# Patient Record
Sex: FEMALE | Race: WHITE | NOT HISPANIC OR LATINO | Employment: OTHER | URBAN - METROPOLITAN AREA
[De-identification: names, ages, dates, MRNs, and addresses within clinical notes are randomized per-mention and may not be internally consistent; named-entity substitution may affect disease eponyms.]

---

## 2017-01-16 ENCOUNTER — LAB REQUISITION (OUTPATIENT)
Dept: LAB | Facility: HOSPITAL | Age: 82
End: 2017-01-16
Payer: MEDICARE

## 2017-01-16 ENCOUNTER — ALLSCRIPTS OFFICE VISIT (OUTPATIENT)
Dept: OTHER | Facility: OTHER | Age: 82
End: 2017-01-16

## 2017-01-16 DIAGNOSIS — N39.0 URINARY TRACT INFECTION: ICD-10-CM

## 2017-01-16 LAB
BILIRUB UR QL STRIP: NEGATIVE
CLARITY UR: NORMAL
COLOR UR: YELLOW
GLUCOSE (HISTORICAL): NEGATIVE
HGB UR QL STRIP.AUTO: NEGATIVE
KETONES UR STRIP-MCNC: NEGATIVE MG/DL
LEUKOCYTE ESTERASE UR QL STRIP: NORMAL
NITRITE UR QL STRIP: NEGATIVE
PH UR STRIP.AUTO: 7 [PH]
PROT UR STRIP-MCNC: NEGATIVE MG/DL
SP GR UR STRIP.AUTO: 1.01
UROBILINOGEN UR QL STRIP.AUTO: 0.2

## 2017-01-16 PROCEDURE — 87086 URINE CULTURE/COLONY COUNT: CPT | Performed by: FAMILY MEDICINE

## 2017-01-18 LAB — BACTERIA UR CULT: NORMAL

## 2017-01-19 ENCOUNTER — GENERIC CONVERSION - ENCOUNTER (OUTPATIENT)
Dept: OTHER | Facility: OTHER | Age: 82
End: 2017-01-19

## 2017-01-30 ENCOUNTER — ALLSCRIPTS OFFICE VISIT (OUTPATIENT)
Dept: OTHER | Facility: OTHER | Age: 82
End: 2017-01-30

## 2017-01-30 ENCOUNTER — LAB REQUISITION (OUTPATIENT)
Dept: LAB | Facility: HOSPITAL | Age: 82
End: 2017-01-30
Payer: MEDICARE

## 2017-01-30 DIAGNOSIS — R31.9 HEMATURIA: ICD-10-CM

## 2017-01-30 LAB
BILIRUB UR QL STRIP: NORMAL
CLARITY UR: NORMAL
COLOR UR: YELLOW
GLUCOSE (HISTORICAL): NORMAL
HGB UR QL STRIP.AUTO: NORMAL
KETONES UR STRIP-MCNC: NORMAL MG/DL
LEUKOCYTE ESTERASE UR QL STRIP: NORMAL
NITRITE UR QL STRIP: NORMAL
PH UR STRIP.AUTO: 7 [PH]
PROT UR STRIP-MCNC: NORMAL MG/DL
SP GR UR STRIP.AUTO: 1.01
UROBILINOGEN UR QL STRIP.AUTO: 0.2

## 2017-01-30 PROCEDURE — 87086 URINE CULTURE/COLONY COUNT: CPT | Performed by: FAMILY MEDICINE

## 2017-02-01 LAB — BACTERIA UR CULT: NORMAL

## 2017-02-03 ENCOUNTER — GENERIC CONVERSION - ENCOUNTER (OUTPATIENT)
Dept: OTHER | Facility: OTHER | Age: 82
End: 2017-02-03

## 2017-02-16 DIAGNOSIS — R30.0 DYSURIA: ICD-10-CM

## 2017-02-16 DIAGNOSIS — N39.0 URINARY TRACT INFECTION: ICD-10-CM

## 2017-03-09 ENCOUNTER — ALLSCRIPTS OFFICE VISIT (OUTPATIENT)
Dept: OTHER | Facility: OTHER | Age: 82
End: 2017-03-09

## 2017-03-09 ENCOUNTER — GENERIC CONVERSION - ENCOUNTER (OUTPATIENT)
Dept: OTHER | Facility: OTHER | Age: 82
End: 2017-03-09

## 2017-03-09 ENCOUNTER — APPOINTMENT (OUTPATIENT)
Dept: LAB | Facility: HOSPITAL | Age: 82
End: 2017-03-09
Payer: MEDICARE

## 2017-03-09 LAB
BILIRUB UR QL STRIP: NORMAL
CLARITY UR: NORMAL
COLOR UR: YELLOW
GLUCOSE (HISTORICAL): NORMAL
HGB UR QL STRIP.AUTO: NORMAL
KETONES UR STRIP-MCNC: NORMAL MG/DL
LEUKOCYTE ESTERASE UR QL STRIP: NORMAL
NITRITE UR QL STRIP: NORMAL
PH UR STRIP.AUTO: 7 [PH]
PROT UR STRIP-MCNC: NORMAL MG/DL
SP GR UR STRIP.AUTO: 1.02
UROBILINOGEN UR QL STRIP.AUTO: 0.2

## 2017-03-09 PROCEDURE — 87077 CULTURE AEROBIC IDENTIFY: CPT

## 2017-03-10 ENCOUNTER — APPOINTMENT (OUTPATIENT)
Dept: LAB | Facility: HOSPITAL | Age: 82
End: 2017-03-10
Payer: MEDICARE

## 2017-03-10 DIAGNOSIS — R30.0 DYSURIA: ICD-10-CM

## 2017-03-10 PROCEDURE — 87086 URINE CULTURE/COLONY COUNT: CPT

## 2017-03-11 ENCOUNTER — ALLSCRIPTS OFFICE VISIT (OUTPATIENT)
Dept: OTHER | Facility: OTHER | Age: 82
End: 2017-03-11

## 2017-03-11 ENCOUNTER — LAB REQUISITION (OUTPATIENT)
Dept: LAB | Facility: HOSPITAL | Age: 82
End: 2017-03-11
Payer: MEDICARE

## 2017-03-11 DIAGNOSIS — N30.00 ACUTE CYSTITIS WITHOUT HEMATURIA: ICD-10-CM

## 2017-03-11 PROCEDURE — 87086 URINE CULTURE/COLONY COUNT: CPT | Performed by: FAMILY MEDICINE

## 2017-03-11 PROCEDURE — 87077 CULTURE AEROBIC IDENTIFY: CPT | Performed by: FAMILY MEDICINE

## 2017-03-17 LAB
BACTERIA UR CULT: NORMAL
BACTERIA UR CULT: NORMAL

## 2017-04-03 ENCOUNTER — ALLSCRIPTS OFFICE VISIT (OUTPATIENT)
Dept: OTHER | Facility: OTHER | Age: 82
End: 2017-04-03

## 2017-04-03 ENCOUNTER — LAB REQUISITION (OUTPATIENT)
Dept: LAB | Facility: HOSPITAL | Age: 82
End: 2017-04-03
Payer: MEDICARE

## 2017-04-03 ENCOUNTER — GENERIC CONVERSION - ENCOUNTER (OUTPATIENT)
Dept: OTHER | Facility: OTHER | Age: 82
End: 2017-04-03

## 2017-04-03 DIAGNOSIS — N39.0 URINARY TRACT INFECTION: ICD-10-CM

## 2017-04-03 DIAGNOSIS — R30.0 DYSURIA: ICD-10-CM

## 2017-04-03 LAB
BILIRUB UR QL STRIP: ABNORMAL
CLARITY UR: ABNORMAL
COLOR UR: YELLOW
GLUCOSE (HISTORICAL): ABNORMAL
HGB UR QL STRIP.AUTO: ABNORMAL
KETONES UR STRIP-MCNC: ABNORMAL MG/DL
LEUKOCYTE ESTERASE UR QL STRIP: ABNORMAL
NITRITE UR QL STRIP: ABNORMAL
PH UR STRIP.AUTO: 7.5 [PH]
PROT UR STRIP-MCNC: 30 MG/DL
SP GR UR STRIP.AUTO: 1.01
UROBILINOGEN UR QL STRIP.AUTO: 0.2

## 2017-04-03 PROCEDURE — 87086 URINE CULTURE/COLONY COUNT: CPT | Performed by: FAMILY MEDICINE

## 2017-04-05 LAB — BACTERIA UR CULT: NORMAL

## 2017-04-06 ENCOUNTER — GENERIC CONVERSION - ENCOUNTER (OUTPATIENT)
Dept: OTHER | Facility: OTHER | Age: 82
End: 2017-04-06

## 2017-04-20 ENCOUNTER — APPOINTMENT (OUTPATIENT)
Dept: LAB | Facility: CLINIC | Age: 82
End: 2017-04-20
Payer: MEDICARE

## 2017-04-20 ENCOUNTER — TRANSCRIBE ORDERS (OUTPATIENT)
Dept: LAB | Facility: CLINIC | Age: 82
End: 2017-04-20

## 2017-04-20 DIAGNOSIS — E87.6 HYPOKALEMIA: ICD-10-CM

## 2017-04-20 DIAGNOSIS — M19.90 OSTEOARTHRITIS: ICD-10-CM

## 2017-04-20 DIAGNOSIS — K21.9 GASTRO-ESOPHAGEAL REFLUX DISEASE WITHOUT ESOPHAGITIS: ICD-10-CM

## 2017-04-20 DIAGNOSIS — I10 ESSENTIAL (PRIMARY) HYPERTENSION: ICD-10-CM

## 2017-04-20 DIAGNOSIS — R74.8 ABNORMAL LEVELS OF OTHER SERUM ENZYMES: ICD-10-CM

## 2017-04-20 DIAGNOSIS — R68.89 OTHER GENERAL SYMPTOMS AND SIGNS: ICD-10-CM

## 2017-04-20 DIAGNOSIS — E55.9 VITAMIN D DEFICIENCY: ICD-10-CM

## 2017-04-20 DIAGNOSIS — R73.9 HYPERGLYCEMIA: ICD-10-CM

## 2017-04-20 DIAGNOSIS — E78.5 HYPERLIPIDEMIA: ICD-10-CM

## 2017-04-20 DIAGNOSIS — Z00.00 ENCOUNTER FOR GENERAL ADULT MEDICAL EXAMINATION WITHOUT ABNORMAL FINDINGS: ICD-10-CM

## 2017-04-20 DIAGNOSIS — F41.9 ANXIETY DISORDER: ICD-10-CM

## 2017-04-20 DIAGNOSIS — I89.0 LYMPHEDEMA, NOT ELSEWHERE CLASSIFIED: ICD-10-CM

## 2017-04-20 LAB
25(OH)D3 SERPL-MCNC: 58.3 NG/ML (ref 30–100)
ALBUMIN SERPL BCP-MCNC: 4.1 G/DL (ref 3.5–5)
ALP SERPL-CCNC: 96 U/L (ref 46–116)
ALT SERPL W P-5'-P-CCNC: 16 U/L (ref 12–78)
ANION GAP SERPL CALCULATED.3IONS-SCNC: 7 MMOL/L (ref 4–13)
AST SERPL W P-5'-P-CCNC: 14 U/L (ref 5–45)
BACTERIA UR QL AUTO: ABNORMAL /HPF
BILIRUB SERPL-MCNC: 0.47 MG/DL (ref 0.2–1)
BILIRUB UR QL STRIP: NEGATIVE
BUN SERPL-MCNC: 11 MG/DL (ref 5–25)
CALCIUM SERPL-MCNC: 9.3 MG/DL (ref 8.3–10.1)
CHLORIDE SERPL-SCNC: 99 MMOL/L (ref 100–108)
CHOLEST SERPL-MCNC: 204 MG/DL (ref 50–200)
CLARITY UR: ABNORMAL
CO2 SERPL-SCNC: 30 MMOL/L (ref 21–32)
COLOR UR: YELLOW
CREAT SERPL-MCNC: 0.56 MG/DL (ref 0.6–1.3)
ERYTHROCYTE [DISTWIDTH] IN BLOOD BY AUTOMATED COUNT: 14 % (ref 11.6–15.1)
EST. AVERAGE GLUCOSE BLD GHB EST-MCNC: 111 MG/DL
GFR SERPL CREATININE-BSD FRML MDRD: >60 ML/MIN/1.73SQ M
GLUCOSE P FAST SERPL-MCNC: 109 MG/DL (ref 65–99)
GLUCOSE UR STRIP-MCNC: NEGATIVE MG/DL
HBA1C MFR BLD: 5.5 % (ref 4.2–6.3)
HCT VFR BLD AUTO: 39.8 % (ref 34.8–46.1)
HDLC SERPL-MCNC: 60 MG/DL (ref 40–60)
HGB BLD-MCNC: 13.6 G/DL (ref 11.5–15.4)
HGB UR QL STRIP.AUTO: NEGATIVE
KETONES UR STRIP-MCNC: NEGATIVE MG/DL
LDLC SERPL CALC-MCNC: 100 MG/DL (ref 0–100)
LEUKOCYTE ESTERASE UR QL STRIP: ABNORMAL
MCH RBC QN AUTO: 31.8 PG (ref 26.8–34.3)
MCHC RBC AUTO-ENTMCNC: 34.2 G/DL (ref 31.4–37.4)
MCV RBC AUTO: 93 FL (ref 82–98)
NITRITE UR QL STRIP: NEGATIVE
NON-SQ EPI CELLS URNS QL MICRO: ABNORMAL /HPF
PH UR STRIP.AUTO: 6.5 [PH] (ref 4.5–8)
PLATELET # BLD AUTO: 254 THOUSANDS/UL (ref 149–390)
PMV BLD AUTO: 10.6 FL (ref 8.9–12.7)
POTASSIUM SERPL-SCNC: 3.7 MMOL/L (ref 3.5–5.3)
PROT SERPL-MCNC: 8 G/DL (ref 6.4–8.2)
PROT UR STRIP-MCNC: NEGATIVE MG/DL
RBC # BLD AUTO: 4.28 MILLION/UL (ref 3.81–5.12)
RBC #/AREA URNS AUTO: ABNORMAL /HPF
SODIUM SERPL-SCNC: 136 MMOL/L (ref 136–145)
SP GR UR STRIP.AUTO: 1.01 (ref 1–1.03)
TRIGL SERPL-MCNC: 222 MG/DL
TSH SERPL DL<=0.05 MIU/L-ACNC: 2.21 UIU/ML (ref 0.36–3.74)
UROBILINOGEN UR QL STRIP.AUTO: 0.2 E.U./DL
WBC # BLD AUTO: 4.53 THOUSAND/UL (ref 4.31–10.16)
WBC #/AREA URNS AUTO: ABNORMAL /HPF

## 2017-04-20 PROCEDURE — 36415 COLL VENOUS BLD VENIPUNCTURE: CPT

## 2017-04-20 PROCEDURE — 84075 ASSAY ALKALINE PHOSPHATASE: CPT

## 2017-04-20 PROCEDURE — 80053 COMPREHEN METABOLIC PANEL: CPT

## 2017-04-20 PROCEDURE — 81001 URINALYSIS AUTO W/SCOPE: CPT

## 2017-04-20 PROCEDURE — 80061 LIPID PANEL: CPT

## 2017-04-20 PROCEDURE — 84080 ASSAY ALKALINE PHOSPHATASES: CPT

## 2017-04-20 PROCEDURE — 83036 HEMOGLOBIN GLYCOSYLATED A1C: CPT

## 2017-04-20 PROCEDURE — 82306 VITAMIN D 25 HYDROXY: CPT

## 2017-04-20 PROCEDURE — 85027 COMPLETE CBC AUTOMATED: CPT

## 2017-04-20 PROCEDURE — 84443 ASSAY THYROID STIM HORMONE: CPT

## 2017-04-24 LAB
ALP BONE CFR SERPL: 31 % (ref 14–68)
ALP INTEST CFR SERPL: 7 % (ref 0–18)
ALP LIVER CFR SERPL: 62 % (ref 18–85)
ALP SERPL-CCNC: 93 IU/L (ref 39–117)

## 2017-04-28 ENCOUNTER — ALLSCRIPTS OFFICE VISIT (OUTPATIENT)
Dept: OTHER | Facility: OTHER | Age: 82
End: 2017-04-28

## 2017-04-28 ENCOUNTER — LAB REQUISITION (OUTPATIENT)
Dept: LAB | Facility: HOSPITAL | Age: 82
End: 2017-04-28
Payer: MEDICARE

## 2017-04-28 DIAGNOSIS — N39.0 URINARY TRACT INFECTION: ICD-10-CM

## 2017-04-28 LAB
BILIRUB UR QL STRIP: NEGATIVE
CLARITY UR: NORMAL
COLOR UR: CLEAR
GLUCOSE (HISTORICAL): NEGATIVE
HGB UR QL STRIP.AUTO: NEGATIVE
KETONES UR STRIP-MCNC: NEGATIVE MG/DL
LEUKOCYTE ESTERASE UR QL STRIP: NORMAL
NITRITE UR QL STRIP: NEGATIVE
PH UR STRIP.AUTO: 7 [PH]
PROT UR STRIP-MCNC: NEGATIVE MG/DL
SP GR UR STRIP.AUTO: 1.01
UROBILINOGEN UR QL STRIP.AUTO: 0.2

## 2017-04-28 PROCEDURE — 87086 URINE CULTURE/COLONY COUNT: CPT | Performed by: FAMILY MEDICINE

## 2017-05-01 LAB — BACTERIA UR CULT: NORMAL

## 2017-05-02 ENCOUNTER — GENERIC CONVERSION - ENCOUNTER (OUTPATIENT)
Dept: OTHER | Facility: OTHER | Age: 82
End: 2017-05-02

## 2017-06-06 ENCOUNTER — ALLSCRIPTS OFFICE VISIT (OUTPATIENT)
Dept: OTHER | Facility: OTHER | Age: 82
End: 2017-06-06

## 2017-10-19 ENCOUNTER — APPOINTMENT (OUTPATIENT)
Dept: LAB | Facility: HOSPITAL | Age: 82
End: 2017-10-19
Payer: MEDICARE

## 2017-10-19 ENCOUNTER — APPOINTMENT (EMERGENCY)
Dept: CT IMAGING | Facility: HOSPITAL | Age: 82
End: 2017-10-19
Payer: MEDICARE

## 2017-10-19 ENCOUNTER — HOSPITAL ENCOUNTER (EMERGENCY)
Facility: HOSPITAL | Age: 82
Discharge: HOME | End: 2017-10-19
Attending: EMERGENCY MEDICINE | Admitting: EMERGENCY MEDICINE
Payer: MEDICARE

## 2017-10-19 ENCOUNTER — GENERIC CONVERSION - ENCOUNTER (OUTPATIENT)
Dept: OTHER | Facility: OTHER | Age: 82
End: 2017-10-19

## 2017-10-19 ENCOUNTER — HOSPITAL ENCOUNTER (OUTPATIENT)
Dept: NON INVASIVE DIAGNOSTICS | Facility: HOSPITAL | Age: 82
Discharge: HOME | End: 2017-10-19
Payer: MEDICARE

## 2017-10-19 ENCOUNTER — ALLSCRIPTS OFFICE VISIT (OUTPATIENT)
Dept: OTHER | Facility: OTHER | Age: 82
End: 2017-10-19

## 2017-10-19 ENCOUNTER — HOSPITAL ENCOUNTER (OUTPATIENT)
Dept: RADIOLOGY | Facility: HOSPITAL | Age: 82
Discharge: HOME | End: 2017-10-19
Payer: MEDICARE

## 2017-10-19 VITALS
HEART RATE: 76 BPM | DIASTOLIC BLOOD PRESSURE: 66 MMHG | OXYGEN SATURATION: 98 % | RESPIRATION RATE: 21 BRPM | TEMPERATURE: 97.9 F | SYSTOLIC BLOOD PRESSURE: 144 MMHG

## 2017-10-19 DIAGNOSIS — R60.0 LOCALIZED EDEMA: ICD-10-CM

## 2017-10-19 DIAGNOSIS — R07.9 CHEST PAIN: ICD-10-CM

## 2017-10-19 DIAGNOSIS — I89.0 LYMPHEDEMA, NOT ELSEWHERE CLASSIFIED: ICD-10-CM

## 2017-10-19 DIAGNOSIS — I10 ESSENTIAL (PRIMARY) HYPERTENSION: ICD-10-CM

## 2017-10-19 DIAGNOSIS — R68.89 OTHER GENERAL SYMPTOMS AND SIGNS: ICD-10-CM

## 2017-10-19 DIAGNOSIS — N64.4 MASTODYNIA: ICD-10-CM

## 2017-10-19 DIAGNOSIS — M54.6 PAIN IN THORACIC SPINE: ICD-10-CM

## 2017-10-19 DIAGNOSIS — M19.90 OSTEOARTHRITIS: ICD-10-CM

## 2017-10-19 DIAGNOSIS — R07.9 CHEST PAIN: Primary | ICD-10-CM

## 2017-10-19 DIAGNOSIS — C50.919 MALIGNANT NEOPLASM OF FEMALE BREAST (HCC): ICD-10-CM

## 2017-10-19 LAB
ALBUMIN SERPL BCP-MCNC: 3.8 G/DL (ref 3.5–5)
ALP SERPL-CCNC: 101 U/L (ref 46–116)
ALT SERPL W P-5'-P-CCNC: 16 U/L (ref 12–78)
ANION GAP SERPL CALCULATED.3IONS-SCNC: 6 MMOL/L (ref 4–13)
AST SERPL W P-5'-P-CCNC: 20 U/L (ref 5–45)
ATRIAL RATE: 76 BPM
BILIRUB SERPL-MCNC: 0.47 MG/DL (ref 0.2–1)
BUN SERPL-MCNC: 14 MG/DL (ref 5–25)
CALCIUM SERPL-MCNC: 9.3 MG/DL (ref 8.3–10.1)
CHLORIDE SERPL-SCNC: 102 MMOL/L (ref 100–108)
CO2 SERPL-SCNC: 32 MMOL/L (ref 21–32)
CREAT SERPL-MCNC: 0.68 MG/DL (ref 0.6–1.3)
DEPRECATED D DIMER PPP: 616 NG/ML (FEU) (ref 0–424)
ERYTHROCYTE [DISTWIDTH] IN BLOOD BY AUTOMATED COUNT: 14.1 % (ref 11.6–15.1)
GFR SERPL CREATININE-BSD FRML MDRD: 77 ML/MIN/1.73SQ M
GLUCOSE SERPL-MCNC: 98 MG/DL (ref 65–140)
HCT VFR BLD AUTO: 36.9 % (ref 34.8–46.1)
HGB BLD-MCNC: 12.6 G/DL (ref 11.5–15.4)
MCH RBC QN AUTO: 31.6 PG (ref 26.8–34.3)
MCHC RBC AUTO-ENTMCNC: 34.1 G/DL (ref 31.4–37.4)
MCV RBC AUTO: 93 FL (ref 82–98)
P AXIS: 64 DEGREES
PLATELET # BLD AUTO: 243 THOUSANDS/UL (ref 149–390)
PMV BLD AUTO: 9.8 FL (ref 8.9–12.7)
POTASSIUM SERPL-SCNC: 3.6 MMOL/L (ref 3.5–5.3)
PR INTERVAL: 170 MS
PROT SERPL-MCNC: 7.9 G/DL (ref 6.4–8.2)
QRS AXIS: 68 DEGREES
QRSD INTERVAL: 82 MS
QT INTERVAL: 384 MS
QTC INTERVAL: 432 MS
RBC # BLD AUTO: 3.99 MILLION/UL (ref 3.81–5.12)
SODIUM SERPL-SCNC: 140 MMOL/L (ref 136–145)
SPECIMEN SOURCE: NORMAL
T WAVE AXIS: 59 DEGREES
TROPONIN I BLD-MCNC: 0.01 NG/ML (ref 0–0.08)
VENTRICULAR RATE: 76 BPM
WBC # BLD AUTO: 5.08 THOUSAND/UL (ref 4.31–10.16)

## 2017-10-19 PROCEDURE — 71275 CT ANGIOGRAPHY CHEST: CPT

## 2017-10-19 PROCEDURE — 85027 COMPLETE CBC AUTOMATED: CPT

## 2017-10-19 PROCEDURE — 36415 COLL VENOUS BLD VENIPUNCTURE: CPT

## 2017-10-19 PROCEDURE — 72072 X-RAY EXAM THORAC SPINE 3VWS: CPT

## 2017-10-19 PROCEDURE — 80053 COMPREHEN METABOLIC PANEL: CPT

## 2017-10-19 PROCEDURE — 84484 ASSAY OF TROPONIN QUANT: CPT

## 2017-10-19 PROCEDURE — 99284 EMERGENCY DEPT VISIT MOD MDM: CPT

## 2017-10-19 PROCEDURE — 85379 FIBRIN DEGRADATION QUANT: CPT

## 2017-10-19 PROCEDURE — 93970 EXTREMITY STUDY: CPT

## 2017-10-19 PROCEDURE — 71020 HB CHEST X-RAY 2VW FRONTAL&LATL: CPT

## 2017-10-19 PROCEDURE — 93005 ELECTROCARDIOGRAM TRACING: CPT | Performed by: EMERGENCY MEDICINE

## 2017-10-19 RX ORDER — CHLORDIAZEPOXIDE HYDROCHLORIDE 25 MG/1
25 CAPSULE, GELATIN COATED ORAL 2 TIMES DAILY
COMMUNITY
End: 2018-06-06 | Stop reason: SDUPTHER

## 2017-10-19 RX ORDER — POTASSIUM CHLORIDE 750 MG/1
20 TABLET, EXTENDED RELEASE ORAL DAILY
COMMUNITY
End: 2019-03-18

## 2017-10-19 RX ORDER — OMEPRAZOLE 20 MG/1
20 CAPSULE, DELAYED RELEASE ORAL DAILY
COMMUNITY
End: 2018-05-09 | Stop reason: SDUPTHER

## 2017-10-19 RX ORDER — FUROSEMIDE 40 MG/1
40 TABLET ORAL 2 TIMES DAILY
COMMUNITY
End: 2018-05-09 | Stop reason: SDUPTHER

## 2017-10-19 RX ORDER — TRAMADOL HYDROCHLORIDE 50 MG/1
100 TABLET ORAL EVERY 6 HOURS PRN
COMMUNITY
End: 2018-06-06 | Stop reason: SDUPTHER

## 2017-10-19 RX ORDER — PRAMIPEXOLE DIHYDROCHLORIDE 0.25 MG/1
0.25 TABLET ORAL DAILY
COMMUNITY
End: 2018-05-09 | Stop reason: SDUPTHER

## 2017-10-19 RX ORDER — NYSTATIN 100000 [USP'U]/G
1 POWDER TOPICAL AS NEEDED
COMMUNITY
End: 2018-02-17 | Stop reason: SDUPTHER

## 2017-10-19 RX ORDER — BRIMONIDINE TARTRATE, TIMOLOL MALEATE 2; 5 MG/ML; MG/ML
1 SOLUTION/ DROPS OPHTHALMIC 2 TIMES DAILY
COMMUNITY

## 2017-10-19 RX ORDER — LATANOPROST 50 UG/ML
1 SOLUTION/ DROPS OPHTHALMIC DAILY
COMMUNITY

## 2017-10-19 RX ORDER — ENALAPRIL MALEATE 10 MG/1
10 TABLET ORAL DAILY
COMMUNITY
End: 2018-05-09 | Stop reason: SDUPTHER

## 2017-10-19 RX ADMIN — IOHEXOL 85 ML: 350 INJECTION, SOLUTION INTRAVENOUS at 20:31

## 2017-10-19 NOTE — ED PROVIDER NOTES
History  Chief Complaint   Patient presents with    Evaluation of Abnormal Diagnostic Test     Pt presents to ED with daughter, reports having had  outpt tests done today and referred to ED for f/u due to potential blod clot in her lungs, pt denies complaints  This 70-year-old female with a history of breast cancer some 20 years ago presented to the emergency department after an abnormal outpatient blood test   The patient was apparently having some outpatient blood work done today and was called by her physician's office and told she needed to go to the emergency department  On review of the medical record, it appears the patient had an elevated D-dimer that was ordered as an outpatient  The patient denies any chest pain or shortness of breath at this time  She has had some intermittent right-sided breast pain and left-sided back pain that has been coming and going over the past few days  Patient notes that she has chronic back pain for some time  She already had bilateral lower extremity duplexes performed today which were normal  Her symptoms are described as mild and without exacerbating or remitting factors  She has had no fevers  He has had no cough  History provided by:  Patient   used: No    Evaluation of Abnormal Diagnostic Test   Time since result:  3 hours  Patient referred by:  PCP  Resulting agency:  Internal  Result type comment:  D-dimer      Prior to Admission Medications   Prescriptions Last Dose Informant Patient Reported? Taking?    Cholecalciferol (VITAMIN D PO)   Yes Yes   Sig: Take 2,000 Units by mouth daily   MECLIZINE HCL PO   Yes Yes   Sig: Take 25 mg by mouth 3 (three) times a day as needed   brimonidine-timolol (COMBIGAN) 0 2-0 5 %   Yes Yes   Sig: Administer 1 drop into the left eye 2 (two) times a day   chlordiazePOXIDE (LIBRIUM) 25 mg capsule   Yes Yes   Sig: Take 25 mg by mouth 2 (two) times a day   enalapril (VASOTEC) 10 mg tablet   Yes Yes Sig: Take 10 mg by mouth daily   furosemide (LASIX) 40 mg tablet   Yes Yes   Sig: Take 40 mg by mouth 2 (two) times a day   latanoprost (XALATAN) 0 005 % ophthalmic solution   Yes Yes   Sig: Administer 1 drop into the left eye daily   lidocaine (LIDODERM) 5 %   No Yes   Sig: Place 1 patch on the skin every 24 hours Remove & Discard patch within 12 hours or as directed by MD   nystatin (MYCOSTATIN) powder   Yes Yes   Sig: Apply 1 application topically as needed (2-3 times per day)   omeprazole (PriLOSEC) 20 mg delayed release capsule   Yes Yes   Sig: Take 20 mg by mouth daily   potassium chloride (K-DUR,KLOR-CON) 10 mEq tablet   Yes Yes   Sig: Take 20 mEq by mouth daily   pramipexole (MIRAPEX) 0 25 mg tablet   Yes Yes   Sig: Take 0 25 mg by mouth daily   traMADol (ULTRAM) 50 mg tablet   Yes Yes   Sig: Take 100 mg by mouth every 6 (six) hours as needed for moderate pain      Facility-Administered Medications: None       Past Medical History:   Diagnosis Date    Anxiety     Arthritis     Cancer ([facility] [address] )     Hypertension        Past Surgical History:   Procedure Laterality Date    BREAST SURGERY      CHOLECYSTECTOMY      HERNIA REPAIR      HYSTERECTOMY         History reviewed  No pertinent family history  I have reviewed and agree with the history as documented  Social History   Substance Use Topics    Smoking status: Never Smoker    Smokeless tobacco: Never Used    Alcohol use No        Review of Systems   Constitutional: Negative for activity change, appetite change, fatigue, fever and unexpected weight change  HENT: Negative for congestion, sore throat and voice change  Eyes: Negative for pain and visual disturbance  Respiratory: Negative for cough, chest tightness and shortness of breath  Cardiovascular: Negative for chest pain  Gastrointestinal: Negative for abdominal pain, diarrhea, nausea and vomiting  Endocrine: Negative for polyphagia and polyuria     Genitourinary: Negative for difficulty urinating, dysuria, flank pain, frequency and urgency  Musculoskeletal: Negative for back pain, gait problem, neck pain and neck stiffness  Skin: Negative for color change and rash  Allergic/Immunologic: Negative for immunocompromised state  Neurological: Negative for dizziness, syncope, speech difficulty, light-headedness, numbness and headaches  Hematological: Does not bruise/bleed easily  Psychiatric/Behavioral: Negative for confusion and decreased concentration  Physical Exam  ED Triage Vitals [10/19/17 1714]   Temperature Pulse Respirations Blood Pressure SpO2   97 9 °F (36 6 °C) 85 18 155/71 97 %      Temp Source Heart Rate Source Patient Position - Orthostatic VS BP Location FiO2 (%)   Oral Monitor Sitting Right arm --      Pain Score       No Pain           Physical Exam   Constitutional: She is oriented to person, place, and time  She appears well-developed and well-nourished  HENT:   Head: Normocephalic and atraumatic  Eyes: Conjunctivae and EOM are normal  Pupils are equal, round, and reactive to light  No scleral icterus  Neck: Normal range of motion  Neck supple  No tracheal deviation present  Cardiovascular: Normal rate and regular rhythm  Pulmonary/Chest: Effort normal and breath sounds normal  No respiratory distress  Abdominal: Soft  She exhibits no distension  There is no tenderness  There is no rebound and no guarding  Musculoskeletal: Normal range of motion  She exhibits no tenderness or deformity  Lymphadenopathy:     She has no cervical adenopathy  Neurological: She is alert and oriented to person, place, and time  No gross focal sensory or motor deficits   Skin: Skin is warm and dry  No rash noted  She is not diaphoretic  Psychiatric: She has a normal mood and affect  Vitals reviewed        ED Medications  Medications   iohexol (OMNIPAQUE) 350 MG/ML injection (MULTI-DOSE) 85 mL (85 mL Intravenous Given 10/19/17 2031)       Diagnostic Studies  Labs Reviewed   POCT TROPONIN - Normal       Result Value Ref Range Status    POC Troponin I 0 01  0 00 - 0 08 ng/ml Final    Specimen Type VENOUS   Final    Narrative:     [employer] i-Stat handheld analyzer 99% cutoff is > 0 08ng/mL in [facility] Emergency Departments    o cTnI 99% cutoff is useful only when applied to patients in the clinical setting of myocardial ischemia  o cTnI 99% cutoff should be interpreted in the context of clinical history, ECG findings and possibly cardiac imaging to establish correct diagnosis  o cTnI 99% cutoff may be suggestive but clearly not indicative of a coronary event without the clinical setting of myocardial ischemia  CTA ED chest PE study   Final Result      No evidence of pulmonary embolus  Large hiatal hernia  Workstation performed: [id number]             Procedures  Procedures      Phone Contacts  ED Phone Contact    ED Course  ED Course                                MDM  Number of Diagnoses or Management Options  Chest pain: new and requires workup  Diagnosis management comments: 70-year-old female presented after being told by her primary care physician's office that she had an abnormal blood test and that she might have a blood clot in her lungs  The patient did affirm some intermittent back pain over the past two days but denied any shortness of breath or dyspnea on exertion  A CTA of the chest was negative for pulmonary embolism  She will be discharged to follow up with her primary care physician         Amount and/or Complexity of Data Reviewed  Clinical lab tests: reviewed and ordered  Tests in the radiology section of CPT®: reviewed and ordered  Review and summarize past medical records: yes  Independent visualization of images, tracings, or specimens: yes      CritCare Time    Disposition  Final diagnoses:   Chest pain     ED Disposition     ED Disposition Condition Comment    Discharge  [address] Said discharge to home/self care     Condition at discharge: Good        Follow-up Information     Follow up With Specialties Details Why [address] [facility],  Family Medicine   [place] [address]  [phone]          Discharge Medication List as of 10/19/2017  8:52 PM      CONTINUE these medications which have NOT CHANGED    Details   brimonidine-timolol (COMBIGAN) 0 2-0 5 % Administer 1 drop into the left eye 2 (two) times a day, Historical Med      chlordiazePOXIDE (LIBRIUM) 25 mg capsule Take 25 mg by mouth 2 (two) times a day, Historical Med      Cholecalciferol (VITAMIN D PO) Take 2,000 Units by mouth daily, Historical Med      enalapril (VASOTEC) 10 mg tablet Take 10 mg by mouth daily, Historical Med      furosemide (LASIX) 40 mg tablet Take 40 mg by mouth 2 (two) times a day, Historical Med      latanoprost (XALATAN) 0 005 % ophthalmic solution Administer 1 drop into the left eye daily, Historical Med      lidocaine (LIDODERM) 5 % Place 1 patch on the skin every 24 hours Remove & Discard patch within 12 hours or as directed by MD, Starting 11/20/2016, Until Discontinued, Print      MECLIZINE HCL PO Take 25 mg by mouth 3 (three) times a day as needed, Historical Med      nystatin (MYCOSTATIN) powder Apply 1 application topically as needed (2-3 times per day), Historical Med      omeprazole (PriLOSEC) 20 mg delayed release capsule Take 20 mg by mouth daily, Historical Med      potassium chloride (K-DUR,KLOR-CON) 10 mEq tablet Take 20 mEq by mouth daily, Historical Med      pramipexole (MIRAPEX) 0 25 mg tablet Take 0 25 mg by mouth daily, Historical Med      traMADol (ULTRAM) 50 mg tablet Take 100 mg by mouth every 6 (six) hours as needed for moderate pain, Historical Med           No discharge procedures on file      ED Provider  Electronically Signed by       Shameka Coles MD  10/19/17 3562

## 2017-10-20 NOTE — DISCHARGE INSTRUCTIONS

## 2017-10-20 NOTE — PROGRESS NOTES
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 2)    Plan  Breast carcinoma, Chest pain, Mastalgia, Thoracic back pain    · * XR CHEST PA & LATERAL; Status:Active; Requested FTQ:[id number];    · * XR SPINE THORACIC 3 VIEW; Status:Active; Requested HJV:[id number];    · MAMMO DIAGNOSTIC RIGHT W CAD; Status:Hold For - Scheduling; Requested  for:19Oct2017;   Breast carcinoma, Mastalgia    · 1 - Jacob Pichardo MD, Mayur Abreu  (Surgical Oncology) Co-Management  *  Status: Active  Requested  for: 51TOE5556  Care Summary provided  : Yes  Chest pain, Decreased ambulation status, Hypertension, Leg edema, Lymphedema,  Mastalgia, Osteoarthritis, Thoracic back pain    · Continue with our present treatment plan ; Status:Complete;   Done: 30ACO3830 01:51PM   · Follow-up visit in 1 week Evaluation and Treatment  Follow-up  Status: Hold For -  Scheduling  Requested for: 72AXE0462   · (1) CBC/ PLT (NO DIFF); Status:Active; Requested BHN:[id number];    · (1) COMPREHENSIVE METABOLIC PANEL; Status:Active; Requested NPF:[id number];    · (1) D-DIMER; Status:Active; Requested DIM:[id number];   Encounter for special screening examination for genitourinary disorder    · *VB - Urinary Incontinence Screen (Dx Z13 89 Screen for UI);  Status:Complete -  Retrospective Authorization;   Done: 96FZI0522 01:41PM  Leg edema, Lymphedema    · VAS LOWER LIMB VENOUS DUPLEX STUDY, COMPLETE BILATERAL; Status:Hold For  - Scheduling; Requested VLM:[id number];   Need for prophylactic vaccination and inoculation against influenza    · Fluzone High-Dose 0 5 ML Intramuscular Suspension Prefilled Syringe  Neoplasm of skin of face    · 2 - Darrin Quinn  (Dermatology) Co-Management  *  Status: Hold For -  Scheduling  Requested for: 10HJF9201  Care Summary provided  : Yes    Discussion/Summary    1  Chest pain/thoracic pain, EKG showed no change checked out thoracic and chest x-ray I believe this is going to be muscle skeletal however will rule out cardiovascular and pulmonary with testing as performedRight mastalgia with history of left breast carcinoma mammography is ordered refer to breast specialist, Dr Adams  with increasing lymphedema left lower extremity will check venous duplex check blood work will refer for physical therapy/lymphedema evaluationDJD/decreased ambulation status patient ambulates with walkerHypertension, stable continue present therapyFacial neoplasm, nonhealing [facility] she has seen them in the remote pastWill recheck in 1 week if worsen report to [address] [address] Emergency Department  Chief Complaint  1) patient c/o left back pain x 3 days and right breast pain and burning intermittently x 2 months  2) Patient also c/o an ongoing sore on her right forehead  She has applied Eucerin and hydrocortisone cream with no relief  Now theres another spot on her back  Areas are itchy  3) Patient c/o bilateral lower leg swelling, intermittent burning  Now for 3 weeks she c/o thigh coldness, swelling  ak      History of Present Illness  HPI: Patient is having thoracic pain radiating anterior to anterior chest  Patient is having some right mastalgia  Patient is status post left mastectomy secondary to cancer approximately 15 years ago  Patient/mammography was 2013  Patient denies any nausea vomiting shortness of breath or palpitations  Patient is having increased edema left lower extremity with occasional pain and a cold feeling left lower extremity negative weakness   Patient has a nonhealing skin lesion right forehead      Review of Systems    Constitutional: No fever, no chills, feels well, no tiredness, no recent weight gain or loss  ENT: no ear ache, no loss of hearing, no nosebleeds or nasal discharge, no sore throat or hoarseness  Cardiovascular: as noted in HPI  Respiratory: no complaints of shortness of breath, no wheezing, no dyspnea on exertion, no orthopnea or PND  Breasts: as noted in HPI  Gastrointestinal: no complaints of abdominal pain, no constipation, no nausea or diarrhea, no vomiting, no bloody stools  Genitourinary: no complaints of dysuria, no incontinence, no pelvic pain, no dysmenorrhea, no vaginal discharge or abnormal vaginal bleeding  Musculoskeletal: as noted in HPI  Integumentary: as noted in HPI  Neurological: as noted in HPI  Preventive Quality 65 and Older: The patient is currently experiencing urinary symptoms  Urinary Incontinence Symptoms includes: urinary incontinence       Active Problems  1  Allergy to insect bites and stings (V15 06) (Z91 038)   2  Angiography Cerebral Aneurysm   3  Anxiety (300 00) (F41 9)   4  Back pain (724 5) (M54 9)   5  Breast carcinoma (174 9) (C50 919)   6  Chronic urinary tract infection (599 0) (N39 0)   7  Decreased ambulation status (780 99) (R68 89)   8  Dizziness (780 4) (R42)   9  History of Elevated serum alkaline phosphatase level (790 5) (R74 8)   10  Encounter for screening mammogram for malignant neoplasm of breast (V76 12)    (Z12 31)   11  GERD (gastroesophageal reflux disease) (530 81) (K21 9)   12  Glaucoma (365 9) (H40 9)   13  Hyperglycemia (790 29) (R73 9)   14  Hyperlipidemia (272 4) (E78 5)   15  Hypertension (401 9) (I10)   16  Hypokalemia (276 8) (E87 6)   17  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)   18  Lymphedema (457 1) (I89 0)   19  Macular degeneration (362 50) (H35 30)   20  Need for prophylactic vaccination and inoculation against influenza (V04 81) (Z23)   21  Osteoarthritis (715 90) (M19 90)   22   Osteoporosis (733 00) (M81 0)   23  Ovarian cancer (183 0) (C56 9)   24  Paresthesias (782 0) (R20 2)   25  Restless legs syndrome (333 94) (G25 81)   26  Visit for screening mammogram (V76 12) (Z12 31)   27  Vitamin D deficiency (268 9) (E55 9)    Past Medical History  1  History of Back Pain   2  History of Elevated serum alkaline phosphatase level (790 5) (R74 8)   3  History of Encounter for screening mammogram for malignant neoplasm of breast   (V76 12) (Z12 31)   4  History of Flank Pain Right   5  History of hematuria (V13 09) (Z87 448)   6  History of Joint Pain In The Left Hip   7  History of Joint Pain In The Right Knee   8  Pulmonary embolism (415 19) (I26 99)   9  History of Pulmonary Embolism (V12 51)   10  History of Screening mammogram for high-risk patient (V76 11) (Z12 31)    Family History  Mother    1  Family history of Hypertension (V17 49)   2  Family history of Legally Blind ([place] Definition) (V19 0)  Family History Reviewed: The family history was reviewed and updated today  Social History   · Lives with adult children   · Never a smoker   · Never Drank Alcohol   ·  (V61 07) (Z63 4)  The social history was reviewed and updated today  Surgical History  1  History of Breast Surgery Mastectomy   2  History of Cholecystectomy Laparoscopic   3  History of Oophorectomy - Bilat (Removal Of Both Ovaries) Laparoscopic   4  History of Small Bowel Resection  Surgical History Reviewed: The surgical history was reviewed and updated today  Current Meds   1  ChlordiazePOXIDE HCl - 25 MG Oral Capsule; TAKE 1 CAPSULE TWICE DAILY; Therapy: 25VNP0034 to (Last Rx:06Jun2017)  Requested for: 06Jun2017 Ordered   2  Combigan 0 2-0 5 % Ophthalmic Solution; Therapy: 77ZKK1927 to Recorded   3  Enalapril Maleate 10 MG Oral Tablet; Take 1 tablet by mouth  daily; Therapy: 15KRY6669 to (Evaluate:55Pav8978)  Requested for: 94JYJ6958; Last   Rx:05Crc4433 Ordered   4  Furosemide 40 MG Oral Tablet;  Take 1 tablet by mouth  twice a day; Therapy: 71AFM4658 to (Evaluate:09Aug2017)  Requested for: 10RPU1258; Last   Rx:11May2017 Ordered   5  Latanoprost 0 005 % Ophthalmic Solution; Therapy: 77LSE6949 to (Last Rx:06Jun2011)  Requested for: 21QMW0919 Ordered   6  Lidocaine 5 % External Patch; APPLY 1 PATCH TO THE AFFECTED AREA AND LEAVE   IN PLACE FOR 12 HOURS, THEN REMOVE AND LEAVE OFF FOR 12 HOURS; Therapy: 90LUM7988 to (Last Rx:16Jan2017)  Requested for: 20CRV9641 Ordered   7  Meclizine HCl - 25 MG Oral Tablet; Take one tablet by mouth  three times daily as   needed; Therapy: 97LPC2630 to (Evaluate:09Aug2017)  Requested for: 31FHJ1929; Last   Rx:11May2017 Ordered   8  Omeprazole 20 MG Oral Capsule Delayed Release; Take 1 capsule by mouth  daily; Therapy: 78FQM8821 to (Evaluate:09Aug2017)  Requested for: 27TTM0167; Last   Rx:11May2017 Ordered   9  Potassium Chloride Lacy ER 20 MEQ Oral Tablet Extended Release; Take 1 tablet by   mouth  daily; Therapy: 81MJP1806 to (Evaluate:17Mar2018)  Requested for: 32CGC6642; Last   Rx:22Mar2017 Ordered   10  Pramipexole Dihydrochloride 0 25 MG Oral Tablet; Take 1 tablet by mouth  daily; Therapy: 81Ojn6068 to (Evaluate:09Aug2017)  Requested for: 05VTC0827; Last    Rx:11May2017 Ordered   11  TraMADol HCl - 50 MG Oral Tablet; take 2 tablets every 6 hours as needed; Therapy: 71DWE3371 to (Chin Mendoza)  Requested for: 47QKI8617; Last    Rx:12May2017 Ordered   12  Tylenol 8 Hour Arthritis Pain 650 MG Oral Tablet Extended Release; TAKE 1 TABLET 3-4    TIMES DAILY; Therapy: 23ONY8409 to Recorded   13  Vitamin D 1000 UNIT CAPS; TAKE 2 CAPSULE Daily; Therapy: 07HCA2230 to (Last Rx:49Yom5866) Ordered   14  Xalatan 0 005 % Ophthalmic Solution; Therapy: 57POL0560 to (Last Rx:30Hwn8143)  Requested for: 97Yxi1718 Ordered    The medication list was reviewed and updated today  Allergies  1  Amoxicillin TABS   2  Cipro TABS   3  Macrobid CAPS   4  Bactrim TABS   5   CeleBREX CAPS   6  Codeine Derivatives   7  Keflex CAPS   8  Percocet TABS   9  Sulfa Drugs   10  Vioxx TABS    Vitals   Recorded: 19LCW5745 01:28PM   Heart Rate 72   Systolic 353   Diastolic 60   Weight 188 lb    BMI Calculated 38 34   BSA Calculated 1 66     Physical Exam    Constitutional   General appearance: No acute distress, well appearing and well nourished  Eyes   Conjunctiva and lids: No swelling, erythema or discharge  Ears, Nose, Mouth, and Throat Mucus membranes are moist    Pulmonary   Respiratory effort: No increased work of breathing or signs of respiratory distress  Auscultation of lungs: Clear to auscultation  Cardiovascular   Palpation of heart: Normal PMI, no thrills  Auscultation of heart: Normal rate and rhythm, normal S1 and S2, without murmurs  Examination of extremities for edema and/or varicosities: Abnormal  -- Positive pitting and nonpitting edema left more so than right lower extremities up to knee on left  Abdomen Soft nontender positive bowel sounds  Musculoskeletal   Inspection/palpation of joints, bones, and muscles: Abnormal  -- Mild thoracic kyphosis negative gross deformity negative point tenderness  Skin   Examination of the skin for lesions: Abnormal  -- Right forehead with approximately 1 cm x 1 cm erythematous excoriated area  Psychiatric   Mood and affect: Normal          Results/Data  *VB - Urinary Incontinence Screen (Dx Z13 89 Screen for UI) 45UJJ7105 01:41PM Cori Marino     Test Name Result Flag Reference   Urinary Incontinence Assessment 58SNS7821       A 12 lead ECG was performed and was normal -- No acute ischemia  Rhythm and rate: normal sinus rhythm  P-waves: the P wave is normal    Axis: the QRS axis is normal    QRS: the QRS is normal    ST segment: the ST segments are normal    T waves: normal    Comparison to prior ECGs: No change compared to EKG prior, 10/19/2012--   no interval change        Signatures   Electronically signed by : Jacklyn Diaz DO; Oct 19 2017  1:56PM EST                       (Author)

## 2017-10-22 ENCOUNTER — GENERIC CONVERSION - ENCOUNTER (OUTPATIENT)
Dept: OTHER | Facility: OTHER | Age: 82
End: 2017-10-22

## 2017-11-27 ENCOUNTER — GENERIC CONVERSION - ENCOUNTER (OUTPATIENT)
Dept: OTHER | Facility: OTHER | Age: 82
End: 2017-11-27

## 2017-11-27 ENCOUNTER — ALLSCRIPTS OFFICE VISIT (OUTPATIENT)
Dept: OTHER | Facility: OTHER | Age: 82
End: 2017-11-27

## 2017-11-27 LAB
BILIRUB UR QL STRIP: ABNORMAL
CLARITY UR: ABNORMAL
COLOR UR: YELLOW
GLUCOSE (HISTORICAL): ABNORMAL
HGB UR QL STRIP.AUTO: ABNORMAL
KETONES UR STRIP-MCNC: ABNORMAL MG/DL
LEUKOCYTE ESTERASE UR QL STRIP: ABNORMAL
NITRITE UR QL STRIP: POSITIVE
PH UR STRIP.AUTO: 7 [PH]
PROT UR STRIP-MCNC: ABNORMAL MG/DL
SP GR UR STRIP.AUTO: 1.01
UROBILINOGEN UR QL STRIP.AUTO: 0.2

## 2017-12-06 ENCOUNTER — GENERIC CONVERSION - ENCOUNTER (OUTPATIENT)
Dept: OTHER | Facility: OTHER | Age: 82
End: 2017-12-06

## 2017-12-06 DIAGNOSIS — G25.81 RESTLESS LEGS SYNDROME: ICD-10-CM

## 2017-12-06 DIAGNOSIS — I89.0 LYMPHEDEMA, NOT ELSEWHERE CLASSIFIED: ICD-10-CM

## 2017-12-06 DIAGNOSIS — I10 ESSENTIAL (PRIMARY) HYPERTENSION: ICD-10-CM

## 2017-12-06 DIAGNOSIS — R68.89 OTHER GENERAL SYMPTOMS AND SIGNS: ICD-10-CM

## 2017-12-06 DIAGNOSIS — R73.9 HYPERGLYCEMIA: ICD-10-CM

## 2017-12-06 DIAGNOSIS — M19.90 OSTEOARTHRITIS: ICD-10-CM

## 2017-12-06 DIAGNOSIS — E55.9 VITAMIN D DEFICIENCY: ICD-10-CM

## 2017-12-06 DIAGNOSIS — E78.5 HYPERLIPIDEMIA: ICD-10-CM

## 2017-12-06 DIAGNOSIS — F41.9 ANXIETY DISORDER: ICD-10-CM

## 2017-12-06 DIAGNOSIS — E87.6 HYPOKALEMIA: ICD-10-CM

## 2017-12-13 ENCOUNTER — GENERIC CONVERSION - ENCOUNTER (OUTPATIENT)
Dept: OTHER | Facility: OTHER | Age: 82
End: 2017-12-13

## 2017-12-13 ENCOUNTER — LAB REQUISITION (OUTPATIENT)
Dept: LAB | Facility: HOSPITAL | Age: 82
End: 2017-12-13
Payer: MEDICARE

## 2017-12-13 ENCOUNTER — ALLSCRIPTS OFFICE VISIT (OUTPATIENT)
Dept: OTHER | Facility: OTHER | Age: 82
End: 2017-12-13

## 2017-12-13 DIAGNOSIS — R30.0 DYSURIA: ICD-10-CM

## 2017-12-13 DIAGNOSIS — N39.0 URINARY TRACT INFECTION: ICD-10-CM

## 2017-12-13 PROCEDURE — 87077 CULTURE AEROBIC IDENTIFY: CPT | Performed by: FAMILY MEDICINE

## 2017-12-13 PROCEDURE — 87086 URINE CULTURE/COLONY COUNT: CPT | Performed by: FAMILY MEDICINE

## 2017-12-15 ENCOUNTER — GENERIC CONVERSION - ENCOUNTER (OUTPATIENT)
Dept: OTHER | Facility: OTHER | Age: 82
End: 2017-12-15

## 2017-12-15 LAB — BACTERIA UR CULT: ABNORMAL

## 2017-12-16 ENCOUNTER — GENERIC CONVERSION - ENCOUNTER (OUTPATIENT)
Dept: OTHER | Facility: OTHER | Age: 82
End: 2017-12-16

## 2018-01-09 NOTE — RESULT NOTES
Verified Results  * XR KNEE 4+ VIEW LEFT 00Xie2962 04:11PM Jim Derrick Order Number: [id number]     Test Name Result Flag Reference   XR KNEE 4+ VW LEFT (Report)     LEFT KNEE     INDICATION: Weakness left knee joint which gives out  COMPARISON: None     VIEWS: 4; 5 images     FINDINGS:     There is no acute fracture or dislocation  There is no joint effusion  Moderate tricompartmental osteoarthritis characterized by joint space narrowing and osteophyte formation  No lytic or blastic lesions are seen  Vascular calcifications  IMPRESSION:     Moderate tricompartmental osteoarthritis         Workstation performed: [id number]     Signed by:   Carlos Field DO   2/25/16

## 2018-01-10 NOTE — RESULT NOTES
Verified Results  (1) URINE CULTURE 28Apr2017 04:12PM Alessandro Tesfaye Order Number: [id number]     Test Name Result Flag Reference   CLINICAL REPORT (Report)     Test:        Urine culture  Specimen Source:  Urine, Unspecified Source  Specimen Type:   Urine  Specimen Date:   4/28/2017 4:12 PM  Result Date:    5/1/2017 6:42 PM  Result Status:   Final result  Resulting Lab:   [place] [address]            Tel: [phone]      CULTURE                                       ------------------                                   70,000-79,000 cfu/ml Aerococcus viridans

## 2018-01-10 NOTE — RESULT NOTES
Message  Pts daughter came to the office with a urine specimen stating her Mother has urinary frequency and burning x 2 days  UA run with positive results and Levaquin 500mg QD x 7 days was given by BT and CX sent to [facility]  kw      Plan  Dysuria    · Urine Dip Automated- POC; Status:Complete - Retrospective By Protocol Authorization;    Done: 74QUM1402 06:25PM  Dysuria, Urinary tract infection    · (1) URINE CULTURE; Source:Urine, Clean Catch; Status: In Progress - Specimen/Data  Collected,Retrospective By Protocol Authorization;   Done: 94ASQ7563  PMH: Urinary tract infection    · Levofloxacin 500 MG Oral Tablet (Levaquin);  Take 1 tablet daily    Signatures   Electronically signed by : Vipul Haas, ; Sep 19 2016  6:27PM EST                       (Author)

## 2018-01-10 NOTE — RESULT NOTES
Verified Results  (1) URINE CULTURE 30Jan2017 05:06PM Shashank Art Order Number: [id number]     Test Name Result Flag Reference   CLINICAL REPORT (Report)     Test:        Urine culture  Specimen Type:   Urine  Specimen Date:   1/30/2017 5:06 PM  Result Date:    2/1/2017 5:02 PM  Result Status:   Final result  Resulting Lab:   [place] [address]            Tel: [phone]      CULTURE                                       ------------------                                   >100,000 cfu/ml Mixed Contaminants X4

## 2018-01-10 NOTE — RESULT NOTES
Verified Results  Urine Dip Automated- POC [id number] 02:22PM Morro Marino     Test Name Result Flag Reference   Color Yellow     Clarity Cloudy     Leukocytes small     Nitrite neg     Blood neg     Bilirubin neg     Urobilinogen 0 2     Protein neg     Ph 7 0     Specific Gravity 1 020     Ketone neg     Glucose neg     Color Yellow     Clarity Cloudy     Leukocytes small     Nitrite neg     Blood neg     Bilirubin neg     Urobilinogen 0 2     Protein neg     Ph 7 0     Specific Gravity 1 020     Ketone neg     Glucose neg

## 2018-01-11 NOTE — RESULT NOTES
Verified Results  (1) URINE CULTURE 24Oct2016 03:59PM Noé Betancourt Order Number: [id number]     Test Name Result Flag Reference   CLINICAL REPORT (Report)     Test:        Urine culture  Specimen Type:   Urine  Specimen Date:   10/24/2016 3:59 PM  Result Date:    10/26/2016 8:49 AM  Result Status:   Final result  Resulting Lab:   [address] [address]            Tel: [phone]      CULTURE                                       ------------------                                   No Growth <1000 cfu/mL

## 2018-01-12 NOTE — RESULT NOTES
Verified Results  (1) CBC/ PLT (NO DIFF) [id number] 03:43PM Néstor Ignacio Order Number: [id number]     Test Name Result Flag Reference   HEMATOCRIT 36 9 %  34 8-46 1   HEMOGLOBIN 12 6 g/dL  11 5-15 4   MCHC 34 1 g/dL  31 4-37 4   MCH 31 6 pg  26 8-34 3   MCV 93 fL  82-98   PLATELET COUNT 736 Thousands/uL  149-390   RBC COUNT 3 99 Million/uL  3 81-5 12   RDW 14 1 %  11 6-15 1   WBC COUNT 5 08 Thousand/uL  4 31-10 16   MPV 9 8 fL  8 9-12 7     (1) COMPREHENSIVE METABOLIC PANEL 07QQW6539 36:21MR Xander Marino Order Number: [id number]     Test Name Result Flag Reference   GLUCOSE,RANDM 98 mg/dL     If the patient is fasting, the ADA then defines impaired fasting glucose as > 100 mg/dL and diabetes as > or equal to 123 mg/dL  Specimen collection should occur prior to Sulfasalazine administration due to the potential for falsely depressed results  Specimen collection should occur prior to Sulfapyridine administration due to the potential for falsely elevated results  SODIUM 140 mmol/L  136-145   POTASSIUM 3 6 mmol/L  3 5-5 3   CHLORIDE 102 mmol/L  100-108   CARBON DIOXIDE 32 mmol/L  21-32   ANION GAP (CALC) 6 mmol/L  4-13   BLOOD UREA NITROGEN 14 mg/dL  5-25   CREATININE 0 68 mg/dL  0 60-1 30   Standardized to IDMS reference method   CALCIUM 9 3 mg/dL  8 3-10 1   BILI, TOTAL 0 47 mg/dL  0 20-1 00   ALK PHOSPHATAS 101 U/L     ALT (SGPT) 16 U/L  12-78   Specimen collection should occur prior to Sulfasalazine administration due to the potential for falsely depressed results  AST(SGOT) 20 U/L  5-45   Specimen collection should occur prior to Sulfasalazine administration due to the potential for falsely depressed results     ALBUMIN 3 8 g/dL  3 5-5 0   TOTAL PROTEIN 7 9 g/dL  6 4-8 2   eGFR 77 ml/min/1 73sq m     National Kidney Disease Education Program recommendations are as follows:  GFR calculation is accurate only with a steady state creatinine  Chronic Kidney disease less than 60 ml/min/1 73 sq  meters  Kidney failure less than 15 ml/min/1 73 sq  meters  (1) D-DIMER [id number] 03:43PM Jose Rubi Order Number: [id number]     Test Name Result Flag Reference   D-DIMER 616 ng/ml (FEU) H 0-424   Reference and upper limits to exclude DVT and PE are the same  Do not use to exclude if clinical symptoms are present        Pregnant women:  1st trimester:  <220 - 1060 ng/ml (FEU)  2nd trimester:  <220 - 1880 ng/ml (FEU)  3rd trimester:   238 - 3280 ng/ml (FEU)

## 2018-01-12 NOTE — RESULT NOTES
Verified Results  (1) URINE CULTURE 03Apr2017 02:08PM Mazin Desai Order Number: [id number]     Test Name Result Flag Reference   CLINICAL REPORT (Report)     Test:        Urine culture  Specimen Type:   Urine  Specimen Date:   4/3/2017 2:08 PM  Result Date:    4/5/2017 9:41 PM  Result Status:   Final result  Resulting Lab:   [address] [address]            Tel: [phone]      CULTURE                                       ------------------                                   >100,000 cfu/ml Aerococcus viridans

## 2018-01-13 VITALS
SYSTOLIC BLOOD PRESSURE: 130 MMHG | HEART RATE: 78 BPM | WEIGHT: 164 LBS | HEIGHT: 56 IN | TEMPERATURE: 98.1 F | BODY MASS INDEX: 36.89 KG/M2 | DIASTOLIC BLOOD PRESSURE: 80 MMHG

## 2018-01-13 NOTE — MISCELLANEOUS
Message   Recorded as Task   Date: 09/22/2016 03:32 PM, Created By: Lawyer Dixon   Task Name: Medical Complaint Callback   Assigned To: Xiomara Wright   Regarding Patient: Azam Monteiro, Status: Complete   CommentCy Gtz - 22 Sep 2016 3:32 PM     TASK CREATED  Daughter [facility] called, patient is on Day 3 of Levaquin and c/o tingling in her toes and up into calf  Her UTI sx are better  Should she stop Levaquin? Call Cherry Montoya - 22 Sep 2016 3:37 PM     TASK REPLIED TO: Previously Assigned To Lexy Willams   No she should continue the Levaquin and until it's gone  Lawyer Dixon - 22 Sep 2016 3:55 PM     TASK EDITED  [facility] aware  Xiomara Wright - 22 Sep 2016 3:55 PM     TASK COMPLETED        Active Problems    1  Allergy to insect bites and stings (V15 06) (Z91 038)   2  Angiography Cerebral Aneurysm   3  Anxiety (300 00) (F41 9)   4  Backache (724 5) (M54 9)   5  Colonoscopy (Fiberoptic) Screening   6  Decreased ambulation status (780 99) (R68 89)   7  Dysuria (788 1) (R30 0)   8  Elevated serum alkaline phosphatase level (790 5) (R74 8)   9  Elevated serum protein level (790 99) (R77 9)   10  Encounter for screening mammogram for malignant neoplasm of breast (V76 12)    (Z12 31)   11  GERD (gastroesophageal reflux disease) (530 81) (K21 9)   12  Glaucoma (365 9) (H40 9)   13  Hyperglycemia (790 29) (R73 9)   14  Hyperlipidemia (272 4) (E78 5)   15  Hypertension (401 9) (I10)   16  Hypokalemia (276 8) (E87 6)   17  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)   18  Knee pain, left (719 46) (M25 562)   19  Leg pain (729 5) (M79 606)   20  Lymphedema (457 1) (I89 0)   21  Macular degeneration (362 50) (H35 30)   22  Malignant neoplasm of breast, unspecified laterality   23  Need for prophylactic vaccination and inoculation against influenza (V04 81) (Z23)   24  Osteoarthritis (715 90) (M19 90)   25   Osteoporosis (733 00) (M81 0) 26  Ovarian cancer (183 0) (C56 9)   27  Paresthesias (782 0) (R20 2)   28  Renal calculus, right (592 0) (N20 0)   29  Restless legs syndrome (333 94) (G25 81)   30  Urinary tract infection (599 0) (N39 0)   31  Visit for screening mammogram (V76 12) (Z12 31)   32  Vitamin D deficiency (268 9) (E55 9)    Current Meds   1  ChlordiazePOXIDE HCl - 25 MG Oral Capsule; TAKE 1 CAPSULE TWICE DAILY; Therapy: 56WXD8994 to (yL Barkley)  Requested for: 15Zkw0352; Last   Rx:08Sep2016 Ordered   2  Claritin 10 MG Oral Tablet; TAKE 1 TABLET DAILY AS NEEDED; Therapy: 53AEC2046 to (Last Rx:10Rvy5031) Ordered   3  Enalapril Maleate 10 MG Oral Tablet; Take 1 tablet by mouth  daily; Therapy: 30ESZ1765 to (Evaluate:13Mar2017)  Requested for: 20Mar2016; Last   Rx:18Mar2016 Ordered   4  Furosemide 40 MG Oral Tablet; Take 1 tablet by mouth  twice a day; Therapy: 79NMU4560 to (Evaluate:13Mar2017)  Requested for: 20Mar2016; Last   Rx:18Mar2016 Ordered   5  Klor-Con M20 20 MEQ Oral Tablet Extended Release; TAKE 1 TABLET DAILY; Therapy: 68BBH4755 to (Evaluate:13Mar2017)  Requested for: 20Mar2016; Last   Rx:18Mar2016 Ordered   6  Latanoprost 0 005 % Ophthalmic Solution; Therapy: 96EEU3502 to (Last Rx:06Jun2011)  Requested for: 95HSY1886 Ordered   7  Levofloxacin 500 MG Oral Tablet (Levaquin); Take 1 tablet daily; Therapy: 33VRV4602 to (Last Rx:69Vjw4304)  Requested for: 06Irg4265 Ordered   8  Meclizine HCl - 25 MG Oral Tablet; one tablet three times daily as needed; Therapy: 37IYE4958 to (Evaluate:13Mar2017)  Requested for: 20Mar2016; Last   Rx:18Mar2016 Ordered   9  Nystatin 634592 UNIT/GM External Powder; APPLY 2-3 TIMES DAILY TO AFFECTED   AREA(S); Therapy: 45XRV2260 to (Last Rx:14Cyj2229)  Requested for: 19Aug2015 Ordered   10  Omeprazole 20 MG Oral Capsule Delayed Release; take 1 capsule daily; Therapy: 06EMH6875 to (Evaluate:13Mar2017)  Requested for: 20Mar2016; Last    Rx:18Mar2016 Ordered   11   Phazyme 180 MG Oral Capsule; TAKE AS DIRECTED ON PACKAGE; Therapy: 88SUR1730 to (Last Rx:02Cmu1757) Ordered   12  Pramipexole Dihydrochloride 0 25 MG Oral Tablet; Take 1 tablet by mouth  daily; Therapy: 49Tpv2849 to (Evaluate:13Mar2017)  Requested for: 20Mar2016; Last    Rx:18Mar2016 Ordered   13  TraMADol HCl - 50 MG Oral Tablet; take 2 tablets every 6 hours as needed; Therapy: 47IMS0462 to (Evaluate:62Flv2846)  Requested for: 68WOI6657; Last    Rx:15Eya2260 Ordered   14  Vitamin D 1000 UNIT CAPS; TAKE 2 CAPSULE Daily; Therapy: 73GYQ6305 to (Last Rx:43Cps4024) Ordered   15  Xalatan 0 005 % Ophthalmic Solution (Latanoprost); Therapy: 66EAB3464 to (Last Rx:69Lji1628)  Requested for: 30Vsh5959 Ordered    Allergies    1  Amoxicillin TABS   2  Cipro TABS   3  Macrobid CAPS   4  Bactrim TABS   5  CeleBREX CAPS   6  Codeine Derivatives   7  Keflex CAPS   8  Percocet TABS   9  Sulfa Drugs   10   Vioxx TABS    Signatures   Electronically signed by : Milind Scott, ; Sep 22 2016  3:55PM EST                       (Author)

## 2018-01-13 NOTE — RESULT NOTES
Verified Results  (1) URINE CULTURE 03CES9194 01:26PM Miguelito Sanabria Order Number: [id number]     Test Name Result Flag Reference   CLINICAL REPORT (Report)     Test:        Urine culture  Specimen Type:   Urine  Specimen Date:   1/16/2017 1:26 PM  Result Date:    1/18/2017 10:31 PM  Result Status:   Final result  Resulting Lab:   [place] [address]            Tel: [phone]      CULTURE                                       ------------------                                   >100,000 cfu/ml Aerococcus viridans

## 2018-01-13 NOTE — RESULT NOTES
Message  Patient's daughter dropped off her urine  Positive for leukocytes with this patient usually means UTI will set up prescription in for Levaquin      Plan  Chronic urinary tract infection    · Urine Dip Automated- POC; Status:Complete;   Done: 19NEB9701 12:18PM  Urinary tract infection    · LevoFLOXacin 250 MG Oral Tablet (Levaquin); Take 1 tablet daily    Signatures   Electronically signed by : Belinda Hearn DO;  Apr 28 2017 12:26PM EST                       (Author)

## 2018-01-13 NOTE — PROGRESS NOTES
Chief Complaint  pt;'s daughter called stating pt is once again having urinary frequency / urgency  urine specimen was dropped off  U / A show small amount of leukocytes  she uses [facility] and states Levaquin is the only antibiotic she can take  Active Problems    1  Allergy to insect bites and stings (V15 06) (Z91 038)   2  Angiography Cerebral Aneurysm   3  Anxiety (300 00) (F41 9)   4  Back pain (724 5) (M54 9)   5  Chronic urinary tract infection (599 0) (N39 0)   6  Colonoscopy (Fiberoptic) Screening   7  Decreased ambulation status (780 99) (R68 89)   8  Dizziness (780 4) (R42)   9  Dysuria (788 1) (R30 0)   10  History of Elevated serum alkaline phosphatase level (790 5) (R74 8)   11  Encounter for screening mammogram for malignant neoplasm of breast (V76 12)    (Z12 31)   12  GERD (gastroesophageal reflux disease) (530 81) (K21 9)   13  Glaucoma (365 9) (H40 9)   14  Hematuria (599 70) (R31 9)   15  Hyperglycemia (790 29) (R73 9)   16  Hyperlipidemia (272 4) (E78 5)   17  Hypertension (401 9) (I10)   18  Hypokalemia (276 8) (E87 6)   19  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)   20  Knee pain, left (719 46) (M25 562)   21  Lymphedema (457 1) (I89 0)   22  Macular degeneration (362 50) (H35 30)   23  Malignant neoplasm of breast, unspecified laterality   24  Need for prophylactic vaccination and inoculation against influenza (V04 81) (Z23)   25  Osteoarthritis (715 90) (M19 90)   26  Osteoporosis (733 00) (M81 0)   27  Ovarian cancer (183 0) (C56 9)   28  Paresthesias (782 0) (R20 2)   29  Renal calculus, right (592 0) (N20 0)   30  Restless legs syndrome (333 94) (G25 81)   31  Urinary tract infection (599 0) (N39 0)   32  Visit for screening mammogram (V76 12) (Z12 31)   33  Vitamin D deficiency (268 9) (E55 9)    Current Meds   1  ChlordiazePOXIDE HCl - 25 MG Oral Capsule; TAKE 1 CAPSULE TWICE DAILY;    Therapy: 45UCZ3101 to (Evaluate:19Xvm1298)  Requested for: 58CIX7372; Last Rx:99Ikj6474 Ordered   2  Cyclobenzaprine HCl - 5 MG Oral Tablet; Take 1 tablet 3 times daily as needed; Therapy: 57ZHK5114 to (Last Rx:93Pho8216)  Requested for: 78EKY8388 Ordered   3  Enalapril Maleate 10 MG Oral Tablet; Take 1 tablet by mouth  daily; Therapy: 94WIK7574 to (Yurie Crimes)  Requested for: 71VDQ8664; Last   Rx:30Jan2017 Ordered   4  Furosemide 40 MG Oral Tablet; Take 1 tablet by mouth  twice a day; Therapy: 12GFF8763 to (Evaluate:84Kco4802)  Requested for: 91CED0965; Last   Rx:30Jan2017 Ordered   5  Klor-Con M20 20 MEQ Oral Tablet Extended Release; TAKE 1 TABLET DAILY; Therapy: 87LVQ8735 to (Evaluate:13Mar2017)  Requested for: 20Mar2016; Last   Rx:18Mar2016 Ordered   6  Latanoprost 0 005 % Ophthalmic Solution; Therapy: 24LBG1784 to (Last Rx:06Jun2011)  Requested for: 52JRC9655 Ordered   7  LevoFLOXacin 250 MG Oral Tablet; TAKE 1 TABLET DAILY; Therapy: 51LNQ3908 to (Evaluate:08Apr2017)  Requested for: 03Apr2017; Last   Rx:03Apr2017 Ordered   8  Lidocaine 5 % External Patch; APPLY 1 PATCH TO THE AFFECTED AREA AND LEAVE   IN PLACE FOR 12 HOURS, THEN REMOVE AND LEAVE OFF FOR 12 HOURS; Therapy: 58MLK8395 to (Last Rx:16Jan2017)  Requested for: 29CWF8901 Ordered   9  Meclizine HCl - 25 MG Oral Tablet; Take one tablet by mouth  three times daily as   needed; Therapy: 84ZSO0158 to (Skye Veronica)  Requested for: 66YHE4675; Last   Rx:31Ykj5269 Ordered   10  Nystatin 716624 UNIT/GM External Powder; APPLY 2-3 TIMES DAILY TO AFFECTED    AREA(S); Therapy: 66GQE1595 to (Last Rx:10Nov2016)  Requested for: 68JFS5638 Ordered   11  Omeprazole 20 MG Oral Capsule Delayed Release; Take 1 capsule by mouth  daily; Therapy: 52SZU7323 to (Evaluate:64Wsm0329)  Requested for: 88MXD5968; Last    Rx:30Jan2017 Ordered   12  Potassium Chloride Lacy ER 20 MEQ Oral Tablet Extended Release; Take 1 tablet by    mouth  daily;     Therapy: 29QAL8857 to (Evaluate:17Mar2018)  Requested for: 13SRS9538; Last Rx:22Mar2017 Ordered   13  Pramipexole Dihydrochloride 0 25 MG Oral Tablet; Take 1 tablet by mouth  daily; Therapy: 51Rlv7613 to (Evaluate:95Hlm8987)  Requested for: 62QKV1224; Last    Rx:30Jan2017 Ordered   14  TraMADol HCl - 50 MG Oral Tablet; take 2 tablets every 6 hours as needed; Therapy: 24RZZ6734 to (Evaluate:66Tgb9640)  Requested for: 61VOF6821; Last    Rx:56Rbf3653 Ordered   15  Vitamin D 1000 UNIT CAPS; TAKE 2 CAPSULE Daily; Therapy: 57NFF4008 to (Last Rx:69Pyx6486) Ordered   16  Xalatan 0 005 % Ophthalmic Solution; Therapy: 12RYM6109 to (Last Rx:24Arx1001)  Requested for: 87Hls6282 Ordered    Allergies    1  Amoxicillin TABS   2  Cipro TABS   3  Macrobid CAPS   4  Bactrim TABS   5  CeleBREX CAPS   6  Codeine Derivatives   7  Keflex CAPS   8  Percocet TABS   9  Sulfa Drugs   10  Vioxx TABS    Results/Data  Urine Dip Automated- POC 28Apr2017 12:18PM Maurice Marino     Test Name Result Flag Reference   Color Clear     Clarity Transparent     Leukocytes small     Nitrite negative     Blood negative     Bilirubin negative     Urobilinogen 0 2     Protein negative     Ph 7 0     Specific Gravity 1 015     Ketone negative     Glucose negative         Plan  Chronic urinary tract infection    · Urine Dip Automated- POC; Status:Complete;   Done: 53ISR0242 12:18PM    Future Appointments    Date/Time Provider Specialty Site   05/18/2017 03:00 PM Kev Marino DO [facility]     Signatures   Electronically signed by : Cristiano Jauregui, ; Apr 28 2017 12:17PM EST                       (Author)    Electronically signed by : Tammy Meng DO;  Apr 28 2017 12:24PM EST                       (Author)

## 2018-01-14 VITALS
BODY MASS INDEX: 38.34 KG/M2 | HEART RATE: 72 BPM | WEIGHT: 171 LBS | SYSTOLIC BLOOD PRESSURE: 122 MMHG | DIASTOLIC BLOOD PRESSURE: 60 MMHG

## 2018-01-14 VITALS
HEIGHT: 56 IN | SYSTOLIC BLOOD PRESSURE: 140 MMHG | HEART RATE: 76 BPM | BODY MASS INDEX: 38.02 KG/M2 | WEIGHT: 169 LBS | DIASTOLIC BLOOD PRESSURE: 80 MMHG

## 2018-01-14 NOTE — RESULT NOTES
Verified Results  Urine Dip Automated- POC 46PUU0800 01:51PM Jamila Morro     Test Name Result Flag Reference   Color Yellow     Clarity Cloudy A    Leukocytes small A    Nitrite neg     Blood neg     Bilirubin neg     Urobilinogen 0 2     Protein 30 A    Ph 7 5     Specific Gravity 1 015     Ketone neg     Glucose neg     Color Yellow     Clarity Cloudy A    Leukocytes small A    Nitrite neg     Blood neg     Bilirubin neg     Urobilinogen 0 2     Protein 30 A    Ph 7 5     Specific Gravity 1 015     Ketone neg     Glucose neg

## 2018-01-15 NOTE — RESULT NOTES
Verified Results  * XR CHEST PA & LATERAL 35IQC5052 03:05PM Nuvia Brewer Order Number: [id number]     Test Name Result Flag Reference   XR CHEST PA & LATERAL (Report)     CHEST 2 View     INDICATION: Breast cancer  Mid back pain  COMPARISON: 12/27/2012     VIEWS: PA and lateral projections; 2 images     FINDINGS:        Cardiomediastinal silhouette  Stable large hiatal hernia and normal heart size  The lungs are clear  No pneumothorax or pleural effusion  Visualized osseous structures demonstrate chronic compression deformities in the lower thoracic/upper lumbar spine  IMPRESSION:     No active pulmonary disease  Large hiatal hernia  Workstation performed: [id number]     Signed by: Alexandra Aguirre MD   10/20/17     * XR SPINE THORACIC 3 VIEW 19Oct2017 03:05PM Nuvia Brewer Order Number: [id number]     Test Name Result Flag Reference   XR SPINE THORACIC 3 VW (Report)     THORACIC SPINE     INDICATION: Back pain  COMPARISON: Chest x-ray 12/27/2012     VIEWS: AP, lateral and coned-down projections     IMAGES: 4     FINDINGS:     Chronic compression deformities in the lower thoracic/upper lumbar spine  No acute abnormality  Partially visualized scoliosis of the thoracolumbar spine     There is no acute fracture or pathologic bone lesion  There is no displacement of the paraspinal line  The pedicles are intact  IMPRESSION:     Chronic compression deformities in the lower thoracic/upper lumbar spine  No acute abnormality  Workstation performed: [id number]     Signed by: Alexandra Aguirre MD   10/20/17     VAS LOWER LIMB VENOUS DUPLEX STUDY, COMPLETE BILATERAL 33AQD6569 03:03PM Nuvia Brewer Order Number: [id number]    - Patient Instructions: To schedule this appointment, please contact Central Scheduling at [phone]       Test Name Result Flag Reference   VAS LOWER LIMB VENOUS DUPLEX STUDY, COMPLETE BILATERAL (Report)     [facility] REPORT   CLINICAL:   Indications: Bilateral Swelling of Limb [R22 4]  Patient has a history of bilateral leg lymphedema  FINDINGS:      Segment Right      Left          Impression    Impression       CFV   Normal (Patent) Normal (Patent)             CONCLUSION:      Impression:   RIGHT LOWER LIMB:   No definite evidence of acute or chronic deep vein thrombosis in the common   femoral vein, femoral vein, popliteal vein and visualized posterior tibial   veins  The peroneal veins could not be identified  No evidence of superficial thrombophlebitis noted  Doppler evaluation shows a normal response to augmentation maneuvers  Popliteal, posterior tibial arterial Doppler waveforms are biphasic  LEFT LOWER LIMB:   No definite evidence of acute or chronic deep vein thrombosis in the common   femoral vein, femoral vein, popliteal vein and visualized posterior tibial   veins  The peroneal veins could not be identified  No evidence of superficial thrombophlebitis noted  Doppler evaluation shows a normal response to augmentation maneuvers  Popliteal, posterior tibial arterial Doppler waveforms are   triphasic/monophasic  Tech Note: This study was technically limited/difficult due to leg edema   (lymphedema)  Preliminary report called to Monica at physician office   KT/4pm       SIGNATURE:   Electronically Signed by: Barbara Pack on 2017-10-20 12:45:35 PM

## 2018-01-16 NOTE — RESULT NOTES
Verified Results  (1) URINE CULTURE 51Lra3059 06:27PM Kina De Santiago Order Number: [id number]     Test Name Result Flag Reference   CLINICAL REPORT (Report)     Test:        Urine culture  Specimen Type:   Urine  Specimen Date:   9/19/2016 6:27 PM  Result Date:    9/21/2016 12:14 PM  Result Status:   Final result  Resulting Lab:   [place] [address]            Tel: [phone]      CULTURE                                       ------------------                                   >100,000 cfu/ml Klebsiella oxytoca      SUSCEPTIBILITY                                   ------------------                                                       Klebsiella oxytoca  METHOD                 JEROME  -------------------------------------  --------------------------  AMPICILLIN ($$)             >16 00 ug/ml Resistant  AMPICILLIN + SULBACTAM ($)       <=8/4 ug/ml  Susceptible  AZTREONAM ($$$)             <=8 ug/ml   Susceptible  CEFAZOLIN ($)              16 00 ug/ml  Intermediate  CEFUROXIME ($$)             <=4 ug/ml   Susceptible  CIPROFLOXACIN ($)            <=1 00 ug/ml Susceptible  GENTAMICIN ($$)             <=4 ug/ml   Susceptible  LEVOFLOXACIN ($)            <=2 00 ug/ml Susceptible  NITROFURANTOIN             <=32 ug/ml  Susceptible  PIPERACILLIN + TAZOBACTAM ($$$)     <=16 ug/ml  Susceptible  TETRACYCLINE              <=4 ug/ml   Susceptible  TOBRAMYCIN ($)             <=4 ug/ml   Susceptible  TRIMETHOPRIM + SULFAMETHOXAZOLE ($$$)  <=2/38 ug/ml Susceptible

## 2018-01-17 NOTE — RESULT NOTES
Verified Results  Urine Dip Automated- POC 61RBG8860 03:42PM Bryce Lopez     Test Name Result Flag Reference   Color Yellow     Clarity Transparent     Leukocytes trace A    Nitrite neg     Blood neg     Bilirubin neg     Urobilinogen 0 2     Protein neg     Ph 5 5     Specific Gravity 1 010     Ketone neg     Glucose neg     Color Yellow     Clarity Transparent     Leukocytes trace A    Nitrite neg     Blood neg     Bilirubin neg     Urobilinogen 0 2     Protein neg     Ph 5 5     Specific Gravity 1 010     Ketone neg     Glucose neg

## 2018-01-23 NOTE — RESULT NOTES
Verified Results  (1) URINE CULTURE 41Kzp6266 10:39AM Perla Cihn    Order Number: [id number]     Test Name Result Flag Reference   CLINICAL REPORT (Report) A    Test:        Urine culture  Specimen Type:   Urine  Specimen Date:   12/13/2017 10:39 AM  Result Date:    12/15/2017 2:21 PM  Result Status:   Final result  Abnormal:      Yes  Resulting Lab:   [facility] [address] [place] [address]            Tel: [phone]      CULTURE                                       ------------------                                   >100,000 cfu/ml Aerococcus viridans (Abnormal)

## 2018-01-23 NOTE — MISCELLANEOUS
Message   Recorded as Task   Date: 12/15/2017 03:50 PM, Created By: Swati Christopher   Task Name: Call Patient with results   Assigned To: Sophia Gutierrez   Regarding Patient: Newt Phoenix, Status: Active   Comment: Dwain Husain - 15 Dec 2017 3:50 PM     Patient Phone: [phone]      Patient does have a positive urinalysis  Unfortunately, the culture results do not show sensitivity yet  Would recommend waiting for sensitivities to start antibiotics  If they are not back, or are not going to be done, I would consider treating this with Cipro 500 b i d   This would be for 7 days  Yennifer Back - 16 Dec 2017 10:46 AM     TASK EDITED  Joellen Smith BACK AT   [facility]  THANK YOU  Sophia Gutierrez - 16 Dec 2017 12:16 PM     TASK EDITED  Pt's daughter adament that pt can only take levaquin  Aretha Garcia gave long profanity laced tirade recounting all the UTIs her mother has had and Levaquin is the only antibiotic that works for her  She invited us to her mother's  if we do not call in this antibiotic  Leighton sent in [address] [place] [address] Rd  Daughter notified  She will bring specimen to office for testing after completing antibiotic  Active Problems    1  Allergic rhinitis (477 9) (J30 9)   2  Allergy to insect bites and stings (V15 06) (Z91 038)   3  Angiography Cerebral Aneurysm   4  Anxiety (300 00) (F41 9)   5  Back pain (724 5) (M54 9)   6  Breast carcinoma (174 9) (C50 919)   7  Chronic urinary tract infection (599 0) (N39 0)   8  Decreased ambulation status (780 99) (R68 89)   9  Dizziness (780 4) (R42)   10  Dysuria (788 1) (R30 0)   11  History of Elevated serum alkaline phosphatase level (790 5) (R74 8)   12  Encounter for screening mammogram for malignant neoplasm of breast (V76 12)    (Z12 31)   13  Encounter for special screening examination for genitourinary disorder (V81 6) (Z13 89)   14  GERD (gastroesophageal reflux disease) (530 81) (K21 9)   15   Glaucoma (365 9) (H40 9) 16  Hyperglycemia (790 29) (R73 9)   17  Hyperlipidemia (272 4) (E78 5)   18  Hypertension (401 9) (I10)   19  Hypokalemia (276 8) (E87 6)   20  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)   21  Leg edema (782 3) (R60 0)   22  Lymphedema (457 1) (I89 0)   23  Macular degeneration (362 50) (H35 30)   24  Mastalgia (611 71) (N64 4)   25  Need for prophylactic vaccination and inoculation against influenza (V04 81) (Z23)   26  Neoplasm of skin of face (239 2) (D49 2)   27  Osteoarthritis (715 90) (M19 90)   28  Osteoporosis (733 00) (M81 0)   29  Ovarian cancer (183 0) (C56 9)   30  Paresthesias (782 0) (R20 2)   31  Restless legs syndrome (333 94) (G25 81)   32  Serous otitis media (381 4) (H65 90)   33  Thoracic back pain (724 1) (M54 6)   34  Visit for screening mammogram (V76 12) (Z12 31)   35  Vitamin D deficiency (268 9) (E55 9)    Current Meds   1  Azelastine HCl - 0 1 % Nasal Solution; use 2 sprays each nostril twice a day; Therapy: 11GNR4292 to (Last Rx:55Vjf4075)  Requested for: 67Sec6808 Ordered   2  ChlordiazePOXIDE HCl - 25 MG Oral Capsule; TAKE 1 CAPSULE TWICE DAILY; Therapy: 66CSD7829 to (Last Rx:01Txe4071)  Requested for: 18Zja3334 Ordered   3  Combigan 0 2-0 5 % Ophthalmic Solution; Therapy: 91BLG7040 to Recorded   4  Enalapril Maleate 10 MG Oral Tablet; Take 1 tablet by mouth  daily; Therapy: 59PPB3886 to (Nani Look)  Requested for: 70ZAA1281; Last   Rx:07Nov2017 Ordered   5  Furosemide 40 MG Oral Tablet; Take 1 tablet by mouth  twice a day; Therapy: 49QED8206 to (Nani Look)  Requested for: 16CUI3192; Last   Rx:07Nov2017 Ordered   6  Latanoprost 0 005 % Ophthalmic Solution; Therapy: 93ZGE8605 to (Last Rx:06Jun2011)  Requested for: 92IAO8373 Ordered   7  LevoFLOXacin 250 MG Oral Tablet (Levaquin); TAKE 1 TABLET DAILY AS DIRECTED; Therapy: 31YTO8688 to (Evaluate:30Xta6794)  Requested for: 46ALR1921; Last   Rx:30Oty4735 Ordered   8   Lidocaine 5 % External Patch; APPLY 1 PATCH TO THE AFFECTED AREA AND LEAVE   IN PLACE FOR 12 HOURS, THEN REMOVE AND LEAVE OFF FOR 12 HOURS; Therapy: 57LTB3526 to (Last Rx:16Jan2017)  Requested for: 40QTW8265 Ordered   9  Meclizine HCl - 25 MG Oral Tablet; Take one tablet by mouth  three times daily as   needed; Therapy: 59GIX0251 to (0487 72 23 66)  Requested for: 09ZKY6788; Last   Rx:07Nov2017 Ordered   10  Omeprazole 20 MG Oral Capsule Delayed Release; Take 1 capsule by mouth  daily; Therapy: 09IAE4228 to (0487 72 23 66)  Requested for: 05POA8676; Last    Rx:07Nov2017 Ordered   11  Potassium Chloride Lacy ER 20 MEQ Oral Tablet Extended Release; Take 1 tablet by    mouth  daily; Therapy: 72XKD2297 to (Evaluate:17Mar2018)  Requested for: 22Mar2017; Last    Rx:22Mar2017 Ordered   12  Pramipexole Dihydrochloride 0 25 MG Oral Tablet; Take 1 tablet by mouth  daily; Therapy: 96Mao2655 to (0487 72 23 66)  Requested for: 16PDA8665; Last    Rx:07Nov2017 Ordered   13  TraMADol HCl - 50 MG Oral Tablet; take 2 tablets every 6 hours as needed; Therapy: 27FTU8674 to (Evaluate:04Jun2018)  Requested for: 41XIX4358; Last    Rx:73Kds4771 Ordered   14  Tylenol 8 Hour Arthritis Pain 650 MG Oral Tablet Extended Release; TAKE 1 TABLET 3-4    TIMES DAILY; Therapy: 27QMK1144 to Recorded   15  Vitamin D 1000 UNIT CAPS; TAKE 2 CAPSULE Daily; Therapy: 34THT3037 to (Last Rx:31Bbe2154) Ordered   16  Xalatan 0 005 % Ophthalmic Solution (Latanoprost); Therapy: 83ITM7450 to (Last Rx:35Hbi5848)  Requested for: 39Cod9601 Ordered    Allergies    1  Amoxicillin TABS   2  Cipro TABS   3  Macrobid CAPS   4  Bactrim TABS   5  CeleBREX CAPS   6  Codeine Derivatives   7  Keflex CAPS   8  Percocet TABS   9  Sulfa Drugs   10   Vioxx TABS    Signatures   Electronically signed by : Analilia Kwok, ; Dec 16 2017 12:16PM EST                       (Author)

## 2018-01-24 VITALS
HEART RATE: 72 BPM | SYSTOLIC BLOOD PRESSURE: 116 MMHG | WEIGHT: 166 LBS | BODY MASS INDEX: 37.22 KG/M2 | DIASTOLIC BLOOD PRESSURE: 78 MMHG

## 2018-02-13 NOTE — RESULT NOTES
Verified Results  Urine Dip Automated- POC 05GMM7406 11:42AM Morro Marino     Test Name Result Flag Reference   Color Yellow     Clarity Cloudy     Leukocytes moderate A    Nitrite positive A    Blood neg     Bilirubin neg     Urobilinogen 0 2     Protein trace A    Ph 7 0     Specific Gravity 1 015     Ketone trace A    Glucose neg     Color Yellow     Clarity Cloudy     Leukocytes moderate A    Nitrite positive A    Blood neg     Bilirubin neg     Urobilinogen 0 2     Protein trace A    Ph 7 0     Specific Gravity 1 015     Ketone trace A    Glucose neg

## 2018-02-17 DIAGNOSIS — B37.2 CANDIDAL INTERTRIGO: Primary | ICD-10-CM

## 2018-02-19 PROBLEM — N39.0 CHRONIC URINARY TRACT INFECTION: Status: ACTIVE | Noted: 2017-04-03

## 2018-02-19 PROBLEM — M54.6 THORACIC BACK PAIN: Status: ACTIVE | Noted: 2017-10-19

## 2018-02-19 PROBLEM — R42 DIZZINESS: Status: ACTIVE | Noted: 2017-02-01

## 2018-02-19 PROBLEM — J30.9 ALLERGIC RHINITIS: Status: ACTIVE | Noted: 2017-12-06

## 2018-02-19 PROBLEM — R60.0 LEG EDEMA: Status: ACTIVE | Noted: 2017-10-19

## 2018-02-19 PROBLEM — B37.2 CANDIDAL INTERTRIGO: Status: ACTIVE | Noted: 2018-02-19

## 2018-02-19 PROBLEM — D49.2 NEOPLASM OF SKIN OF FACE: Status: ACTIVE | Noted: 2017-10-19

## 2018-02-19 RX ORDER — NYSTATIN 100000 [USP'U]/G
POWDER TOPICAL
Qty: 180 G | Refills: 3 | Status: SHIPPED | OUTPATIENT
Start: 2018-02-19 | End: 2019-05-23 | Stop reason: SDUPTHER

## 2018-02-20 ENCOUNTER — TELEPHONE (OUTPATIENT)
Dept: FAMILY MEDICINE CLINIC | Facility: CLINIC | Age: 83
End: 2018-02-20

## 2018-02-20 DIAGNOSIS — R35.0 URINARY FREQUENCY: Primary | ICD-10-CM

## 2018-02-20 PROCEDURE — 87086 URINE CULTURE/COLONY COUNT: CPT | Performed by: FAMILY MEDICINE

## 2018-02-20 NOTE — TELEPHONE ENCOUNTER
We rec'd a Urine specimen from pt  Her daughter dropped it off with the front office stating her mother is having symptoms  Unfortunately she did not speak to anyone about this and I spoke to Dr Nita White he advised we send the Urine culture only

## 2018-02-21 ENCOUNTER — TELEPHONE (OUTPATIENT)
Dept: FAMILY MEDICINE CLINIC | Facility: CLINIC | Age: 83
End: 2018-02-21

## 2018-02-21 NOTE — TELEPHONE ENCOUNTER
Patient's daughter called in about her urine culture  Told her it takes a few days to come back  They took it upon themselves to just drop off urine and they hadn't spoken to anyone  Ortega Reinoso would like to speak to someone re: her symptoms

## 2018-02-22 LAB — BACTERIA UR CULT: NORMAL

## 2018-02-22 NOTE — TELEPHONE ENCOUNTER
lmom notifying daughter that urine cx results are neg  And if she has further questions to call back  --bb

## 2018-04-06 DIAGNOSIS — J30.9 ALLERGIC RHINITIS, UNSPECIFIED CHRONICITY, UNSPECIFIED SEASONALITY, UNSPECIFIED TRIGGER: Primary | ICD-10-CM

## 2018-04-06 RX ORDER — AZELASTINE 1 MG/ML
SPRAY, METERED NASAL
Qty: 30 ML | Refills: 5 | Status: SHIPPED | OUTPATIENT
Start: 2018-04-06 | End: 2018-06-05

## 2018-05-09 DIAGNOSIS — I10 ESSENTIAL HYPERTENSION: ICD-10-CM

## 2018-05-09 DIAGNOSIS — R42 DIZZINESS: ICD-10-CM

## 2018-05-09 DIAGNOSIS — K21.9 GASTROESOPHAGEAL REFLUX DISEASE, ESOPHAGITIS PRESENCE NOT SPECIFIED: ICD-10-CM

## 2018-05-09 DIAGNOSIS — E87.6 HYPOKALEMIA: Primary | ICD-10-CM

## 2018-05-09 DIAGNOSIS — E78.5 HYPERLIPIDEMIA, UNSPECIFIED HYPERLIPIDEMIA TYPE: ICD-10-CM

## 2018-05-09 DIAGNOSIS — E55.9 VITAMIN D DEFICIENCY: ICD-10-CM

## 2018-05-09 DIAGNOSIS — R73.9 HYPERGLYCEMIA: ICD-10-CM

## 2018-05-09 DIAGNOSIS — G25.81 RESTLESS LEGS SYNDROME: ICD-10-CM

## 2018-05-09 DIAGNOSIS — R60.0 LEG EDEMA: ICD-10-CM

## 2018-05-09 RX ORDER — ENALAPRIL MALEATE 10 MG/1
TABLET ORAL
Qty: 90 TABLET | Refills: 3 | Status: SHIPPED | OUTPATIENT
Start: 2018-05-09 | End: 2019-03-26 | Stop reason: SDUPTHER

## 2018-05-09 RX ORDER — OMEPRAZOLE 20 MG/1
CAPSULE, DELAYED RELEASE ORAL
Qty: 90 CAPSULE | Refills: 3 | Status: SHIPPED | OUTPATIENT
Start: 2018-05-09 | End: 2019-03-26 | Stop reason: SDUPTHER

## 2018-05-09 RX ORDER — MECLIZINE HYDROCHLORIDE 25 MG/1
TABLET ORAL
Qty: 270 TABLET | Refills: 3 | Status: SHIPPED | OUTPATIENT
Start: 2018-05-09 | End: 2019-05-23 | Stop reason: SDUPTHER

## 2018-05-09 RX ORDER — FUROSEMIDE 40 MG/1
TABLET ORAL
Qty: 180 TABLET | Refills: 3 | Status: SHIPPED | OUTPATIENT
Start: 2018-05-09 | End: 2019-03-26 | Stop reason: SDUPTHER

## 2018-05-09 RX ORDER — POTASSIUM CHLORIDE 20 MEQ/1
TABLET, EXTENDED RELEASE ORAL
Qty: 90 TABLET | Refills: 3 | Status: SHIPPED | OUTPATIENT
Start: 2018-05-09 | End: 2019-03-18

## 2018-05-09 RX ORDER — PRAMIPEXOLE DIHYDROCHLORIDE 0.25 MG/1
TABLET ORAL
Qty: 90 TABLET | Refills: 3 | Status: SHIPPED | OUTPATIENT
Start: 2018-05-09 | End: 2019-03-26 | Stop reason: SDUPTHER

## 2018-06-05 RX ORDER — AZELASTINE 1 MG/ML
2 SPRAY, METERED NASAL 2 TIMES DAILY
COMMUNITY
Start: 2017-12-06 | End: 2020-03-01

## 2018-06-06 ENCOUNTER — OFFICE VISIT (OUTPATIENT)
Dept: FAMILY MEDICINE CLINIC | Facility: CLINIC | Age: 83
End: 2018-06-06
Payer: MEDICARE

## 2018-06-06 ENCOUNTER — TRANSCRIBE ORDERS (OUTPATIENT)
Dept: LAB | Facility: CLINIC | Age: 83
End: 2018-06-06

## 2018-06-06 ENCOUNTER — APPOINTMENT (OUTPATIENT)
Dept: LAB | Facility: CLINIC | Age: 83
End: 2018-06-06
Payer: MEDICARE

## 2018-06-06 VITALS
RESPIRATION RATE: 16 BRPM | HEART RATE: 68 BPM | DIASTOLIC BLOOD PRESSURE: 76 MMHG | HEIGHT: 60 IN | WEIGHT: 164.5 LBS | SYSTOLIC BLOOD PRESSURE: 118 MMHG | BODY MASS INDEX: 32.3 KG/M2

## 2018-06-06 DIAGNOSIS — Z74.09 DECREASED AMBULATION STATUS: ICD-10-CM

## 2018-06-06 DIAGNOSIS — E87.6 HYPOKALEMIA: ICD-10-CM

## 2018-06-06 DIAGNOSIS — G25.81 RESTLESS LEGS SYNDROME: ICD-10-CM

## 2018-06-06 DIAGNOSIS — K21.9 GASTROESOPHAGEAL REFLUX DISEASE, ESOPHAGITIS PRESENCE NOT SPECIFIED: ICD-10-CM

## 2018-06-06 DIAGNOSIS — E55.9 VITAMIN D DEFICIENCY: ICD-10-CM

## 2018-06-06 DIAGNOSIS — I10 ESSENTIAL HYPERTENSION: Primary | ICD-10-CM

## 2018-06-06 DIAGNOSIS — F41.9 ANXIETY: ICD-10-CM

## 2018-06-06 DIAGNOSIS — R60.0 LEG EDEMA: ICD-10-CM

## 2018-06-06 DIAGNOSIS — C50.919 CARCINOMA OF FEMALE BREAST, UNSPECIFIED ESTROGEN RECEPTOR STATUS, UNSPECIFIED LATERALITY, UNSPECIFIED SITE OF BREAST (HCC): ICD-10-CM

## 2018-06-06 DIAGNOSIS — R73.9 HYPERGLYCEMIA: ICD-10-CM

## 2018-06-06 DIAGNOSIS — I89.0 LYMPHEDEMA: ICD-10-CM

## 2018-06-06 DIAGNOSIS — D49.2 NEOPLASM OF SKIN OF FACE: ICD-10-CM

## 2018-06-06 DIAGNOSIS — R42 DIZZINESS: ICD-10-CM

## 2018-06-06 DIAGNOSIS — M54.50 CHRONIC BILATERAL LOW BACK PAIN WITHOUT SCIATICA: ICD-10-CM

## 2018-06-06 DIAGNOSIS — E78.5 HYPERLIPIDEMIA, UNSPECIFIED HYPERLIPIDEMIA TYPE: ICD-10-CM

## 2018-06-06 DIAGNOSIS — G89.29 CHRONIC BILATERAL LOW BACK PAIN WITHOUT SCIATICA: ICD-10-CM

## 2018-06-06 DIAGNOSIS — I10 ESSENTIAL HYPERTENSION: ICD-10-CM

## 2018-06-06 DIAGNOSIS — N39.0 CHRONIC URINARY TRACT INFECTION: Primary | ICD-10-CM

## 2018-06-06 DIAGNOSIS — M15.9 OSTEOARTHRITIS OF MULTIPLE JOINTS, UNSPECIFIED OSTEOARTHRITIS TYPE: ICD-10-CM

## 2018-06-06 PROBLEM — E88.09 HYPERPROTEINEMIA: Status: ACTIVE | Noted: 2018-06-06

## 2018-06-06 LAB
25(OH)D3 SERPL-MCNC: 85.4 NG/ML (ref 30–100)
ALBUMIN SERPL BCP-MCNC: 4.3 G/DL (ref 3.5–5)
ALP SERPL-CCNC: 103 U/L (ref 46–116)
ALT SERPL W P-5'-P-CCNC: 16 U/L (ref 12–78)
ANION GAP SERPL CALCULATED.3IONS-SCNC: 7 MMOL/L (ref 4–13)
AST SERPL W P-5'-P-CCNC: 18 U/L (ref 5–45)
BACTERIA UR QL AUTO: ABNORMAL /HPF
BILIRUB SERPL-MCNC: 0.81 MG/DL (ref 0.2–1)
BILIRUB UR QL STRIP: NEGATIVE
BUN SERPL-MCNC: 13 MG/DL (ref 5–25)
CALCIUM SERPL-MCNC: 9.6 MG/DL (ref 8.3–10.1)
CAOX CRY URNS QL MICRO: ABNORMAL /HPF
CHLORIDE SERPL-SCNC: 97 MMOL/L (ref 100–108)
CHOLEST SERPL-MCNC: 199 MG/DL (ref 50–200)
CLARITY UR: ABNORMAL
CO2 SERPL-SCNC: 32 MMOL/L (ref 21–32)
COLOR UR: YELLOW
CREAT SERPL-MCNC: 0.68 MG/DL (ref 0.6–1.3)
ERYTHROCYTE [DISTWIDTH] IN BLOOD BY AUTOMATED COUNT: 13.9 % (ref 11.6–15.1)
EST. AVERAGE GLUCOSE BLD GHB EST-MCNC: 108 MG/DL
GFR SERPL CREATININE-BSD FRML MDRD: 77 ML/MIN/1.73SQ M
GLUCOSE P FAST SERPL-MCNC: 89 MG/DL (ref 65–99)
GLUCOSE UR STRIP-MCNC: NEGATIVE MG/DL
HBA1C MFR BLD: 5.4 % (ref 4.2–6.3)
HCT VFR BLD AUTO: 40 % (ref 34.8–46.1)
HDLC SERPL-MCNC: 64 MG/DL (ref 40–60)
HGB BLD-MCNC: 13.1 G/DL (ref 11.5–15.4)
HGB UR QL STRIP.AUTO: NEGATIVE
KETONES UR STRIP-MCNC: NEGATIVE MG/DL
LDLC SERPL CALC-MCNC: 113 MG/DL (ref 0–100)
LEUKOCYTE ESTERASE UR QL STRIP: ABNORMAL
MCH RBC QN AUTO: 31.2 PG (ref 26.8–34.3)
MCHC RBC AUTO-ENTMCNC: 32.8 G/DL (ref 31.4–37.4)
MCV RBC AUTO: 95 FL (ref 82–98)
NITRITE UR QL STRIP: NEGATIVE
NON-SQ EPI CELLS URNS QL MICRO: ABNORMAL /HPF
PH UR STRIP.AUTO: 6.5 [PH] (ref 4.5–8)
PLATELET # BLD AUTO: 250 THOUSANDS/UL (ref 149–390)
PMV BLD AUTO: 10.4 FL (ref 8.9–12.7)
POTASSIUM SERPL-SCNC: 4 MMOL/L (ref 3.5–5.3)
PROT SERPL-MCNC: 8.4 G/DL (ref 6.4–8.2)
PROT UR STRIP-MCNC: NEGATIVE MG/DL
RBC # BLD AUTO: 4.2 MILLION/UL (ref 3.81–5.12)
RBC #/AREA URNS AUTO: ABNORMAL /HPF
SODIUM SERPL-SCNC: 136 MMOL/L (ref 136–145)
SP GR UR STRIP.AUTO: 1.01 (ref 1–1.03)
TRIGL SERPL-MCNC: 110 MG/DL
TSH SERPL DL<=0.05 MIU/L-ACNC: 2.14 UIU/ML (ref 0.36–3.74)
UROBILINOGEN UR QL STRIP.AUTO: 0.2 E.U./DL
WBC # BLD AUTO: 5.52 THOUSAND/UL (ref 4.31–10.16)
WBC #/AREA URNS AUTO: ABNORMAL /HPF

## 2018-06-06 PROCEDURE — 85027 COMPLETE CBC AUTOMATED: CPT

## 2018-06-06 PROCEDURE — 84443 ASSAY THYROID STIM HORMONE: CPT

## 2018-06-06 PROCEDURE — 99214 OFFICE O/P EST MOD 30 MIN: CPT | Performed by: FAMILY MEDICINE

## 2018-06-06 PROCEDURE — 36415 COLL VENOUS BLD VENIPUNCTURE: CPT

## 2018-06-06 PROCEDURE — 80053 COMPREHEN METABOLIC PANEL: CPT

## 2018-06-06 PROCEDURE — 81001 URINALYSIS AUTO W/SCOPE: CPT

## 2018-06-06 PROCEDURE — 82306 VITAMIN D 25 HYDROXY: CPT

## 2018-06-06 PROCEDURE — 80061 LIPID PANEL: CPT

## 2018-06-06 PROCEDURE — 83036 HEMOGLOBIN GLYCOSYLATED A1C: CPT

## 2018-06-06 RX ORDER — CHLORDIAZEPOXIDE HYDROCHLORIDE 25 MG/1
25 CAPSULE, GELATIN COATED ORAL 2 TIMES DAILY
Qty: 60 CAPSULE | Refills: 5 | Status: SHIPPED | OUTPATIENT
Start: 2018-06-06 | End: 2018-12-11 | Stop reason: SDUPTHER

## 2018-06-06 RX ORDER — TRAMADOL HYDROCHLORIDE 50 MG/1
50 TABLET ORAL EVERY 6 HOURS PRN
Qty: 30 TABLET | Refills: 1 | Status: SHIPPED | OUTPATIENT
Start: 2018-06-06 | End: 2018-10-05 | Stop reason: SDUPTHER

## 2018-06-06 NOTE — ASSESSMENT & PLAN NOTE
Librium was reordered, I discussed decreasing dose or changing medication    Patient tried this in the past it did not go well in light of patient's advanced age medical status I think the safest thing to do is just have patient keep taking this medication without change

## 2018-06-06 NOTE — PATIENT INSTRUCTIONS
Follow-up with dermatologist for facial skin lesion, return in 6 months for office visit blood work sooner if need

## 2018-06-06 NOTE — PROGRESS NOTES
Assessment/Plan:  1  Hypertension, stable continue present therapy  2  DJD/ambulation dysfunction/back pain, patient is off Podiatry early today tramadol was reordered  3  Ambulation dysfunction patient ambulates with walker  4  Hypokalemia/hyperglycemia/hyperlipidemia/vitamin-D deficiency, blood work is completed this morning results are pending  5  Breast carcinoma, stable blood work pending  6  Lymphedema, chronic and stable  7  Anxiety, refill Librium, discussed decreasing dose or changing medication  We tried this a few years ago it did not go well" per patient and daughter  In light of patient's advanced age and other medical conditions and safest thing to do is discontinue this at the same dose  Patient and daughter are aware of the risks some benefit especially risk of increased drowsiness respiratory just brash in an dependency  Patient and the daughter want to continue the same dose without change  8  Facial neoplasm, right forehead, 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  9  Patient to return in 6 months for opposite blood work will see sooner if needed  GERD (gastroesophageal reflux disease)  Stable continue present therapy    Hypertension  Stable continue present therapy    Osteoarthritis  Tramadol was reordered    Neoplasm of skin of face  Refer to Dermatology    Vitamin D deficiency  Blood work pending    Restless legs syndrome  Stable continue present therapy    Lymphedema  Chronic, stable    Hypokalemia  Blood work pending    Hyperlipidemia  Blood work pending    Hyperglycemia  Blood work pending    Decreased ambulation status  Ambulates with walker    Breast carcinoma ([facility] [address] )  Stable, blood work pending    Back pain  Stable, tramadol reordered    Anxiety  Librium was reordered, I discussed decreasing dose or changing medication    Patient tried this in the past it did not go well in light of patient's advanced age medical status I think the safest thing to do is just have patient keep taking this medication without change       Diagnoses and all orders for this visit:    Essential hypertension  -     CBC; Future  -     Comprehensive metabolic panel; Future  -     HEMOGLOBIN A1C W/ EAG ESTIMATION; Future  -     Lipid Panel with Direct LDL reflex; Future  -     TSH, 3rd generation with T4 reflex; Future  -     Urinalysis with microscopic; Future  -     Vitamin D 25 hydroxy; Future    Vitamin D deficiency  -     CBC; Future  -     Comprehensive metabolic panel; Future  -     HEMOGLOBIN A1C W/ EAG ESTIMATION; Future  -     Lipid Panel with Direct LDL reflex; Future  -     TSH, 3rd generation with T4 reflex; Future  -     Urinalysis with microscopic; Future  -     Vitamin D 25 hydroxy; Future    Restless legs syndrome  -     CBC; Future  -     Comprehensive metabolic panel; Future  -     HEMOGLOBIN A1C W/ EAG ESTIMATION; Future  -     Lipid Panel with Direct LDL reflex; Future  -     TSH, 3rd generation with T4 reflex; Future  -     Urinalysis with microscopic; Future  -     Vitamin D 25 hydroxy; Future    Hypokalemia  -     CBC; Future  -     Comprehensive metabolic panel; Future  -     HEMOGLOBIN A1C W/ EAG ESTIMATION; Future  -     Lipid Panel with Direct LDL reflex; Future  -     TSH, 3rd generation with T4 reflex; Future  -     Urinalysis with microscopic; Future  -     Vitamin D 25 hydroxy; Future    Hyperglycemia  -     CBC; Future  -     Comprehensive metabolic panel; Future  -     HEMOGLOBIN A1C W/ EAG ESTIMATION; Future  -     Lipid Panel with Direct LDL reflex; Future  -     TSH, 3rd generation with T4 reflex; Future  -     Urinalysis with microscopic; Future  -     Vitamin D 25 hydroxy; Future    Carcinoma of female breast, unspecified estrogen receptor status, unspecified laterality, [facility]  -     CBC; Future  -     Comprehensive metabolic panel; Future  -     HEMOGLOBIN A1C W/ EAG ESTIMATION; Future  -     Lipid Panel with Direct LDL reflex;  Future  -     TSH, 3rd generation with T4 reflex; Future  -     Urinalysis with microscopic; Future  -     Vitamin D 25 hydroxy; Future    Decreased ambulation status  -     CBC; Future  -     Comprehensive metabolic panel; Future  -     HEMOGLOBIN A1C W/ EAG ESTIMATION; Future  -     Lipid Panel with Direct LDL reflex; Future  -     TSH, 3rd generation with T4 reflex; Future  -     Urinalysis with microscopic; Future  -     Vitamin D 25 hydroxy; Future    Anxiety  -     chlordiazePOXIDE (LIBRIUM) 25 mg capsule; Take 1 capsule (25 mg total) by mouth 2 (two) times a day  -     CBC; Future  -     Comprehensive metabolic panel; Future  -     HEMOGLOBIN A1C W/ EAG ESTIMATION; Future  -     Lipid Panel with Direct LDL reflex; Future  -     TSH, 3rd generation with T4 reflex; Future  -     Urinalysis with microscopic; Future  -     Vitamin D 25 hydroxy; Future    Chronic bilateral low back pain without sciatica  -     traMADol (ULTRAM) 50 mg tablet; Take 1 tablet (50 mg total) by mouth every 6 (six) hours as needed for moderate pain Take 1  -     CBC; Future  -     Comprehensive metabolic panel; Future  -     HEMOGLOBIN A1C W/ EAG ESTIMATION; Future  -     Lipid Panel with Direct LDL reflex; Future  -     TSH, 3rd generation with T4 reflex; Future  -     Urinalysis with microscopic; Future  -     Vitamin D 25 hydroxy; Future    Osteoarthritis of multiple joints, unspecified osteoarthritis type  -     traMADol (ULTRAM) 50 mg tablet; Take 1 tablet (50 mg total) by mouth every 6 (six) hours as needed for moderate pain Take 1  -     CBC; Future  -     Comprehensive metabolic panel; Future  -     HEMOGLOBIN A1C W/ EAG ESTIMATION; Future  -     Lipid Panel with Direct LDL reflex; Future  -     TSH, 3rd generation with T4 reflex; Future  -     Urinalysis with microscopic; Future  -     Vitamin D 25 hydroxy; Future    Gastroesophageal reflux disease, esophagitis presence not specified  -     CBC; Future  -     Comprehensive metabolic panel;  Future  - HEMOGLOBIN A1C W/ EAG ESTIMATION; Future  -     Lipid Panel with Direct LDL reflex; Future  -     TSH, 3rd generation with T4 reflex; Future  -     Urinalysis with microscopic; Future  -     Vitamin D 25 hydroxy; Future    Lymphedema  -     CBC; Future  -     Comprehensive metabolic panel; Future  -     HEMOGLOBIN A1C W/ EAG ESTIMATION; Future  -     Lipid Panel with Direct LDL reflex; Future  -     TSH, 3rd generation with T4 reflex; Future  -     Urinalysis with microscopic; Future  -     Vitamin D 25 hydroxy; Future    Neoplasm of skin of face  -     Ambulatory referral to Dermatology; Future          Subjective: pt here with concerns of angel on forehead x years and rx refills  GUILLERMO Casanova     Patient ID: Sachin Quarles is a 80 y o  female  Patient stable at the present time  She did see Podiatry for her foot pain this morning  Patient's right forehead lung a skin lesion is enlarging  I recommend dermatologist they had seen Dr priscilla veliz in the past   Wishes to go back to his group  Blood work completed this morning  I will call patient with blood work results        The following portions of the patient's history were reviewed and updated as appropriate: allergies, current medications, past family history, past medical history, past social history, past surgical history and problem list     Review of Systems   Constitutional: Negative  HENT: Negative  Eyes: Negative  Respiratory: Negative  Cardiovascular: Negative  Gastrointestinal: Negative  Endocrine: Negative  Genitourinary: Negative  Musculoskeletal:        Back pain leg pain is stable patient's [facility] for foot pain earlier today   Skin:        HPI   Allergic/Immunologic: Negative  Hematological: Negative  Psychiatric/Behavioral: Negative            Objective:      Vitals:    06/06/18 1430   BP: 118/76   BP Location: Left arm   Patient Position: Sitting   Cuff Size: Large   Pulse: 68   Resp: 16   Weight: 74 6 kg (164 lb 8 oz)   Height: 5' (1 524 m)        Physical Exam   Constitutional: She is oriented to person, place, and time  She appears well-developed and well-nourished  HENT:   Head: Normocephalic and atraumatic  Mouth/Throat: Oropharynx is clear and moist    Eyes: Conjunctivae are normal  Pupils are equal, round, and reactive to light  No scleral icterus  Neck: Neck supple  Cardiovascular: Normal rate, regular rhythm and normal heart sounds  Pulmonary/Chest: Effort normal and breath sounds normal    Abdominal: Bowel sounds are normal  There is no tenderness  Musculoskeletal: She exhibits edema  Ambulating with walker, chronic nonpitting edema lower extremities   Lymphadenopathy:     She has no cervical adenopathy  Neurological: She is alert and oriented to person, place, and time  No cranial nerve deficit  Skin: Skin is warm and dry  Right forehead with flat erythematous irregular skin lesion 1 x 1 cm   Psychiatric: She has a normal mood and affect

## 2018-06-12 ENCOUNTER — APPOINTMENT (OUTPATIENT)
Dept: LAB | Facility: CLINIC | Age: 83
End: 2018-06-12
Payer: MEDICARE

## 2018-06-12 DIAGNOSIS — N39.0 CHRONIC URINARY TRACT INFECTION: ICD-10-CM

## 2018-06-12 PROCEDURE — 84166 PROTEIN E-PHORESIS/URINE/CSF: CPT

## 2018-06-12 PROCEDURE — 84166 PROTEIN E-PHORESIS/URINE/CSF: CPT | Performed by: PATHOLOGY

## 2018-06-12 PROCEDURE — 36415 COLL VENOUS BLD VENIPUNCTURE: CPT

## 2018-06-12 PROCEDURE — 84165 PROTEIN E-PHORESIS SERUM: CPT | Performed by: PATHOLOGY

## 2018-06-12 PROCEDURE — 84165 PROTEIN E-PHORESIS SERUM: CPT

## 2018-06-15 ENCOUNTER — TELEPHONE (OUTPATIENT)
Dept: FAMILY MEDICINE CLINIC | Facility: CLINIC | Age: 83
End: 2018-06-15

## 2018-06-15 LAB
ALBUMIN SERPL ELPH-MCNC: 4.28 G/DL (ref 3.5–5)
ALBUMIN SERPL ELPH-MCNC: 57.1 % (ref 52–65)
ALBUMIN UR ELPH-MCNC: 100 %
ALPHA1 GLOB MFR UR ELPH: 0 %
ALPHA1 GLOB SERPL ELPH-MCNC: 0.25 G/DL (ref 0.1–0.4)
ALPHA1 GLOB SERPL ELPH-MCNC: 3.3 % (ref 2.5–5)
ALPHA2 GLOB MFR UR ELPH: 0 %
ALPHA2 GLOB SERPL ELPH-MCNC: 0.88 G/DL (ref 0.4–1.2)
ALPHA2 GLOB SERPL ELPH-MCNC: 11.7 % (ref 7–13)
B-GLOBULIN MFR UR ELPH: 0 %
BETA GLOB ABNORMAL SERPL ELPH-MCNC: 0.5 G/DL (ref 0.4–0.8)
BETA1 GLOB SERPL ELPH-MCNC: 6.6 % (ref 5–13)
BETA2 GLOB SERPL ELPH-MCNC: 7 % (ref 2–8)
BETA2+GAMMA GLOB SERPL ELPH-MCNC: 0.53 G/DL (ref 0.2–0.5)
GAMMA GLOB ABNORMAL SERPL ELPH-MCNC: 1.07 G/DL (ref 0.5–1.6)
GAMMA GLOB MFR UR ELPH: 0 %
GAMMA GLOB SERPL ELPH-MCNC: 14.3 % (ref 12–22)
IGG/ALB SER: 1.33 {RATIO} (ref 1.1–1.8)
PROT PATTERN SERPL ELPH-IMP: ABNORMAL
PROT PATTERN UR ELPH-IMP: NORMAL
PROT SERPL-MCNC: 7.5 G/DL (ref 6.4–8.2)
PROT UR-MCNC: 8 MG/DL

## 2018-06-16 DIAGNOSIS — G89.4 CHRONIC PAIN SYNDROME: Primary | ICD-10-CM

## 2018-06-18 RX ORDER — LIDOCAINE 50 MG/G
PATCH TOPICAL
Qty: 30 PATCH | Refills: 0 | Status: SHIPPED | OUTPATIENT
Start: 2018-06-18 | End: 2018-08-01 | Stop reason: SDUPTHER

## 2018-08-01 ENCOUNTER — APPOINTMENT (OUTPATIENT)
Dept: LAB | Facility: CLINIC | Age: 83
End: 2018-08-01
Payer: MEDICARE

## 2018-08-01 DIAGNOSIS — N39.0 CHRONIC URINARY TRACT INFECTION: Primary | ICD-10-CM

## 2018-08-01 DIAGNOSIS — G89.4 CHRONIC PAIN SYNDROME: ICD-10-CM

## 2018-08-01 LAB
AMORPH PHOS CRY URNS QL MICRO: ABNORMAL /HPF
BACTERIA UR QL AUTO: ABNORMAL /HPF
BILIRUB UR QL STRIP: NEGATIVE
CLARITY UR: ABNORMAL
COLOR UR: YELLOW
GLUCOSE UR STRIP-MCNC: NEGATIVE MG/DL
HGB UR QL STRIP.AUTO: NEGATIVE
KETONES UR STRIP-MCNC: NEGATIVE MG/DL
LEUKOCYTE ESTERASE UR QL STRIP: ABNORMAL
NITRITE UR QL STRIP: NEGATIVE
NON-SQ EPI CELLS URNS QL MICRO: ABNORMAL /HPF
PH UR STRIP.AUTO: 8 [PH] (ref 4.5–8)
PROT UR STRIP-MCNC: ABNORMAL MG/DL
RBC #/AREA URNS AUTO: ABNORMAL /HPF
SP GR UR STRIP.AUTO: 1.02 (ref 1–1.03)
TRI-PHOS CRY URNS QL MICRO: ABNORMAL /HPF
UROBILINOGEN UR QL STRIP.AUTO: 0.2 E.U./DL
WBC #/AREA URNS AUTO: ABNORMAL /HPF

## 2018-08-01 PROCEDURE — 81001 URINALYSIS AUTO W/SCOPE: CPT | Performed by: FAMILY MEDICINE

## 2018-08-01 RX ORDER — DOXYCYCLINE HYCLATE 100 MG/1
100 CAPSULE ORAL EVERY 12 HOURS SCHEDULED
Qty: 14 CAPSULE | Refills: 0 | Status: SHIPPED | OUTPATIENT
Start: 2018-08-01 | End: 2018-08-08

## 2018-08-01 RX ORDER — LIDOCAINE 50 MG/G
1 PATCH TOPICAL DAILY
Qty: 30 PATCH | Refills: 0 | Status: SHIPPED | OUTPATIENT
Start: 2018-08-01 | End: 2019-01-30 | Stop reason: SDUPTHER

## 2018-08-06 ENCOUNTER — TELEPHONE (OUTPATIENT)
Dept: FAMILY MEDICINE CLINIC | Facility: CLINIC | Age: 83
End: 2018-08-06

## 2018-08-06 DIAGNOSIS — R30.0 DYSURIA: Primary | ICD-10-CM

## 2018-08-06 PROCEDURE — 87086 URINE CULTURE/COLONY COUNT: CPT | Performed by: FAMILY MEDICINE

## 2018-08-06 NOTE — TELEPHONE ENCOUNTER
What is the status of the urine culture? At this point, based on the urinalysis alone, I would not feel comfortable changing to anything else

## 2018-08-06 NOTE — TELEPHONE ENCOUNTER
Spoke with daughter and told her pt needs to have another cx done prior to changing ABT  She will try to get specimen here today for send out

## 2018-08-06 NOTE — TELEPHONE ENCOUNTER
Pt daughter called, Angeles Negrete has a UTI and normally gets Levaquin per Mack Coleman, this time Doxycycline was called in and patient is sick off of it with diarrhea, she has 2 days left of the Doxy and is not sure if she should finish it or can something else be sent in for her UTI, Mack Coleman would like a call back today please  Thank you!

## 2018-08-06 NOTE — TELEPHONE ENCOUNTER
I spoke with the laba and the culture was not done  (Mistake on their part )  Does this change anything?

## 2018-08-07 RX ORDER — LEVOFLOXACIN 250 MG/1
TABLET ORAL
Qty: 3 TABLET | Refills: 0 | OUTPATIENT
Start: 2018-08-07

## 2018-08-07 NOTE — TELEPHONE ENCOUNTER
Patient just had a urine culture obtained after the most recent culture was not able to be done by the lab  Urinalysis at that point did look abnormal, and she was on doxycycline  I would recommend that we wait until after the culture is back, and then start antibiotics that would be appropriate for the bacteria that she may have

## 2018-08-08 LAB — BACTERIA UR CULT: NORMAL

## 2018-08-23 ENCOUNTER — APPOINTMENT (OUTPATIENT)
Dept: LAB | Facility: CLINIC | Age: 83
End: 2018-08-23
Payer: MEDICARE

## 2018-08-23 ENCOUNTER — TELEPHONE (OUTPATIENT)
Dept: FAMILY MEDICINE CLINIC | Facility: CLINIC | Age: 83
End: 2018-08-23

## 2018-08-23 DIAGNOSIS — N39.0 CHRONIC URINARY TRACT INFECTION: Primary | ICD-10-CM

## 2018-08-23 PROCEDURE — 87077 CULTURE AEROBIC IDENTIFY: CPT | Performed by: PHYSICIAN ASSISTANT

## 2018-08-23 PROCEDURE — 87186 SC STD MICRODIL/AGAR DIL: CPT | Performed by: PHYSICIAN ASSISTANT

## 2018-08-23 PROCEDURE — 87086 URINE CULTURE/COLONY COUNT: CPT | Performed by: PHYSICIAN ASSISTANT

## 2018-08-23 RX ORDER — LEVOFLOXACIN 250 MG/1
250 TABLET ORAL DAILY
Qty: 3 TABLET | Refills: 0 | Status: SHIPPED | OUTPATIENT
Start: 2018-08-23 | End: 2018-08-26

## 2018-08-23 NOTE — TELEPHONE ENCOUNTER
Per call to pt's daughter Nikia Richey,  pt is experiencing urinary urgency, very little urine output, "squeezing sensation" while urinating  Symptoms started yesterday  Pt's daughter reluctant to wait starting antibiotic as last time " it took 5 days" and she doesn't want to wait until Monday to hear results  She will drop off specimen today

## 2018-08-23 NOTE — TELEPHONE ENCOUNTER
Upon review of patient's chart she just had a urine culture at the beginning of this month which was negative  I guess what we could do since this is not my patient is to give patient a prescription for Levaquin 250 mg to take 1 daily for 3 days as long as the patient's daughter drops off a urine for culture before patient starts antibiotic  We must document patient's symptoms as well

## 2018-08-23 NOTE — TELEPHONE ENCOUNTER
Pt's dghtr called and Pt has UTI again  Would like to know if Levaquin can be called in  She has no issue dropping off a urine sample      Pharmacy-[facility]

## 2018-08-25 LAB — BACTERIA UR CULT: ABNORMAL

## 2018-08-31 ENCOUNTER — TELEPHONE (OUTPATIENT)
Dept: FAMILY MEDICINE CLINIC | Facility: CLINIC | Age: 83
End: 2018-08-31

## 2018-08-31 NOTE — TELEPHONE ENCOUNTER
Maria Luz Adams was in for a doctor appointment today and wanted to let us know that her mom took the 3 day rx of Levaquin before she was told on the 27th that her UTI was positive and she should take it then  Toyin Martinez spoke to the daughter with me and Iyvtracie Palmira was telling us that she still has some kidney pain and the problem is that her mom wears maxi pads on top of each other and doesn't change them like she should so she is always sitting in wet pads  Ivytracie Chavis said she has tried to explain to her and bought her depends and other supplies to make it easier for her mom to change but her mom refuses to do so  She thinks this is what is constantly giving her UTIs and doesn't know what to do  This is an FYI

## 2018-10-05 DIAGNOSIS — M54.50 CHRONIC BILATERAL LOW BACK PAIN WITHOUT SCIATICA: ICD-10-CM

## 2018-10-05 DIAGNOSIS — M15.9 OSTEOARTHRITIS OF MULTIPLE JOINTS, UNSPECIFIED OSTEOARTHRITIS TYPE: ICD-10-CM

## 2018-10-05 DIAGNOSIS — G89.29 CHRONIC BILATERAL LOW BACK PAIN WITHOUT SCIATICA: ICD-10-CM

## 2018-10-06 RX ORDER — TRAMADOL HYDROCHLORIDE 50 MG/1
TABLET ORAL
Qty: 30 TABLET | Refills: 1 | Status: SHIPPED | OUTPATIENT
Start: 2018-10-06 | End: 2018-12-11 | Stop reason: SDUPTHER

## 2018-12-11 DIAGNOSIS — M54.50 CHRONIC BILATERAL LOW BACK PAIN WITHOUT SCIATICA: ICD-10-CM

## 2018-12-11 DIAGNOSIS — M15.9 OSTEOARTHRITIS OF MULTIPLE JOINTS, UNSPECIFIED OSTEOARTHRITIS TYPE: ICD-10-CM

## 2018-12-11 DIAGNOSIS — G89.29 CHRONIC BILATERAL LOW BACK PAIN WITHOUT SCIATICA: ICD-10-CM

## 2018-12-11 DIAGNOSIS — F41.9 ANXIETY: ICD-10-CM

## 2018-12-11 RX ORDER — TRAMADOL HYDROCHLORIDE 50 MG/1
50 TABLET ORAL EVERY 6 HOURS PRN
Qty: 30 TABLET | Refills: 0 | Status: SHIPPED | OUTPATIENT
Start: 2018-12-11 | End: 2019-01-30 | Stop reason: SDUPTHER

## 2018-12-11 RX ORDER — CHLORDIAZEPOXIDE HYDROCHLORIDE 25 MG/1
25 CAPSULE, GELATIN COATED ORAL 2 TIMES DAILY
Qty: 60 CAPSULE | Refills: 5 | Status: SHIPPED | OUTPATIENT
Start: 2018-12-11 | End: 2019-03-26 | Stop reason: SDUPTHER

## 2019-01-30 DIAGNOSIS — M15.9 OSTEOARTHRITIS OF MULTIPLE JOINTS, UNSPECIFIED OSTEOARTHRITIS TYPE: ICD-10-CM

## 2019-01-30 DIAGNOSIS — G89.29 CHRONIC BILATERAL LOW BACK PAIN WITHOUT SCIATICA: ICD-10-CM

## 2019-01-30 DIAGNOSIS — M54.50 CHRONIC BILATERAL LOW BACK PAIN WITHOUT SCIATICA: ICD-10-CM

## 2019-01-30 DIAGNOSIS — G89.4 CHRONIC PAIN SYNDROME: ICD-10-CM

## 2019-01-31 ENCOUNTER — PATIENT OUTREACH (OUTPATIENT)
Dept: FAMILY MEDICINE CLINIC | Facility: CLINIC | Age: 84
End: 2019-01-31

## 2019-01-31 RX ORDER — LIDOCAINE 50 MG/G
1 PATCH TOPICAL DAILY
Qty: 30 PATCH | Refills: 0 | Status: SHIPPED | OUTPATIENT
Start: 2019-01-31 | End: 2019-10-17 | Stop reason: SDUPTHER

## 2019-01-31 RX ORDER — TRAMADOL HYDROCHLORIDE 50 MG/1
50 TABLET ORAL EVERY 6 HOURS PRN
Qty: 30 TABLET | Refills: 0 | Status: SHIPPED | OUTPATIENT
Start: 2019-01-31 | End: 2019-03-26 | Stop reason: SDUPTHER

## 2019-01-31 NOTE — PROGRESS NOTES
I spoke with Maurice Rodriguez, patient's daughter & caregiver  The following is a summary of our conversation:    Safety: She told me that her mom is unable to see well, uses a walker and has problems with balance  She said that she has fallen about 3x over the past month  She sometimes forgets or chooses not to use the walker  She explained that it is very difficult to get her out of the house for any activities  We talked about home PT  She is going to ask her mom if she would be agreeable to have someone come to her home  Valeria  has a life alert pendant, but does not always wear it  ADL's/IADL's: Maurice Rodriguez told me that the patient is able to eat & get dressed, but needs help with bathing and help to get up when she falls  Maurice Rodriguez does all of the household tasks & errands  PCP Follow Up: The patient canceled her appointment in December & prior appointment was in June  I encouraged her to make an appointment for February & offered to coordinate for her  She told me that she would prefer to call the office herself  Meds: Maurice Rodriguez fills her pill cases for her & supervises, but says that sometimes the patient does not take the meds as she should  Caregiver Stress: Maurice Rodriguez describes feeling stressed over the household tasks and the care giving for her mom  She's looking for resources for the two of them  Planning--We discussed various options as follows:  -caregiver support and stress management  -home health aids (not covered by insurance)  -possible home PT if patient is interested (covered by Medicare)   -[address]  Maurice Rodriguez has already been thinking about looking at [employer]: She is already aware of the services (free of charge) that [facility] offers and she will call & speak with [facility]  I mailed Maurice Rodriguez information on [facility] Caregiver Support Group, which meets monthly  Update to Dr Tahmina Knowles

## 2019-01-31 NOTE — TELEPHONE ENCOUNTER
Because tramadol is a narcotic  I will renew it over need to see her at least every 6 months she was last seen in June    She needs to make appoint within the next month for me to keep prescribing this

## 2019-03-13 ENCOUNTER — PATIENT OUTREACH (OUTPATIENT)
Dept: FAMILY MEDICINE CLINIC | Facility: CLINIC | Age: 84
End: 2019-03-13

## 2019-03-13 NOTE — PROGRESS NOTES
This CM spoke very briefly to pt's daughter Angel Jules as she explained she was at work and couldn't talk long but was still seeking help getting her mother into an assisted living facility  CM inquired if she'd contacted [facility] and Angel Jules said she had and she knows Altaf Sanchez very well  Angel Jules stated she would give this CM a call tomorrow and confirmed she had the contact number

## 2019-03-16 DIAGNOSIS — E87.6 HYPOKALEMIA: Primary | ICD-10-CM

## 2019-03-18 RX ORDER — POTASSIUM CHLORIDE 20 MEQ/1
TABLET, EXTENDED RELEASE ORAL
Qty: 90 TABLET | Refills: 3 | Status: SHIPPED | OUTPATIENT
Start: 2019-03-18 | End: 2020-01-17

## 2019-03-22 ENCOUNTER — TELEPHONE (OUTPATIENT)
Dept: FAMILY MEDICINE CLINIC | Facility: CLINIC | Age: 84
End: 2019-03-22

## 2019-03-23 ENCOUNTER — TELEPHONE (OUTPATIENT)
Dept: FAMILY MEDICINE CLINIC | Facility: CLINIC | Age: 84
End: 2019-03-23

## 2019-03-23 DIAGNOSIS — M54.50 CHRONIC BILATERAL LOW BACK PAIN WITHOUT SCIATICA: ICD-10-CM

## 2019-03-23 DIAGNOSIS — M15.9 OSTEOARTHRITIS OF MULTIPLE JOINTS, UNSPECIFIED OSTEOARTHRITIS TYPE: ICD-10-CM

## 2019-03-23 DIAGNOSIS — G89.29 CHRONIC BILATERAL LOW BACK PAIN WITHOUT SCIATICA: ICD-10-CM

## 2019-03-23 NOTE — TELEPHONE ENCOUNTER
Patient's daughter called in yesterday for a refill of her tramadol  She is currently out and has been fairly painful  There is a task to TS for refills as well as homecare  Patient's daughter is having issues trying to get her out of the home  Would you be willing to call in a refill of tramadol for her?

## 2019-03-23 NOTE — TELEPHONE ENCOUNTER
Problem List Items Addressed This Visit     Back pain    Osteoarthritis     Please see 31 January 2019 telephone note from Dr Colette Mooney  per that note, he did not wish to renew the tramadol  Further, I did review the case management notes, and in the case management notes there was a concern that the patient had fallen multiple times  Based on these to fax, I would feel uncomfortable renewing tramadol as it has not been renewed since January, it was only 30 pills at that point, and patient is quite unsteady  She does need to have further evaluation for this, so she does need to make an appointment

## 2019-03-23 NOTE — TELEPHONE ENCOUNTER
Patient's daughter called in again yelling that we are not doing our jobs  I explained that we can not approve a medication for her mother, that would go to the doctor  TH was in the office this weekend and he will not approve pain medications for her mother as she has not followed up as she was supposed to  Joseph Mccormack continued to yell saying the same things that I had already discussed with her  I tried explaining that we needed to wait for TS to come in on Monday to speak to him She continued yelling and speaking over me  I asked her to please stop and to let me speak  She continued to yell over me, not letting me get a word out  I told her that I felt she was being rude and I was going to end the call if she would not stop yelling at me and not allowing me to speak  She then yelled joselyn alves rude over and over  I told her the manager would be calling her and to have a nice day  She continued to chanreji alves and I hung up the phone

## 2019-03-23 NOTE — TELEPHONE ENCOUNTER
Called pt's daughter with information that pt needs to up appt to be seen  Pt hasnt been seen since June 2018  And as of right now she can not have a refill of tramadol until she comes in per the Dr on call  Aretha Garcia very upset starts to yell and scream and states that she wants to speaks to Dr Marium Raphael  Explaining to gavi that Ts is not here she goes on stating for our office to page him now!   Again she is yelling I tell madonna to please stop yelling and that i'm not going to put up with yelling at me and continues to scream  So I tell her ok sorry have to go and I hang up   R Edilberto

## 2019-03-23 NOTE — ASSESSMENT & PLAN NOTE
Please see 31 January 2019 telephone note from Dr Juliana Arroyo  per that note, he did not wish to renew the tramadol  Further, I did review the case management notes, and in the case management notes there was a concern that the patient had fallen multiple times  Based on these to fax, I would feel uncomfortable renewing tramadol as it has not been renewed since January, it was only 30 pills at that point, and patient is quite unsteady  She does need to have further evaluation for this, so she does need to make an appointment

## 2019-03-26 ENCOUNTER — OFFICE VISIT (OUTPATIENT)
Dept: FAMILY MEDICINE CLINIC | Facility: CLINIC | Age: 84
End: 2019-03-26
Payer: MEDICARE

## 2019-03-26 VITALS
WEIGHT: 153.6 LBS | HEART RATE: 68 BPM | SYSTOLIC BLOOD PRESSURE: 118 MMHG | DIASTOLIC BLOOD PRESSURE: 72 MMHG | BODY MASS INDEX: 30 KG/M2

## 2019-03-26 DIAGNOSIS — K21.9 GASTROESOPHAGEAL REFLUX DISEASE, ESOPHAGITIS PRESENCE NOT SPECIFIED: ICD-10-CM

## 2019-03-26 DIAGNOSIS — G25.81 RESTLESS LEGS SYNDROME: ICD-10-CM

## 2019-03-26 DIAGNOSIS — R60.0 LEG EDEMA: ICD-10-CM

## 2019-03-26 DIAGNOSIS — C50.919 CARCINOMA OF FEMALE BREAST, UNSPECIFIED ESTROGEN RECEPTOR STATUS, UNSPECIFIED LATERALITY, UNSPECIFIED SITE OF BREAST (HCC): ICD-10-CM

## 2019-03-26 DIAGNOSIS — I10 ESSENTIAL HYPERTENSION: ICD-10-CM

## 2019-03-26 DIAGNOSIS — G89.29 CHRONIC BILATERAL LOW BACK PAIN WITHOUT SCIATICA: ICD-10-CM

## 2019-03-26 DIAGNOSIS — F41.9 ANXIETY: ICD-10-CM

## 2019-03-26 DIAGNOSIS — M15.9 OSTEOARTHRITIS OF MULTIPLE JOINTS, UNSPECIFIED OSTEOARTHRITIS TYPE: ICD-10-CM

## 2019-03-26 DIAGNOSIS — M54.50 CHRONIC BILATERAL LOW BACK PAIN WITHOUT SCIATICA: ICD-10-CM

## 2019-03-26 DIAGNOSIS — Z23 ENCOUNTER FOR IMMUNIZATION: Primary | ICD-10-CM

## 2019-03-26 PROCEDURE — 99214 OFFICE O/P EST MOD 30 MIN: CPT | Performed by: PHYSICIAN ASSISTANT

## 2019-03-26 RX ORDER — TRAMADOL HYDROCHLORIDE 50 MG/1
50 TABLET ORAL EVERY 8 HOURS PRN
Qty: 90 TABLET | Refills: 1 | Status: SHIPPED | OUTPATIENT
Start: 2019-03-26 | End: 2019-07-11 | Stop reason: SDUPTHER

## 2019-03-26 RX ORDER — OMEPRAZOLE 20 MG/1
20 CAPSULE, DELAYED RELEASE ORAL DAILY
Qty: 90 CAPSULE | Refills: 3 | Status: SHIPPED | OUTPATIENT
Start: 2019-03-26 | End: 2020-05-20 | Stop reason: SDUPTHER

## 2019-03-26 RX ORDER — FUROSEMIDE 40 MG/1
40 TABLET ORAL 2 TIMES DAILY
Qty: 180 TABLET | Refills: 3 | Status: SHIPPED | OUTPATIENT
Start: 2019-03-26 | End: 2020-03-09 | Stop reason: HOSPADM

## 2019-03-26 RX ORDER — CHLORDIAZEPOXIDE HYDROCHLORIDE 25 MG/1
25 CAPSULE, GELATIN COATED ORAL 2 TIMES DAILY
Qty: 60 CAPSULE | Refills: 5 | Status: SHIPPED | OUTPATIENT
Start: 2019-03-26 | End: 2019-07-11 | Stop reason: SDUPTHER

## 2019-03-26 RX ORDER — ENALAPRIL MALEATE 10 MG/1
10 TABLET ORAL DAILY
Qty: 90 TABLET | Refills: 3 | Status: SHIPPED | OUTPATIENT
Start: 2019-03-26 | End: 2020-03-09 | Stop reason: HOSPADM

## 2019-03-26 RX ORDER — PRAMIPEXOLE DIHYDROCHLORIDE 0.25 MG/1
0.25 TABLET ORAL DAILY
Qty: 90 TABLET | Refills: 3 | Status: SHIPPED | OUTPATIENT
Start: 2019-03-26 | End: 2020-05-20 | Stop reason: SDUPTHER

## 2019-03-26 NOTE — PATIENT INSTRUCTIONS
1   Osteoarthritis of the knee-discussed treatment options  She is continuing Tylenol and tramadol as necessary  Medication renewals given  Encouraged follow-up with orthopedic specialist to consider injection therapies  Patient and daughter seem interested and will [facility]  2  Hypertension-presently stable, no medication changes  3  Breast cancer-history mastectomy, 2004  Patient declined further mammography surveillance    4   Osteoporosis-patient continues calcium and vitamin-D

## 2019-04-01 ENCOUNTER — PATIENT OUTREACH (OUTPATIENT)
Dept: FAMILY MEDICINE CLINIC | Facility: CLINIC | Age: 84
End: 2019-04-01

## 2019-04-17 ENCOUNTER — PATIENT OUTREACH (OUTPATIENT)
Dept: FAMILY MEDICINE CLINIC | Facility: CLINIC | Age: 84
End: 2019-04-17

## 2019-04-25 ENCOUNTER — PATIENT OUTREACH (OUTPATIENT)
Dept: FAMILY MEDICINE CLINIC | Facility: CLINIC | Age: 84
End: 2019-04-25

## 2019-05-23 DIAGNOSIS — B37.2 CANDIDAL INTERTRIGO: ICD-10-CM

## 2019-05-23 DIAGNOSIS — R42 DIZZINESS: ICD-10-CM

## 2019-05-23 RX ORDER — MECLIZINE HYDROCHLORIDE 25 MG/1
TABLET ORAL
Qty: 270 TABLET | Refills: 3 | Status: SHIPPED | OUTPATIENT
Start: 2019-05-23 | End: 2020-03-24

## 2019-05-23 RX ORDER — NYSTATIN 100000 [USP'U]/G
POWDER TOPICAL
Qty: 180 G | Refills: 3 | Status: SHIPPED | OUTPATIENT
Start: 2019-05-23 | End: 2020-03-24

## 2019-05-24 ENCOUNTER — PATIENT OUTREACH (OUTPATIENT)
Dept: FAMILY MEDICINE CLINIC | Facility: CLINIC | Age: 84
End: 2019-05-24

## 2019-07-11 ENCOUNTER — APPOINTMENT (OUTPATIENT)
Dept: LAB | Facility: CLINIC | Age: 84
End: 2019-07-11
Payer: MEDICARE

## 2019-07-11 ENCOUNTER — OFFICE VISIT (OUTPATIENT)
Dept: FAMILY MEDICINE CLINIC | Facility: CLINIC | Age: 84
End: 2019-07-11
Payer: MEDICARE

## 2019-07-11 VITALS
DIASTOLIC BLOOD PRESSURE: 64 MMHG | BODY MASS INDEX: 35.7 KG/M2 | HEART RATE: 60 BPM | SYSTOLIC BLOOD PRESSURE: 120 MMHG | HEIGHT: 55 IN

## 2019-07-11 DIAGNOSIS — G89.29 CHRONIC PAIN OF LEFT KNEE: ICD-10-CM

## 2019-07-11 DIAGNOSIS — G89.29 CHRONIC BILATERAL LOW BACK PAIN WITHOUT SCIATICA: ICD-10-CM

## 2019-07-11 DIAGNOSIS — E88.09 HYPERPROTEINEMIA: ICD-10-CM

## 2019-07-11 DIAGNOSIS — J30.9 ALLERGIC RHINITIS, UNSPECIFIED SEASONALITY, UNSPECIFIED TRIGGER: Primary | ICD-10-CM

## 2019-07-11 DIAGNOSIS — E87.6 HYPOKALEMIA: ICD-10-CM

## 2019-07-11 DIAGNOSIS — M25.562 CHRONIC PAIN OF LEFT KNEE: ICD-10-CM

## 2019-07-11 DIAGNOSIS — M25.362 INSTABILITY OF LEFT KNEE JOINT: ICD-10-CM

## 2019-07-11 DIAGNOSIS — E55.9 VITAMIN D DEFICIENCY: ICD-10-CM

## 2019-07-11 DIAGNOSIS — I89.0 LYMPHEDEMA: ICD-10-CM

## 2019-07-11 DIAGNOSIS — R73.9 HYPERGLYCEMIA: ICD-10-CM

## 2019-07-11 DIAGNOSIS — M15.9 OSTEOARTHRITIS OF MULTIPLE JOINTS, UNSPECIFIED OSTEOARTHRITIS TYPE: ICD-10-CM

## 2019-07-11 DIAGNOSIS — E78.5 HYPERLIPIDEMIA, UNSPECIFIED HYPERLIPIDEMIA TYPE: ICD-10-CM

## 2019-07-11 DIAGNOSIS — J30.9 ALLERGIC RHINITIS, UNSPECIFIED SEASONALITY, UNSPECIFIED TRIGGER: ICD-10-CM

## 2019-07-11 DIAGNOSIS — I10 ESSENTIAL HYPERTENSION: ICD-10-CM

## 2019-07-11 DIAGNOSIS — M25.462 KNEE EFFUSION, LEFT: ICD-10-CM

## 2019-07-11 DIAGNOSIS — M54.50 CHRONIC BILATERAL LOW BACK PAIN WITHOUT SCIATICA: ICD-10-CM

## 2019-07-11 DIAGNOSIS — F41.9 ANXIETY: ICD-10-CM

## 2019-07-11 DIAGNOSIS — Z74.09 DECREASED AMBULATION STATUS: ICD-10-CM

## 2019-07-11 DIAGNOSIS — Z00.00 HEALTH CARE MAINTENANCE: ICD-10-CM

## 2019-07-11 LAB
25(OH)D3 SERPL-MCNC: 132.2 NG/ML (ref 30–100)
ALBUMIN SERPL BCP-MCNC: 4.2 G/DL (ref 3.5–5)
ALP SERPL-CCNC: 91 U/L (ref 46–116)
ALT SERPL W P-5'-P-CCNC: 18 U/L (ref 12–78)
ANION GAP SERPL CALCULATED.3IONS-SCNC: 6 MMOL/L (ref 4–13)
AST SERPL W P-5'-P-CCNC: 20 U/L (ref 5–45)
BACTERIA UR QL AUTO: ABNORMAL /HPF
BILIRUB SERPL-MCNC: 0.61 MG/DL (ref 0.2–1)
BILIRUB UR QL STRIP: NEGATIVE
BUN SERPL-MCNC: 19 MG/DL (ref 5–25)
CALCIUM SERPL-MCNC: 9.4 MG/DL (ref 8.3–10.1)
CHLORIDE SERPL-SCNC: 106 MMOL/L (ref 100–108)
CHOLEST SERPL-MCNC: 193 MG/DL (ref 50–200)
CK SERPL-CCNC: 97 U/L (ref 26–192)
CLARITY UR: ABNORMAL
CO2 SERPL-SCNC: 30 MMOL/L (ref 21–32)
COLOR UR: YELLOW
CREAT SERPL-MCNC: 0.75 MG/DL (ref 0.6–1.3)
CRP SERPL QL: <3 MG/L
ERYTHROCYTE [DISTWIDTH] IN BLOOD BY AUTOMATED COUNT: 13.9 % (ref 11.6–15.1)
GFR SERPL CREATININE-BSD FRML MDRD: 69 ML/MIN/1.73SQ M
GLUCOSE SERPL-MCNC: 86 MG/DL (ref 65–140)
GLUCOSE UR STRIP-MCNC: NEGATIVE MG/DL
HCT VFR BLD AUTO: 38.9 % (ref 34.8–46.1)
HDLC SERPL-MCNC: 74 MG/DL (ref 40–60)
HGB BLD-MCNC: 12.5 G/DL (ref 11.5–15.4)
HGB UR QL STRIP.AUTO: NEGATIVE
HYALINE CASTS #/AREA URNS LPF: ABNORMAL /LPF
KETONES UR STRIP-MCNC: NEGATIVE MG/DL
LDLC SERPL CALC-MCNC: 99 MG/DL (ref 0–100)
LEUKOCYTE ESTERASE UR QL STRIP: ABNORMAL
MCH RBC QN AUTO: 30.8 PG (ref 26.8–34.3)
MCHC RBC AUTO-ENTMCNC: 32.1 G/DL (ref 31.4–37.4)
MCV RBC AUTO: 96 FL (ref 82–98)
NITRITE UR QL STRIP: POSITIVE
NON-SQ EPI CELLS URNS QL MICRO: ABNORMAL /HPF
PH UR STRIP.AUTO: 7 [PH]
PLATELET # BLD AUTO: 239 THOUSANDS/UL (ref 149–390)
PMV BLD AUTO: 10.7 FL (ref 8.9–12.7)
POTASSIUM SERPL-SCNC: 3.6 MMOL/L (ref 3.5–5.3)
PROT SERPL-MCNC: 8 G/DL (ref 6.4–8.2)
PROT UR STRIP-MCNC: NEGATIVE MG/DL
RBC # BLD AUTO: 4.06 MILLION/UL (ref 3.81–5.12)
RBC #/AREA URNS AUTO: ABNORMAL /HPF
SODIUM SERPL-SCNC: 142 MMOL/L (ref 136–145)
SP GR UR STRIP.AUTO: 1.01 (ref 1–1.03)
TRIGL SERPL-MCNC: 99 MG/DL
TSH SERPL DL<=0.05 MIU/L-ACNC: 0.83 UIU/ML (ref 0.36–3.74)
URATE SERPL-MCNC: 5.3 MG/DL (ref 2–6.8)
UROBILINOGEN UR QL STRIP.AUTO: 0.2 E.U./DL
WBC # BLD AUTO: 5.91 THOUSAND/UL (ref 4.31–10.16)
WBC #/AREA URNS AUTO: ABNORMAL /HPF

## 2019-07-11 PROCEDURE — 86038 ANTINUCLEAR ANTIBODIES: CPT

## 2019-07-11 PROCEDURE — 84166 PROTEIN E-PHORESIS/URINE/CSF: CPT

## 2019-07-11 PROCEDURE — 80053 COMPREHEN METABOLIC PANEL: CPT

## 2019-07-11 PROCEDURE — 84165 PROTEIN E-PHORESIS SERUM: CPT

## 2019-07-11 PROCEDURE — G0439 PPPS, SUBSEQ VISIT: HCPCS | Performed by: FAMILY MEDICINE

## 2019-07-11 PROCEDURE — 84165 PROTEIN E-PHORESIS SERUM: CPT | Performed by: PATHOLOGY

## 2019-07-11 PROCEDURE — 84443 ASSAY THYROID STIM HORMONE: CPT

## 2019-07-11 PROCEDURE — 80061 LIPID PANEL: CPT

## 2019-07-11 PROCEDURE — 36415 COLL VENOUS BLD VENIPUNCTURE: CPT

## 2019-07-11 PROCEDURE — 99214 OFFICE O/P EST MOD 30 MIN: CPT | Performed by: FAMILY MEDICINE

## 2019-07-11 PROCEDURE — 84550 ASSAY OF BLOOD/URIC ACID: CPT

## 2019-07-11 PROCEDURE — 82306 VITAMIN D 25 HYDROXY: CPT

## 2019-07-11 PROCEDURE — 81001 URINALYSIS AUTO W/SCOPE: CPT

## 2019-07-11 PROCEDURE — 84166 PROTEIN E-PHORESIS/URINE/CSF: CPT | Performed by: PATHOLOGY

## 2019-07-11 PROCEDURE — 86140 C-REACTIVE PROTEIN: CPT

## 2019-07-11 PROCEDURE — 86430 RHEUMATOID FACTOR TEST QUAL: CPT

## 2019-07-11 PROCEDURE — 85027 COMPLETE CBC AUTOMATED: CPT

## 2019-07-11 PROCEDURE — 82550 ASSAY OF CK (CPK): CPT

## 2019-07-11 RX ORDER — CHLORDIAZEPOXIDE HYDROCHLORIDE 25 MG/1
25 CAPSULE, GELATIN COATED ORAL 2 TIMES DAILY
Qty: 60 CAPSULE | Refills: 5 | Status: CANCELLED | OUTPATIENT
Start: 2019-07-11

## 2019-07-11 RX ORDER — TRAMADOL HYDROCHLORIDE 50 MG/1
50 TABLET ORAL EVERY 8 HOURS PRN
Qty: 90 TABLET | Refills: 1 | Status: SHIPPED | OUTPATIENT
Start: 2019-07-11 | End: 2020-01-30 | Stop reason: SDUPTHER

## 2019-07-11 RX ORDER — CHLORDIAZEPOXIDE HYDROCHLORIDE 25 MG/1
25 CAPSULE, GELATIN COATED ORAL 2 TIMES DAILY
Qty: 60 CAPSULE | Refills: 5 | Status: SHIPPED | OUTPATIENT
Start: 2019-07-11 | End: 2020-01-30 | Stop reason: SDUPTHER

## 2019-07-11 NOTE — PROGRESS NOTES
Assessment and Plan:     Problem List Items Addressed This Visit        Musculoskeletal and Integument    Osteoarthritis       Other    Anxiety    Back pain         History of Present Illness:     Patient presents for Medicare Annual Wellness visit    Patient Care Team:  Jeff Lamas DO as PCP - General  Cherylle Fall, PA-C Madalyn Babinski, PA-C Kenney Harden, PA-C     Problem List:     Patient Active Problem List   Diagnosis    Allergic rhinitis    Cerebral aneurysm    Anxiety    Back pain    Breast carcinoma ([facility] [address] [facility]    Chronic urinary tract infection    Decreased ambulation status    Dizziness    GERD (gastroesophageal reflux disease)    Glaucoma    Hyperglycemia    Hyperlipidemia    Hypertension    Hypokalemia    Instability of left knee joint    Leg edema    Lymphedema    Macular degeneration    Neoplasm of skin of face    Osteoarthritis    Osteoporosis    Ovarian cancer ([facility] [address] [facility]    Restless legs syndrome    Thoracic back pain    Vitamin D deficiency    Candidal intertrigo    Hyperproteinemia    Dysuria      Past Medical and Surgical History:     Past Medical History:   Diagnosis Date    Anxiety     Arthritis     Cancer ([facility] [address] [facility]     Elevated serum alkaline phosphatase level     mild, isoenzymes are normal, resolved 4/24/17 labs , last assessed 11/10/16, resolved 4/24/17    Hematuria     last assessed 9/18/12    Hyperlipidemia 8/13/2012    Hypertension     Pulmonary embolism ([facility] [address] ) 01/2011    last assessed 9/18/12     Past Surgical History:   Procedure Laterality Date    BILATERAL OOPHORECTOMY      Laparoscopic    BREAST SURGERY Left     Mastectomy     CHOLECYSTECTOMY      Laparoscopic    HERNIA REPAIR      HYSTERECTOMY      SMALL INTESTINE SURGERY        Family History:     Family History   Problem Relation Age of Onset    Hypertension Mother     Blindness Mother       Social History:     Social History     Tobacco Use   Smoking Status Never Smoker   Smokeless Tobacco Never Used     Social History     Substance and Sexual Activity   Alcohol Use No     Social History     Substance and Sexual Activity   Drug Use No      Medications and Allergies:     Current Outpatient Medications   Medication Sig Dispense Refill    azelastine (ASTELIN) 0 1 % nasal spray 2 sprays into each nostril 2 (two) times a day      brimonidine-timolol (COMBIGAN) 0 2-0 5 % Administer 1 drop into the left eye 2 (two) times a day      chlordiazePOXIDE (LIBRIUM) 25 mg capsule Take 1 capsule (25 mg total) by mouth 2 (two) times a day 60 capsule 5    Cholecalciferol (VITAMIN D PO) Take 2,000 Units by mouth daily      enalapril (VASOTEC) 10 mg tablet Take 1 tablet (10 mg total) by mouth daily 90 tablet 3    furosemide (LASIX) 40 mg tablet Take 1 tablet (40 mg total) by mouth 2 (two) times a day 180 tablet 3    latanoprost (XALATAN) 0 005 % ophthalmic solution Administer 1 drop into the left eye daily      lidocaine (LIDODERM) 5 % Apply 1 patch topically daily Remove & Discard patch within 12 hours or as directed by MD 30 patch 0    meclizine (ANTIVERT) 25 mg tablet TAKE 1 TABLET BY MOUTH 3  TIMES A DAY AS NEEDED 270 tablet 3    NYSTATIN powder APPLY TO AFFECTED AREA(S)  2-3 TIMES A  g 3    omeprazole (PriLOSEC) 20 mg delayed release capsule Take 1 capsule (20 mg total) by mouth daily 90 capsule 3    potassium chloride (K-DUR,KLOR-CON) 20 mEq tablet TAKE 1 TABLET BY MOUTH  DAILY 90 tablet 3    pramipexole (MIRAPEX) 0 25 mg tablet Take 1 tablet (0 25 mg total) by mouth daily 90 tablet 3    traMADol (ULTRAM) 50 mg tablet Take 1 tablet (50 mg total) by mouth every 8 (eight) hours as needed for moderate pain 90 tablet 1     No current facility-administered medications for this visit  Allergies   Allergen Reactions    Amoxicillin Hives    Celecoxib     Cephalexin     Ciprofloxacin      Other reaction(s): diarrhea   Tolerates Levaquin    Codeine Other (See Comments)     Other reaction(s): Other (See Comments)  takes vicodin @ home  takes vicodin @ home    Epinephrine Other (See Comments)     Other reaction(s): Unknown Reaction    Oxycodone-Acetaminophen Other (See Comments)    Oxycodone-Acetaminophen      Other reaction(s): Unknown Reaction    Propoxyphene Other (See Comments)    Rofecoxib     Sulfa Antibiotics Other (See Comments)     takes at home  takes at home  Other reaction(s): Other (See Comments)  takes at home    Sulfamethoxazole-Trimethoprim     Nitrofurantoin Rash      Immunizations:     Immunization History   Administered Date(s) Administered    Influenza Quadrivalent Preservative Free 3 years and older IM 10/09/2014, 12/17/2015    Influenza Split High Dose Preservative Free IM 11/10/2016, 10/19/2017    Influenza TIV (IM) 10/05/2009, 10/27/2010, 10/03/2013    Pneumococcal Conjugate 13-Valent 06/06/2017    Pneumococcal Polysaccharide PPV23 10/01/2010      Medicare Screening Tests and Risk Assessments:     Travis Gardner is here for her Subsequent Wellness visit  Health Risk Assessment:  Patient rates overall health as poor  Patient feels that their physical health rating is Slightly worse  Eyesight was rated as Much worse  Hearing was rated as Same  Patient feels that their emotional and mental health rating is Slightly worse  Pain experienced by patient in the last 7 days has been A lot  Patient's pain rating has been 10/10  Patient states that she has experienced no weight loss or gain in last 6 months  Emotional/Mental Health:  Patient has been feeling nervous/anxious  PHQ-9 Depression Screening:    Frequency of the following problems over the past two weeks:      1  Little interest or pleasure in doing things: 0 - not at all      2  Feeling down, depressed, or hopeless: 0 - not at all  PHQ-2 Score: 0          Broken Bones/Falls:     Fall Risk Assessment:    In the past year, patient has experienced: No history of falling in past year          Bladder/Bowel:  Patient has leaked urine accidently in the last six months  Patient reports no loss of bowel control  Immunizations:  Patient has not had a flu vaccination within the last year  Patient has not received a pneumonia shot  Patient has not received a shingles shot  Home Safety:  Patient has trouble with stairs inside or outside of their home  Patient currently reports that there are safety hazards present in home , working smoke alarms, working carbon monoxide detectors  Preventative Screenings:   Breast cancer screening performed, no colon cancer screen completed, cholesterol screen completed, glaucoma eye exam completed,     Nutrition:  Current diet: Regular with servings of the following:    Medications:  Patient is currently taking over-the-counter supplements  Patient is not able to manage medications  Lifestyle Choices:  Patient reports no tobacco use  Patient has not smoked or used tobacco in the past   Patient reports no alcohol use  Patient wears seat belt  Activities of Daily Living:  Can get out of bed by his or her self, able to dress self, unable to make own meals, unable to do own shopping, able to bathe self, unable to do laundry/housekeeping, unable to manage own money and other related tasks    Previous Hospitalizations:  Hospitalization or ED visit in past 12 months  Number of hospitalizations within the last year: 1-2        Advanced Directives:  Patient has decided on a power of   Patient has spoken to designated power of   Patient has completed advanced directive          Preventative Screening/Counseling:      Cardiovascular:      General: Screening Current          Diabetes:      General: Screening Current          Colorectal Cancer:      General: Screening Not Indicated          Breast Cancer:      General: Screening Not Indicated          Cervical Cancer:      General: Screening Not Indicated          Osteoporosis: General: Screening Not Indicated          AAA:      General: Screening Not Indicated          Glaucoma:      General: Screening Current          HIV:      General: Screening Not Indicated          Hepatitis C:      General: Screening Not Indicated        Advanced Directives:   Patient has living will for healthcare, has durable POA for healthcare, patient has an advanced directive  Information on ACP and/or AD provided  No 5 wishes given  End of life assessment reviewed with patient  Provider agrees with end of life decisions

## 2019-07-11 NOTE — PATIENT INSTRUCTIONS
Complete x-ray of knee, complete blood work today, follow[facility] with [facility]    Recheck 2 months sooner if needed

## 2019-07-11 NOTE — PROGRESS NOTES
Assessment and Plan:  1  Allergic rhinitis, patient to start Astelin nasal spray she has his at home  2  Left knee pain /left knee instability / DJD / decreased ambulation / knee effusion  Continue present therapy tramadol was reordered Voltaren gel was ordered  X-ray of knee was ordered  Physical therapy and orthopedic consultation ordered  3  Lymphedema, lymphedema therapy is ordered  4  Hypertension, stable continue present therapy  5  Vitamin-D deficiency, blood work ordered  6  Hyperlipidemia blood work ordered  7  Hyperproteinemia serum and urine electrophoresis ordered  8  Hyperglycemia blood work is ordered  9  Anxiety, chlordiazepoxide is ordered  Discussed at length the risk in Geriatrics with this medication with patient and daughter however, at patient's advanced age is been on this for years, I believe it would be more hazardous to her health to discontinue this than just continue this  Patient and daughter agree with verbalized understanding of the risks specially dizziness and excessive drowsiness and respiratory depression    10  Recheck patient in 2 months, sooner if needed    Problem List Items Addressed This Visit        Respiratory    Allergic rhinitis - Primary    Relevant Orders    CBC    Comprehensive metabolic panel    Lipid Panel with Direct LDL reflex    TSH, 3rd generation with Free T4 reflex    Urinalysis with reflex to microscopic    Vitamin D 25 hydroxy    Protein electrophoresis, serum    Protein electrophoresis, urine    RF Screen w/ Reflex to Titer    Uric acid    CRISTAL Screen w/ Reflex to Titer/Pattern    C-reactive protein    CK       Cardiovascular and Mediastinum    Hypertension    Relevant Orders    CBC    Comprehensive metabolic panel    Lipid Panel with Direct LDL reflex    TSH, 3rd generation with Free T4 reflex    Urinalysis with reflex to microscopic    Vitamin D 25 hydroxy    Protein electrophoresis, serum    Protein electrophoresis, urine    RF Screen w/ Reflex to Titer Uric acid    CRISTAL Screen w/ Reflex to Titer/Pattern    C-reactive protein    CK       Musculoskeletal and Integument    Osteoarthritis    Relevant Medications    traMADol (ULTRAM) 50 mg tablet    diclofenac sodium (VOLTAREN) 1 %    Other Relevant Orders    Ambulatory referral to Orthopedic Surgery    Ambulatory referral to Physical Therapy    CBC    Comprehensive metabolic panel    Lipid Panel with Direct LDL reflex    TSH, 3rd generation with Free T4 reflex    Urinalysis with reflex to microscopic    Vitamin D 25 hydroxy    Protein electrophoresis, serum    Protein electrophoresis, urine    RF Screen w/ Reflex to Titer    Uric acid    CRISTAL Screen w/ Reflex to Titer/Pattern    C-reactive protein    CK       Other    Anxiety    Relevant Medications    chlordiazePOXIDE (LIBRIUM) 25 mg capsule    Other Relevant Orders    CBC    Comprehensive metabolic panel    Lipid Panel with Direct LDL reflex    TSH, 3rd generation with Free T4 reflex    Urinalysis with reflex to microscopic    Vitamin D 25 hydroxy    Protein electrophoresis, serum    Protein electrophoresis, urine    RF Screen w/ Reflex to Titer    Uric acid    CRISTAL Screen w/ Reflex to Titer/Pattern    C-reactive protein    CK    Back pain    Relevant Medications    traMADol (ULTRAM) 50 mg tablet    Other Relevant Orders    CBC    Comprehensive metabolic panel    Lipid Panel with Direct LDL reflex    TSH, 3rd generation with Free T4 reflex    Urinalysis with reflex to microscopic    Vitamin D 25 hydroxy    Protein electrophoresis, serum    Protein electrophoresis, urine    RF Screen w/ Reflex to Titer    Uric acid    CRISTAL Screen w/ Reflex to Titer/Pattern    C-reactive protein    CK    Decreased ambulation status    Relevant Orders    Ambulatory referral to Physical Therapy    CBC    Comprehensive metabolic panel    Lipid Panel with Direct LDL reflex    TSH, 3rd generation with Free T4 reflex    Urinalysis with reflex to microscopic    Vitamin D 25 hydroxy    Protein electrophoresis, serum    Protein electrophoresis, urine    RF Screen w/ Reflex to Titer    Uric acid    CRISTAL Screen w/ Reflex to Titer/Pattern    C-reactive protein    CK    Hyperglycemia    Relevant Orders    CBC    Comprehensive metabolic panel    Lipid Panel with Direct LDL reflex    TSH, 3rd generation with Free T4 reflex    Urinalysis with reflex to microscopic    Vitamin D 25 hydroxy    Protein electrophoresis, serum    Protein electrophoresis, urine    RF Screen w/ Reflex to Titer    Uric acid    CRISTAL Screen w/ Reflex to Titer/Pattern    C-reactive protein    CK    Hyperlipidemia    Relevant Orders    CBC    Comprehensive metabolic panel    Lipid Panel with Direct LDL reflex    TSH, 3rd generation with Free T4 reflex    Urinalysis with reflex to microscopic    Vitamin D 25 hydroxy    Protein electrophoresis, serum    Protein electrophoresis, urine    RF Screen w/ Reflex to Titer    Uric acid    CRISTAL Screen w/ Reflex to Titer/Pattern    C-reactive protein    CK    Hypokalemia    Relevant Orders    CBC    Comprehensive metabolic panel    Lipid Panel with Direct LDL reflex    TSH, 3rd generation with Free T4 reflex    Urinalysis with reflex to microscopic    Vitamin D 25 hydroxy    Protein electrophoresis, serum    Protein electrophoresis, urine    RF Screen w/ Reflex to Titer    Uric acid    CRISTAL Screen w/ Reflex to Titer/Pattern    C-reactive protein    CK    Instability of left knee joint    Relevant Orders    Ambulatory referral to Orthopedic Surgery    Ambulatory referral to Physical Therapy    CBC    Comprehensive metabolic panel    Lipid Panel with Direct LDL reflex    TSH, 3rd generation with Free T4 reflex    Urinalysis with reflex to microscopic    Vitamin D 25 hydroxy    Protein electrophoresis, serum    Protein electrophoresis, urine    RF Screen w/ Reflex to Titer    Uric acid    CRISTAL Screen w/ Reflex to Titer/Pattern    C-reactive protein    CK    XR knee 3 vw left non injury    Lymphedema    Relevant Orders    Ambulatory referral to Physical Therapy    CBC    Comprehensive metabolic panel    Lipid Panel with Direct LDL reflex    TSH, 3rd generation with Free T4 reflex    Urinalysis with reflex to microscopic    Vitamin D 25 hydroxy    Protein electrophoresis, serum    Protein electrophoresis, urine    RF Screen w/ Reflex to Titer    Uric acid    CRISTAL Screen w/ Reflex to Titer/Pattern    C-reactive protein    CK    Vitamin D deficiency    Relevant Orders    CBC    Comprehensive metabolic panel    Lipid Panel with Direct LDL reflex    TSH, 3rd generation with Free T4 reflex    Urinalysis with reflex to microscopic    Vitamin D 25 hydroxy    Protein electrophoresis, serum    Protein electrophoresis, urine    RF Screen w/ Reflex to Titer    Uric acid    CRISTAL Screen w/ Reflex to Titer/Pattern    C-reactive protein    CK    Hyperproteinemia    Relevant Orders    CBC    Comprehensive metabolic panel    Lipid Panel with Direct LDL reflex    TSH, 3rd generation with Free T4 reflex    Urinalysis with reflex to microscopic    Vitamin D 25 hydroxy    Protein electrophoresis, serum    Protein electrophoresis, urine    RF Screen w/ Reflex to Titer    Uric acid    CRISTAL Screen w/ Reflex to Titer/Pattern    C-reactive protein    CK    Chronic pain of left knee      X-ray ordered orthopedic consultation ordered         Relevant Medications    diclofenac sodium (VOLTAREN) 1 %    Other Relevant Orders    Ambulatory referral to Orthopedic Surgery    Ambulatory referral to Physical Therapy    CBC    Comprehensive metabolic panel    Lipid Panel with Direct LDL reflex    TSH, 3rd generation with Free T4 reflex    Urinalysis with reflex to microscopic    Vitamin D 25 hydroxy    Protein electrophoresis, serum    Protein electrophoresis, urine    RF Screen w/ Reflex to Titer    Uric acid    CRISTAL Screen w/ Reflex to Titer/Pattern    C-reactive protein    CK    Health care maintenance      Medicare wellness questionnaire was completed Other Visit Diagnoses     Knee effusion, left        Relevant Orders    Ambulatory referral to Orthopedic Surgery    Ambulatory referral to Physical Therapy    CBC    Comprehensive metabolic panel    Lipid Panel with Direct LDL reflex    TSH, 3rd generation with Free T4 reflex    Urinalysis with reflex to microscopic    Vitamin D 25 hydroxy    Protein electrophoresis, serum    Protein electrophoresis, urine    RF Screen w/ Reflex to Titer    Uric acid    CRISTAL Screen w/ Reflex to Titer/Pattern    C-reactive protein    CK    XR knee 3 vw left non injury                 Diagnoses and all orders for this visit:    Allergic rhinitis, unspecified seasonality, unspecified trigger  -     CBC; Future  -     Comprehensive metabolic panel; Future  -     Lipid Panel with Direct LDL reflex; Future  -     TSH, 3rd generation with Free T4 reflex; Future  -     Urinalysis with reflex to microscopic; Future  -     Vitamin D 25 hydroxy; Future  -     Protein electrophoresis, serum; Future  -     Protein electrophoresis, urine; Future  -     RF Screen w/ Reflex to Titer; Future  -     Uric acid; Future  -     CRISTAL Screen w/ Reflex to Titer/Pattern; Future  -     C-reactive protein; Future  -     CK; Future    Anxiety  -     chlordiazePOXIDE (LIBRIUM) 25 mg capsule; Take 1 capsule (25 mg total) by mouth 2 (two) times a day  -     CBC; Future  -     Comprehensive metabolic panel; Future  -     Lipid Panel with Direct LDL reflex; Future  -     TSH, 3rd generation with Free T4 reflex; Future  -     Urinalysis with reflex to microscopic; Future  -     Vitamin D 25 hydroxy; Future  -     Protein electrophoresis, serum; Future  -     Protein electrophoresis, urine; Future  -     RF Screen w/ Reflex to Titer; Future  -     Uric acid; Future  -     CRISTAL Screen w/ Reflex to Titer/Pattern; Future  -     C-reactive protein; Future  -     CK; Future    Chronic bilateral low back pain without sciatica  -     traMADol (ULTRAM) 50 mg tablet;  Take 1 tablet (50 mg total) by mouth every 8 (eight) hours as needed for moderate pain  -     CBC; Future  -     Comprehensive metabolic panel; Future  -     Lipid Panel with Direct LDL reflex; Future  -     TSH, 3rd generation with Free T4 reflex; Future  -     Urinalysis with reflex to microscopic; Future  -     Vitamin D 25 hydroxy; Future  -     Protein electrophoresis, serum; Future  -     Protein electrophoresis, urine; Future  -     RF Screen w/ Reflex to Titer; Future  -     Uric acid; Future  -     CRISTAL Screen w/ Reflex to Titer/Pattern; Future  -     C-reactive protein; Future  -     CK; Future    Osteoarthritis of multiple joints, unspecified osteoarthritis type  -     traMADol (ULTRAM) 50 mg tablet; Take 1 tablet (50 mg total) by mouth every 8 (eight) hours as needed for moderate pain  -     Ambulatory referral to Orthopedic Surgery; Future  -     diclofenac sodium (VOLTAREN) 1 %; Apply 4 g topically 4 (four) times a day  -     Ambulatory referral to Physical Therapy; Future  -     CBC; Future  -     Comprehensive metabolic panel; Future  -     Lipid Panel with Direct LDL reflex; Future  -     TSH, 3rd generation with Free T4 reflex; Future  -     Urinalysis with reflex to microscopic; Future  -     Vitamin D 25 hydroxy; Future  -     Protein electrophoresis, serum; Future  -     Protein electrophoresis, urine; Future  -     RF Screen w/ Reflex to Titer; Future  -     Uric acid; Future  -     CRISTAL Screen w/ Reflex to Titer/Pattern; Future  -     C-reactive protein; Future  -     CK; Future    Essential hypertension  -     CBC; Future  -     Comprehensive metabolic panel; Future  -     Lipid Panel with Direct LDL reflex; Future  -     TSH, 3rd generation with Free T4 reflex; Future  -     Urinalysis with reflex to microscopic; Future  -     Vitamin D 25 hydroxy; Future  -     Protein electrophoresis, serum; Future  -     Protein electrophoresis, urine; Future  -     RF Screen w/ Reflex to Titer;  Future  -     Uric acid; Future  -     CRISTAL Screen w/ Reflex to Titer/Pattern; Future  -     C-reactive protein; Future  -     CK; Future    Vitamin D deficiency  -     CBC; Future  -     Comprehensive metabolic panel; Future  -     Lipid Panel with Direct LDL reflex; Future  -     TSH, 3rd generation with Free T4 reflex; Future  -     Urinalysis with reflex to microscopic; Future  -     Vitamin D 25 hydroxy; Future  -     Protein electrophoresis, serum; Future  -     Protein electrophoresis, urine; Future  -     RF Screen w/ Reflex to Titer; Future  -     Uric acid; Future  -     CRISTAL Screen w/ Reflex to Titer/Pattern; Future  -     C-reactive protein; Future  -     CK; Future    Lymphedema  -     Ambulatory referral to Physical Therapy; Future  -     CBC; Future  -     Comprehensive metabolic panel; Future  -     Lipid Panel with Direct LDL reflex; Future  -     TSH, 3rd generation with Free T4 reflex; Future  -     Urinalysis with reflex to microscopic; Future  -     Vitamin D 25 hydroxy; Future  -     Protein electrophoresis, serum; Future  -     Protein electrophoresis, urine; Future  -     RF Screen w/ Reflex to Titer; Future  -     Uric acid; Future  -     CRISTAL Screen w/ Reflex to Titer/Pattern; Future  -     C-reactive protein; Future  -     CK; Future    Hypokalemia  -     CBC; Future  -     Comprehensive metabolic panel; Future  -     Lipid Panel with Direct LDL reflex; Future  -     TSH, 3rd generation with Free T4 reflex; Future  -     Urinalysis with reflex to microscopic; Future  -     Vitamin D 25 hydroxy; Future  -     Protein electrophoresis, serum; Future  -     Protein electrophoresis, urine; Future  -     RF Screen w/ Reflex to Titer; Future  -     Uric acid; Future  -     CRISTAL Screen w/ Reflex to Titer/Pattern; Future  -     C-reactive protein; Future  -     CK; Future    Hyperproteinemia  -     CBC; Future  -     Comprehensive metabolic panel; Future  -     Lipid Panel with Direct LDL reflex;  Future  -     TSH, 3rd generation with Free T4 reflex; Future  -     Urinalysis with reflex to microscopic; Future  -     Vitamin D 25 hydroxy; Future  -     Protein electrophoresis, serum; Future  -     Protein electrophoresis, urine; Future  -     RF Screen w/ Reflex to Titer; Future  -     Uric acid; Future  -     CRISTAL Screen w/ Reflex to Titer/Pattern; Future  -     C-reactive protein; Future  -     CK; Future    Hyperlipidemia, unspecified hyperlipidemia type  -     CBC; Future  -     Comprehensive metabolic panel; Future  -     Lipid Panel with Direct LDL reflex; Future  -     TSH, 3rd generation with Free T4 reflex; Future  -     Urinalysis with reflex to microscopic; Future  -     Vitamin D 25 hydroxy; Future  -     Protein electrophoresis, serum; Future  -     Protein electrophoresis, urine; Future  -     RF Screen w/ Reflex to Titer; Future  -     Uric acid; Future  -     CRISTAL Screen w/ Reflex to Titer/Pattern; Future  -     C-reactive protein; Future  -     CK; Future    Hyperglycemia  -     CBC; Future  -     Comprehensive metabolic panel; Future  -     Lipid Panel with Direct LDL reflex; Future  -     TSH, 3rd generation with Free T4 reflex; Future  -     Urinalysis with reflex to microscopic; Future  -     Vitamin D 25 hydroxy; Future  -     Protein electrophoresis, serum; Future  -     Protein electrophoresis, urine; Future  -     RF Screen w/ Reflex to Titer; Future  -     Uric acid; Future  -     CRISTAL Screen w/ Reflex to Titer/Pattern; Future  -     C-reactive protein; Future  -     CK; Future    Decreased ambulation status  -     Ambulatory referral to Physical Therapy; Future  -     CBC; Future  -     Comprehensive metabolic panel; Future  -     Lipid Panel with Direct LDL reflex; Future  -     TSH, 3rd generation with Free T4 reflex; Future  -     Urinalysis with reflex to microscopic; Future  -     Vitamin D 25 hydroxy; Future  -     Protein electrophoresis, serum; Future  -     Protein electrophoresis, urine;  Future  - RF Screen w/ Reflex to Titer; Future  -     Uric acid; Future  -     CRISTAL Screen w/ Reflex to Titer/Pattern; Future  -     C-reactive protein; Future  -     CK; Future    Chronic pain of left knee  -     Ambulatory referral to Orthopedic Surgery; Future  -     diclofenac sodium (VOLTAREN) 1 %; Apply 4 g topically 4 (four) times a day  -     Ambulatory referral to Physical Therapy; Future  -     CBC; Future  -     Comprehensive metabolic panel; Future  -     Lipid Panel with Direct LDL reflex; Future  -     TSH, 3rd generation with Free T4 reflex; Future  -     Urinalysis with reflex to microscopic; Future  -     Vitamin D 25 hydroxy; Future  -     Protein electrophoresis, serum; Future  -     Protein electrophoresis, urine; Future  -     RF Screen w/ Reflex to Titer; Future  -     Uric acid; Future  -     CRISTAL Screen w/ Reflex to Titer/Pattern; Future  -     C-reactive protein; Future  -     CK; Future    Knee effusion, left  -     Ambulatory referral to Orthopedic Surgery; Future  -     Ambulatory referral to Physical Therapy; Future  -     CBC; Future  -     Comprehensive metabolic panel; Future  -     Lipid Panel with Direct LDL reflex; Future  -     TSH, 3rd generation with Free T4 reflex; Future  -     Urinalysis with reflex to microscopic; Future  -     Vitamin D 25 hydroxy; Future  -     Protein electrophoresis, serum; Future  -     Protein electrophoresis, urine; Future  -     RF Screen w/ Reflex to Titer; Future  -     Uric acid; Future  -     CRISTAL Screen w/ Reflex to Titer/Pattern; Future  -     C-reactive protein; Future  -     CK; Future  -     XR knee 3 vw left non injury; Future    Health care maintenance    Instability of left knee joint  -     Ambulatory referral to Orthopedic Surgery; Future  -     Ambulatory referral to Physical Therapy; Future  -     CBC; Future  -     Comprehensive metabolic panel; Future  -     Lipid Panel with Direct LDL reflex;  Future  -     TSH, 3rd generation with Free T4 reflex; Future  -     Urinalysis with reflex to microscopic; Future  -     Vitamin D 25 hydroxy; Future  -     Protein electrophoresis, serum; Future  -     Protein electrophoresis, urine; Future  -     RF Screen w/ Reflex to Titer; Future  -     Uric acid; Future  -     CRISTAL Screen w/ Reflex to Titer/Pattern; Future  -     C-reactive protein; Future  -     CK; Future  -     XR knee 3 vw left non injury; Future    Other orders  -     Cancel: chlordiazePOXIDE (LIBRIUM) 25 mg capsule; Take 1 capsule (25 mg total) by mouth 2 (two) times a day  -     Acetaminophen (TYLENOL ARTHRITIS PAIN PO); Take 1 tablet by mouth 3 (three) times a day              Subjective:      Patient ID: Warren Mckenzie is a 80 y o  female  CC:    Chief Complaint   Patient presents with    Knee Pain     patient c/o increased left knee pain radiating both up to the hip and down to the toes  Patient takes Tramadol with some relief and Arthritis Tylenol  ak       HPI:     Patient is having increasing left knee pain using over-the-counter Tylenol as well as over-the-counter  arthritis cream as well as tramadol still with pain  Patient is having trouble getting up and ambulating with his pain  In addition patient is having mild head congestion no fever chills chest pain shortness of breath  The following portions of the patient's history were reviewed and updated as appropriate: allergies, current medications, past family history, past medical history, past social history, past surgical history and problem list       Review of Systems   Constitutional:          HPI   HENT:         HPI   Eyes: Negative  Respiratory: Negative  Cardiovascular: Negative  Gastrointestinal: Negative  Endocrine: Negative  Genitourinary: Negative  Musculoskeletal:         HPI   Skin: Negative  Allergic/Immunologic: Negative  Neurological: Negative  Hematological: Negative  Psychiatric/Behavioral: Negative            Data to review: Objective:    Vitals:    07/11/19 1249   BP: 120/64   Pulse: 60   Height: 4' 7" (1 397 m)        Physical Exam   Constitutional: She is oriented to person, place, and time  She appears well-developed and well-nourished  HENT:   Head: Normocephalic and atraumatic  Right Ear: External ear normal    Left Ear: External ear normal    Mouth/Throat: Oropharynx is clear and moist  No oropharyngeal exudate  Positive allergic turbinates and clear postnasal drip   Eyes: Pupils are equal, round, and reactive to light  Conjunctivae and EOM are normal  No scleral icterus  Neck: Neck supple  Cardiovascular: Normal rate, regular rhythm and normal heart sounds  Pulmonary/Chest: Effort normal and breath sounds normal    Abdominal: Soft  Bowel sounds are normal  There is no tenderness  Musculoskeletal: She exhibits edema  She exhibits no tenderness or deformity  Left knee with anterior effusion negative warmth or induration  Negative point tenderness  Positive chronic lymphedema  Positive valgus deformity   Lymphadenopathy:     She has no cervical adenopathy  Neurological: She is alert and oriented to person, place, and time  No cranial nerve deficit  Skin: Skin is warm and dry  Psychiatric: She has a normal mood and affect

## 2019-07-12 LAB
RHEUMATOID FACT SER QL LA: NEGATIVE
RYE IGE QN: NEGATIVE

## 2019-07-13 LAB
ALBUMIN SERPL ELPH-MCNC: 4.59 G/DL (ref 3.5–5)
ALBUMIN SERPL ELPH-MCNC: 58.8 % (ref 52–65)
ALBUMIN UR ELPH-MCNC: 100 %
ALPHA1 GLOB MFR UR ELPH: 0 %
ALPHA1 GLOB SERPL ELPH-MCNC: 0.24 G/DL (ref 0.1–0.4)
ALPHA1 GLOB SERPL ELPH-MCNC: 3.1 % (ref 2.5–5)
ALPHA2 GLOB MFR UR ELPH: 0 %
ALPHA2 GLOB SERPL ELPH-MCNC: 0.83 G/DL (ref 0.4–1.2)
ALPHA2 GLOB SERPL ELPH-MCNC: 10.6 % (ref 7–13)
B-GLOBULIN MFR UR ELPH: 0 %
BETA GLOB ABNORMAL SERPL ELPH-MCNC: 0.48 G/DL (ref 0.4–0.8)
BETA1 GLOB SERPL ELPH-MCNC: 6.2 % (ref 5–13)
BETA2 GLOB SERPL ELPH-MCNC: 6.6 % (ref 2–8)
BETA2+GAMMA GLOB SERPL ELPH-MCNC: 0.51 G/DL (ref 0.2–0.5)
GAMMA GLOB ABNORMAL SERPL ELPH-MCNC: 1.15 G/DL (ref 0.5–1.6)
GAMMA GLOB MFR UR ELPH: 0 %
GAMMA GLOB SERPL ELPH-MCNC: 14.7 % (ref 12–22)
IGG/ALB SER: 1.43 {RATIO} (ref 1.1–1.8)
PROT PATTERN SERPL ELPH-IMP: ABNORMAL
PROT PATTERN UR ELPH-IMP: NORMAL
PROT SERPL-MCNC: 7.8 G/DL (ref 6.4–8.2)
PROT UR-MCNC: 6 MG/DL

## 2019-07-15 ENCOUNTER — APPOINTMENT (OUTPATIENT)
Dept: RADIOLOGY | Facility: MEDICAL CENTER | Age: 84
End: 2019-07-15
Payer: MEDICARE

## 2019-07-15 DIAGNOSIS — M25.362 INSTABILITY OF LEFT KNEE JOINT: ICD-10-CM

## 2019-07-15 DIAGNOSIS — M25.462 KNEE EFFUSION, LEFT: ICD-10-CM

## 2019-07-15 PROBLEM — E88.09 HYPERPROTEINEMIA: Status: RESOLVED | Noted: 2018-06-06 | Resolved: 2019-07-15

## 2019-07-15 PROCEDURE — 73562 X-RAY EXAM OF KNEE 3: CPT

## 2019-07-18 NOTE — PROGRESS NOTES
Assessment and Plan:  Patient Instructions   1  Osteoarthritis of the knee-discussed treatment options  She is continuing Tylenol and tramadol as necessary  Medication renewals given  Encouraged follow-up with orthopedic specialist to consider injection therapies  Patient and daughter seem interested and will set up appointment with [facility]  2  Hypertension-presently stable, no medication changes  3  Breast cancer-history mastectomy, 2004  Patient declined further mammography surveillance  4   Osteoporosis-patient continues calcium and vitamin-D  Diagnoses and all orders for this visit:    Encounter for immunization  -     PREFERRED: influenza vaccine, [id number], high-dose, PF 0 5 mL, for patients 65 yr+ (FLUZONE HIGH-DOSE)  -     Tdap vaccine greater than or equal to 6yo IM    Essential hypertension  -     enalapril (VASOTEC) 10 mg tablet; Take 1 tablet (10 mg total) by mouth daily  -     CBC and differential; Future  -     Comprehensive metabolic panel; Future  -     Lipid Panel with Direct LDL reflex; Future  -     TSH, 3rd generation; Future  -     UA w Reflex to Microscopic w Reflex to Culture -Lab Collect; Future    Osteoarthritis of multiple joints, unspecified osteoarthritis type  -     traMADol (ULTRAM) 50 mg tablet; Take 1 tablet (50 mg total) by mouth every 8 (eight) hours as needed for moderate pain  -     Ambulatory referral to Orthopedic Surgery; Future    Carcinoma of female breast, unspecified estrogen receptor status, unspecified laterality, unspecified site of breast ([facility] [address] )  -     CBC and differential; Future  -     Comprehensive metabolic panel; Future  -     Lipid Panel with Direct LDL reflex; Future  -     TSH, 3rd generation; Future  -     UA w Reflex to Microscopic w Reflex to Culture -Lab Collect; Future    Chronic bilateral low back pain without sciatica  -     traMADol (ULTRAM) 50 mg tablet;  Take 1 tablet (50 mg total) by mouth every 8 (eight) hours as needed for moderate pain  -     CBC and differential; Future  -     Comprehensive metabolic panel; Future  -     Lipid Panel with Direct LDL reflex; Future  -     TSH, 3rd generation; Future  -     UA w Reflex to Microscopic w Reflex to Culture -Lab Collect; Future    Anxiety  -     chlordiazePOXIDE (LIBRIUM) 25 mg capsule; Take 1 capsule (25 mg total) by mouth 2 (two) times a day  -     CBC and differential; Future  -     Comprehensive metabolic panel; Future  -     Lipid Panel with Direct LDL reflex; Future  -     TSH, 3rd generation; Future  -     UA w Reflex to Microscopic w Reflex to Culture -Lab Collect; Future    Restless legs syndrome  -     pramipexole (MIRAPEX) 0 25 mg tablet; Take 1 tablet (0 25 mg total) by mouth daily    Leg edema  -     furosemide (LASIX) 40 mg tablet; Take 1 tablet (40 mg total) by mouth 2 (two) times a day    Gastroesophageal reflux disease, esophagitis presence not specified  -     omeprazole (PriLOSEC) 20 mg delayed release capsule; Take 1 capsule (20 mg total) by mouth daily  -     CBC and differential; Future  -     Comprehensive metabolic panel; Future  -     Lipid Panel with Direct LDL reflex; Future  -     TSH, 3rd generation; Future  -     UA w Reflex to Microscopic w Reflex to Culture -Lab Collect; Future              Subjective:      Patient ID: Cosmo Graham is a 80 y o  female  CC:    Chief Complaint   Patient presents with    Follow-up     HTN, Ambulatory dysfunction, hypokalemia, hyperglycemia, Vit D deficiency, hyperlipidemia, Anxiety, Lymphedema    Medication Refill       HPI:    HPI:  This is a 60-year-old female who presents to the office accompanied by her daughter  She is here for follow-up of chronic health conditions and complains of ongoing pain in her left knee from arthritis  She has had a history of trauma to her left ankle after a tendon was inadvertently cut during surgery  She has had difficulty walking and pain in the knee since    Pain is at the joint space  There is occasionally swelling  She has been using tramadol when necessary but typically takes Tylenol, 2 tablets every 8 hours  Patient does also have a history of esophageal reflux disease which has been stable on omeprazole  She continues to take Librium for anxiety  She has been stable on this for several years  She has tried other therapies in the past but has not had success  Her blood pressure has been controlled with enalapril  She does have a history of breast cancer and had mastectomy in 2004  The following portions of the patient's history were reviewed and updated as appropriate: allergies, current medications, past family history, past medical history, past social history, past surgical history and problem list       Review of Systems   Constitutional: Negative for chills, fatigue and fever  HENT: Negative for congestion, ear pain and sinus pressure  Eyes: Negative for visual disturbance  Respiratory: Negative for cough, chest tightness and shortness of breath  Cardiovascular: Negative for chest pain and palpitations  Gastrointestinal: Negative for diarrhea, nausea and vomiting  Endocrine: Negative for polyuria  Genitourinary: Negative for dysuria and frequency  Musculoskeletal: Positive for arthralgias and myalgias  Skin: Negative for pallor and rash  Neurological: Negative for dizziness, weakness, light-headedness, numbness and headaches  Psychiatric/Behavioral: Negative for agitation, behavioral problems and sleep disturbance  All other systems reviewed and are negative  Data to review:       Objective:    Vitals:    03/26/19 1003   BP: 118/72   BP Location: Right arm   Patient Position: Sitting   Cuff Size: Standard   Pulse: 68   Weight: 69 7 kg (153 lb 9 6 oz)        Physical Exam   Constitutional: She is oriented to person, place, and time  She appears well-developed and well-nourished  No distress  Ambulates with a walker  Significant kyphosis  HENT:   Head: Normocephalic and atraumatic  Eyes: Pupils are equal, round, and reactive to light  Conjunctivae are normal    Cardiovascular: Normal rate, normal heart sounds and intact distal pulses  No murmur heard  Pulmonary/Chest: Effort normal  No respiratory distress  She has no wheezes  Musculoskeletal:   Crepitus with range of motion of the left knee  There is tenderness at the joint space bilaterally  Neurological: She is alert and oriented to person, place, and time  No

## 2019-07-24 ENCOUNTER — TELEPHONE (OUTPATIENT)
Dept: OBGYN CLINIC | Facility: MEDICAL CENTER | Age: 84
End: 2019-07-24

## 2019-07-24 NOTE — TELEPHONE ENCOUNTER
Called and spoke to patient's adult daughter, Sarita Burgess  She confirms that her mother has sever arthritis in her knee and that no surgery was done to her knee previously       # [phone]

## 2019-07-29 ENCOUNTER — CONSULT (OUTPATIENT)
Dept: OBGYN CLINIC | Facility: MEDICAL CENTER | Age: 84
End: 2019-07-29
Payer: MEDICARE

## 2019-07-29 VITALS
HEIGHT: 56 IN | HEART RATE: 59 BPM | DIASTOLIC BLOOD PRESSURE: 71 MMHG | BODY MASS INDEX: 35.99 KG/M2 | WEIGHT: 160 LBS | SYSTOLIC BLOOD PRESSURE: 116 MMHG

## 2019-07-29 DIAGNOSIS — M17.12 PRIMARY OSTEOARTHRITIS OF LEFT KNEE: Primary | ICD-10-CM

## 2019-07-29 DIAGNOSIS — M25.462 KNEE EFFUSION, LEFT: ICD-10-CM

## 2019-07-29 DIAGNOSIS — M25.562 CHRONIC PAIN OF LEFT KNEE: ICD-10-CM

## 2019-07-29 DIAGNOSIS — G89.29 CHRONIC PAIN OF LEFT KNEE: ICD-10-CM

## 2019-07-29 DIAGNOSIS — M25.362 INSTABILITY OF LEFT KNEE JOINT: ICD-10-CM

## 2019-07-29 DIAGNOSIS — M15.9 OSTEOARTHRITIS OF MULTIPLE JOINTS, UNSPECIFIED OSTEOARTHRITIS TYPE: ICD-10-CM

## 2019-07-29 PROCEDURE — 20610 DRAIN/INJ JOINT/BURSA W/O US: CPT | Performed by: ORTHOPAEDIC SURGERY

## 2019-07-29 PROCEDURE — 99203 OFFICE O/P NEW LOW 30 MIN: CPT | Performed by: ORTHOPAEDIC SURGERY

## 2019-07-29 RX ORDER — LIDOCAINE HYDROCHLORIDE 10 MG/ML
3 INJECTION, SOLUTION INFILTRATION; PERINEURAL
Status: COMPLETED | OUTPATIENT
Start: 2019-07-29 | End: 2019-07-29

## 2019-07-29 RX ORDER — METHYLPREDNISOLONE ACETATE 40 MG/ML
2 INJECTION, SUSPENSION INTRA-ARTICULAR; INTRALESIONAL; INTRAMUSCULAR; SOFT TISSUE
Status: COMPLETED | OUTPATIENT
Start: 2019-07-29 | End: 2019-07-29

## 2019-07-29 RX ADMIN — METHYLPREDNISOLONE ACETATE 2 ML: 40 INJECTION, SUSPENSION INTRA-ARTICULAR; INTRALESIONAL; INTRAMUSCULAR; SOFT TISSUE at 11:47

## 2019-07-29 RX ADMIN — LIDOCAINE HYDROCHLORIDE 3 ML: 10 INJECTION, SOLUTION INFILTRATION; PERINEURAL at 11:47

## 2019-07-29 NOTE — PROGRESS NOTES
Assessment/Plan     1  Primary osteoarthritis of left knee    2  Osteoarthritis of multiple joints, unspecified osteoarthritis type    3  Chronic pain of left knee    4  Knee effusion, left    5  Instability of left knee joint      Orders Placed This Encounter   Procedures    Large joint arthrocentesis: L knee     Plan:  Treatment options were discussed in detail with patient for severe osteoarthritis of the left knee  A CSI of the left knee was performed today in the office  Rash avoided at injection site  She is to continue activity to tolerance after post CSI protocol  She can continue with Lidoderm patches which helps to provide relief once rash clears up  Monitor rash- if persists call PCP  Continue with home physical therapy for the left knee  A short leg hinged knee brace was advised, patient elected not wear the brace  She was advised on rest, ice and elevation  If symptoms persist or no relief from CSI will order Visco lubricant injections  Return in about 3 months (around 10/29/2019) for Recheck left knee osteoarthritis       I answered all of the patient's questions during the visit and provided education of the patient's condition during the visit  The patient verbalized understanding of the information given and agrees with the plan  This note was dictated using [employer] software  It may contain errors including improperly dictated words  Please contact physician directly for any questions  History of Present Illness   Chief complaint:   Chief Complaint   Patient presents with    Left Knee - Pain       HPI: Devan Vivas is a 80 y o  female that c/o left knee pain  She states knee pain started x 1 month ago  Pain level at worse is 8/10 and at best 2/10  She describes the pain as ache and sharp  Pain increases with getting up from a sitting positions and is relieved with rest   Pain is located anterior and lateral knee    She has taken tramadol for pain control, Lidoderm patches and Voltaren gel for pain relief  ROS:    See HPI for musculoskeletal review     All other systems reviewed are negative     Historical Information   Past Medical History:   Diagnosis Date    Anxiety     Arthritis     Cancer ([facility] [address] )     Elevated serum alkaline phosphatase level     mild, isoenzymes are normal, resolved 4/24/17 labs , last assessed 11/10/16, resolved 4/24/17    Hematuria     last assessed 9/18/12    Hyperlipidemia 8/13/2012    Hypertension     Pulmonary embolism ([facility] [address] ) 01/2011    last assessed 9/18/12     Past Surgical History:   Procedure Laterality Date    BILATERAL OOPHORECTOMY      Laparoscopic    BREAST SURGERY Left     Mastectomy     CHOLECYSTECTOMY      Laparoscopic    HERNIA REPAIR      HYSTERECTOMY      SMALL INTESTINE SURGERY       Social History   Social History     Substance and Sexual Activity   Alcohol Use No     Social History     Substance and Sexual Activity   Drug Use No     Social History     Tobacco Use   Smoking Status Never Smoker   Smokeless Tobacco Never Used     Family History:   Family History   Problem Relation Age of Onset    Hypertension Mother     Blindness Mother        Current Outpatient Medications on File Prior to Visit   Medication Sig Dispense Refill    Acetaminophen (TYLENOL ARTHRITIS PAIN PO) Take 1 tablet by mouth 3 (three) times a day      azelastine (ASTELIN) 0 1 % nasal spray 2 sprays into each nostril 2 (two) times a day      brimonidine-timolol (COMBIGAN) 0 2-0 5 % Administer 1 drop into the left eye 2 (two) times a day      chlordiazePOXIDE (LIBRIUM) 25 mg capsule Take 1 capsule (25 mg total) by mouth 2 (two) times a day 60 capsule 5    diclofenac sodium (VOLTAREN) 1 % Apply 4 g topically 4 (four) times a day 1 Tube 5    enalapril (VASOTEC) 10 mg tablet Take 1 tablet (10 mg total) by mouth daily 90 tablet 3    furosemide (LASIX) 40 mg tablet Take 1 tablet (40 mg total) by mouth 2 (two) times a day 180 tablet 3    latanoprost (XALATAN) 0 005 % ophthalmic solution Administer 1 drop into the left eye daily      lidocaine (LIDODERM) 5 % Apply 1 patch topically daily Remove & Discard patch within 12 hours or as directed by MD 30 patch 0    meclizine (ANTIVERT) 25 mg tablet TAKE 1 TABLET BY MOUTH 3  TIMES A DAY AS NEEDED 270 tablet 3    NYSTATIN powder APPLY TO AFFECTED AREA(S)  2-3 TIMES A  g 3    omeprazole (PriLOSEC) 20 mg delayed release capsule Take 1 capsule (20 mg total) by mouth daily 90 capsule 3    potassium chloride (K-DUR,KLOR-CON) 20 mEq tablet TAKE 1 TABLET BY MOUTH  DAILY 90 tablet 3    pramipexole (MIRAPEX) 0 25 mg tablet Take 1 tablet (0 25 mg total) by mouth daily 90 tablet 3    traMADol (ULTRAM) 50 mg tablet Take 1 tablet (50 mg total) by mouth every 8 (eight) hours as needed for moderate pain 90 tablet 1     No current facility-administered medications on file prior to visit  Allergies   Allergen Reactions    Amoxicillin Hives    Celecoxib     Cephalexin     Ciprofloxacin      Other reaction(s): diarrhea  Tolerates Levaquin    Codeine Other (See Comments)     Other reaction(s): Other (See Comments)  takes vicodin @ home  takes vicodin @ home    Epinephrine Other (See Comments)     Other reaction(s): Unknown Reaction    Oxycodone-Acetaminophen Other (See Comments)    Oxycodone-Acetaminophen      Other reaction(s): Unknown Reaction    Propoxyphene Other (See Comments)    Rofecoxib     Sulfa Antibiotics Other (See Comments)     takes at home  takes at home  Other reaction(s):  Other (See Comments)  takes at home    Sulfamethoxazole-Trimethoprim     Nitrofurantoin Rash       Current Outpatient Medications on File Prior to Visit   Medication Sig Dispense Refill    Acetaminophen (TYLENOL ARTHRITIS PAIN PO) Take 1 tablet by mouth 3 (three) times a day      azelastine (ASTELIN) 0 1 % nasal spray 2 sprays into each nostril 2 (two) times a day      brimonidine-timolol (COMBIGAN) 0 2-0 5 % Administer 1 drop into the left eye 2 (two) times a day      chlordiazePOXIDE (LIBRIUM) 25 mg capsule Take 1 capsule (25 mg total) by mouth 2 (two) times a day 60 capsule 5    diclofenac sodium (VOLTAREN) 1 % Apply 4 g topically 4 (four) times a day 1 Tube 5    enalapril (VASOTEC) 10 mg tablet Take 1 tablet (10 mg total) by mouth daily 90 tablet 3    furosemide (LASIX) 40 mg tablet Take 1 tablet (40 mg total) by mouth 2 (two) times a day 180 tablet 3    latanoprost (XALATAN) 0 005 % ophthalmic solution Administer 1 drop into the left eye daily      lidocaine (LIDODERM) 5 % Apply 1 patch topically daily Remove & Discard patch within 12 hours or as directed by MD 30 patch 0    meclizine (ANTIVERT) 25 mg tablet TAKE 1 TABLET BY MOUTH 3  TIMES A DAY AS NEEDED 270 tablet 3    NYSTATIN powder APPLY TO AFFECTED AREA(S)  2-3 TIMES A  g 3    omeprazole (PriLOSEC) 20 mg delayed release capsule Take 1 capsule (20 mg total) by mouth daily 90 capsule 3    potassium chloride (K-DUR,KLOR-CON) 20 mEq tablet TAKE 1 TABLET BY MOUTH  DAILY 90 tablet 3    pramipexole (MIRAPEX) 0 25 mg tablet Take 1 tablet (0 25 mg total) by mouth daily 90 tablet 3    traMADol (ULTRAM) 50 mg tablet Take 1 tablet (50 mg total) by mouth every 8 (eight) hours as needed for moderate pain 90 tablet 1     No current facility-administered medications on file prior to visit  Objective   Vitals: Blood pressure 116/71, pulse 59, height 4' 7 5" (1 41 m), weight 72 6 kg (160 lb)  ,Body mass index is 36 52 kg/m²      PE:  AAOx 3  WDWN  Hearing intact, no drainage from eyes  Regular rate  no audible wheezing  no abdominal distension  LE compartments soft, skin intact    leftknee:    Appearance:  Mild generalized swelling   No ecchymosis  no obvious joint deformity   No effusion  Rash present over anterior/anteromedial knee, no signs of infection  Palpation/Tenderness:  + TTP over medial joint line  +  TTP over lateral joint line   + TTP over patella  + TTP over patellar tendon  No TTP over pes anserine bursa  Active Range of Motion:  AROM: 10-90  Passive:  5-95  Special Tests:  Valgus Stress Test:  negative  Varus Stress Test:  negative     No ipsilateral hip pain with ROM    leftLE:    Sensation grossly intact L4, S1  Brisk capillary refill  AT/GS intact    Imaging Studies: I have personally reviewed pertinent films in PACS  leftknee: Severe tricompartmental osteoarthritis       Large joint arthrocentesis: L knee  Date/Time: 7/29/2019 11:47 AM  Consent given by: patient  Site marked: site marked  Supporting Documentation  Indications: pain and diagnostic evaluation   Procedure Details  Location: knee - L knee  Preparation: Patient was prepped and draped in the usual sterile fashion  Needle size: 22 G  Ultrasound guidance: no  Approach: anterolateral  Medications administered: 3 mL lidocaine 1 %; 2 mL methylPREDNISolone acetate 40 mg/mL    Patient tolerance: patient tolerated the procedure well with no immediate complications  Dressing:  Sterile dressing applied        Scribe Attestation    I,:   Torrey Bustillos am acting as a scribe while in the presence of the attending physician :        I,:   Keven Cook DO personally performed the services described in this documentation    as scribed in my presence :

## 2019-07-29 NOTE — LETTER
July 29, 2019     Najma Swartz, [address] [address] [address] [facility]    Patient: Beatriz Correa   YOB: 1926   Date of Visit: 7/29/2019       Dear Dr Wong: Thank you for referring Carley Peralta to me for evaluation  Below are my notes for this consultation  If you have questions, please do not hesitate to call me  I look forward to following your patient along with you  Sincerely,        Jenny Baird DO        CC: No Recipients  Jenny Baird DO  7/29/2019 12:19 PM  Incomplete   Assessment/Plan     1  Primary osteoarthritis of left knee    2  Osteoarthritis of multiple joints, unspecified osteoarthritis type    3  Chronic pain of left knee    4  Knee effusion, left    5  Instability of left knee joint      Orders Placed This Encounter   Procedures    Large joint arthrocentesis: L knee     Plan:  Treatment options were discussed in detail with patient for severe osteoarthritis of the left knee  A CSI of the left knee was performed today in the office  Rash avoided at injection site  She is to continue activity to tolerance after post CSI protocol  She can continue with Lidoderm patches which helps to provide relief once rash clears up  Monitor rash- if persists call PCP  Continue with home physical therapy for the left knee  A short leg hinged knee brace was advised, patient elected not wear the brace  She was advised on rest, ice and elevation  If symptoms persist or no relief from CSI will order Visco lubricant injections  Return in about 3 months (around 10/29/2019) for Recheck left knee osteoarthritis       I answered all of the patient's questions during the visit and provided education of the patient's condition during the visit  The patient verbalized understanding of the information given and agrees with the plan  This note was dictated using [employer] software  It may contain errors including improperly dictated words    Please contact physician directly for any questions  History of Present Illness   Chief complaint:   Chief Complaint   Patient presents with    Left Knee - Pain       HPI: Refugio Hilliard is a 80 y o  female that c/o left knee pain  She states knee pain started x 1 month ago  Pain level at worse is 8/10 and at best 2/10  She describes the pain as ache and sharp  Pain increases with getting up from a sitting positions and is relieved with rest   Pain is located anterior and lateral knee  She has taken tramadol for pain control, Lidoderm patches and Voltaren gel for pain relief  ROS:    See HPI for musculoskeletal review     All other systems reviewed are negative     Historical Information   Past Medical History:   Diagnosis Date    Anxiety     Arthritis     [facility] ([facility] [address] [facility]     Elevated serum alkaline phosphatase level     mild, isoenzymes are normal, resolved 4/24/17 labs , last assessed 11/10/16, resolved 4/24/17    Hematuria     last assessed 9/18/12    Hyperlipidemia 8/13/2012    Hypertension     Pulmonary embolism ([facility] [address] ) 01/2011    last assessed 9/18/12     Past Surgical History:   Procedure Laterality Date    BILATERAL OOPHORECTOMY      Laparoscopic    BREAST SURGERY Left     Mastectomy     CHOLECYSTECTOMY      Laparoscopic    HERNIA REPAIR      HYSTERECTOMY      SMALL INTESTINE SURGERY       Social History   Social History     Substance and Sexual Activity   Alcohol Use No     Social History     Substance and Sexual Activity   Drug Use No     Social History     Tobacco Use   Smoking Status Never Smoker   Smokeless Tobacco Never Used     Family History:   Family History   Problem Relation Age of Onset    Hypertension Mother     Blindness Mother        Current Outpatient Medications on File Prior to Visit   Medication Sig Dispense Refill    Acetaminophen (TYLENOL ARTHRITIS PAIN PO) Take 1 tablet by mouth 3 (three) times a day      azelastine (ASTELIN) 0 1 % nasal spray 2 sprays into each nostril 2 (two) times a day      brimonidine-timolol (COMBIGAN) 0 2-0 5 % Administer 1 drop into the left eye 2 (two) times a day      chlordiazePOXIDE (LIBRIUM) 25 mg capsule Take 1 capsule (25 mg total) by mouth 2 (two) times a day 60 capsule 5    diclofenac sodium (VOLTAREN) 1 % Apply 4 g topically 4 (four) times a day 1 Tube 5    enalapril (VASOTEC) 10 mg tablet Take 1 tablet (10 mg total) by mouth daily 90 tablet 3    furosemide (LASIX) 40 mg tablet Take 1 tablet (40 mg total) by mouth 2 (two) times a day 180 tablet 3    latanoprost (XALATAN) 0 005 % ophthalmic solution Administer 1 drop into the left eye daily      lidocaine (LIDODERM) 5 % Apply 1 patch topically daily Remove & Discard patch within 12 hours or as directed by MD 30 patch 0    meclizine (ANTIVERT) 25 mg tablet TAKE 1 TABLET BY MOUTH 3  TIMES A DAY AS NEEDED 270 tablet 3    NYSTATIN powder APPLY TO AFFECTED AREA(S)  2-3 TIMES A  g 3    omeprazole (PriLOSEC) 20 mg delayed release capsule Take 1 capsule (20 mg total) by mouth daily 90 capsule 3    potassium chloride (K-DUR,KLOR-CON) 20 mEq tablet TAKE 1 TABLET BY MOUTH  DAILY 90 tablet 3    pramipexole (MIRAPEX) 0 25 mg tablet Take 1 tablet (0 25 mg total) by mouth daily 90 tablet 3    traMADol (ULTRAM) 50 mg tablet Take 1 tablet (50 mg total) by mouth every 8 (eight) hours as needed for moderate pain 90 tablet 1     No current facility-administered medications on file prior to visit  Allergies   Allergen Reactions    Amoxicillin Hives    Celecoxib     Cephalexin     Ciprofloxacin      Other reaction(s): diarrhea  Tolerates Levaquin    Codeine Other (See Comments)     Other reaction(s):  Other (See Comments)  takes vicodin @ home  takes vicodin @ home    Epinephrine Other (See Comments)     Other reaction(s): Unknown Reaction    Oxycodone-Acetaminophen Other (See Comments)    Oxycodone-Acetaminophen      Other reaction(s): Unknown Reaction    Propoxyphene Other (See Comments)  Rofecoxib     Sulfa Antibiotics Other (See Comments)     takes at home  takes at home  Other reaction(s): Other (See Comments)  takes at home    Sulfamethoxazole-Trimethoprim     Nitrofurantoin Rash       Current Outpatient Medications on File Prior to Visit   Medication Sig Dispense Refill    Acetaminophen (TYLENOL ARTHRITIS PAIN PO) Take 1 tablet by mouth 3 (three) times a day      azelastine (ASTELIN) 0 1 % nasal spray 2 sprays into each nostril 2 (two) times a day      brimonidine-timolol (COMBIGAN) 0 2-0 5 % Administer 1 drop into the left eye 2 (two) times a day      chlordiazePOXIDE (LIBRIUM) 25 mg capsule Take 1 capsule (25 mg total) by mouth 2 (two) times a day 60 capsule 5    diclofenac sodium (VOLTAREN) 1 % Apply 4 g topically 4 (four) times a day 1 Tube 5    enalapril (VASOTEC) 10 mg tablet Take 1 tablet (10 mg total) by mouth daily 90 tablet 3    furosemide (LASIX) 40 mg tablet Take 1 tablet (40 mg total) by mouth 2 (two) times a day 180 tablet 3    latanoprost (XALATAN) 0 005 % ophthalmic solution Administer 1 drop into the left eye daily      lidocaine (LIDODERM) 5 % Apply 1 patch topically daily Remove & Discard patch within 12 hours or as directed by MD 30 patch 0    meclizine (ANTIVERT) 25 mg tablet TAKE 1 TABLET BY MOUTH 3  TIMES A DAY AS NEEDED 270 tablet 3    NYSTATIN powder APPLY TO AFFECTED AREA(S)  2-3 TIMES A  g 3    omeprazole (PriLOSEC) 20 mg delayed release capsule Take 1 capsule (20 mg total) by mouth daily 90 capsule 3    potassium chloride (K-DUR,KLOR-CON) 20 mEq tablet TAKE 1 TABLET BY MOUTH  DAILY 90 tablet 3    pramipexole (MIRAPEX) 0 25 mg tablet Take 1 tablet (0 25 mg total) by mouth daily 90 tablet 3    traMADol (ULTRAM) 50 mg tablet Take 1 tablet (50 mg total) by mouth every 8 (eight) hours as needed for moderate pain 90 tablet 1     No current facility-administered medications on file prior to visit          Objective   Vitals: Blood pressure 116/71, pulse 59, height 4' 7 5" (1 41 m), weight 72 6 kg (160 lb)  ,Body mass index is 36 52 kg/m²  PE:  AAOx 3  WDWN  Hearing intact, no drainage from eyes  Regular rate  no audible wheezing  no abdominal distension  LE compartments soft, skin intact    leftknee:    Appearance:  Mild generalized swelling   No ecchymosis  no obvious joint deformity   No effusion  Rash present over anterior/anteromedial knee, no signs of infection  Palpation/Tenderness:  + TTP over medial joint line  +  TTP over lateral joint line   + TTP over patella  + TTP over patellar tendon  No TTP over pes anserine bursa  Active Range of Motion:  AROM: 10-90  Passive:  5-95  Special Tests:  Valgus Stress Test:  negative  Varus Stress Test:  negative     No ipsilateral hip pain with ROM    leftLE:    Sensation grossly intact L4, S1  Brisk capillary refill  AT/GS intact    Imaging Studies: I have personally reviewed pertinent films in PACS  leftknee: Severe tricompartmental osteoarthritis       Large joint arthrocentesis: L knee  Date/Time: 7/29/2019 11:47 AM  Consent given by: patient  Site marked: site marked  Supporting Documentation  Indications: pain and diagnostic evaluation   Procedure Details  Location: knee - L knee  Preparation: Patient was prepped and draped in the usual sterile fashion  Needle size: 22 G  Ultrasound guidance: no  Approach: anterolateral  Medications administered: 3 mL lidocaine 1 %; 2 mL methylPREDNISolone acetate 40 mg/mL    Patient tolerance: patient tolerated the procedure well with no immediate complications  Dressing:  Sterile dressing applied        Scribe Attestation    I,:   Ana Garner am acting as a scribe while in the presence of the attending physician :        I,:   Sheridan Payton DO personally performed the services described in this documentation    as scribed in my presence :

## 2019-08-07 ENCOUNTER — HOSPITAL ENCOUNTER (EMERGENCY)
Facility: HOSPITAL | Age: 84
Discharge: HOME | End: 2019-08-07
Attending: EMERGENCY MEDICINE | Admitting: EMERGENCY MEDICINE
Payer: MEDICARE

## 2019-08-07 VITALS
HEART RATE: 86 BPM | RESPIRATION RATE: 20 BRPM | DIASTOLIC BLOOD PRESSURE: 68 MMHG | TEMPERATURE: 98.4 F | SYSTOLIC BLOOD PRESSURE: 132 MMHG | OXYGEN SATURATION: 97 %

## 2019-08-07 DIAGNOSIS — N10 PYELONEPHRITIS, ACUTE: Primary | ICD-10-CM

## 2019-08-07 LAB
ALBUMIN SERPL BCP-MCNC: 4.1 G/DL (ref 3.5–5)
ALP SERPL-CCNC: 90 U/L (ref 46–116)
ALT SERPL W P-5'-P-CCNC: 18 U/L (ref 12–78)
ANION GAP SERPL CALCULATED.3IONS-SCNC: 11 MMOL/L (ref 4–13)
AST SERPL W P-5'-P-CCNC: 37 U/L (ref 5–45)
BACTERIA UR QL AUTO: ABNORMAL /HPF
BASOPHILS # BLD AUTO: 0.04 THOUSANDS/ΜL (ref 0–0.1)
BASOPHILS NFR BLD AUTO: 1 % (ref 0–1)
BILIRUB SERPL-MCNC: 0.89 MG/DL (ref 0.2–1)
BILIRUB UR QL STRIP: NEGATIVE
BUN SERPL-MCNC: 16 MG/DL (ref 5–25)
CALCIUM SERPL-MCNC: 9.5 MG/DL (ref 8.3–10.1)
CHLORIDE SERPL-SCNC: 100 MMOL/L (ref 100–108)
CLARITY UR: CLEAR
CO2 SERPL-SCNC: 27 MMOL/L (ref 21–32)
COLOR UR: YELLOW
CREAT SERPL-MCNC: 0.77 MG/DL (ref 0.6–1.3)
EOSINOPHIL # BLD AUTO: 0.08 THOUSAND/ΜL (ref 0–0.61)
EOSINOPHIL NFR BLD AUTO: 1 % (ref 0–6)
ERYTHROCYTE [DISTWIDTH] IN BLOOD BY AUTOMATED COUNT: 13.2 % (ref 11.6–15.1)
GFR SERPL CREATININE-BSD FRML MDRD: 67 ML/MIN/1.73SQ M
GLUCOSE SERPL-MCNC: 94 MG/DL (ref 65–140)
GLUCOSE UR STRIP-MCNC: NEGATIVE MG/DL
HCT VFR BLD AUTO: 38.6 % (ref 34.8–46.1)
HGB BLD-MCNC: 12.8 G/DL (ref 11.5–15.4)
HGB UR QL STRIP.AUTO: NEGATIVE
IMM GRANULOCYTES # BLD AUTO: 0.02 THOUSAND/UL (ref 0–0.2)
IMM GRANULOCYTES NFR BLD AUTO: 0 % (ref 0–2)
KETONES UR STRIP-MCNC: ABNORMAL MG/DL
LEUKOCYTE ESTERASE UR QL STRIP: ABNORMAL
LIPASE SERPL-CCNC: 122 U/L (ref 73–393)
LYMPHOCYTES # BLD AUTO: 1.67 THOUSANDS/ΜL (ref 0.6–4.47)
LYMPHOCYTES NFR BLD AUTO: 28 % (ref 14–44)
MCH RBC QN AUTO: 31.2 PG (ref 26.8–34.3)
MCHC RBC AUTO-ENTMCNC: 33.2 G/DL (ref 31.4–37.4)
MCV RBC AUTO: 94 FL (ref 82–98)
MONOCYTES # BLD AUTO: 0.68 THOUSAND/ΜL (ref 0.17–1.22)
MONOCYTES NFR BLD AUTO: 12 % (ref 4–12)
NEUTROPHILS # BLD AUTO: 3.44 THOUSANDS/ΜL (ref 1.85–7.62)
NEUTS SEG NFR BLD AUTO: 58 % (ref 43–75)
NITRITE UR QL STRIP: POSITIVE
NON-SQ EPI CELLS URNS QL MICRO: ABNORMAL /HPF
NRBC BLD AUTO-RTO: 0 /100 WBCS
PH UR STRIP.AUTO: 6 [PH] (ref 4.5–8)
PLATELET # BLD AUTO: 231 THOUSANDS/UL (ref 149–390)
PMV BLD AUTO: 9.8 FL (ref 8.9–12.7)
POTASSIUM SERPL-SCNC: 3.8 MMOL/L (ref 3.5–5.3)
PROT SERPL-MCNC: 8 G/DL (ref 6.4–8.2)
PROT UR STRIP-MCNC: NEGATIVE MG/DL
RBC # BLD AUTO: 4.1 MILLION/UL (ref 3.81–5.12)
RBC #/AREA URNS AUTO: ABNORMAL /HPF
SODIUM SERPL-SCNC: 138 MMOL/L (ref 136–145)
SP GR UR STRIP.AUTO: 1.02 (ref 1–1.03)
UROBILINOGEN UR QL STRIP.AUTO: 0.2 E.U./DL
WBC # BLD AUTO: 5.93 THOUSAND/UL (ref 4.31–10.16)
WBC #/AREA URNS AUTO: ABNORMAL /HPF

## 2019-08-07 PROCEDURE — 99284 EMERGENCY DEPT VISIT MOD MDM: CPT

## 2019-08-07 PROCEDURE — 83690 ASSAY OF LIPASE: CPT | Performed by: EMERGENCY MEDICINE

## 2019-08-07 PROCEDURE — 36415 COLL VENOUS BLD VENIPUNCTURE: CPT | Performed by: EMERGENCY MEDICINE

## 2019-08-07 PROCEDURE — 85025 COMPLETE CBC W/AUTO DIFF WBC: CPT | Performed by: EMERGENCY MEDICINE

## 2019-08-07 PROCEDURE — 99284 EMERGENCY DEPT VISIT MOD MDM: CPT | Performed by: EMERGENCY MEDICINE

## 2019-08-07 PROCEDURE — 80053 COMPREHEN METABOLIC PANEL: CPT | Performed by: EMERGENCY MEDICINE

## 2019-08-07 PROCEDURE — 81001 URINALYSIS AUTO W/SCOPE: CPT

## 2019-08-07 PROCEDURE — 96360 HYDRATION IV INFUSION INIT: CPT

## 2019-08-07 RX ORDER — LEVOFLOXACIN 750 MG/1
750 TABLET ORAL EVERY 24 HOURS
Qty: 5 TABLET | Refills: 0 | Status: SHIPPED | OUTPATIENT
Start: 2019-08-07 | End: 2019-08-12

## 2019-08-07 RX ADMIN — SODIUM CHLORIDE 500 ML: 0.9 INJECTION, SOLUTION INTRAVENOUS at 16:11

## 2019-08-07 RX ADMIN — LEVOFLOXACIN 750 MG: 500 TABLET, FILM COATED ORAL at 16:17

## 2019-08-07 NOTE — DISCHARGE INSTRUCTIONS
Take antibiotics as prescribed  Call , Vniny Lazo tomorrow between 8a-5p, her number is [phone]    Take Tylenol and/or Ibuprofen as needed for discomfort  Follow up with family doctor    Return to ED if any new/worsening symptoms including but not limited to fevers, severe pain, intractable vomiting, etc

## 2019-08-08 NOTE — SOCIAL WORK
B[facility] was contacted by pt's daughter Lex Mariee [phone]  She asked about how to get help for caring for her mom  Discussed Home health aids vs [facility]  She has already been working with [facility] and will contact them to discuss respite care at [facility]  [facility] offered to help with [facility] as needed

## 2019-08-14 DIAGNOSIS — N39.0 CHRONIC URINARY TRACT INFECTION: Primary | ICD-10-CM

## 2019-08-14 LAB
SL AMB  POCT GLUCOSE, UA: NEGATIVE
SL AMB LEUKOCYTE ESTERASE,UA: NORMAL
SL AMB POCT BILIRUBIN,UA: NEGATIVE
SL AMB POCT BLOOD,UA: NEGATIVE
SL AMB POCT CLARITY,UA: CLEAR
SL AMB POCT COLOR,UA: YELLOW
SL AMB POCT KETONES,UA: NEGATIVE
SL AMB POCT NITRITE,UA: NEGATIVE
SL AMB POCT PH,UA: 5.5
SL AMB POCT SPECIFIC GRAVITY,UA: 1.01
SL AMB POCT URINE PROTEIN: NEGATIVE
SL AMB POCT UROBILINOGEN: 0.2

## 2019-09-16 ENCOUNTER — CLINICAL SUPPORT (OUTPATIENT)
Dept: FAMILY MEDICINE CLINIC | Facility: CLINIC | Age: 84
End: 2019-09-16
Payer: MEDICARE

## 2019-09-16 DIAGNOSIS — N39.0 CHRONIC URINARY TRACT INFECTION: Primary | ICD-10-CM

## 2019-09-16 DIAGNOSIS — R39.9 UTI SYMPTOMS: ICD-10-CM

## 2019-09-16 LAB
SL AMB  POCT GLUCOSE, UA: NEGATIVE
SL AMB LEUKOCYTE ESTERASE,UA: NORMAL
SL AMB POCT BILIRUBIN,UA: NEGATIVE
SL AMB POCT BLOOD,UA: NEGATIVE
SL AMB POCT CLARITY,UA: NORMAL
SL AMB POCT COLOR,UA: YELLOW
SL AMB POCT KETONES,UA: NEGATIVE
SL AMB POCT NITRITE,UA: NEGATIVE
SL AMB POCT PH,UA: 7.5
SL AMB POCT SPECIFIC GRAVITY,UA: 1.01
SL AMB POCT URINE PROTEIN: NEGATIVE
SL AMB POCT UROBILINOGEN: 0.2

## 2019-09-16 PROCEDURE — 87086 URINE CULTURE/COLONY COUNT: CPT | Performed by: FAMILY MEDICINE

## 2019-09-16 PROCEDURE — 81002 URINALYSIS NONAUTO W/O SCOPE: CPT | Performed by: FAMILY MEDICINE

## 2019-09-17 LAB — BACTERIA UR CULT: NORMAL

## 2019-09-18 DIAGNOSIS — N39.0 CHRONIC URINARY TRACT INFECTION: Primary | ICD-10-CM

## 2019-09-18 RX ORDER — LEVOFLOXACIN 250 MG/1
250 TABLET ORAL DAILY
Qty: 3 TABLET | Refills: 0 | Status: SHIPPED | OUTPATIENT
Start: 2019-09-18 | End: 2019-09-19 | Stop reason: SDUPTHER

## 2019-09-19 ENCOUNTER — TELEPHONE (OUTPATIENT)
Dept: FAMILY MEDICINE CLINIC | Facility: CLINIC | Age: 84
End: 2019-09-19

## 2019-09-19 DIAGNOSIS — N39.0 CHRONIC URINARY TRACT INFECTION: ICD-10-CM

## 2019-09-19 RX ORDER — LEVOFLOXACIN 250 MG/1
250 TABLET ORAL DAILY
Qty: 2 TABLET | Refills: 0 | Status: SHIPPED | OUTPATIENT
Start: 2019-09-19 | End: 2019-09-23

## 2019-09-19 NOTE — TELEPHONE ENCOUNTER
TS CALLED IN ONLY 3 DAYS OF LEVAQUIN AND DAUGHTER STATES THAT IN THE HOSPITAL HER MOTHER HAD 5 DAYS WORTH  PT IS ACTING "LOOPY" AND DAUGHTER IS CONCERNED AND ALSO IS GOING AWAY SO WOULD PREFER HER MOTHER HAVE THE FULL 5 DAYS WORTH PLEASE  PLEASE LET DAUGHTER KNOW  THANK YOU

## 2019-09-26 DIAGNOSIS — R41.0 CONFUSION: Primary | ICD-10-CM

## 2019-09-26 NOTE — TELEPHONE ENCOUNTER
Hep drop off another urine  Typically UTIs or treated with 3 days she has had more than this    If she still symptomatic I would like her to drop off urine and a culture to be sent

## 2019-09-26 NOTE — TELEPHONE ENCOUNTER
lmom for daughter asking her to bring a urine to any [facility] lab   Order is in computer~cd

## 2019-09-26 NOTE — TELEPHONE ENCOUNTER
Pt's daughter called stating pt is still having frequent urination and is acting alittle loopy   Please advise

## 2019-09-27 ENCOUNTER — TRANSCRIBE ORDERS (OUTPATIENT)
Dept: LAB | Facility: CLINIC | Age: 84
End: 2019-09-27

## 2019-09-27 ENCOUNTER — APPOINTMENT (OUTPATIENT)
Dept: LAB | Facility: CLINIC | Age: 84
End: 2019-09-27
Payer: MEDICARE

## 2019-09-27 DIAGNOSIS — R41.0 CONFUSION: ICD-10-CM

## 2019-09-27 PROCEDURE — 87086 URINE CULTURE/COLONY COUNT: CPT

## 2019-09-29 LAB — BACTERIA UR CULT: ABNORMAL

## 2019-09-30 DIAGNOSIS — N39.0 CHRONIC URINARY TRACT INFECTION: Primary | ICD-10-CM

## 2019-09-30 RX ORDER — LEVOFLOXACIN 250 MG/1
250 TABLET ORAL DAILY
Qty: 7 TABLET | Refills: 0 | Status: SHIPPED | OUTPATIENT
Start: 2019-09-30 | End: 2019-11-23 | Stop reason: SDUPTHER

## 2019-10-17 DIAGNOSIS — G89.4 CHRONIC PAIN SYNDROME: ICD-10-CM

## 2019-10-17 RX ORDER — LIDOCAINE 50 MG/G
1 PATCH TOPICAL DAILY
Qty: 30 PATCH | Refills: 0 | Status: ON HOLD | OUTPATIENT
Start: 2019-10-17 | End: 2020-02-28 | Stop reason: SDUPTHER

## 2019-10-29 ENCOUNTER — OFFICE VISIT (OUTPATIENT)
Dept: OBGYN CLINIC | Facility: MEDICAL CENTER | Age: 84
End: 2019-10-29
Payer: MEDICARE

## 2019-10-29 ENCOUNTER — TELEPHONE (OUTPATIENT)
Dept: FAMILY MEDICINE CLINIC | Facility: CLINIC | Age: 84
End: 2019-10-29

## 2019-10-29 VITALS
HEART RATE: 76 BPM | BODY MASS INDEX: 35.99 KG/M2 | DIASTOLIC BLOOD PRESSURE: 72 MMHG | SYSTOLIC BLOOD PRESSURE: 118 MMHG | WEIGHT: 160 LBS | HEIGHT: 56 IN

## 2019-10-29 DIAGNOSIS — M17.12 PRIMARY OSTEOARTHRITIS OF LEFT KNEE: Primary | ICD-10-CM

## 2019-10-29 PROCEDURE — 99213 OFFICE O/P EST LOW 20 MIN: CPT | Performed by: PHYSICIAN ASSISTANT

## 2019-10-29 PROCEDURE — 20610 DRAIN/INJ JOINT/BURSA W/O US: CPT | Performed by: PHYSICIAN ASSISTANT

## 2019-10-29 RX ORDER — LIDOCAINE HYDROCHLORIDE 10 MG/ML
3 INJECTION, SOLUTION INFILTRATION; PERINEURAL
Status: COMPLETED | OUTPATIENT
Start: 2019-10-29 | End: 2019-10-29

## 2019-10-29 RX ORDER — METHYLPREDNISOLONE ACETATE 40 MG/ML
2 INJECTION, SUSPENSION INTRA-ARTICULAR; INTRALESIONAL; INTRAMUSCULAR; SOFT TISSUE
Status: COMPLETED | OUTPATIENT
Start: 2019-10-29 | End: 2019-10-29

## 2019-10-29 RX ADMIN — LIDOCAINE HYDROCHLORIDE 3 ML: 10 INJECTION, SOLUTION INFILTRATION; PERINEURAL at 16:28

## 2019-10-29 RX ADMIN — METHYLPREDNISOLONE ACETATE 2 ML: 40 INJECTION, SUSPENSION INTRA-ARTICULAR; INTRALESIONAL; INTRAMUSCULAR; SOFT TISSUE at 16:28

## 2019-10-29 NOTE — TELEPHONE ENCOUNTER
Louann Tony (Daughter) called in stating patient's left leg was very swollen and she was not able to get her up from the chair  Patient had an appointment to get her Cortisone shot but she is going to miss it  I advice her to call 911 to request an ambulance and have her evaluated by the ER

## 2019-10-29 NOTE — PROGRESS NOTES
Assessment/Plan:  1  Primary osteoarthritis of left knee      Orders Placed This Encounter   Procedures    Large joint arthrocentesis     · Left knee CSI performed today without difficulty  Patient may rest, ice, and avoid strenuous activity for the next 1-2 days if needed  · Continue home PT   · Continue Tylenol and tramadol as prescribed by PCP for pain control prn  · Discussed the possibility of visco with the patient and her family as a possbile option if CSI loose effectiveness  Return in about 3 months (around 1/29/2020)  Subjective   Chief Complaint:   Chief Complaint   Patient presents with    Left Knee - Follow-up       Beryle Gutter is a 80 y o  female who presents for follow up for left knee pain  Patient is s/p L CSI performed on 7/29/19  Patient and her family report approximately 2 months of relief  She has been working with home PT on range of motion and tranferring  She uses tylenol and tramadol as prescribed by her PCP for pain control  She reports pain both medial and lateral  She states her pain has been so severe the last several days, she can hardly tolerate bearing weight  She also reports her knee pain prevents her from ranging her knees  She denies any new falls or trauma  Review of Systems  ROS:    See HPI for musculoskeletal review     All other systems reviewed are negative     History:  Past Medical History:   Diagnosis Date    Anxiety     Arthritis     Cancer ([facility] [address] )     Elevated serum alkaline phosphatase level     mild, isoenzymes are normal, resolved 4/24/17 labs , last assessed 11/10/16, resolved 4/24/17    Hematuria     last assessed 9/18/12    Hyperlipidemia 8/13/2012    Hypertension     Pulmonary embolism ([address] ) 01/2011    last assessed 9/18/12     Past Surgical History:   Procedure Laterality Date    BILATERAL OOPHORECTOMY      Laparoscopic    BREAST SURGERY Left     Mastectomy     CHOLECYSTECTOMY      Laparoscopic    HERNIA REPAIR      HYSTERECTOMY  SMALL INTESTINE SURGERY       Social History   Social History     Substance and Sexual Activity   Alcohol Use No     Social History     Substance and Sexual Activity   Drug Use No     Social History     Tobacco Use   Smoking Status Never Smoker   Smokeless Tobacco Never Used     Family History:   Family History   Problem Relation Age of Onset    Hypertension Mother     Blindness Mother        Meds/Allergies     (Not in a hospital admission)  Allergies   Allergen Reactions    Amoxicillin Hives    Celecoxib     Cephalexin     Ciprofloxacin      Other reaction(s): diarrhea  Tolerates Levaquin    Codeine Other (See Comments)     Other reaction(s): Other (See Comments)  takes vicodin @ home  takes vicodin @ home    Epinephrine Other (See Comments)     Other reaction(s): Unknown Reaction    Oxycodone-Acetaminophen Other (See Comments)    Oxycodone-Acetaminophen      Other reaction(s): Unknown Reaction    Propoxyphene Other (See Comments)    Rofecoxib     Sulfa Antibiotics Other (See Comments)     takes at home  takes at home  Other reaction(s): Other (See Comments)  takes at home    Sulfamethoxazole-Trimethoprim     Nitrofurantoin Rash          Objective     /72   Pulse 76   Ht 4' 7 5" (1 41 m)   Wt 72 6 kg (160 lb)   BMI 36 52 kg/m²      PE:  AAOx 3  WDWN  Hearing intact, no drainage from eyes  no audible wheezing  no abdominal distension  LE compartments soft, skin intact    Ortho Exam:  left Knee:   No erythema  Mild generalized swelling  no effusion  no warmth  Patient reports she is unable to actively range knee secondary to pain     PROM: 10-90  +TTP medial and lateral joint lines  +TTP patella   Stable to varus/valgus stress    Large joint arthrocentesis: L knee  Date/Time: 10/29/2019 4:28 PM  Consent given by: patient  Site marked: site marked  Timeout: Immediately prior to procedure a time out was called to verify the correct patient, procedure, equipment, support staff and site/side marked as required   Supporting Documentation  Indications: pain   Procedure Details  Location: knee - L knee  Preparation: Patient was prepped and draped in the usual sterile fashion  Needle size: 22 G  Ultrasound guidance: no  Approach: anterolateral  Medications administered: 3 mL lidocaine 1 %; 2 mL methylPREDNISolone acetate 40 mg/mL    Patient tolerance: patient tolerated the procedure well with no immediate complications  Dressing:  Sterile dressing applied

## 2019-10-30 ENCOUNTER — TELEPHONE (OUTPATIENT)
Dept: FAMILY MEDICINE CLINIC | Facility: CLINIC | Age: 84
End: 2019-10-30

## 2019-10-30 DIAGNOSIS — Z78.9 IMPAIRED MOBILITY AND ADLS: Primary | ICD-10-CM

## 2019-10-30 DIAGNOSIS — Z74.09 IMPAIRED MOBILITY AND ADLS: Primary | ICD-10-CM

## 2019-10-30 NOTE — TELEPHONE ENCOUNTER
Patient's daughter was in today  Patient start her is having difficult time caring for the patient she is incontinent have his complete difficulty ADLs  Patient's daughter states that she had the aging office in and patient would meet requirements for nursing home care    I will refer to our outpatient care manager and have them call the daughter, Miroslava Rocha,  To assist them

## 2019-11-06 ENCOUNTER — PATIENT OUTREACH (OUTPATIENT)
Dept: FAMILY MEDICINE CLINIC | Facility: CLINIC | Age: 84
End: 2019-11-06

## 2019-11-06 DIAGNOSIS — Z78.9 ENROLLED IN CHRONIC CARE MANAGEMENT: Primary | ICD-10-CM

## 2019-11-06 NOTE — PROGRESS NOTES
This CM spoke to [place] and recommended she call Kori Silva at   [employer] for information about possibly getting help for her mother  She agreed to do so

## 2019-11-06 NOTE — PROGRESS NOTES
As per PCP request, this CM contacted patient's daughter Shane Resendiz in effort to assist her with finding home health care for patient  Shane Resendiz admitted being very frustrated and overwhelmed with the daily care giving requirements her mother needs  She appears to have hit many roadblocks and doesn't know where else to turn  This CM agreed to provide resources as able and have our  [place] assist as well

## 2019-11-06 NOTE — PROGRESS NOTES
This CM spoke to Shad Wade at   [employer] who stated patient might be eligible for program that could provide in home assistance via her Medicare  Shad Wade will check to see if patient ever applied for their service in the past and contact this CM with her findings

## 2019-11-06 NOTE — PROGRESS NOTES
Alyssa at   [employer] contacted this CM and reported she did not find any records that patient had ever applied to their service  CM will contact patient's daughter Vickie Carolina with Alyssa's contact number

## 2019-11-16 NOTE — RESULT NOTES
Verified Results  (1) CBC/ PLT (NO DIFF) [id number] 03:43PM Alessandro Breed Order Number: [id number]     Test Name Result Flag Reference   HEMATOCRIT 36 9 %  34 8-46 1   HEMOGLOBIN 12 6 g/dL  11 5-15 4   MCHC 34 1 g/dL  31 4-37 4   MCH 31 6 pg  26 8-34 3   MCV 93 fL  82-98   PLATELET COUNT 213 Thousands/uL  149-390   RBC COUNT 3 99 Million/uL  3 81-5 12   RDW 14 1 %  11 6-15 1   WBC COUNT 5 08 Thousand/uL  4 31-10 16   MPV 9 8 fL  8 9-12 7 No

## 2019-11-23 DIAGNOSIS — N39.0 CHRONIC URINARY TRACT INFECTION: ICD-10-CM

## 2019-11-23 RX ORDER — LEVOFLOXACIN 250 MG/1
250 TABLET ORAL DAILY
Qty: 7 TABLET | Refills: 0 | Status: SHIPPED | OUTPATIENT
Start: 2019-11-23 | End: 2019-11-30

## 2019-11-23 NOTE — PROGRESS NOTES
77-year-old white female who lives with her daughter  Daughter called this morning stating that mother is acting somewhat unusually, which is indicative of when she gets a UTI  Will send in Levaquin 250 b i d  For 7 days    Daughter knows to notify us if symptoms worsen or persist

## 2019-12-10 ENCOUNTER — PATIENT OUTREACH (OUTPATIENT)
Dept: FAMILY MEDICINE CLINIC | Facility: CLINIC | Age: 84
End: 2019-12-10

## 2019-12-10 NOTE — PROGRESS NOTES
This CM left message for patient's daughter Lissett Crane her that the medical information form for [facility] had been completed and faxed to [facility] Shepherd by PCP office on 11/25/19

## 2019-12-10 NOTE — PROGRESS NOTES
This CM spoke to patient's daughter Roula Armando who continues to face multiple obstacles getting caregiver assistance for her mother  Roula Armando stated it's either the income is over the eligibility level or some other problem  Roula Armando reported trust issues as she's had problems with people in her home in the past  This CM suggested she put cameras in her house  She said she has cameras installed but an agency she was interested in using wanted to know the location of the cameras  Roula Armando stated she is waiting to receive call back from PCP office when they complete the health care section on an application for [facility] she had given  She accepted this CM offer to follow up on the status of the application  Roula Armando also accepted contact number of Nico Berumen, an independent senior , another resource she may find helpful

## 2019-12-10 NOTE — PROGRESS NOTES
This CM left message with Admissions Dept at [facility] with request for return call  CM inquired if Alexander Cornejo had received the medical information form (scanned into patient chart) faxed to them on 11/2519 by PCP office

## 2019-12-12 ENCOUNTER — PATIENT OUTREACH (OUTPATIENT)
Dept: CASE MANAGEMENT | Facility: OTHER | Age: 84
End: 2019-12-12

## 2019-12-12 NOTE — PROGRESS NOTES
OP[facility] SW intern reached out to patient daughter to discuss resources for her mother  Enma Davis stated that she is about to go to a meeting and can not tlk currenlty but they have tried every resource and there is nothing anyone can do  Xaviersarah Davis stated patient makes to much money for waiver assistance and the only help she would be able to get she would have to pay out of pocket, which she can not afford  Process has been started to have patient transition to [place]  Isaelcarlos Susan stated that she is having difficulty managing being a caregiver for her mother and being able to support the two of them  All other resources have been exhausted at this time

## 2020-01-15 ENCOUNTER — PATIENT OUTREACH (OUTPATIENT)
Dept: FAMILY MEDICINE CLINIC | Facility: CLINIC | Age: 85
End: 2020-01-15

## 2020-01-16 DIAGNOSIS — E87.6 HYPOKALEMIA: ICD-10-CM

## 2020-01-17 RX ORDER — POTASSIUM CHLORIDE 20 MEQ/1
TABLET, EXTENDED RELEASE ORAL
Qty: 90 TABLET | Refills: 0 | Status: SHIPPED | OUTPATIENT
Start: 2020-01-17 | End: 2020-03-09 | Stop reason: HOSPADM

## 2020-01-21 ENCOUNTER — PATIENT OUTREACH (OUTPATIENT)
Dept: FAMILY MEDICINE CLINIC | Facility: CLINIC | Age: 85
End: 2020-01-21

## 2020-01-21 NOTE — PROGRESS NOTES
This CM spoke very briefly to patient's daughter Saira Barreto who stated she was in a hurry to get to a 9:30AM appt  Saira Barreto reported that it was "just me and mom" at the house  Saira Barreto said she never heard back from Son  She also said patient is no longer getting OT because the therapist said patient wasn't retaining what was taught  Jadajose luis Barreto agreed to give this CM a call when she has time as CM would like to inquire if she contacted Mark Grijalva, a  who without cost, helps people navigate nursing home and assisted living facilities

## 2020-01-28 DIAGNOSIS — G89.29 CHRONIC BILATERAL LOW BACK PAIN WITHOUT SCIATICA: ICD-10-CM

## 2020-01-28 DIAGNOSIS — G89.4 CHRONIC PAIN SYNDROME: Primary | ICD-10-CM

## 2020-01-28 DIAGNOSIS — M54.50 CHRONIC BILATERAL LOW BACK PAIN WITHOUT SCIATICA: ICD-10-CM

## 2020-01-28 DIAGNOSIS — M15.9 OSTEOARTHRITIS OF MULTIPLE JOINTS, UNSPECIFIED OSTEOARTHRITIS TYPE: ICD-10-CM

## 2020-01-28 DIAGNOSIS — F41.9 ANXIETY: ICD-10-CM

## 2020-01-28 RX ORDER — TRAMADOL HYDROCHLORIDE 50 MG/1
TABLET ORAL
Qty: 90 TABLET | Refills: 0 | OUTPATIENT
Start: 2020-01-28

## 2020-01-28 RX ORDER — CHLORDIAZEPOXIDE HYDROCHLORIDE 25 MG/1
CAPSULE, GELATIN COATED ORAL
Qty: 60 CAPSULE | Refills: 0 | OUTPATIENT
Start: 2020-01-28

## 2020-01-28 NOTE — TELEPHONE ENCOUNTER
DENNISE, PT'S DAUGHTER CALLED STATING PT IS UNABLE TO BRING PT IN FOR URINE SAMPLE  SHE WAS VERY FRUSTRATED AND WOULD LIKE TO KNOW IF BRINGING IN A URINE SAMPLE WOULD BE AN OPTION TO SEND OUT TO THE LAB AND IF SHE IS ABLE TO SIGN THE CONTRACT FOR HER MOTHER AS SHE IS HER POA  DAUGHTER WAS REQUESTING FOR DR CONNER TO CALL HER BACK TO DISCUSS SOME OPTIONS [phone]

## 2020-01-28 NOTE — TELEPHONE ENCOUNTER
Patient needs office visit    Per state law she will need a urine drug screen and sign a contract and I needed discussed risk and benefit of medications

## 2020-01-28 NOTE — TELEPHONE ENCOUNTER
I put an order in for urine drug screen  Patient may drop urine off at our lab  If she does not use are lab she may stop it will print this out for her    In addition patient's daughter may sign a contract for chronic narcotic therapy if she has power-of-

## 2020-01-29 NOTE — TELEPHONE ENCOUNTER
Pt;s daughter Theoplis [place] notified of TS response  She will drop off urine specimen at [facility] lab  She will stop by office to sign narcotics contract

## 2020-01-30 ENCOUNTER — APPOINTMENT (OUTPATIENT)
Dept: LAB | Facility: CLINIC | Age: 85
End: 2020-01-30
Payer: MEDICARE

## 2020-01-30 ENCOUNTER — TELEPHONE (OUTPATIENT)
Dept: FAMILY MEDICINE CLINIC | Facility: CLINIC | Age: 85
End: 2020-01-30

## 2020-01-30 DIAGNOSIS — N39.0 CHRONIC URINARY TRACT INFECTION: Primary | ICD-10-CM

## 2020-01-30 DIAGNOSIS — M54.50 CHRONIC BILATERAL LOW BACK PAIN WITHOUT SCIATICA: ICD-10-CM

## 2020-01-30 DIAGNOSIS — F41.9 ANXIETY: ICD-10-CM

## 2020-01-30 DIAGNOSIS — G89.4 CHRONIC PAIN SYNDROME: ICD-10-CM

## 2020-01-30 DIAGNOSIS — N39.0 CHRONIC URINARY TRACT INFECTION: ICD-10-CM

## 2020-01-30 DIAGNOSIS — G89.29 CHRONIC BILATERAL LOW BACK PAIN WITHOUT SCIATICA: ICD-10-CM

## 2020-01-30 DIAGNOSIS — M15.9 OSTEOARTHRITIS OF MULTIPLE JOINTS, UNSPECIFIED OSTEOARTHRITIS TYPE: ICD-10-CM

## 2020-01-30 DIAGNOSIS — Z79.891 OPIOID USE AGREEMENT EXISTS: Primary | ICD-10-CM

## 2020-01-30 PROCEDURE — 80307 DRUG TEST PRSMV CHEM ANLYZR: CPT | Performed by: FAMILY MEDICINE

## 2020-01-30 PROCEDURE — 87086 URINE CULTURE/COLONY COUNT: CPT

## 2020-01-30 PROCEDURE — 87077 CULTURE AEROBIC IDENTIFY: CPT

## 2020-01-30 PROCEDURE — 87186 SC STD MICRODIL/AGAR DIL: CPT

## 2020-01-30 RX ORDER — CHLORDIAZEPOXIDE HYDROCHLORIDE 25 MG/1
CAPSULE, GELATIN COATED ORAL
Qty: 60 CAPSULE | Refills: 5 | Status: SHIPPED | OUTPATIENT
Start: 2020-01-30 | End: 2020-03-24

## 2020-01-30 RX ORDER — TRAMADOL HYDROCHLORIDE 50 MG/1
50 TABLET ORAL EVERY 8 HOURS PRN
Qty: 90 TABLET | Refills: 1 | Status: ON HOLD | OUTPATIENT
Start: 2020-01-30 | End: 2020-02-28 | Stop reason: SDUPTHER

## 2020-01-30 NOTE — TELEPHONE ENCOUNTER
Spoke with pts daughter Tamika Singh and she stated "if she is alive next month I will be glad to bring her in"  She expressed that mother is house bound and she is unable to bring her in and would like to know if there is any way to get a home visit  I expressed to her that Dr Daphne Knight was only asking her to come in due to being on a controled substance and that he would like her to start to decrease chlordiazepoxide and she started " I already have her taking one a day does he not know who I am  I was in the office for my own appointment and discussed this with him already"  I told her I would relay message

## 2020-01-30 NOTE — TELEPHONE ENCOUNTER
Please call the daughter  If patient is doing that poorly    I would happily place hospice order that hospice nursing can come into the home and take care of her if she feels that is necessary would be happy to do with this

## 2020-01-30 NOTE — TELEPHONE ENCOUNTER
Yes, this is why I decreased did not remember speaking with her  However because these are controlled substances I must see patient every 6 months  She can call either local ambulance core or [facility] they do of transportation services to get her mother into the office  These are state law rules and [facility] because of controlled substances    As of the 1st of the year there are very significant restrictions and rules about controlled substances and stated [place]

## 2020-01-30 NOTE — TELEPHONE ENCOUNTER
Patient was last seen 07/11/2019  I did refill her tramadol and her chlordiazepoxide the patient will need appointment next month as I need to see her least every 6 months secondary to controlled substances     In addition I have decreased her chlordiazepoxide 25 mg daily  At patient's age, chlordiazepoxide can build up in the system  At patient's next office visit will consider changing chlordiazepoxide to something shorter acting  However patient's minutes for years and do not want to change abruptly    PDMP was checked

## 2020-01-31 DIAGNOSIS — N39.0 CHRONIC URINARY TRACT INFECTION: Primary | ICD-10-CM

## 2020-01-31 LAB
LABORATORY COMMENT REPORT: ABNORMAL
TRAMADOL UR QL SCN: POSITIVE NG/ML

## 2020-01-31 RX ORDER — DOXYCYCLINE HYCLATE 100 MG/1
100 CAPSULE ORAL EVERY 12 HOURS SCHEDULED
Qty: 20 CAPSULE | Refills: 0 | Status: SHIPPED | OUTPATIENT
Start: 2020-01-31 | End: 2020-02-10

## 2020-02-01 LAB — BACTERIA UR CULT: ABNORMAL

## 2020-02-04 LAB
AMPHETAMINES UR QL SCN: NEGATIVE NG/ML
BARBITURATES UR QL SCN: NEGATIVE NG/ML
BENZODIAZ UR QL: POSITIVE
BZE UR QL: NEGATIVE NG/ML
CANNABINOIDS UR QL SCN: NEGATIVE NG/ML
METHADONE UR QL SCN: NEGATIVE NG/ML
OPIATES UR QL: NEGATIVE NG/ML
PCP UR QL: NEGATIVE NG/ML
PROPOXYPH UR QL SCN: NEGATIVE NG/ML

## 2020-02-05 ENCOUNTER — TELEPHONE (OUTPATIENT)
Dept: FAMILY MEDICINE CLINIC | Facility: CLINIC | Age: 85
End: 2020-02-05

## 2020-02-05 DIAGNOSIS — Z74.09 DECREASED AMBULATION STATUS: Primary | ICD-10-CM

## 2020-02-05 DIAGNOSIS — I67.1 CEREBRAL ANEURYSM: ICD-10-CM

## 2020-02-05 DIAGNOSIS — Z74.09 IMPAIRED MOBILITY AND ADLS: ICD-10-CM

## 2020-02-05 DIAGNOSIS — G89.4 CHRONIC PAIN SYNDROME: ICD-10-CM

## 2020-02-05 DIAGNOSIS — I10 ESSENTIAL HYPERTENSION: ICD-10-CM

## 2020-02-05 DIAGNOSIS — Z78.9 IMPAIRED MOBILITY AND ADLS: ICD-10-CM

## 2020-02-05 DIAGNOSIS — Z79.891 OPIOID USE AGREEMENT EXISTS: ICD-10-CM

## 2020-02-05 NOTE — TELEPHONE ENCOUNTER
Palliative care should be calling the patient    She will have to be seen once in the office and then they can set up home visits thereafter

## 2020-02-05 NOTE — TELEPHONE ENCOUNTER
Please call daughter  I just was at a lecture from palliative care specialist   They will do home visits I believe that this may be a benefit for Latanya    If patient family wish I will put an order for palliative care so they can evaluate patient and possibly set up home visits for her

## 2020-02-21 ENCOUNTER — HOSPITAL ENCOUNTER (INPATIENT)
Facility: HOSPITAL | Age: 85
LOS: 7 days | Discharge: NURSING, REHAB OR LONG-TERM CARE | DRG: 697 | End: 2020-02-28
Attending: EMERGENCY MEDICINE | Admitting: HOSPITALIST
Payer: MEDICARE

## 2020-02-21 ENCOUNTER — APPOINTMENT (EMERGENCY)
Dept: CT IMAGING | Facility: HOSPITAL | Age: 85
DRG: 697 | End: 2020-02-21
Payer: MEDICARE

## 2020-02-21 DIAGNOSIS — M15.9 OSTEOARTHRITIS OF MULTIPLE JOINTS, UNSPECIFIED OSTEOARTHRITIS TYPE: ICD-10-CM

## 2020-02-21 DIAGNOSIS — N39.0 RECURRENT UTI: ICD-10-CM

## 2020-02-21 DIAGNOSIS — R33.9 URINARY RETENTION: Primary | ICD-10-CM

## 2020-02-21 DIAGNOSIS — Q52.5 LABIAL FUSION: ICD-10-CM

## 2020-02-21 DIAGNOSIS — M54.50 CHRONIC BILATERAL LOW BACK PAIN WITHOUT SCIATICA: ICD-10-CM

## 2020-02-21 DIAGNOSIS — G89.4 CHRONIC PAIN SYNDROME: ICD-10-CM

## 2020-02-21 DIAGNOSIS — G89.29 CHRONIC BILATERAL LOW BACK PAIN WITHOUT SCIATICA: ICD-10-CM

## 2020-02-21 DIAGNOSIS — N35.919 URETHRAL STRICTURE: ICD-10-CM

## 2020-02-21 LAB
ANION GAP SERPL CALCULATED.3IONS-SCNC: 8 MMOL/L (ref 4–13)
BASOPHILS # BLD AUTO: 0.03 THOUSANDS/ΜL (ref 0–0.1)
BASOPHILS NFR BLD AUTO: 1 % (ref 0–1)
BILIRUB UR QL STRIP: NEGATIVE
BUN SERPL-MCNC: 13 MG/DL (ref 5–25)
CALCIUM SERPL-MCNC: 8.6 MG/DL (ref 8.3–10.1)
CHLORIDE SERPL-SCNC: 97 MMOL/L (ref 100–108)
CLARITY UR: CLEAR
CO2 SERPL-SCNC: 30 MMOL/L (ref 21–32)
COLOR UR: YELLOW
CREAT SERPL-MCNC: 0.7 MG/DL (ref 0.6–1.3)
EOSINOPHIL # BLD AUTO: 0.01 THOUSAND/ΜL (ref 0–0.61)
EOSINOPHIL NFR BLD AUTO: 0 % (ref 0–6)
ERYTHROCYTE [DISTWIDTH] IN BLOOD BY AUTOMATED COUNT: 14.2 % (ref 11.6–15.1)
GFR SERPL CREATININE-BSD FRML MDRD: 75 ML/MIN/1.73SQ M
GLUCOSE SERPL-MCNC: 81 MG/DL (ref 65–140)
GLUCOSE UR STRIP-MCNC: NEGATIVE MG/DL
HCT VFR BLD AUTO: 33.2 % (ref 34.8–46.1)
HGB BLD-MCNC: 10.9 G/DL (ref 11.5–15.4)
HGB UR QL STRIP.AUTO: NEGATIVE
IMM GRANULOCYTES # BLD AUTO: 0.01 THOUSAND/UL (ref 0–0.2)
IMM GRANULOCYTES NFR BLD AUTO: 0 % (ref 0–2)
KETONES UR STRIP-MCNC: ABNORMAL MG/DL
LEUKOCYTE ESTERASE UR QL STRIP: NEGATIVE
LYMPHOCYTES # BLD AUTO: 1.89 THOUSANDS/ΜL (ref 0.6–4.47)
LYMPHOCYTES NFR BLD AUTO: 36 % (ref 14–44)
MCH RBC QN AUTO: 31.5 PG (ref 26.8–34.3)
MCHC RBC AUTO-ENTMCNC: 32.8 G/DL (ref 31.4–37.4)
MCV RBC AUTO: 96 FL (ref 82–98)
MONOCYTES # BLD AUTO: 0.73 THOUSAND/ΜL (ref 0.17–1.22)
MONOCYTES NFR BLD AUTO: 14 % (ref 4–12)
NEUTROPHILS # BLD AUTO: 2.53 THOUSANDS/ΜL (ref 1.85–7.62)
NEUTS SEG NFR BLD AUTO: 49 % (ref 43–75)
NITRITE UR QL STRIP: NEGATIVE
NRBC BLD AUTO-RTO: 0 /100 WBCS
PH UR STRIP.AUTO: 6 [PH]
PLATELET # BLD AUTO: 185 THOUSANDS/UL (ref 149–390)
PMV BLD AUTO: 9.8 FL (ref 8.9–12.7)
POTASSIUM SERPL-SCNC: 3.5 MMOL/L (ref 3.5–5.3)
PROT UR STRIP-MCNC: NEGATIVE MG/DL
RBC # BLD AUTO: 3.46 MILLION/UL (ref 3.81–5.12)
SODIUM SERPL-SCNC: 135 MMOL/L (ref 136–145)
SP GR UR STRIP.AUTO: 1.01 (ref 1–1.03)
UROBILINOGEN UR QL STRIP.AUTO: 0.2 E.U./DL
WBC # BLD AUTO: 5.2 THOUSAND/UL (ref 4.31–10.16)

## 2020-02-21 PROCEDURE — 99285 EMERGENCY DEPT VISIT HI MDM: CPT

## 2020-02-21 PROCEDURE — 70450 CT HEAD/BRAIN W/O DYE: CPT

## 2020-02-21 PROCEDURE — 80048 BASIC METABOLIC PNL TOTAL CA: CPT | Performed by: NURSE PRACTITIONER

## 2020-02-21 PROCEDURE — 85025 COMPLETE CBC W/AUTO DIFF WBC: CPT | Performed by: NURSE PRACTITIONER

## 2020-02-21 PROCEDURE — 81003 URINALYSIS AUTO W/O SCOPE: CPT | Performed by: NURSE PRACTITIONER

## 2020-02-21 PROCEDURE — 74177 CT ABD & PELVIS W/CONTRAST: CPT

## 2020-02-21 PROCEDURE — 36415 COLL VENOUS BLD VENIPUNCTURE: CPT | Performed by: NURSE PRACTITIONER

## 2020-02-21 PROCEDURE — 99285 EMERGENCY DEPT VISIT HI MDM: CPT | Performed by: EMERGENCY MEDICINE

## 2020-02-21 PROCEDURE — 96360 HYDRATION IV INFUSION INIT: CPT

## 2020-02-21 PROCEDURE — 99223 1ST HOSP IP/OBS HIGH 75: CPT | Performed by: HOSPITALIST

## 2020-02-21 PROCEDURE — 99222 1ST HOSP IP/OBS MODERATE 55: CPT | Performed by: PHYSICIAN ASSISTANT

## 2020-02-21 PROCEDURE — 96361 HYDRATE IV INFUSION ADD-ON: CPT

## 2020-02-21 RX ORDER — MELATONIN
4000 DAILY
COMMUNITY

## 2020-02-21 RX ORDER — GLIMEPIRIDE 2 MG/1
1 TABLET ORAL DAILY
Status: DISCONTINUED | OUTPATIENT
Start: 2020-02-22 | End: 2020-02-28 | Stop reason: HOSPADM

## 2020-02-21 RX ORDER — CHLORDIAZEPOXIDE HYDROCHLORIDE 25 MG/1
25 CAPSULE, GELATIN COATED ORAL DAILY
Status: DISCONTINUED | OUTPATIENT
Start: 2020-02-22 | End: 2020-02-22

## 2020-02-21 RX ORDER — POTASSIUM CHLORIDE 20 MEQ/1
20 TABLET, EXTENDED RELEASE ORAL DAILY
Status: DISCONTINUED | OUTPATIENT
Start: 2020-02-22 | End: 2020-02-22

## 2020-02-21 RX ORDER — SODIUM CHLORIDE 9 MG/ML
100 INJECTION, SOLUTION INTRAVENOUS CONTINUOUS
Status: DISCONTINUED | OUTPATIENT
Start: 2020-02-21 | End: 2020-02-21

## 2020-02-21 RX ORDER — TRAMADOL HYDROCHLORIDE 50 MG/1
50 TABLET ORAL EVERY 8 HOURS PRN
Status: DISCONTINUED | OUTPATIENT
Start: 2020-02-21 | End: 2020-02-28 | Stop reason: HOSPADM

## 2020-02-21 RX ORDER — ACETAMINOPHEN 325 MG/1
650 TABLET ORAL EVERY 6 HOURS PRN
Status: DISCONTINUED | OUTPATIENT
Start: 2020-02-21 | End: 2020-02-28 | Stop reason: HOSPADM

## 2020-02-21 RX ORDER — ONDANSETRON 2 MG/ML
4 INJECTION INTRAMUSCULAR; INTRAVENOUS EVERY 6 HOURS PRN
Status: DISCONTINUED | OUTPATIENT
Start: 2020-02-21 | End: 2020-02-28 | Stop reason: HOSPADM

## 2020-02-21 RX ORDER — MECLIZINE HYDROCHLORIDE 25 MG/1
25 TABLET ORAL 3 TIMES DAILY PRN
Status: DISCONTINUED | OUTPATIENT
Start: 2020-02-21 | End: 2020-02-22

## 2020-02-21 RX ORDER — PANTOPRAZOLE SODIUM 40 MG/1
40 TABLET, DELAYED RELEASE ORAL
Status: DISCONTINUED | OUTPATIENT
Start: 2020-02-22 | End: 2020-02-22

## 2020-02-21 RX ORDER — PRAMIPEXOLE DIHYDROCHLORIDE 0.25 MG/1
0.25 TABLET ORAL DAILY
Status: DISCONTINUED | OUTPATIENT
Start: 2020-02-22 | End: 2020-02-28 | Stop reason: HOSPADM

## 2020-02-21 RX ORDER — SODIUM CHLORIDE 9 MG/ML
50 INJECTION, SOLUTION INTRAVENOUS CONTINUOUS
Status: DISCONTINUED | OUTPATIENT
Start: 2020-02-21 | End: 2020-02-23

## 2020-02-21 RX ORDER — LEVOFLOXACIN 5 MG/ML
500 INJECTION, SOLUTION INTRAVENOUS ONCE
Status: COMPLETED | OUTPATIENT
Start: 2020-02-21 | End: 2020-02-21

## 2020-02-21 RX ORDER — LATANOPROST 50 UG/ML
1 SOLUTION/ DROPS OPHTHALMIC DAILY
Status: DISCONTINUED | OUTPATIENT
Start: 2020-02-22 | End: 2020-02-28 | Stop reason: HOSPADM

## 2020-02-21 RX ORDER — LISINOPRIL 10 MG/1
10 TABLET ORAL DAILY
Status: DISCONTINUED | OUTPATIENT
Start: 2020-02-22 | End: 2020-02-28 | Stop reason: HOSPADM

## 2020-02-21 RX ORDER — MAGNESIUM 30 MG
400 TABLET ORAL DAILY
COMMUNITY
End: 2021-12-19 | Stop reason: SINTOL

## 2020-02-21 RX ORDER — LANOLIN ALCOHOL/MO/W.PET/CERES
2500 CREAM (GRAM) TOPICAL DAILY
COMMUNITY

## 2020-02-21 RX ADMIN — SODIUM CHLORIDE 500 ML: 0.9 INJECTION, SOLUTION INTRAVENOUS at 13:10

## 2020-02-21 RX ADMIN — IOHEXOL 85 ML: 350 INJECTION, SOLUTION INTRAVENOUS at 15:04

## 2020-02-21 RX ADMIN — ACETAMINOPHEN 650 MG: 325 TABLET ORAL at 22:21

## 2020-02-21 RX ADMIN — SODIUM CHLORIDE 75 ML/HR: 0.9 INJECTION, SOLUTION INTRAVENOUS at 22:58

## 2020-02-21 RX ADMIN — TRAMADOL HYDROCHLORIDE 50 MG: 50 TABLET, FILM COATED ORAL at 19:45

## 2020-02-21 RX ADMIN — LEVOFLOXACIN 500 MG: 5 INJECTION, SOLUTION INTRAVENOUS at 22:58

## 2020-02-21 NOTE — ED NOTES
Per Radha Fitzgerald NP continuous cardiac monitoring and pulse oximetry not needed at this time       Bren Coles RN  02/21/20 [id number]

## 2020-02-21 NOTE — H&P
H&P- Lonza Landau 9/20/1926, 80 y o  female MRN: [id number]    Unit/Bed#: ED 23 Encounter: [id number]    Primary Care Provider: Sheldon Sandhu DO   Date and time admitted to hospital: 2/21/2020 11:00 AM      * Urinary retention  Assessment & Plan  Likely causing chronic UTIs  Multifactorial secondary to advanced age, poor mobility, and stenotic urethra  ER had difficulty placing a Ward catheter and therefore urology placed it  Ward catheter inserted 2/21/20  Urology recommends flushing Ward until clear and maintaining to gravity  Will need outpatient follow-up with urology the office    Chronic urinary tract infection  Assessment & Plan  With history of multiple UTIs  Just finished a course of doxycycline 100 mg b i d  x 10 days  Likely multifactorial secondary to above  Hold off on current antibiotics as urinalysis with no leukocytes or nitrates  Patient afebrile, nontoxic without leukocytosis on admission    Draining clear yellow urine on admission    Impaired mobility and ADLs  Assessment & Plan  Secondary to osteoarthritis  With deconditioning secondary to above  Will have PT/OT evaluate    Restless legs syndrome  Assessment & Plan  Continue Mirapex    Osteoarthritis  Assessment & Plan  With chronic deformities/severe kyphosis and ambulatory dysfunction  Uses walker to ambulate at baseline    Glaucoma  Assessment & Plan  Continue home eye drops    GERD (gastroesophageal reflux disease)  Assessment & Plan  Continue omeprazole    Lower extremity edema  Assessment & Plan  On lasix 40 mg BID  Hold here while on IV fluids    VTE Prophylaxis: Enoxaparin (Lovenox)  / sequential compression device   Code Status: full code  Anticipated Length of Stay:  Initially admitted under observation however will require a greater than 2 midnight stay given urinary retention with need for ward placement, generalized weakness/deconditioning with need for further evaluation by PT/OT    Chief Complaint: Multiple vague complaints - decreased appetite / decreased energy / frequent urination    History of Present Illness:    Makeda Lui is a 80 y o  female with a PMH of hypertension, frequent UTIs, dementia, glaucoma, osteoarthritis, kyphosis, chronic deformities of bilateral feet, ambulatory dysfunction, history of breast cancer status post left mastectomy, chronic pain and follows with pain management  She presents from home after being brought to the hospital by her daughter  Majority of history was obtained from daughter at bedside  She reports patient has been with intermittent confusion/altered mental status  She was concerned given these are her usual presenting symptoms of a urinary tract infection  She does have a history of frequent UTIs and most recently was treated for 1 with a 10 day course of doxycycline 100 mg b i d  She finished the course around February 17th  Other general complaints consist of decreased appetite and decreased energy  A Orantes catheter was placed in the ED on 2/21/20  Urinalysis at that time negative for leukocytes and nitrates  Patient nontoxic appearing afebrile, no white count  Lives at home with daughter  For ambulatory status at baseline but does ambulate with a walker  Review of Systems:    General:   No Fever or chills; + decreased appetite   EENT:   No ear pain, facial swelling;    Skin:   No rashes, color changes  Respiratory:     No shortness of breath, cough,   Cardiovascular:     No chest pain, palpitations  Gastrointestinal:    No nausea, vomiting, diarrhea; No abdominal pain  Musculoskeletal:     No arthralgias, myalgias, swelling  Neurologic:   No dizziness, numbness, weakness  No speech difficulties     Psych:   No agitation,    Otherwise, All other twelve-point review of systems normal      Past Medical and Surgical History:     Past Medical History:   Diagnosis Date    Anxiety     Arthritis     [facility] ([facility] [address] )     Elevated serum alkaline phosphatase level     mild, isoenzymes are normal, resolved 4/24/17 labs , last assessed 11/10/16, resolved 4/24/17    Hematuria     last assessed 9/18/12    Hyperlipidemia 8/13/2012    Hypertension     Pulmonary embolism ([facility] [address] ) 01/2011    last assessed 9/18/12       Past Surgical History:   Procedure Laterality Date    BILATERAL OOPHORECTOMY      Laparoscopic    BREAST SURGERY Left     Mastectomy     CHOLECYSTECTOMY      Laparoscopic    HERNIA REPAIR      HYSTERECTOMY      SMALL INTESTINE SURGERY         Meds/Allergies:    Current Facility-Administered Medications   Medication Dose Route Frequency Provider Last Rate Last Dose    sodium chloride 0 9 % infusion  100 mL/hr Intravenous Continuous Radha Krause MD         Current Outpatient Medications   Medication Sig Dispense Refill    brimonidine-timolol (COMBIGAN) 0 2-0 5 % Administer 1 drop into the left eye 2 (two) times a day      Calcium Carbonate-Vit D-Min (CALCIUM 1200 PO) Take 750 mg by mouth 2 (two) times a day      chlordiazePOXIDE (LIBRIUM) 25 mg capsule Take 1 capsule daily as needed for anxiety 60 capsule 5    cholecalciferol (VITAMIN D3) 1,000 units tablet Take 4,000 Units by mouth daily      Cranberry 250 MG TABS Take by mouth daily      enalapril (VASOTEC) 10 mg tablet Take 1 tablet (10 mg total) by mouth daily 90 tablet 3    furosemide (LASIX) 40 mg tablet Take 1 tablet (40 mg total) by mouth 2 (two) times a day 180 tablet 3    latanoprost (XALATAN) 0 005 % ophthalmic solution Administer 1 drop into the left eye daily      magnesium 30 MG tablet Take 400 mg by mouth daily      meclizine (ANTIVERT) 25 mg tablet TAKE 1 TABLET BY MOUTH 3  TIMES A DAY AS NEEDED 270 tablet 3    NYSTATIN powder APPLY TO AFFECTED AREA(S)  2-3 TIMES A  g 3    omeprazole (PriLOSEC) 20 mg delayed release capsule Take 1 capsule (20 mg total) by mouth daily 90 capsule 3    potassium chloride (K-DUR,KLOR-CON) 20 mEq tablet TAKE 1 TABLET BY MOUTH  DAILY 90 tablet 0    pramipexole (MIRAPEX) 0 25 mg tablet Take 1 tablet (0 25 mg total) by mouth daily 90 tablet 3    vitamin B-12 (VITAMIN B-12) 1,000 mcg tablet Take 2,500 mcg by mouth daily      Acetaminophen (TYLENOL ARTHRITIS PAIN PO) Take 1 tablet by mouth 3 (three) times a day      azelastine (ASTELIN) 0 1 % nasal spray 2 sprays into each nostril 2 (two) times a day      diclofenac sodium (VOLTAREN) 1 % Apply 4 g topically 4 (four) times a day 1 Tube 5    lidocaine (LIDODERM) 5 % Apply 1 patch topically daily Remove & Discard patch within 12 hours or as directed by MD 30 patch 0    traMADol (ULTRAM) 50 mg tablet Take 1 tablet (50 mg total) by mouth every 8 (eight) hours as needed for moderate pain 90 tablet 1       Allergies   Allergen Reactions    Amoxicillin Hives    Celecoxib     Cephalexin     Ciprofloxacin      Other reaction(s): diarrhea  Tolerates Levaquin    Codeine Other (See Comments)     Other reaction(s): Other (See Comments)  takes vicodin @ home  takes vicodin @ home    Epinephrine Other (See Comments)     Other reaction(s): Unknown Reaction    Oxycodone-Acetaminophen Other (See Comments)    Oxycodone-Acetaminophen      Other reaction(s): Unknown Reaction    Propoxyphene Other (See Comments)    Rofecoxib     Sulfa Antibiotics Other (See Comments)     takes at home  takes at home  Other reaction(s): Other (See Comments)  takes at home    Sulfamethoxazole-Trimethoprim     Nitrofurantoin Rash       Allergies: Allergies   Allergen Reactions    Amoxicillin Hives    Celecoxib     Cephalexin     Ciprofloxacin      Other reaction(s): diarrhea  Tolerates Levaquin    Codeine Other (See Comments)     Other reaction(s):  Other (See Comments)  takes vicodin @ home  takes vicodin @ home    Epinephrine Other (See Comments)     Other reaction(s): Unknown Reaction    Oxycodone-Acetaminophen Other (See Comments)    Oxycodone-Acetaminophen      Other reaction(s): Unknown Reaction    Propoxyphene Other (See Comments)    Rofecoxib     Sulfa Antibiotics Other (See Comments)     takes at home  takes at home  Other reaction(s): Other (See Comments)  takes at home    Sulfamethoxazole-Trimethoprim     Nitrofurantoin Rash       Social History:     Marital Status:      Substance Use History:   Social History     Substance and Sexual Activity   Alcohol Use No     Social History     Tobacco Use   Smoking Status Never Smoker   Smokeless Tobacco Never Used     Social History     Substance and Sexual Activity   Drug Use No       Family History:    non-contributory    Physical Exam:     Vitals:   Blood Pressure: 134/70 (02/21/20 1815)  Pulse: 89 (02/21/20 1815)  Temperature: 99 7 °F (37 6 °C) (02/21/20 1314)  Temp Source: Rectal (02/21/20 1314)  Respirations: 16 (02/21/20 1815)  Weight - Scale: 61 1 kg (134 lb 11 2 oz) (02/21/20 1111)  SpO2: 94 % (02/21/20 1815)    Physical Exam   Constitutional: She is oriented to person, place, and time  No distress  Frail and cachectic-appearing  Kyphosis   HENT:   Head: Normocephalic and atraumatic  Cardiovascular: Normal rate, regular rhythm and normal heart sounds  Pulmonary/Chest: Effort normal and breath sounds normal  No stridor  No respiratory distress  She has no wheezes  She has no rales  She exhibits no tenderness  Abdominal: Soft  Bowel sounds are normal  She exhibits no distension and no mass  There is no tenderness  There is no rebound and no guarding  No hernia  Genitourinary:   Genitourinary Comments: Orantes catheter in place   Neurological: She is alert and oriented to person, place, and time  Skin: Skin is warm and dry  She is not diaphoretic  Psychiatric: She has a normal mood and affect  Her behavior is normal    Nursing note and vitals reviewed  Additional Data:     Lab Results: I have personally reviewed pertinent reports        Results from last 7 days   Lab Units 02/21/20  1309   WBC Thousand/uL 5 20 HEMOGLOBIN g/dL 10 9*   HEMATOCRIT % 33 2*   PLATELETS Thousands/uL 185   NEUTROS PCT % 49   LYMPHS PCT % 36   MONOS PCT % 14*   EOS PCT % 0     Results from last 7 days   Lab Units 02/21/20  1309   POTASSIUM mmol/L 3 5   CHLORIDE mmol/L 97*   CO2 mmol/L 30   BUN mg/dL 13   CREATININE mg/dL 0 70   CALCIUM mg/dL 8 6           Imaging: I have personally reviewed pertinent reports  Ct Head Without Contrast    Result Date: 2/21/2020  Narrative: CT BRAIN - WITHOUT CONTRAST INDICATION:   fall  COMPARISON:  None  TECHNIQUE:  CT examination of the brain was performed  In addition to axial images, coronal 2D reformatted images were created and submitted for interpretation  Radiation dose length product (DLP) for this visit:  23-14-20-09 mGy-cm   This examination, like all CT scans performed in the [facility], was performed utilizing techniques to minimize radiation dose exposure, including the use of iterative reconstruction and automated exposure control  IMAGE QUALITY:  Diagnostic  FINDINGS: PARENCHYMA: Decreased attenuation is noted in periventricular and subcortical white matter demonstrating an appearance that is statistically most likely to represent moderate microangiopathic change  No CT signs of acute infarction  No intracranial mass, mass effect or midline shift  No acute parenchymal hemorrhage  VENTRICLES AND EXTRA-AXIAL SPACES:  Normal for the patient's age  VISUALIZED ORBITS AND PARANASAL SINUSES:  Unremarkable  CALVARIUM AND EXTRACRANIAL SOFT TISSUES:  Normal      Impression: No acute intracranial abnormality  Workstation performed: [id number]     Ct Recon Only Lumbar Spine (no Charge)    Result Date: 2/21/2020  Narrative: CT ABDOMEN AND PELVIS WITH IV CONTRAST, CT RECONSTRUCTION LUMBAR SPINE (NO CHARGE) INDICATION:   Concerned for hydronephritis r/t UTI with right CVA tenderness  COMPARISON:  1/17/2014 TECHNIQUE:  CT examination of the abdomen and pelvis was performed   Dedicated reconstructions the lumbar spine were created and utilized in interpretation  Axial, sagittal, and coronal 2D reformatted images were created from the source data and submitted for interpretation  Radiation dose length product (DLP) for this visit:  298 mGy-cm  (accession [id number]), 0 mGy-cm  (accession [id number])  This examination, like all CT scans performed in the [facility], was performed utilizing techniques to minimize radiation dose exposure, including the use of iterative reconstruction and automated exposure control  IV Contrast:  85 mL of iohexol (OMNIPAQUE) Enteric Contrast:  Enteric contrast was not administered  FINDINGS: ABDOMEN LOWER CHEST:  Partially imaged large hiatal hernia containing stomach and small bowel  LIVER/BILIARY TREE:  Unremarkable  GALLBLADDER:  Surgically absent  SPLEEN:  Unremarkable  PANCREAS:  Unremarkable  ADRENAL GLANDS:  Unremarkable  KIDNEYS/URETERS:  Large left parapelvic cyst again noted  No hydronephrosis  STOMACH AND BOWEL:  Unremarkable  APPENDIX:  No findings to suggest appendicitis  ABDOMINOPELVIC CAVITY:  No ascites or free intraperitoneal air  No lymphadenopathy  VESSELS:  Unremarkable for patient's age  PELVIS REPRODUCTIVE ORGANS:  Status post hysterectomy  URINARY BLADDER:  Distended despite the presence of a Orantes catheter  Locules of gas are noted  ABDOMINAL WALL/INGUINAL REGIONS:  Unremarkable  OSSEOUS STRUCTURES:  No acute fracture or destructive osseous lesion  LUMBAR SPINE RECONSTRUCTIONS: No acute osseous abnormality  Moderate multilevel degenerative disc disease and osteoarthritis of the lower lumbar facet joints with degenerative dextroscoliosis noted  No significant spondylolisthesis  No destructive osseous lesion  Impression: 1  Urinary bladder is distended despite the presence of a Orantes catheter  Correlate for proper function    Locules of gas in the urinary bladder possibly related to infection versus recent catheter manipulation  2   No CT evidence of pyelonephritis, as clinically questioned  3   No acute osseous abnormality in the lumbar spine  Workstation performed: [id number]     Ct Abdomen Pelvis With Contrast    Result Date: 2/21/2020  Narrative: CT ABDOMEN AND PELVIS WITH IV CONTRAST, CT RECONSTRUCTION LUMBAR SPINE (NO CHARGE) INDICATION:   Concerned for hydronephritis r/t UTI with right CVA tenderness  COMPARISON:  1/17/2014 TECHNIQUE:  CT examination of the abdomen and pelvis was performed  Dedicated reconstructions the lumbar spine were created and utilized in interpretation  Axial, sagittal, and coronal 2D reformatted images were created from the source data and submitted for interpretation  Radiation dose length product (DLP) for this visit:  298 mGy-cm  (accession [id number]), 0 mGy-cm  (accession [id number])  This examination, like all CT scans performed in the [facility], was performed utilizing techniques to minimize radiation dose exposure, including the use of iterative reconstruction and automated exposure control  IV Contrast:  85 mL of iohexol (OMNIPAQUE) Enteric Contrast:  Enteric contrast was not administered  FINDINGS: ABDOMEN LOWER CHEST:  Partially imaged large hiatal hernia containing stomach and small bowel  LIVER/BILIARY TREE:  Unremarkable  GALLBLADDER:  Surgically absent  SPLEEN:  Unremarkable  PANCREAS:  Unremarkable  ADRENAL GLANDS:  Unremarkable  KIDNEYS/URETERS:  Large left parapelvic cyst again noted  No hydronephrosis  STOMACH AND BOWEL:  Unremarkable  APPENDIX:  No findings to suggest appendicitis  ABDOMINOPELVIC CAVITY:  No ascites or free intraperitoneal air  No lymphadenopathy  VESSELS:  Unremarkable for patient's age  PELVIS REPRODUCTIVE ORGANS:  Status post hysterectomy  URINARY BLADDER:  Distended despite the presence of a Orantes catheter  Locules of gas are noted  ABDOMINAL WALL/INGUINAL REGIONS:  Unremarkable   OSSEOUS STRUCTURES:  No acute fracture or destructive osseous lesion  LUMBAR SPINE RECONSTRUCTIONS: No acute osseous abnormality  Moderate multilevel degenerative disc disease and osteoarthritis of the lower lumbar facet joints with degenerative dextroscoliosis noted  No significant spondylolisthesis  No destructive osseous lesion  Impression: 1  Urinary bladder is distended despite the presence of a Orantes catheter  Correlate for proper function  Locules of gas in the urinary bladder possibly related to infection versus recent catheter manipulation  2   No CT evidence of pyelonephritis, as clinically questioned  3   No acute osseous abnormality in the lumbar spine  Workstation performed: [id number]       EKG, Pathology, and Other Studies Reviewed on Admission:   · EKG: none    Allscripts Records Reviewed: Yes     Total Time for Visit, including Counseling / Coordination of Care: 45 minutes  Greater than 50% of this total time spent on direct patient counseling and coordination of care  ** Please Note: This note has been constructed using a voice recognition system   **

## 2020-02-21 NOTE — CONSULTS
Consult - Urology   Divya Arm 9/20/1926, 80 y o  female MRN: [id number]    Unit/Bed#: ED 23 Encounter: [id number]    Chronic urinary tract infection  Assessment & Plan  Likely multifactorial due to age, dementia, decreased mobility, stenotic urethra  Status post Orantes catheter placement with a 14 [place] catheter with 10 cc in the balloon  Patient tolerated this well  Clear yellow urine draining  Maintain Orantes catheter upon discharge from the hospital   Outpatient follow-up with Urology  Bedside rounds performed with RN present at the bedside  Discussed with Dr Anusha Walker in the ED  Subjective/Objective     Subjective:   CC: History from daughter, present at the bedside  HPI:  Her daughter reports a history of recurrent UTIs  She is here with a myriad of complaints in the ER  One of them is concerned BI  The emergency department has requested urologic consultation secondary to inability of nurses to perform straight catheterization or placement of a Orantes catheter  On ER doctor exam, he believes there is a stenotic or fused urethra  Bladder scan the ER revealed a bladder volume of about 500  She is normally incontinent at home  She lives at home with her daughter, who cares for her  Her daughter believes she may no longer be able to care for her  She reports a history of uterine prolapse with repair and subsequent delivery and hysterectomy in the past     She has a history of dementia  She is also blind  She denies any abdominal pain at this time  She cannot remember if she has had burning with urination  No hematuria noted by the daughter  No fevers at home  ROS:  Review of Systems   Constitutional: Negative for activity change and appetite change  HENT: Negative for congestion and ear pain  Eyes: Negative for pain  Respiratory: Negative for cough and shortness of breath  Cardiovascular: Negative for chest pain and palpitations     Gastrointestinal: Negative for abdominal distention, abdominal pain, blood in stool, constipation, diarrhea and nausea  Genitourinary: Negative for difficulty urinating and dysuria  Musculoskeletal: Negative for arthralgias and myalgias  Skin: Negative for rash  Allergic/Immunologic: Negative for immunocompromised state  Neurological: Negative for dizziness and headaches  Hematological: Negative for adenopathy  Does not bruise/bleed easily  Psychiatric/Behavioral: Negative for agitation  The patient is not nervous/anxious  Objective:  Vitals: Blood pressure 158/76, pulse 82, temperature 99 7 °F (37 6 °C), temperature source Rectal, resp  rate 18, weight 61 1 kg (134 lb 11 2 oz), SpO2 96 %  ,Body mass index is 30 75 kg/m²  Intake/Output Summary (Last 24 hours) at 2/21/2020 1556  Last data filed at 2/21/2020 1441  Gross per 24 hour   Intake --   Output 40 ml   Net -40 ml       Invasive Devices     Peripheral Intravenous Line            Peripheral IV 02/21/20 Left Forearm less than 1 day          Drain            Urethral Catheter 14 Fr  less than 1 day                Physical Exam   Constitutional: She is oriented to person, place, and time  She appears well-developed and well-nourished  She is cooperative  She does not appear ill  No distress  HENT:   Head: Normocephalic and atraumatic  Moist mucous membranes  Eyes: Conjunctivae and EOM are normal    Neck: Normal range of motion  Neck supple  No tracheal deviation present  Cardiovascular: Normal rate, regular rhythm and normal heart sounds  No murmur heard  Pulmonary/Chest: Effort normal and breath sounds normal  No respiratory distress  She has no wheezes  Good airflow bilaterally on deep inspiration  Abdominal: Soft  Bowel sounds are normal  She exhibits no distension and no mass  There is no tenderness  Abdomen soft and benign without significant tenderness  Genitourinary:   Genitourinary Comments: Tiny stenotic urethra    Twelve in that subsequently a 15 [place] catheter was placed was return of clear yellow urine  This was sent for sample  10 cc replaced in the balloon  Patient tolerated this moderately well  Musculoskeletal: She exhibits deformity (Scoliosis, ulcer on her back  )  Deformity of her feet  Neurological: She is alert and oriented to person, place, and time  Skin: Skin is warm and dry  No rash noted  She is not diaphoretic  No erythema  No pallor  Psychiatric: She has a normal mood and affect  Her behavior is normal  Judgment and thought content normal    Nursing note and vitals reviewed  History:    Past Medical History:   Diagnosis Date    Anxiety     Arthritis     [facility]     Elevated serum alkaline phosphatase level     mild, isoenzymes are normal, resolved 4/24/17 labs , last assessed 11/10/16, resolved 4/24/17    Hematuria     last assessed 9/18/12    Hyperlipidemia 8/13/2012    Hypertension     Pulmonary embolism ([facility] ) 01/2011    last assessed 9/18/12     Past Surgical History:   Procedure Laterality Date    BILATERAL OOPHORECTOMY      Laparoscopic    BREAST SURGERY Left     Mastectomy     CHOLECYSTECTOMY      Laparoscopic    HERNIA REPAIR      HYSTERECTOMY      SMALL INTESTINE SURGERY       Family History   Problem Relation Age of Onset    Hypertension Mother     Blindness Mother      Social History     Socioeconomic History    Marital status:       Spouse name: None    Number of children: None    Years of education: None    Highest education level: None   Occupational History    None   Social Needs    Financial resource strain: None    Food insecurity:     Worry: None     Inability: None    Transportation needs:     Medical: None     Non-medical: None   Tobacco Use    Smoking status: Never Smoker    Smokeless tobacco: Never Used   Substance and Sexual Activity    Alcohol use: No    Drug use: No    Sexual activity: None   Lifestyle    Physical activity:     Days per week: None Minutes per session: None    Stress: None   Relationships    Social connections:     Talks on phone: None     Gets together: None     Attends [employer] service: None     Active member of club or organization: None     Attends meetings of clubs or organizations: None     Relationship status: None    Intimate partner violence:     Fear of current or ex partner: None     Emotionally abused: None     Physically abused: None     Forced sexual activity: None   Other Topics Concern    None   Social History Narrative    Lives with adult children        Imaging:  CT pending  Imaging reviewed - both report and images personally reviewed  Lab Results:  I have personally reviewed pertinent labs    Results from last 7 days   Lab Units 02/21/20  1309   WBC Thousand/uL 5 20   HEMOGLOBIN g/dL 10 9*   PLATELETS Thousands/uL 185     Results from last 7 days   Lab Units 02/21/20  1309   SODIUM mmol/L 135*   POTASSIUM mmol/L 3 5   CHLORIDE mmol/L 97*   CO2 mmol/L 30   BUN mg/dL 13   CREATININE mg/dL 0 70   EGFR ml/min/1 73sq m 75   CALCIUM mg/dL 8 6               Steven Tee PA-C  Date: 2/21/2020 Time: 3:56 PM

## 2020-02-21 NOTE — ASSESSMENT & PLAN NOTE
With history of multiple UTIs  Just finished a course of doxycycline 100 mg b i d  x 10 days  Likely multifactorial secondary to above  Hold off on current antibiotics as urinalysis with no leukocytes or nitrates  Patient afebrile, nontoxic without leukocytosis on admission    Draining clear yellow urine on admission

## 2020-02-21 NOTE — ED NOTES
Unable to successfully straight cath  Dr Nicola Mccoy made aware       Feli Mahmood, NATHANIEL  02/21/20 4849

## 2020-02-21 NOTE — ASSESSMENT & PLAN NOTE
Likely multifactorial due to age, dementia, decreased mobility, stenotic urethra  Status post Orantes catheter placement with a 14 [place] catheter with 10 cc in the balloon  Patient tolerated this well  Clear yellow urine draining  Maintain Orantes catheter upon discharge from the hospital   Outpatient follow-up with Urology

## 2020-02-21 NOTE — ASSESSMENT & PLAN NOTE
With chronic deformities/severe kyphosis and ambulatory dysfunction  Uses walker to ambulate at baseline

## 2020-02-21 NOTE — ASSESSMENT & PLAN NOTE
Likely causing chronic UTIs  Multifactorial secondary to advanced age, poor mobility, and stenotic urethra  ER had difficulty placing a Orantes catheter and therefore urology placed it     Orantes catheter inserted 2/21/20  Urology recommends flushing Orantes until clear and maintaining to gravity  Will need outpatient follow-up with urology the office

## 2020-02-21 NOTE — ED ATTENDING ATTESTATION
2/21/2020  I, Noelle Velasquez MD, saw and evaluated the patient  I have discussed the patient with the resident/non-physician practitioner and agree with the resident's/non-physician practitioner's findings, Plan of Care, and MDM as documented in the resident's/non-physician practitioner's note, except where noted  All available labs and Radiology studies were reviewed  I was present for key portions of any procedure(s) performed by the resident/non-physician practitioner and I was immediately available to provide assistance  At this point I agree with the current assessment done in the Emergency Department  I have conducted an independent evaluation of this patient a history and physical is as follows:  History is provided by the daughter who is her primary caregiver  Apparently she has had a little bit of altered mental status she gets recurrent UTIs just finished a course of doxycycline for UTI  It was pansensitive E coli  She has had no fevers  No nausea vomiting  She is getting difficult to care for at home and in her current state she is unable to care for her  She is concerned of complaints of back pain and that she possibly could have another urinary tract infection  There has been no nausea vomiting  She has had decreased p o  Intake  No known trauma although she may have fall in recently  She does have what appears to be pressure sores on her back on the posterior ribcage  She has quite kyphotic  Her abdominal exam appears rather benign presently  She has severe significant labial fusion with a pinhole for urethra  Bladder scan was performed and she had almost 500 cc of urine in her bladder and I suspect that this may be the reason why she could be getting recurrent UTIs  Urology was consulted and they were able to place a 14 [place] Orantes and they recommend leaving that in until her possible urinary tract infection is cleared    ED Course         Critical Care Time  Procedures

## 2020-02-21 NOTE — ED NOTES
Meal tray ordered for patient  Okay per AVIVA Fritz for patient to eat tray       Deborah Winters RN  02/21/20 8073

## 2020-02-21 NOTE — ED PROVIDER NOTES
History  Chief Complaint   Patient presents with    Back Pain     Pt reports right lower back radiating into tailbone pain  Pt recently diagnosed with uti-daughter sent in from home because she is not sure if antibiotic treated UTI  Patient unaware what antibiotic she is on or when she started  Denies burning with urination, abdominal pain or fevers  Patient confused at baseline  This is a 44-year-old female with past medical history of chronic back pain, osteoarthritis, osteoporosis, breast cancer, and UTI that was treated with doxycycline for 14 days; she comes in today with her daughter (primary home caregiver) complaining confusion and back pain  Symptoms started 4 days ago, around the time antibiotics were completed, per daughter  Daughter states that she is generally oriented and relatively active around the house for 80year-old, at baseline  Recently, she has been less active and asking unusual questions including "who are you?" and "did you see the flying saucers?"  While she normally has back pain that she treats with tramadol, she has been stating that is worse particularly in her lower spine  Daughter states that she fell on her right side 2 weeks ago that she has an ecchymotic area around her right posterior iliac crest  She has also had associated generalized weakness and subjective fever  Patient is typically continent but wears depends for occasional incontinence  Patient is unable to recall any urinary symptoms  Prior to Admission Medications   Prescriptions Last Dose Informant Patient Reported? Taking?    Acetaminophen (TYLENOL ARTHRITIS PAIN PO) Unknown at Unknown time  Yes No   Sig: Take 1 tablet by mouth 3 (three) times a day   Calcium Carbonate-Vit D-Min (CALCIUM 1200 PO)   Yes Yes   Sig: Take 750 mg by mouth 2 (two) times a day   Cranberry 250 MG TABS   Yes Yes   Sig: Take by mouth daily   NYSTATIN powder   No Yes   Sig: APPLY TO AFFECTED AREA(S)  2-3 TIMES A DAY azelastine (ASTELIN) 0 1 % nasal spray Unknown at Unknown time  Yes No   Si sprays into each nostril 2 (two) times a day   brimonidine-timolol (COMBIGAN) 0 2-0 5 %   Yes Yes   Sig: Administer 1 drop into the left eye 2 (two) times a day   chlordiazePOXIDE (LIBRIUM) 25 mg capsule   No Yes   Sig: Take 1 capsule daily as needed for anxiety   cholecalciferol (VITAMIN D3) 1,000 units tablet   Yes Yes   Sig: Take 4,000 Units by mouth daily   diclofenac sodium (VOLTAREN) 1 %   No No   Sig: Apply 4 g topically 4 (four) times a day   enalapril (VASOTEC) 10 mg tablet   No Yes   Sig: Take 1 tablet (10 mg total) by mouth daily   furosemide (LASIX) 40 mg tablet   No Yes   Sig: Take 1 tablet (40 mg total) by mouth 2 (two) times a day   latanoprost (XALATAN) 0 005 % ophthalmic solution   Yes Yes   Sig: Administer 1 drop into the left eye daily   lidocaine (LIDODERM) 5 % Unknown at Unknown time  No No   Sig: Apply 1 patch topically daily Remove & Discard patch within 12 hours or as directed by MD   magnesium 30 MG tablet   Yes Yes   Sig: Take 400 mg by mouth daily   meclizine (ANTIVERT) 25 mg tablet   No Yes   Sig: TAKE 1 TABLET BY MOUTH 3  TIMES A DAY AS NEEDED   omeprazole (PriLOSEC) 20 mg delayed release capsule   No Yes   Sig: Take 1 capsule (20 mg total) by mouth daily   potassium chloride (K-DUR,KLOR-CON) 20 mEq tablet   No Yes   Sig: TAKE 1 TABLET BY MOUTH  DAILY   pramipexole (MIRAPEX) 0 25 mg tablet   No Yes   Sig: Take 1 tablet (0 25 mg total) by mouth daily   traMADol (ULTRAM) 50 mg tablet Unknown at Unknown time  No No   Sig: Take 1 tablet (50 mg total) by mouth every 8 (eight) hours as needed for moderate pain   vitamin B-12 (VITAMIN B-12) 1,000 mcg tablet   Yes Yes   Sig: Take 2,500 mcg by mouth daily      Facility-Administered Medications: None       Past Medical History:   Diagnosis Date    Anxiety     Arthritis     Cancer (HCC)     Elevated serum alkaline phosphatase level     mild, isoenzymes are normal, resolved 4/24/17 labs , last assessed 11/10/16, resolved 4/24/17    Hematuria     last assessed 9/18/12    Hyperlipidemia 8/13/2012    Hypertension     Pulmonary embolism ([facility] [address] ) 01/2011    last assessed 9/18/12       Past Surgical History:   Procedure Laterality Date    BILATERAL OOPHORECTOMY      Laparoscopic    BREAST SURGERY Left     Mastectomy     CHOLECYSTECTOMY      Laparoscopic    HERNIA REPAIR      HYSTERECTOMY      SMALL INTESTINE SURGERY         Family History   Problem Relation Age of Onset    Hypertension Mother     Blindness Mother      I have reviewed and agree with the history as documented  Social History     Tobacco Use    Smoking status: Never Smoker    Smokeless tobacco: Never Used   Substance Use Topics    Alcohol use: No    Drug use: No       Review of Systems   Constitutional: Positive for activity change, appetite change, fatigue and fever  Negative for chills and diaphoresis  Respiratory: Negative for cough, chest tightness, shortness of breath and wheezing  Cardiovascular: Positive for leg swelling  Negative for chest pain and palpitations  Daughter states that she commonly has minor leg swelling   Gastrointestinal: Negative for abdominal distention, abdominal pain, constipation, diarrhea, nausea and vomiting  Genitourinary: Negative for difficulty urinating, dysuria, frequency, hematuria and urgency  Musculoskeletal: Positive for back pain  Negative for neck pain and neck stiffness  Daughter states she has been having difficulty walking  related to weakness  Neurological: Positive for dizziness and weakness  Negative for numbness and headaches  Patient has vertigo and does get dizziness occasionally  Psychiatric/Behavioral: Positive for confusion and decreased concentration  Physical Exam  Physical Exam   Constitutional: She is oriented to person, place, and time  She appears well-developed and well-nourished  She appears distressed  HENT:   Head: Normocephalic and atraumatic  Mouth/Throat: Uvula is midline, oropharynx is clear and moist and mucous membranes are normal  No oropharyngeal exudate, posterior oropharyngeal edema or posterior oropharyngeal erythema  Cardiovascular: Normal rate, normal heart sounds and normal pulses  Pulmonary/Chest: Effort normal and breath sounds normal  She has no decreased breath sounds  She has no wheezes  She has no rhonchi  She has no rales  Abdominal: Soft  Normal appearance and bowel sounds are normal  She exhibits no distension and no mass  There is no hepatosplenomegaly  There is no tenderness  There is CVA tenderness  There is no rigidity, no guarding, no tenderness at McBurney's point and negative Bryant's sign  Right-sided CVA tenderness   Genitourinary:         Musculoskeletal:        Thoracic back: She exhibits no tenderness and no bony tenderness  Lumbar back: She exhibits tenderness  She exhibits no bony tenderness, no edema and no laceration  Back:         Right lower leg: She exhibits swelling  Left lower leg: She exhibits swelling  Fading ecchymotic area left iliac crest as indicated on diagram   Tender musculature  No bony tenderness   Neurological: She is alert and oriented to person, place, and time  GCS eye subscore is 4  GCS verbal subscore is 5  GCS motor subscore is 6  Patient is oriented, overall  She was able to tell me her name, year, and place  She was not able to tell me the day or date, which daughter states is typical    Skin: Skin is warm, dry and intact  Capillary refill takes less than 2 seconds  She is not diaphoretic  Lower leg skin is thickened  Psychiatric: She has a normal mood and affect  Her behavior is normal  Thought content normal  Her speech is not slurred         Vital Signs  ED Triage Vitals   Temperature Pulse Respirations Blood Pressure SpO2   02/21/20 1111 02/21/20 1111 02/21/20 1111 02/21/20 1111 02/21/20 1111   98 7 °F (37 1 °C) 86 18 160/68 96 %      Temp Source Heart Rate Source Patient Position - Orthostatic VS BP Location FiO2 (%)   02/21/20 1111 02/21/20 1111 02/21/20 1111 02/21/20 1111 --   Oral Monitor Lying Right arm       Pain Score       02/21/20 1441       No Pain           Vitals:    02/21/20 1111 02/21/20 1310 02/21/20 1441   BP: 160/68 145/67 158/76   Pulse: 86 79 82   Patient Position - Orthostatic VS: Lying Lying Lying         Visual Acuity      ED Medications  Medications   sodium chloride 0 9 % infusion (has no administration in time range)   sodium chloride 0 9 % bolus 500 mL (500 mL Intravenous New Bag 2/21/20 1310)   iohexol (OMNIPAQUE) 350 MG/ML injection (MULTI-DOSE) 85 mL (85 mL Intravenous Given 2/21/20 1504)       Diagnostic Studies  Results Reviewed     Procedure Component Value Units Date/Time    UA w Reflex to Microscopic w Reflex to Culture [[id number]]  (Abnormal) Collected:  02/21/20 1437    Lab Status:  Final result Specimen:  Urine, Indwelling Orantes Catheter Updated:  02/21/20 1527     Color, UA Yellow     Clarity, UA Clear     Specific Gravity, UA 1 015     pH, UA 6 0     Leukocytes, UA Negative     Nitrite, UA Negative     Protein, UA Negative mg/dl      Glucose, UA Negative mg/dl      Ketones, UA Trace mg/dl      Urobilinogen, UA 0 2 E U /dl      Bilirubin, UA Negative     Blood, UA Negative    Basic metabolic panel [[id number]]  (Abnormal) Collected:  02/21/20 1309    Lab Status:  Final result Specimen:  Blood from Arm, Left Updated:  02/21/20 1333     Sodium 135 mmol/L      Potassium 3 5 mmol/L      Chloride 97 mmol/L      CO2 30 mmol/L      ANION GAP 8 mmol/L      BUN 13 mg/dL      Creatinine 0 70 mg/dL      Glucose 81 mg/dL      Calcium 8 6 mg/dL      eGFR 75 ml/min/1 73sq m     Narrative:       [place] guidelines for Chronic Kidney Disease (CKD):     Stage 1 with normal or high GFR (GFR > 90 mL/min/1 73 square meters)    Stage 2 Mild CKD (GFR = 60-89 mL/min/1 73 square meters)    Stage 3A Moderate CKD (GFR = 45-59 mL/min/1 73 square meters)    Stage 3B Moderate CKD (GFR = 30-44 mL/min/1 73 square meters)    Stage 4 Severe CKD (GFR = 15-29 mL/min/1 73 square meters)    Stage 5 End Stage CKD (GFR <15 mL/min/1 73 square meters)  Note: GFR calculation is accurate only with a steady state creatinine    CBC and differential [[id number]]  (Abnormal) Collected:  02/21/20 1309    Lab Status:  Final result Specimen:  Blood from Arm, Left Updated:  02/21/20 1317     WBC 5 20 Thousand/uL      RBC 3 46 Million/uL      Hemoglobin 10 9 g/dL      Hematocrit 33 2 %      MCV 96 fL      MCH 31 5 pg      MCHC 32 8 g/dL      RDW 14 2 %      MPV 9 8 fL      Platelets 082 Thousands/uL      nRBC 0 /100 WBCs      Neutrophils Relative 49 %      Immat GRANS % 0 %      Lymphocytes Relative 36 %      Monocytes Relative 14 %      Eosinophils Relative 0 %      Basophils Relative 1 %      Neutrophils Absolute 2 53 Thousands/µL      Immature Grans Absolute 0 01 Thousand/uL      Lymphocytes Absolute 1 89 Thousands/µL      Monocytes Absolute 0 73 Thousand/µL      Eosinophils Absolute 0 01 Thousand/µL      Basophils Absolute 0 03 Thousands/µL                  CT head without contrast   Final Result by Magnus Hooper MD (02/21 8860)      No acute intracranial abnormality  Workstation performed: [id number]         CT abdomen pelvis with contrast   Final Result by Melly Armando MD (02/21 9371)      1  Urinary bladder is distended despite the presence of a Orantes catheter  Correlate for proper function  Locules of gas in the urinary bladder possibly related to infection versus recent catheter manipulation  2   No CT evidence of pyelonephritis, as clinically questioned  3   No acute osseous abnormality in the lumbar spine  Workstation performed: [id number]         CT recon only lumbar spine (No Charge)   Final Result by Melly Armando MD (02/21 7040)      1    Urinary bladder is distended despite the presence of a Orantes catheter  Correlate for proper function  Locules of gas in the urinary bladder possibly related to infection versus recent catheter manipulation  2   No CT evidence of pyelonephritis, as clinically questioned  3   No acute osseous abnormality in the lumbar spine  Workstation performed: [id number]                    Procedures  Procedures         ED Course  ED Course as of Feb 21 1642   Fri Feb 21, 2020   1413 Lymphocytes Relative: 39                               MDM  Number of Diagnoses or Management Options  Labial fusion: new and requires workup  Urethral stricture: new and requires workup  Urinary retention: new and requires workup  Diagnosis management comments: This is a 80year-old with chronic back pain and recent UTI has been treated, coming in today complaining worsening back pain and confusion  On exam patient displayed right-sided CVA tenderness and has old ecchymotic area on right iliac crest   She is unable to report any UTI symptoms, because she does not remember  I am concerned for UTI, ureter obstruction, hydronephrosis, malignancy, spinal trauma, unknown fall with spinal/head injury  CT of lumbar spine will be performed to assess for malignancy and trauma  CT abdomen will be performed for assessment kidneys or ureter obstruction  CT head will be performed because daughter was unable to recall specific details of event leading to right hip ecchymosis  Due to subjective fevers and patient's current confusion straight catheterization will be performed for UA  As well as a CBC with BMP to assess white count, kidney functioning, with basic electrolytes  There was difficulty obtaining a straight cath  On examination patient has labial fusion and urethral stricture; which is causing urinary retention  Is a possibility for cysto colonic fistula  Neurology was consulted came to bedside and placed Orantes catheter    CT scans where negative  UA negative  Blood work unremarkable  Orantes catheter has not been draining appropriately  Urology was paged come back to bedside  Discussed patient with IM provider regarding inpatient admission for monitoring of urinary retention related to urethral stricture  Also, daughter is unsure if she is able to continue to care for her mother at home; is interested in placement  IM agreed to accept the patient on an observation basis  Urology consult  Amount and/or Complexity of Data Reviewed  Clinical lab tests: ordered and reviewed  Tests in the radiology section of CPT®: ordered and reviewed          Disposition  Final diagnoses:   Urinary retention   Labial fusion   Urethral stricture     Time reflects when diagnosis was documented in both MDM as applicable and the Disposition within this note     Time User Action Codes Description Comment    2/21/2020  2:10 PM Hedwig Kirks [R33 9] Urinary retention     2/21/2020  2:10 PM [place] Orn Add [Q52 5] Labial fusion     2/21/2020  3:04 PM Damian Orn Add [W30 755] Urethral stricture       ED Disposition     ED Disposition Condition Date/Time Comment    Admit Stable Fri Feb 21, 2020  4:30 PM Case was discussed with Dr Ajith Floyd and the patient's admission status was agreed to be Admission Status: observation status to the service of Dr Ajith Floyd   Follow-up Information     Follow up With Specialties Details Why Contact Info Additional [address] [facility] Follow up  [facility] [place] [address] [place] [address] [id number]  704  [facility], [address] [place] [place] [address], [place], [place], [id number] [phone]          Patient's Medications   Discharge Prescriptions    No medications on file     No discharge procedures on file      PDMP Review       Value Time User    PDMP Reviewed  Yes 1/30/2020  2:15 PM Mick Khan DO Jamila          ED Provider  Electronically Signed by           Denise Cortez  02/21/20 9862

## 2020-02-22 LAB
ANION GAP SERPL CALCULATED.3IONS-SCNC: 7 MMOL/L (ref 4–13)
BUN SERPL-MCNC: 9 MG/DL (ref 5–25)
CALCIUM SERPL-MCNC: 7.9 MG/DL (ref 8.3–10.1)
CHLORIDE SERPL-SCNC: 99 MMOL/L (ref 100–108)
CO2 SERPL-SCNC: 28 MMOL/L (ref 21–32)
CREAT SERPL-MCNC: 0.67 MG/DL (ref 0.6–1.3)
ERYTHROCYTE [DISTWIDTH] IN BLOOD BY AUTOMATED COUNT: 14.1 % (ref 11.6–15.1)
GFR SERPL CREATININE-BSD FRML MDRD: 76 ML/MIN/1.73SQ M
GLUCOSE SERPL-MCNC: 82 MG/DL (ref 65–140)
HCT VFR BLD AUTO: 28.9 % (ref 34.8–46.1)
HGB BLD-MCNC: 9.4 G/DL (ref 11.5–15.4)
MCH RBC QN AUTO: 31.5 PG (ref 26.8–34.3)
MCHC RBC AUTO-ENTMCNC: 32.5 G/DL (ref 31.4–37.4)
MCV RBC AUTO: 97 FL (ref 82–98)
PLATELET # BLD AUTO: 156 THOUSANDS/UL (ref 149–390)
PMV BLD AUTO: 10 FL (ref 8.9–12.7)
POTASSIUM SERPL-SCNC: 3.3 MMOL/L (ref 3.5–5.3)
RBC # BLD AUTO: 2.98 MILLION/UL (ref 3.81–5.12)
SODIUM SERPL-SCNC: 134 MMOL/L (ref 136–145)
WBC # BLD AUTO: 4.73 THOUSAND/UL (ref 4.31–10.16)

## 2020-02-22 PROCEDURE — NC001 PR NO CHARGE: Performed by: INTERNAL MEDICINE

## 2020-02-22 PROCEDURE — 99232 SBSQ HOSP IP/OBS MODERATE 35: CPT | Performed by: PHYSICIAN ASSISTANT

## 2020-02-22 PROCEDURE — 99223 1ST HOSP IP/OBS HIGH 75: CPT | Performed by: INTERNAL MEDICINE

## 2020-02-22 PROCEDURE — 85027 COMPLETE CBC AUTOMATED: CPT | Performed by: PHYSICIAN ASSISTANT

## 2020-02-22 PROCEDURE — 80048 BASIC METABOLIC PNL TOTAL CA: CPT | Performed by: PHYSICIAN ASSISTANT

## 2020-02-22 RX ORDER — MECLIZINE HYDROCHLORIDE 25 MG/1
25 TABLET ORAL 3 TIMES DAILY
Status: DISCONTINUED | OUTPATIENT
Start: 2020-02-22 | End: 2020-02-28 | Stop reason: HOSPADM

## 2020-02-22 RX ORDER — CHLORDIAZEPOXIDE HYDROCHLORIDE 10 MG/1
10 CAPSULE, GELATIN COATED ORAL ONCE
Status: COMPLETED | OUTPATIENT
Start: 2020-02-22 | End: 2020-02-22

## 2020-02-22 RX ORDER — PANTOPRAZOLE SODIUM 20 MG/1
20 TABLET, DELAYED RELEASE ORAL
Status: DISCONTINUED | OUTPATIENT
Start: 2020-02-22 | End: 2020-02-28 | Stop reason: HOSPADM

## 2020-02-22 RX ORDER — CHLORDIAZEPOXIDE HYDROCHLORIDE 25 MG/1
25 CAPSULE, GELATIN COATED ORAL
Status: DISCONTINUED | OUTPATIENT
Start: 2020-02-23 | End: 2020-02-28 | Stop reason: HOSPADM

## 2020-02-22 RX ADMIN — PRAMIPEXOLE DIHYDROCHLORIDE 0.25 MG: 0.25 TABLET ORAL at 21:58

## 2020-02-22 RX ADMIN — POTASSIUM CHLORIDE 20 MEQ: 1500 TABLET, EXTENDED RELEASE ORAL at 09:44

## 2020-02-22 RX ADMIN — ENOXAPARIN SODIUM 40 MG: 40 INJECTION SUBCUTANEOUS at 09:44

## 2020-02-22 RX ADMIN — CHLORDIAZEPOXIDE HYDROCHLORIDE 10 MG: 10 CAPSULE ORAL at 16:28

## 2020-02-22 RX ADMIN — MECLIZINE HYDROCHLORIDE 25 MG: 25 TABLET ORAL at 16:28

## 2020-02-22 RX ADMIN — PANTOPRAZOLE SODIUM 40 MG: 40 TABLET, DELAYED RELEASE ORAL at 06:12

## 2020-02-22 RX ADMIN — LISINOPRIL 10 MG: 10 TABLET ORAL at 09:44

## 2020-02-22 RX ADMIN — MECLIZINE HYDROCHLORIDE 25 MG: 25 TABLET ORAL at 21:58

## 2020-02-22 RX ADMIN — LATANOPROST 1 DROP: 50 SOLUTION OPHTHALMIC at 09:44

## 2020-02-22 RX ADMIN — TIMOLOL MALEATE 1 DROP: 2.5 SOLUTION/ DROPS OPHTHALMIC at 09:44

## 2020-02-22 RX ADMIN — PANTOPRAZOLE SODIUM 20 MG: 20 TABLET, DELAYED RELEASE ORAL at 21:58

## 2020-02-22 RX ADMIN — SODIUM CHLORIDE 75 ML/HR: 0.9 INJECTION, SOLUTION INTRAVENOUS at 12:52

## 2020-02-22 RX ADMIN — CHLORDIAZEPOXIDE HYDROCHLORIDE 25 MG: 25 CAPSULE ORAL at 09:44

## 2020-02-22 NOTE — ED NOTES
Pt c/o bone pain and would like her tramadol at this time     Kadie Guerrero, NATHANIEL  02/21/20 1950

## 2020-02-22 NOTE — PLAN OF CARE
Problem: Potential for Falls  Goal: Patient will remain free of falls  Description  INTERVENTIONS:  - Assess patient frequently for physical needs  -  Identify cognitive and physical deficits and behaviors that affect risk of falls    -  [place] fall precautions as indicated by assessment   - Educate patient/family on patient safety including physical limitations  - Instruct patient to call for assistance with activity based on assessment  - Modify environment to reduce risk of injury  - Consider OT/PT consult to assist with strengthening/mobility  Outcome: Progressing     Problem: Prexisting or High Potential for Compromised Skin Integrity  Goal: Skin integrity is maintained or improved  Description  INTERVENTIONS:  - Identify patients at risk for skin breakdown  - Assess and monitor skin integrity  - Assess and monitor nutrition and hydration status  - Monitor labs   - Assess for incontinence   - Turn and reposition patient  - Assist with mobility/ambulation  - Relieve pressure over bony prominences  - Avoid friction and shearing  - Provide appropriate hygiene as needed including keeping skin clean and dry  - Evaluate need for skin moisturizer/barrier cream  - Collaborate with interdisciplinary team   - Patient/family teaching  - Consider wound care consult   Outcome: Progressing     Problem: PAIN - ADULT  Goal: Verbalizes/displays adequate comfort level or baseline comfort level  Description  Interventions:  - Encourage patient to monitor pain and request assistance  - Assess pain using appropriate pain scale  - Administer analgesics based on type and severity of pain and evaluate response  - Implement non-pharmacological measures as appropriate and evaluate response  - Consider cultural and social influences on pain and pain management  - Notify physician/advanced practitioner if interventions unsuccessful or patient reports new pain  Outcome: Progressing     Problem: INFECTION - ADULT  Goal: Absence or prevention of progression during hospitalization  Description  INTERVENTIONS:  - Assess and monitor for signs and symptoms of infection  - Monitor lab/diagnostic results  - Monitor all insertion sites, i e  indwelling lines, tubes, and drains  - Monitor endotracheal if appropriate and nasal secretions for changes in amount and color  - [place] appropriate cooling/warming therapies per order  - Administer medications as ordered  - Instruct and encourage patient and family to use good hand hygiene technique  - Identify and instruct in appropriate isolation precautions for identified infection/condition  Outcome: Progressing     Problem: SAFETY ADULT  Goal: Maintain or return to baseline ADL function  Description  INTERVENTIONS:  -  Assess patient's ability to carry out ADLs; assess patient's baseline for ADL function and identify physical deficits which impact ability to perform ADLs (bathing, care of mouth/teeth, toileting, grooming, dressing, etc )  - Assess/evaluate cause of self-care deficits   - Assess range of motion  - Assess patient's mobility; develop plan if impaired  - Assess patient's need for assistive devices and provide as appropriate  - Encourage maximum independence but intervene and supervise when necessary  - Involve family in performance of ADLs  - Assess for home care needs following discharge   - Consider OT consult to assist with ADL evaluation and planning for discharge  - Provide patient education as appropriate  Outcome: Progressing  Goal: Maintain or return mobility status to optimal level  Description  INTERVENTIONS:  - Assess patient's baseline mobility status (ambulation, transfers, stairs, etc )    - Identify cognitive and physical deficits and behaviors that affect mobility  - Identify mobility aids required to assist with transfers and/or ambulation (gait belt, sit-to-stand, lift, walker, cane, etc )  - [place] fall precautions as indicated by assessment  - Record patient progress and toleration of activity level on Mobility SBAR; progress patient to next Phase/Stage  - Instruct patient to call for assistance with activity based on assessment  - Consider rehabilitation consult to assist with strengthening/weightbearing, etc   Outcome: Progressing     Problem: DISCHARGE PLANNING  Goal: Discharge to home or other facility with appropriate resources  Description  INTERVENTIONS:  - Identify barriers to discharge w/patient and caregiver  - Arrange for needed discharge resources and transportation as appropriate  - Identify discharge learning needs (meds, wound care, etc )  - Arrange for interpretive services to assist at discharge as needed  - Refer to Case Management Department for coordinating discharge planning if the patient needs post-hospital services based on physician/advanced practitioner order or complex needs related to functional status, cognitive ability, or social support system  Outcome: Progressing     Problem: Knowledge Deficit  Goal: Patient/family/caregiver demonstrates understanding of disease process, treatment plan, medications, and discharge instructions  Description  Complete learning assessment and assess knowledge base  Interventions:  - Provide teaching at level of understanding  - Provide teaching via preferred learning methods  Outcome: Progressing     Problem: Nutrition/Hydration-ADULT  Goal: Nutrient/Hydration intake appropriate for improving, restoring or maintaining nutritional needs  Description  Monitor and assess patient's nutrition/hydration status for malnutrition  Collaborate with interdisciplinary team and initiate plan and interventions as ordered  Monitor patient's weight and dietary intake as ordered or per policy  Utilize nutrition screening tool and intervene as necessary  Determine patient's food preferences and provide high-protein, high-caloric foods as appropriate       INTERVENTIONS:  - Monitor oral intake, urinary output, labs, and treatment plans  - Assess nutrition and hydration status and recommend course of action  - Evaluate amount of meals eaten  - Assist patient with eating if necessary   - Allow adequate time for meals  - Recommend/ encourage appropriate diets, oral nutritional supplements, and vitamin/mineral supplements  - Order, calculate, and assess calorie counts as needed  - Recommend, monitor, and adjust tube feedings and TPN/PPN based on assessed needs  - Assess need for intravenous fluids  - Provide specific nutrition/hydration education as appropriate  - Include patient/family/caregiver in decisions related to nutrition  Outcome: Progressing     Problem: GENITOURINARY - ADULT  Goal: Maintains or returns to baseline urinary function  Description  INTERVENTIONS:  - Assess urinary function  - Encourage oral fluids to ensure adequate hydration if ordered  - Administer IV fluids as ordered to ensure adequate hydration  - Administer ordered medications as needed  - Offer frequent toileting  - Follow urinary retention protocol if ordered  Outcome: Progressing  Goal: Absence of urinary retention  Description  INTERVENTIONS:  - Assess patients ability to void and empty bladder  - Monitor I/O  - Bladder scan as needed  - Discuss with physician/AP medications to alleviate retention as needed  - Discuss catheterization for long term situations as appropriate  Outcome: Progressing  Goal: Urinary catheter remains patent  Description  INTERVENTIONS:  - Assess patency of urinary catheter  - If patient has a chronic ward, consider changing catheter if non-functioning  - Follow guidelines for intermittent irrigation of non-functioning urinary catheter  Outcome: Progressing     Problem: SKIN/TISSUE INTEGRITY - ADULT  Goal: Skin integrity remains intact  Description  INTERVENTIONS  - Identify patients at risk for skin breakdown  - Assess and monitor skin integrity  - Assess and monitor nutrition and hydration status  - Monitor labs (i e  albumin)  - Assess for incontinence   - Turn and reposition patient  - Assist with mobility/ambulation  - Relieve pressure over bony prominences  - Avoid friction and shearing  - Provide appropriate hygiene as needed including keeping skin clean and dry  - Evaluate need for skin moisturizer/barrier cream  - Collaborate with interdisciplinary team (i e  Nutrition, Rehabilitation, etc )   - Patient/family teaching  Outcome: Progressing  Goal: Oral mucous membranes remain intact  Description  INTERVENTIONS  - Assess oral mucosa and hygiene practices  - Implement preventative oral hygiene regimen  - Implement oral medicated treatments as ordered  - Initiate Nutrition services referral as needed  Outcome: Progressing     Problem: MUSCULOSKELETAL - ADULT  Goal: Maintain or return mobility to safest level of function  Description  INTERVENTIONS:  - Assess patient's ability to carry out ADLs; assess patient's baseline for ADL function and identify physical deficits which impact ability to perform ADLs (bathing, care of mouth/teeth, toileting, grooming, dressing, etc )  - Assess/evaluate cause of self-care deficits   - Assess range of motion  - Assess patient's mobility  - Assess patient's need for assistive devices and provide as appropriate  - Encourage maximum independence but intervene and supervise when necessary  - Involve family in performance of ADLs  - Assess for home care needs following discharge   - Consider OT consult to assist with ADL evaluation and planning for discharge  - Provide patient education as appropriate  Outcome: Progressing

## 2020-02-22 NOTE — PROGRESS NOTES
Progress Note - Oc Stagers 9/20/1926, 80 y o  female MRN: [id number]    Unit/Bed#: [address] [id number] Encounter: [id number]    Primary Care Provider: Amauri Soto DO   Date and time admitted to hospital: 2/21/2020 11:00 AM      * Urinary retention  Assessment & Plan  Likely causing chronic UTIs  Multifactorial secondary to advanced age, poor mobility, and stenotic urethra  ER had difficulty placing a Orantes catheter and therefore urology placed it  Orantes catheter inserted 2/21/20  Urology recommends flushing Orantes until clear and maintaining to gravity  Will need outpatient follow-up with urology the office    Chronic urinary tract infection  Assessment & Plan  With history of multiple UTIs  Likely multifactorial secondary to above  Just finished a course of doxycycline 100 mg b i d  x 10 days 2/17/20    On admission patient was afebrile, nontoxic, no leukocytosis and therefore antibiotics were not warranted at that time  Patient was later seen by admitting physician and spiked a 102 fever  At that time she was given a dose of IV Levaquin 500 mg x 1  Personally spoke with Dr Kori Peterson of Infectious Disease and will hold off on further antibiotics as fever may have been caused from multiple attempts at Orantes insertion with traumatic urethritis  If fever reoccurs consider checking urine cultures and will further confer with ID at that time      Impaired mobility and ADLs  Assessment & Plan  Secondary to osteoarthritis  With deconditioning secondary to above  Awaiting PT/OT evaluation    Restless legs syndrome  Assessment & Plan  Continue Mirapex    Osteoarthritis  Assessment & Plan  With chronic deformities/severe kyphosis and ambulatory dysfunction  Uses walker to ambulate at baseline    Glaucoma  Assessment & Plan  Continue home eye drops    GERD (gastroesophageal reflux disease)  Assessment & Plan  Continue omeprazole        VTE Pharmacologic Prophylaxis:   Pharmacologic: Enoxaparin (Lovenox)  Mechanical VTE Prophylaxis in Place: Yes    Discharge Plan: With need for continued monitoring for fever and PT/OT evaluation  Discussions with Specialists or Other Care Team Provider: Dr Solis Mccollum, Dr Ren Humphrey, nursing    Education and Discussions with Family / Patient: patient and daughter at bedside    Time Spent for Care: 20 minutes  More than 50% of total time spent on counseling and coordination of care as described above  Current Length of Stay: 1 day(s)  Current Patient Status: Inpatient   Code Status: Level 3 - DNAR and DNI    Subjective:   Pt resting in bed  Daughter at bedside  Discussed plan of care and need to hold off on antibiotics at this point  Pt has been tired and very sleeping often  Complains of being cold  Daughter was concerned about times her meds were being administered  Personally went through and readjusted times  Denies any pain at present  Waiting to work with PT/OT    Objective:     Vitals:   Temp (24hrs), Av 7 °F (37 6 °C), Min:98 2 °F (36 8 °C), Max:102 1 °F (38 9 °C)    Temp:  [98 2 °F (36 8 °C)-102 1 °F (38 9 °C)] 98 8 °F (37 1 °C)  HR:  [73-96] 73  Resp:  [16-18] 18  BP: (105-158)/(55-76) 105/55  SpO2:  [94 %-97 %] 97 %  Body mass index is 30 75 kg/m²  Input and Output Summary (last 24 hours): Intake/Output Summary (Last 24 hours) at 2020 1424  Last data filed at 2020 1044  Gross per 24 hour   Intake 500 ml   Output 1190 ml   Net -690 ml       Physical Exam:     Physical Exam   Constitutional: She is oriented to person, place, and time  She appears well-developed and well-nourished  No distress  Kyphosis   HENT:   Head: Normocephalic and atraumatic  Cardiovascular: Normal rate, regular rhythm and normal heart sounds  Pulmonary/Chest: Effort normal and breath sounds normal  No stridor  No respiratory distress  She has no wheezes  She has no rales  She exhibits no tenderness  Abdominal: Soft   Bowel sounds are normal  She exhibits no distension and no mass  There is no tenderness  There is no rebound and no guarding  No hernia  Genitourinary:   Genitourinary Comments: Urinary catheter in place draining yellow urine   Neurological: She is alert and oriented to person, place, and time  Skin: Skin is warm and dry  She is not diaphoretic  Psychiatric: She has a normal mood and affect  Her behavior is normal    Nursing note and vitals reviewed  Additional Data:     Labs:    Results from last 7 days   Lab Units 02/22/20  0509 02/21/20  1309   WBC Thousand/uL 4 73 5 20   HEMOGLOBIN g/dL 9 4* 10 9*   HEMATOCRIT % 28 9* 33 2*   PLATELETS Thousands/uL 156 185   NEUTROS PCT %  --  49   LYMPHS PCT %  --  36   MONOS PCT %  --  14*   EOS PCT %  --  0     Results from last 7 days   Lab Units 02/22/20  0509   POTASSIUM mmol/L 3 3*   CHLORIDE mmol/L 99*   CO2 mmol/L 28   BUN mg/dL 9   CREATININE mg/dL 0 67   CALCIUM mg/dL 7 9*           * I Have Reviewed All Lab Data Listed Above  * Additional Pertinent Lab Tests Reviewed:  Yelena Parker Admission Reviewed    Imaging:    Imaging Reports Reviewed Today Include:   Imaging Personally Reviewed by Myself Includes:      Recent Cultures (last 7 days):           Last 24 Hours Medication List:     Current Facility-Administered Medications:  acetaminophen 650 mg Oral Q6H PRN Pradip Marie PA-C    chlordiazePOXIDE 25 mg Oral Daily Aracelis Camila, PA-BENITA    enoxaparin 40 mg Subcutaneous Daily Aracelis Jensen, NITZA    latanoprost 1 drop Left Eye Daily Aracelis Jensen PA-C    lisinopril 10 mg Oral Daily Aracelis Camila, PA-BENITA    meclizine 25 mg Oral TID PRN Pradip Marie PA-C    ondansetron 4 mg Intravenous Q6H PRN Aracelis Jensen, PA-BENITA    pantoprazole 40 mg Oral Early Morning Aracelis Camila, PA-C    potassium chloride 20 mEq Oral Daily Aracelis Piger, PA-BENITA    pramipexole 0 25 mg Oral Daily Aracelis Piger, PA-C    sodium chloride 75 mL/hr Intravenous Continuous Aracelis Camila PA-C Last Rate: 75 mL/hr (02/22/20 1252)   timolol 1 drop Left Eye Daily Aracelis Jensen PA-C    traMADol 50 mg Oral Q8H PRN Raphael Reyes PA-C         Today, Patient Was Seen By: Raphael Reyes PA-C    ** Please Note: This note has been constructed using a voice recognition system   **

## 2020-02-22 NOTE — PROGRESS NOTES
Consultation - Infectious Disease   Onofre Mendoza 80 y o  female MRN: [id number]  Unit/Bed#: [place] [id number] [id number] Encounter: [id number]      IMPRESSION & RECOMMENDATIONS:   Impression/Recommendations: This is a 80 y o  female, with dementia and urine retention with chronic Orantes catheterization, was brought to the ER yesterday due to encephalopathy  Patient was found to have blocked Orantes catheter  Orantes was exchanged after multiple attempts  Patient had fever afterwards, which was nonsustained  1  Fever, which was not present on admission but developed after multiple attempts at Orantes catheter insertion  Suspect that her fever was most likely secondary to traumatic urethritis  Fever was nonsustained  UA was benign  Patient is comfortable without suprapubic tenderness  At this point, I would observe off antibiotic  I would not check urine culture/fever recurs, since it will most likely reflect bladder colonization  Observe off further antibiotic  Monitor temperature  Re-evaluate with cultures only if fever recurs  2  Encephalopathy, on background of advanced dementia  I suspect it is secondary to urinary retention  Patient is with advanced dementia usually has encephalopathy with any significant change in baseline status, infectious and noninfectious  Urinary retention can certainly result in encephalopathy in this patient  Monitor mental status  3  Urinary retention, with chronic Orantes catheterization  If patient does indeed have recurrent UTI, although I am not sure she truly had UTIs (see below), she would be a candidate for suprapubic catheterization  4  Recurrent UTIs by report  As typical with patient who has chronic Orantes catheterization, many antibiotic courses are actually given for bladder colonization rather than true infection    In this patient with advanced dementia, I would refrain from treating patient for UTI unless she has objective signs of UTI, not just positive urine culture and encephalopathy  5  Advanced dementia  Patient is unable to voice symptoms reliably  Discussed with patient  Discussed with slim service  Thank you for this consultation  We will follow along with you  HISTORY OF PRESENT ILLNESS:  Reason for Consult:  Fever  HPI: Suraj Steven is a 80 y o  female, with underlying dementia, was brought to the ER yesterday by her daughter due to increasing confusion  On presentation, patient did not have fever or leukocytosis  Although patient has Orantes catheter in place, abdomen/pelvis CT showed urinary bladder distention  Orantes catheter was removed and exchanged  Orantes insertion was a difficult process  Orantes catheter could not be inserted in the ER despite multiple tries  Subsequently, urology placed  Afterwards, patient had fever  One dose IV Levaquin was given  Patient was admitted  We are asked to evaluate the patient  Unfortunately, due to patient dementia, history was unobtainable  At present, patient has Orantes catheter in place and draining well  She has no abdominal pain or discomfort  She has no chills  Patient does have history of supposed UTI, currently on doxycycline for bacteriuria  REVIEW OF SYSTEMS:  A complete system-based review was attempted but unreliable, due to patient's advanced dementia      PAST MEDICAL HISTORY:  Past Medical History:   Diagnosis Date    Anxiety     Arthritis     [facility] ([facility] [address]     Elevated serum alkaline phosphatase level     mild, isoenzymes are normal, resolved 4/24/17 labs , last assessed 11/10/16, resolved 4/24/17    Hematuria     last assessed 9/18/12    Hyperlipidemia 8/13/2012    Hypertension     Pulmonary embolism ([facility] [address] [facility] 01/2011    last assessed 9/18/12     Past Surgical History:   Procedure Laterality Date    BILATERAL OOPHORECTOMY      Laparoscopic    BREAST SURGERY Left     Mastectomy     CHOLECYSTECTOMY      Laparoscopic    HERNIA REPAIR      HYSTERECTOMY      SMALL INTESTINE SURGERY       Problem list reviewed  FAMILY HISTORY:  Non-contributory    SOCIAL HISTORY:  Social History     Substance and Sexual Activity   Alcohol Use No     Social History     Substance and Sexual Activity   Drug Use No     Social History     Tobacco Use   Smoking Status Never Smoker   Smokeless Tobacco Never Used       ALLERGIES:  Allergies   Allergen Reactions    Amoxicillin Hives    Celecoxib     Cephalexin     Ciprofloxacin      Other reaction(s): diarrhea  Tolerates Levaquin    Codeine Other (See Comments)     Other reaction(s): Other (See Comments)  takes vicodin @ home  takes vicodin @ home    Epinephrine Other (See Comments)     Other reaction(s): Unknown Reaction    Oxycodone-Acetaminophen Other (See Comments)    Oxycodone-Acetaminophen      Other reaction(s): Unknown Reaction    Propoxyphene Other (See Comments)    Rofecoxib     Sulfa Antibiotics Other (See Comments)     takes at home  takes at home  Other reaction(s): Other (See Comments)  takes at home    Sulfamethoxazole-Trimethoprim     Nitrofurantoin Rash       MEDICATIONS:  All current active medications have been reviewed  One dose Levaquin noted  PHYSICAL EXAM:  Vitals:  Temp:  [98 2 °F (36 8 °C)-102 1 °F (38 9 °C)] 98 8 °F (37 1 °C)  HR:  [73-96] 73  Resp:  [16-18] 18  BP: (105-158)/(55-76) 105/55  SpO2:  [94 %-97 %] 97 %  Temp (24hrs), Av 7 °F (37 6 °C), Min:98 2 °F (36 8 °C), Max:102 1 °F (38 9 °C)  Current: Temperature: 98 8 °F (37 1 °C)     Physical Exam:  General:  Pain, near cachectic, chronically ill appearing, not acutely ill, comfortable, in no acute distress  Awake, alert but disoriented    Eyes:  Conjunctive clear with no hemorrhages or effusions  Oropharynx:  No ulcers, no lesions, pharynx benign, no tonsillitis  Neck:  Supple, no lymphadenopathy, no mass, nontender  Lungs:  Expansion symmetric, no rales, no wheezing, no accessory muscle use  Cardiac:  Regular rate and rhythm, normal S1, normal S2, no murmurs  Abdomen:  Soft, nondistended, non-tender, no HSM  Extremities:  Trace edema  Chronic left plantar foot ulcer  No drainage  No erythema/warmth  No tenderness  Skin:  No rashes, no ulcers  Neurological:  Moves all four extremities spontaneously, sensation grossly intact    LABS, IMAGING, & OTHER STUDIES:  Lab Results:  I have personally reviewed pertinent labs  Results from last 7 days   Lab Units 02/22/20  0509 02/21/20  1309   POTASSIUM mmol/L 3 3* 3 5   CHLORIDE mmol/L 99* 97*   CO2 mmol/L 28 30   BUN mg/dL 9 13   CREATININE mg/dL 0 67 0 70   EGFR ml/min/1 73sq m 76 75   CALCIUM mg/dL 7 9* 8 6     Results from last 7 days   Lab Units 02/22/20  0509 02/21/20  1309   WBC Thousand/uL 4 73 5 20   HEMOGLOBIN g/dL 9 4* 10 9*   PLATELETS Thousands/uL 156 185           Imaging Studies:   I have personally reviewed pertinent imaging study reports and images in PACS  Abdomen/pelvis CT reviewed personally  Distended urinary bladder despite presence of Orantes catheter  Kidneys are normal   Lumbar spine CT reviewed personally  No diskitis/osteomyelitis/abscess  Head CT reviewed personally  No acute changes  EKG, Pathology, and Other Studies:   I have personally reviewed pertinent reports

## 2020-02-22 NOTE — PLAN OF CARE
Problem: Potential for Falls  Goal: Patient will remain free of falls  Description  INTERVENTIONS:  - Assess patient frequently for physical needs  -  Identify cognitive and physical deficits and behaviors that affect risk of falls    -  [place] fall precautions as indicated by assessment   - Educate patient/family on patient safety including physical limitations  - Instruct patient to call for assistance with activity based on assessment  - Modify environment to reduce risk of injury  - Consider OT/PT consult to assist with strengthening/mobility  Outcome: Progressing     Problem: Prexisting or High Potential for Compromised Skin Integrity  Goal: Skin integrity is maintained or improved  Description  INTERVENTIONS:  - Identify patients at risk for skin breakdown  - Assess and monitor skin integrity  - Assess and monitor nutrition and hydration status  - Monitor labs   - Assess for incontinence   - Turn and reposition patient  - Assist with mobility/ambulation  - Relieve pressure over bony prominences  - Avoid friction and shearing  - Provide appropriate hygiene as needed including keeping skin clean and dry  - Evaluate need for skin moisturizer/barrier cream  - Collaborate with interdisciplinary team   - Patient/family teaching  - Consider wound care consult   Outcome: Progressing     Problem: PAIN - ADULT  Goal: Verbalizes/displays adequate comfort level or baseline comfort level  Description  Interventions:  - Encourage patient to monitor pain and request assistance  - Assess pain using appropriate pain scale  - Administer analgesics based on type and severity of pain and evaluate response  - Implement non-pharmacological measures as appropriate and evaluate response  - Consider cultural and social influences on pain and pain management  - Notify physician/advanced practitioner if interventions unsuccessful or patient reports new pain  Outcome: Progressing     Problem: INFECTION - ADULT  Goal: Absence or prevention of progression during hospitalization  Description  INTERVENTIONS:  - Assess and monitor for signs and symptoms of infection  - Monitor lab/diagnostic results  - Monitor all insertion sites, i e  indwelling lines, tubes, and drains  - Monitor endotracheal if appropriate and nasal secretions for changes in amount and color  - [place] appropriate cooling/warming therapies per order  - Administer medications as ordered  - Instruct and encourage patient and family to use good hand hygiene technique  - Identify and instruct in appropriate isolation precautions for identified infection/condition  Outcome: Progressing     Problem: SAFETY ADULT  Goal: Maintain or return to baseline ADL function  Description  INTERVENTIONS:  -  Assess patient's ability to carry out ADLs; assess patient's baseline for ADL function and identify physical deficits which impact ability to perform ADLs (bathing, care of mouth/teeth, toileting, grooming, dressing, etc )  - Assess/evaluate cause of self-care deficits   - Assess range of motion  - Assess patient's mobility; develop plan if impaired  - Assess patient's need for assistive devices and provide as appropriate  - Encourage maximum independence but intervene and supervise when necessary  - Involve family in performance of ADLs  - Assess for home care needs following discharge   - Consider OT consult to assist with ADL evaluation and planning for discharge  - Provide patient education as appropriate  Outcome: Progressing  Goal: Maintain or return mobility status to optimal level  Description  INTERVENTIONS:  - Assess patient's baseline mobility status (ambulation, transfers, stairs, etc )    - Identify cognitive and physical deficits and behaviors that affect mobility  - Identify mobility aids required to assist with transfers and/or ambulation (gait belt, sit-to-stand, lift, walker, cane, etc )  - [place] fall precautions as indicated by assessment  - Record patient progress and toleration of activity level on Mobility SBAR; progress patient to next Phase/Stage  - Instruct patient to call for assistance with activity based on assessment  - Consider rehabilitation consult to assist with strengthening/weightbearing, etc   Outcome: Progressing     Problem: DISCHARGE PLANNING  Goal: Discharge to home or other facility with appropriate resources  Description  INTERVENTIONS:  - Identify barriers to discharge w/patient and caregiver  - Arrange for needed discharge resources and transportation as appropriate  - Identify discharge learning needs (meds, wound care, etc )  - Arrange for interpretive services to assist at discharge as needed  - Refer to Case Management Department for coordinating discharge planning if the patient needs post-hospital services based on physician/advanced practitioner order or complex needs related to functional status, cognitive ability, or social support system  Outcome: Progressing     Problem: Knowledge Deficit  Goal: Patient/family/caregiver demonstrates understanding of disease process, treatment plan, medications, and discharge instructions  Description  Complete learning assessment and assess knowledge base  Interventions:  - Provide teaching at level of understanding  - Provide teaching via preferred learning methods  Outcome: Progressing     Problem: Nutrition/Hydration-ADULT  Goal: Nutrient/Hydration intake appropriate for improving, restoring or maintaining nutritional needs  Description  Monitor and assess patient's nutrition/hydration status for malnutrition  Collaborate with interdisciplinary team and initiate plan and interventions as ordered  Monitor patient's weight and dietary intake as ordered or per policy  Utilize nutrition screening tool and intervene as necessary  Determine patient's food preferences and provide high-protein, high-caloric foods as appropriate       INTERVENTIONS:  - Monitor oral intake, urinary output, labs, and treatment plans  - Assess nutrition and hydration status and recommend course of action  - Evaluate amount of meals eaten  - Assist patient with eating if necessary   - Allow adequate time for meals  - Recommend/ encourage appropriate diets, oral nutritional supplements, and vitamin/mineral supplements  - Order, calculate, and assess calorie counts as needed  - Recommend, monitor, and adjust tube feedings and TPN/PPN based on assessed needs  - Assess need for intravenous fluids  - Provide specific nutrition/hydration education as appropriate  - Include patient/family/caregiver in decisions related to nutrition  Outcome: Progressing     Problem: GENITOURINARY - ADULT  Goal: Maintains or returns to baseline urinary function  Description  INTERVENTIONS:  - Assess urinary function  - Encourage oral fluids to ensure adequate hydration if ordered  - Administer IV fluids as ordered to ensure adequate hydration  - Administer ordered medications as needed  - Offer frequent toileting  - Follow urinary retention protocol if ordered  Outcome: Progressing  Goal: Absence of urinary retention  Description  INTERVENTIONS:  - Assess patients ability to void and empty bladder  - Monitor I/O  - Bladder scan as needed  - Discuss with physician/AP medications to alleviate retention as needed  - Discuss catheterization for long term situations as appropriate  Outcome: Progressing  Goal: Urinary catheter remains patent  Description  INTERVENTIONS:  - Assess patency of urinary catheter  - If patient has a chronic ward, consider changing catheter if non-functioning  - Follow guidelines for intermittent irrigation of non-functioning urinary catheter  Outcome: Progressing     Problem: SKIN/TISSUE INTEGRITY - ADULT  Goal: Skin integrity remains intact  Description  INTERVENTIONS  - Identify patients at risk for skin breakdown  - Assess and monitor skin integrity  - Assess and monitor nutrition and hydration status  - Monitor labs (i e  albumin)  - Assess for incontinence   - Turn and reposition patient  - Assist with mobility/ambulation  - Relieve pressure over bony prominences  - Avoid friction and shearing  - Provide appropriate hygiene as needed including keeping skin clean and dry  - Evaluate need for skin moisturizer/barrier cream  - Collaborate with interdisciplinary team (i e  Nutrition, Rehabilitation, etc )   - Patient/family teaching  Outcome: Progressing  Goal: Oral mucous membranes remain intact  Description  INTERVENTIONS  - Assess oral mucosa and hygiene practices  - Implement preventative oral hygiene regimen  - Implement oral medicated treatments as ordered  - Initiate Nutrition services referral as needed  Outcome: Progressing     Problem: MUSCULOSKELETAL - ADULT  Goal: Maintain or return mobility to safest level of function  Description  INTERVENTIONS:  - Assess patient's ability to carry out ADLs; assess patient's baseline for ADL function and identify physical deficits which impact ability to perform ADLs (bathing, care of mouth/teeth, toileting, grooming, dressing, etc )  - Assess/evaluate cause of self-care deficits   - Assess range of motion  - Assess patient's mobility  - Assess patient's need for assistive devices and provide as appropriate  - Encourage maximum independence but intervene and supervise when necessary  - Involve family in performance of ADLs  - Assess for home care needs following discharge   - Consider OT consult to assist with ADL evaluation and planning for discharge  - Provide patient education as appropriate  Outcome: Progressing

## 2020-02-22 NOTE — ASSESSMENT & PLAN NOTE
With history of multiple UTIs  Likely multifactorial secondary to above  Just finished a course of doxycycline 100 mg b i d  x 10 days 2/17/20    On admission patient was afebrile, nontoxic, no leukocytosis and therefore antibiotics were not warranted at that time  Patient was later seen by admitting physician and spiked a 102 fever  At that time she was given a dose of IV Levaquin 500 mg x 1  Personally spoke with Dr Casi Nash of Infectious Disease and will hold off on further antibiotics as fever may have been caused from multiple attempts at Orantes insertion with traumatic urethritis  If fever reoccurs consider checking urine cultures and will further confer with ID at that time

## 2020-02-23 LAB
ANION GAP SERPL CALCULATED.3IONS-SCNC: 6 MMOL/L (ref 4–13)
BUN SERPL-MCNC: 12 MG/DL (ref 5–25)
CALCIUM SERPL-MCNC: 8.1 MG/DL (ref 8.3–10.1)
CHLORIDE SERPL-SCNC: 103 MMOL/L (ref 100–108)
CO2 SERPL-SCNC: 26 MMOL/L (ref 21–32)
CREAT SERPL-MCNC: 0.6 MG/DL (ref 0.6–1.3)
ERYTHROCYTE [DISTWIDTH] IN BLOOD BY AUTOMATED COUNT: 14.3 % (ref 11.6–15.1)
GFR SERPL CREATININE-BSD FRML MDRD: 79 ML/MIN/1.73SQ M
GLUCOSE SERPL-MCNC: 96 MG/DL (ref 65–140)
HCT VFR BLD AUTO: 29.8 % (ref 34.8–46.1)
HGB BLD-MCNC: 9.5 G/DL (ref 11.5–15.4)
MCH RBC QN AUTO: 31.6 PG (ref 26.8–34.3)
MCHC RBC AUTO-ENTMCNC: 31.9 G/DL (ref 31.4–37.4)
MCV RBC AUTO: 99 FL (ref 82–98)
PLATELET # BLD AUTO: 138 THOUSANDS/UL (ref 149–390)
PMV BLD AUTO: 10.9 FL (ref 8.9–12.7)
POTASSIUM SERPL-SCNC: 3.7 MMOL/L (ref 3.5–5.3)
RBC # BLD AUTO: 3.01 MILLION/UL (ref 3.81–5.12)
SODIUM SERPL-SCNC: 135 MMOL/L (ref 136–145)
WBC # BLD AUTO: 5.42 THOUSAND/UL (ref 4.31–10.16)

## 2020-02-23 PROCEDURE — 85027 COMPLETE CBC AUTOMATED: CPT | Performed by: PHYSICIAN ASSISTANT

## 2020-02-23 PROCEDURE — 80048 BASIC METABOLIC PNL TOTAL CA: CPT | Performed by: PHYSICIAN ASSISTANT

## 2020-02-23 PROCEDURE — 99232 SBSQ HOSP IP/OBS MODERATE 35: CPT | Performed by: INTERNAL MEDICINE

## 2020-02-23 PROCEDURE — 97163 PT EVAL HIGH COMPLEX 45 MIN: CPT | Performed by: PHYSICAL THERAPIST

## 2020-02-23 PROCEDURE — 99232 SBSQ HOSP IP/OBS MODERATE 35: CPT | Performed by: PHYSICIAN ASSISTANT

## 2020-02-23 RX ORDER — KETOROLAC TROMETHAMINE 30 MG/ML
30 INJECTION, SOLUTION INTRAMUSCULAR; INTRAVENOUS ONCE
Status: COMPLETED | OUTPATIENT
Start: 2020-02-23 | End: 2020-02-23

## 2020-02-23 RX ORDER — LIDOCAINE 50 MG/G
1 PATCH TOPICAL DAILY
Status: DISCONTINUED | OUTPATIENT
Start: 2020-02-23 | End: 2020-02-28 | Stop reason: HOSPADM

## 2020-02-23 RX ORDER — FUROSEMIDE 40 MG/1
40 TABLET ORAL
Status: DISCONTINUED | OUTPATIENT
Start: 2020-02-23 | End: 2020-02-28 | Stop reason: HOSPADM

## 2020-02-23 RX ADMIN — SODIUM CHLORIDE 50 ML/HR: 0.9 INJECTION, SOLUTION INTRAVENOUS at 07:36

## 2020-02-23 RX ADMIN — KETOROLAC TROMETHAMINE 30 MG: 30 INJECTION, SOLUTION INTRAMUSCULAR at 13:42

## 2020-02-23 RX ADMIN — TIMOLOL MALEATE 1 DROP: 2.5 SOLUTION/ DROPS OPHTHALMIC at 09:17

## 2020-02-23 RX ADMIN — LIDOCAINE 1 PATCH: 50 PATCH TOPICAL at 13:42

## 2020-02-23 RX ADMIN — CHLORDIAZEPOXIDE HYDROCHLORIDE 25 MG: 25 CAPSULE ORAL at 21:53

## 2020-02-23 RX ADMIN — MECLIZINE HYDROCHLORIDE 25 MG: 25 TABLET ORAL at 21:53

## 2020-02-23 RX ADMIN — PANTOPRAZOLE SODIUM 20 MG: 20 TABLET, DELAYED RELEASE ORAL at 21:53

## 2020-02-23 RX ADMIN — LATANOPROST 1 DROP: 50 SOLUTION OPHTHALMIC at 09:17

## 2020-02-23 RX ADMIN — PRAMIPEXOLE DIHYDROCHLORIDE 0.25 MG: 0.25 TABLET ORAL at 21:53

## 2020-02-23 RX ADMIN — MECLIZINE HYDROCHLORIDE 25 MG: 25 TABLET ORAL at 09:17

## 2020-02-23 RX ADMIN — LISINOPRIL 10 MG: 10 TABLET ORAL at 09:17

## 2020-02-23 RX ADMIN — MECLIZINE HYDROCHLORIDE 25 MG: 25 TABLET ORAL at 16:33

## 2020-02-23 RX ADMIN — ENOXAPARIN SODIUM 40 MG: 40 INJECTION SUBCUTANEOUS at 09:17

## 2020-02-23 NOTE — PROGRESS NOTES
Progress Note - Twila Doshi 9/20/1926, 80 y o  female MRN: [id number]    Unit/Bed#: [facility] [address] [id number] Encounter: [id number]    Primary Care Provider: Nuno Vigil DO   Date and time admitted to hospital: 2/21/2020 11:00 AM        * Urinary retention  Assessment & Plan  Likely causing chronic UTIs  Multifactorial secondary to advanced age, poor mobility, and stenotic urethra  ER had difficulty placing a Orantes catheter and therefore urology placed it  Orantes catheter inserted 2/21/20 - please note catheter is NOT chronic   Urology recommends flushing Orantes until clear and maintaining to gravity  Will need outpatient follow-up with urology the office    Chronic urinary tract infection  Assessment & Plan  With history of multiple UTIs  Likely multifactorial secondary to above  Just finished a course of doxycycline 100 mg b i d  x 10 days 2/17/20    On admission patient was afebrile, nontoxic, no leukocytosis and therefore antibiotics were not warranted at that time  Patient was later seen by admitting physician and spiked a 102 fever  At that time she was given a dose of IV Levaquin 500 mg x 1  Personally spoke with Dr Lynnette Sam of Infectious Disease and will hold off on further antibiotics as fever may have been caused from multiple attempts at Orantes insertion with traumatic urethritis  If fever reoccurs consider checking urine cultures and will further confer with ID at that time    -fortunately has remained without additional fever spike      Impaired mobility and ADLs  Assessment & Plan  Secondary to osteoarthritis  With deconditioning secondary to above  PT/OT recommending short-term rehab  Spoke with daughter at bedside who is agreeable    Restless legs syndrome  Assessment & Plan  Continue Mirapex    Osteoarthritis  Assessment & Plan  With chronic deformities/severe kyphosis and ambulatory dysfunction  Uses walker to ambulate at baseline   -complains of joint pain and back pain   -worsened after working with physical therapy   -provide IV Toradol 30 mg x 1 in Lidoderm patch    Leg edema  Assessment & Plan  Pre-hospital chronically maintained on Lasix 40 mg daily  This was initially held as patient was thought to be dehydrated  She received IV fluids  Will stop IV fluids at this point and resume Lasix therapy  Glaucoma  Assessment & Plan  Continue home eye drops    GERD (gastroesophageal reflux disease)  Assessment & Plan  Continue omeprazole      VTE Pharmacologic Prophylaxis:   Pharmacologic: Enoxaparin (Lovenox)  Mechanical VTE Prophylaxis in Place: Yes    Discharge Plan: With need for continued inpatient stay for short-term placement    Discussions with Specialists or Other Care Team Provider:  Dr Bonita Tan    Education and Discussions with Family / Patient:  Patient and daughter at bedside    Time Spent for Care: 15 minutes  More than 50% of total time spent on counseling and coordination of care as described above  Current Length of Stay: 2 day(s)  Current Patient Status: Inpatient   Code Status: Level 3 - DNAR and DNI    Subjective:   Patient resting in chair at bedside about the lunch  Complains of joint pain especially after working with PT  Also complains of left-sided mid back pain that is tender to palpation  She does have chronic deformities and severe kyphosis/scoliosis  At home she normally takes Tylenol on a more regular basis  She remains without fever spike overnight  She denies any other shortness of breath, chest pain, chest pressure  PT/OT recommending rehab-daughter agreeable  Await placement and case management consult    Objective:     Vitals:   Temp (24hrs), Av 6 °F (37 6 °C), Min:99 5 °F (37 5 °C), Max:99 8 °F (37 7 °C)    Temp:  [99 5 °F (37 5 °C)-99 8 °F (37 7 °C)] 99 5 °F (37 5 °C)  HR:  [73-77] 73  Resp:  [18] 18  BP: (116-131)/(66-71) 131/71  SpO2:  [96 %-99 %] 96 %  Body mass index is 30 75 kg/m²       Input and Output Summary (last 24 hours): Intake/Output Summary (Last 24 hours) at 2/23/2020 1253  Last data filed at 2/23/2020 0701  Gross per 24 hour   Intake --   Output 600 ml   Net -600 ml       Physical Exam:     Physical Exam   Constitutional: No distress  Severe kyphosis/scoliosis   Cardiovascular: Normal rate, regular rhythm and normal heart sounds  Pulmonary/Chest:   Decreased breath sounds bilaterally   Abdominal: Soft  Bowel sounds are normal  She exhibits no distension and no mass  There is no tenderness  There is no rebound and no guarding  No hernia  Musculoskeletal: She exhibits edema  Neurological: She is alert  Skin: Skin is warm and dry  She is not diaphoretic  Nursing note and vitals reviewed  Additional Data:     Labs:    Results from last 7 days   Lab Units 02/23/20  0458  02/21/20  1309   WBC Thousand/uL 5 42   < > 5 20   HEMOGLOBIN g/dL 9 5*   < > 10 9*   HEMATOCRIT % 29 8*   < > 33 2*   PLATELETS Thousands/uL 138*   < > 185   NEUTROS PCT %  --   --  49   LYMPHS PCT %  --   --  36   MONOS PCT %  --   --  14*   EOS PCT %  --   --  0    < > = values in this interval not displayed  Results from last 7 days   Lab Units 02/23/20  0458   POTASSIUM mmol/L 3 7   CHLORIDE mmol/L 103   CO2 mmol/L 26   BUN mg/dL 12   CREATININE mg/dL 0 60   CALCIUM mg/dL 8 1*           * I Have Reviewed All Lab Data Listed Above  * Additional Pertinent Lab Tests Reviewed:  All Labs Within Last 24 Hours Reviewed    Imaging:    Imaging Reports Reviewed Today Include:   Imaging Personally Reviewed by Myself Includes:      Recent Cultures (last 7 days):           Last 24 Hours Medication List:     Current Facility-Administered Medications:  acetaminophen 650 mg Oral Q6H PRN Serena Marrero PA-C    chlordiazePOXIDE 25 mg Oral HS Aracelis Jensen PA-C    enoxaparin 40 mg Subcutaneous Daily Aracelis Jensen PA-C    ketorolac 30 mg Intravenous Once Serena Marrero PA-C    latanoprost 1 drop Left Eye Daily Aracelis Jensen PA-C    lidocaine 1 patch Topical Daily Aracelis Jensen PA-C    lisinopril 10 mg Oral Daily Aracelis Jensen PA-C    meclizine 25 mg Oral TID Carla Shoulder NITZA Jensen    ondansetron 4 mg Intravenous Q6H PRN Aracelis Jensen PA-C    pantoprazole 20 mg Oral HS Aracelis Jensen PA-C    pramipexole 0 25 mg Oral Daily Aracelis Jensen PA-C    sodium chloride 50 mL/hr Intravenous Continuous Aracelis Jensen PA-C Last Rate: 50 mL/hr (02/23/20 0736)   timolol 1 drop Left Eye Daily Aracelis Jensen PA-C    traMADol 50 mg Oral Q8H PRN Sylvester David PA-C         Today, Patient Was Seen By: Sylvester David PA-C    ** Please Note: This note has been constructed using a voice recognition system   **

## 2020-02-23 NOTE — PLAN OF CARE
Problem: Potential for Falls  Goal: Patient will remain free of falls  Description  INTERVENTIONS:  - Assess patient frequently for physical needs  -  Identify cognitive and physical deficits and behaviors that affect risk of falls    -  [place] fall precautions as indicated by assessment   - Educate patient/family on patient safety including physical limitations  - Instruct patient to call for assistance with activity based on assessment  - Modify environment to reduce risk of injury  - Consider OT/PT consult to assist with strengthening/mobility  Outcome: Progressing     Problem: Prexisting or High Potential for Compromised Skin Integrity  Goal: Skin integrity is maintained or improved  Description  INTERVENTIONS:  - Identify patients at risk for skin breakdown  - Assess and monitor skin integrity  - Assess and monitor nutrition and hydration status  - Monitor labs   - Assess for incontinence   - Turn and reposition patient  - Assist with mobility/ambulation  - Relieve pressure over bony prominences  - Avoid friction and shearing  - Provide appropriate hygiene as needed including keeping skin clean and dry  - Evaluate need for skin moisturizer/barrier cream  - Collaborate with interdisciplinary team   - Patient/family teaching  - Consider wound care consult   Outcome: Progressing     Problem: PAIN - ADULT  Goal: Verbalizes/displays adequate comfort level or baseline comfort level  Description  Interventions:  - Encourage patient to monitor pain and request assistance  - Assess pain using appropriate pain scale  - Administer analgesics based on type and severity of pain and evaluate response  - Implement non-pharmacological measures as appropriate and evaluate response  - Consider cultural and social influences on pain and pain management  - Notify physician/advanced practitioner if interventions unsuccessful or patient reports new pain  Outcome: Progressing     Problem: INFECTION - ADULT  Goal: Absence or prevention of progression during hospitalization  Description  INTERVENTIONS:  - Assess and monitor for signs and symptoms of infection  - Monitor lab/diagnostic results  - Monitor all insertion sites, i e  indwelling lines, tubes, and drains  - Monitor endotracheal if appropriate and nasal secretions for changes in amount and color  - [place] appropriate cooling/warming therapies per order  - Administer medications as ordered  - Instruct and encourage patient and family to use good hand hygiene technique  - Identify and instruct in appropriate isolation precautions for identified infection/condition  Outcome: Progressing     Problem: SAFETY ADULT  Goal: Maintain or return to baseline ADL function  Description  INTERVENTIONS:  -  Assess patient's ability to carry out ADLs; assess patient's baseline for ADL function and identify physical deficits which impact ability to perform ADLs (bathing, care of mouth/teeth, toileting, grooming, dressing, etc )  - Assess/evaluate cause of self-care deficits   - Assess range of motion  - Assess patient's mobility; develop plan if impaired  - Assess patient's need for assistive devices and provide as appropriate  - Encourage maximum independence but intervene and supervise when necessary  - Involve family in performance of ADLs  - Assess for home care needs following discharge   - Consider OT consult to assist with ADL evaluation and planning for discharge  - Provide patient education as appropriate  Outcome: Progressing  Goal: Maintain or return mobility status to optimal level  Description  INTERVENTIONS:  - Assess patient's baseline mobility status (ambulation, transfers, stairs, etc )    - Identify cognitive and physical deficits and behaviors that affect mobility  - Identify mobility aids required to assist with transfers and/or ambulation (gait belt, sit-to-stand, lift, walker, cane, etc )  - [place] fall precautions as indicated by assessment  - Record patient progress and toleration of activity level on Mobility SBAR; progress patient to next Phase/Stage  - Instruct patient to call for assistance with activity based on assessment  - Consider rehabilitation consult to assist with strengthening/weightbearing, etc   Outcome: Progressing     Problem: DISCHARGE PLANNING  Goal: Discharge to home or other facility with appropriate resources  Description  INTERVENTIONS:  - Identify barriers to discharge w/patient and caregiver  - Arrange for needed discharge resources and transportation as appropriate  - Identify discharge learning needs (meds, wound care, etc )  - Arrange for interpretive services to assist at discharge as needed  - Refer to Case Management Department for coordinating discharge planning if the patient needs post-hospital services based on physician/advanced practitioner order or complex needs related to functional status, cognitive ability, or social support system  Outcome: Progressing     Problem: Knowledge Deficit  Goal: Patient/family/caregiver demonstrates understanding of disease process, treatment plan, medications, and discharge instructions  Description  Complete learning assessment and assess knowledge base  Interventions:  - Provide teaching at level of understanding  - Provide teaching via preferred learning methods  Outcome: Progressing     Problem: Nutrition/Hydration-ADULT  Goal: Nutrient/Hydration intake appropriate for improving, restoring or maintaining nutritional needs  Description  Monitor and assess patient's nutrition/hydration status for malnutrition  Collaborate with interdisciplinary team and initiate plan and interventions as ordered  Monitor patient's weight and dietary intake as ordered or per policy  Utilize nutrition screening tool and intervene as necessary  Determine patient's food preferences and provide high-protein, high-caloric foods as appropriate       INTERVENTIONS:  - Monitor oral intake, urinary output, labs, and treatment plans  - Assess nutrition and hydration status and recommend course of action  - Evaluate amount of meals eaten  - Assist patient with eating if necessary   - Allow adequate time for meals  - Recommend/ encourage appropriate diets, oral nutritional supplements, and vitamin/mineral supplements  - Order, calculate, and assess calorie counts as needed  - Recommend, monitor, and adjust tube feedings and TPN/PPN based on assessed needs  - Assess need for intravenous fluids  - Provide specific nutrition/hydration education as appropriate  - Include patient/family/caregiver in decisions related to nutrition  Outcome: Progressing     Problem: GENITOURINARY - ADULT  Goal: Maintains or returns to baseline urinary function  Description  INTERVENTIONS:  - Assess urinary function  - Encourage oral fluids to ensure adequate hydration if ordered  - Administer IV fluids as ordered to ensure adequate hydration  - Administer ordered medications as needed  - Offer frequent toileting  - Follow urinary retention protocol if ordered  Outcome: Progressing  Goal: Absence of urinary retention  Description  INTERVENTIONS:  - Assess patients ability to void and empty bladder  - Monitor I/O  - Bladder scan as needed  - Discuss with physician/AP medications to alleviate retention as needed  - Discuss catheterization for long term situations as appropriate  Outcome: Progressing  Goal: Urinary catheter remains patent  Description  INTERVENTIONS:  - Assess patency of urinary catheter  - If patient has a chronic ward, consider changing catheter if non-functioning  - Follow guidelines for intermittent irrigation of non-functioning urinary catheter  Outcome: Progressing     Problem: SKIN/TISSUE INTEGRITY - ADULT  Goal: Skin integrity remains intact  Description  INTERVENTIONS  - Identify patients at risk for skin breakdown  - Assess and monitor skin integrity  - Assess and monitor nutrition and hydration status  - Monitor labs (i e  albumin)  - Assess for incontinence   - Turn and reposition patient  - Assist with mobility/ambulation  - Relieve pressure over bony prominences  - Avoid friction and shearing  - Provide appropriate hygiene as needed including keeping skin clean and dry  - Evaluate need for skin moisturizer/barrier cream  - Collaborate with interdisciplinary team (i e  Nutrition, Rehabilitation, etc )   - Patient/family teaching  Outcome: Progressing  Goal: Oral mucous membranes remain intact  Description  INTERVENTIONS  - Assess oral mucosa and hygiene practices  - Implement preventative oral hygiene regimen  - Implement oral medicated treatments as ordered  - Initiate Nutrition services referral as needed  Outcome: Progressing     Problem: MUSCULOSKELETAL - ADULT  Goal: Maintain or return mobility to safest level of function  Description  INTERVENTIONS:  - Assess patient's ability to carry out ADLs; assess patient's baseline for ADL function and identify physical deficits which impact ability to perform ADLs (bathing, care of mouth/teeth, toileting, grooming, dressing, etc )  - Assess/evaluate cause of self-care deficits   - Assess range of motion  - Assess patient's mobility  - Assess patient's need for assistive devices and provide as appropriate  - Encourage maximum independence but intervene and supervise when necessary  - Involve family in performance of ADLs  - Assess for home care needs following discharge   - Consider OT consult to assist with ADL evaluation and planning for discharge  - Provide patient education as appropriate  Outcome: Progressing

## 2020-02-23 NOTE — ASSESSMENT & PLAN NOTE
Likely causing chronic UTIs  Multifactorial secondary to advanced age, poor mobility, and stenotic urethra  ER had difficulty placing a Orantes catheter and therefore urology placed it     Orantes catheter inserted 2/21/20 - please note catheter is NOT chronic   Urology recommends flushing Orantes until clear and maintaining to gravity  Will need outpatient follow-up with urology the office

## 2020-02-23 NOTE — ASSESSMENT & PLAN NOTE
With history of multiple UTIs  Likely multifactorial secondary to above  Just finished a course of doxycycline 100 mg b i d  x 10 days 2/17/20    On admission patient was afebrile, nontoxic, no leukocytosis and therefore antibiotics were not warranted at that time  Patient was later seen by admitting physician and spiked a 102 fever  At that time she was given a dose of IV Levaquin 500 mg x 1  Personally spoke with Dr Miri Adair of Infectious Disease and will hold off on further antibiotics as fever may have been caused from multiple attempts at Orantes insertion with traumatic urethritis  If fever reoccurs consider checking urine cultures and will further confer with ID at that time    -fortunately has remained without additional fever spike

## 2020-02-23 NOTE — PHYSICAL THERAPY NOTE
Physical Therapy Evaluation      Patient Active Problem List   Diagnosis    Allergic rhinitis    Cerebral aneurysm    Anxiety    Back pain    Breast carcinoma (HCC)    Chronic urinary tract infection    Decreased ambulation status    Dizziness    GERD (gastroesophageal reflux disease)    Glaucoma    Hyperglycemia    Hyperlipidemia    Hypertension    Hypokalemia    Instability of left knee joint    Leg edema    Lymphedema    Macular degeneration    Neoplasm of skin of face    Osteoarthritis    Osteoporosis    Ovarian cancer (HCC)    Restless legs syndrome    Thoracic back pain    Vitamin D deficiency    Candidal intertrigo    Dysuria    Chronic pain of left knee    Health care maintenance    Impaired mobility and ADLs    Chronic pain syndrome    Opioid use agreement exists    Urinary retention       Past Medical History:   Diagnosis Date    Anxiety     Arthritis     Cancer ([facility] [address] )     Elevated serum alkaline phosphatase level     mild, isoenzymes are normal, resolved 4/24/17 labs , last assessed 11/10/16, resolved 4/24/17    Hematuria     last assessed 9/18/12    Hyperlipidemia 8/13/2012    Hypertension     Pulmonary embolism ([facility] [address] ) 01/2011    last assessed 9/18/12       Past Surgical History:   Procedure Laterality Date    BILATERAL OOPHORECTOMY      Laparoscopic    BREAST SURGERY Left     Mastectomy     CHOLECYSTECTOMY      Laparoscopic    HERNIA REPAIR      HYSTERECTOMY      SMALL INTESTINE SURGERY          02/23/20 0912   Note Type   Note type Eval only   Pain Assessment   Pain Assessment No/denies pain   Home Living   Type of [address] Two level;Stairs to enter with rails  (4 stairs to enter)   Home Equipment Walker;Cane;Wheelchair-manual   Prior Function   Level of [place] Needs assistance with IADLs; Needs assistance with ADLs and functional mobility   Lives With Family   Receives Help From Family   ADL Assistance Needs assistance   IADLs Needs assistance   Falls in the last 6 months 0  (pt denies)   Comments pt is disoriented, thinks she has been here for 2 weeks  pt reports she lives with family, has assist for bathing and bed mobility, pt reports using rw indep to amb  pt stays first floor  Restrictions/Precautions   Other Precautions Cognitive; Chair Alarm;Multiple lines; Fall Risk;Pain   General   Family/Caregiver Present No   Cognition   Overall Cognitive Status Impaired   Orientation Level Oriented to person;Oriented to place   RUE Assessment   RUE Assessment X  (severe limit elevation, str 3+/5)   LUE Assessment   LUE Assessment X  (severe limit elevation, str 3+/5)   RLE Assessment   RLE Assessment X  (pes planus, calf tender, veins firm, str 3+/5)   LLE Assessment   LLE Assessment X  (unstable knee, foot deformed, str 3-/5)   Coordination   Movements are Fluid and Coordinated 1   Sensation X  (hypersensitive ble)   Bed Mobility   Rolling R 2  Maximal assistance   Additional items Assist x 1; Increased time required;Verbal cues;LE management   Rolling L 2  Maximal assistance   Additional items Assist x 1; Increased time required;Verbal cues;LE management   Supine to Sit 2  Maximal assistance   Additional items Assist x 1; Increased time required;Verbal cues;LE management   Transfers   Sit to Stand 3  Moderate assistance   Additional items Assist x 2; Increased time required;Verbal cues  (pulls on rw)   Stand to Sit 2  Maximal assistance   Additional items Assist x 2; Increased time required;Verbal cues  (to control descent)   Stand pivot 2  Maximal assistance   Additional items Assist x 2; Increased time required;Verbal cues  (assist weight shift, steer rw, advance lle)   Ambulation/Elevation   Gait pattern Poor UE support; Improper Weight shift; Forward Flexion;Decreased foot clearance;Narrow ZOEY; Decreased L stance;Shuffling; Inconsistent juan; Short stride; Step to;Excessively slow   Gait Assistance 2  Maximal assist   Additional items Assist x 2 Assistive Device Rolling walker   Distance 2'   Balance   Static Sitting Fair -   Dynamic Sitting Poor +   Static Standing Poor   Dynamic Standing Poor   Ambulatory Poor   Endurance Deficit   Endurance Deficit Yes   Endurance Deficit Description weakness, fatigue   Activity Tolerance   Activity Tolerance Treatment limited secondary to medical complications (Comment)   Nurse Made Aware yes   Assessment   Prognosis Fair   Problem List Decreased strength;Decreased range of motion;Decreased endurance; Impaired balance;Decreased mobility; Decreased coordination;Decreased cognition; Impaired judgement;Decreased safety awareness;Decreased skin integrity;Pain   Assessment pt adimtted with complaint of back pain, in thoracic region per pt  pt dx with uti, urinary retention  pt referred to PT  pt was disoriented and history may be somewhat unreliable  reports she is indep with amb using rw in home distances  pt also reports needing assist for bathing (sponge bath), dressing and bed mobility  pt reports she is indep toileting  pt stays on single floor and lives with family  pt denies prior falls  pt demonstrated severe functional limitatoins due to chronic debility, recent infection and cognitive impairment due to advanced age  pt required max assist of 1 for supine to sit  max assist of 2 for transfer and attempted amb using rw  pt has severe kyhposcoliosis, foot deformitied wtih callus and wounds, l knee instability, weakness , gait instability and compromised self care  pt will need skilled PT due to high fall risk and function below baseline  pt will need rehab  Barriers to Discharge Inaccessible home environment   Goals   Patient Goals go home   STG Expiration Date 03/04/20   Short Term Goal #1 bed moiblity wtih mod assist of 1  transfers with max assist of 1  amb using rw for 25' with mod assist of 1  improve strength and balance by 1/2 to 1 grade  improve safety awareness and practices  improve standing posture  Plan   Treatment/Interventions Functional transfer training;LE strengthening/ROM; Therapeutic exercise; Endurance training;Cognitive reorientation;Patient/family training;Equipment eval/education; Bed mobility;Gait training;Spoke to nursing   PT Frequency   (3-5x/week)   Recommendation   Recommendation Post acute IP rehab   PT - OK to Discharge Yes  (rehab)   Modified [place] Scale   Modified Riaz Scale 4   Barthel Index   Feeding 0   Bathing 0   Grooming Score 0   Dressing Score 0   Bladder Score 0   Bowels Score 5   Toilet Use Score 5   Transfers (Bed/Chair) Score 5   Mobility (Level Surface) Score 0   Stairs Score 0   Barthel Index Score 15   History: co - morbidities, fall risk, use of assistive device, assist for adl's, cognition, multiple lines  Exam: impairments in locomotion, musculoskeletal, balance,posture, joint integrity, skin integrity,  cognition   Clinical: unstable/unpredictable  Complexity:high      Cheryl Rocha, PT

## 2020-02-23 NOTE — ASSESSMENT & PLAN NOTE
Secondary to osteoarthritis  With deconditioning secondary to above  PT/OT recommending short-term rehab  Spoke with daughter at bedside who is agreeable

## 2020-02-23 NOTE — PROGRESS NOTES
Progress Note - Infectious Disease   Divya Arm 80 y o  female MRN: [id number]  Unit/Bed#: [place] [address] -01 Encounter: [id number]      Impression/Recommendations:  1  Fever, which was not present on admission but developed after multiple attempts at Orantes catheter insertion  Suspect that her fever was most likely secondary to traumatic urethritis  Fever was nonsustained  UA was benign  Patient remains comfortable without suprapubic tenderness  Patient remains without further temperature spike  I would not check urine culture unless fever recurs, since it will most likely reflect bladder colonization  Observe off further antibiotic  Monitor temperature  Re-evaluate with cultures only if fever recurs      2  Encephalopathy, on background of advanced dementia  I suspect it is secondary to urinary retention  Patient is with advanced dementia usually has encephalopathy with any significant change in baseline status, infectious and noninfectious  Urinary retention can certainly result in encephalopathy in this patient  Mental status stable  Monitor mental status      3  Urinary retention, with chronic Orantes catheterization  If patient does indeed have recurrent UTI, although I am not sure she truly had UTIs (see below), she would be a candidate for suprapubic catheterization      4  Recurrent UTIs by report  As typical with patient who has chronic Orantes catheterization, many antibiotic courses are actually given for bladder colonization rather than true infection  In this patient with advanced dementia, I would refrain from treating patient for UTI unless she has objective signs of UTI, not just positive urine culture and encephalopathy      5  Advanced dementia  Patient is unable to voice symptoms reliably      Antibiotics:  None    Subjective:  Patient is stable  Mental status about the same  Temperature low-grade overnight  No diarrhea      Objective:  Vitals:  Temp:  [99 5 °F (37 5 °C)-99 8 °F (37 7 °C)] 99 5 °F (37 5 °C)  HR:  [73-77] 73  Resp:  [18] 18  BP: (116-131)/(66-71) 131/71  SpO2:  [96 %-99 %] 96 %  Temp (24hrs), Av 6 °F (37 6 °C), Min:99 5 °F (37 5 °C), Max:99 8 °F (37 7 °C)  Current: Temperature: 99 5 °F (37 5 °C)    Physical Exam:     General: Awake, alert, cooperative, no distress  Stable confusion   Neck:  Supple  No mass  No lymphadenopathy  Lungs: Decreased breath sounds, no rales, no wheezing, respirations unlabored  Heart:  Regular rate and rhythm, S1 and S2 normal, no murmur  Abdomen: Soft, nondistended, non-tender, bowel sounds active all four quadrants, no masses, no organomegaly  Extremities: Trace edema  No erythema/warmth  No ulcer  Nontender to palpation  Skin:  No rash  Neuro: Moves all extremities  Invasive Devices     Peripheral Intravenous Line            Peripheral IV 20 Left Forearm 1 day          Drain            Urethral Catheter 14 Fr  1 day                Labs studies:   I have personally reviewed pertinent labs  Results from last 7 days   Lab Units 20  0458 20  0509 20  1309   POTASSIUM mmol/L 3 7 3 3* 3 5   CHLORIDE mmol/L 103 99* 97*   CO2 mmol/L 26 28 30   BUN mg/dL 12 9 13   CREATININE mg/dL 0 60 0 67 0 70   EGFR ml/min/1 73sq m 79 76 75   CALCIUM mg/dL 8 1* 7 9* 8 6     Results from last 7 days   Lab Units 20  0458 20  0509 20  1309   WBC Thousand/uL 5 42 4 73 5 20   HEMOGLOBIN g/dL 9 5* 9 4* 10 9*   PLATELETS Thousands/uL 138* 156 185           Imaging Studies:   I have personally reviewed pertinent imaging study reports and images in PACS  EKG, Pathology, and Other Studies:   I have personally reviewed pertinent reports

## 2020-02-23 NOTE — ASSESSMENT & PLAN NOTE
With chronic deformities/severe kyphosis and ambulatory dysfunction  Uses walker to ambulate at baseline   -complains of joint pain and back pain   -worsened after working with physical therapy   -provide IV Toradol 30 mg x 1 in Lidoderm patch

## 2020-02-23 NOTE — PLAN OF CARE
Problem: PHYSICAL THERAPY ADULT  Goal: Performs mobility at highest level of function for planned discharge setting  See evaluation for individualized goals  Description  Treatment/Interventions: Functional transfer training, LE strengthening/ROM, Therapeutic exercise, Endurance training, Cognitive reorientation, Patient/family training, Equipment eval/education, Bed mobility, Gait training, Spoke to nursing          See flowsheet documentation for full assessment, interventions and recommendations  Note:   Prognosis: Fair  Problem List: Decreased strength, Decreased range of motion, Decreased endurance, Impaired balance, Decreased mobility, Decreased coordination, Decreased cognition, Impaired judgement, Decreased safety awareness, Decreased skin integrity, Pain  Assessment: pt adimtted with complaint of back pain, in thoracic region per pt  pt dx with uti, urinary retention  pt referred to PT  pt was disoriented and history may be somewhat unreliable  reports she is indep with amb using rw in home distances  pt also reports needing assist for bathing (sponge bath), dressing and bed mobility  pt reports she is indep toileting  pt stays on single floor and lives with family  pt denies prior falls  pt demonstrated severe functional limitatoins due to chronic debility, recent infection and cognitive impairment due to advanced age  pt required max assist of 1 for supine to sit  max assist of 2 for transfer and attempted amb using rw  pt has severe kyhposcoliosis, foot deformitied wtih callus and wounds, l knee instability, weakness , gait instability and compromised self care  pt will need skilled PT due to high fall risk and function below baseline  pt will need rehab  Barriers to Discharge: Inaccessible home environment     Recommendation: Post acute IP rehab     PT - OK to Discharge: Yes(rehab)    See flowsheet documentation for full assessment

## 2020-02-23 NOTE — PLAN OF CARE
Problem: Potential for Falls  Goal: Patient will remain free of falls  Description  INTERVENTIONS:  - Assess patient frequently for physical needs  -  Identify cognitive and physical deficits and behaviors that affect risk of falls    -  [place] fall precautions as indicated by assessment   - Educate patient/family on patient safety including physical limitations  - Instruct patient to call for assistance with activity based on assessment  - Modify environment to reduce risk of injury  - Consider OT/PT consult to assist with strengthening/mobility  Outcome: Progressing     Problem: Prexisting or High Potential for Compromised Skin Integrity  Goal: Skin integrity is maintained or improved  Description  INTERVENTIONS:  - Identify patients at risk for skin breakdown  - Assess and monitor skin integrity  - Assess and monitor nutrition and hydration status  - Monitor labs   - Assess for incontinence   - Turn and reposition patient  - Assist with mobility/ambulation  - Relieve pressure over bony prominences  - Avoid friction and shearing  - Provide appropriate hygiene as needed including keeping skin clean and dry  - Evaluate need for skin moisturizer/barrier cream  - Collaborate with interdisciplinary team   - Patient/family teaching  - Consider wound care consult   Outcome: Progressing     Problem: PAIN - ADULT  Goal: Verbalizes/displays adequate comfort level or baseline comfort level  Description  Interventions:  - Encourage patient to monitor pain and request assistance  - Assess pain using appropriate pain scale  - Administer analgesics based on type and severity of pain and evaluate response  - Implement non-pharmacological measures as appropriate and evaluate response  - Consider cultural and social influences on pain and pain management  - Notify physician/advanced practitioner if interventions unsuccessful or patient reports new pain  Outcome: Progressing     Problem: INFECTION - ADULT  Goal: Absence or prevention of progression during hospitalization  Description  INTERVENTIONS:  - Assess and monitor for signs and symptoms of infection  - Monitor lab/diagnostic results  - Monitor all insertion sites, i e  indwelling lines, tubes, and drains  - Monitor endotracheal if appropriate and nasal secretions for changes in amount and color  - [place] appropriate cooling/warming therapies per order  - Administer medications as ordered  - Instruct and encourage patient and family to use good hand hygiene technique  - Identify and instruct in appropriate isolation precautions for identified infection/condition  Outcome: Progressing     Problem: SAFETY ADULT  Goal: Maintain or return to baseline ADL function  Description  INTERVENTIONS:  -  Assess patient's ability to carry out ADLs; assess patient's baseline for ADL function and identify physical deficits which impact ability to perform ADLs (bathing, care of mouth/teeth, toileting, grooming, dressing, etc )  - Assess/evaluate cause of self-care deficits   - Assess range of motion  - Assess patient's mobility; develop plan if impaired  - Assess patient's need for assistive devices and provide as appropriate  - Encourage maximum independence but intervene and supervise when necessary  - Involve family in performance of ADLs  - Assess for home care needs following discharge   - Consider OT consult to assist with ADL evaluation and planning for discharge  - Provide patient education as appropriate  Outcome: Progressing  Goal: Maintain or return mobility status to optimal level  Description  INTERVENTIONS:  - Assess patient's baseline mobility status (ambulation, transfers, stairs, etc )    - Identify cognitive and physical deficits and behaviors that affect mobility  - Identify mobility aids required to assist with transfers and/or ambulation (gait belt, sit-to-stand, lift, walker, cane, etc )  - [place] fall precautions as indicated by assessment  - Record patient progress and toleration of activity level on Mobility SBAR; progress patient to next Phase/Stage  - Instruct patient to call for assistance with activity based on assessment  - Consider rehabilitation consult to assist with strengthening/weightbearing, etc   Outcome: Progressing     Problem: DISCHARGE PLANNING  Goal: Discharge to home or other facility with appropriate resources  Description  INTERVENTIONS:  - Identify barriers to discharge w/patient and caregiver  - Arrange for needed discharge resources and transportation as appropriate  - Identify discharge learning needs (meds, wound care, etc )  - Arrange for interpretive services to assist at discharge as needed  - Refer to Case Management Department for coordinating discharge planning if the patient needs post-hospital services based on physician/advanced practitioner order or complex needs related to functional status, cognitive ability, or social support system  Outcome: Progressing     Problem: Knowledge Deficit  Goal: Patient/family/caregiver demonstrates understanding of disease process, treatment plan, medications, and discharge instructions  Description  Complete learning assessment and assess knowledge base  Interventions:  - Provide teaching at level of understanding  - Provide teaching via preferred learning methods  Outcome: Progressing     Problem: Nutrition/Hydration-ADULT  Goal: Nutrient/Hydration intake appropriate for improving, restoring or maintaining nutritional needs  Description  Monitor and assess patient's nutrition/hydration status for malnutrition  Collaborate with interdisciplinary team and initiate plan and interventions as ordered  Monitor patient's weight and dietary intake as ordered or per policy  Utilize nutrition screening tool and intervene as necessary  Determine patient's food preferences and provide high-protein, high-caloric foods as appropriate       INTERVENTIONS:  - Monitor oral intake, urinary output, labs, and treatment plans  - Assess nutrition and hydration status and recommend course of action  - Evaluate amount of meals eaten  - Assist patient with eating if necessary   - Allow adequate time for meals  - Recommend/ encourage appropriate diets, oral nutritional supplements, and vitamin/mineral supplements  - Order, calculate, and assess calorie counts as needed  - Recommend, monitor, and adjust tube feedings and TPN/PPN based on assessed needs  - Assess need for intravenous fluids  - Provide specific nutrition/hydration education as appropriate  - Include patient/family/caregiver in decisions related to nutrition  Outcome: Progressing     Problem: GENITOURINARY - ADULT  Goal: Maintains or returns to baseline urinary function  Description  INTERVENTIONS:  - Assess urinary function  - Encourage oral fluids to ensure adequate hydration if ordered  - Administer IV fluids as ordered to ensure adequate hydration  - Administer ordered medications as needed  - Offer frequent toileting  - Follow urinary retention protocol if ordered  Outcome: Progressing  Goal: Absence of urinary retention  Description  INTERVENTIONS:  - Assess patients ability to void and empty bladder  - Monitor I/O  - Bladder scan as needed  - Discuss with physician/AP medications to alleviate retention as needed  - Discuss catheterization for long term situations as appropriate  Outcome: Progressing  Goal: Urinary catheter remains patent  Description  INTERVENTIONS:  - Assess patency of urinary catheter  - If patient has a chronic ward, consider changing catheter if non-functioning  - Follow guidelines for intermittent irrigation of non-functioning urinary catheter  Outcome: Progressing     Problem: SKIN/TISSUE INTEGRITY - ADULT  Goal: Skin integrity remains intact  Description  INTERVENTIONS  - Identify patients at risk for skin breakdown  - Assess and monitor skin integrity  - Assess and monitor nutrition and hydration status  - Monitor labs (i e  albumin)  - Assess for incontinence   - Turn and reposition patient  - Assist with mobility/ambulation  - Relieve pressure over bony prominences  - Avoid friction and shearing  - Provide appropriate hygiene as needed including keeping skin clean and dry  - Evaluate need for skin moisturizer/barrier cream  - Collaborate with interdisciplinary team (i e  Nutrition, Rehabilitation, etc )   - Patient/family teaching  Outcome: Progressing  Goal: Oral mucous membranes remain intact  Description  INTERVENTIONS  - Assess oral mucosa and hygiene practices  - Implement preventative oral hygiene regimen  - Implement oral medicated treatments as ordered  - Initiate Nutrition services referral as needed  Outcome: Progressing     Problem: MUSCULOSKELETAL - ADULT  Goal: Maintain or return mobility to safest level of function  Description  INTERVENTIONS:  - Assess patient's ability to carry out ADLs; assess patient's baseline for ADL function and identify physical deficits which impact ability to perform ADLs (bathing, care of mouth/teeth, toileting, grooming, dressing, etc )  - Assess/evaluate cause of self-care deficits   - Assess range of motion  - Assess patient's mobility  - Assess patient's need for assistive devices and provide as appropriate  - Encourage maximum independence but intervene and supervise when necessary  - Involve family in performance of ADLs  - Assess for home care needs following discharge   - Consider OT consult to assist with ADL evaluation and planning for discharge  - Provide patient education as appropriate  Outcome: Progressing

## 2020-02-23 NOTE — ASSESSMENT & PLAN NOTE
Pre-hospital chronically maintained on Lasix 40 mg daily  This was initially held as patient was thought to be dehydrated  She received IV fluids  Will stop IV fluids at this point and resume Lasix therapy

## 2020-02-24 PROCEDURE — 97530 THERAPEUTIC ACTIVITIES: CPT

## 2020-02-24 PROCEDURE — 97110 THERAPEUTIC EXERCISES: CPT

## 2020-02-24 PROCEDURE — 97167 OT EVAL HIGH COMPLEX 60 MIN: CPT

## 2020-02-24 PROCEDURE — 99232 SBSQ HOSP IP/OBS MODERATE 35: CPT | Performed by: PHYSICIAN ASSISTANT

## 2020-02-24 PROCEDURE — 97116 GAIT TRAINING THERAPY: CPT

## 2020-02-24 PROCEDURE — 99232 SBSQ HOSP IP/OBS MODERATE 35: CPT | Performed by: INTERNAL MEDICINE

## 2020-02-24 RX ORDER — VALACYCLOVIR HYDROCHLORIDE 500 MG/1
1000 TABLET, FILM COATED ORAL EVERY 12 HOURS
Status: DISCONTINUED | OUTPATIENT
Start: 2020-02-24 | End: 2020-02-28 | Stop reason: HOSPADM

## 2020-02-24 RX ORDER — DIPHENHYDRAMINE HYDROCHLORIDE 50 MG/ML
12.5 INJECTION INTRAMUSCULAR; INTRAVENOUS ONCE
Status: DISCONTINUED | OUTPATIENT
Start: 2020-02-24 | End: 2020-02-28 | Stop reason: HOSPADM

## 2020-02-24 RX ORDER — ACETAMINOPHEN 325 MG/1
650 TABLET ORAL EVERY MORNING
Status: DISCONTINUED | OUTPATIENT
Start: 2020-02-25 | End: 2020-02-28 | Stop reason: HOSPADM

## 2020-02-24 RX ORDER — LIDOCAINE 50 MG/G
1 PATCH TOPICAL DAILY
Status: DISCONTINUED | OUTPATIENT
Start: 2020-02-24 | End: 2020-02-28 | Stop reason: HOSPADM

## 2020-02-24 RX ADMIN — LATANOPROST 1 DROP: 50 SOLUTION OPHTHALMIC at 08:24

## 2020-02-24 RX ADMIN — VALACYCLOVIR HYDROCHLORIDE 1000 MG: 500 TABLET, FILM COATED ORAL at 21:30

## 2020-02-24 RX ADMIN — MECLIZINE HYDROCHLORIDE 25 MG: 25 TABLET ORAL at 08:23

## 2020-02-24 RX ADMIN — PANTOPRAZOLE SODIUM 20 MG: 20 TABLET, DELAYED RELEASE ORAL at 21:30

## 2020-02-24 RX ADMIN — FUROSEMIDE 40 MG: 40 TABLET ORAL at 17:00

## 2020-02-24 RX ADMIN — ENOXAPARIN SODIUM 40 MG: 40 INJECTION SUBCUTANEOUS at 08:23

## 2020-02-24 RX ADMIN — CHLORDIAZEPOXIDE HYDROCHLORIDE 25 MG: 25 CAPSULE ORAL at 21:30

## 2020-02-24 RX ADMIN — MECLIZINE HYDROCHLORIDE 25 MG: 25 TABLET ORAL at 21:30

## 2020-02-24 RX ADMIN — LIDOCAINE 1 PATCH: 50 PATCH TOPICAL at 14:12

## 2020-02-24 RX ADMIN — LIDOCAINE 1 PATCH: 50 PATCH TOPICAL at 08:23

## 2020-02-24 RX ADMIN — TIMOLOL MALEATE 1 DROP: 2.5 SOLUTION/ DROPS OPHTHALMIC at 08:24

## 2020-02-24 RX ADMIN — MECLIZINE HYDROCHLORIDE 25 MG: 25 TABLET ORAL at 17:00

## 2020-02-24 RX ADMIN — FUROSEMIDE 40 MG: 40 TABLET ORAL at 08:24

## 2020-02-24 RX ADMIN — PRAMIPEXOLE DIHYDROCHLORIDE 0.25 MG: 0.25 TABLET ORAL at 21:30

## 2020-02-24 NOTE — PROGRESS NOTES
Progress Note - Infectious Disease   Nir Walker 80 y o  female MRN: [id number]  Unit/Bed#: [facility] 2 -01 Encounter: [id number]      Impression/Recommendations:  1  Fever, which was not present on admission but developed after multiple attempts at Orantes catheter insertion   Suspect that her fever was most likely secondary to traumatic urethritis versus urinary retention   Fever was nonsustained   UA was benign   Patient remains comfortable without suprapubic tenderness  Patient remains without further temperature spike   I would not check urine culture unless fever recurs, since it will most likely reflect bladder colonization  Observe off further antibiotic  Monitor temperature  Re-evaluate with cultures only if fever recurs      2  Encephalopathy, on background of advanced dementia   I suspect it is secondary to urinary retention  Alexia Coventry is with advanced dementia usually has encephalopathy with any significant change in baseline status, infectious and noninfectious  Tigist Ricardo retention can certainly result in encephalopathy in this patient  Mental status stable, now at baseline  Monitor mental status      3  Urinary retention, with chronic Orantes catheterization   If patient does indeed have recurrent UTI, although I am not sure she truly had UTIs (see below), she would be a candidate for suprapubic catheterization      4  Recurrent UTIs by report   As typical with patient who has chronic Orantes catheterization, many antibiotic courses are actually given for bladder colonization rather than true infection   In this patient with advanced dementia, I would refrain from treating patient for UTI unless she has objective signs of UTI, not just positive urine culture and encephalopathy      5  Advanced dementia   Patient is unable to voice symptoms reliably  Discussed with patient and her family in detail regarding the above plan      Antibiotics:  None     Subjective:  Patient is comfortable    Mental status is baseline  Temperature stays down  No diarrhea  Objective:  Vitals:  Temp:  [98 1 °F (36 7 °C)-99 7 °F (37 6 °C)] 98 1 °F (36 7 °C)  HR:  [68-89] 89  Resp:  [16-18] 16  BP: ()/(47-68) 107/60  SpO2:  [94 %-98 %] 95 %  Temp (24hrs), Av 2 °F (37 3 °C), Min:98 1 °F (36 7 °C), Max:99 7 °F (37 6 °C)  Current: Temperature: 98 1 °F (36 7 °C)    Physical Exam:     General: Awake, alert, cooperative, no distress  Stable confusion  Neck:  Supple  No mass  No lymphadenopathy  Lungs: Expansion symmetric, no rales, no wheezing, respirations unlabored  Heart:  Regular rate and rhythm, S1 and S2 normal, no murmur  Abdomen: Soft, nondistended, non-tender, bowel sounds active all four quadrants, no masses, no organomegaly  Extremities: No edema  No erythema/warmth  No ulcer  Nontender to palpation  Skin:  No rash  Neuro: Moves all extremities  Invasive Devices     Peripheral Intravenous Line            Peripheral IV 20 Left Antecubital less than 1 day          Drain            Urethral Catheter 14 Fr  2 days                Labs studies:   I have personally reviewed pertinent labs  Results from last 7 days   Lab Units 208 20  0509 20  1309   POTASSIUM mmol/L 3 7 3 3* 3 5   CHLORIDE mmol/L 103 99* 97*   CO2 mmol/L 26 28 30   BUN mg/dL 12 9 13   CREATININE mg/dL 0 60 0 67 0 70   EGFR ml/min/1 73sq m 79 76 75   CALCIUM mg/dL 8 1* 7 9* 8 6     Results from last 7 days   Lab Units 20  0458 20  0509 20  1309   WBC Thousand/uL 5 42 4 73 5 20   HEMOGLOBIN g/dL 9 5* 9 4* 10 9*   PLATELETS Thousands/uL 138* 156 185           Imaging Studies:   I have personally reviewed pertinent imaging study reports and images in PACS  EKG, Pathology, and Other Studies:   I have personally reviewed pertinent reports

## 2020-02-24 NOTE — PHYSICAL THERAPY NOTE
Physical Therapy Progress Note     02/24/20 1228   Pain Assessment   Pain Assessment FLACC   Pain Location Knee   Pain Orientation Left   Pain Rating: FLACC (Rest) - Face 0   Pain Rating: FLACC (Rest) - Legs 0   Pain Rating: FLACC (Rest) - Activity 0   Pain Rating: FLACC (Rest) - Cry 0   Pain Rating: FLACC (Rest) - Consolability 0   Score: FLACC (Rest) 0   Pain Rating: FLACC (Activity) - Face 1   Pain Rating: FLACC (Activity) - Legs 0   Pain Rating: FLACC (Activity) - Activity 1   Pain Rating: FLACC (Activity) - Cry 1   Pain Rating: FLACC (Activity) - Consolability 1   Score: FLACC (Activity) 4   Restrictions/Precautions   Weight Bearing Precautions Per Order No   Other Precautions Cognitive; Chair Alarm; Bed Alarm; Fall Risk;Pain   General   Chart Reviewed Yes   Response to Previous Treatment Patient reporting fatigue but able to participate  Family/Caregiver Present No   Subjective   Subjective Willing to participate in therapy this PM    Transfers   Sit to Stand 4  Minimal assistance   Additional items Assist x 2;Armrests; Increased time required;Verbal cues   Stand to Sit 4  Minimal assistance   Additional items Assist x 2;Armrests; Increased time required;Verbal cues   Ambulation/Elevation   Gait pattern Decreased foot clearance; Forward Flexion; Short stride; Step to;Excessively slow; Inconsistent juan; Shuffling   Gait Assistance 4  Minimal assist   Additional items Assist x 2;Verbal cues; Tactile cues   Assistive Device Rolling walker   Distance 10'   Balance   Static Sitting Fair   Dynamic Sitting Fair -   Static Standing Poor +   Dynamic Standing Poor   Ambulatory Poor   Endurance Deficit   Endurance Deficit Yes   Endurance Deficit Description fatigue/weakness/pain   Activity Tolerance   Activity Tolerance Patient limited by fatigue;Patient limited by pain   Medical Staff Made Aware [facility] [place]   Nurse Made Aware Yes   Exercises   TKR Sitting;10 reps;AAROM; Bilateral   Assessment   Prognosis Fair   Problem List Decreased strength;Decreased range of motion;Decreased endurance; Impaired balance;Decreased mobility; Decreased cognition; Impaired judgement;Decreased safety awareness;Decreased skin integrity;Pain; Impaired vision   Assessment Pt  seated in bedside chair upon my arrival  Pt  reporting fatigue, however agreeable to therapeutic intervention  Performance of HEP seated in bedside chair with cues provided for proper completion  Progressed with transfers requiring A of 2 with cues for hand placement/technique  Progressed with a limtied amb  trial with use of RW and A of 2 with cues provided for LE sequencing  Pt  limited by fatigue requiring quick return to bedside chair  Pt  remained seated in bedside chair with alarm active at end of treatment session  PT will continue to recommend d/c to rehab when medically stable for continued improvement of noted impairments above  Barriers to Discharge Inaccessible home environment   Goals   Patient Goals To get better  STG Expiration Date 03/04/20   PT Treatment Day 1   Plan   Treatment/Interventions LE strengthening/ROM; Functional transfer training; Therapeutic exercise; Endurance training;Gait training;Spoke to nursing;Spoke to case management;OT;Family   Progress Slow progress, decreased activity tolerance   PT Frequency Other (Comment)  (3-5x/wk)   Recommendation   Recommendation Short-term skilled PT   Equipment Recommended Walker  (RW)   PT - OK to Discharge Yes  (if d/c to rehab when medically stable )     Everett Mckeon, PTA

## 2020-02-24 NOTE — ASSESSMENT & PLAN NOTE
Likely causing chronic UTIs  Multifactorial secondary to advanced age, poor mobility, and stenotic urethra  ER had difficulty placing a Orantes catheter and therefore urology placed it     Orantes catheter inserted 2/21/20 - please note catheter is NOT chronic   Urology recommends flushing Orantes until clear and maintaining to gravity  Will need outpatient follow-up with urology the office to determine further management

## 2020-02-24 NOTE — PLAN OF CARE
Problem: OCCUPATIONAL THERAPY ADULT  Goal: Performs self-care activities at highest level of function for planned discharge setting  See evaluation for individualized goals  Description  Treatment Interventions: ADL retraining, Functional transfer training, UE strengthening/ROM, Endurance training, Cognitive reorientation, Patient/family training, Equipment evaluation/education, Compensatory technique education, Activityengagement, Energy conservation          See flowsheet documentation for full assessment, interventions and recommendations  Note:   Limitation: Decreased ADL status, Decreased UE strength, Decreased Safe judgement during ADL, Decreased cognition, Decreased endurance, Decreased self-care trans, Decreased high-level ADLs  Prognosis: Good  Assessment: Patient is a 80 y o  female admitted to [facility] on 2/21/2020 due to Urinary retention  Comorbidities affecting pt's physical performance at time of assessment include chronic UTI, GERD, Glaucoma, OA in L knee, back pain, macular degeneration  Patient has active OT orders and activity orders for Up and OOB as tolerated   PTA pt living in a ranch home with 2+2 SUSI, pt lives with daughter who A with ADLs and IADL tasks, pt able to complete UB dressing and A with washing/drying dishes, uses RW for functional mobility, (-)drives, (-)falls  Personal factors affecting pt at time of IE include:steps to enter environment, difficulty performing ADLS and difficulty performing IADLS  At the time of evaluation patient currently requires min-max A for overall ADLs, and min Ax2 for functional mobility  The following deficits affected patient's occupational performance weakness, decreased functional strength, decreased functional balance, decreased activity tolerance, decreased safety awareness, impaired initiation, impaired memory, impaired problem solving and increased pain   Patient would benefit from skilled OT services while in the hospital to address above deficits  Occupational performance areas to be addressed include ADL retraining, functional transfer training, endurance training, cognitive reorientation, patient/family training, equipment evaluation/education, compensatory technique education, activity engagement and activity tolerance in order to maximize patient's level of function  From OT standpoint recommend Short term rehab upon D/C  OT continue to follow pt 3-5x/week to address the following goals        OT Discharge Recommendation: Short Term Rehab  OT - OK to Discharge: (to rehab when medically cleared)

## 2020-02-24 NOTE — PROGRESS NOTES
Progress Note - Mack Armando 9/20/1926, 80 y o  female MRN: [id number]    Unit/Bed#: [facility] [address] 204-01 Encounter: [id number]    Primary Care Provider: Linda Arevalo DO   Date and time admitted to hospital: 2/21/2020 11:00 AM        * Urinary retention  Assessment & Plan  Likely causing chronic UTIs  Multifactorial secondary to advanced age, poor mobility, and stenotic urethra  ER had difficulty placing a Orantes catheter and therefore urology placed it  Orantes catheter inserted 2/21/20 - please note catheter is NOT chronic   Urology recommends flushing Orantes until clear and maintaining to gravity  Will need outpatient follow-up with urology the office to determine further management    Chronic urinary tract infection  Assessment & Plan  With history of multiple UTIs  Likely multifactorial secondary to above  Just finished a course of doxycycline 100 mg b i d  x 10 days 2/17/20    On admission patient was afebrile, nontoxic, no leukocytosis and therefore antibiotics were not warranted at that time  Patient was later seen by admitting physician and spiked a 102 fever  At that time she was given a dose of IV Levaquin 500 mg x 1  Personally spoke with Dr Ramon Clarke of Infectious Disease and will hold off on further antibiotics as fever may have been caused from multiple attempts at Orantes insertion with traumatic urethritis  If fever reoccurs consider checking urine cultures and will further confer with ID at that time    -fortunately has remained without additional fever spike      Impaired mobility and ADLs  Assessment & Plan  Secondary to osteoarthritis  With deconditioning secondary to above  PT/OT recommending short-term rehab  Spoke with daughter at bedside who is agreeable  Awaiting placement    Restless legs syndrome  Assessment & Plan  Continue Mirapex    Osteoarthritis  Assessment & Plan  With chronic deformities/severe kyphosis and ambulatory dysfunction  Uses walker to ambulate at baseline   -complains of joint pain and back pain          -worsened after working with physical therapy          -provide IV Toradol 30 mg x 1 in Lidoderm patch   -with improvement in pain           -add lidoderm patch for left knee    Leg edema  Assessment & Plan  Pre-hospital chronically maintained on Lasix 40 mg daily  This was initially held as patient was thought to be dehydrated  She received IV fluids  Now IV fluids stopped and pt back on Lasix therapy  Glaucoma  Assessment & Plan  Continue home eye drops    GERD (gastroesophageal reflux disease)  Assessment & Plan  Continue omeprazole        VTE Pharmacologic Prophylaxis:   Pharmacologic: Enoxaparin (Lovenox)  Mechanical VTE Prophylaxis in Place: Yes    Discharge Plan:  Awaiting placement    Discussions with Specialists or Other Care Team Provider: Dr Kari Moody    Education and Discussions with Family / Patient: patient, daughter and son at bedside    Time Spent for Care: 20 minutes  More than 50% of total time spent on counseling and coordination of care as described above  Current Length of Stay: 3 day(s)  Current Patient Status: Inpatient   Code Status: Level 3 - DNAR and DNI    Subjective:   Patient sitting in chair at bedside  Just finished working with physical therapy  She will be a candidate for rehab  Requesting have Tylenol administered in the morning scheduled to help with her joint pain  Objective:     Vitals:   Temp (24hrs), Av 2 °F (37 3 °C), Min:98 1 °F (36 7 °C), Max:99 7 °F (37 6 °C)    Temp:  [98 1 °F (36 7 °C)-99 7 °F (37 6 °C)] 98 1 °F (36 7 °C)  HR:  [68-89] 89  Resp:  [16-18] 16  BP: ()/(47-68) 107/60  SpO2:  [94 %-98 %] 95 %  Body mass index is 30 75 kg/m²  Input and Output Summary (last 24 hours):        Intake/Output Summary (Last 24 hours) at 2020 1341  Last data filed at 2020 0601  Gross per 24 hour   Intake --   Output 2300 ml   Net -2300 ml       Physical Exam:     Physical Exam Constitutional: She is oriented to person, place, and time  She appears well-developed and well-nourished  No distress  Severe kyphosis/scoliosis   HENT:   Head: Normocephalic and atraumatic  Cardiovascular: Normal rate, regular rhythm and normal heart sounds  Pulmonary/Chest: Effort normal and breath sounds normal  No stridor  No respiratory distress  She has no wheezes  She has no rales  She exhibits no tenderness  Abdominal: Soft  Bowel sounds are normal  She exhibits no distension and no mass  There is no tenderness  There is no rebound and no guarding  No hernia  Musculoskeletal:   Chronic deformities to feet   Neurological: She is alert and oriented to person, place, and time  Skin: She is not diaphoretic  Psychiatric: She has a normal mood and affect  Her behavior is normal    Nursing note and vitals reviewed  Additional Data:     Labs:    Results from last 7 days   Lab Units 02/23/20 0458  02/21/20  1309   WBC Thousand/uL 5 42   < > 5 20   HEMOGLOBIN g/dL 9 5*   < > 10 9*   HEMATOCRIT % 29 8*   < > 33 2*   PLATELETS Thousands/uL 138*   < > 185   NEUTROS PCT %  --   --  49   LYMPHS PCT %  --   --  36   MONOS PCT %  --   --  14*   EOS PCT %  --   --  0    < > = values in this interval not displayed  Results from last 7 days   Lab Units 02/23/20  0458   POTASSIUM mmol/L 3 7   CHLORIDE mmol/L 103   CO2 mmol/L 26   BUN mg/dL 12   CREATININE mg/dL 0 60   CALCIUM mg/dL 8 1*           * I Have Reviewed All Lab Data Listed Above  * Additional Pertinent Lab Tests Reviewed:  All Labs Within Last 24 Hours Reviewed    Imaging:    Imaging Reports Reviewed Today Include:   Imaging Personally Reviewed by Myself Includes:      Recent Cultures (last 7 days):           Last 24 Hours Medication List:     Current Facility-Administered Medications:  acetaminophen 650 mg Oral Q6H PRN Radha Derek, PA-C   chlordiazePOXIDE 25 mg Oral HS Aracelis Jensen PA-C   enoxaparin 40 mg Subcutaneous Daily Tory Bio NITZA Jensen   furosemide 40 mg Oral BID (diuretic) Aracelis Jensen PA-C   latanoprost 1 drop Left Eye Daily Aracelis Jensen PA-C   lidocaine 1 patch Topical Daily Aracelis Jensen PA-C   lisinopril 10 mg Oral Daily Aracelis Jensen PA-C   meclizine 25 mg Oral TID Cassidy Sniff NITZA Jensen   ondansetron 4 mg Intravenous Q6H PRN Aracelis Jensen PA-C   pantoprazole 20 mg Oral HS Aracelis Jensen PA-C   pramipexole 0 25 mg Oral Daily Aracelis Jensen PA-C   timolol 1 drop Left Eye Daily Aracelis Jensen PA-C   traMADol 50 mg Oral Q8H PRN Orlando Lynn PA-C        Today, Patient Was Seen By: Orlando Lynn PA-C    ** Please Note: This note has been constructed using a voice recognition system   **

## 2020-02-24 NOTE — SOCIAL WORK
CM met with pt, pt's daughter and pt's son at bedside  Pt is from home  She lives in a two story home but stays on the first floor  Pt uses RW/cane/manual WC and receives assistance from her family  Therapy recommends STR and family was agreeable  Pt has dementia, so family was assisting with choices  Choices were, in order:  [facility] - [facility], Félix Sena, [facility], Son  CM sent referrals and will follow up for remainder of dcp

## 2020-02-24 NOTE — PLAN OF CARE
Problem: Potential for Falls  Goal: Patient will remain free of falls  Description  INTERVENTIONS:  - Assess patient frequently for physical needs  -  Identify cognitive and physical deficits and behaviors that affect risk of falls    -  [place] fall precautions as indicated by assessment   - Educate patient/family on patient safety including physical limitations  - Instruct patient to call for assistance with activity based on assessment  - Modify environment to reduce risk of injury  - Consider OT/PT consult to assist with strengthening/mobility  Outcome: Progressing     Problem: Prexisting or High Potential for Compromised Skin Integrity  Goal: Skin integrity is maintained or improved  Description  INTERVENTIONS:  - Identify patients at risk for skin breakdown  - Assess and monitor skin integrity  - Assess and monitor nutrition and hydration status  - Monitor labs   - Assess for incontinence   - Turn and reposition patient  - Assist with mobility/ambulation  - Relieve pressure over bony prominences  - Avoid friction and shearing  - Provide appropriate hygiene as needed including keeping skin clean and dry  - Evaluate need for skin moisturizer/barrier cream  - Collaborate with interdisciplinary team   - Patient/family teaching  - Consider wound care consult   Outcome: Progressing     Problem: PAIN - ADULT  Goal: Verbalizes/displays adequate comfort level or baseline comfort level  Description  Interventions:  - Encourage patient to monitor pain and request assistance  - Assess pain using appropriate pain scale  - Administer analgesics based on type and severity of pain and evaluate response  - Implement non-pharmacological measures as appropriate and evaluate response  - Consider cultural and social influences on pain and pain management  - Notify physician/advanced practitioner if interventions unsuccessful or patient reports new pain  Outcome: Progressing     Problem: INFECTION - ADULT  Goal: Absence or prevention of progression during hospitalization  Description  INTERVENTIONS:  - Assess and monitor for signs and symptoms of infection  - Monitor lab/diagnostic results  - Monitor all insertion sites, i e  indwelling lines, tubes, and drains  - Monitor endotracheal if appropriate and nasal secretions for changes in amount and color  - [place] appropriate cooling/warming therapies per order  - Administer medications as ordered  - Instruct and encourage patient and family to use good hand hygiene technique  - Identify and instruct in appropriate isolation precautions for identified infection/condition  Outcome: Progressing     Problem: SAFETY ADULT  Goal: Maintain or return to baseline ADL function  Description  INTERVENTIONS:  -  Assess patient's ability to carry out ADLs; assess patient's baseline for ADL function and identify physical deficits which impact ability to perform ADLs (bathing, care of mouth/teeth, toileting, grooming, dressing, etc )  - Assess/evaluate cause of self-care deficits   - Assess range of motion  - Assess patient's mobility; develop plan if impaired  - Assess patient's need for assistive devices and provide as appropriate  - Encourage maximum independence but intervene and supervise when necessary  - Involve family in performance of ADLs  - Assess for home care needs following discharge   - Consider OT consult to assist with ADL evaluation and planning for discharge  - Provide patient education as appropriate  Outcome: Progressing  Goal: Maintain or return mobility status to optimal level  Description  INTERVENTIONS:  - Assess patient's baseline mobility status (ambulation, transfers, stairs, etc )    - Identify cognitive and physical deficits and behaviors that affect mobility  - Identify mobility aids required to assist with transfers and/or ambulation (gait belt, sit-to-stand, lift, walker, cane, etc )  - [place] fall precautions as indicated by assessment  - Record patient progress and toleration of activity level on Mobility SBAR; progress patient to next Phase/Stage  - Instruct patient to call for assistance with activity based on assessment  - Consider rehabilitation consult to assist with strengthening/weightbearing, etc   Outcome: Progressing     Problem: DISCHARGE PLANNING  Goal: Discharge to home or other facility with appropriate resources  Description  INTERVENTIONS:  - Identify barriers to discharge w/patient and caregiver  - Arrange for needed discharge resources and transportation as appropriate  - Identify discharge learning needs (meds, wound care, etc )  - Arrange for interpretive services to assist at discharge as needed  - Refer to Case Management Department for coordinating discharge planning if the patient needs post-hospital services based on physician/advanced practitioner order or complex needs related to functional status, cognitive ability, or social support system  Outcome: Progressing     Problem: Knowledge Deficit  Goal: Patient/family/caregiver demonstrates understanding of disease process, treatment plan, medications, and discharge instructions  Description  Complete learning assessment and assess knowledge base  Interventions:  - Provide teaching at level of understanding  - Provide teaching via preferred learning methods  Outcome: Progressing     Problem: Nutrition/Hydration-ADULT  Goal: Nutrient/Hydration intake appropriate for improving, restoring or maintaining nutritional needs  Description  Monitor and assess patient's nutrition/hydration status for malnutrition  Collaborate with interdisciplinary team and initiate plan and interventions as ordered  Monitor patient's weight and dietary intake as ordered or per policy  Utilize nutrition screening tool and intervene as necessary  Determine patient's food preferences and provide high-protein, high-caloric foods as appropriate       INTERVENTIONS:  - Monitor oral intake, urinary output, labs, and treatment plans  - Assess nutrition and hydration status and recommend course of action  - Evaluate amount of meals eaten  - Assist patient with eating if necessary   - Allow adequate time for meals  - Recommend/ encourage appropriate diets, oral nutritional supplements, and vitamin/mineral supplements  - Order, calculate, and assess calorie counts as needed  - Recommend, monitor, and adjust tube feedings and TPN/PPN based on assessed needs  - Assess need for intravenous fluids  - Provide specific nutrition/hydration education as appropriate  - Include patient/family/caregiver in decisions related to nutrition  Outcome: Progressing     Problem: GENITOURINARY - ADULT  Goal: Maintains or returns to baseline urinary function  Description  INTERVENTIONS:  - Assess urinary function  - Encourage oral fluids to ensure adequate hydration if ordered  - Administer IV fluids as ordered to ensure adequate hydration  - Administer ordered medications as needed  - Offer frequent toileting  - Follow urinary retention protocol if ordered  Outcome: Progressing  Goal: Absence of urinary retention  Description  INTERVENTIONS:  - Assess patients ability to void and empty bladder  - Monitor I/O  - Bladder scan as needed  - Discuss with physician/AP medications to alleviate retention as needed  - Discuss catheterization for long term situations as appropriate  Outcome: Progressing  Goal: Urinary catheter remains patent  Description  INTERVENTIONS:  - Assess patency of urinary catheter  - If patient has a chronic ward, consider changing catheter if non-functioning  - Follow guidelines for intermittent irrigation of non-functioning urinary catheter  Outcome: Progressing     Problem: SKIN/TISSUE INTEGRITY - ADULT  Goal: Skin integrity remains intact  Description  INTERVENTIONS  - Identify patients at risk for skin breakdown  - Assess and monitor skin integrity  - Assess and monitor nutrition and hydration status  - Monitor labs (i e  albumin)  - Assess for incontinence   - Turn and reposition patient  - Assist with mobility/ambulation  - Relieve pressure over bony prominences  - Avoid friction and shearing  - Provide appropriate hygiene as needed including keeping skin clean and dry  - Evaluate need for skin moisturizer/barrier cream  - Collaborate with interdisciplinary team (i e  Nutrition, Rehabilitation, etc )   - Patient/family teaching  Outcome: Progressing  Goal: Oral mucous membranes remain intact  Description  INTERVENTIONS  - Assess oral mucosa and hygiene practices  - Implement preventative oral hygiene regimen  - Implement oral medicated treatments as ordered  - Initiate Nutrition services referral as needed  Outcome: Progressing     Problem: MUSCULOSKELETAL - ADULT  Goal: Maintain or return mobility to safest level of function  Description  INTERVENTIONS:  - Assess patient's ability to carry out ADLs; assess patient's baseline for ADL function and identify physical deficits which impact ability to perform ADLs (bathing, care of mouth/teeth, toileting, grooming, dressing, etc )  - Assess/evaluate cause of self-care deficits   - Assess range of motion  - Assess patient's mobility  - Assess patient's need for assistive devices and provide as appropriate  - Encourage maximum independence but intervene and supervise when necessary  - Involve family in performance of ADLs  - Assess for home care needs following discharge   - Consider OT consult to assist with ADL evaluation and planning for discharge  - Provide patient education as appropriate  Outcome: Progressing

## 2020-02-24 NOTE — PLAN OF CARE
Problem: Potential for Falls  Goal: Patient will remain free of falls  Description  INTERVENTIONS:  - Assess patient frequently for physical needs  -  Identify cognitive and physical deficits and behaviors that affect risk of falls    -  [place] fall precautions as indicated by assessment   - Educate patient/family on patient safety including physical limitations  - Instruct patient to call for assistance with activity based on assessment  - Modify environment to reduce risk of injury  - Consider OT/PT consult to assist with strengthening/mobility  Outcome: Progressing     Problem: Prexisting or High Potential for Compromised Skin Integrity  Goal: Skin integrity is maintained or improved  Description  INTERVENTIONS:  - Identify patients at risk for skin breakdown  - Assess and monitor skin integrity  - Assess and monitor nutrition and hydration status  - Monitor labs   - Assess for incontinence   - Turn and reposition patient  - Assist with mobility/ambulation  - Relieve pressure over bony prominences  - Avoid friction and shearing  - Provide appropriate hygiene as needed including keeping skin clean and dry  - Evaluate need for skin moisturizer/barrier cream  - Collaborate with interdisciplinary team   - Patient/family teaching  - Consider wound care consult   Outcome: Progressing     Problem: PAIN - ADULT  Goal: Verbalizes/displays adequate comfort level or baseline comfort level  Description  Interventions:  - Encourage patient to monitor pain and request assistance  - Assess pain using appropriate pain scale  - Administer analgesics based on type and severity of pain and evaluate response  - Implement non-pharmacological measures as appropriate and evaluate response  - Consider cultural and social influences on pain and pain management  - Notify physician/advanced practitioner if interventions unsuccessful or patient reports new pain  Outcome: Progressing     Problem: INFECTION - ADULT  Goal: Absence or prevention of progression during hospitalization  Description  INTERVENTIONS:  - Assess and monitor for signs and symptoms of infection  - Monitor lab/diagnostic results  - Monitor all insertion sites, i e  indwelling lines, tubes, and drains  - Monitor endotracheal if appropriate and nasal secretions for changes in amount and color  - [place] appropriate cooling/warming therapies per order  - Administer medications as ordered  - Instruct and encourage patient and family to use good hand hygiene technique  - Identify and instruct in appropriate isolation precautions for identified infection/condition  Outcome: Progressing     Problem: SAFETY ADULT  Goal: Maintain or return to baseline ADL function  Description  INTERVENTIONS:  -  Assess patient's ability to carry out ADLs; assess patient's baseline for ADL function and identify physical deficits which impact ability to perform ADLs (bathing, care of mouth/teeth, toileting, grooming, dressing, etc )  - Assess/evaluate cause of self-care deficits   - Assess range of motion  - Assess patient's mobility; develop plan if impaired  - Assess patient's need for assistive devices and provide as appropriate  - Encourage maximum independence but intervene and supervise when necessary  - Involve family in performance of ADLs  - Assess for home care needs following discharge   - Consider OT consult to assist with ADL evaluation and planning for discharge  - Provide patient education as appropriate  Outcome: Progressing  Goal: Maintain or return mobility status to optimal level  Description  INTERVENTIONS:  - Assess patient's baseline mobility status (ambulation, transfers, stairs, etc )    - Identify cognitive and physical deficits and behaviors that affect mobility  - Identify mobility aids required to assist with transfers and/or ambulation (gait belt, sit-to-stand, lift, walker, cane, etc )  - [place] fall precautions as indicated by assessment  - Record patient progress and toleration of activity level on Mobility SBAR; progress patient to next Phase/Stage  - Instruct patient to call for assistance with activity based on assessment  - Consider rehabilitation consult to assist with strengthening/weightbearing, etc   Outcome: Progressing     Problem: DISCHARGE PLANNING  Goal: Discharge to home or other facility with appropriate resources  Description  INTERVENTIONS:  - Identify barriers to discharge w/patient and caregiver  - Arrange for needed discharge resources and transportation as appropriate  - Identify discharge learning needs (meds, wound care, etc )  - Arrange for interpretive services to assist at discharge as needed  - Refer to Case Management Department for coordinating discharge planning if the patient needs post-hospital services based on physician/advanced practitioner order or complex needs related to functional status, cognitive ability, or social support system  Outcome: Progressing     Problem: Knowledge Deficit  Goal: Patient/family/caregiver demonstrates understanding of disease process, treatment plan, medications, and discharge instructions  Description  Complete learning assessment and assess knowledge base  Interventions:  - Provide teaching at level of understanding  - Provide teaching via preferred learning methods  Outcome: Progressing     Problem: Nutrition/Hydration-ADULT  Goal: Nutrient/Hydration intake appropriate for improving, restoring or maintaining nutritional needs  Description  Monitor and assess patient's nutrition/hydration status for malnutrition  Collaborate with interdisciplinary team and initiate plan and interventions as ordered  Monitor patient's weight and dietary intake as ordered or per policy  Utilize nutrition screening tool and intervene as necessary  Determine patient's food preferences and provide high-protein, high-caloric foods as appropriate       INTERVENTIONS:  - Monitor oral intake, urinary output, labs, and treatment plans  - Assess nutrition and hydration status and recommend course of action  - Evaluate amount of meals eaten  - Assist patient with eating if necessary   - Allow adequate time for meals  - Recommend/ encourage appropriate diets, oral nutritional supplements, and vitamin/mineral supplements  - Order, calculate, and assess calorie counts as needed  - Recommend, monitor, and adjust tube feedings and TPN/PPN based on assessed needs  - Assess need for intravenous fluids  - Provide specific nutrition/hydration education as appropriate  - Include patient/family/caregiver in decisions related to nutrition  Outcome: Progressing     Problem: GENITOURINARY - ADULT  Goal: Maintains or returns to baseline urinary function  Description  INTERVENTIONS:  - Assess urinary function  - Encourage oral fluids to ensure adequate hydration if ordered  - Administer IV fluids as ordered to ensure adequate hydration  - Administer ordered medications as needed  - Offer frequent toileting  - Follow urinary retention protocol if ordered  Outcome: Progressing  Goal: Absence of urinary retention  Description  INTERVENTIONS:  - Assess patients ability to void and empty bladder  - Monitor I/O  - Bladder scan as needed  - Discuss with physician/AP medications to alleviate retention as needed  - Discuss catheterization for long term situations as appropriate  Outcome: Progressing  Goal: Urinary catheter remains patent  Description  INTERVENTIONS:  - Assess patency of urinary catheter  - If patient has a chronic ward, consider changing catheter if non-functioning  - Follow guidelines for intermittent irrigation of non-functioning urinary catheter  Outcome: Progressing     Problem: SKIN/TISSUE INTEGRITY - ADULT  Goal: Skin integrity remains intact  Description  INTERVENTIONS  - Identify patients at risk for skin breakdown  - Assess and monitor skin integrity  - Assess and monitor nutrition and hydration status  - Monitor labs (i e  albumin)  - Assess for incontinence   - Turn and reposition patient  - Assist with mobility/ambulation  - Relieve pressure over bony prominences  - Avoid friction and shearing  - Provide appropriate hygiene as needed including keeping skin clean and dry  - Evaluate need for skin moisturizer/barrier cream  - Collaborate with interdisciplinary team (i e  Nutrition, Rehabilitation, etc )   - Patient/family teaching  Outcome: Progressing  Goal: Oral mucous membranes remain intact  Description  INTERVENTIONS  - Assess oral mucosa and hygiene practices  - Implement preventative oral hygiene regimen  - Implement oral medicated treatments as ordered  - Initiate Nutrition services referral as needed  Outcome: Progressing     Problem: MUSCULOSKELETAL - ADULT  Goal: Maintain or return mobility to safest level of function  Description  INTERVENTIONS:  - Assess patient's ability to carry out ADLs; assess patient's baseline for ADL function and identify physical deficits which impact ability to perform ADLs (bathing, care of mouth/teeth, toileting, grooming, dressing, etc )  - Assess/evaluate cause of self-care deficits   - Assess range of motion  - Assess patient's mobility  - Assess patient's need for assistive devices and provide as appropriate  - Encourage maximum independence but intervene and supervise when necessary  - Involve family in performance of ADLs  - Assess for home care needs following discharge   - Consider OT consult to assist with ADL evaluation and planning for discharge  - Provide patient education as appropriate  Outcome: Progressing

## 2020-02-24 NOTE — ASSESSMENT & PLAN NOTE
With history of multiple UTIs  Likely multifactorial secondary to above  Just finished a course of doxycycline 100 mg b i d  x 10 days 2/17/20    On admission patient was afebrile, nontoxic, no leukocytosis and therefore antibiotics were not warranted at that time  Patient was later seen by admitting physician and spiked a 102 fever  At that time she was given a dose of IV Levaquin 500 mg x 1  Personally spoke with Dr Adia Shane of Infectious Disease and will hold off on further antibiotics as fever may have been caused from multiple attempts at [place] insertion with traumatic urethritis  If fever reoccurs consider checking urine cultures and will further confer with ID at that time    -fortunately has remained without additional fever spike

## 2020-02-24 NOTE — ASSESSMENT & PLAN NOTE
Pre-hospital chronically maintained on Lasix 40 mg daily  This was initially held as patient was thought to be dehydrated  She received IV fluids  Now IV fluids stopped and pt back on Lasix therapy

## 2020-02-24 NOTE — OCCUPATIONAL THERAPY NOTE
Occupational Therapy Evaluation     Patient Name: Sophie Merlos  TYHUB'X Date: 2/24/2020  Problem List  Principal Problem:    Urinary retention  Active Problems:    Chronic urinary tract infection    GERD (gastroesophageal reflux disease)    Glaucoma    Leg edema    Osteoarthritis    Restless legs syndrome    Impaired mobility and ADLs    Past Medical History  Past Medical History:   Diagnosis Date    Anxiety     Arthritis     Cancer ([facility] )     Elevated serum alkaline phosphatase level     mild, isoenzymes are normal, resolved 4/24/17 labs , last assessed 11/10/16, resolved 4/24/17    Hematuria     last assessed 9/18/12    Hyperlipidemia 8/13/2012    Hypertension     Pulmonary embolism ([facility] ) 01/2011    last assessed 9/18/12     Past Surgical History  Past Surgical History:   Procedure Laterality Date    BILATERAL OOPHORECTOMY      Laparoscopic    BREAST SURGERY Left     Mastectomy     CHOLECYSTECTOMY      Laparoscopic    HERNIA REPAIR      HYSTERECTOMY      SMALL INTESTINE SURGERY             02/24/20 1232   Note Type   Note type Eval only   Restrictions/Precautions   Weight Bearing Precautions Per Order No   Other Precautions Cognitive; Chair Alarm; Bed Alarm; Fall Risk;Pain   Pain Assessment   Pain Assessment FLACC   Pain Location Knee   Pain Orientation Left   Pain Rating: FLACC (Rest) - Face 0   Pain Rating: FLACC (Rest) - Legs 0   Pain Rating: FLACC (Rest) - Activity 0   Pain Rating: FLACC (Rest) - Cry 0   Pain Rating: FLACC (Rest) - Consolability 0   Score: FLACC (Rest) 0   Pain Rating: FLACC (Activity) - Face 1   Pain Rating: FLACC (Activity) - Legs 0   Pain Rating: FLACC (Activity) - Activity 1   Pain Rating: FLACC (Activity) - Cry 1   Pain Rating: FLACC (Activity) - Consolability 1   Score: FLACC (Activity) 4   Home Living   Type of Home House   Home Layout One level; Laundry in basement;Performs ADLs on one level; Able to live on main level with bedroom/bathroom;Stairs to enter with rails  (2+2 SUSI)   Bathroom Shower/Tub Tub/shower unit  (pt reports sponge bathing at sink)   Bathroom Toilet Standard   Bathroom Equipment Grab bars in shower; Shower chair;Commode   Bathroom Accessibility Accessible via walker   [address]; Wheelchair-manual;Cane   Additional Comments Pt reports using RW at baseline, states having 4 RW at home and 3 [facility]   Prior Function   Level of [place] Needs assistance with IADLs; Needs assistance with ADLs and functional mobility   Lives With Family   Receives Help From Family   ADL Assistance Needs assistance   IADLs Needs assistance   Falls in the last 6 months 0   Vocational Retired   Comments PTA pt living in a ranch home with 2+2 SUSI, pt lives with daughter who A with ADLs and IADL tasks, pt able to complete UB dressing and A with washing/drying dishes, uses RW for functional mobility, (-)drives, (-)falls  Lifestyle   Autonomy PTA pt living in a ranch home with 2+2 SUSI, pt lives with daughter who A with ADLs and IADL tasks, pt able to complete UB dressing and A with washing/drying dishes, uses RW for functional mobility, (-)drives, (-)falls  Reciprocal Relationships daughter, son   Service to Others retired   Intrinsic Gratification watching tv, doing dishes   Subjective   Subjective "I don't go outside, because I have no shoes I like"   ADL   Where Assessed Chair   Eating Assistance 5  Supervision/Setup   Eating Deficit Setup; Increased time to complete   Grooming Assistance 5  Supervision/Setup   Grooming Deficit Setup;Supervision/safety; Increased time to complete   UB Bathing Assistance 4  Minimal Assistance   LB Bathing Assistance 2  Maximal Assistance   [address] 2  Maximal Assistance   LB Dressing Deficit Don/doff R sock; Don/doff L sock; Increased time to complete;Supervision/safety;Steadying;Verbal cueing   Toileting Assistance  2  Maximal Assistance   Bed Mobility   Additional Comments OOB and in chair upon arrival, pt noted with L lateral lean upon arrival   Transfers   Sit to Stand 4  Minimal assistance   Additional items Assist x 2; Increased time required;Verbal cues   Stand to Sit 4  Minimal assistance   Additional items Assist x 2; Increased time required;Verbal cues   Functional Mobility   Functional Mobility 4  Minimal assistance   Additional Comments Ax2, taking 8-10 steps forward, with chair follow   Additional items Rolling walker   Balance   Static Sitting Fair   Dynamic Sitting Fair -   Static Standing Poor +   Dynamic Standing Poor +   Ambulatory Poor +   Activity Tolerance   Activity Tolerance Patient limited by fatigue   Medical Staff Made Aware FÉLIX Ornelas   RUEDMUNDO Assessment   RUE Assessment   (3+/5 strength throughout)   LUE Assessment   LUE Assessment   (3+/5 strength throughout)   Hand Function   Gross Motor Coordination Functional   Fine Motor Coordination Functional   Sensation   Light Touch No apparent deficits   Sharp/Dull No apparent deficits   Vision-Basic Assessment   Current Vision Does not wear glasses   Visual History Macular degeneration;Glaucoma  (Daughter states "She is blind")   Vision - Complex Assessment   Acuity   (Able to make out therapist's features when 2ft away )   Cognition   Overall Cognitive Status Impaired   Arousal/Participation Alert; Cooperative   Attention Attends with cues to redirect   Orientation Level Oriented to person;Oriented to place;Oriented to time   Memory Decreased short term memory;Decreased recall of recent events;Decreased recall of precautions   Following Commands Follows one step commands with increased time or repetition   Comments decreased safety awareness   Assessment   Limitation Decreased ADL status; Decreased UE strength;Decreased Safe judgement during ADL;Decreased cognition;Decreased endurance;Decreased self-care trans;Decreased high-level ADLs   Prognosis Good   Assessment Patient is a 80 y o  female admitted to [facility] on 2/21/2020 due to Urinary retention  Comorbidities affecting pt's physical performance at time of assessment include chronic UTI, GERD, Glaucoma, OA in L knee, back pain, macular degeneration  Patient has active OT orders and activity orders for Up and OOB as tolerated   PTA pt living in a ranch home with 2+2 SUSI, pt lives with daughter who A with ADLs and IADL tasks, pt able to complete UB dressing and A with washing/drying dishes, uses RW for functional mobility, (-)drives, (-)falls  Personal factors affecting pt at time of IE include:steps to enter environment, difficulty performing ADLS and difficulty performing IADLS  At the time of evaluation patient currently requires min-max A for overall ADLs, and min Ax2 for functional mobility  The following deficits affected patient's occupational performance weakness, decreased functional strength, decreased functional balance, decreased activity tolerance, decreased safety awareness, impaired initiation, impaired memory, impaired problem solving and increased pain  Patient would benefit from skilled OT services while in the hospital to address above deficits  Occupational performance areas to be addressed include ADL retraining, functional transfer training, endurance training, cognitive reorientation, patient/family training, equipment evaluation/education, compensatory technique education, activity engagement and activity tolerance in order to maximize patient's level of function  From OT standpoint recommend Short term rehab upon D/C  OT continue to follow pt 3-5x/week to address the following goals  Goals   Long Term Goal please see goals written below   Plan   Treatment Interventions ADL retraining;Functional transfer training;UE strengthening/ROM; Endurance training;Cognitive reorientation;Patient/family training;Equipment evaluation/education; Compensatory technique education; Activityengagement; Energy conservation   Goal Expiration Date 03/09/20   OT Treatment Day 0   OT Frequency 3-5x/wk   Recommendation   OT Discharge Recommendation Short Term Rehab   OT - OK to Discharge   (to rehab when medically cleared)   Barthel Index   Feeding 5   Bathing 0   Grooming Score 5   Dressing Score 5   Bladder Score 0   Bowels Score 5   Toilet Use Score 5   Transfers (Bed/Chair) Score 5   Mobility (Level Surface) Score 0   Stairs Score 0   Barthel Index Score 30        Goals    1) Patient will complete UB ADLs w/ (S) using AE and AD as needed    2) Patient will complete LB ADLs w/ (S) using AE and AD as needed    3) Patient will complete toileting w/ (S) w/ G hygiene/thoroughness    4) Patient will complete bed mobility with (S) without use of bed rails    5) Patient will tolerate therapeutic activities for greater than 15 min, in order to increase tolerance for functional activities  6) Patient will perform functional transfers with (S) to/from all surfaces using DME as needed    7) Patient will complete functional mobility during ADL/IADL/leisure tasks with (S)     8) Patient will follow multistep instructions with no cuing to increase cognitive function and independence with tasks     9) Patient will increase BUE strength by 1MM grade via AROM/AAROM/PROM exercises to increase independence in ADLs and transfers    10)Patient will verbalize 3 potential fall hazards and identify appropriate compensatory techniques to decrease fall risk in home environment         Pt will benefit from continued OT services in order to maximize independence with ADL performance, functional mobility with RW, and improve overall endurance/strength required to complete functional tasks in preparation for discharge  At the end of the session, all needs met and pt seated in bedside chair, LEs elevated , chair alarm activated, SCDs on BLE and turned on and Call bell within reach       Zita Anderson, OTR/L
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
## 2020-02-24 NOTE — ASSESSMENT & PLAN NOTE
With chronic deformities/severe kyphosis and ambulatory dysfunction  Uses walker to ambulate at baseline   2/24-complains of joint pain and back pain          -worsened after working with physical therapy          -provide IV Toradol 30 mg x 1 in Lidoderm patch   2/25-with improvement in pain           -add lidoderm patch for left knee

## 2020-02-24 NOTE — ASSESSMENT & PLAN NOTE
Secondary to osteoarthritis  With deconditioning secondary to above  PT/OT recommending short-term rehab  Spoke with daughter at bedside who is agreeable  Awaiting placement

## 2020-02-24 NOTE — PLAN OF CARE
Problem: PHYSICAL THERAPY ADULT  Goal: Performs mobility at highest level of function for planned discharge setting  See evaluation for individualized goals  Description  Treatment/Interventions: Functional transfer training, LE strengthening/ROM, Therapeutic exercise, Endurance training, Cognitive reorientation, Patient/family training, Equipment eval/education, Bed mobility, Gait training, Spoke to nursing          See flowsheet documentation for full assessment, interventions and recommendations  Outcome: Progressing  Note:   Prognosis: Fair  Problem List: Decreased strength, Decreased range of motion, Decreased endurance, Impaired balance, Decreased mobility, Decreased cognition, Impaired judgement, Decreased safety awareness, Decreased skin integrity, Pain, Impaired vision  Assessment: Pt  seated in bedside chair upon my arrival  Pt  reporting fatigue, however agreeable to therapeutic intervention  Performance of HEP seated in bedside chair with cues provided for proper completion  Progressed with transfers requiring A of 2 with cues for hand placement/technique  Progressed with a limtied amb  trial with use of RW and A of 2 with cues provided for LE sequencing  Pt  limited by fatigue requiring quick return to bedside chair  Pt  remained seated in bedside chair with alarm active at end of treatment session  PT will continue to recommend d/c to rehab when medically stable for continued improvement of noted impairments above  Barriers to Discharge: Inaccessible home environment     Recommendation: Short-term skilled PT     PT - OK to Discharge: Yes(if d/c to rehab when medically stable )    See flowsheet documentation for full assessment

## 2020-02-25 PROBLEM — B02.9 SHINGLES: Status: ACTIVE | Noted: 2020-02-25

## 2020-02-25 LAB
ANION GAP SERPL CALCULATED.3IONS-SCNC: 9 MMOL/L (ref 4–13)
BUN SERPL-MCNC: 14 MG/DL (ref 5–25)
CALCIUM SERPL-MCNC: 8.7 MG/DL (ref 8.3–10.1)
CHLORIDE SERPL-SCNC: 101 MMOL/L (ref 100–108)
CO2 SERPL-SCNC: 26 MMOL/L (ref 21–32)
CREAT SERPL-MCNC: 0.62 MG/DL (ref 0.6–1.3)
ERYTHROCYTE [DISTWIDTH] IN BLOOD BY AUTOMATED COUNT: 13.8 % (ref 11.6–15.1)
GFR SERPL CREATININE-BSD FRML MDRD: 78 ML/MIN/1.73SQ M
GLUCOSE SERPL-MCNC: 97 MG/DL (ref 65–140)
HCT VFR BLD AUTO: 32.8 % (ref 34.8–46.1)
HGB BLD-MCNC: 10.6 G/DL (ref 11.5–15.4)
MCH RBC QN AUTO: 31.1 PG (ref 26.8–34.3)
MCHC RBC AUTO-ENTMCNC: 32.3 G/DL (ref 31.4–37.4)
MCV RBC AUTO: 96 FL (ref 82–98)
PLATELET # BLD AUTO: 199 THOUSANDS/UL (ref 149–390)
PMV BLD AUTO: 10 FL (ref 8.9–12.7)
POTASSIUM SERPL-SCNC: 4.1 MMOL/L (ref 3.5–5.3)
RBC # BLD AUTO: 3.41 MILLION/UL (ref 3.81–5.12)
SODIUM SERPL-SCNC: 136 MMOL/L (ref 136–145)
WBC # BLD AUTO: 5.24 THOUSAND/UL (ref 4.31–10.16)

## 2020-02-25 PROCEDURE — 80048 BASIC METABOLIC PNL TOTAL CA: CPT | Performed by: PHYSICIAN ASSISTANT

## 2020-02-25 PROCEDURE — 99232 SBSQ HOSP IP/OBS MODERATE 35: CPT | Performed by: INTERNAL MEDICINE

## 2020-02-25 PROCEDURE — 99232 SBSQ HOSP IP/OBS MODERATE 35: CPT | Performed by: PHYSICIAN ASSISTANT

## 2020-02-25 PROCEDURE — 85027 COMPLETE CBC AUTOMATED: CPT | Performed by: PHYSICIAN ASSISTANT

## 2020-02-25 RX ADMIN — TIMOLOL MALEATE 1 DROP: 2.5 SOLUTION/ DROPS OPHTHALMIC at 09:54

## 2020-02-25 RX ADMIN — FUROSEMIDE 40 MG: 40 TABLET ORAL at 17:30

## 2020-02-25 RX ADMIN — MECLIZINE HYDROCHLORIDE 25 MG: 25 TABLET ORAL at 22:00

## 2020-02-25 RX ADMIN — ACETAMINOPHEN 650 MG: 325 TABLET ORAL at 08:55

## 2020-02-25 RX ADMIN — ENOXAPARIN SODIUM 40 MG: 40 INJECTION SUBCUTANEOUS at 08:56

## 2020-02-25 RX ADMIN — PANTOPRAZOLE SODIUM 20 MG: 20 TABLET, DELAYED RELEASE ORAL at 22:00

## 2020-02-25 RX ADMIN — MECLIZINE HYDROCHLORIDE 25 MG: 25 TABLET ORAL at 17:30

## 2020-02-25 RX ADMIN — LISINOPRIL 10 MG: 10 TABLET ORAL at 08:56

## 2020-02-25 RX ADMIN — MECLIZINE HYDROCHLORIDE 25 MG: 25 TABLET ORAL at 08:55

## 2020-02-25 RX ADMIN — VALACYCLOVIR HYDROCHLORIDE 1000 MG: 500 TABLET, FILM COATED ORAL at 22:04

## 2020-02-25 RX ADMIN — CHLORDIAZEPOXIDE HYDROCHLORIDE 25 MG: 25 CAPSULE ORAL at 22:00

## 2020-02-25 RX ADMIN — LIDOCAINE 1 PATCH: 50 PATCH TOPICAL at 09:55

## 2020-02-25 RX ADMIN — VALACYCLOVIR HYDROCHLORIDE 1000 MG: 500 TABLET, FILM COATED ORAL at 09:54

## 2020-02-25 RX ADMIN — LATANOPROST 1 DROP: 50 SOLUTION OPHTHALMIC at 09:54

## 2020-02-25 RX ADMIN — FUROSEMIDE 40 MG: 40 TABLET ORAL at 08:56

## 2020-02-25 RX ADMIN — LIDOCAINE 1 PATCH: 50 PATCH TOPICAL at 09:54

## 2020-02-25 RX ADMIN — PRAMIPEXOLE DIHYDROCHLORIDE 0.25 MG: 0.25 TABLET ORAL at 22:00

## 2020-02-25 NOTE — PROGRESS NOTES
Progress Note - Infectious Disease   Libby Taveras 80 y o  female MRN: [id number]  Unit/Bed#: [place] [address] -[id number] Encounter: [id number]      Impression/Recommendations:  1  Dermatomal zoster, on left flank  No clinical signs of bacterial superinfection  Start Valtrex  Serial exams  Treat x7 days total     2  Fever, which was not present on admission but developed after multiple attempts at Orantes catheter insertion   Suspect that her fever was most likely secondary to traumatic urethritis versus urinary retention   Fever was nonsustained   UA was benign     Observe off further antibiotic  Monitor temperature  Re-evaluate with cultures only if fever recurs      3  Encephalopathy, on background of advanced dementia   I suspect it is secondary to urinary retention  Cornelia Mc is with advanced dementia usually has encephalopathy with any significant change in baseline status, infectious and noninfectious  [place] retention can certainly result in encephalopathy in this patient   Mental status stable, now at baseline  Monitor mental status      4  Urinary retention, with chronic Orantes catheterization   If patient does indeed have recurrent UTI, although I am not sure she truly had UTIs (see below), she would be a candidate for suprapubic catheterization      5  Recurrent UTIs by report   As typical with patient who has chronic Orantes catheterization, many antibiotic courses are actually given for bladder colonization rather than true infection   In this patient with advanced dementia, I would refrain from treating patient for UTI unless she has objective signs of UTI, not just positive urine culture and encephalopathy      6  Advanced dementia   Patient is unable to voice symptoms reliably      Discussed with patient  Discussed with slim service      Antibiotics:  None     Subjective:  Patient with painful left flank rash  She is otherwise comfortable  Mental status is baseline  Temperature stays down    No diarrhea  Objective:  Vitals:  Temp:  [98 3 °F (36 8 °C)-98 9 °F (37 2 °C)] 98 9 °F (37 2 °C)  HR:  [81-86] 81  Resp:  [16-18] 18  BP: (108-124)/(61-70) 119/66  SpO2:  [94 %-99 %] 97 %  Temp (24hrs), Av 8 °F (37 1 °C), Min:98 3 °F (36 8 °C), Max:98 9 °F (37 2 °C)  Current: Temperature: 98 9 °F (37 2 °C)    Physical Exam:     General: Awake, alert, cooperative, no distress  Neck:  Supple  No mass  No lymphadenopathy  Lungs: Expansion symmetric, no rales, no wheezing, respirations unlabored  Heart:  Regular rate and rhythm, S1 and S2 normal, no murmur  Abdomen: Soft, nondistended, non-tender, bowel sounds active all four quadrants, no masses, no organomegaly  Extremities: No edema  No erythema/warmth  No ulcer  Nontender to palpation  Skin:  Vesicular rash in left back/flank/abdomen, in dermatomal distribution  No purulence  Mild erythema  Mild to moderate tenderness  Neuro: Moves all extremities  Invasive Devices     Peripheral Intravenous Line            Peripheral IV 20 Left Antecubital 1 day          Drain            Urethral Catheter 14 Fr  4 days                Labs studies:   I have personally reviewed pertinent labs  Results from last 7 days   Lab Units 20  0505 20  0458 20  0509   POTASSIUM mmol/L 4 1 3 7 3 3*   CHLORIDE mmol/L 101 103 99*   CO2 mmol/L 26 26 28   BUN mg/dL 14 12 9   CREATININE mg/dL 0 62 0 60 0 67   EGFR ml/min/1 73sq m 78 79 76   CALCIUM mg/dL 8 7 8 1* 7 9*     Results from last 7 days   Lab Units 20  0505 20  0458 20  0509   WBC Thousand/uL 5 24 5 42 4 73   HEMOGLOBIN g/dL 10 6* 9 5* 9 4*   PLATELETS Thousands/uL 199 138* 156           Imaging Studies:   I have personally reviewed pertinent imaging study reports and images in PACS  EKG, Pathology, and Other Studies:   I have personally reviewed pertinent reports

## 2020-02-25 NOTE — ASSESSMENT & PLAN NOTE
With history of multiple UTIs  Likely multifactorial secondary to above  Just finished a course of doxycycline 100 mg b i d  x 10 days 2/17/20    On admission patient was afebrile, nontoxic, no leukocytosis and therefore antibiotics were not warranted at that time  Patient was later seen by admitting physician and spiked a 102 fever  At that time she was given a dose of IV Levaquin 500 mg x 1  Personally spoke with Dr Claudio Gregory of Infectious Disease and will hold off on further antibiotics as fever may have been caused from multiple attempts at Orantes insertion with traumatic urethritis  If fever reoccurs consider checking urine cultures and will further confer with ID at that time    -fortunately has remained without additional fever spike

## 2020-02-25 NOTE — PROGRESS NOTES
Paged by nurse - concern for a new rash on back and abdomen initially thought to be from soap used during bathing  Given 12 5 mg IV benedryl stat  Seen and evaluated patient  Rash actually follows a dermatomal pattern and is blistering  Will treat for shingles  Will renally dose valtrex 1g BID x 7 days  ID already following  Pt placed on contact and airborne precautions per protocol

## 2020-02-25 NOTE — ASSESSMENT & PLAN NOTE
Paged by nurse in the evening of 2/24 - concern for a new rash on back and abdomen initially thought to be from soap used during bathing  Given 12 5 mg IV benedryl stat  Seen and evaluated patient  Rash actually follows a dermatomal pattern and is blistering  Will treat for shingles  Will renally dose valtrex 1g BID x 7 days  ID already following  Initially placed on contact and airborne precautions per protocol  Personally spoke with Dr Viji Clancy feels airborne precautions can be discontinued at this time

## 2020-02-25 NOTE — PROGRESS NOTES
Progress Note - Sophie Merlos 9/20/1926, 80 y o  female MRN: [id number]    Unit/Bed#: [place] [address]-01 Encounter: [id number]    Primary Care Provider: Moira Estrada DO   Date and time admitted to hospital: 2/21/2020 11:00 AM        * Urinary retention  Assessment & Plan  Likely causing chronic UTIs  Multifactorial secondary to advanced age, poor mobility, and stenotic urethra  ER had difficulty placing a Orantes catheter and therefore urology placed it  Orantes catheter inserted 2/21/20 - please note catheter is NOT chronic   Urology recommends flushing Orantes until clear and maintaining to gravity  Will need outpatient follow-up with urology the office to determine further management    Chronic urinary tract infection  Assessment & Plan  With history of multiple UTIs  Likely multifactorial secondary to above  Just finished a course of doxycycline 100 mg b i d  x 10 days 2/17/20    On admission patient was afebrile, nontoxic, no leukocytosis and therefore antibiotics were not warranted at that time  Patient was later seen by admitting physician and spiked a 102 fever  At that time she was given a dose of IV Levaquin 500 mg x 1  Personally spoke with Dr Elton Hernandez of Infectious Disease and will hold off on further antibiotics as fever may have been caused from multiple attempts at Orantes insertion with traumatic urethritis  If fever reoccurs consider checking urine cultures and will further confer with ID at that time    -fortunately has remained without additional fever spike  Shingles  Assessment & Plan  Paged by nurse in the evening of 2/24 - concern for a new rash on back and abdomen initially thought to be from soap used during bathing  Given 12 5 mg IV benedryl stat  Seen and evaluated patient  Rash actually follows a dermatomal pattern and is blistering  Will treat for shingles  Will renally dose valtrex 1g BID x 7 days  ID already following    Initially placed on contact and airborne precautions per protocol  Personally spoke with Dr Oumar Luz feels airborne precautions can be discontinued at this time  Impaired mobility and ADLs  Assessment & Plan  Secondary to osteoarthritis  With deconditioning secondary to above  PT/OT recommending short-term rehab  Spoke with daughter at bedside who is agreeable  Awaiting placement    Restless legs syndrome  Assessment & Plan  Continue Mirapex    Osteoarthritis  Assessment & Plan  With chronic deformities/severe kyphosis and ambulatory dysfunction  Uses walker to ambulate at baseline   2/24-complains of joint pain and back pain          -worsened after working with physical therapy          -provide IV Toradol 30 mg x 1 in Lidoderm patch   2/25-with improvement in pain           -add lidoderm patch for left knee    Leg edema  Assessment & Plan  Pre-hospital chronically maintained on Lasix 40 mg daily  This was initially held as patient was thought to be dehydrated  She received IV fluids  Now IV fluids stopped and pt back on Lasix therapy  Glaucoma  Assessment & Plan  Continue home eye drops    GERD (gastroesophageal reflux disease)  Assessment & Plan  Continue omeprazole          VTE Pharmacologic Prophylaxis:   Pharmacologic: Enoxaparin (Lovenox)  Mechanical VTE Prophylaxis in Place: Yes    Discharge Plan:  Start discharge planning-awaiting placement  Will need urology follow-up as she will be discharged with a Orantes catheter in place which is new for her  Discussions with Specialists or Other Care Team Provider:  Dr Oumar Luz    Education and Discussions with Family / Patient:  Patient and daughter via telephone    Time Spent for Care: 20 minutes  More than 50% of total time spent on counseling and coordination of care as described above  Current Length of Stay: 4 day(s)  Current Patient Status: Inpatient   Code Status: Level 3 - DNAR and DNI    Subjective:   Patient resting in bed  She complains of burning in her back and side where shingles are present  Requesting for daughter to be called  No other complaints  Objective:     Vitals:   Temp (24hrs), Av 8 °F (37 1 °C), Min:98 3 °F (36 8 °C), Max:98 9 °F (37 2 °C)    Temp:  [98 3 °F (36 8 °C)-98 9 °F (37 2 °C)] 98 9 °F (37 2 °C)  HR:  [81-86] 81  Resp:  [16-18] 18  BP: (108-124)/(61-70) 119/66  SpO2:  [94 %-99 %] 97 %  Body mass index is 30 75 kg/m²  Input and Output Summary (last 24 hours): Intake/Output Summary (Last 24 hours) at 2020 1448  Last data filed at 2020 0500  Gross per 24 hour   Intake --   Output 1650 ml   Net -1650 ml       Physical Exam:     Physical Exam   Constitutional: She is oriented to person, place, and time  No distress  Severely kyphotic and scoliosis present   HENT:   Head: Normocephalic and atraumatic  Cardiovascular: Normal rate, regular rhythm and normal heart sounds  Pulmonary/Chest: Effort normal and breath sounds normal  No stridor  No respiratory distress  She has no wheezes  She has no rales  She exhibits no tenderness  Abdominal: Soft  Bowel sounds are normal  She exhibits no distension and no mass  There is no tenderness  There is no rebound and no guarding  Crusted lesions that do not cross midline of back, extending in a dermatomal pattern to her abdomen   Neurological: She is alert and oriented to person, place, and time  Skin: She is not diaphoretic  Psychiatric: She has a normal mood and affect  Her behavior is normal    Nursing note and vitals reviewed  Additional Data:     Labs:    Results from last 7 days   Lab Units 20  0505  20  1309   WBC Thousand/uL 5 24   < > 5 20   HEMOGLOBIN g/dL 10 6*   < > 10 9*   HEMATOCRIT % 32 8*   < > 33 2*   PLATELETS Thousands/uL 199   < > 185   NEUTROS PCT %  --   --  49   LYMPHS PCT %  --   --  36   MONOS PCT %  --   --  14*   EOS PCT %  --   --  0    < > = values in this interval not displayed       Results from last 7 days   Lab Units 20  0505   POTASSIUM mmol/L 4 1 CHLORIDE mmol/L 101   CO2 mmol/L 26   BUN mg/dL 14   CREATININE mg/dL 0 62   CALCIUM mg/dL 8 7           * I Have Reviewed All Lab Data Listed Above  * Additional Pertinent Lab Tests Reviewed: All Labs Within Last 24 Hours Reviewed    Imaging:    Imaging Reports Reviewed Today Include:   Imaging Personally Reviewed by Myself Includes:      Recent Cultures (last 7 days):           Last 24 Hours Medication List:     Current Facility-Administered Medications:  acetaminophen 650 mg Oral Q6H PRN Regina Rodriguez PA-C   acetaminophen 650 mg Oral QAM Aracelis Piger, NITZA   chlordiazePOXIDE 25 mg Oral HS Aracelis Piger, NITZA   diphenhydrAMINE 12 5 mg Intravenous Once Regina Rodriguez PA-C   enoxaparin 40 mg Subcutaneous Daily Aracelis Piger, NITZA   furosemide 40 mg Oral BID (diuretic) Aracelis Piger, NITZA   latanoprost 1 drop Left Eye Daily Aracelis Piger, NITZA   lidocaine 1 patch Topical Daily Aracelis Piger, NITZA   lidocaine 1 patch Topical Daily Aracelis Piger, NITZA   lisinopril 10 mg Oral Daily Aracelis Piger, NITZA   meclizine 25 mg Oral TID Aracelis Piger, NITZA   ondansetron 4 mg Intravenous Q6H PRN Aracelis Piger, NITZA   pantoprazole 20 mg Oral HS Aracelis Piger, NITZA   pramipexole 0 25 mg Oral Daily Aracelis Piger, NITZA   timolol 1 drop Left Eye Daily Aracelis Piger, NITZA   traMADol 50 mg Oral Q8H PRN Aracelis Piger, NITZA   valACYclovir 1,000 mg Oral Q12H Regina Rodriguez PA-C        Today, Patient Was Seen By: Regina Rodriguez PA-C    ** Please Note: This note has been constructed using a voice recognition system   **

## 2020-02-25 NOTE — PLAN OF CARE
Problem: Potential for Falls  Goal: Patient will remain free of falls  Description  INTERVENTIONS:  - Assess patient frequently for physical needs  -  Identify cognitive and physical deficits and behaviors that affect risk of falls    -  [place] fall precautions as indicated by assessment   - Educate patient/family on patient safety including physical limitations  - Instruct patient to call for assistance with activity based on assessment  - Modify environment to reduce risk of injury  - Consider OT/PT consult to assist with strengthening/mobility  Outcome: Progressing     Problem: Prexisting or High Potential for Compromised Skin Integrity  Goal: Skin integrity is maintained or improved  Description  INTERVENTIONS:  - Identify patients at risk for skin breakdown  - Assess and monitor skin integrity  - Assess and monitor nutrition and hydration status  - Monitor labs   - Assess for incontinence   - Turn and reposition patient  - Assist with mobility/ambulation  - Relieve pressure over bony prominences  - Avoid friction and shearing  - Provide appropriate hygiene as needed including keeping skin clean and dry  - Evaluate need for skin moisturizer/barrier cream  - Collaborate with interdisciplinary team   - Patient/family teaching  - Consider wound care consult   Outcome: Progressing     Problem: PAIN - ADULT  Goal: Verbalizes/displays adequate comfort level or baseline comfort level  Description  Interventions:  - Encourage patient to monitor pain and request assistance  - Assess pain using appropriate pain scale  - Administer analgesics based on type and severity of pain and evaluate response  - Implement non-pharmacological measures as appropriate and evaluate response  - Consider cultural and social influences on pain and pain management  - Notify physician/advanced practitioner if interventions unsuccessful or patient reports new pain  Outcome: Progressing     Problem: INFECTION - ADULT  Goal: Absence or prevention of progression during hospitalization  Description  INTERVENTIONS:  - Assess and monitor for signs and symptoms of infection  - Monitor lab/diagnostic results  - Monitor all insertion sites, i e  indwelling lines, tubes, and drains  - Monitor endotracheal if appropriate and nasal secretions for changes in amount and color  - [place] appropriate cooling/warming therapies per order  - Administer medications as ordered  - Instruct and encourage patient and family to use good hand hygiene technique  - Identify and instruct in appropriate isolation precautions for identified infection/condition  Outcome: Progressing     Problem: SAFETY ADULT  Goal: Maintain or return to baseline ADL function  Description  INTERVENTIONS:  -  Assess patient's ability to carry out ADLs; assess patient's baseline for ADL function and identify physical deficits which impact ability to perform ADLs (bathing, care of mouth/teeth, toileting, grooming, dressing, etc )  - Assess/evaluate cause of self-care deficits   - Assess range of motion  - Assess patient's mobility; develop plan if impaired  - Assess patient's need for assistive devices and provide as appropriate  - Encourage maximum independence but intervene and supervise when necessary  - Involve family in performance of ADLs  - Assess for home care needs following discharge   - Consider OT consult to assist with ADL evaluation and planning for discharge  - Provide patient education as appropriate  Outcome: Progressing  Goal: Maintain or return mobility status to optimal level  Description  INTERVENTIONS:  - Assess patient's baseline mobility status (ambulation, transfers, stairs, etc )    - Identify cognitive and physical deficits and behaviors that affect mobility  - Identify mobility aids required to assist with transfers and/or ambulation (gait belt, sit-to-stand, lift, walker, cane, etc )  - [place] fall precautions as indicated by assessment  - Record patient progress and toleration of activity level on Mobility SBAR; progress patient to next Phase/Stage  - Instruct patient to call for assistance with activity based on assessment  - Consider rehabilitation consult to assist with strengthening/weightbearing, etc   Outcome: Progressing     Problem: DISCHARGE PLANNING  Goal: Discharge to home or other facility with appropriate resources  Description  INTERVENTIONS:  - Identify barriers to discharge w/patient and caregiver  - Arrange for needed discharge resources and transportation as appropriate  - Identify discharge learning needs (meds, wound care, etc )  - Arrange for interpretive services to assist at discharge as needed  - Refer to Case Management Department for coordinating discharge planning if the patient needs post-hospital services based on physician/advanced practitioner order or complex needs related to functional status, cognitive ability, or social support system  Outcome: Progressing     Problem: Knowledge Deficit  Goal: Patient/family/caregiver demonstrates understanding of disease process, treatment plan, medications, and discharge instructions  Description  Complete learning assessment and assess knowledge base  Interventions:  - Provide teaching at level of understanding  - Provide teaching via preferred learning methods  Outcome: Progressing     Problem: Nutrition/Hydration-ADULT  Goal: Nutrient/Hydration intake appropriate for improving, restoring or maintaining nutritional needs  Description  Monitor and assess patient's nutrition/hydration status for malnutrition  Collaborate with interdisciplinary team and initiate plan and interventions as ordered  Monitor patient's weight and dietary intake as ordered or per policy  Utilize nutrition screening tool and intervene as necessary  Determine patient's food preferences and provide high-protein, high-caloric foods as appropriate       INTERVENTIONS:  - Monitor oral intake, urinary output, labs, and treatment plans  - Assess nutrition and hydration status and recommend course of action  - Evaluate amount of meals eaten  - Assist patient with eating if necessary   - Allow adequate time for meals  - Recommend/ encourage appropriate diets, oral nutritional supplements, and vitamin/mineral supplements  - Order, calculate, and assess calorie counts as needed  - Recommend, monitor, and adjust tube feedings and TPN/PPN based on assessed needs  - Assess need for intravenous fluids  - Provide specific nutrition/hydration education as appropriate  - Include patient/family/caregiver in decisions related to nutrition  Outcome: Progressing     Problem: GENITOURINARY - ADULT  Goal: Maintains or returns to baseline urinary function  Description  INTERVENTIONS:  - Assess urinary function  - Encourage oral fluids to ensure adequate hydration if ordered  - Administer IV fluids as ordered to ensure adequate hydration  - Administer ordered medications as needed  - Offer frequent toileting  - Follow urinary retention protocol if ordered  Outcome: Progressing  Goal: Absence of urinary retention  Description  INTERVENTIONS:  - Assess patients ability to void and empty bladder  - Monitor I/O  - Bladder scan as needed  - Discuss with physician/AP medications to alleviate retention as needed  - Discuss catheterization for long term situations as appropriate  Outcome: Progressing  Goal: Urinary catheter remains patent  Description  INTERVENTIONS:  - Assess patency of urinary catheter  - If patient has a chronic ward, consider changing catheter if non-functioning  - Follow guidelines for intermittent irrigation of non-functioning urinary catheter  Outcome: Progressing     Problem: SKIN/TISSUE INTEGRITY - ADULT  Goal: Skin integrity remains intact  Description  INTERVENTIONS  - Identify patients at risk for skin breakdown  - Assess and monitor skin integrity  - Assess and monitor nutrition and hydration status  - Monitor labs (i e  albumin)  - Assess for incontinence   - Turn and reposition patient  - Assist with mobility/ambulation  - Relieve pressure over bony prominences  - Avoid friction and shearing  - Provide appropriate hygiene as needed including keeping skin clean and dry  - Evaluate need for skin moisturizer/barrier cream  - Collaborate with interdisciplinary team (i e  Nutrition, Rehabilitation, etc )   - Patient/family teaching  Outcome: Progressing  Goal: Oral mucous membranes remain intact  Description  INTERVENTIONS  - Assess oral mucosa and hygiene practices  - Implement preventative oral hygiene regimen  - Implement oral medicated treatments as ordered  - Initiate Nutrition services referral as needed  Outcome: Progressing     Problem: MUSCULOSKELETAL - ADULT  Goal: Maintain or return mobility to safest level of function  Description  INTERVENTIONS:  - Assess patient's ability to carry out ADLs; assess patient's baseline for ADL function and identify physical deficits which impact ability to perform ADLs (bathing, care of mouth/teeth, toileting, grooming, dressing, etc )  - Assess/evaluate cause of self-care deficits   - Assess range of motion  - Assess patient's mobility  - Assess patient's need for assistive devices and provide as appropriate  - Encourage maximum independence but intervene and supervise when necessary  - Involve family in performance of ADLs  - Assess for home care needs following discharge   - Consider OT consult to assist with ADL evaluation and planning for discharge  - Provide patient education as appropriate  Outcome: Progressing

## 2020-02-26 PROCEDURE — 97110 THERAPEUTIC EXERCISES: CPT

## 2020-02-26 PROCEDURE — 99232 SBSQ HOSP IP/OBS MODERATE 35: CPT | Performed by: INTERNAL MEDICINE

## 2020-02-26 PROCEDURE — 97530 THERAPEUTIC ACTIVITIES: CPT

## 2020-02-26 PROCEDURE — 97535 SELF CARE MNGMENT TRAINING: CPT

## 2020-02-26 PROCEDURE — 97116 GAIT TRAINING THERAPY: CPT

## 2020-02-26 RX ORDER — KETOROLAC TROMETHAMINE 30 MG/ML
15 INJECTION, SOLUTION INTRAMUSCULAR; INTRAVENOUS ONCE
Status: COMPLETED | OUTPATIENT
Start: 2020-02-26 | End: 2020-02-26

## 2020-02-26 RX ADMIN — ENOXAPARIN SODIUM 40 MG: 40 INJECTION SUBCUTANEOUS at 08:50

## 2020-02-26 RX ADMIN — LIDOCAINE 1 PATCH: 50 PATCH TOPICAL at 08:49

## 2020-02-26 RX ADMIN — VALACYCLOVIR HYDROCHLORIDE 1000 MG: 500 TABLET, FILM COATED ORAL at 21:50

## 2020-02-26 RX ADMIN — VALACYCLOVIR HYDROCHLORIDE 1000 MG: 500 TABLET, FILM COATED ORAL at 08:51

## 2020-02-26 RX ADMIN — MECLIZINE HYDROCHLORIDE 25 MG: 25 TABLET ORAL at 21:50

## 2020-02-26 RX ADMIN — CHLORDIAZEPOXIDE HYDROCHLORIDE 25 MG: 25 CAPSULE ORAL at 21:50

## 2020-02-26 RX ADMIN — ACETAMINOPHEN 650 MG: 325 TABLET ORAL at 16:44

## 2020-02-26 RX ADMIN — PANTOPRAZOLE SODIUM 20 MG: 20 TABLET, DELAYED RELEASE ORAL at 21:50

## 2020-02-26 RX ADMIN — TIMOLOL MALEATE 1 DROP: 2.5 SOLUTION/ DROPS OPHTHALMIC at 08:49

## 2020-02-26 RX ADMIN — ACETAMINOPHEN 650 MG: 325 TABLET ORAL at 08:50

## 2020-02-26 RX ADMIN — MECLIZINE HYDROCHLORIDE 25 MG: 25 TABLET ORAL at 08:50

## 2020-02-26 RX ADMIN — FUROSEMIDE 40 MG: 40 TABLET ORAL at 17:22

## 2020-02-26 RX ADMIN — PRAMIPEXOLE DIHYDROCHLORIDE 0.25 MG: 0.25 TABLET ORAL at 21:50

## 2020-02-26 RX ADMIN — KETOROLAC TROMETHAMINE 15 MG: 30 INJECTION, SOLUTION INTRAMUSCULAR at 20:38

## 2020-02-26 RX ADMIN — MECLIZINE HYDROCHLORIDE 25 MG: 25 TABLET ORAL at 17:22

## 2020-02-26 RX ADMIN — LATANOPROST 1 DROP: 50 SOLUTION OPHTHALMIC at 08:50

## 2020-02-26 NOTE — ASSESSMENT & PLAN NOTE
With history of multiple UTIs  Likely multifactorial secondary to above  Just finished a course of doxycycline 100 mg b i d  x 10 days 2/17/20    On admission patient was afebrile, nontoxic, no leukocytosis and therefore antibiotics were not warranted at that time  Patient was later seen by admitting physician and spiked a 102 fever  At that time she was given a dose of IV Levaquin 500 mg x 1  Personally spoke with Dr Del Duarte of Infectious Disease and will hold off on further antibiotics as fever may have been caused from multiple attempts at Orantes insertion with traumatic urethritis  If fever reoccurs consider checking urine cultures and will further confer with ID at that time    -fortunately has remained without additional fever spike

## 2020-02-26 NOTE — OCCUPATIONAL THERAPY NOTE
Occupational Therapy Treatment Note:         02/26/20 1509   Restrictions/Precautions   Weight Bearing Precautions Per Order No   Other Precautions Fall Risk;Cognitive; Chair Alarm; Bed Alarm   Pain Assessment   Pain Assessment FLACC   Pain Location Arm;Knee; Shoulder   Pain Orientation Left   Pain Rating: FLACC (Rest) - Face 0   Pain Rating: FLACC (Rest) - Legs 0   Pain Rating: FLACC (Rest) - Activity 0   Pain Rating: FLACC (Rest) - Cry 0   Pain Rating: FLACC (Rest) - Consolability 0   Score: FLACC (Rest) 0   Pain Rating: FLACC (Activity) - Face 1   Pain Rating: FLACC (Activity) - Legs 0   Pain Rating: FLACC (Activity) - Activity 0   Pain Rating: FLACC (Activity) - Cry 1   Pain Rating: FLACC (Activity) - Consolability 1   Score: FLACC (Activity) 3   ADL   Where Assessed Supine, bed  (bed positioned as chair  )   Grooming Assistance 4  Minimal Assistance   Grooming Deficit Setup;Verbal cueing;Supervision/safety; Increased time to complete;Wash/dry hands; Wash/dry face;Brushing hair   Grooming Comments increased A to encourage good quality  UB Bathing Assistance Unable to assess   UB Bathing Comments UE covered  Nursing staff report monitoring skin integrity  LB Bathing Assistance 2  Maximal Assistance   LB Bathing Deficit Setup;Verbal cueing   UB Dressing Assistance Unable to assess   LB Dressing Assistance 2  Maximal Assistance   LB Dressing Deficit Setup; Increased time to complete; Don/doff R sock; Don/doff L sock   Functional Standing Tolerance   Comments Pt prefers bed positioned as chair this tx session  Bed Mobility   Supine to Sit 3  Moderate assistance   Additional items Assist x 2;HOB elevated; Bedrails; Increased time required;Verbal cues;LE management   Sit to Supine 2  Maximal assistance   Additional items Assist x 2;Bedrails; Increased time required;Verbal cues;LE management   Additional Comments Pt able to tolerate bed positioned as chair with focus on flexion/extension of trunk with functional reach  Cognition   Overall Cognitive Status Impaired   Arousal/Participation Cooperative   Attention Attends with cues to redirect   Orientation Level Oriented X4   Memory Decreased short term memory   Following Commands Follows one step commands without difficulty   Comments Pt able to make her needs known  Additional Activities   Additional Activities Other (Comment)  (reviewed current plan of care with Pt and daughter  )   Additional Activities Comments Pt's daughter with many negative comments regarding her mother's capabilities and current therapies  Forwarded information to [employer]  Activity Tolerance   Activity Tolerance Patient tolerated treatment well   Medical Staff Made Aware reported all findings to nursing staff  Assessment   Assessment Pt was seen for skilled OT with focus on completion of self care tasks, bed mobility, review of BUE ROM exercises tolerated, light grooming activity and review of current plan of care  Pt agreeable to chair being positioned as chair to complete trunk flexion/extension, BUE ROM exercises  Pt noted slight discomfort with ROM of L shoulder due to skin integrity issues found by nursing staff  Pt able to complete ROM with full range without further increase in pain noted  Pt's daughter entered the room with many questions regarding Pt's participation levels stating, "I've been with her for 15 years of therapy, therapy doesn't do anything"  Attempted to review Pt's current plan of care and as to what goals the Pt and family currently have  Pt reports hoping to get around better"  Pt's daughter stated, "Well that's what you people are recommending"  Again questioned Pt and daughter what their wishes were  Pt's daughter stated, "We want therapy but not at where my dad was because they killed him"  Extended time provided for Pt's daughter to self express current concerns   Pt's daughter again reported displeasure with attempts to complete both PT/OT with Pt stating, "they barely did anything with her"  Reviewed Pt's current plan of care to include visuospatial/perceptual activities to promoted functional tasks as able with adaptive techs  Pt's daughter stated, "She's blind you know"  Educated Pt's daughter on Pt's ability to read the wipe of board in room, though it was blurry as well as identify grooming items that were currently being worked with for OT tx session  Pt was able to complete use of shampoo cap, towel to towel dry hair and comb hair with stained usage of BUE  Pt's daughter stated, "now she look like a drowned rat"  This MORRISON suggested that maybe the Pt's daughter could better assist with grooming activity due to being the one that she lives with  Again reviewed current plan of care to include her mother's wishes as well as the families to promote her mothers independence and quality of life  Attempted to empathize with Pt regarding having an elderly family member that is sick with reassurance that this therapist would continue to promote the Pt's wishes as indicated to the best of my ability  Pt did displayed engagement in tx session with full usage of BUE with bed positioned as chair for more than 25 mins without further c/o of fatigue or pain  Pt reported just getting back to bed and that she was comfortable to be in supine positioning at this time  Plan   Treatment Interventions ADL retraining;Functional transfer training; Endurance training;Cognitive reorientation;Patient/family training   Goal Expiration Date 03/09/20   OT Treatment Day 1   OT Frequency 3-5x/wk   Recommendation   OT Discharge Recommendation Short Term Rehab   OT - OK to Discharge   (when medically cleared  )   Barthel Index   Feeding 5   Bathing 0   Grooming Score 5   Dressing Score 5   Bladder Score 0   Bowels Score 5   Toilet Use Score 5   Transfers (Bed/Chair) Score 5   Mobility (Level Surface) Score 0   Stairs Score 0   Barthel Index Score 30   Modified [place] Scale   Modified [place] Scale [address], [place]

## 2020-02-26 NOTE — PROGRESS NOTES
Progress Note - Libby Going 9/20/1926, 80 y o  female MRN: [id number]    Unit/Bed#: [facility] [address] [id number] Encounter: [id number]    Primary Care Provider: Marie Riojas DO   Date and time admitted to hospital: 2/21/2020 11:00 AM        * Urinary retention  Assessment & Plan  Likely causing chronic UTIs  Multifactorial secondary to advanced age, poor mobility, and stenotic urethra  ER had difficulty placing a Orantes catheter and therefore urology placed it  Orantes catheter inserted 2/21/20 - please note catheter is NOT chronic   Urology recommends flushing Orantes until clear and maintaining to gravity  Will need outpatient follow-up with urology the office to determine further management    Chronic urinary tract infection  Assessment & Plan  With history of multiple UTIs  Likely multifactorial secondary to above  Just finished a course of doxycycline 100 mg b i d  x 10 days 2/17/20    On admission patient was afebrile, nontoxic, no leukocytosis and therefore antibiotics were not warranted at that time  Patient was later seen by admitting physician and spiked a 102 fever  At that time she was given a dose of IV Levaquin 500 mg x 1  Personally spoke with Dr Mirna Tineo of Infectious Disease and will hold off on further antibiotics as fever may have been caused from multiple attempts at Orantes insertion with traumatic urethritis  If fever reoccurs consider checking urine cultures and will further confer with ID at that time    -fortunately has remained without additional fever spike      Shingles  Assessment & Plan  Infectious follow-up appreciated, continue Valtrex to treat for 7 days total  No need for further isolation    Impaired mobility and ADLs  Assessment & Plan  Secondary to osteoarthritis  With deconditioning secondary to above  PT/OT recommending short-term rehab  Spoke with daughter at bedside who is agreeable  Awaiting placement    Restless legs syndrome  Assessment & Plan  Continue Mirapex    Osteoarthritis  Assessment & Plan  With chronic deformities/severe kyphosis and ambulatory dysfunction  Uses walker to ambulate at baseline  Continue Lidoderm    Leg edema  Assessment & Plan  Pre-hospital chronically maintained on Lasix 40 mg daily  This was initially held as patient was thought to be dehydrated  She received IV fluids  Now IV fluids stopped and pt back on Lasix therapy  Glaucoma  Assessment & Plan  Continue home eye drops    GERD (gastroesophageal reflux disease)  Assessment & Plan  Continue omeprazole          VTE Pharmacologic Prophylaxis:   Pharmacologic:  Lovenox    Patient Centered Rounds: I have performed bedside rounds with nursing staff today  Time Spent for Care: 20 minutes  More than 50% of total time spent on counseling and coordination of care as described above  Current Length of Stay: 5 day(s)    Current Patient Status: Inpatient   Certification Statement: The patient will continue to require additional inpatient hospital stay due to Awaiting placement    Discharge Plan / Estimated Discharge Date: TBD    Code Status: Level 3 - DNAR and DNI      Subjective:   Patient seen and examined at bedside, currently denies any complaints    Objective:     Vitals:   Temp (24hrs), Av 7 °F (37 1 °C), Min:97 8 °F (36 6 °C), Max:99 2 °F (37 3 °C)    Temp:  [97 8 °F (36 6 °C)-99 2 °F (37 3 °C)] 99 °F (37 2 °C)  HR:  [73-76] 76  Resp:  [18-19] 18  BP: ()/(47-63) 112/61  SpO2:  [93 %-96 %] 95 %  Body mass index is 30 75 kg/m²  Input and Output Summary (last 24 hours): Intake/Output Summary (Last 24 hours) at 2020 1802  Last data filed at 2020 1501  Gross per 24 hour   Intake --   Output 1600 ml   Net -1600 ml       Physical Exam:    Constitutional: Patient is oriented to person, place and time, no acute distress  HEENT:  Normocephalic, atraumatic  Cardiovascular: Normal S1S2, RRR, No murmurs/rubs/gallops appreciated    Pulmonary:  Bilateral air entry, No rhonchi/rales/wheezing appreciated  Abdominal: Soft, Bowel sounds present, Non-tender, Non-distended  Extremities:  No cyanosis, clubbing or edema  Neurological: Cranial nerves II-XII grossly intact, sensation intact, otherwise no focal neurological symptoms  Skin:  Crusted lesions from left midline back extending into left upper abdomen chest region, no new vesicles, no purulence    Additional Data:     Labs:    Results from last 7 days   Lab Units 02/25/20  0505  02/21/20  1309   WBC Thousand/uL 5 24   < > 5 20   HEMOGLOBIN g/dL 10 6*   < > 10 9*   HEMATOCRIT % 32 8*   < > 33 2*   PLATELETS Thousands/uL 199   < > 185   NEUTROS PCT %  --   --  49   LYMPHS PCT %  --   --  36   MONOS PCT %  --   --  14*   EOS PCT %  --   --  0    < > = values in this interval not displayed  Results from last 7 days   Lab Units 02/25/20  0505   POTASSIUM mmol/L 4 1   CHLORIDE mmol/L 101   CO2 mmol/L 26   BUN mg/dL 14   CREATININE mg/dL 0 62   CALCIUM mg/dL 8 7            I Have Reviewed All Lab Data Listed Above          Recent Cultures (last 7 days):           Last 24 Hours Medication List:     Current Facility-Administered Medications:  acetaminophen 650 mg Oral Q6H PRN Alvy Mask, PA-C   acetaminophen 650 mg Oral QAM Aracelis Piger, PA-C   chlordiazePOXIDE 25 mg Oral HS Aracelis Piger, PA-C   diphenhydrAMINE 12 5 mg Intravenous Once Alvy Mask, PA-C   enoxaparin 40 mg Subcutaneous Daily Aracelis Piger, PA-C   furosemide 40 mg Oral BID (diuretic) Aracelis Piger, PA-C   latanoprost 1 drop Left Eye Daily Aracelis Piger, PA-C   lidocaine 1 patch Topical Daily Aracelis Piger, PA-C   lidocaine 1 patch Topical Daily Aracelis Piger, PA-C   lisinopril 10 mg Oral Daily Aracelis Piger, PA-C   meclizine 25 mg Oral TID Aracelis Piger, PA-C   ondansetron 4 mg Intravenous Q6H PRN Aracelis Piger, PA-C   pantoprazole 20 mg Oral HS Aracelis Piger, PA-C   pramipexole 0 25 mg Oral Daily Aracelis Piger, PA-C   timolol 1 drop Left Eye Daily Lady Larios NITZA Jensen   traMADol 50 mg Oral Q8H PRN Marta Anderson PA-C   valACYclovir 1,000 mg Oral Q12H Marta Anderson PA-C        Today, Patient Was Seen By: Rosio Doyle MD

## 2020-02-26 NOTE — ASSESSMENT & PLAN NOTE
Infectious follow-up appreciated, continue Valtrex to treat for 7 days total  No need for further isolation

## 2020-02-26 NOTE — PROGRESS NOTES
Progress Note - Infectious Disease   Willard Orf 80 y o  female MRN: [id number]  Unit/Bed#: [place] [address] -01 Encounter: [id number]      Impression/Recommendations:  1  Dermatomal zoster, on left flank  No clinical signs of bacterial superinfection  Rash crusting  Continue Valtrex  Serial exams  Treat x7 days total      2  Fever, which was not present on admission but developed after multiple attempts at Orantes catheter insertion   Suspect that her fever was most likely secondary to traumatic urethritis versus urinary retention   Fever was nonsustained  [place] was benign     Observe off further antibiotic  Monitor temperature  Re-evaluate with cultures only if fever recurs      3  Encephalopathy, on background of advanced dementia   I suspect it is secondary to urinary retention  Lukas Jani is with advanced dementia usually has encephalopathy with any significant change in baseline status, infectious and noninfectious  Conrad Herkimer retention can certainly result in encephalopathy in this patient   Mental status stable, now at baseline  Monitor mental status      4  Urinary retention, with chronic Orantes catheterization   If patient does indeed have recurrent UTI, although I am not sure she truly had UTIs (see below), she would be a candidate for suprapubic catheterization      5  Recurrent UTIs by report   As typical with patient who has chronic Orantes catheterization, many antibiotic courses are actually given for bladder colonization rather than true infection   In this patient with advanced dementia, I would refrain from treating patient for UTI unless she has objective signs of UTI, not just positive urine culture and encephalopathy      6  Advanced dementia   Patient is unable to voice symptoms reliably      Discussed with patient      Antibiotics:  Valtrex # 2      Subjective:  Patient  is comfortable  Mild pain in left flank  Mental status is baseline  Temperature stays down    No diarrhea  Objective:  Vitals:  Temp:  [97 8 °F (36 6 °C)-99 2 °F (37 3 °C)] 97 8 °F (36 6 °C)  HR:  [73] 73  Resp:  [19] 19  BP: ()/(47-63) 98/47  SpO2:  [93 %-96 %] 96 %  Temp (24hrs), Av 5 °F (36 9 °C), Min:97 8 °F (36 6 °C), Max:99 2 °F (37 3 °C)  Current: Temperature: 97 8 °F (36 6 °C)    Physical Exam:     General: Awake, alert, cooperative, no distress  Neck:  Supple  No mass  No lymphadenopathy  Lungs: Expansion symmetric, no rales, no wheezing, respirations unlabored  Heart:  Regular rate and rhythm, S1 and S2 normal, no murmur  Abdomen: Soft, nondistended, non-tender, bowel sounds active all four quadrants, no masses, no organomegaly  Extremities: No edema  No erythema/warmth  No ulcer  Nontender to palpation  Skin:  Left flank rash crusting  No new vesicle  No purulence  Mildly tender  Neuro: Moves all extremities  Invasive Devices     Peripheral Intravenous Line            Peripheral IV 20 Left Antecubital 2 days          Drain            Urethral Catheter 14 Fr  4 days                Labs studies:   I have personally reviewed pertinent labs  Results from last 7 days   Lab Units 20  0505 20  0458 20  0509   POTASSIUM mmol/L 4 1 3 7 3 3*   CHLORIDE mmol/L 101 103 99*   CO2 mmol/L 26 26 28   BUN mg/dL 14 12 9   CREATININE mg/dL 0 62 0 60 0 67   EGFR ml/min/1 73sq m 78 79 76   CALCIUM mg/dL 8 7 8 1* 7 9*     Results from last 7 days   Lab Units 20  0505 20  0458 20  0509   WBC Thousand/uL 5 24 5 42 4 73   HEMOGLOBIN g/dL 10 6* 9 5* 9 4*   PLATELETS Thousands/uL 199 138* 156           Imaging Studies:   I have personally reviewed pertinent imaging study reports and images in PACS  EKG, Pathology, and Other Studies:   I have personally reviewed pertinent reports

## 2020-02-26 NOTE — PHYSICAL THERAPY NOTE
PHYSICAL THERAPY NOTE          Patient Name: Twila Doshi  ZVHAH'I Date: 2/26/2020   Time In: 1340 Time Out: 1430       02/26/20 1430   Pain Assessment   Pain Assessment FLACC   Pain Score No Pain   Pain Location Arm;Knee   Pain Orientation Left   Pain Rating: FLACC (Rest) - Face 0   Pain Rating: FLACC (Rest) - Legs 0   Pain Rating: FLACC (Rest) - Activity 0   Pain Rating: FLACC (Rest) - Cry 0   Pain Rating: FLACC (Rest) - Consolability 0   Score: FLACC (Rest) 0   Pain Rating: FLACC (Activity) - Face 1   Pain Rating: FLACC (Activity) - Legs 0   Pain Rating: FLACC (Activity) - Activity 0   Pain Rating: FLACC (Activity) - Cry 1   Pain Rating: FLACC (Activity) - Consolability 1   Score: FLACC (Activity) 3   Restrictions/Precautions   Other Precautions Cognitive; Chair Alarm; Bed Alarm;Multiple lines;Pain; Fall Risk;Limb alert;Visual impairment   General   Chart Reviewed Yes   Additional Pertinent History pt w +shingles but per infectious disease, pt does not need contact or airborne precautions  appropriate to be seen with gown covering backside rash   Response to Previous Treatment Patient reporting fatigue but able to participate  Family/Caregiver Present No  (dtr at end of session)   Cognition   Overall Cognitive Status Impaired   Arousal/Participation Cooperative   Attention Attends with cues to redirect   Orientation Level Oriented to person;Oriented to place;Oriented to time  (general to time (mo, year, Yovanny Wednesday))   Memory Decreased recall of precautions;Decreased recall of recent events;Decreased short term memory   Following Commands Follows one step commands with increased time or repetition   Subjective   Subjective pt agreeable to PT at this time   Bed Mobility   Supine to Sit 3  Moderate assistance  (pt able to initiate, increased A needed toward end)   Additional items Assist x 2;HOB elevated; Bedrails; Increased time required;Verbal cues;LE management   Sit to Supine 2  Maximal assistance   Additional items Assist x 1; Increased time required;Verbal cues;LE management; Other  (trunk support)   Additional Comments pt require mod-max A to reposition at EOB   Transfers   Sit to Stand 3  Moderate assistance   Additional items Assist x 1; Increased time required;Verbal cues; Other  (RW)   Stand to Sit 4  Minimal assistance   Additional items Assist x 1; Increased time required;Verbal cues; Other  (RW)   Additional Comments vc for safety and hand placement, increased reliance on RW for transf   Ambulation/Elevation   Gait pattern Wide ZOEY; Forward Flexion;Decreased foot clearance; Short stride; Excessively slow; Improper Weight shift   Gait Assistance 4  Minimal assist   Additional items Assist x 1;Verbal cues   Assistive Device Rolling walker   Distance 2' FW, 2' BW   Balance   Static Sitting Fair   Dynamic Sitting Fair -   Static Standing Poor +  (RW)   Dynamic Standing Poor +  (RW)   Ambulatory Poor  (RW)   Endurance Deficit   Endurance Deficit Yes   Endurance Deficit Description fatigue, weakness   Activity Tolerance   Activity Tolerance Patient limited by fatigue;Patient limited by pain   Medical Staff [address]; spoke w BETITO Porter to let her know dtr is asking to speak w Baldomero RN   Exercises   Knee AROM Long Arc Quad Sitting;10 reps;AROM; Bilateral  (2x10)   Marching Sitting;10 reps;AROM; Bilateral  (2x10)   Balance training  pt tolerate sitting EOB >15' to perform LE therex and balance training  1  Reaching out of ZOEY w one UE at a time 2x5, require other UE to maintain upright position, w cues for safety and to correct posture (noted L/BW lean)  2  Pt tolerate sitting w/o BUE support 1', supervision for safety   Assessment   Prognosis Fair   Problem List Decreased strength;Decreased range of motion;Decreased endurance; Impaired balance;Decreased mobility; Decreased cognition; Impaired judgement;Decreased safety awareness;Decreased skin integrity;Pain; Impaired vision   Assessment Ceil was seen for a follow up session today  Pt reporting fatigue but agreeable to PT at this time  Pt continues to require min-max physical A to perform all mobility tasks, w frequent cuing for technique, safety  Pt activity tolerance limited by L sided arm and knee pain, fatigue, weakness  However pt demonstrates mild improvements in balance EOB to tolerate LE therex and balance activities  End of session pt repositioned in bed for comfort and care needs, call bell in reach  MORRISON and pt dtr present  Barriers to Discharge Inaccessible home environment   Goals   Patient Goals to decrease L side pain   STG Expiration Date 03/04/20   Short Term Goal #1 bed moiblity wtih mod assist of 1  transfers with max assist of 1  amb using rw for 25' with mod assist of 1  improve strength and balance by 1/2 to 1 grade  improve safety awareness and practices  improve standing posture  PT Treatment Day 2   Plan   Treatment/Interventions Functional transfer training;LE strengthening/ROM; Therapeutic exercise; Endurance training;Cognitive reorientation;Patient/family training;Equipment eval/education; Bed mobility;Gait training;Spoke to nursing;Spoke to case management;OT;Compensatory technique education   Progress Slow progress, decreased activity tolerance   PT Frequency Other (Comment)  (3-5/wk)   Recommendation   Recommendation Short-term skilled PT   Equipment Recommended Walker   PT - OK to Discharge Yes   Additional Comments to STR when medically stable   Ana Randall PT
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
## 2020-02-26 NOTE — PLAN OF CARE
Problem: Potential for Falls  Goal: Patient will remain free of falls  Description  INTERVENTIONS:  - Assess patient frequently for physical needs  -  Identify cognitive and physical deficits and behaviors that affect risk of falls    -  [place] fall precautions as indicated by assessment   - Educate patient/family on patient safety including physical limitations  - Instruct patient to call for assistance with activity based on assessment  - Modify environment to reduce risk of injury  - Consider OT/PT consult to assist with strengthening/mobility  Outcome: Progressing     Problem: Prexisting or High Potential for Compromised Skin Integrity  Goal: Skin integrity is maintained or improved  Description  INTERVENTIONS:  - Identify patients at risk for skin breakdown  - Assess and monitor skin integrity  - Assess and monitor nutrition and hydration status  - Monitor labs   - Assess for incontinence   - Turn and reposition patient  - Assist with mobility/ambulation  - Relieve pressure over bony prominences  - Avoid friction and shearing  - Provide appropriate hygiene as needed including keeping skin clean and dry  - Evaluate need for skin moisturizer/barrier cream  - Collaborate with interdisciplinary team   - Patient/family teaching  - Consider wound care consult   Outcome: Progressing     Problem: PAIN - ADULT  Goal: Verbalizes/displays adequate comfort level or baseline comfort level  Description  Interventions:  - Encourage patient to monitor pain and request assistance  - Assess pain using appropriate pain scale  - Administer analgesics based on type and severity of pain and evaluate response  - Implement non-pharmacological measures as appropriate and evaluate response  - Consider cultural and social influences on pain and pain management  - Notify physician/advanced practitioner if interventions unsuccessful or patient reports new pain  Outcome: Progressing     Problem: INFECTION - ADULT  Goal: Absence or prevention of progression during hospitalization  Description  INTERVENTIONS:  - Assess and monitor for signs and symptoms of infection  - Monitor lab/diagnostic results  - Monitor all insertion sites, i e  indwelling lines, tubes, and drains  - Monitor endotracheal if appropriate and nasal secretions for changes in amount and color  - [place] appropriate cooling/warming therapies per order  - Administer medications as ordered  - Instruct and encourage patient and family to use good hand hygiene technique  - Identify and instruct in appropriate isolation precautions for identified infection/condition  Outcome: Progressing     Problem: SAFETY ADULT  Goal: Maintain or return to baseline ADL function  Description  INTERVENTIONS:  -  Assess patient's ability to carry out ADLs; assess patient's baseline for ADL function and identify physical deficits which impact ability to perform ADLs (bathing, care of mouth/teeth, toileting, grooming, dressing, etc )  - Assess/evaluate cause of self-care deficits   - Assess range of motion  - Assess patient's mobility; develop plan if impaired  - Assess patient's need for assistive devices and provide as appropriate  - Encourage maximum independence but intervene and supervise when necessary  - Involve family in performance of ADLs  - Assess for home care needs following discharge   - Consider OT consult to assist with ADL evaluation and planning for discharge  - Provide patient education as appropriate  Outcome: Progressing  Goal: Maintain or return mobility status to optimal level  Description  INTERVENTIONS:  - Assess patient's baseline mobility status (ambulation, transfers, stairs, etc )    - Identify cognitive and physical deficits and behaviors that affect mobility  - Identify mobility aids required to assist with transfers and/or ambulation (gait belt, sit-to-stand, lift, walker, cane, etc )  - [place] fall precautions as indicated by assessment  - Record patient progress and toleration of activity level on Mobility SBAR; progress patient to next Phase/Stage  - Instruct patient to call for assistance with activity based on assessment  - Consider rehabilitation consult to assist with strengthening/weightbearing, etc   Outcome: Progressing     Problem: DISCHARGE PLANNING  Goal: Discharge to home or other facility with appropriate resources  Description  INTERVENTIONS:  - Identify barriers to discharge w/patient and caregiver  - Arrange for needed discharge resources and transportation as appropriate  - Identify discharge learning needs (meds, wound care, etc )  - Arrange for interpretive services to assist at discharge as needed  - Refer to Case Management Department for coordinating discharge planning if the patient needs post-hospital services based on physician/advanced practitioner order or complex needs related to functional status, cognitive ability, or social support system  Outcome: Progressing     Problem: Knowledge Deficit  Goal: Patient/family/caregiver demonstrates understanding of disease process, treatment plan, medications, and discharge instructions  Description  Complete learning assessment and assess knowledge base  Interventions:  - Provide teaching at level of understanding  - Provide teaching via preferred learning methods  Outcome: Progressing     Problem: Nutrition/Hydration-ADULT  Goal: Nutrient/Hydration intake appropriate for improving, restoring or maintaining nutritional needs  Description  Monitor and assess patient's nutrition/hydration status for malnutrition  Collaborate with interdisciplinary team and initiate plan and interventions as ordered  Monitor patient's weight and dietary intake as ordered or per policy  Utilize nutrition screening tool and intervene as necessary  Determine patient's food preferences and provide high-protein, high-caloric foods as appropriate       INTERVENTIONS:  - Monitor oral intake, urinary output, labs, and treatment plans  - Assess nutrition and hydration status and recommend course of action  - Evaluate amount of meals eaten  - Assist patient with eating if necessary   - Allow adequate time for meals  - Recommend/ encourage appropriate diets, oral nutritional supplements, and vitamin/mineral supplements  - Order, calculate, and assess calorie counts as needed  - Recommend, monitor, and adjust tube feedings and TPN/PPN based on assessed needs  - Assess need for intravenous fluids  - Provide specific nutrition/hydration education as appropriate  - Include patient/family/caregiver in decisions related to nutrition  Outcome: Progressing     Problem: GENITOURINARY - ADULT  Goal: Maintains or returns to baseline urinary function  Description  INTERVENTIONS:  - Assess urinary function  - Encourage oral fluids to ensure adequate hydration if ordered  - Administer IV fluids as ordered to ensure adequate hydration  - Administer ordered medications as needed  - Offer frequent toileting  - Follow urinary retention protocol if ordered  Outcome: Progressing  Goal: Absence of urinary retention  Description  INTERVENTIONS:  - Assess patients ability to void and empty bladder  - Monitor I/O  - Bladder scan as needed  - Discuss with physician/AP medications to alleviate retention as needed  - Discuss catheterization for long term situations as appropriate  Outcome: Progressing  Goal: Urinary catheter remains patent  Description  INTERVENTIONS:  - Assess patency of urinary catheter  - If patient has a chronic ward, consider changing catheter if non-functioning  - Follow guidelines for intermittent irrigation of non-functioning urinary catheter  Outcome: Progressing     Problem: SKIN/TISSUE INTEGRITY - ADULT  Goal: Skin integrity remains intact  Description  INTERVENTIONS  - Identify patients at risk for skin breakdown  - Assess and monitor skin integrity  - Assess and monitor nutrition and hydration status  - Monitor labs (i e  albumin)  - Assess for incontinence   - Turn and reposition patient  - Assist with mobility/ambulation  - Relieve pressure over bony prominences  - Avoid friction and shearing  - Provide appropriate hygiene as needed including keeping skin clean and dry  - Evaluate need for skin moisturizer/barrier cream  - Collaborate with interdisciplinary team (i e  Nutrition, Rehabilitation, etc )   - Patient/family teaching  Outcome: Progressing  Goal: Oral mucous membranes remain intact  Description  INTERVENTIONS  - Assess oral mucosa and hygiene practices  - Implement preventative oral hygiene regimen  - Implement oral medicated treatments as ordered  - Initiate Nutrition services referral as needed  Outcome: Progressing     Problem: MUSCULOSKELETAL - ADULT  Goal: Maintain or return mobility to safest level of function  Description  INTERVENTIONS:  - Assess patient's ability to carry out ADLs; assess patient's baseline for ADL function and identify physical deficits which impact ability to perform ADLs (bathing, care of mouth/teeth, toileting, grooming, dressing, etc )  - Assess/evaluate cause of self-care deficits   - Assess range of motion  - Assess patient's mobility  - Assess patient's need for assistive devices and provide as appropriate  - Encourage maximum independence but intervene and supervise when necessary  - Involve family in performance of ADLs  - Assess for home care needs following discharge   - Consider OT consult to assist with ADL evaluation and planning for discharge  - Provide patient education as appropriate  Outcome: Progressing

## 2020-02-26 NOTE — PLAN OF CARE
Problem: PHYSICAL THERAPY ADULT  Goal: Performs mobility at highest level of function for planned discharge setting  See evaluation for individualized goals  Description  Treatment/Interventions: Functional transfer training, LE strengthening/ROM, Therapeutic exercise, Endurance training, Cognitive reorientation, Patient/family training, Equipment eval/education, Bed mobility, Gait training, Spoke to nursing          See flowsheet documentation for full assessment, interventions and recommendations  Note:   Prognosis: Fair  Problem List: Decreased strength, Decreased range of motion, Decreased endurance, Impaired balance, Decreased mobility, Decreased cognition, Impaired judgement, Decreased safety awareness, Decreased skin integrity, Pain, Impaired vision  Assessment: Cuca was seen for a follow up session today  Pt reporting fatigue but agreeable to PT at this time  Pt continues to require min-max physical A to perform all mobility tasks, w frequent cuing for technique, safety  Pt activity tolerance limited by L sided arm and knee pain, fatigue, weakness  However pt demonstrates mild improvements in balance EOB to tolerate LE therex and balance activities  End of session pt repositioned in bed for comfort and care needs, call bell in reach  MORRISON and pt dtr present  Barriers to Discharge: Inaccessible home environment     Recommendation: Short-term skilled PT     PT - OK to Discharge: Yes    See flowsheet documentation for full assessment

## 2020-02-26 NOTE — PLAN OF CARE
Problem: OCCUPATIONAL THERAPY ADULT  Goal: Performs self-care activities at highest level of function for planned discharge setting  See evaluation for individualized goals  Description  Treatment Interventions: ADL retraining, Functional transfer training, UE strengthening/ROM, Endurance training, Cognitive reorientation, Patient/family training, Equipment evaluation/education, Compensatory technique education, Activityengagement, Energy conservation          See flowsheet documentation for full assessment, interventions and recommendations  Outcome: Progressing  Note:   Limitation: Decreased ADL status, Decreased UE strength, Decreased Safe judgement during ADL, Decreased cognition, Decreased endurance, Decreased self-care trans, Decreased high-level ADLs  Prognosis: Good  Assessment: Pt was seen for skilled OT with focus on completion of self care tasks, bed mobility, review of BUE ROM exercises tolerated, light grooming activity and review of current plan of care  Pt agreeable to chair being positioned as chair to complete trunk flexion/extension, BUE ROM exercises  Pt noted slight discomfort with ROM of L shoulder due to skin integrity issues found by nursing staff  Pt able to complete ROM with full range without further increase in pain noted  Pt's daughter entered the room with many questions regarding Pt's participation levels stating, "I've been with her for 15 years of therapy, therapy doesn't do anything"  Attempted to review Pt's current plan of care and as to what goals the Pt and family currently have  Pt reports hoping to get around better"  Pt's daughter stated, "Well that's what you people are recommending"  Again questioned Pt and daughter what their wishes were  Pt's daughter stated, "We want therapy but not at where my dad was because they killed him"  Extended time provided for Pt's daughter to self express current concerns   Pt's daughter again reported displeasure with attempts to complete both PT/OT with Pt stating, "they barely did anything with her"  Reviewed Pt's current plan of care to include visuospatial/perceptual activities to promoted functional tasks as able with adaptive techs  Pt's daughter stated, "She's blind you know"  Educated Pt's daughter on Pt's ability to read the wipe of board in room, though it was blurry as well as identify grooming items that were currently being worked with for OT tx session  Pt was able to complete use of shampoo cap, towel to towel dry hair and comb hair with stained usage of BUE  Pt's daughter stated, "now she look like a drowned rat"  This MORRISON suggested that maybe the Pt's daughter could better assist with grooming activity due to being the one that she lives with  Again reviewed current plan of care to include her mother's wishes as well as the families to promote her mothers independence and quality of life  Attempted to empathize with Pt regarding having an elderly family member that is sick with reassurance that this therapist would continue to promote the Pt's wishes as indicated to the best of my ability  Pt did displayed engagement in tx session with full usage of BUE with bed positioned as chair for more than 25 mins without further c/o of fatigue or pain  Pt reported just getting back to bed and that she was comfortable to be in supine positioning at this time       OT Discharge Recommendation: Short Term Rehab  OT - OK to Discharge: (when medically cleared  )

## 2020-02-26 NOTE — ASSESSMENT & PLAN NOTE
With chronic deformities/severe kyphosis and ambulatory dysfunction  Uses walker to ambulate at baseline  Continue Lidoderm

## 2020-02-27 LAB
ANION GAP SERPL CALCULATED.3IONS-SCNC: 8 MMOL/L (ref 4–13)
BASOPHILS # BLD AUTO: 0.03 THOUSANDS/ΜL (ref 0–0.1)
BASOPHILS NFR BLD AUTO: 1 % (ref 0–1)
BUN SERPL-MCNC: 16 MG/DL (ref 5–25)
CALCIUM SERPL-MCNC: 8.4 MG/DL (ref 8.3–10.1)
CHLORIDE SERPL-SCNC: 101 MMOL/L (ref 100–108)
CO2 SERPL-SCNC: 28 MMOL/L (ref 21–32)
CREAT SERPL-MCNC: 0.7 MG/DL (ref 0.6–1.3)
EOSINOPHIL # BLD AUTO: 0.2 THOUSAND/ΜL (ref 0–0.61)
EOSINOPHIL NFR BLD AUTO: 3 % (ref 0–6)
ERYTHROCYTE [DISTWIDTH] IN BLOOD BY AUTOMATED COUNT: 14.1 % (ref 11.6–15.1)
GFR SERPL CREATININE-BSD FRML MDRD: 75 ML/MIN/1.73SQ M
GLUCOSE SERPL-MCNC: 97 MG/DL (ref 65–140)
HCT VFR BLD AUTO: 32 % (ref 34.8–46.1)
HGB BLD-MCNC: 10.5 G/DL (ref 11.5–15.4)
IMM GRANULOCYTES # BLD AUTO: 0.01 THOUSAND/UL (ref 0–0.2)
IMM GRANULOCYTES NFR BLD AUTO: 0 % (ref 0–2)
LYMPHOCYTES # BLD AUTO: 2.19 THOUSANDS/ΜL (ref 0.6–4.47)
LYMPHOCYTES NFR BLD AUTO: 35 % (ref 14–44)
MCH RBC QN AUTO: 31.8 PG (ref 26.8–34.3)
MCHC RBC AUTO-ENTMCNC: 32.8 G/DL (ref 31.4–37.4)
MCV RBC AUTO: 97 FL (ref 82–98)
MONOCYTES # BLD AUTO: 0.68 THOUSAND/ΜL (ref 0.17–1.22)
MONOCYTES NFR BLD AUTO: 11 % (ref 4–12)
NEUTROPHILS # BLD AUTO: 3.24 THOUSANDS/ΜL (ref 1.85–7.62)
NEUTS SEG NFR BLD AUTO: 50 % (ref 43–75)
NRBC BLD AUTO-RTO: 0 /100 WBCS
PLATELET # BLD AUTO: 231 THOUSANDS/UL (ref 149–390)
PMV BLD AUTO: 9.9 FL (ref 8.9–12.7)
POTASSIUM SERPL-SCNC: 4.2 MMOL/L (ref 3.5–5.3)
RBC # BLD AUTO: 3.3 MILLION/UL (ref 3.81–5.12)
SODIUM SERPL-SCNC: 137 MMOL/L (ref 136–145)
WBC # BLD AUTO: 6.35 THOUSAND/UL (ref 4.31–10.16)

## 2020-02-27 PROCEDURE — 99232 SBSQ HOSP IP/OBS MODERATE 35: CPT | Performed by: INTERNAL MEDICINE

## 2020-02-27 PROCEDURE — 80048 BASIC METABOLIC PNL TOTAL CA: CPT | Performed by: INTERNAL MEDICINE

## 2020-02-27 PROCEDURE — 85025 COMPLETE CBC W/AUTO DIFF WBC: CPT | Performed by: INTERNAL MEDICINE

## 2020-02-27 RX ORDER — DOCUSATE SODIUM 100 MG/1
100 CAPSULE, LIQUID FILLED ORAL 2 TIMES DAILY
Status: DISCONTINUED | OUTPATIENT
Start: 2020-02-27 | End: 2020-02-28 | Stop reason: HOSPADM

## 2020-02-27 RX ORDER — GABAPENTIN 100 MG/1
100 CAPSULE ORAL DAILY
Status: DISCONTINUED | OUTPATIENT
Start: 2020-02-27 | End: 2020-02-28 | Stop reason: HOSPADM

## 2020-02-27 RX ADMIN — TIMOLOL MALEATE 1 DROP: 2.5 SOLUTION/ DROPS OPHTHALMIC at 09:10

## 2020-02-27 RX ADMIN — LIDOCAINE 1 PATCH: 50 PATCH TOPICAL at 09:09

## 2020-02-27 RX ADMIN — ACETAMINOPHEN 650 MG: 325 TABLET ORAL at 09:10

## 2020-02-27 RX ADMIN — VALACYCLOVIR HYDROCHLORIDE 1000 MG: 500 TABLET, FILM COATED ORAL at 22:00

## 2020-02-27 RX ADMIN — ENOXAPARIN SODIUM 40 MG: 40 INJECTION SUBCUTANEOUS at 09:10

## 2020-02-27 RX ADMIN — DOCUSATE SODIUM 100 MG: 100 CAPSULE, LIQUID FILLED ORAL at 12:32

## 2020-02-27 RX ADMIN — VALACYCLOVIR HYDROCHLORIDE 1000 MG: 500 TABLET, FILM COATED ORAL at 09:21

## 2020-02-27 RX ADMIN — CHLORDIAZEPOXIDE HYDROCHLORIDE 25 MG: 25 CAPSULE ORAL at 22:03

## 2020-02-27 RX ADMIN — LATANOPROST 1 DROP: 50 SOLUTION OPHTHALMIC at 09:09

## 2020-02-27 RX ADMIN — LISINOPRIL 10 MG: 10 TABLET ORAL at 09:10

## 2020-02-27 RX ADMIN — DOCUSATE SODIUM 100 MG: 100 CAPSULE, LIQUID FILLED ORAL at 17:40

## 2020-02-27 RX ADMIN — FUROSEMIDE 40 MG: 40 TABLET ORAL at 09:10

## 2020-02-27 RX ADMIN — GABAPENTIN 100 MG: 100 CAPSULE ORAL at 12:31

## 2020-02-27 RX ADMIN — MECLIZINE HYDROCHLORIDE 25 MG: 25 TABLET ORAL at 17:40

## 2020-02-27 RX ADMIN — MECLIZINE HYDROCHLORIDE 25 MG: 25 TABLET ORAL at 22:00

## 2020-02-27 RX ADMIN — TRAMADOL HYDROCHLORIDE 50 MG: 50 TABLET, FILM COATED ORAL at 06:29

## 2020-02-27 RX ADMIN — PANTOPRAZOLE SODIUM 20 MG: 20 TABLET, DELAYED RELEASE ORAL at 22:03

## 2020-02-27 RX ADMIN — MECLIZINE HYDROCHLORIDE 25 MG: 25 TABLET ORAL at 09:10

## 2020-02-27 RX ADMIN — PRAMIPEXOLE DIHYDROCHLORIDE 0.25 MG: 0.25 TABLET ORAL at 22:03

## 2020-02-27 NOTE — PROGRESS NOTES
Progress Note - Infectious Disease   Sachin Rogers 80 y o  female MRN: [id number]  Unit/Bed#: [place] 2 -01 Encounter: [id number]      Impression/Recommendations:  1  Dermatomal zoster, on left flank   No clinical signs of bacterial superinfection  Rash crusting/resolving  Continue Valtrex  Serial exams  Treat x7 days total      2  Fever, most likely secondary to urinary retention   Fever was nonsustained   UA was benign     Observe off further antibiotic  Monitor temperature  Re-evaluate with cultures only if fever recurs      3  Encephalopathy, on background of advanced dementia   I suspect it is secondary to urinary retention  Cristo Ferrer is with advanced dementia usually has encephalopathy with any significant change in baseline status, infectious and noninfectious  Sheria Bers retention can certainly result in encephalopathy in this patient   Mental status stable, now at baseline  Monitor mental status      4  Urinary retention, with chronic Orantes catheterization   If patient does indeed have recurrent UTI, although I am not sure she truly had UTIs (see below), she would be a candidate for suprapubic catheterization      5  Recurrent UTIs by report   As typical with patient who has chronic Orantes catheterization, many antibiotic courses are actually given for bladder colonization rather than true infection   In this patient with advanced dementia, I would refrain from treating patient for UTI unless she has objective signs of UTI, not just positive urine culture and encephalopathy      6  Advanced dementia   Patient is unable to voice symptoms reliably      Discussed with patient      Antibiotics:  Valtrex # 3      Subjective:  Patient  is comfortable  Left flank pain mild and improving  Mental status is baseline  Temperature stays down  No diarrhea      Objective:  Vitals:  Temp:  [99 °F (37 2 °C)-100 4 °F (38 °C)] 99 2 °F (37 3 °C)  HR:  [75-80] 75  Resp:  [17-20] 17  BP: (112-126)/(53-61) 126/60  SpO2:  [95 %-96 %] 95 %  Temp (24hrs), Av 5 °F (37 5 °C), Min:99 °F (37 2 °C), Max:100 4 °F (38 °C)  Current: Temperature: 99 2 °F (37 3 °C)    Physical Exam:     General: Awake, alert, cooperative, no distress  Neck:  Supple  No mass  No lymphadenopathy  Lungs: Expansion symmetric, no rales, no wheezing, respirations unlabored  Heart:  Regular rate and rhythm, S1 and S2 normal, no murmur  Abdomen: Soft, nondistended, non-tender, bowel sounds active all four quadrants, no masses, no organomegaly  Extremities: Trace leg edema  No erythema/warmth  No ulcer  Nontender to palpation  Skin:  Left flank rash crusting  No new vesicle  No purulence  Mild tenderness  Neuro: Moves all extremities  Invasive Devices     Peripheral Intravenous Line            Peripheral IV 20 Left Antecubital 3 days          Drain            Urethral Catheter 14 Fr  5 days                Labs studies:   I have personally reviewed pertinent labs  Results from last 7 days   Lab Units 20  0629 20  0505 20  0458   POTASSIUM mmol/L 4 2 4 1 3 7   CHLORIDE mmol/L 101 101 103   CO2 mmol/L 28 26 26   BUN mg/dL 16 14 12   CREATININE mg/dL 0 70 0 62 0 60   EGFR ml/min/1 73sq m 75 78 79   CALCIUM mg/dL 8 4 8 7 8 1*     Results from last 7 days   Lab Units 20  0614 20  0505 20  0458   WBC Thousand/uL 6 35 5 24 5 42   HEMOGLOBIN g/dL 10 5* 10 6* 9 5*   PLATELETS Thousands/uL 231 199 138*           Imaging Studies:   I have personally reviewed pertinent imaging study reports and images in PACS  EKG, Pathology, and Other Studies:   I have personally reviewed pertinent reports

## 2020-02-27 NOTE — PROGRESS NOTES
Progress Note - Oc Stagers 9/20/1926, 80 y o  female MRN: [id number]    Unit/Bed#: [place] [address] 2 [place] [address] 208-01 Encounter: [id number]    Primary Care Provider: Amauri Soto DO   Date and time admitted to hospital: 2/21/2020 11:00 AM        * Urinary retention  Assessment & Plan  Likely causing chronic UTIs  Multifactorial secondary to advanced age, poor mobility, and stenotic urethra  ER had difficulty placing a Orantes catheter and therefore urology placed it  Orantes catheter inserted 2/21/20 - please note catheter is NOT chronic   Urology recommends flushing Orantes until clear and maintaining to gravity  Will need outpatient follow-up with urology the office to determine further management    Chronic urinary tract infection  Assessment & Plan  With history of multiple UTIs  Likely multifactorial secondary to above  Just finished a course of doxycycline 100 mg b i d  x 10 days 2/17/20    On admission patient was afebrile, nontoxic, no leukocytosis and therefore antibiotics were not warranted at that time  Patient was later seen by admitting physician and spiked a 102 fever  At that time she was given a dose of IV Levaquin 500 mg x 1    -As per ID will hold off on further antibiotics as fever may have been caused from multiple attempts at Orantes insertion with traumatic urethritis       Shingles  Assessment & Plan  Infectious follow-up appreciated, continue Valtrex to treat for 7 days total  No need for further isolation    Impaired mobility and ADLs  Assessment & Plan  Secondary to osteoarthritis  With deconditioning secondary to above  PT/OT recommending short-term rehab  Spoke with daughter at bedside who is agreeable  Awaiting placement    Restless legs syndrome  Assessment & Plan  Continue Mirapex    Osteoarthritis  Assessment & Plan  With chronic deformities/severe kyphosis and ambulatory dysfunction  Uses walker to ambulate at baseline  Continue Lidoderm    Leg edema  Assessment & Plan  Pre-hospital chronically maintained on Lasix 40 mg daily  Glaucoma  Assessment & Plan  Continue home eye drops    GERD (gastroesophageal reflux disease)  Assessment & Plan  Continue omeprazole        VTE Pharmacologic Prophylaxis:   Pharmacologic: lovenox    Patient Centered Rounds: I have performed bedside rounds with nursing staff today  Education and Discussions with Family / Patient: daughter[place]    Time Spent for Care: 36 minutes  More than 50% of total time spent on counseling and coordination of care as described above  Current Length of Stay: 6 day(s)    Current Patient Status: Inpatient   Certification Statement: The patient will continue to require additional inpatient hospital stay due to fever, pain    Discharge Plan / Estimated Discharge Date: 24-48 H    Code Status: Level 3 - DNAR and DNI      Subjective:   Patient seen and examined at bedside, currently denies any complaints  Objective:     Vitals:   Temp (24hrs), Av 6 °F (37 6 °C), Min:99 2 °F (37 3 °C), Max:100 4 °F (38 °C)    Temp:  [99 2 °F (37 3 °C)-100 4 °F (38 °C)] 99 2 °F (37 3 °C)  HR:  [73-80] 73  Resp:  [17-20] 17  BP: ()/(50-60) 90/50  SpO2:  [94 %-96 %] 94 %  Body mass index is 30 75 kg/m²  Input and Output Summary (last 24 hours): Intake/Output Summary (Last 24 hours) at 2020 1526  Last data filed at 2020 9551  Gross per 24 hour   Intake --   Output 1400 ml   Net -1400 ml       Physical Exam:    Constitutional: Patient is oriented to person, place and time, no acute distress  HEENT:  Normocephalic, atraumatic  Cardiovascular: Normal S1S2, RRR, No murmurs/rubs/gallops appreciated  Pulmonary:  Bilateral air entry, No rhonchi/rales/wheezing appreciated  Abdominal: Soft, Bowel sounds present, Non-tender, Non-distended  Extremities:  No cyanosis, clubbing or edema  Neurological: Cranial nerves II-XII grossly intact, sensation intact, otherwise no focal neurological symptoms    Skin: left flank rash crusting, no new lesions     Additional Data:     Labs:    Results from last 7 days   Lab Units 02/27/20  0614   WBC Thousand/uL 6 35   HEMOGLOBIN g/dL 10 5*   HEMATOCRIT % 32 0*   PLATELETS Thousands/uL 231   NEUTROS PCT % 50   LYMPHS PCT % 35   MONOS PCT % 11   EOS PCT % 3     Results from last 7 days   Lab Units 02/27/20  0629   POTASSIUM mmol/L 4 2   CHLORIDE mmol/L 101   CO2 mmol/L 28   BUN mg/dL 16   CREATININE mg/dL 0 70   CALCIUM mg/dL 8 4            I Have Reviewed All Lab Data Listed Above          Recent Cultures (last 7 days):           Last 24 Hours Medication List:     Current Facility-Administered Medications:  acetaminophen 650 mg Oral Q6H PRN Aftab Ramon PA-C   acetaminophen 650 mg Oral QAM Aracelis PigerNITZA   chlordiazePOXIDE 25 mg Oral HS Aracelis Piger, NITZA   diphenhydrAMINE 12 5 mg Intravenous Once Aftab Ramon PA-C   docusate sodium 100 mg Oral BID Monique Muñoz MD   enoxaparin 40 mg Subcutaneous Daily Aracelis Piger, NITZA   furosemide 40 mg Oral BID (diuretic) Aftab Ramon PA-C   gabapentin 100 mg Oral Daily Monique Muñoz MD   latanoprost 1 drop Left Eye Daily Aracelis Piger, NITZA   lidocaine 1 patch Topical Daily Aracelis Piger, PA-BENITA   lidocaine 1 patch Topical Daily Aracelis Piger, PA-BENITA   lisinopril 10 mg Oral Daily Aracelis Piger, PA-BENITA   meclizine 25 mg Oral TID Aracelis Piger, PA-C   ondansetron 4 mg Intravenous Q6H PRN Aracelis Piger, PA-C   pantoprazole 20 mg Oral HS Aracelis Piger, PA-BENITA   pramipexole 0 25 mg Oral Daily Aracelis Piger, NITZA   timolol 1 drop Left Eye Daily Aracelis Piger, PA-BENITA   traMADol 50 mg Oral Q8H PRN Aracelis PigeNITZA hopper   valACYclovir 1,000 mg Oral Q12H Aftab Ramon PA-C        Today, Patient Was Seen By: Monique Muñoz MD

## 2020-02-27 NOTE — ASSESSMENT & PLAN NOTE
With history of multiple UTIs  Likely multifactorial secondary to above  Just finished a course of doxycycline 100 mg b i d  x 10 days 2/17/20    On admission patient was afebrile, nontoxic, no leukocytosis and therefore antibiotics were not warranted at that time  Patient was later seen by admitting physician and spiked a 102 fever  At that time she was given a dose of IV Levaquin 500 mg x 1    -As per ID will hold off on further antibiotics as fever may have been caused from multiple attempts at [place] insertion with traumatic urethritis

## 2020-02-27 NOTE — PLAN OF CARE
Problem: Potential for Falls  Goal: Patient will remain free of falls  Description  INTERVENTIONS:  - Assess patient frequently for physical needs  -  Identify cognitive and physical deficits and behaviors that affect risk of falls    -  [place] fall precautions as indicated by assessment   - Educate patient/family on patient safety including physical limitations  - Instruct patient to call for assistance with activity based on assessment  - Modify environment to reduce risk of injury  - Consider OT/PT consult to assist with strengthening/mobility  Outcome: Progressing     Problem: Prexisting or High Potential for Compromised Skin Integrity  Goal: Skin integrity is maintained or improved  Description  INTERVENTIONS:  - Identify patients at risk for skin breakdown  - Assess and monitor skin integrity  - Assess and monitor nutrition and hydration status  - Monitor labs   - Assess for incontinence   - Turn and reposition patient  - Assist with mobility/ambulation  - Relieve pressure over bony prominences  - Avoid friction and shearing  - Provide appropriate hygiene as needed including keeping skin clean and dry  - Evaluate need for skin moisturizer/barrier cream  - Collaborate with interdisciplinary team   - Patient/family teaching  - Consider wound care consult   Outcome: Progressing     Problem: PAIN - ADULT  Goal: Verbalizes/displays adequate comfort level or baseline comfort level  Description  Interventions:  - Encourage patient to monitor pain and request assistance  - Assess pain using appropriate pain scale  - Administer analgesics based on type and severity of pain and evaluate response  - Implement non-pharmacological measures as appropriate and evaluate response  - Consider cultural and social influences on pain and pain management  - Notify physician/advanced practitioner if interventions unsuccessful or patient reports new pain  Outcome: Progressing     Problem: INFECTION - ADULT  Goal: Absence or prevention of progression during hospitalization  Description  INTERVENTIONS:  - Assess and monitor for signs and symptoms of infection  - Monitor lab/diagnostic results  - Monitor all insertion sites, i e  indwelling lines, tubes, and drains  - Monitor endotracheal if appropriate and nasal secretions for changes in amount and color  - [place] appropriate cooling/warming therapies per order  - Administer medications as ordered  - Instruct and encourage patient and family to use good hand hygiene technique  - Identify and instruct in appropriate isolation precautions for identified infection/condition  Outcome: Progressing     Problem: SAFETY ADULT  Goal: Maintain or return to baseline ADL function  Description  INTERVENTIONS:  -  Assess patient's ability to carry out ADLs; assess patient's baseline for ADL function and identify physical deficits which impact ability to perform ADLs (bathing, care of mouth/teeth, toileting, grooming, dressing, etc )  - Assess/evaluate cause of self-care deficits   - Assess range of motion  - Assess patient's mobility; develop plan if impaired  - Assess patient's need for assistive devices and provide as appropriate  - Encourage maximum independence but intervene and supervise when necessary  - Involve family in performance of ADLs  - Assess for home care needs following discharge   - Consider OT consult to assist with ADL evaluation and planning for discharge  - Provide patient education as appropriate  Outcome: Progressing  Goal: Maintain or return mobility status to optimal level  Description  INTERVENTIONS:  - Assess patient's baseline mobility status (ambulation, transfers, stairs, etc )    - Identify cognitive and physical deficits and behaviors that affect mobility  - Identify mobility aids required to assist with transfers and/or ambulation (gait belt, sit-to-stand, lift, walker, cane, etc )  - [place] fall precautions as indicated by assessment  - Record patient progress and toleration of activity level on Mobility SBAR; progress patient to next Phase/Stage  - Instruct patient to call for assistance with activity based on assessment  - Consider rehabilitation consult to assist with strengthening/weightbearing, etc   Outcome: Progressing     Problem: DISCHARGE PLANNING  Goal: Discharge to home or other facility with appropriate resources  Description  INTERVENTIONS:  - Identify barriers to discharge w/patient and caregiver  - Arrange for needed discharge resources and transportation as appropriate  - Identify discharge learning needs (meds, wound care, etc )  - Arrange for interpretive services to assist at discharge as needed  - Refer to Case Management Department for coordinating discharge planning if the patient needs post-hospital services based on physician/advanced practitioner order or complex needs related to functional status, cognitive ability, or social support system  Outcome: Progressing     Problem: Knowledge Deficit  Goal: Patient/family/caregiver demonstrates understanding of disease process, treatment plan, medications, and discharge instructions  Description  Complete learning assessment and assess knowledge base  Interventions:  - Provide teaching at level of understanding  - Provide teaching via preferred learning methods  Outcome: Progressing     Problem: Nutrition/Hydration-ADULT  Goal: Nutrient/Hydration intake appropriate for improving, restoring or maintaining nutritional needs  Description  Monitor and assess patient's nutrition/hydration status for malnutrition  Collaborate with interdisciplinary team and initiate plan and interventions as ordered  Monitor patient's weight and dietary intake as ordered or per policy  Utilize nutrition screening tool and intervene as necessary  Determine patient's food preferences and provide high-protein, high-caloric foods as appropriate       INTERVENTIONS:  - Monitor oral intake, urinary output, labs, and treatment plans  - Assess nutrition and hydration status and recommend course of action  - Evaluate amount of meals eaten  - Assist patient with eating if necessary   - Allow adequate time for meals  - Recommend/ encourage appropriate diets, oral nutritional supplements, and vitamin/mineral supplements  - Order, calculate, and assess calorie counts as needed  - Recommend, monitor, and adjust tube feedings and TPN/PPN based on assessed needs  - Assess need for intravenous fluids  - Provide specific nutrition/hydration education as appropriate  - Include patient/family/caregiver in decisions related to nutrition  Outcome: Progressing     Problem: GENITOURINARY - ADULT  Goal: Maintains or returns to baseline urinary function  Description  INTERVENTIONS:  - Assess urinary function  - Encourage oral fluids to ensure adequate hydration if ordered  - Administer IV fluids as ordered to ensure adequate hydration  - Administer ordered medications as needed  - Offer frequent toileting  - Follow urinary retention protocol if ordered  Outcome: Progressing  Goal: Absence of urinary retention  Description  INTERVENTIONS:  - Assess patients ability to void and empty bladder  - Monitor I/O  - Bladder scan as needed  - Discuss with physician/AP medications to alleviate retention as needed  - Discuss catheterization for long term situations as appropriate  Outcome: Progressing  Goal: Urinary catheter remains patent  Description  INTERVENTIONS:  - Assess patency of urinary catheter  - If patient has a chronic ward, consider changing catheter if non-functioning  - Follow guidelines for intermittent irrigation of non-functioning urinary catheter  Outcome: Progressing     Problem: SKIN/TISSUE INTEGRITY - ADULT  Goal: Skin integrity remains intact  Description  INTERVENTIONS  - Identify patients at risk for skin breakdown  - Assess and monitor skin integrity  - Assess and monitor nutrition and hydration status  - Monitor labs (i e  albumin)  - Assess for incontinence   - Turn and reposition patient  - Assist with mobility/ambulation  - Relieve pressure over bony prominences  - Avoid friction and shearing  - Provide appropriate hygiene as needed including keeping skin clean and dry  - Evaluate need for skin moisturizer/barrier cream  - Collaborate with interdisciplinary team (i e  Nutrition, Rehabilitation, etc )   - Patient/family teaching  Outcome: Progressing  Goal: Oral mucous membranes remain intact  Description  INTERVENTIONS  - Assess oral mucosa and hygiene practices  - Implement preventative oral hygiene regimen  - Implement oral medicated treatments as ordered  - Initiate Nutrition services referral as needed  Outcome: Progressing     Problem: MUSCULOSKELETAL - ADULT  Goal: Maintain or return mobility to safest level of function  Description  INTERVENTIONS:  - Assess patient's ability to carry out ADLs; assess patient's baseline for ADL function and identify physical deficits which impact ability to perform ADLs (bathing, care of mouth/teeth, toileting, grooming, dressing, etc )  - Assess/evaluate cause of self-care deficits   - Assess range of motion  - Assess patient's mobility  - Assess patient's need for assistive devices and provide as appropriate  - Encourage maximum independence but intervene and supervise when necessary  - Involve family in performance of ADLs  - Assess for home care needs following discharge   - Consider OT consult to assist with ADL evaluation and planning for discharge  - Provide patient education as appropriate  Outcome: Progressing

## 2020-02-28 VITALS
DIASTOLIC BLOOD PRESSURE: 58 MMHG | BODY MASS INDEX: 30.75 KG/M2 | SYSTOLIC BLOOD PRESSURE: 118 MMHG | TEMPERATURE: 98.6 F | HEART RATE: 82 BPM | WEIGHT: 134.7 LBS | OXYGEN SATURATION: 94 % | RESPIRATION RATE: 17 BRPM

## 2020-02-28 LAB
ANION GAP SERPL CALCULATED.3IONS-SCNC: 8 MMOL/L (ref 4–13)
BASOPHILS # BLD AUTO: 0.04 THOUSANDS/ΜL (ref 0–0.1)
BASOPHILS NFR BLD AUTO: 1 % (ref 0–1)
BUN SERPL-MCNC: 14 MG/DL (ref 5–25)
CALCIUM SERPL-MCNC: 8.5 MG/DL (ref 8.3–10.1)
CHLORIDE SERPL-SCNC: 101 MMOL/L (ref 100–108)
CO2 SERPL-SCNC: 27 MMOL/L (ref 21–32)
CREAT SERPL-MCNC: 0.68 MG/DL (ref 0.6–1.3)
EOSINOPHIL # BLD AUTO: 0.28 THOUSAND/ΜL (ref 0–0.61)
EOSINOPHIL NFR BLD AUTO: 5 % (ref 0–6)
ERYTHROCYTE [DISTWIDTH] IN BLOOD BY AUTOMATED COUNT: 14.5 % (ref 11.6–15.1)
GFR SERPL CREATININE-BSD FRML MDRD: 76 ML/MIN/1.73SQ M
GLUCOSE SERPL-MCNC: 93 MG/DL (ref 65–140)
HCT VFR BLD AUTO: 31.1 % (ref 34.8–46.1)
HGB BLD-MCNC: 10.2 G/DL (ref 11.5–15.4)
IMM GRANULOCYTES # BLD AUTO: 0.02 THOUSAND/UL (ref 0–0.2)
IMM GRANULOCYTES NFR BLD AUTO: 0 % (ref 0–2)
LYMPHOCYTES # BLD AUTO: 2.29 THOUSANDS/ΜL (ref 0.6–4.47)
LYMPHOCYTES NFR BLD AUTO: 41 % (ref 14–44)
MCH RBC QN AUTO: 31.9 PG (ref 26.8–34.3)
MCHC RBC AUTO-ENTMCNC: 32.8 G/DL (ref 31.4–37.4)
MCV RBC AUTO: 97 FL (ref 82–98)
MONOCYTES # BLD AUTO: 0.6 THOUSAND/ΜL (ref 0.17–1.22)
MONOCYTES NFR BLD AUTO: 11 % (ref 4–12)
NEUTROPHILS # BLD AUTO: 2.34 THOUSANDS/ΜL (ref 1.85–7.62)
NEUTS SEG NFR BLD AUTO: 42 % (ref 43–75)
NRBC BLD AUTO-RTO: 0 /100 WBCS
PLATELET # BLD AUTO: 232 THOUSANDS/UL (ref 149–390)
PMV BLD AUTO: 9.9 FL (ref 8.9–12.7)
POTASSIUM SERPL-SCNC: 3.7 MMOL/L (ref 3.5–5.3)
RBC # BLD AUTO: 3.2 MILLION/UL (ref 3.81–5.12)
SODIUM SERPL-SCNC: 136 MMOL/L (ref 136–145)
WBC # BLD AUTO: 5.57 THOUSAND/UL (ref 4.31–10.16)

## 2020-02-28 PROCEDURE — 85025 COMPLETE CBC W/AUTO DIFF WBC: CPT | Performed by: INTERNAL MEDICINE

## 2020-02-28 PROCEDURE — 80048 BASIC METABOLIC PNL TOTAL CA: CPT | Performed by: INTERNAL MEDICINE

## 2020-02-28 PROCEDURE — 99239 HOSP IP/OBS DSCHRG MGMT >30: CPT | Performed by: INTERNAL MEDICINE

## 2020-02-28 PROCEDURE — 99232 SBSQ HOSP IP/OBS MODERATE 35: CPT | Performed by: INTERNAL MEDICINE

## 2020-02-28 RX ORDER — GABAPENTIN 100 MG/1
100 CAPSULE ORAL DAILY
Qty: 10 CAPSULE | Refills: 0 | Status: SHIPPED | OUTPATIENT
Start: 2020-02-29 | End: 2020-03-19

## 2020-02-28 RX ORDER — LIDOCAINE 50 MG/G
1 PATCH TOPICAL DAILY
Qty: 30 PATCH | Refills: 0 | Status: SHIPPED | OUTPATIENT
Start: 2020-02-28 | End: 2020-03-24

## 2020-02-28 RX ORDER — TRAMADOL HYDROCHLORIDE 50 MG/1
50 TABLET ORAL EVERY 8 HOURS PRN
Qty: 4 TABLET | Refills: 0 | Status: SHIPPED | OUTPATIENT
Start: 2020-02-28 | End: 2020-03-24

## 2020-02-28 RX ORDER — VALACYCLOVIR HYDROCHLORIDE 1 G/1
1000 TABLET, FILM COATED ORAL EVERY 12 HOURS
Qty: 7 TABLET | Refills: 0 | Status: SHIPPED | OUTPATIENT
Start: 2020-02-28 | End: 2020-03-09 | Stop reason: HOSPADM

## 2020-02-28 RX ADMIN — LISINOPRIL 10 MG: 10 TABLET ORAL at 08:35

## 2020-02-28 RX ADMIN — VALACYCLOVIR HYDROCHLORIDE 1000 MG: 500 TABLET, FILM COATED ORAL at 08:46

## 2020-02-28 RX ADMIN — FUROSEMIDE 40 MG: 40 TABLET ORAL at 08:35

## 2020-02-28 RX ADMIN — MECLIZINE HYDROCHLORIDE 25 MG: 25 TABLET ORAL at 16:03

## 2020-02-28 RX ADMIN — MECLIZINE HYDROCHLORIDE 25 MG: 25 TABLET ORAL at 08:35

## 2020-02-28 RX ADMIN — LIDOCAINE 1 PATCH: 50 PATCH TOPICAL at 08:34

## 2020-02-28 RX ADMIN — TRAMADOL HYDROCHLORIDE 50 MG: 50 TABLET, FILM COATED ORAL at 16:02

## 2020-02-28 RX ADMIN — ENOXAPARIN SODIUM 40 MG: 40 INJECTION SUBCUTANEOUS at 08:35

## 2020-02-28 RX ADMIN — FUROSEMIDE 40 MG: 40 TABLET ORAL at 16:03

## 2020-02-28 RX ADMIN — DOCUSATE SODIUM 100 MG: 100 CAPSULE, LIQUID FILLED ORAL at 08:35

## 2020-02-28 RX ADMIN — LATANOPROST 1 DROP: 50 SOLUTION OPHTHALMIC at 08:36

## 2020-02-28 RX ADMIN — LIDOCAINE 1 PATCH: 50 PATCH TOPICAL at 08:35

## 2020-02-28 RX ADMIN — GABAPENTIN 100 MG: 100 CAPSULE ORAL at 08:35

## 2020-02-28 RX ADMIN — ACETAMINOPHEN 650 MG: 325 TABLET ORAL at 08:35

## 2020-02-28 RX ADMIN — TIMOLOL MALEATE 1 DROP: 2.5 SOLUTION/ DROPS OPHTHALMIC at 08:36

## 2020-02-28 NOTE — PLAN OF CARE
Problem: Potential for Falls  Goal: Patient will remain free of falls  Description  INTERVENTIONS:  - Assess patient frequently for physical needs  -  Identify cognitive and physical deficits and behaviors that affect risk of falls    -  [place] fall precautions as indicated by assessment   - Educate patient/family on patient safety including physical limitations  - Instruct patient to call for assistance with activity based on assessment  - Modify environment to reduce risk of injury  - Consider OT/PT consult to assist with strengthening/mobility  Outcome: Progressing     Problem: Prexisting or High Potential for Compromised Skin Integrity  Goal: Skin integrity is maintained or improved  Description  INTERVENTIONS:  - Identify patients at risk for skin breakdown  - Assess and monitor skin integrity  - Assess and monitor nutrition and hydration status  - Monitor labs   - Assess for incontinence   - Turn and reposition patient  - Assist with mobility/ambulation  - Relieve pressure over bony prominences  - Avoid friction and shearing  - Provide appropriate hygiene as needed including keeping skin clean and dry  - Evaluate need for skin moisturizer/barrier cream  - Collaborate with interdisciplinary team   - Patient/family teaching  - Consider wound care consult   Outcome: Progressing     Problem: PAIN - ADULT  Goal: Verbalizes/displays adequate comfort level or baseline comfort level  Description  Interventions:  - Encourage patient to monitor pain and request assistance  - Assess pain using appropriate pain scale  - Administer analgesics based on type and severity of pain and evaluate response  - Implement non-pharmacological measures as appropriate and evaluate response  - Consider cultural and social influences on pain and pain management  - Notify physician/advanced practitioner if interventions unsuccessful or patient reports new pain  Outcome: Progressing     Problem: INFECTION - ADULT  Goal: Absence or prevention of progression during hospitalization  Description  INTERVENTIONS:  - Assess and monitor for signs and symptoms of infection  - Monitor lab/diagnostic results  - Monitor all insertion sites, i e  indwelling lines, tubes, and drains  - Monitor endotracheal if appropriate and nasal secretions for changes in amount and color  - [place] appropriate cooling/warming therapies per order  - Administer medications as ordered  - Instruct and encourage patient and family to use good hand hygiene technique  - Identify and instruct in appropriate isolation precautions for identified infection/condition  Outcome: Progressing     Problem: SAFETY ADULT  Goal: Maintain or return to baseline ADL function  Description  INTERVENTIONS:  -  Assess patient's ability to carry out ADLs; assess patient's baseline for ADL function and identify physical deficits which impact ability to perform ADLs (bathing, care of mouth/teeth, toileting, grooming, dressing, etc )  - Assess/evaluate cause of self-care deficits   - Assess range of motion  - Assess patient's mobility; develop plan if impaired  - Assess patient's need for assistive devices and provide as appropriate  - Encourage maximum independence but intervene and supervise when necessary  - Involve family in performance of ADLs  - Assess for home care needs following discharge   - Consider OT consult to assist with ADL evaluation and planning for discharge  - Provide patient education as appropriate  Outcome: Progressing  Goal: Maintain or return mobility status to optimal level  Description  INTERVENTIONS:  - Assess patient's baseline mobility status (ambulation, transfers, stairs, etc )    - Identify cognitive and physical deficits and behaviors that affect mobility  - Identify mobility aids required to assist with transfers and/or ambulation (gait belt, sit-to-stand, lift, walker, cane, etc )  - [place] fall precautions as indicated by assessment  - Record patient progress and toleration of activity level on Mobility SBAR; progress patient to next Phase/Stage  - Instruct patient to call for assistance with activity based on assessment  - Consider rehabilitation consult to assist with strengthening/weightbearing, etc   Outcome: Progressing     Problem: DISCHARGE PLANNING  Goal: Discharge to home or other facility with appropriate resources  Description  INTERVENTIONS:  - Identify barriers to discharge w/patient and caregiver  - Arrange for needed discharge resources and transportation as appropriate  - Identify discharge learning needs (meds, wound care, etc )  - Arrange for interpretive services to assist at discharge as needed  - Refer to Case Management Department for coordinating discharge planning if the patient needs post-hospital services based on physician/advanced practitioner order or complex needs related to functional status, cognitive ability, or social support system  Outcome: Progressing     Problem: Knowledge Deficit  Goal: Patient/family/caregiver demonstrates understanding of disease process, treatment plan, medications, and discharge instructions  Description  Complete learning assessment and assess knowledge base  Interventions:  - Provide teaching at level of understanding  - Provide teaching via preferred learning methods  Outcome: Progressing     Problem: Nutrition/Hydration-ADULT  Goal: Nutrient/Hydration intake appropriate for improving, restoring or maintaining nutritional needs  Description  Monitor and assess patient's nutrition/hydration status for malnutrition  Collaborate with interdisciplinary team and initiate plan and interventions as ordered  Monitor patient's weight and dietary intake as ordered or per policy  Utilize nutrition screening tool and intervene as necessary  Determine patient's food preferences and provide high-protein, high-caloric foods as appropriate       INTERVENTIONS:  - Monitor oral intake, urinary output, labs, and treatment plans  - Assess nutrition and hydration status and recommend course of action  - Evaluate amount of meals eaten  - Assist patient with eating if necessary   - Allow adequate time for meals  - Recommend/ encourage appropriate diets, oral nutritional supplements, and vitamin/mineral supplements  - Order, calculate, and assess calorie counts as needed  - Recommend, monitor, and adjust tube feedings and TPN/PPN based on assessed needs  - Assess need for intravenous fluids  - Provide specific nutrition/hydration education as appropriate  - Include patient/family/caregiver in decisions related to nutrition  Outcome: Progressing     Problem: GENITOURINARY - ADULT  Goal: Maintains or returns to baseline urinary function  Description  INTERVENTIONS:  - Assess urinary function  - Encourage oral fluids to ensure adequate hydration if ordered  - Administer IV fluids as ordered to ensure adequate hydration  - Administer ordered medications as needed  - Offer frequent toileting  - Follow urinary retention protocol if ordered  Outcome: Progressing  Goal: Absence of urinary retention  Description  INTERVENTIONS:  - Assess patients ability to void and empty bladder  - Monitor I/O  - Bladder scan as needed  - Discuss with physician/AP medications to alleviate retention as needed  - Discuss catheterization for long term situations as appropriate  Outcome: Progressing  Goal: Urinary catheter remains patent  Description  INTERVENTIONS:  - Assess patency of urinary catheter  - If patient has a chronic ward, consider changing catheter if non-functioning  - Follow guidelines for intermittent irrigation of non-functioning urinary catheter  Outcome: Progressing     Problem: SKIN/TISSUE INTEGRITY - ADULT  Goal: Skin integrity remains intact  Description  INTERVENTIONS  - Identify patients at risk for skin breakdown  - Assess and monitor skin integrity  - Assess and monitor nutrition and hydration status  - Monitor labs (i e  albumin)  - Assess for incontinence   - Turn and reposition patient  - Assist with mobility/ambulation  - Relieve pressure over bony prominences  - Avoid friction and shearing  - Provide appropriate hygiene as needed including keeping skin clean and dry  - Evaluate need for skin moisturizer/barrier cream  - Collaborate with interdisciplinary team (i e  Nutrition, Rehabilitation, etc )   - Patient/family teaching  Outcome: Progressing  Goal: Oral mucous membranes remain intact  Description  INTERVENTIONS  - Assess oral mucosa and hygiene practices  - Implement preventative oral hygiene regimen  - Implement oral medicated treatments as ordered  - Initiate Nutrition services referral as needed  Outcome: Progressing     Problem: MUSCULOSKELETAL - ADULT  Goal: Maintain or return mobility to safest level of function  Description  INTERVENTIONS:  - Assess patient's ability to carry out ADLs; assess patient's baseline for ADL function and identify physical deficits which impact ability to perform ADLs (bathing, care of mouth/teeth, toileting, grooming, dressing, etc )  - Assess/evaluate cause of self-care deficits   - Assess range of motion  - Assess patient's mobility  - Assess patient's need for assistive devices and provide as appropriate  - Encourage maximum independence but intervene and supervise when necessary  - Involve family in performance of ADLs  - Assess for home care needs following discharge   - Consider OT consult to assist with ADL evaluation and planning for discharge  - Provide patient education as appropriate  Outcome: Progressing

## 2020-02-28 NOTE — PROGRESS NOTES
Patient's ward appears to be leaking upon assessment, urine draining in collection bag but also on pad on bed underneath patient  RN informed Dr Heather Daniels who stated to contact Urology, RN paged urology, awaiting reply  Patient scheduled to leave facility at 1600 2/28/2020  Will continue to monitor      Henry Nguyen RN 2/28/2020 2:32 PM

## 2020-02-28 NOTE — SOCIAL WORK
Attending made CM aware that patient would be cleared for discharge today  Pt was given [facility]; accepted by [facility]      A BLS transport d/t ambulation status of max assist of two, leg edema and instability and pain of left knee, was scheduled with [email or web address] for 4:00 today  CMN form in chart; copy made for MR bin  Numbers for report in EPIC     IMM was provided to son at bedside with explanation to dtr as pt's POA; copy was put in MR bin  Updated: attending, RN, UC and facility via [place]  CM updated child at bedside  PASRR requested  Uploaded into [employer] and sent to [facility], MSW  2/28/2020  [id number]

## 2020-02-28 NOTE — PLAN OF CARE
Problem: Potential for Falls  Goal: Patient will remain free of falls  Description  INTERVENTIONS:  - Assess patient frequently for physical needs  -  Identify cognitive and physical deficits and behaviors that affect risk of falls    -  [place] fall precautions as indicated by assessment   - Educate patient/family on patient safety including physical limitations  - Instruct patient to call for assistance with activity based on assessment  - Modify environment to reduce risk of injury  - Consider OT/PT consult to assist with strengthening/mobility  Outcome: Progressing     Problem: Prexisting or High Potential for Compromised Skin Integrity  Goal: Skin integrity is maintained or improved  Description  INTERVENTIONS:  - Identify patients at risk for skin breakdown  - Assess and monitor skin integrity  - Assess and monitor nutrition and hydration status  - Monitor labs   - Assess for incontinence   - Turn and reposition patient  - Assist with mobility/ambulation  - Relieve pressure over bony prominences  - Avoid friction and shearing  - Provide appropriate hygiene as needed including keeping skin clean and dry  - Evaluate need for skin moisturizer/barrier cream  - Collaborate with interdisciplinary team   - Patient/family teaching  - Consider wound care consult   Outcome: Progressing     Problem: PAIN - ADULT  Goal: Verbalizes/displays adequate comfort level or baseline comfort level  Description  Interventions:  - Encourage patient to monitor pain and request assistance  - Assess pain using appropriate pain scale  - Administer analgesics based on type and severity of pain and evaluate response  - Implement non-pharmacological measures as appropriate and evaluate response  - Consider cultural and social influences on pain and pain management  - Notify physician/advanced practitioner if interventions unsuccessful or patient reports new pain  Outcome: Progressing     Problem: INFECTION - ADULT  Goal: Absence or prevention of progression during hospitalization  Description  INTERVENTIONS:  - Assess and monitor for signs and symptoms of infection  - Monitor lab/diagnostic results  - Monitor all insertion sites, i e  indwelling lines, tubes, and drains  - Monitor endotracheal if appropriate and nasal secretions for changes in amount and color  - [place] appropriate cooling/warming therapies per order  - Administer medications as ordered  - Instruct and encourage patient and family to use good hand hygiene technique  - Identify and instruct in appropriate isolation precautions for identified infection/condition  Outcome: Progressing     Problem: SAFETY ADULT  Goal: Maintain or return to baseline ADL function  Description  INTERVENTIONS:  -  Assess patient's ability to carry out ADLs; assess patient's baseline for ADL function and identify physical deficits which impact ability to perform ADLs (bathing, care of mouth/teeth, toileting, grooming, dressing, etc )  - Assess/evaluate cause of self-care deficits   - Assess range of motion  - Assess patient's mobility; develop plan if impaired  - Assess patient's need for assistive devices and provide as appropriate  - Encourage maximum independence but intervene and supervise when necessary  - Involve family in performance of ADLs  - Assess for home care needs following discharge   - Consider OT consult to assist with ADL evaluation and planning for discharge  - Provide patient education as appropriate  Outcome: Progressing  Goal: Maintain or return mobility status to optimal level  Description  INTERVENTIONS:  - Assess patient's baseline mobility status (ambulation, transfers, stairs, etc )    - Identify cognitive and physical deficits and behaviors that affect mobility  - Identify mobility aids required to assist with transfers and/or ambulation (gait belt, sit-to-stand, lift, walker, cane, etc )  - [place] fall precautions as indicated by assessment  - Record patient progress and toleration of activity level on Mobility SBAR; progress patient to next Phase/Stage  - Instruct patient to call for assistance with activity based on assessment  - Consider rehabilitation consult to assist with strengthening/weightbearing, etc   Outcome: Progressing     Problem: DISCHARGE PLANNING  Goal: Discharge to home or other facility with appropriate resources  Description  INTERVENTIONS:  - Identify barriers to discharge w/patient and caregiver  - Arrange for needed discharge resources and transportation as appropriate  - Identify discharge learning needs (meds, wound care, etc )  - Arrange for interpretive services to assist at discharge as needed  - Refer to Case Management Department for coordinating discharge planning if the patient needs post-hospital services based on physician/advanced practitioner order or complex needs related to functional status, cognitive ability, or social support system  Outcome: Progressing     Problem: Knowledge Deficit  Goal: Patient/family/caregiver demonstrates understanding of disease process, treatment plan, medications, and discharge instructions  Description  Complete learning assessment and assess knowledge base  Interventions:  - Provide teaching at level of understanding  - Provide teaching via preferred learning methods  Outcome: Progressing     Problem: Nutrition/Hydration-ADULT  Goal: Nutrient/Hydration intake appropriate for improving, restoring or maintaining nutritional needs  Description  Monitor and assess patient's nutrition/hydration status for malnutrition  Collaborate with interdisciplinary team and initiate plan and interventions as ordered  Monitor patient's weight and dietary intake as ordered or per policy  Utilize nutrition screening tool and intervene as necessary  Determine patient's food preferences and provide high-protein, high-caloric foods as appropriate       INTERVENTIONS:  - Monitor oral intake, urinary output, labs, and treatment plans  - Assess nutrition and hydration status and recommend course of action  - Evaluate amount of meals eaten  - Assist patient with eating if necessary   - Allow adequate time for meals  - Recommend/ encourage appropriate diets, oral nutritional supplements, and vitamin/mineral supplements  - Order, calculate, and assess calorie counts as needed  - Recommend, monitor, and adjust tube feedings and TPN/PPN based on assessed needs  - Assess need for intravenous fluids  - Provide specific nutrition/hydration education as appropriate  - Include patient/family/caregiver in decisions related to nutrition  Outcome: Progressing     Problem: GENITOURINARY - ADULT  Goal: Maintains or returns to baseline urinary function  Description  INTERVENTIONS:  - Assess urinary function  - Encourage oral fluids to ensure adequate hydration if ordered  - Administer IV fluids as ordered to ensure adequate hydration  - Administer ordered medications as needed  - Offer frequent toileting  - Follow urinary retention protocol if ordered  Outcome: Progressing  Goal: Absence of urinary retention  Description  INTERVENTIONS:  - Assess patients ability to void and empty bladder  - Monitor I/O  - Bladder scan as needed  - Discuss with physician/AP medications to alleviate retention as needed  - Discuss catheterization for long term situations as appropriate  Outcome: Progressing  Goal: Urinary catheter remains patent  Description  INTERVENTIONS:  - Assess patency of urinary catheter  - If patient has a chronic ward, consider changing catheter if non-functioning  - Follow guidelines for intermittent irrigation of non-functioning urinary catheter  Outcome: Progressing     Problem: SKIN/TISSUE INTEGRITY - ADULT  Goal: Skin integrity remains intact  Description  INTERVENTIONS  - Identify patients at risk for skin breakdown  - Assess and monitor skin integrity  - Assess and monitor nutrition and hydration status  - Monitor labs (i e  albumin)  - Assess for incontinence   - Turn and reposition patient  - Assist with mobility/ambulation  - Relieve pressure over bony prominences  - Avoid friction and shearing  - Provide appropriate hygiene as needed including keeping skin clean and dry  - Evaluate need for skin moisturizer/barrier cream  - Collaborate with interdisciplinary team (i e  Nutrition, Rehabilitation, etc )   - Patient/family teaching  Outcome: Progressing  Goal: Oral mucous membranes remain intact  Description  INTERVENTIONS  - Assess oral mucosa and hygiene practices  - Implement preventative oral hygiene regimen  - Implement oral medicated treatments as ordered  - Initiate Nutrition services referral as needed  Outcome: Progressing     Problem: MUSCULOSKELETAL - ADULT  Goal: Maintain or return mobility to safest level of function  Description  INTERVENTIONS:  - Assess patient's ability to carry out ADLs; assess patient's baseline for ADL function and identify physical deficits which impact ability to perform ADLs (bathing, care of mouth/teeth, toileting, grooming, dressing, etc )  - Assess/evaluate cause of self-care deficits   - Assess range of motion  - Assess patient's mobility  - Assess patient's need for assistive devices and provide as appropriate  - Encourage maximum independence but intervene and supervise when necessary  - Involve family in performance of ADLs  - Assess for home care needs following discharge   - Consider OT consult to assist with ADL evaluation and planning for discharge  - Provide patient education as appropriate  Outcome: Progressing

## 2020-02-28 NOTE — PLAN OF CARE
Problem: Potential for Falls  Goal: Patient will remain free of falls  Description  INTERVENTIONS:  - Assess patient frequently for physical needs  -  Identify cognitive and physical deficits and behaviors that affect risk of falls    -  [place] fall precautions as indicated by assessment   - Educate patient/family on patient safety including physical limitations  - Instruct patient to call for assistance with activity based on assessment  - Modify environment to reduce risk of injury  - Consider OT/PT consult to assist with strengthening/mobility  2/28/2020 1306 by Keith Allison RN  Outcome: Adequate for Discharge  2/28/2020 0759 by Keith Allison RN  Outcome: Progressing     Problem: Prexisting or High Potential for Compromised Skin Integrity  Goal: Skin integrity is maintained or improved  Description  INTERVENTIONS:  - Identify patients at risk for skin breakdown  - Assess and monitor skin integrity  - Assess and monitor nutrition and hydration status  - Monitor labs   - Assess for incontinence   - Turn and reposition patient  - Assist with mobility/ambulation  - Relieve pressure over bony prominences  - Avoid friction and shearing  - Provide appropriate hygiene as needed including keeping skin clean and dry  - Evaluate need for skin moisturizer/barrier cream  - Collaborate with interdisciplinary team   - Patient/family teaching  - Consider wound care consult   2/28/2020 1306 by Keith Allison RN  Outcome: Adequate for Discharge  2/28/2020 0759 by Keith Allison RN  Outcome: Progressing     Problem: PAIN - ADULT  Goal: Verbalizes/displays adequate comfort level or baseline comfort level  Description  Interventions:  - Encourage patient to monitor pain and request assistance  - Assess pain using appropriate pain scale  - Administer analgesics based on type and severity of pain and evaluate response  - Implement non-pharmacological measures as appropriate and evaluate response  - Consider cultural and social influences on pain and pain management  - Notify physician/advanced practitioner if interventions unsuccessful or patient reports new pain  2/28/2020 1306 by Arslan Benjamin RN  Outcome: Adequate for Discharge  2/28/2020 0759 by Arslan Benjamin RN  Outcome: Progressing     Problem: INFECTION - ADULT  Goal: Absence or prevention of progression during hospitalization  Description  INTERVENTIONS:  - Assess and monitor for signs and symptoms of infection  - Monitor lab/diagnostic results  - Monitor all insertion sites, i e  indwelling lines, tubes, and drains  - Monitor endotracheal if appropriate and nasal secretions for changes in amount and color  - [place] appropriate cooling/warming therapies per order  - Administer medications as ordered  - Instruct and encourage patient and family to use good hand hygiene technique  - Identify and instruct in appropriate isolation precautions for identified infection/condition  2/28/2020 1306 by Arslan Benjamin RN  Outcome: Adequate for Discharge  2/28/2020 0759 by Arslan Benjamin RN  Outcome: Progressing     Problem: SAFETY ADULT  Goal: Maintain or return to baseline ADL function  Description  INTERVENTIONS:  -  Assess patient's ability to carry out ADLs; assess patient's baseline for ADL function and identify physical deficits which impact ability to perform ADLs (bathing, care of mouth/teeth, toileting, grooming, dressing, etc )  - Assess/evaluate cause of self-care deficits   - Assess range of motion  - Assess patient's mobility; develop plan if impaired  - Assess patient's need for assistive devices and provide as appropriate  - Encourage maximum independence but intervene and supervise when necessary  - Involve family in performance of ADLs  - Assess for home care needs following discharge   - Consider OT consult to assist with ADL evaluation and planning for discharge  - Provide patient education as appropriate  2/28/2020 1306 by Arslan Benjamin RN  Outcome: Adequate for Discharge  2/28/2020 0759 by Nikki River RN  Outcome: Progressing  Goal: Maintain or return mobility status to optimal level  Description  INTERVENTIONS:  - Assess patient's baseline mobility status (ambulation, transfers, stairs, etc )    - Identify cognitive and physical deficits and behaviors that affect mobility  - Identify mobility aids required to assist with transfers and/or ambulation (gait belt, sit-to-stand, lift, walker, cane, etc )  - [place] fall precautions as indicated by assessment  - Record patient progress and toleration of activity level on Mobility SBAR; progress patient to next Phase/Stage  - Instruct patient to call for assistance with activity based on assessment  - Consider rehabilitation consult to assist with strengthening/weightbearing, etc   2/28/2020 1306 by Nikki River RN  Outcome: Adequate for Discharge  2/28/2020 0759 by Nikki River RN  Outcome: Progressing     Problem: DISCHARGE PLANNING  Goal: Discharge to home or other facility with appropriate resources  Description  INTERVENTIONS:  - Identify barriers to discharge w/patient and caregiver  - Arrange for needed discharge resources and transportation as appropriate  - Identify discharge learning needs (meds, wound care, etc )  - Arrange for interpretive services to assist at discharge as needed  - Refer to Case Management Department for coordinating discharge planning if the patient needs post-hospital services based on physician/advanced practitioner order or complex needs related to functional status, cognitive ability, or social support system  2/28/2020 1306 by Nikki River RN  Outcome: Adequate for Discharge  2/28/2020 0759 by Nikki River RN  Outcome: Progressing     Problem: Knowledge Deficit  Goal: Patient/family/caregiver demonstrates understanding of disease process, treatment plan, medications, and discharge instructions  Description  Complete learning assessment and assess knowledge base    Interventions:  - Provide teaching at level of understanding  - Provide teaching via preferred learning methods  2/28/2020 1306 by Suzanna Bell RN  Outcome: Adequate for Discharge  2/28/2020 0759 by Suzanna Bell RN  Outcome: Progressing     Problem: Nutrition/Hydration-ADULT  Goal: Nutrient/Hydration intake appropriate for improving, restoring or maintaining nutritional needs  Description  Monitor and assess patient's nutrition/hydration status for malnutrition  Collaborate with interdisciplinary team and initiate plan and interventions as ordered  Monitor patient's weight and dietary intake as ordered or per policy  Utilize nutrition screening tool and intervene as necessary  Determine patient's food preferences and provide high-protein, high-caloric foods as appropriate       INTERVENTIONS:  - Monitor oral intake, urinary output, labs, and treatment plans  - Assess nutrition and hydration status and recommend course of action  - Evaluate amount of meals eaten  - Assist patient with eating if necessary   - Allow adequate time for meals  - Recommend/ encourage appropriate diets, oral nutritional supplements, and vitamin/mineral supplements  - Order, calculate, and assess calorie counts as needed  - Recommend, monitor, and adjust tube feedings and TPN/PPN based on assessed needs  - Assess need for intravenous fluids  - Provide specific nutrition/hydration education as appropriate  - Include patient/family/caregiver in decisions related to nutrition  2/28/2020 1306 by Suzanna Bell RN  Outcome: Adequate for Discharge  2/28/2020 0759 by Suzanna Bell RN  Outcome: Progressing     Problem: GENITOURINARY - ADULT  Goal: Maintains or returns to baseline urinary function  Description  INTERVENTIONS:  - Assess urinary function  - Encourage oral fluids to ensure adequate hydration if ordered  - Administer IV fluids as ordered to ensure adequate hydration  - Administer ordered medications as needed  - Offer frequent toileting  - Follow urinary retention protocol if ordered  2/28/2020 1306 by Bryant Muñoz RN  Outcome: Adequate for Discharge  2/28/2020 0759 by Bryant Muñoz RN  Outcome: Progressing  Goal: Absence of urinary retention  Description  INTERVENTIONS:  - Assess patients ability to void and empty bladder  - Monitor I/O  - Bladder scan as needed  - Discuss with physician/AP medications to alleviate retention as needed  - Discuss catheterization for long term situations as appropriate  2/28/2020 1306 by Bryant Muñoz RN  Outcome: Adequate for Discharge  2/28/2020 0759 by Bryant Muñoz RN  Outcome: Progressing  Goal: Urinary catheter remains patent  Description  INTERVENTIONS:  - Assess patency of urinary catheter  - If patient has a chronic ward, consider changing catheter if non-functioning  - Follow guidelines for intermittent irrigation of non-functioning urinary catheter  2/28/2020 1306 by Bryant Muñoz RN  Outcome: Adequate for Discharge  2/28/2020 0759 by Bryant Muñoz RN  Outcome: Progressing     Problem: SKIN/TISSUE INTEGRITY - ADULT  Goal: Skin integrity remains intact  Description  INTERVENTIONS  - Identify patients at risk for skin breakdown  - Assess and monitor skin integrity  - Assess and monitor nutrition and hydration status  - Monitor labs (i e  albumin)  - Assess for incontinence   - Turn and reposition patient  - Assist with mobility/ambulation  - Relieve pressure over bony prominences  - Avoid friction and shearing  - Provide appropriate hygiene as needed including keeping skin clean and dry  - Evaluate need for skin moisturizer/barrier cream  - Collaborate with interdisciplinary team (i e  Nutrition, Rehabilitation, etc )   - Patient/family teaching  2/28/2020 1306 by Bryant Muñoz RN  Outcome: Adequate for Discharge  2/28/2020 0759 by Bryant Muñoz RN  Outcome: Progressing  Goal: Oral mucous membranes remain intact  Description  INTERVENTIONS  - Assess oral mucosa and hygiene practices  - Implement preventative oral hygiene regimen  - Implement oral medicated treatments as ordered  - Initiate Nutrition services referral as needed  2/28/2020 1306 by Noé Bryant RN  Outcome: Adequate for Discharge  2/28/2020 0759 by Noé Bryant RN  Outcome: Progressing     Problem: MUSCULOSKELETAL - ADULT  Goal: Maintain or return mobility to safest level of function  Description  INTERVENTIONS:  - Assess patient's ability to carry out ADLs; assess patient's baseline for ADL function and identify physical deficits which impact ability to perform ADLs (bathing, care of mouth/teeth, toileting, grooming, dressing, etc )  - Assess/evaluate cause of self-care deficits   - Assess range of motion  - Assess patient's mobility  - Assess patient's need for assistive devices and provide as appropriate  - Encourage maximum independence but intervene and supervise when necessary  - Involve family in performance of ADLs  - Assess for home care needs following discharge   - Consider OT consult to assist with ADL evaluation and planning for discharge  - Provide patient education as appropriate  2/28/2020 1306 by Noé Bryant RN  Outcome: Adequate for Discharge  2/28/2020 0759 by Noé Bryant RN  Outcome: Progressing

## 2020-02-28 NOTE — PROGRESS NOTES
Progress Note - Infectious Disease   Mack Armando 80 y o  female MRN: [id number]  Unit/Bed#: [place] [address] -01 Encounter: [id number]      Impression/Recommendations:  1  Dermatomal zoster, on left flank   No clinical signs of bacterial superinfection   Rash crusting/resolving  Continue Valtrex  Serial exams  Treat x7 days total, through 3/2      2  Fever, most likely secondary to urinary retention   Fever was nonsustained   Nestle was benign     Observe off further antibiotic  Monitor temperature  Re-evaluate with cultures only if fever recurs      3  Encephalopathy, on background of advanced dementia   I suspect it is secondary to urinary retention  Abram Spears is with advanced dementia usually has encephalopathy with any significant change in baseline status, infectious and noninfectious  Beatriz Kawasaki retention can certainly result in encephalopathy in this patient   Mental status stable, now at baseline  Monitor mental status      4  Urinary retention, with chronic Orantes catheterization   If patient does indeed have recurrent UTI, although I am not sure she truly had UTIs (see below), she would be a candidate for suprapubic catheterization      5  Recurrent UTIs by report   As typical with patient who has chronic Orantes catheterization, many antibiotic courses are actually given for bladder colonization rather than true infection   In this patient with advanced dementia, I would refrain from treating patient for UTI unless she has objective signs of UTI, not just positive urine culture and encephalopathy      6  Advanced dementia   Patient is unable to voice symptoms reliably      Discussed with patient  Okay for discharge from ID viewpoint  With no further active infection, I will sign off  Thank you for the consultation  Please do not hesitate to reconsult us for any questions or issues      Antibiotics:  Valtrex # 4     Subjective:  Patient  is comfortable  Left flank pain minimal now    Mental status is baseline  Temperature stays down  No diarrhea  Objective:  Vitals:  Temp:  [98 1 °F (36 7 °C)-99 2 °F (37 3 °C)] 98 6 °F (37 °C)  HR:  [73-82] 82  Resp:  [17-20] 17  BP: ()/(50-58) 118/58  SpO2:  [94 %] 94 %  Temp (24hrs), Av 6 °F (37 °C), Min:98 1 °F (36 7 °C), Max:99 2 °F (37 3 °C)  Current: Temperature: 98 6 °F (37 °C)    Physical Exam:     General: Awake, alert, cooperative, no distress  Neck:  Supple  No mass  No lymphadenopathy  Lungs: Expansion symmetric, no rales, no wheezing, respirations unlabored  Heart:  Regular rate and rhythm, S1 and S2 normal, no murmur  Abdomen: Soft, nondistended, non-tender, bowel sounds active all four quadrants, no masses, no organomegaly  Extremities: No edema  No erythema/warmth  No ulcer  Nontender to palpation  Skin:  Left flank rash crusting  No new vesicle  No purulence  No erythema  Minimal tenderness  Neuro: Moves all extremities  Invasive Devices     Peripheral Intravenous Line            Peripheral IV 20 Left Antecubital 4 days          Drain            Urethral Catheter 14 Fr  6 days                Labs studies:   I have personally reviewed pertinent labs  Results from last 7 days   Lab Units 20  0629 20  0505   POTASSIUM mmol/L 3 7 4 2 4 1   CHLORIDE mmol/L 101 101 101   CO2 mmol/L 27 28 26   BUN mg/dL 14 16 14   CREATININE mg/dL 0 68 0 70 0 62   EGFR ml/min/1 73sq m 76 75 78   CALCIUM mg/dL 8 5 8 4 8 7     Results from last 7 days   Lab Units 20  0528 20  0614 20  0505   WBC Thousand/uL 5 57 6 35 5 24   HEMOGLOBIN g/dL 10 2* 10 5* 10 6*   PLATELETS Thousands/uL 232 231 199           Imaging Studies:   I have personally reviewed pertinent imaging study reports and images in PACS  EKG, Pathology, and Other Studies:   I have personally reviewed pertinent reports

## 2020-02-28 NOTE — PROGRESS NOTES
Per Veronica CHICAS with urology, patient OK with leave with current ward, given her degree of rentention, leaking is not unexpected finding      Farrah Fulton, RN 2/28/2020 4:17 PM

## 2020-02-29 NOTE — DISCHARGE SUMMARY
Discharge- Chivo Jones 9/20/1926, 80 y o  female MRN: [id number]    Unit/Bed#: [place] [address] [address]-01 Encounter: [id number]    Primary Care Provider: Solomon Rosario DO   Date and time admitted to hospital: 2/21/2020 11:00 AM        * Urinary retention  Assessment & Plan  Likely causing chronic UTIs  Multifactorial secondary to advanced age, poor mobility, and stenotic urethra  ER had difficulty placing a Orantes catheter and therefore urology placed it  Orantes catheter inserted 2/21/20 - please note catheter is NOT chronic   Urology recommends flushing Orantes until clear and maintaining to gravity  Will need outpatient follow-up with urology the office to determine further management    Chronic urinary tract infection  Assessment & Plan  With history of multiple UTIs  Likely multifactorial secondary to above  Just finished a course of doxycycline 100 mg b i d  x 10 days 2/17/20    No further antibiotics as per infectious disease    Shingles  Assessment & Plan  Infectious follow-up appreciated, continue Valtrex to treat for 7 days total  No need for further isolation  Started on small dose of gabapentin 100 mg daily for neuralgia    Impaired mobility and ADLs  Assessment & Plan  Secondary to osteoarthritis  With deconditioning secondary to above  PT/OT recommending short-term rehab  Spoke with daughter at bedside who is agreeable    Restless legs syndrome  Assessment & Plan  Continue Mirapex    Leg edema  Assessment & Plan  Pre-hospital chronically maintained on Lasix 40 mg daily      Glaucoma  Assessment & Plan  Continue home eye drops    GERD (gastroesophageal reflux disease)  Assessment & Plan  Continue omeprazole          Transition of Care Discharge Summary - [address] [facility]    Patient Information: Chivo Jones 80 y o  female MRN: [id number]  Unit/Bed#: [place] [address] [place] [address] 208-01 Encounter: [id number]    Discharging Physician / Practitioner: Alden Millard MD  PCP: Solomon Rosario DO  Admission Date: 2/21/2020  Discharge Date: 02/28/20    Disposition:      Acute Rehab Facility at [address] SNF:   · Not Applicable to this Patient - Not Applicable to this Patient    Reason for Admission:  Urinary retention    Discharge Diagnoses:     Principal Problem:    Urinary retention  Active Problems:    Chronic urinary tract infection    Shingles    GERD (gastroesophageal reflux disease)    Glaucoma    Leg edema    Osteoarthritis    Restless legs syndrome    Impaired mobility and ADLs  Resolved Problems:    * No resolved hospital problems  *      Consultations During Hospital Stay:  · Urology  · Infectious disease    Procedures Performed:     · Orantes catheter    Medication Adjustments and Discharge Medications:  · Medication Dosing Tapers - Please refer to Discharge Medication List for details on any medication dosing tapers (if applicable to patient)  · Discharge Medication List: See after visit summary for reconciled discharge medications  Wound Care Recommendations:  When applicable, please see wound care section of After Visit Summary  Diet Recommendations at Discharge:  Diet -   Fluid Restriction - No Fluid Restriction at Discharge  Significant Findings / Test Results:     CT head:  Negative  CT abdomen pelvis:1  Urinary bladder is distended despite the presence of a Orantes catheter  Correlate for proper function  Locules of gas in the urinary bladder possibly related to infection versus recent catheter manipulation      2  No CT evidence of pyelonephritis, as clinically questioned      3  No acute osseous abnormality in the lumbar spine  Hospital Course:     Jessica Garcia is a 80 y o  female patient who originally presented to the hospital on 2/21/2020 due to decreased appetite, frequent urination  Patient has history of hypertension, frequent UTIs, ambulatory dysfunction, history of breast cancer status post left mastectomy  Patient brought by her daughter complaining of intermittent confusion, altered mental status  Found to have frequent UTIs  Urology consulted for urinary retention Orantes catheter placed for which she will need to follow outpatient for  For chronic UTIs ID consulted at this point not recommending any further antibiotics  Patient subsequently found to have rash which was confirmed to be shingles started on valacyclovir  Patient is otherwise medically stable for discharge to rehab today  Updated patient's daughter, Theoplis Roebuck via telephone and patient's son at bedside who are in agreement with plan of care  Please see above problem list for further details  Condition at Discharge: good     Discharge Day Visit / Exam:     Subjective:  Patient seen examined at bedside, denies any abdominal pain, nausea, vomiting, chest pain    Vitals: Blood Pressure: 118/58 (02/28/20 0755)  Pulse: 82 (02/28/20 0755)  Temperature: 98 6 °F (37 °C) (02/28/20 0755)  Temp Source: Temporal (02/28/20 0755)  Respirations: 17 (02/28/20 0755)  Weight - Scale: 61 1 kg (134 lb 11 2 oz) (02/21/20 1111)  SpO2: 94 % (02/28/20 0755)    Physical Exam:    Constitutional: Patient is in no acute distress  HEENT:  Normocephalic, atraumatic  Cardiovascular: Normal S1S2, RRR, No murmurs/rubs/gallops appreciated  Pulmonary:  Bilateral air entry, No rhonchi/rales/wheezing appreciated  Abdominal: Soft, Bowel sounds present, Non-tender, Non-distended  Extremities:  No cyanosis, clubbing or edema  Neurological: Cranial nerves II-XII grossly intact, sensation intact, otherwise no focal neurological symptoms  Skin:  Rash extending from left flank across left chest wall, crusting, no new lesions    Discharge instructions/Information to patient and family:   See after visit summary section titled Discharge Instructions for information provided to patient and family        Planned Readmission: no     Discharge Statement:  I spent 35 minutes discharging the patient  This time was spent on the day of discharge  I had direct contact with the patient on the day of discharge  Greater than 50% of the total time was spent examining patient, answering all patient questions, arranging and discussing plan of care with patient as well as directly providing post-discharge instructions  Additional time then spent on discharge activities      ** Please Note: This note has been constructed using a voice recognition system **

## 2020-02-29 NOTE — ASSESSMENT & PLAN NOTE
With history of multiple UTIs  Likely multifactorial secondary to above      Just finished a course of doxycycline 100 mg b i d  x 10 days 2/17/20    No further antibiotics as per infectious disease

## 2020-02-29 NOTE — ASSESSMENT & PLAN NOTE
Infectious follow-up appreciated, continue Valtrex to treat for 7 days total  No need for further isolation  Started on small dose of gabapentin 100 mg daily for neuralgia

## 2020-03-01 ENCOUNTER — HOSPITAL ENCOUNTER (INPATIENT)
Facility: HOSPITAL | Age: 85
LOS: 7 days | Discharge: HOME HEALTH | DRG: 074 | End: 2020-03-09
Attending: EMERGENCY MEDICINE | Admitting: INTERNAL MEDICINE
Payer: MEDICARE

## 2020-03-01 DIAGNOSIS — Z74.09 DECREASED AMBULATION STATUS: ICD-10-CM

## 2020-03-01 DIAGNOSIS — N17.9 AKI (ACUTE KIDNEY INJURY) (HCC): Primary | ICD-10-CM

## 2020-03-01 DIAGNOSIS — E86.0 DEHYDRATION: ICD-10-CM

## 2020-03-01 DIAGNOSIS — I95.89 HYPOTENSION DUE TO HYPOVOLEMIA: ICD-10-CM

## 2020-03-01 DIAGNOSIS — R60.0 LEG EDEMA: ICD-10-CM

## 2020-03-01 DIAGNOSIS — Z97.8 INDWELLING FOLEY CATHETER PRESENT: ICD-10-CM

## 2020-03-01 DIAGNOSIS — E86.1 HYPOTENSION DUE TO HYPOVOLEMIA: ICD-10-CM

## 2020-03-01 DIAGNOSIS — B02.9 SHINGLES: ICD-10-CM

## 2020-03-01 LAB
ALBUMIN SERPL BCP-MCNC: 2.3 G/DL (ref 3.5–5)
ALP SERPL-CCNC: 88 U/L (ref 46–116)
ALT SERPL W P-5'-P-CCNC: 15 U/L (ref 12–78)
ANION GAP SERPL CALCULATED.3IONS-SCNC: 10 MMOL/L (ref 4–13)
AST SERPL W P-5'-P-CCNC: 19 U/L (ref 5–45)
ATRIAL RATE: 71 BPM
BACTERIA UR QL AUTO: ABNORMAL /HPF
BASOPHILS # BLD AUTO: 0.05 THOUSANDS/ΜL (ref 0–0.1)
BASOPHILS NFR BLD AUTO: 1 % (ref 0–1)
BILIRUB SERPL-MCNC: 0.34 MG/DL (ref 0.2–1)
BILIRUB UR QL STRIP: NEGATIVE
BUN SERPL-MCNC: 24 MG/DL (ref 5–25)
CALCIUM SERPL-MCNC: 9.1 MG/DL (ref 8.3–10.1)
CAOX CRY URNS QL MICRO: ABNORMAL /HPF
CHLORIDE SERPL-SCNC: 98 MMOL/L (ref 100–108)
CLARITY UR: ABNORMAL
CO2 SERPL-SCNC: 25 MMOL/L (ref 21–32)
COLOR UR: YELLOW
CREAT SERPL-MCNC: 1.49 MG/DL (ref 0.6–1.3)
EOSINOPHIL # BLD AUTO: 0.11 THOUSAND/ΜL (ref 0–0.61)
EOSINOPHIL NFR BLD AUTO: 2 % (ref 0–6)
ERYTHROCYTE [DISTWIDTH] IN BLOOD BY AUTOMATED COUNT: 14.7 % (ref 11.6–15.1)
GFR SERPL CREATININE-BSD FRML MDRD: 30 ML/MIN/1.73SQ M
GLUCOSE SERPL-MCNC: 121 MG/DL (ref 65–140)
GLUCOSE UR STRIP-MCNC: NEGATIVE MG/DL
HCT VFR BLD AUTO: 31.2 % (ref 34.8–46.1)
HGB BLD-MCNC: 10 G/DL (ref 11.5–15.4)
HGB UR QL STRIP.AUTO: ABNORMAL
IMM GRANULOCYTES # BLD AUTO: 0.04 THOUSAND/UL (ref 0–0.2)
IMM GRANULOCYTES NFR BLD AUTO: 1 % (ref 0–2)
KETONES UR STRIP-MCNC: NEGATIVE MG/DL
LACTATE SERPL-SCNC: 1 MMOL/L (ref 0.5–2)
LACTATE SERPL-SCNC: 2.2 MMOL/L (ref 0.5–2)
LEUKOCYTE ESTERASE UR QL STRIP: ABNORMAL
LYMPHOCYTES # BLD AUTO: 2.77 THOUSANDS/ΜL (ref 0.6–4.47)
LYMPHOCYTES NFR BLD AUTO: 37 % (ref 14–44)
MAGNESIUM SERPL-MCNC: 1.9 MG/DL (ref 1.6–2.6)
MCH RBC QN AUTO: 32.1 PG (ref 26.8–34.3)
MCHC RBC AUTO-ENTMCNC: 32.1 G/DL (ref 31.4–37.4)
MCV RBC AUTO: 100 FL (ref 82–98)
MONOCYTES # BLD AUTO: 0.96 THOUSAND/ΜL (ref 0.17–1.22)
MONOCYTES NFR BLD AUTO: 13 % (ref 4–12)
NEUTROPHILS # BLD AUTO: 3.63 THOUSANDS/ΜL (ref 1.85–7.62)
NEUTS SEG NFR BLD AUTO: 46 % (ref 43–75)
NITRITE UR QL STRIP: NEGATIVE
NON-SQ EPI CELLS URNS QL MICRO: ABNORMAL /HPF
NRBC BLD AUTO-RTO: 0 /100 WBCS
OTHER STN SPEC: ABNORMAL
P AXIS: 72 DEGREES
PH UR STRIP.AUTO: 6 [PH]
PLATELET # BLD AUTO: 276 THOUSANDS/UL (ref 149–390)
PMV BLD AUTO: 9.2 FL (ref 8.9–12.7)
POTASSIUM SERPL-SCNC: 4.1 MMOL/L (ref 3.5–5.3)
PR INTERVAL: 168 MS
PROT SERPL-MCNC: 5.9 G/DL (ref 6.4–8.2)
PROT UR STRIP-MCNC: NEGATIVE MG/DL
QRS AXIS: 46 DEGREES
QRSD INTERVAL: 90 MS
QT INTERVAL: 378 MS
QTC INTERVAL: 410 MS
RBC # BLD AUTO: 3.12 MILLION/UL (ref 3.81–5.12)
RBC #/AREA URNS AUTO: ABNORMAL /HPF
SODIUM SERPL-SCNC: 133 MMOL/L (ref 136–145)
SP GR UR STRIP.AUTO: 1.01 (ref 1–1.03)
T WAVE AXIS: 56 DEGREES
UROBILINOGEN UR QL STRIP.AUTO: 0.2 E.U./DL
VENTRICULAR RATE: 71 BPM
WBC # BLD AUTO: 7.56 THOUSAND/UL (ref 4.31–10.16)
WBC #/AREA URNS AUTO: ABNORMAL /HPF

## 2020-03-01 PROCEDURE — 99284 EMERGENCY DEPT VISIT MOD MDM: CPT | Performed by: NURSE PRACTITIONER

## 2020-03-01 PROCEDURE — 36415 COLL VENOUS BLD VENIPUNCTURE: CPT

## 2020-03-01 PROCEDURE — 85025 COMPLETE CBC W/AUTO DIFF WBC: CPT | Performed by: EMERGENCY MEDICINE

## 2020-03-01 PROCEDURE — 83605 ASSAY OF LACTIC ACID: CPT | Performed by: NURSE PRACTITIONER

## 2020-03-01 PROCEDURE — 99220 PR INITIAL OBSERVATION CARE/DAY 70 MINUTES: CPT | Performed by: PHYSICIAN ASSISTANT

## 2020-03-01 PROCEDURE — 96360 HYDRATION IV INFUSION INIT: CPT

## 2020-03-01 PROCEDURE — 81001 URINALYSIS AUTO W/SCOPE: CPT | Performed by: NURSE PRACTITIONER

## 2020-03-01 PROCEDURE — 80053 COMPREHEN METABOLIC PANEL: CPT | Performed by: EMERGENCY MEDICINE

## 2020-03-01 PROCEDURE — 93005 ELECTROCARDIOGRAM TRACING: CPT

## 2020-03-01 PROCEDURE — 99285 EMERGENCY DEPT VISIT HI MDM: CPT

## 2020-03-01 PROCEDURE — 83735 ASSAY OF MAGNESIUM: CPT | Performed by: NURSE PRACTITIONER

## 2020-03-01 PROCEDURE — 93010 ELECTROCARDIOGRAM REPORT: CPT | Performed by: INTERNAL MEDICINE

## 2020-03-01 RX ORDER — DORZOLAMIDE HYDROCHLORIDE AND TIMOLOL MALEATE 20; 5 MG/ML; MG/ML
1 SOLUTION/ DROPS OPHTHALMIC 2 TIMES DAILY
Status: CANCELLED | OUTPATIENT
Start: 2020-03-02

## 2020-03-01 RX ADMIN — SODIUM CHLORIDE 1000 ML: 0.9 INJECTION, SOLUTION INTRAVENOUS at 21:06

## 2020-03-01 RX ADMIN — SODIUM CHLORIDE 1000 ML: 0.9 INJECTION, SOLUTION INTRAVENOUS at 21:47

## 2020-03-02 LAB
ANION GAP SERPL CALCULATED.3IONS-SCNC: 8 MMOL/L (ref 4–13)
BUN SERPL-MCNC: 18 MG/DL (ref 5–25)
CALCIUM SERPL-MCNC: 8 MG/DL (ref 8.3–10.1)
CHLORIDE SERPL-SCNC: 103 MMOL/L (ref 100–108)
CO2 SERPL-SCNC: 26 MMOL/L (ref 21–32)
CREAT SERPL-MCNC: 0.82 MG/DL (ref 0.6–1.3)
ERYTHROCYTE [DISTWIDTH] IN BLOOD BY AUTOMATED COUNT: 14.8 % (ref 11.6–15.1)
GFR SERPL CREATININE-BSD FRML MDRD: 62 ML/MIN/1.73SQ M
GLUCOSE P FAST SERPL-MCNC: 83 MG/DL (ref 65–99)
GLUCOSE SERPL-MCNC: 83 MG/DL (ref 65–140)
HCT VFR BLD AUTO: 29.3 % (ref 34.8–46.1)
HGB BLD-MCNC: 9.6 G/DL (ref 11.5–15.4)
MCH RBC QN AUTO: 32.5 PG (ref 26.8–34.3)
MCHC RBC AUTO-ENTMCNC: 32.8 G/DL (ref 31.4–37.4)
MCV RBC AUTO: 99 FL (ref 82–98)
PLATELET # BLD AUTO: 275 THOUSANDS/UL (ref 149–390)
PMV BLD AUTO: 9.9 FL (ref 8.9–12.7)
POTASSIUM SERPL-SCNC: 3.5 MMOL/L (ref 3.5–5.3)
RBC # BLD AUTO: 2.95 MILLION/UL (ref 3.81–5.12)
SODIUM SERPL-SCNC: 137 MMOL/L (ref 136–145)
WBC # BLD AUTO: 6.14 THOUSAND/UL (ref 4.31–10.16)

## 2020-03-02 PROCEDURE — 97163 PT EVAL HIGH COMPLEX 45 MIN: CPT

## 2020-03-02 PROCEDURE — 85027 COMPLETE CBC AUTOMATED: CPT | Performed by: PHYSICIAN ASSISTANT

## 2020-03-02 PROCEDURE — 99221 1ST HOSP IP/OBS SF/LOW 40: CPT | Performed by: INTERNAL MEDICINE

## 2020-03-02 PROCEDURE — 99232 SBSQ HOSP IP/OBS MODERATE 35: CPT | Performed by: INTERNAL MEDICINE

## 2020-03-02 PROCEDURE — 80048 BASIC METABOLIC PNL TOTAL CA: CPT | Performed by: PHYSICIAN ASSISTANT

## 2020-03-02 PROCEDURE — 97167 OT EVAL HIGH COMPLEX 60 MIN: CPT

## 2020-03-02 RX ORDER — POTASSIUM CHLORIDE 20 MEQ/1
20 TABLET, EXTENDED RELEASE ORAL DAILY
Status: DISCONTINUED | OUTPATIENT
Start: 2020-03-02 | End: 2020-03-09 | Stop reason: HOSPADM

## 2020-03-02 RX ORDER — CALCIUM CARBONATE 200(500)MG
1000 TABLET,CHEWABLE ORAL DAILY PRN
Status: DISCONTINUED | OUTPATIENT
Start: 2020-03-02 | End: 2020-03-09 | Stop reason: HOSPADM

## 2020-03-02 RX ORDER — B-COMPLEX WITH VITAMIN C
1 TABLET ORAL 2 TIMES DAILY WITH MEALS
Status: DISCONTINUED | OUTPATIENT
Start: 2020-03-02 | End: 2020-03-09 | Stop reason: HOSPADM

## 2020-03-02 RX ORDER — LATANOPROST 50 UG/ML
1 SOLUTION/ DROPS OPHTHALMIC DAILY
Status: DISCONTINUED | OUTPATIENT
Start: 2020-03-02 | End: 2020-03-09 | Stop reason: HOSPADM

## 2020-03-02 RX ORDER — ACETAMINOPHEN 325 MG/1
650 TABLET ORAL EVERY 8 HOURS SCHEDULED
Status: DISCONTINUED | OUTPATIENT
Start: 2020-03-02 | End: 2020-03-09 | Stop reason: HOSPADM

## 2020-03-02 RX ORDER — GABAPENTIN 100 MG/1
100 CAPSULE ORAL DAILY
Status: DISCONTINUED | OUTPATIENT
Start: 2020-03-02 | End: 2020-03-02

## 2020-03-02 RX ORDER — ACETAMINOPHEN 325 MG/1
650 TABLET ORAL 2 TIMES DAILY
Status: DISCONTINUED | OUTPATIENT
Start: 2020-03-02 | End: 2020-03-02

## 2020-03-02 RX ORDER — GABAPENTIN 100 MG/1
100 CAPSULE ORAL 2 TIMES DAILY
Status: DISCONTINUED | OUTPATIENT
Start: 2020-03-02 | End: 2020-03-09 | Stop reason: HOSPADM

## 2020-03-02 RX ORDER — CHLORDIAZEPOXIDE HYDROCHLORIDE 25 MG/1
25 CAPSULE, GELATIN COATED ORAL DAILY PRN
Status: DISCONTINUED | OUTPATIENT
Start: 2020-03-02 | End: 2020-03-08

## 2020-03-02 RX ORDER — SODIUM CHLORIDE 9 MG/ML
50 INJECTION, SOLUTION INTRAVENOUS CONTINUOUS
Status: DISPENSED | OUTPATIENT
Start: 2020-03-02 | End: 2020-03-02

## 2020-03-02 RX ORDER — TRAMADOL HYDROCHLORIDE 50 MG/1
50 TABLET ORAL EVERY 12 HOURS PRN
Status: DISCONTINUED | OUTPATIENT
Start: 2020-03-02 | End: 2020-03-09 | Stop reason: HOSPADM

## 2020-03-02 RX ORDER — ACETAMINOPHEN 325 MG/1
325 TABLET ORAL EVERY 8 HOURS PRN
Status: DISCONTINUED | OUTPATIENT
Start: 2020-03-02 | End: 2020-03-09 | Stop reason: HOSPADM

## 2020-03-02 RX ORDER — VALACYCLOVIR HYDROCHLORIDE 500 MG/1
1000 TABLET, FILM COATED ORAL EVERY 12 HOURS SCHEDULED
Status: COMPLETED | OUTPATIENT
Start: 2020-03-02 | End: 2020-03-04

## 2020-03-02 RX ORDER — PANTOPRAZOLE SODIUM 40 MG/1
40 TABLET, DELAYED RELEASE ORAL
Status: DISCONTINUED | OUTPATIENT
Start: 2020-03-02 | End: 2020-03-09 | Stop reason: HOSPADM

## 2020-03-02 RX ORDER — SENNOSIDES 8.6 MG
1 TABLET ORAL
Status: DISCONTINUED | OUTPATIENT
Start: 2020-03-02 | End: 2020-03-09 | Stop reason: HOSPADM

## 2020-03-02 RX ORDER — UREA 10 %
250 LOTION (ML) TOPICAL DAILY
Status: DISCONTINUED | OUTPATIENT
Start: 2020-03-02 | End: 2020-03-09 | Stop reason: HOSPADM

## 2020-03-02 RX ORDER — PRAMIPEXOLE DIHYDROCHLORIDE 0.25 MG/1
0.25 TABLET ORAL DAILY
Status: DISCONTINUED | OUTPATIENT
Start: 2020-03-02 | End: 2020-03-09 | Stop reason: HOSPADM

## 2020-03-02 RX ORDER — ONDANSETRON 2 MG/ML
4 INJECTION INTRAMUSCULAR; INTRAVENOUS EVERY 6 HOURS PRN
Status: DISCONTINUED | OUTPATIENT
Start: 2020-03-02 | End: 2020-03-09 | Stop reason: HOSPADM

## 2020-03-02 RX ORDER — MECLIZINE HYDROCHLORIDE 25 MG/1
25 TABLET ORAL 3 TIMES DAILY PRN
Status: DISCONTINUED | OUTPATIENT
Start: 2020-03-02 | End: 2020-03-09 | Stop reason: HOSPADM

## 2020-03-02 RX ORDER — LIDOCAINE 50 MG/G
1 PATCH TOPICAL DAILY
Status: DISCONTINUED | OUTPATIENT
Start: 2020-03-03 | End: 2020-03-09 | Stop reason: HOSPADM

## 2020-03-02 RX ORDER — MELATONIN
4000 DAILY
Status: DISCONTINUED | OUTPATIENT
Start: 2020-03-02 | End: 2020-03-09 | Stop reason: HOSPADM

## 2020-03-02 RX ADMIN — PRAMIPEXOLE DIHYDROCHLORIDE 0.25 MG: 0.25 TABLET ORAL at 08:15

## 2020-03-02 RX ADMIN — ACETAMINOPHEN 650 MG: 325 TABLET ORAL at 14:26

## 2020-03-02 RX ADMIN — VALACYCLOVIR HYDROCHLORIDE 1000 MG: 500 TABLET, FILM COATED ORAL at 21:52

## 2020-03-02 RX ADMIN — CYANOCOBALAMIN TAB 500 MCG 1000 MCG: 500 TAB at 08:15

## 2020-03-02 RX ADMIN — ACETAMINOPHEN 650 MG: 325 TABLET ORAL at 08:15

## 2020-03-02 RX ADMIN — ENOXAPARIN SODIUM 40 MG: 40 INJECTION SUBCUTANEOUS at 08:16

## 2020-03-02 RX ADMIN — TRAMADOL HYDROCHLORIDE 50 MG: 50 TABLET, FILM COATED ORAL at 01:29

## 2020-03-02 RX ADMIN — MELATONIN 4000 UNITS: at 08:15

## 2020-03-02 RX ADMIN — CHLORDIAZEPOXIDE HYDROCHLORIDE 25 MG: 25 CAPSULE ORAL at 09:49

## 2020-03-02 RX ADMIN — GABAPENTIN 100 MG: 100 CAPSULE ORAL at 17:14

## 2020-03-02 RX ADMIN — VALACYCLOVIR HYDROCHLORIDE 1000 MG: 500 TABLET, FILM COATED ORAL at 08:15

## 2020-03-02 RX ADMIN — POTASSIUM CHLORIDE 20 MEQ: 1500 TABLET, EXTENDED RELEASE ORAL at 08:15

## 2020-03-02 RX ADMIN — TRAMADOL HYDROCHLORIDE 50 MG: 50 TABLET, FILM COATED ORAL at 14:26

## 2020-03-02 RX ADMIN — LATANOPROST 1 DROP: 50 SOLUTION OPHTHALMIC at 08:15

## 2020-03-02 RX ADMIN — CALCIUM CARBONATE 500 MG (1,250 MG)-VITAMIN D3 200 UNIT TABLET 1 TABLET: at 08:16

## 2020-03-02 RX ADMIN — Medication 250 MG: at 08:14

## 2020-03-02 RX ADMIN — CALCIUM CARBONATE 500 MG (1,250 MG)-VITAMIN D3 200 UNIT TABLET 1 TABLET: at 17:14

## 2020-03-02 RX ADMIN — SODIUM CHLORIDE 50 ML/HR: 0.9 INJECTION, SOLUTION INTRAVENOUS at 00:41

## 2020-03-02 RX ADMIN — ACETAMINOPHEN 650 MG: 325 TABLET ORAL at 21:52

## 2020-03-02 RX ADMIN — GABAPENTIN 100 MG: 100 CAPSULE ORAL at 08:15

## 2020-03-02 RX ADMIN — VALACYCLOVIR HYDROCHLORIDE 1000 MG: 500 TABLET, FILM COATED ORAL at 01:08

## 2020-03-02 RX ADMIN — PANTOPRAZOLE SODIUM 40 MG: 40 TABLET, DELAYED RELEASE ORAL at 06:24

## 2020-03-02 NOTE — ED PROVIDER NOTES
History  Chief Complaint   Patient presents with    Hypotension     From [address] [place] [address], yesterday BP was in the 80s, today in the 60s, lethargic on EMS arrival  Recent dx of shingles     This is a 80year old female who was discharged 2/21 from [facility] with shingles and possible UTI and had a ward placed by urology which is still in place who was brought to the ED via EMS from [facility] due to lethargy, hypotension  Daughter states that pt was doing well yesterday, no distress and was eating  Today she went to visit her and she noticed that pt was sitting slumped in a chair and wasn't responding  She states BP was 60/S  She told staff to call 911  It was noted in EMS that pt 80/60 so 1 liter IVF given in route  Daughter states that pt did not receive her shingle medication on Friday night and Saturday  She also states that pt has been given tramadol every 8 hours and it was only to be as needed  Pt is not able to give an account of events       History provided by:  Medical records, relative and patient   used: No        Prior to Admission Medications   Prescriptions Last Dose Informant Patient Reported? Taking?    Acetaminophen (TYLENOL ARTHRITIS PAIN PO)   Yes Yes   Sig: Take 1 tablet by mouth 3 (three) times a day   Calcium Carbonate-Vit D-Min (CALCIUM 1200 PO)   Yes Yes   Sig: Take 750 mg by mouth 2 (two) times a day   Cranberry 250 MG TABS   Yes Yes   Sig: Take by mouth daily   NYSTATIN powder Not Taking at Unknown time  No No   Sig: APPLY TO AFFECTED AREA(S)  2-3 TIMES A DAY   Patient not taking: Reported on 3/1/2020   brimonidine-timolol (COMBIGAN) 0 2-0 5 %   Yes Yes   Sig: Administer 1 drop into the left eye 2 (two) times a day   chlordiazePOXIDE (LIBRIUM) 25 mg capsule   No Yes   Sig: Take 1 capsule daily as needed for anxiety   cholecalciferol (VITAMIN D3) 1,000 units tablet   Yes Yes   Sig: Take 4,000 Units by mouth daily   diclofenac sodium (VOLTAREN) 1 %   No Yes   Sig: Apply 4 g topically 4 (four) times a day   enalapril (VASOTEC) 10 mg tablet Not Taking at Unknown time  No No   Sig: Take 1 tablet (10 mg total) by mouth daily   Patient not taking: Reported on 3/1/2020   furosemide (LASIX) 40 mg tablet   No Yes   Sig: Take 1 tablet (40 mg total) by mouth 2 (two) times a day   gabapentin (NEURONTIN) 100 mg capsule   No Yes   Sig: Take 1 capsule (100 mg total) by mouth daily   latanoprost (XALATAN) 0 005 % ophthalmic solution   Yes Yes   Sig: Administer 1 drop into the left eye daily   lidocaine (LIDODERM) 5 % Not Taking at Unknown time  No No   Sig: Apply 1 patch topically daily Remove & Discard patch within 12 hours or as directed by MD   Patient not taking: Reported on 3/1/2020   magnesium 30 MG tablet Not Taking at Unknown time  Yes No   Sig: Take 400 mg by mouth daily   meclizine (ANTIVERT) 25 mg tablet Not Taking at Unknown time  No No   Sig: TAKE 1 TABLET BY MOUTH 3  TIMES A DAY AS NEEDED   Patient not taking: Reported on 3/1/2020   omeprazole (PriLOSEC) 20 mg delayed release capsule   No Yes   Sig: Take 1 capsule (20 mg total) by mouth daily   potassium chloride (K-DUR,KLOR-CON) 20 mEq tablet   No Yes   Sig: TAKE 1 TABLET BY MOUTH  DAILY   pramipexole (MIRAPEX) 0 25 mg tablet   No Yes   Sig: Take 1 tablet (0 25 mg total) by mouth daily   traMADol (ULTRAM) 50 mg tablet Not Taking at Unknown time  No No   Sig: Take 1 tablet (50 mg total) by mouth every 8 (eight) hours as needed for moderate pain   Patient not taking: Reported on 3/1/2020   valACYclovir (VALTREX) 1,000 mg tablet   No Yes   Sig: Take 1 tablet (1,000 mg total) by mouth every 12 (twelve) hours for 7 doses   vitamin B-12 (VITAMIN B-12) 1,000 mcg tablet   Yes Yes   Sig: Take 2,500 mcg by mouth daily      Facility-Administered Medications: None       Past Medical History:   Diagnosis Date    Anxiety     Arthritis     Cancer (HCC)     Elevated serum alkaline phosphatase level     mild, isoenzymes are normal, resolved 4/24/17 labs , last assessed 11/10/16, resolved 4/24/17    Hematuria     last assessed 9/18/12    Hyperlipidemia 8/13/2012    Hypertension     Pulmonary embolism ([facility] [address] ) 01/2011    last assessed 9/18/12       Past Surgical History:   Procedure Laterality Date    BILATERAL OOPHORECTOMY      Laparoscopic    BREAST SURGERY Left     Mastectomy     CHOLECYSTECTOMY      Laparoscopic    HERNIA REPAIR      HYSTERECTOMY      SMALL INTESTINE SURGERY         Family History   Problem Relation Age of Onset    Hypertension Mother     Blindness Mother     Cancer Sister     Cancer Maternal Aunt     Cancer Maternal Uncle      I have reviewed and agree with the history as documented  E-Cigarette/Vaping    E-Cigarette Use Never User      E-Cigarette/Vaping Substances     Social History     Tobacco Use    Smoking status: Never Smoker    Smokeless tobacco: Never Used   Substance Use Topics    Alcohol use: No    Drug use: No       Review of Systems   Unable to perform ROS: Acuity of condition   Constitutional: Positive for activity change and fatigue  Negative for chills and fever  HENT: Negative for congestion, rhinorrhea, sinus pressure, sore throat and trouble swallowing  Eyes: Positive for visual disturbance  Macular degeneration   Cardiovascular: Negative  Gastrointestinal: Negative  Endocrine: Negative  Genitourinary:        Existing indwelling urinary catheter present upon arrival to ED  Musculoskeletal: Positive for arthralgias and gait problem  Skin: Positive for rash  Neurological: Negative for dizziness, seizures, syncope, facial asymmetry, speech difficulty, light-headedness, numbness and headaches  Psychiatric/Behavioral: Positive for confusion  Physical Exam  Physical Exam   Constitutional: She appears lethargic  No distress  Elderly frail female     HENT:   Head: Normocephalic and atraumatic  Eyes: Pupils are equal, round, and reactive to light  Conjunctivae and EOM are normal    Neck: Normal range of motion  Neck supple  Cardiovascular: Normal rate, regular rhythm, normal heart sounds and intact distal pulses  Pulmonary/Chest: Effort normal  No respiratory distress  She has decreased breath sounds  She has no wheezes  She has no rhonchi  She has no rales  Abdominal: Soft  Bowel sounds are normal    Genitourinary:   Genitourinary Comments: Existing indwelling urinary catheter   Musculoskeletal: She exhibits edema and deformity  Right foot: There is decreased range of motion and deformity  Left foot: There is decreased range of motion and deformity  Bilateral UE able to raise to gravity and mild resistance; good bilateral hand grasp  Chronic LE weakness and deformity of bilateral feet  Able to raise bilateral legs briefly to gravity but not to resistance  Trace non-pitting LE edema  Neurological: She appears lethargic  She is disoriented  No sensory deficit  GCS eye subscore is 3  GCS verbal subscore is 4  GCS motor subscore is 6  Oriented to name, birth date, current month/year  Disoriented to place, situation  No focal deficits  Skin: Skin is warm and dry  Capillary refill takes less than 2 seconds  Rash noted  Rash is vesicular  She is not diaphoretic  Psychiatric: She has a normal mood and affect  Her behavior is normal    Nursing note and vitals reviewed        Vital Signs  ED Triage Vitals [03/01/20 2024]   Temperature Pulse Respirations Blood Pressure SpO2   97 9 °F (36 6 °C) 73 20 (!) 86/50 92 %      Temp Source Heart Rate Source Patient Position - Orthostatic VS BP Location FiO2 (%)   Temporal Monitor Lying Right arm --      Pain Score       No Pain           Vitals:    03/01/20 2145 03/01/20 2200 03/01/20 2215 03/01/20 2245   BP: 96/51 94/51 100/50 92/54   Pulse: 70 68 68 64   Patient Position - Orthostatic VS: Lying Lying Lying Lying         Visual Acuity      ED Medications  Medications   sodium chloride 0 9 % bolus 1,000 mL (0 mL Intravenous Stopped 3/1/20 2136)   sodium chloride 0 9 % bolus 1,000 mL (0 mL Intravenous Stopped 3/1/20 2306)       Diagnostic Studies  Results Reviewed     Procedure Component Value Units Date/Time    Lactic acid, plasma x2 [[id number]] Collected:  03/01/20 2306    Lab Status: In process Specimen:  Blood from Arm, Right Updated:  03/01/20 2316    Urine Microscopic [[id number]]  (Abnormal) Collected:  03/01/20 2135    Lab Status:  Final result Specimen:  Urine, Indwelling Orantes Catheter Updated:  03/01/20 2254     RBC, UA 2-4 /hpf      WBC, UA Innumerable /hpf      Epithelial Cells Occasional /hpf      Bacteria, UA Innumerable /hpf      Ca Oxalate Lacy, UA Occasional /hpf      OTHER OBSERVATIONS Yeast Cells Present  Renal Epithelial Cells Present    UA (URINE) with reflex to Scope [[id number]]  (Abnormal) Collected:  03/01/20 2135    Lab Status:  Final result Specimen:  Urine, Indwelling Orantes Catheter Updated:  03/01/20 2237     Color, UA Yellow     Clarity, UA Cloudy     Specific Stopover, UA 1 010     pH, UA 6 0     Leukocytes, UA Large     Nitrite, UA Negative     Protein, UA Negative mg/dl      Glucose, UA Negative mg/dl      Ketones, UA Negative mg/dl      Urobilinogen, UA 0 2 E U /dl      Bilirubin, UA Negative     Blood, UA Trace-Intact    Lactic acid, plasma x2 [[id number]]  (Abnormal) Collected:  03/01/20 2105    Lab Status:  Final result Specimen:  Blood from Arm, Right Updated:  03/01/20 2200     LACTIC ACID 2 2 mmol/L     Narrative:       Result may be elevated if tourniquet was used during collection      Comprehensive metabolic panel [[id number]]  (Abnormal) Collected:  03/01/20 2036    Lab Status:  Final result Specimen:  Blood from Arm, Right Updated:  03/01/20 2140     Sodium 133 mmol/L      Potassium 4 1 mmol/L      Chloride 98 mmol/L      CO2 25 mmol/L      ANION GAP 10 mmol/L      BUN 24 mg/dL      Creatinine 1 49 mg/dL      Glucose 121 mg/dL      Calcium 9 1 mg/dL      AST 19 U/L      ALT 15 U/L      Alkaline Phosphatase 88 U/L      Total Protein 5 9 g/dL      Albumin 2 3 g/dL      Total Bilirubin 0 34 mg/dL      eGFR 30 ml/min/1 73sq m     Narrative:       [place] guidelines for Chronic Kidney Disease (CKD):     Stage 1 with normal or high GFR (GFR > 90 mL/min/1 73 square meters)    Stage 2 Mild CKD (GFR = 60-89 mL/min/1 73 square meters)    Stage 3A Moderate CKD (GFR = 45-59 mL/min/1 73 square meters)    Stage 3B Moderate CKD (GFR = 30-44 mL/min/1 73 square meters)    Stage 4 Severe CKD (GFR = 15-29 mL/min/1 73 square meters)    Stage 5 End Stage CKD (GFR <15 mL/min/1 73 square meters)  Note: GFR calculation is accurate only with a steady state creatinine    Magnesium [[id number]]  (Normal) Collected:  03/01/20 2036    Lab Status:  Final result Specimen:  Blood from Arm, Right Updated:  03/01/20 2124     Magnesium 1 9 mg/dL     CBC and differential [[id number]]  (Abnormal) Collected:  03/01/20 2036    Lab Status:  Final result Specimen:  Blood from Arm, Right Updated:  03/01/20 2042     WBC 7 56 Thousand/uL      RBC 3 12 Million/uL      Hemoglobin 10 0 g/dL      Hematocrit 31 2 %       fL      MCH 32 1 pg      MCHC 32 1 g/dL      RDW 14 7 %      MPV 9 2 fL      Platelets 833 Thousands/uL      nRBC 0 /100 WBCs      Neutrophils Relative 46 %      Immat GRANS % 1 %      Lymphocytes Relative 37 %      Monocytes Relative 13 %      Eosinophils Relative 2 %      Basophils Relative 1 %      Neutrophils Absolute 3 63 Thousands/µL      Immature Grans Absolute 0 04 Thousand/uL      Lymphocytes Absolute 2 77 Thousands/µL      Monocytes Absolute 0 96 Thousand/µL      Eosinophils Absolute 0 11 Thousand/µL      Basophils Absolute 0 05 Thousands/µL                  No orders to display              Procedures  ECG 12 Lead Documentation Only  Date/Time: 3/1/2020 8:30 PM  Performed by: MARINA Dempsey  Authorized by: MARINA Dempsey     Indications / Diagnosis:  Hypotension   ECG reviewed: Dr Oly Bal     Patient location:  ED  Previous ECG:     Previous ECG:  Compared to current    Similarity:  No change    Comparison to cardiac monitor: Yes    Interpretation:     Interpretation: normal    Rate:     ECG rate:  71    ECG rate assessment: normal    Rhythm:     Rhythm: sinus rhythm    Ectopy:     Ectopy: none    QRS:     QRS axis:  Normal    QRS intervals:  Normal  Conduction:     Conduction: normal    ST segments:     ST segments:  Normal  T waves:     T waves: normal               ED Course  ED Course as of Mar 01 2333   Sun Mar 01, 2020   2131 /55  Bladder scan 0 - pt ward is draining appropriately   Hgb 10 - baseline       2141 Creatinine(!): 1 49   2141 Cr 2 days ago 0 68 today 1 49 = SUNDAR    eGFR: 30   2200 LA 2 2 -pt getting IVF  Elevated most likely due to dehydration  WBC 7 59 afebrile  Pt is not SIRS or sepsis  2205 BP's improving  2217 Labs reviewed and discussed with daughter  Pt will be admitted  Page to Shen Cervantes will place in observation  Daughter aware of POC and agrees  waiting on urine results - Dr Carlos Cervantes aware  BP 92/54 (BP Location: Right arm)   Pulse 64   Temp 98 °F (36 7 °C) (Oral)   Resp 18   Wt 62 8 kg (138 lb 7 2 oz)   SpO2 94%   BMI 31 60 kg/m²                             MDM  Number of Diagnoses or Management Options  Diagnosis management comments: ? Unresponsive episode  Hypotension  Shingles      Plan  Labs  EKG  IVF   Tele       Concern that hypotension could have been caused by furosemide, tramadol combination and poor oral intake           Amount and/or Complexity of Data Reviewed  Clinical lab tests: ordered and reviewed  Tests in the radiology section of CPT®: ordered and reviewed  Review and summarize past medical records: yes          Disposition  Final diagnoses:   Dehydration   SUNDAR (acute kidney injury) ([facility] [address] )   Shingles   Indwelling Ward catheter present   Hypotension due to hypovolemia     Time reflects when diagnosis was documented in both MDM as applicable and the Disposition within this note     Time User Action Codes Description Comment    3/1/2020  9:42 PM Roxy Gang [E86 0] Dehydration     3/1/2020  9:42 PM Emerita Gail Add [N17 9] [facility]     3/1/2020  9:42 PM Roxy Gang [B02 9] Shingles     3/1/2020  9:43 PM Emerita Gail Add [Z96 0] Indwelling Orantes catheter present     3/1/2020 10:26 PM Roxy Gang [I95 89,  E86 1] Hypotension due to hypovolemia     3/1/2020 10:26 PM Veronica Deborah [E86 0] Dehydration     3/1/2020 10:26 PM Veronica Deborah [I95 89,  E86 1] Hypotension due to hypovolemia     3/1/2020 10:26 PM Emerita Gail Modify [N17 9] SUNDAR (acute kidney injury) ([place] [address] )     3/1/2020 10:26 PM Vreonica Deborah [I95 89,  E86 1] Hypotension due to hypovolemia       ED Disposition     ED Disposition Condition Date/Time Comment    Admit Stable Sun Mar 1, 2020 10:26 PM Case was discussed with RAGHAVENDRA  and the patient's admission status was agreed to be Admission Status: observation status to the service of Dr Eliana Avitia   Follow-up Information    None         Patient's Medications   Discharge Prescriptions    No medications on file     No discharge procedures on file  PDMP Review       Value Time User    PDMP Reviewed  Yes 1/30/2020  2:15 PM Sachin Abdullahi DO          ED Provider  Electronically Signed by      Reflex to culture order and no culture defaulted  Results for Robert Goodrich (MRN [id number]) as of 3/1/2020 21:23   Ref   Range 2/21/2020 14:37   Color, UA Unknown Yellow   Clarity, UA Unknown Clear   SL AMB SPECIFIC GRAVITY_URINE Latest Ref Range: 1 003 - 1 030  1 015   Glucose, UA Latest Ref Range: Negative mg/dl Negative   Ketones, UA Latest Ref Range: Negative mg/dl Trace (A)   Blood, UA Latest Ref Range: Negative  Negative   Nitrite, UA Latest Ref Range: Negative  Negative Leukocytes, UA Latest Ref Range: Negative  Negative   pH, UA Latest Ref Range: 4 5, 5 0, 5 5, 6 0, 6 5, 7 0, 7 5, 8 0  6 0   POCT URINE PROTEIN Latest Ref Range: Negative mg/dl Negative   Bilirubin, UA Latest Ref Range: Negative  Negative   SL AMB POCT UROBILINOGEN Latest Ref Range: 0 2, 1 0 E U /dl E U /dl 0 2         CT head 2/21/2020    CT BRAIN - WITHOUT CONTRAST     INDICATION:   fall      COMPARISON:  None      TECHNIQUE:  CT examination of the brain was performed  In addition to axial images, coronal 2D reformatted images were created and submitted for interpretation        Radiation dose length product (DLP) for this visit:  23-14-20-09 mGy-cm   This examination, like all CT scans performed in the [facility], was performed utilizing techniques to minimize radiation dose exposure, including the use of iterative   reconstruction and automated exposure control        IMAGE QUALITY:  Diagnostic      FINDINGS:     PARENCHYMA: Decreased attenuation is noted in periventricular and subcortical white matter demonstrating an appearance that is statistically most likely to represent moderate microangiopathic change      No CT signs of acute infarction  No intracranial mass, mass effect or midline shift    No acute parenchymal hemorrhage      VENTRICLES AND EXTRA-AXIAL SPACES:  Normal for the patient's age      VISUALIZED ORBITS AND PARANASAL SINUSES:  Unremarkable      CALVARIUM AND EXTRACRANIAL SOFT TISSUES:  Normal      IMPRESSION:     No acute intracranial abnormality         MARINA Monge  03/01/20 5615

## 2020-03-02 NOTE — ASSESSMENT & PLAN NOTE
Patient's family member reporting patient has not received her Valtrex since discharge  Concerned of worsening/spreading infection  Still noted to have crusted lesions in dermatomal fashion  Does appear to have open area on back, possibly skin tear over top of infection-will keep on airborne precautions until seen by ID in the morning  Continue Valtrex  Continue gabapentin

## 2020-03-02 NOTE — ASSESSMENT & PLAN NOTE
Ambulates with walker at baseline  Daughter reports patient has not been able to get out of bed for the past few days secondary to weakness  [facility]  Will re-consult PT/OT/case management

## 2020-03-02 NOTE — PLAN OF CARE
Problem: PHYSICAL THERAPY ADULT  Goal: Performs mobility at highest level of function for planned discharge setting  See evaluation for individualized goals  Description  Treatment/Interventions: Functional transfer training, LE strengthening/ROM, Therapeutic exercise, Elevations, Endurance training, Patient/family training, Bed mobility, Gait training, Spoke to nursing, OT  Equipment Recommended: Walker(RW)       See flowsheet documentation for full assessment, interventions and recommendations  Note:   Prognosis: Fair  Problem List: Decreased strength, Decreased endurance, Impaired balance, Decreased mobility, Decreased safety awareness, Impaired judgement, Impaired vision, Impaired hearing, Pain, Decreased skin integrity  Assessment: Pt  89 y  o female presented w/ hypotension  Pt reportedly was d/c from [facility] on Friday 2/28/20  Pt was also recently diagnosed w/ shingles but did not received tx at the facility + other problems w/ medications in the facility  Pt found to be dehydrated on admission  Pt admitted for SUNDAR (acute kidney injury) ([facility] [address] ) w/ Dehydration E86 0, Shingles B02 9 and Hypotension I95 9 on 3/1/20  Pt referred to PT for mobility assessment & D/C planning w/ orders of up w/ assistance  (+) contact precautions  At baseline, pt reports being I w/ either RW or SPC  Pt recently D/C from [place] on 2/28/20  On eval, pt demonstrate dec mobility, balance, endurance & amb  Pt require minAx2 for bed mobility & modAx2 for transfers & amb w/ RW + cues for techniques  Gait deviations as above, slow & unsteady w/ forward flexed posture, dec foot clearance & strides but no gross LOB noted  No dizziness reported t/o session  Nsg staff most recent vital signs as follows: /56 (BP Location: Left arm)   Pulse 66   Temp 98 °F (36 7 °C) (Temporal)   Resp 18   Ht 4' 7 5" (1 41 m)   Wt 62 8 kg (138 lb 7 2 oz)   SpO2 96%   BMI 31 60 kg/m²    At end of session, pt OOB in chair w/o issues, call bell & phone in reach, chair alarm activated  Fall precautions reinforced w/ good understanding  Pt functioning below baseline hence will continue skilled PT to improve function & safety  At this time, due to above mentioned deficits & high risk for falls, pt will benefit from inpt rehab at D/C  CM to follow  Nsg staff to continue to mobilized pt (OOB in chair for all meals & ambulate in room/unit) as tolerated to prevent further decline in function  Nsg notified  Barriers to Discharge: Inaccessible home environment, Decreased caregiver support  Barriers to Discharge Comments: (+) SUSI; pt home alone when daughter works  Recommendation: Short-term skilled PT     PT - OK to Discharge: Yes(to inpt rehab when medically cleared)    See flowsheet documentation for full assessment

## 2020-03-02 NOTE — ASSESSMENT & PLAN NOTE
History of chronic UTIs  Had Orantes placed in ED during prior hospitalization  Continue outpatient urology follow-up to determine further management

## 2020-03-02 NOTE — CONSULTS
Consultation - Infectious Disease   Suraj Steven 80 y o  female MRN: [id number]  Unit/Bed#: E5 -01 Encounter: [id number]      IMPRESSION & RECOMMENDATIONS:   1  Dermatomal zoster  Patient presents for hypotension and reportedly had not completed treatment for dermatomal zoster  Continues to have pain at the site  Majority of the patient's lesions have crusted over and appear in active  Continued pain likely post herpetic neuralgia  There is no signs of dissemination on exam   Continue Valtrex 1 g every 12 hours  Continue therapy through 3/4  Discontinue airborne isolation  Continue contact isolation  Recommend covering lesions, and serial skin exams  Wound Care consult pending  Continue to trend fever curve/vitals  Repeat CBC/chemistry tomorrow  Pain control/regimen as per primary  Additional supportive care as per primary    2  Acute kidney injury  Patient noted with acute elevation in her creatinine from baseline  Possibly prerenal due to issues with her diuretic therapy that were reported  Creatinine improved  Valtrex has been renally dose adjusted  Repeat chemistry tomorrow  Hydration and diuretics as per primary  Will further dose adjust Valtrex as needed    3  Urinary retention and abnormal UA  Patient with previously documented urinary retention along with recurrent UTIs  Remains with Orantes catheter at this time  Draining clear yellow urine  UA noted to be grossly abnormal the patient without symptoms; likely related to catheter  No antibiotics for this issue  Continue antiviral as above  Maintain Orantes catheter as per primary    4  Advanced Dementia  Patient with known underlying dementia  Appears to be a previously reported baseline  No acute signs of CNS infection at this time  Continue to monitor mental status  Supportive care as per primary    Above plan discussed briefly with the patient at bedside  Above plan discussed briefly with nursing    Primary service updated of the above plan  ID consult service will continue to follow  HISTORY OF PRESENT ILLNESS:  Reason for Consult:  Shingles    HPI: Curry Plaza is a 80y o  year old female with hypertension, chronic UTIs, dementia, glaucoma, arthritis, breast cancer status post mastectomy  She presented to the hospital for hypotension yesterday  Reportedly was discharged from rehab on Friday  Patient apparently recently diagnosed with shingles and did not receive Valtrex at this facility  Reportedly there other problems with her medications  Reportedly appear dehydrated on admission  Patient on admission was found to have a dermatomal rash on her back  She was without leukocytosis  Creatinine 1 49  LFTs unremarkable  Lactate 2 2  Patient was admitted for dehydration with acute kidney injury  No other acute events noted overnight on chart review  Patient is currently afebrile without leukocytosis  No culture data  No new imaging on this admission  Patient's other vitals are stable  Creatinine dramatically improved this morning  Lactate normalized  CBC otherwise unremarkable  UA grossly abnormal   Prior ID evaluations reviewed and patient had received 4 days of Valtrex on her last admission  Prior culture data reviewed  We are consulted at this time for further assistance in management  On evaluation, patient is a very pleasant 80year-old female  She cannot recall why she has been brought into the hospital but she does report having pain at her zoster lesions  She reports the pain is the most bothersome  She otherwise denies having any nausea, vomiting, chest pain or shortness of breath  She recalls coming from a rehab facility  She does not recall the names of her medications or her various medical conditions  REVIEW OF SYSTEMS:  Unable to obtain full review of systems given patient's underlying dementia      PAST MEDICAL HISTORY:  Past Medical History:   Diagnosis Date    Anxiety     [facility]  [facility]     Elevated serum alkaline phosphatase level     mild, isoenzymes are normal, resolved 4/24/17 labs , last assessed 11/10/16, resolved 4/24/17    Hematuria     last assessed 9/18/12    Hyperlipidemia 8/13/2012    Hypertension     Pul[facility] embolism ([facility] ) 01/2011    last assessed 9/18/12     Past Surgical History:   Procedure Laterality Date    BILATERAL OOPHORECTOMY      Laparoscopic    BREAST SURGERY Left     Mastectomy     CHOLECYSTECTOMY      Laparoscopic    HERNIA REPAIR      HYSTERECTOMY      SMALL INTESTINE SURGERY         FAMILY HISTORY:  Non-contributory    SOCIAL HISTORY:  Social History   Social History     Substance and Sexual Activity   Alcohol Use No    Alcohol/week: 0 0 standard drinks    Frequency: Never    Drinks per session: Patient refused    Binge frequency: Never    Comment: denies     Social History     Substance and Sexual Activity   Drug Use No     Social History     Tobacco Use   Smoking Status Never Smoker   Smokeless Tobacco Never Used   Tobacco Comment    denies       ALLERGIES:  Allergies   Allergen Reactions    Amoxicillin Hives    Celecoxib     Cephalexin     Ciprofloxacin      Other reaction(s): diarrhea  Tolerates Levaquin    Codeine Other (See Comments)     Other reaction(s): Other (See Comments)  takes vicodin @ home  takes vicodin @ home    Epinephrine Other (See Comments)     Other reaction(s): Unknown Reaction    Oxycodone-Acetaminophen Other (See Comments)    Oxycodone-Acetaminophen      Other reaction(s): Unknown Reaction    Propoxyphene Other (See Comments)    Rofecoxib     Sulfa Antibiotics Other (See Comments)     takes at home  takes at home  Other reaction(s): Other (See Comments)  takes at home    Sulfamethoxazole-Trimethoprim     Nitrofurantoin Rash       MEDICATIONS:  All current active medications have been reviewed      PHYSICAL EXAM:  Temp:  [97 6 °F (36 4 °C)-98 °F (36 7 °C)] 98 °F (36 7 °C)  HR:  [64-73] 66  Resp: [18-21] 18  BP: ()/(49-60) 105/56  SpO2:  [91 %-96 %] 96 %  Temp (24hrs), Av 9 °F (36 6 °C), Min:97 6 °F (36 4 °C), Max:98 °F (36 7 °C)  Current: Temperature: 98 °F (36 7 °C)    Intake/Output Summary (Last 24 hours) at 3/2/2020 1116  Last data filed at 3/2/2020 0645  Gross per 24 hour   Intake 2520 ml   Output 1400 ml   Net 1120 ml       General Appearance:  Appearing well, nontoxic, and in no distress, patient appears stated age  Head:  Normocephalic, without obvious abnormality, atraumatic   Eyes:  Conjunctiva pink and sclera anicteric, both eyes   Nose: Nares normal, mucosa normal, no drainage   Throat: Oropharynx moist without lesions   Neck: Supple, symmetrical, no adenopathy, no tenderness/mass/nodules   Back:   Patient with forward curvature, rash present on the patient's back in a dermatomal fashion extending to the chest   Lesions have for the most part have crusted over and there is no drainage  The rash remains well localized  No pain on the opposite flank or down the spine  Lungs:   Clear to auscultation bilaterally, respirations unlabored on room air   Chest Wall:  No tenderness or deformity   Heart:  RRR; no murmur, rub or gallop appreciated   Abdomen:   Soft, non-tender, non-distended, positive bowel sounds, no suprapubic discomfort, Orantes catheter with clear yellow urine    Extremities: No cyanosis, clubbing or edema   Skin: No other rashes or lesions  No other draining wounds noted  There are no other new lesions present on skin exam concerning for disseminated zoster  Lymph nodes: Cervical, supraclavicular nodes normal   Neurologic: Patient has limited ability at baseline  She is able to move her upper extremities without issue  She is oriented to person and partially to place but not to time  LABS, IMAGING, & OTHER STUDIES:  Lab Results:  I have personally reviewed pertinent labs    Results from last 7 days   Lab Units 20  0506 20  0528   WBC Thousand/uL 6 14 7 56 5 57   HEMOGLOBIN g/dL 9 6* 10 0* 10 2*   PLATELETS Thousands/uL 275 276 232     Results from last 7 days   Lab Units 03/02/20  0506 03/01/20  2036   POTASSIUM mmol/L 3 5 4 1   CHLORIDE mmol/L 103 98*   CO2 mmol/L 26 25   BUN mg/dL 18 24   CREATININE mg/dL 0 82 1 49*   EGFR ml/min/1 73sq m 62 30   CALCIUM mg/dL 8 0* 9 1   AST U/L  --  19   ALT U/L  --  15   ALK PHOS U/L  --  88           Imaging Studies:   I have personally reviewed pertinent imaging study reports and images in PACS  Other Studies:   I have personally reviewed pertinent reports

## 2020-03-02 NOTE — PHYSICAL THERAPY NOTE
PT EVALUATION    Pt  Name: Osvaldo Guillen  Pt  Age: 80 y o    MRN: [id number]  LENGTH OF STAY: 0    Patient Active Problem List   Diagnosis    Allergic rhinitis    Cerebral aneurysm    Anxiety    Back pain    Breast carcinoma (HCC)    Chronic urinary tract infection    Decreased ambulation status    Dizziness    GERD (gastroesophageal reflux disease)    Glaucoma    Hyperglycemia    Hyperlipidemia    Hypertension    Hypokalemia    Instability of left knee joint    Leg edema    Lymphedema    Macular degeneration    Neoplasm of skin of face    Osteoarthritis    Osteoporosis    Ovarian cancer (HCC)    Restless legs syndrome    Thoracic back pain    Vitamin D deficiency    Candidal intertrigo    Dysuria    Chronic pain of left knee    Health care maintenance    Impaired mobility and ADLs    Chronic pain syndrome    Opioid use agreement exists    Urinary retention    Shingles    [facility]) ([facility] )       Admitting Diagnoses:   Dehydration [E86 0]  Shingles [B02 9]  Hypotension [I95 9]  [facility] [N17 9]  Indwelling Orantes catheter present [Z96 0]  Hypotension due to hypovolemia [I95 89, E86 1]    Past Medical History:   Diagnosis Date    Anxiety     Arthritis     Cancer ([place] [address] )     Elevated serum alkaline phosphatase level     mild, isoenzymes are normal, resolved 4/24/17 labs , last assessed 11/10/16, resolved 4/24/17    Hematuria     last assessed 9/18/12    Hyperlipidemia 8/13/2012    Hypertension     Pulmonary embolism ([facility] ) 01/2011    last assessed 9/18/12       Past Surgical History:   Procedure Laterality Date    BILATERAL OOPHORECTOMY      Laparoscopic    BREAST SURGERY Left     Mastectomy     CHOLECYSTECTOMY      Laparoscopic    HERNIA REPAIR      HYSTERECTOMY      SMALL INTESTINE SURGERY         Imaging Studies:  No orders to display        03/02/20 1412   Note Type   Note type Eval only   Pain Assessment   Pain Assessment 0-10   Pain Score 3   Pain Type Acute pain   Pain Location Back  (shingles )   Pain Orientation Left;[facility]   Hospital Pain Intervention(s) Repositioned; Ambulation/increased activity; Emotional support; Rest   Home Living   Type of [address] One level;Stairs to enter with rails  (2+2STE)   Home Equipment Walker;Cane;Wheelchair-manual  (w/c for community distances)   Additional Comments At baseline, pt lives in [facility] w/ her daughter  Prior Function   Level of [place] Independent with ADLs and functional mobility  (w/ either RW or SPC)   Lives With Daughter  (who works)   Receives Help From Family   ADL Assistance Independent  (sponge bathes)   Falls in the last 6 months 0  (pt denies)   Comments At baseline, pt reports that she was I w/ either RW or SPC  Pt recently D/C from [place]  Restrictions/Precautions   Weight Bearing Precautions Per Order No   Other Precautions Cognitive; Chair Alarm; Bed Alarm;Multiple lines; Fall Risk;Pain;Contact/isolation;Hard of hearing;Visual impairment   General   Additional Pertinent History (+) shingles   Family/Caregiver Present No   Cognition   Arousal/Participation Alert   Orientation Level Oriented to person;Oriented to place;Oriented to time   Following Commands Follows one step commands with increased time or repetition   RUE Assessment   RUE Assessment   (refer to OT)   LUE Assessment   LUE Assessment   (refer to OT)   RLE Assessment   RLE Assessment WFL  (4/5 grossly)   LLE Assessment   LLE Assessment WFL  (4-/5 grossly except hip 3-/5)   Bed Mobility   Supine to Sit 4  Minimal assistance   Additional items Assist x 2;HOB elevated; Bedrails; Increased time required;Verbal cues;LE management   Additional Comments cues for techniques & safety   Transfers   Sit to Stand 3  Moderate assistance   Additional items Assist x 2;Bedrails; Increased time required;Verbal cues   Stand to Sit 3  Moderate assistance   Additional items Assist x 2;Armrests; Increased time required;Verbal cues   Additional Comments cues for techniques & safety   Ambulation/Elevation   Gait pattern Wide ZOEY; Forward Flexion;Decreased foot clearance;Shuffling; Short stride; Excessively slow  (inc toeing out B/L; B/L flatfoot deformity)   Gait Assistance 3  Moderate assist   Additional items Assist x 2;Verbal cues; Tactile cues   Assistive Device Rolling walker   Distance 3'x1   Balance   Static Sitting Fair -   Static Standing Poor +  (w/ RW)   Ambulatory Poor  (w/ RW)   Endurance Deficit   Endurance Deficit Yes   Endurance Deficit Description fatigue; pain; weakness   Activity Tolerance   Activity Tolerance Patient limited by fatigue;Patient limited by pain   Medical Staff [address]   Nurse Made Aware NATHANIEL Hickman   Assessment   Prognosis Fair   Problem List Decreased strength;Decreased endurance; Impaired balance;Decreased mobility; Decreased safety awareness; Impaired judgement; Impaired vision; Impaired hearing;Pain;Decreased skin integrity   Assessment Pt  89 y  o female presented w/ hypotension  Pt reportedly was d/c from [facility] on Friday 2/28/20  Pt was also recently diagnosed w/ shingles but did not received tx at the facility + other problems w/ medications in the facility  Pt found to be dehydrated on admission  Pt admitted for SUNDAR (acute kidney injury) ([facility] ) w/ Dehydration E86 0, Shingles B02 9 and Hypotension I95 9 on 3/1/20  Pt referred to PT for mobility assessment & D/C planning w/ orders of up w/ assistance  (+) contact precautions  At baseline, pt reports being I w/ either RW or SPC  Pt recently D/C from [place] on 2/28/20  On eval, pt demonstrate dec mobility, balance, endurance & amb  Pt require minAx2 for bed mobility & modAx2 for transfers & amb w/ RW + cues for techniques  Gait deviations as above, slow & unsteady w/ forward flexed posture, dec foot clearance & strides but no gross LOB noted  No dizziness reported t/o session   Nsg staff most recent vital signs as follows: /56 (BP Location: Left arm)   Pulse 66   Temp 98 °F (36 7 °C) (Temporal)   Resp 18   Ht 4' 7 5" (1 41 m)   Wt 62 8 kg (138 lb 7 2 oz)   SpO2 96%   BMI 31 60 kg/m²   At end of session, pt OOB in chair w/o issues, call bell & phone in reach, chair alarm activated  Fall precautions reinforced w/ good understanding  Pt functioning below baseline hence will continue skilled PT to improve function & safety  At this time, due to above mentioned deficits & high risk for falls, pt will benefit from inpt rehab at D/C   to follow  Nsg staff to continue to mobilized pt (OOB in chair for all meals & ambulate in room/unit) as tolerated to prevent further decline in function  Nsg notified  Barriers to Discharge Inaccessible home environment;Decreased caregiver support   Barriers to Discharge Comments (+) SUSI; pt home alone when daughter works   Goals   Patient Goals to get better   STG Expiration Date 03/16/20   Short Term Goal #1 Goals to be met in 14 days; pt will be able to: 1) inc strength & balance by 1/2 grade to improve overall functional mobility & dec fall risk; 2) inc bed mobility to S for pt to be able to get in/OOB safely w/ proper techniques 100% of the time, to dec caregiver assistance & safely function at home; 3) inc transfers to S for pt to transition safely from one surface to another w/o % of the time, to dec caregiver assistance & safely function at home; 4) inc amb w/ RW approx  >80' w/ minAx1 for pt to ambulate household distances w/o any % of the time, to dec caregiver assistance & safely function at home; 5) inc barthel score to 50 to decrease overall risk for falls; 6) negotiate stairs w/ minAx1 for inc safety during stair mgt inside/outside of home & dec caregiver assistance; 7) pt/caregiver ed   PT Treatment Day 0   Plan   Treatment/Interventions Functional transfer training;LE strengthening/ROM; Therapeutic exercise;Elevations; Endurance training;Patient/family training;Bed mobility;Gait training;Spoke to nursing;OT   PT Frequency Other (Comment)  (3-5x/wk)   Recommendation   Recommendation Short-term skilled PT   Equipment Recommended Walker  (RW)   PT - OK to Discharge Yes  (to inpt rehab when medically cleared)   Barthel Index   Feeding 5   Bathing 0   Grooming Score 5   Dressing Score 5   Bladder Score 0   Bowels Score 5   Toilet Use Score 5   Transfers (Bed/Chair) Score 5   Mobility (Level Surface) Score 0   Stairs Score 0   Barthel Index Score 30   Hx/personal factors: co-morbidities, inaccessible home, dec caregiver support, advanced age, mutliple lines, use of AD, dec cognition, pain, fall risk and assist w/ ADL's  Examination: dec mobility, dec balance, dec endurance, dec amb, high fall risk, pain, dec cognition  Clinical: unpredictable (ongoing medical status, abnormal lab values, high fall risk and pain mgt)  Complexity: high     John Antunez, PT

## 2020-03-02 NOTE — PLAN OF CARE
Problem: OCCUPATIONAL THERAPY ADULT  Goal: Performs self-care activities at highest level of function for planned discharge setting  See evaluation for individualized goals  Description  Treatment Interventions: ADL retraining, Functional transfer training, UE strengthening/ROM, Endurance training, Cognitive reorientation, Equipment evaluation/education, Patient/family training, Compensatory technique education, Energy conservation, Activityengagement          See flowsheet documentation for full assessment, interventions and recommendations  Note:   Limitation: Decreased ADL status, Decreased UE strength, Decreased Safe judgement during ADL, Decreased cognition, Decreased endurance, Decreased self-care trans, Decreased high-level ADLs  Prognosis: Good  Assessment: Pt is a 80 y o  female seen for OT evaluation s/p admit to [facility] on 3/1/2020 w/ lethargy and hypotension; SUNDAR (acute kidney injury) ([facility] )  Comorbidities affecting pt's functional performance at time of assessment include: urinary retention, advanced Dementia, glaucoma, arthritis, h/o breast CA w/ mastectomy, resolving shingles to Left back and flank, pressure injury to coccyx (stage 2 vs  Deep tissue)  CT head (-) and CT lumbar spine: No acute osseous abnormality in the lumbar spine  Personal factors affecting pt at time of IE include:was at [address] [place] [address] for rehab Prior to admission, pt was at home w/ daughter and was independent w/ ADLs, independent w/ functional transfers and mobility w/ RW or SPC, assist w/ IADLs   Upon evaluation: Pt requires MIN assist x2 supine>sit bed mobility w/ increased time to complete, MOD assist x2 sit<>stand w/ VCs for hand placement and positioning, MOD assist x2 functional mobility w/ RW and increased time and sequencing, MOD assist-MAX assist LB ADLs, MIN assist UB ADLs, MAX assist toileting 2* the following deficits impacting occupational performance: decreased strength and endurance, impaired balance, impaired functional reach, impaired insight and safety awareness, increased processing time, impaired activity tolerance, multiple lines, fall risk, fear of falling  Pt to benefit from continued skilled OT tx while in the hospital to address deficits as defined above and maximize level of functional independence w ADL's and functional mobility  Occupational Performance areas to address include: grooming, bathing/shower, toilet hygiene, dressing, health maintenance, functional mobility and clothing management, formal cognitive assessment  From OT standpoint, recommendation at time of d/c would be short term rehab       OT Discharge Recommendation: Short Term Rehab  OT - OK to Discharge: (to rehab when medically stable)

## 2020-03-02 NOTE — CONSULTS
Consult Note - Wound   Divya Arm 80 y o  female MRN: [id number]  Unit/Bed#: E5 -01 Encounter: [id number]      History and Present Illness:  80year old female presented to the hospital from [facility] with hypotension and lethargy  Patient's history significant for recent hospitalization (discharged 2/28/20) with shingles and left mastectomy  Patient is receiving valtrex--ID consultation pending  Assessment Findings:   Patient seen for wound care consultation, agreeable to assessment, appears very frail  Patient reports pain in her back and left chest where resolving shingles lesions are present  Patient has chronic ward catheter and is occasionally incontinent of stool  She has an oval area of dry, scaling to her right forehead of unknown etiology  Bilateral heels and buttocks are intact  Nutrition team following, receiving supplements  1   Bilateral medial feet with thick, round callus and deformity--patient follows with podiatry as an outpatient  2   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--dry, brown, well adhered stable eschar  3   Resolving shingles lesions to left back, flank, and wrapping to left anterior chest following dermatome--lesions crusted with scaling and blanchable erythema to chest and flank  There is a small partial thickness open area draining serous fluid to the left back  4   Present on admission pressure injury to coccyx (stage 2 vs deep tissue injury)--presents as a re-absorbing blister with non-blanchable erythema  Difficult to assess, due to patient unable to tolerate assessment, appears center may be light purple  Refer to photo taken by nursing team at [phone] on 3/2/20  See flowsheet and media for wound details  Wound Care Plan:   1-Sacrum--cleanse with soap and water, pat dry  3M no sting to laura-wound skin  Cover with non-adherent oil emulsion dressing (adaptic) and Allevyn Life foam dressing    2-Left back--cleanse with soap and water, pat dry   Apply non-adherent oil emulsion dressing (adaptic) and ABD  Secure with tape  Change dressing daily  Keep open shingles lesions covered at all times  3-Left 5th toe--Apply 3M no sting skin barrier film to neuropathic ulcer on left 5th toe daily  Skin care plans:  1-Hydraguard to bilateral heels BID and PRN  2-Elevate heels to offload pressure  3-Ehob cushion in chair when getting out of bed  4-Moisturize entire body skin and right forehead daily with skin nourishing cream   5-Turn/reposition q2h or when medically stable for pressure re-distribution on skin--use positioning wedges  Wound care team to follow  Plan of care reviewed with primary RN  Wound 02/22/20 Back Left;Medial (Active)   Wound Image   3/2/2020 10:43 AM   Wound Description Basehor;Drainage 3/2/2020 10:43 AM   Ksenia-wound Assessment Dry;Erythema 3/2/2020 10:43 AM   Wound Length (cm) 1 cm 3/2/2020 10:43 AM   Wound Width (cm) 2 5 cm 3/2/2020 10:43 AM   Wound Depth (cm) 0 1 3/2/2020 10:43 AM   Calculated Wound Area (cm^2) 2 5 cm^2 3/2/2020 10:43 AM   Calculated Wound Volume (cm^3) 0 25 cm^3 3/2/2020 10:43 AM   Drainage Amount Scant 3/2/2020 10:43 AM   Drainage Description Serous 3/2/2020 10:43 AM   Non-staged Wound Description Partial thickness 3/2/2020 10:43 AM   Treatments Cleansed 3/2/2020 10:43 AM   Dressing Non adherent;Dry dressing 3/2/2020 10:43 AM   Dressing Changed New 3/2/2020 10:43 AM   Patient Tolerance Tolerated well 3/2/2020 10:43 AM   Dressing Status Clean;Dry; Intact 3/2/2020 10:43 AM       Wound 03/02/20 Pressure Injury Sacrum (Active)   Wound Description Non-blanchable erythema 3/2/2020 10:43 AM   Ksenia-wound Assessment Erythema; Intact 3/2/2020 10:43 AM   Wound Length (cm) 2 cm 3/2/2020 10:43 AM   Wound Width (cm) 1 cm 3/2/2020 10:43 AM   Wound Depth (cm) 0 3/2/2020 10:43 AM   Calculated Wound Area (cm^2) 2 cm^2 3/2/2020 10:43 AM   Calculated Wound Volume (cm^3) 0 cm^3 3/2/2020 10:43 AM   Drainage Amount None 3/2/2020 10:43 AM   Non-staged Wound Description Not applicable 0/9/9142 85:76 AM   Treatments Cleansed 3/2/2020 10:43 AM   Dressing Non adherent; Foam, Silicon (eg  Allevyn, etc) 3/2/2020 10:43 AM   Dressing Changed New 3/2/2020 10:43 AM   Patient Tolerance Tolerated well 3/2/2020 10:43 AM   Dressing Status Clean; Intact;Dry 3/2/2020 10:43 AM     Carlo PAREDESN, RN, [employer]

## 2020-03-02 NOTE — UTILIZATION REVIEW
Initial Clinical Review    Admission: Date/Time/Statement: Admission Orders (From admission, onward)     Ordered        03/01/20 2228  Place in Observation (expected length of stay for this patient is less than two midnights)  Once                   Orders Placed This Encounter   Procedures    Place in Observation (expected length of stay for this patient is less than two midnights)     Standing Status:   Standing     Number of Occurrences:   1     Order Specific Question:   Admitting Physician     Answer:   Georgiann Galeazzi     Order Specific Question:   Level of Care     Answer:   Med Surg [16]     Order Specific Question:   Bed request comments     Answer:   isolation due to shingles     ED Arrival Information     Expected Arrival [address] Casanova of Arrival Escorted By Service Admission Type    - 3/1/2020 20:20 Urgent Ambulance [facility] Medicine Urgent    Arrival Complaint    hypotension        Chief Complaint   Patient presents with    Hypotension     From [facility], yesterday BP was in the 80s, today in the 60s, lethargic on EMS arrival  Recent dx of shingles     Assessment/Plan: * SUNDAR (acute kidney injury) ([address] )  Assessment & Plan  Patient presents to ED today with hypotension found at nursing facility  Creatinine elevated 1 49, baseline 0 6  Likely component of dehydration as patient appeared clinically dehydrated upon presentation to ED  Was receiving 40 mg of Lasix b i d   At nursing facility, will hold at this time, consider restarting at a lower dose for chronic lower extremity edema  Status post 2 L IV fluid bolus in ED, will continue at 50 mL/hr overnight  Vital signs per routine  Avoid nephrotoxins and hypotension  Recheck BMP in a m      Shingles  Assessment & Plan  Patient's family member reporting patient has not received her Valtrex since discharge  Concerned of worsening/spreading infection  Still noted to have crusted lesions in dermatomal fashion  Does appear to have open area on back, possibly skin tear over top of infection-will keep on airborne precautions until seen by ID in the morning  Continue Valtrex  Continue gabapentin  Urinary retention  Assessment & Plan  History of chronic UTIs  Had Orantes placed in ED during prior hospitalization  Continue outpatient urology follow-up to determine further management     Restless legs syndrome  Assessment & Plan  Continue Mirapex     Leg edema  Assessment & Plan  Maintained at home on 40 mg of Lasix daily  Holding in the setting of dehydration  IV fluid hydration, monitor fluid status     Glaucoma  Assessment & Plan  Continue eyedrops     GERD (gastroesophageal reflux disease)  Assessment & Plan  Continue daily Protonix     Decreased ambulation status  Assessment & Plan  Ambulates with walker at baseline  Daughter reports patient has not been able to get out of bed for the past few days secondary to weakness  Was set to begin rehab on Monday at [facility]  Will re-consult PT/OT/case management    Anticipated Length of Stay:  Patient will be admitted on an Observation basis with an anticipated length of stay of  less than 2 midnights  Justification for Hospital Stay:  Dehydration, hypotension, need for placement    Lorna Norman is a 80 y o  female with past medical history of hypertension, chronic UTIs, dementia, glaucoma, arthritis, breast cancer status post mastectomy who presents with hypotension  Patient was reportedly discharged to [facility] on Friday  Daughter reports patient had not received her Valtrex at the facility as it was unavailable  Daughter also reports there were problems with medications, reporting she was only getting her PO Lasix for the past three days  Patient was found to be hypotensive to the low 80s at the facility so EMS was called  Patient appeared clinically dehydrated per ED, hydrated with 2 L NS  Patient denies any complaints at time of admission   Patient does intermittently ask what is stabbing her, daughter reporting intermittent stabbing pain in her back and side due to shingles rash  Reports patient typically takes Tylenol and Tramadol for pain which she has not had today     ED Triage Vitals [03/01/20 2024]   Temperature Pulse Respirations Blood Pressure SpO2   97 9 °F (36 6 °C) 73 20 (!) 86/50 92 %      Temp Source Heart Rate Source Patient Position - Orthostatic VS BP Location FiO2 (%)   Temporal Monitor Lying Right arm --      Pain Score       No Pain        Wt Readings from Last 1 Encounters:   03/01/20 62 8 kg (138 lb 7 2 oz)     Additional Vital Signs:   03/02/20 0803  98 °F (36 7 °C)  66  18  105/56    96 %  None (Room air)  Sitting   03/02/20 0022  97 6 °F (36 4 °C)  70  18  126/60    96 %  None (Room air)  Lying   03/01/20 2245    64  18  92/54  71  94 %  None (Room air)  Lying   03/01/20 2215    68  20  100/50  71  96 %  None (Room air)  Lying   03/01/20 2200    68  18  94/51  70  94 %  None (Room air)  Lying   03/01/20 2145    70  20  96/51  72  91 %  None (Room air)  Lying   03/01/20 2130    68  21  102/55  77  94 %  None (Room air)     03/01/20 2115    68  20  94/50  68  93 %  None (Room air)  Lying   03/01/20 2045    70  20  89/49Abnormal   66  95 %  None (Room air)  Lying   03/01/20 2029  98 °F (36 7 °C)                 03/01/20 2024  97 9 °F (36 6 °C)  73  20  86/50Abnormal     92 %  Nasal cannula  Lying         Pertinent Labs/Diagnostic Test Results:   Results from last 7 days   Lab Units 03/02/20  0506 03/01/20 2036 02/28/20  0528 02/27/20  0614 02/25/20  0505   WBC Thousand/uL 6 14 7 56 5 57 6 35 5 24   HEMOGLOBIN g/dL 9 6* 10 0* 10 2* 10 5* 10 6*   HEMATOCRIT % 29 3* 31 2* 31 1* 32 0* 32 8*   PLATELETS Thousands/uL 275 276 232 231 199   NEUTROS ABS Thousands/µL  --  3 63 2 34 3 24  --          Results from last 7 days   Lab Units 03/02/20  0506 03/01/20 2036 02/28/20  0528 02/27/20  0629 02/25/20  0505   SODIUM mmol/L 137 133* 136 137 136 POTASSIUM mmol/L 3 5 4 1 3 7 4 2 4 1   CHLORIDE mmol/L 103 98* 101 101 101   CO2 mmol/L 26 25 27 28 26   ANION GAP mmol/L 8 10 8 8 9   BUN mg/dL 18 24 14 16 14   CREATININE mg/dL 0 82 1 49* 0 68 0 70 0 62   EGFR ml/min/1 73sq m 62 30 76 75 78   CALCIUM mg/dL 8 0* 9 1 8 5 8 4 8 7   MAGNESIUM mg/dL  --  1 9  --   --   --      Results from last 7 days   Lab Units 03/01/20 2036   AST U/L 19   ALT U/L 15   ALK PHOS U/L 88   TOTAL PROTEIN g/dL 5 9*   ALBUMIN g/dL 2 3*   TOTAL BILIRUBIN mg/dL 0 34         Results from last 7 days   Lab Units 03/02/20  0506 03/01/20 2036 02/28/20  0528 02/27/20  0629 02/25/20  0505   GLUCOSE RANDOM mg/dL 83 121 93 97 97           Results from last 7 days   Lab Units 03/01/20  2306 03/01/20  2105   LACTIC ACID mmol/L 1 0 2 2*           Results from last 7 days   Lab Units 03/01/20  2135   CLARITY UA  Cloudy   COLOR UA  Yellow   SPEC GRAV UA  1 010   PH UA  6 0   GLUCOSE UA mg/dl Negative   KETONES UA mg/dl Negative   BLOOD UA  Trace-Intact*   PROTEIN UA mg/dl Negative   NITRITE UA  Negative   BILIRUBIN UA  Negative   UROBILINOGEN UA E U /dl 0 2   LEUKOCYTES UA  Large*   WBC UA /hpf Innumerable*   RBC UA /hpf 2-4*   BACTERIA UA /hpf Innumerable*   EPITHELIAL CELLS WET PREP /hpf Occasional             ED Treatment:   Medication Administration from 03/01/2020 2020 to 03/01/2020 2334       Date/Time Order Dose Route Action Action by Comments     03/01/2020 2136 sodium chloride 0 9 % bolus 1,000 mL 0 mL Intravenous Stopped Flora Shah RN      03/01/2020 2106 sodium chloride 0 9 % bolus 1,000 mL 1,000 mL Intravenous [address] Zeynep Toledo RN      03/01/2020 2306 sodium chloride 0 9 % bolus 1,000 mL 0 mL Intravenous Stopped Flora Shah RN      03/01/2020 2147 sodium chloride 0 9 % bolus 1,000 mL 1,000 mL Intravenous New Bag Flora Ro, RN         Past Medical History:   Diagnosis Date    Anxiety     Arthritis     Cancer ([facility] [address] )     Elevated serum alkaline phosphatase level     mild, isoenzymes are normal, resolved 4/24/17 labs , last assessed 11/10/16, resolved 4/24/17    Hematuria     last assessed 9/18/12    Hyperlipidemia 8/13/2012    Hypertension     Pulmonary embolism ([facility] ) 01/2011    last assessed 9/18/12     Present on Admission:   Shingles   Decreased ambulation status   GERD (gastroesophageal reflux disease)   Glaucoma   Leg edema   Restless legs syndrome   Urinary retention      Admitting Diagnosis: Dehydration [E86 0]  Shingles [B02 9]  Hypotension [I95 9]  SUNDAR (acute kidney injury) ([facility] ) [N17 9]  Indwelling Orantes catheter present [Z96 0]  Hypotension due to hypovolemia [I95 89, E86 1]  Age/Sex: 80 y o  female  Admission Orders:  Scheduled Medications:    Medications:  acetaminophen 650 mg Oral BID   calcium carbonate-vitamin D 1 tablet Oral BID With Meals   cholecalciferol 4,000 Units Oral Daily   vitamin B-12 1,000 mcg Oral Daily   enoxaparin 40 mg Subcutaneous Daily   gabapentin 100 mg Oral Daily   latanoprost 1 drop Left Eye Daily   magnesium gluconate 250 mg Oral Daily   pantoprazole 40 mg Oral Early Morning   potassium chloride 20 mEq Oral Daily   pramipexole 0 25 mg Oral Daily   valACYclovir 1,000 mg Oral Q12H [address]     Continuous IV Infusions:    sodium chloride 50 mL/hr Intravenous Continuous     PRN Meds:    acetaminophen 325 mg Oral Q8H PRN   calcium carbonate 1,000 mg Oral Daily PRN   chlordiazePOXIDE 25 mg Oral Daily PRN   meclizine 25 mg Oral TID PRN   ondansetron 4 mg Intravenous Q6H PRN   senna 1 tablet Oral HS PRN   traMADol 50 mg Oral Q12H PRN       IP CONSULT TO WOUND CARE  IP CONSULT TO INFECTIOUS DISEASES  IP CONSULT TO CASE MANAGEMENT    Network Utilization Review Department  [email or web address]  ATTENTION: Please call with any questions or concerns to [phone] and carefully listen to the prompts so that you are directed to the right person   All voicemails are confidential   Ryan Lincoln all requests for admission clinical reviews, approved or denied determinations and any other requests to dedicated fax number below belonging to the campus where the patient is receiving treatment   List of dedicated fax numbers for the Facilities:  [address] [place] [address] DENIALS (Administrative/Medical Necessity) [phone]   [address]  [facility] (Maternity/NICU/Pediatrics) [phone]   Adrián Goldstein [phone]   Harriett Patel [phone]   Cindy Ndiaye [phone]   Eugene Plata [phone]   [address] [place] [address] [phone]   [facility]  [phone]   [address] [facility] [address]  [address] [facility] [address] [facility] [phone]

## 2020-03-02 NOTE — OCCUPATIONAL THERAPY NOTE
Occupational Therapy Evaluation     Patient Name: Makeda Lui  [id number] Date: 3/2/2020  Problem List  Principal Problem:    SUNDAR (acute kidney injury) ([facility] )  Active Problems:    Decreased ambulation status    GERD (gastroesophageal reflux disease)    Glaucoma    Leg edema    Restless legs syndrome    Urinary retention    Shingles    Past Medical History  Past Medical History:   Diagnosis Date    Anxiety     Arthritis     Cancer ([facility] )     Elevated serum alkaline phosphatase level     mild, isoenzymes are normal, resolved 4/24/17 labs , last assessed 11/10/16, resolved 4/24/17    Hematuria     last assessed 9/18/12    Hyperlipidemia 8/13/2012    Hypertension     Pulmonary embolism ([facility] ) 01/2011    last assessed 9/18/12     Past Surgical History  Past Surgical History:   Procedure Laterality Date    BILATERAL OOPHORECTOMY      Laparoscopic    BREAST SURGERY Left     Mastectomy     CHOLECYSTECTOMY      Laparoscopic    HERNIA REPAIR      HYSTERECTOMY      SMALL INTESTINE SURGERY               03/02/20 1416   Note Type   Note type Eval/Treat   Restrictions/Precautions   Weight Bearing Precautions Per Order No   Other Precautions Contact/isolation;Pain; Fall Risk;Multiple lines;Visual impairment; Bed Alarm; Chair Alarm;Cognitive   Pain Assessment   Pain Assessment 0-10   Pain Score 3   Pain Type Acute pain   Pain Location Back   Pain Orientation Left   Hospital Pain Intervention(s) Ambulation/increased activity;Repositioned; Emotional support   Response to Interventions tolerated   Home Living   Type of [facility] One level;Stairs to enter with rails; Able to live on main level with bedroom/bathroom; Performs ADLs on one level  (2+2 SUSI)   Bathroom Shower/Tub Tub/shower unit  (sponge bathes)   Bathroom Toilet Standard   Bathroom Equipment Commode   Bathroom Accessibility Accessible   Home Equipment Walker;Cane;Wheelchair-manual   Additional Comments pt reports short distance mobility w/ RW and use of w/c in community, home alone when daughter works as a realtor, unsure of accuracy of information as pt poor historian   Prior Function   Level of [place] Independent with ADLs and functional mobility  (RW or SPC)   Lives With Daughter  (works as a realtor alone at times)   [place]  (sponge bathes)   IADLs Needs assistance  (able to retieve light meals)   Falls in the last 6 months 0  (denies)   Vocational Retired   Comments pt reports daughter provides transport and cooking and cleaning; pt recently at rehab from [facility] crest d/c from [facility] to 2/28   Lifestyle   Autonomy per pt independent w/ ADLs, independent w/ functional transfers and mobility w/ RW or SPC, assist w/ IADLs   Reciprocal Relationships daughter    Service 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   Subjective   Subjective "I feel better"   ADL   Where Assessed Chair   Eating Assistance 4  Minimal Assistance   Grooming Assistance 4  Minimal [address] 3  Moderate Assistance   LB [address] 3  Moderate Assistance   UB Dressing Assistance 3  Moderate Assistance   LB Dressing Assistance 2  Maximal [address]  2  Maximal Assistance   Bed Mobility   Supine to Sit 4  Minimal assistance   Additional items Assist x 2; Increased time required;Verbal cues;LE management; Bedrails;HOB elevated   Additional Comments increased time to complete   Transfers   Sit to Stand 3  Moderate assistance   Additional items Assist x 2; Increased time required;Verbal cues;Armrests   Stand to Sit 3  Moderate assistance   Additional items Assist x 2; Increased time required;Verbal cues;Armrests   Additional Comments cues for hand placement and positioning   Functional Mobility   Functional Mobility 3  Moderate assistance   Additional Comments assist x2 w/ increased time and unsteadiness noted, cues for sequencing   Additional items Rolling walker   Balance   Static Sitting Fair -   Dynamic Sitting Poor +   Static Standing Poor +   Dynamic Standing Poor   Ambulatory Poor   Activity Tolerance   Activity Tolerance Patient limited by fatigue;Patient limited by pain   Nurse Made Aware appropriate to see per Alonso ARMSTRONG Assessment   RUE Assessment WFL  (3+/5, limited elevation)   LUE Assessment   LUE Assessment WFL  (3+/5 limited elevation)   Hand Function   Gross Motor Coordination Functional   Fine Motor Coordination Functional   Sensation   Light Touch No apparent deficits   Proprioception   Proprioception No apparent deficits   Vision-Basic Assessment   Patient Visual Report   (reports poor vision at baseline)   Vision - Complex Assessment   Ocular Range of Motion WFL   Cognition   Overall Cognitive Status Impaired   Arousal/Participation Responsive; Cooperative   Attention Attends with cues to redirect   Orientation Level Oriented to person;Disoriented to place;Oriented to time  (reports medhat crest; oriented to month, not year)   Memory Decreased recall of precautions;Decreased recall of recent events;Decreased short term memory   Following Commands Follows one step commands with increased time or repetition   Comments pt w/ increased processing time, impaired insight and safety awareness   Assessment   Limitation Decreased ADL status; Decreased UE strength;Decreased Safe judgement during ADL;Decreased cognition;Decreased endurance;Decreased self-care trans;Decreased high-level ADLs   Prognosis Good   Assessment Pt is a 80 y o  female seen for OT evaluation s/p admit to [facility] on 3/1/2020 w/ lethargy and hypotension; SUNDAR (acute kidney injury) ([facility] )  Comorbidities affecting pt's functional performance at time of assessment include: urinary retention, advanced Dementia, glaucoma, arthritis, h/o breast CA w/ mastectomy, resolving shingles to Left back and flank, pressure injury to coccyx (stage 2 vs  Deep tissue)   CT head (-) and CT lumbar spine: No acute osseous abnormality in the lumbar spine  Personal factors affecting pt at time of IE include:was at [address] [place] [address] for rehab Prior to admission, pt was at home w/ daughter and was independent w/ ADLs, independent w/ functional transfers and mobility w/ RW or SPC, assist w/ IADLs  Upon evaluation: Pt requires MIN assist x2 supine>sit bed mobility w/ increased time to complete, MOD assist x2 sit<>stand w/ VCs for hand placement and positioning, MOD assist x2 functional mobility w/ RW and increased time and sequencing, MOD assist-MAX assist LB ADLs, MIN assist UB ADLs, MAX assist toileting 2* the following deficits impacting occupational performance: decreased strength and endurance, impaired balance, impaired functional reach, impaired insight and safety awareness, increased processing time, impaired activity tolerance, multiple lines, fall risk, fear of falling  Pt to benefit from continued skilled OT tx while in the hospital to address deficits as defined above and maximize level of functional independence w ADL's and functional mobility  Occupational Performance areas to address include: grooming, bathing/shower, toilet hygiene, dressing, health maintenance, functional mobility and clothing management, formal cognitive assessment  From OT standpoint, recommendation at time of d/c would be short term rehab  Goals   Patient Goals "to get better"   LTG Time Frame 10-14   Long Term Goal please see below goals   Plan   Treatment Interventions ADL retraining;Functional transfer training;UE strengthening/ROM; Endurance training;Cognitive reorientation;Equipment evaluation/education;Patient/family training; Compensatory technique education; Energy conservation; Activityengagement   Goal Expiration Date 03/16/20   OT Frequency 3-5x/wk   Recommendation   OT Discharge Recommendation Short Term Rehab   OT - OK to Discharge   (to rehab when medically stable)   Barthel Index   Feeding 5   Bathing 0   Grooming Score 5 Dressing Score 5   Bladder Score 0   Bowels Score 5   Toilet Use Score 5   Transfers (Bed/Chair) Score 5   Mobility (Level Surface) Score 0   Stairs Score 0   Barthel Index Score 30   Modified Trigg Scale   Modified Riaz Scale 4     Occupational Therapy Goals to be met in 10-14 days:  1) Pt will improve activity tolerance to F+ for 20 min txment sessions to enhance ADLs  2) Pt will complete UB ADLs/self care w/ supervision and min assist w/ LB ADLs and LHAE prn  3) Pt will complete toileting w/ supervision w/ G hygiene/thoroughness using DME PRN  4) Pt will improve functional transfers on/off all surfaces using DME PRN w/ G balance/safety including toileting w/ min assist  5) Pt will improve fx'l mobility during I/ADl/leisure tasks using DME PRN w/ g balance/safety w/ min assist  6) Pt will engage in ongoing cognitive assessment w/ G participation to A w/ safe d/c planning/recommendations  7) Pt will demonstrate G carryover of pt/caregiver education and training as appropriate w/ mod I  w/ G tolerance  8) Pt will engage in depression screen/leisure interest checklist w/ G participation to monitor s/s depression and ID 3 positive coping strategies to A w/ emotional regulation and management  9) Pt will demonstrate 100% carryover of E C  techniques w/ mod I t/o fx'l I/ADL/leisure tasks w/o cues s/p skilled education  10) Pt will demonstrate improved bed mobility to supervision to enhance ADLs  11) Pt will demonstrate 100% carryover of LHAE for LB ADLs/self care and leisure s/p skilled education w/ mod I and G participation  12) Pt will demonstrate improved standing tolerance to 3-5 minutes during functional tasks w/ Good - dynamic standing balance to enhance ADL performance  13) Pt will demonstrate improved b/l UE strength by 1 MMT grade to enhance ADLS and functional transfers    Documentation completed by: Mely Roberts MS, OTR/L

## 2020-03-02 NOTE — PLAN OF CARE
Problem: Potential for Falls  Goal: Patient will remain free of falls  Description  INTERVENTIONS:  - Assess patient frequently for physical needs  -  Identify cognitive and physical deficits and behaviors that affect risk of falls    -  [place] fall precautions as indicated by assessment   - Educate patient/family on patient safety including physical limitations  - Instruct patient to call for assistance with activity based on assessment  - Modify environment to reduce risk of injury  - Consider OT/PT consult to assist with strengthening/mobility  Outcome: Progressing     Problem: Prexisting or High Potential for Compromised Skin Integrity  Goal: Skin integrity is maintained or improved  Description  INTERVENTIONS:  - Identify patients at risk for skin breakdown  - Assess and monitor skin integrity  - Assess and monitor nutrition and hydration status  - Monitor labs   - Assess for incontinence   - Turn and reposition patient  - Assist with mobility/ambulation  - Relieve pressure over bony prominences  - Avoid friction and shearing  - Provide appropriate hygiene as needed including keeping skin clean and dry  - Evaluate need for skin moisturizer/barrier cream  - Collaborate with interdisciplinary team   - Patient/family teaching  - Consider wound care consult   Outcome: Progressing     Problem: GASTROINTESTINAL - ADULT  Goal: Maintains adequate nutritional intake  Description  INTERVENTIONS:  - Monitor percentage of each meal consumed  - Identify factors contributing to decreased intake, treat as appropriate  - Assist with meals as needed  - Monitor I&O, weight, and lab values if indicated  - Obtain nutrition services referral as needed  Outcome: Progressing     Problem: GENITOURINARY - ADULT  Goal: Maintains or returns to baseline urinary function  Description  INTERVENTIONS:  - Assess urinary function  - Encourage oral fluids to ensure adequate hydration if ordered  - Administer IV fluids as ordered to ensure adequate hydration  - Administer ordered medications as needed  - Offer frequent toileting  - Follow urinary retention protocol if ordered  Outcome: Progressing  Goal: Absence of urinary retention  Description  INTERVENTIONS:  - Assess patients ability to void and empty bladder  - Monitor I/O  - Bladder scan as needed  - Discuss with physician/AP medications to alleviate retention as needed  - Discuss catheterization for long term situations as appropriate  Outcome: Progressing  Goal: Urinary catheter remains patent  Description  INTERVENTIONS:  - Assess patency of urinary catheter  - If patient has a chronic ward, consider changing catheter if non-functioning  - Follow guidelines for intermittent irrigation of non-functioning urinary catheter  Outcome: Progressing     Problem: METABOLIC, FLUID AND ELECTROLYTES - ADULT  Goal: Electrolytes maintained within normal limits  Description  INTERVENTIONS:  - Monitor labs and assess patient for signs and symptoms of electrolyte imbalances  - Administer electrolyte replacement as ordered  - Monitor response to electrolyte replacements, including repeat lab results as appropriate  - Instruct patient on fluid and nutrition as appropriate  Outcome: Progressing  Goal: Fluid balance maintained  Description  INTERVENTIONS:  - Monitor labs   - Monitor I/O and WT  - Instruct patient on fluid and nutrition as appropriate  - Assess for signs & symptoms of volume excess or deficit  Outcome: Progressing  Goal: Glucose maintained within target range  Description  INTERVENTIONS:  - Monitor Blood Glucose as ordered  - Assess for signs and symptoms of hyperglycemia and hypoglycemia  - Administer ordered medications to maintain glucose within target range  - Assess nutritional intake and initiate nutrition service referral as needed  Outcome: Progressing     Problem: SKIN/TISSUE INTEGRITY - ADULT  Goal: Skin integrity remains intact  Description  INTERVENTIONS  - Identify patients at risk for skin breakdown  - Assess and monitor skin integrity  - Assess and monitor nutrition and hydration status  - Monitor labs (i e  albumin)  - Assess for incontinence   - Turn and reposition patient  - Assist with mobility/ambulation  - Relieve pressure over bony prominences  - Avoid friction and shearing  - Provide appropriate hygiene as needed including keeping skin clean and dry  - Evaluate need for skin moisturizer/barrier cream  - Collaborate with interdisciplinary team (i e  Nutrition, Rehabilitation, etc )   - Patient/family teaching  Outcome: Progressing  Goal: Incision(s), wounds(s) or drain site(s) healing without S/S of infection  Description  INTERVENTIONS  - Assess and document risk factors for skin impairment   - Assess and document dressing, incision, wound bed, drain sites and surrounding tissue  - Consider nutrition services referral as needed  - Oral mucous membranes remain intact  - Provide patient/ family education  Outcome: Progressing  Goal: Oral mucous membranes remain intact  Description  INTERVENTIONS  - Assess oral mucosa and hygiene practices  - Implement preventative oral hygiene regimen  - Implement oral medicated treatments as ordered  - Initiate Nutrition services referral as needed  Outcome: Progressing     Problem: MUSCULOSKELETAL - ADULT  Goal: Maintain or return mobility to safest level of function  Description  INTERVENTIONS:  - Assess patient's ability to carry out ADLs; assess patient's baseline for ADL function and identify physical deficits which impact ability to perform ADLs (bathing, care of mouth/teeth, toileting, grooming, dressing, etc )  - Assess/evaluate cause of self-care deficits   - Assess range of motion  - Assess patient's mobility  - Assess patient's need for assistive devices and provide as appropriate  - Encourage maximum independence but intervene and supervise when necessary  - Involve family in performance of ADLs  - Assess for home care needs following discharge   - Consider OT consult to assist with ADL evaluation and planning for discharge  - Provide patient education as appropriate  Outcome: Progressing  Goal: Maintain proper alignment of affected body part  Description  INTERVENTIONS:  - Support, maintain and protect limb and body alignment  - Provide patient/ family with appropriate education  Outcome: Progressing

## 2020-03-02 NOTE — PLAN OF CARE
Problem: Potential for Falls  Goal: Patient will remain free of falls  Description  INTERVENTIONS:  - Assess patient frequently for physical needs  -  Identify cognitive and physical deficits and behaviors that affect risk of falls    -  [place] fall precautions as indicated by assessment   - Educate patient/family on patient safety including physical limitations  - Instruct patient to call for assistance with activity based on assessment  - Modify environment to reduce risk of injury  - Consider OT/PT consult to assist with strengthening/mobility  Outcome: Progressing     Problem: Prexisting or High Potential for Compromised Skin Integrity  Goal: Skin integrity is maintained or improved  Description  INTERVENTIONS:  - Identify patients at risk for skin breakdown  - Assess and monitor skin integrity  - Assess and monitor nutrition and hydration status  - Monitor labs   - Assess for incontinence   - Turn and reposition patient  - Assist with mobility/ambulation  - Relieve pressure over bony prominences  - Avoid friction and shearing  - Provide appropriate hygiene as needed including keeping skin clean and dry  - Evaluate need for skin moisturizer/barrier cream  - Collaborate with interdisciplinary team   - Patient/family teaching  - Consider wound care consult   Outcome: Progressing     Problem: GASTROINTESTINAL - ADULT  Goal: Maintains adequate nutritional intake  Description  INTERVENTIONS:  - Monitor percentage of each meal consumed  - Identify factors contributing to decreased intake, treat as appropriate  - Assist with meals as needed  - Monitor I&O, weight, and lab values if indicated  - Obtain nutrition services referral as needed  Outcome: Progressing     Problem: GENITOURINARY - ADULT  Goal: Maintains or returns to baseline urinary function  Description  INTERVENTIONS:  - Assess urinary function  - Encourage oral fluids to ensure adequate hydration if ordered  - Administer IV fluids as ordered to ensure adequate hydration  - Administer ordered medications as needed  - Offer frequent toileting  - Follow urinary retention protocol if ordered  Outcome: Progressing  Goal: Absence of urinary retention  Description  INTERVENTIONS:  - Assess patients ability to void and empty bladder  - Monitor I/O  - Bladder scan as needed  - Discuss with physician/AP medications to alleviate retention as needed  - Discuss catheterization for long term situations as appropriate  Outcome: Progressing  Goal: Urinary catheter remains patent  Description  INTERVENTIONS:  - Assess patency of urinary catheter  - If patient has a chronic ward, consider changing catheter if non-functioning  - Follow guidelines for intermittent irrigation of non-functioning urinary catheter  Outcome: Progressing     Problem: METABOLIC, FLUID AND ELECTROLYTES - ADULT  Goal: Electrolytes maintained within normal limits  Description  INTERVENTIONS:  - Monitor labs and assess patient for signs and symptoms of electrolyte imbalances  - Administer electrolyte replacement as ordered  - Monitor response to electrolyte replacements, including repeat lab results as appropriate  - Instruct patient on fluid and nutrition as appropriate  Outcome: Progressing  Goal: Fluid balance maintained  Description  INTERVENTIONS:  - Monitor labs   - Monitor I/O and WT  - Instruct patient on fluid and nutrition as appropriate  - Assess for signs & symptoms of volume excess or deficit  Outcome: Progressing  Goal: Glucose maintained within target range  Description  INTERVENTIONS:  - Monitor Blood Glucose as ordered  - Assess for signs and symptoms of hyperglycemia and hypoglycemia  - Administer ordered medications to maintain glucose within target range  - Assess nutritional intake and initiate nutrition service referral as needed  Outcome: Progressing     Problem: SKIN/TISSUE INTEGRITY - ADULT  Goal: Skin integrity remains intact  Description  INTERVENTIONS  - Identify patients at risk for skin breakdown  - Assess and monitor skin integrity  - Assess and monitor nutrition and hydration status  - Monitor labs (i e  albumin)  - Assess for incontinence   - Turn and reposition patient  - Assist with mobility/ambulation  - Relieve pressure over bony prominences  - Avoid friction and shearing  - Provide appropriate hygiene as needed including keeping skin clean and dry  - Evaluate need for skin moisturizer/barrier cream  - Collaborate with interdisciplinary team (i e  Nutrition, Rehabilitation, etc )   - Patient/family teaching  Outcome: Progressing  Goal: Incision(s), wounds(s) or drain site(s) healing without S/S of infection  Description  INTERVENTIONS  - Assess and document risk factors for skin impairment   - Assess and document dressing, incision, wound bed, drain sites and surrounding tissue  - Consider nutrition services referral as needed  - Oral mucous membranes remain intact  - Provide patient/ family education  Outcome: Progressing  Goal: Oral mucous membranes remain intact  Description  INTERVENTIONS  - Assess oral mucosa and hygiene practices  - Implement preventative oral hygiene regimen  - Implement oral medicated treatments as ordered  - Initiate Nutrition services referral as needed  Outcome: Progressing     Problem: MUSCULOSKELETAL - ADULT  Goal: Maintain or return mobility to safest level of function  Description  INTERVENTIONS:  - Assess patient's ability to carry out ADLs; assess patient's baseline for ADL function and identify physical deficits which impact ability to perform ADLs (bathing, care of mouth/teeth, toileting, grooming, dressing, etc )  - Assess/evaluate cause of self-care deficits   - Assess range of motion  - Assess patient's mobility  - Assess patient's need for assistive devices and provide as appropriate  - Encourage maximum independence but intervene and supervise when necessary  - Involve family in performance of ADLs  - Assess for home care needs following discharge   - Consider OT consult to assist with ADL evaluation and planning for discharge  - Provide patient education as appropriate  Outcome: Progressing  Goal: Maintain proper alignment of affected body part  Description  INTERVENTIONS:  - Support, maintain and protect limb and body alignment  - Provide patient/ family with appropriate education  Outcome: Progressing

## 2020-03-02 NOTE — H&P
[facility] Internal Medicine  H&P- Divya Arm 9/20/1926, 80 y o  female MRN: [id number]    Unit/Bed#: E5 -01 Encounter: [id number]    Primary Care Provider: Sahil Christopher DO   Date and time admitted to hospital: 3/1/2020  8:23 PM        * SUNDAR (acute kidney injury) [facility])  Assessment & Plan  Patient presents to ED today with hypotension found at nursing facility  Creatinine elevated 1 49, baseline 0 6  Likely component of dehydration as patient appeared clinically dehydrated upon presentation to ED  Was receiving 40 mg of Lasix b i d  At nursing facility, will hold at this time, consider restarting at a lower dose for chronic lower extremity edema  Status post 2 L IV fluid bolus in ED, will continue at 50 mL/hr overnight  Vital signs per routine  Avoid nephrotoxins and hypotension  Recheck BMP in a m      Shingles  Assessment & Plan  Patient's family member reporting patient has not received her Valtrex since discharge  Concerned of worsening/spreading infection  Still noted to have crusted lesions in dermatomal fashion  Does appear to have open area on back, possibly skin tear over top of infection-will keep on airborne precautions until seen by ID in the morning  Continue Valtrex  Continue gabapentin    Urinary retention  Assessment & Plan  History of chronic UTIs  Had Orantes placed in ED during prior hospitalization  Continue outpatient urology follow-up to determine further management    Restless legs syndrome  Assessment & Plan  Continue Mirapex    Leg edema  Assessment & Plan  Maintained at home on 40 mg of Lasix daily  Holding in the setting of dehydration  IV fluid hydration, monitor fluid status      Glaucoma  Assessment & Plan  Continue eyedrops    GERD (gastroesophageal reflux disease)  Assessment & Plan  Continue daily Protonix    Decreased ambulation status  Assessment & Plan  Ambulates with walker at baseline  Daughter reports patient has not been able to get out of bed for the past few days secondary to weakness  Was set to begin rehab on Monday at [facility]  Will re-consult PT/OT/case management    VTE Prophylaxis: Enoxaparin (Lovenox)     Code Status:  DNR DNI  POLST: POLST form is not discussed and not completed at this time  Discussion with family:  Daughter at bedside    Anticipated Length of Stay:  Patient will be admitted on an Observation basis with an anticipated length of stay of  less than 2 midnights  Justification for Hospital Stay:  Dehydration, hypotension, need for placement    Total Time for Visit, including Counseling / Coordination of Care: 1 hour  Greater than 50% of this total time spent on direct patient counseling and coordination of care  Chief Complaint:   Hypotension    History of Present Illness:    Pan Durham is a 80 y o  female with past medical history of hypertension, chronic UTIs, dementia, glaucoma, arthritis, breast cancer status post mastectomy who presents with hypotension  Patient was reportedly discharged to [facility] on Friday  Daughter reports patient had not received her Valtrex at the facility as it was unavailable  Daughter also reports there were problems with medications, reporting she was only getting her PO Lasix for the past three days  Patient was found to be hypotensive to the low 80s at the facility so EMS was called  Patient appeared clinically dehydrated per ED, hydrated with 2 L NS  Patient denies any complaints at time of admission  Patient does intermittently ask what is stabbing her, daughter reporting intermittent stabbing pain in her back and side due to shingles rash  Reports patient typically takes Tylenol and Tramadol for pain which she has not had today  Review of Systems:    Review of Systems   Constitutional: Positive for activity change  Negative for chills and fever  HENT: Negative for trouble swallowing  Eyes: Negative for visual disturbance     Respiratory: Negative for cough and shortness of breath  Cardiovascular: Negative for chest pain  Gastrointestinal: Negative for abdominal pain and vomiting  Genitourinary: Negative for difficulty urinating  Musculoskeletal: Positive for back pain and gait problem  Skin: Positive for rash  Neurological: Positive for weakness  Negative for light-headedness and headaches  Psychiatric/Behavioral: Negative for behavioral problems  Past Medical and Surgical History:     Past Medical History:   Diagnosis Date    Anxiety     Arthritis     Cancer ([facility] )     Elevated serum alkaline phosphatase level     mild, isoenzymes are normal, resolved 4/24/17 labs , last assessed 11/10/16, resolved 4/24/17    Hematuria     last assessed 9/18/12    Hyperlipidemia 8/13/2012    Hypertension     Pulmonary embolism ([facility] ) 01/2011    last assessed 9/18/12       Past Surgical History:   Procedure Laterality Date    BILATERAL OOPHORECTOMY      Laparoscopic    BREAST SURGERY Left     Mastectomy     CHOLECYSTECTOMY      Laparoscopic    HERNIA REPAIR      HYSTERECTOMY      SMALL INTESTINE SURGERY         Meds/Allergies:    Prior to Admission medications    Medication Sig Start Date End Date Taking?  Authorizing Provider   Acetaminophen (TYLENOL ARTHRITIS PAIN PO) Take 1 tablet by mouth 3 (three) times a day   Yes Historical Provider, MD   brimonidine-timolol (COMBIGAN) 0 2-0 5 % Administer 1 drop into the left eye 2 (two) times a day   Yes Historical Provider, MD   Calcium Carbonate-Vit D-Min (CALCIUM 1200 PO) Take 750 mg by mouth 2 (two) times a day   Yes Historical Provider, MD   chlordiazePOXIDE (LIBRIUM) 25 mg capsule Take 1 capsule daily as needed for anxiety 1/30/20  Yes Morro Mairno DO   cholecalciferol (VITAMIN D3) 1,000 units tablet Take 4,000 Units by mouth daily   Yes Historical Provider, MD   Cranberry 250 MG TABS Take by mouth daily   Yes Historical Provider, MD   diclofenac sodium (VOLTAREN) 1 % Apply 4 g topically 4 (four) times a day 7/11/19  Yes Morro Marino DO   furosemide (LASIX) 40 mg tablet Take 1 tablet (40 mg total) by mouth 2 (two) times a day 3/26/19  Yes Deveron Dance, PA-C   gabapentin (NEURONTIN) 100 mg capsule Take 1 capsule (100 mg total) by mouth daily 2/29/20  Yes Benja Ron MD   latanoprost (XALATAN) 0 005 % ophthalmic solution Administer 1 drop into the left eye daily   Yes Historical Provider, MD   omeprazole (PriLOSEC) 20 mg delayed release capsule Take 1 capsule (20 mg total) by mouth daily 3/26/19  Yes Deveron Dance, PA-C   potassium chloride (K-DUR,KLOR-CON) 20 mEq tablet TAKE 1 TABLET BY MOUTH  DAILY 1/17/20  Yes Joannie Babinski, PA-C   pramipexole (MIRAPEX) 0 25 mg tablet Take 1 tablet (0 25 mg total) by mouth daily 3/26/19  Yes Deveron Dance, PA-C   valACYclovir (VALTREX) 1,000 mg tablet Take 1 tablet (1,000 mg total) by mouth every 12 (twelve) hours for 7 doses 2/28/20 3/3/20 Yes Benja Ron MD   vitamin B-12 (VITAMIN B-12) 1,000 mcg tablet Take 2,500 mcg by mouth daily   Yes Historical Provider, MD   enalapril (VASOTEC) 10 mg tablet Take 1 tablet (10 mg total) by mouth daily  Patient not taking: Reported on 3/1/2020 3/26/19   Deveron Dance, PA-C   lidocaine (LIDODERM) 5 % Apply 1 patch topically daily Remove & Discard patch within 12 hours or as directed by MD  Patient not taking: Reported on 3/1/2020 2/28/20   Benja Ron MD   magnesium 30 MG tablet Take 400 mg by mouth daily    Historical Provider, MD   meclizine (ANTIVERT) 25 mg tablet TAKE 1 TABLET BY MOUTH 3  TIMES A DAY AS NEEDED  Patient not taking: Reported on 3/1/2020 5/23/19   Morro Marino DO   NYSTATIN powder APPLY TO AFFECTED AREA(S)  2-3 TIMES A DAY  Patient not taking: Reported on 3/1/2020 5/23/19   Morro Schmeltzle, DO   traMADol (ULTRAM) 50 mg tablet Take 1 tablet (50 mg total) by mouth every 8 (eight) hours as needed for moderate pain  Patient not taking: Reported on 3/1/2020 2/28/20   MD jessi Ruiz (OLIVIER) 0 1 % nasal spray 2 sprays into each nostril 2 (two) times a day 12/6/17 3/1/20  Historical Provider, MD     I have reviewed home medications with patient family member  Allergies: Allergies   Allergen Reactions    Amoxicillin Hives    Celecoxib     Cephalexin     Ciprofloxacin      Other reaction(s): diarrhea  Tolerates Levaquin    Codeine Other (See Comments)     Other reaction(s): Other (See Comments)  takes vicodin @ home  takes vicodin @ home    Epinephrine Other (See Comments)     Other reaction(s): Unknown Reaction    Oxycodone-Acetaminophen Other (See Comments)    Oxycodone-Acetaminophen      Other reaction(s): Unknown Reaction    Propoxyphene Other (See Comments)    Rofecoxib     Sulfa Antibiotics Other (See Comments)     takes at home  takes at home  Other reaction(s): Other (See Comments)  takes at home    Sulfamethoxazole-Trimethoprim     Nitrofurantoin Rash       Social History:     Marital Status:     Occupation: retired  Patient Pre-hospital Living Situation: home with daughter prior to admission last week, was discharged to [facility] x 2 days   Patient Pre-hospital Level of Mobility: ambulates with walker  Patient Pre-hospital Diet Restrictions: none  Substance Use History:   Social History     Substance and Sexual Activity   Alcohol Use No     Social History     Tobacco Use   Smoking Status Never Smoker   Smokeless Tobacco Never Used     Social History     Substance and Sexual Activity   Drug Use No       Family History:    Family History   Problem Relation Age of Onset    Hypertension Mother     Blindness Mother     Cancer Sister     Cancer Maternal Aunt     Cancer Maternal Uncle        Physical Exam:     Vitals:   Blood Pressure: 92/54 (03/01/20 2245)  Pulse: 64 (03/01/20 2245)  Temperature: 98 °F (36 7 °C) (03/01/20 2029)  Temp Source: Oral (03/01/20 2029)  Respirations: 18 (03/01/20 2245)  Weight - Scale: 62 8 kg (138 lb 7 2 oz) (03/01/20 2024)  SpO2: 94 % (03/01/20 1008)    Physical Exam   Constitutional: No distress  HENT:   Head: Normocephalic and atraumatic  Mouth/Throat: Oropharynx is clear and moist    Eyes: Conjunctivae and EOM are normal  No scleral icterus  Neck: Normal range of motion  Cardiovascular: Normal rate, regular rhythm and normal heart sounds  No murmur heard  Pulmonary/Chest: Effort normal and breath sounds normal  No respiratory distress  She has no wheezes  She has no rales  Abdominal: Soft  Bowel sounds are normal  She exhibits no distension  There is no tenderness  There is no guarding  Musculoskeletal: She exhibits edema (Trace nonpitting lower extremity edema)  Neurological: She is alert  Oriented to self and year, believes she is in a garage   Skin: Skin is warm and dry  Rash (Dermatomal rash on mid back, does not cross midline, mostly crusted lesions, 1 area of open wound approximately dime-sized) noted  Psychiatric: She has a normal mood and affect  Her behavior is normal    Vitals reviewed  Additional Data:     Lab Results: I have personally reviewed pertinent reports  Results from last 7 days   Lab Units 03/01/20 2036   WBC Thousand/uL 7 56   HEMOGLOBIN g/dL 10 0*   HEMATOCRIT % 31 2*   PLATELETS Thousands/uL 276   NEUTROS PCT % 46   LYMPHS PCT % 37   MONOS PCT % 13*   EOS PCT % 2     Results from last 7 days   Lab Units 03/01/20 2036   SODIUM mmol/L 133*   POTASSIUM mmol/L 4 1   CHLORIDE mmol/L 98*   CO2 mmol/L 25   BUN mg/dL 24   CREATININE mg/dL 1 49*   ANION GAP mmol/L 10   CALCIUM mg/dL 9 1   ALBUMIN g/dL 2 3*   TOTAL BILIRUBIN mg/dL 0 34   ALK PHOS U/L 88   ALT U/L 15   AST U/L 19   GLUCOSE RANDOM mg/dL 121                 Results from last 7 days   Lab Units 03/01/20  2105   LACTIC ACID mmol/L 2 2*       Imaging: I have personally reviewed pertinent reports        No orders to display       EKG, Pathology, and Other Studies Reviewed on Admission:   · EKG:  Normal sinus rhythm, no acute ST or T-wave changes    Allscripts / Epic Records Reviewed: Yes     ** Please Note: This note has been constructed using a voice recognition system   **

## 2020-03-02 NOTE — ASSESSMENT & PLAN NOTE
Patient presents to ED today with hypotension found at nursing facility  Creatinine elevated 1 49, baseline 0 6  Likely component of dehydration as patient appeared clinically dehydrated upon presentation to ED  Was receiving 40 mg of Lasix b i d  At nursing facility, will hold at this time, consider restarting at a lower dose for chronic lower extremity edema  Status post 2 L IV fluid bolus in ED, will continue at 50 mL/hr overnight  Vital signs per routine  Avoid nephrotoxins and hypotension  Recheck BMP in a m

## 2020-03-02 NOTE — DISCHARGE INSTR - OTHER ORDERS
Wound Care Plan:   1-Sacrum--cleanse with soap and water, pat dry  3M no sting to laura-wound skin  Cover with non-adherent oil emulsion dressing (adaptic) and bordered foam dressing  Change dressing every other day and PRN with soilage  2-Left back--cleanse with soap and water, pat dry  Apply non-adherent oil emulsion dressing (adaptic) and ABD  Secure with tape  Change dressing daily  Keep open shingles lesions covered at all times  3-Left 5th toe--Cleanse with normal saline, pat dry  Apply Dermagran gauze to wound  Cover with 2x2 and wrap with noemí  Change dressing every other day  Skin care plans:  1-Hydraguard to bilateral heels twice daily for skin protection  2-Elevate heels off of bed/chair with pillows or foam wedges to offload pressure--avoid using prevalon boots  3-Offloading cushion in chair when getting out of bed  4-Moisturize entire body skin and right forehead daily with lotion  5-Turn/reposition every 2 hours while in bed and weight shift frequently while in chair for pressure re-distribution on skin--use positioning wedges

## 2020-03-03 LAB
ANION GAP SERPL CALCULATED.3IONS-SCNC: 9 MMOL/L (ref 4–13)
BASOPHILS # BLD AUTO: 0.03 THOUSANDS/ΜL (ref 0–0.1)
BASOPHILS NFR BLD AUTO: 1 % (ref 0–1)
BUN SERPL-MCNC: 14 MG/DL (ref 5–25)
CALCIUM SERPL-MCNC: 8.8 MG/DL (ref 8.3–10.1)
CHLORIDE SERPL-SCNC: 102 MMOL/L (ref 100–108)
CO2 SERPL-SCNC: 24 MMOL/L (ref 21–32)
CREAT SERPL-MCNC: 0.64 MG/DL (ref 0.6–1.3)
EOSINOPHIL # BLD AUTO: 0.17 THOUSAND/ΜL (ref 0–0.61)
EOSINOPHIL NFR BLD AUTO: 3 % (ref 0–6)
ERYTHROCYTE [DISTWIDTH] IN BLOOD BY AUTOMATED COUNT: 14.7 % (ref 11.6–15.1)
GFR SERPL CREATININE-BSD FRML MDRD: 77 ML/MIN/1.73SQ M
GLUCOSE SERPL-MCNC: 98 MG/DL (ref 65–140)
HCT VFR BLD AUTO: 31 % (ref 34.8–46.1)
HGB BLD-MCNC: 10.1 G/DL (ref 11.5–15.4)
IMM GRANULOCYTES # BLD AUTO: 0.02 THOUSAND/UL (ref 0–0.2)
IMM GRANULOCYTES NFR BLD AUTO: 0 % (ref 0–2)
LYMPHOCYTES # BLD AUTO: 1.89 THOUSANDS/ΜL (ref 0.6–4.47)
LYMPHOCYTES NFR BLD AUTO: 34 % (ref 14–44)
MCH RBC QN AUTO: 32.4 PG (ref 26.8–34.3)
MCHC RBC AUTO-ENTMCNC: 32.6 G/DL (ref 31.4–37.4)
MCV RBC AUTO: 99 FL (ref 82–98)
MONOCYTES # BLD AUTO: 0.59 THOUSAND/ΜL (ref 0.17–1.22)
MONOCYTES NFR BLD AUTO: 11 % (ref 4–12)
NEUTROPHILS # BLD AUTO: 2.85 THOUSANDS/ΜL (ref 1.85–7.62)
NEUTS SEG NFR BLD AUTO: 51 % (ref 43–75)
NRBC BLD AUTO-RTO: 0 /100 WBCS
PLATELET # BLD AUTO: 275 THOUSANDS/UL (ref 149–390)
PMV BLD AUTO: 9.6 FL (ref 8.9–12.7)
POTASSIUM SERPL-SCNC: 3.7 MMOL/L (ref 3.5–5.3)
RBC # BLD AUTO: 3.12 MILLION/UL (ref 3.81–5.12)
SODIUM SERPL-SCNC: 135 MMOL/L (ref 136–145)
WBC # BLD AUTO: 5.55 THOUSAND/UL (ref 4.31–10.16)

## 2020-03-03 PROCEDURE — 99232 SBSQ HOSP IP/OBS MODERATE 35: CPT | Performed by: INTERNAL MEDICINE

## 2020-03-03 PROCEDURE — 85025 COMPLETE CBC W/AUTO DIFF WBC: CPT | Performed by: INTERNAL MEDICINE

## 2020-03-03 PROCEDURE — 80048 BASIC METABOLIC PNL TOTAL CA: CPT | Performed by: INTERNAL MEDICINE

## 2020-03-03 RX ADMIN — LIDOCAINE 1 PATCH: 50 PATCH TOPICAL at 09:05

## 2020-03-03 RX ADMIN — POTASSIUM CHLORIDE 20 MEQ: 1500 TABLET, EXTENDED RELEASE ORAL at 09:05

## 2020-03-03 RX ADMIN — CALCIUM CARBONATE 500 MG (1,250 MG)-VITAMIN D3 200 UNIT TABLET 1 TABLET: at 07:56

## 2020-03-03 RX ADMIN — MELATONIN 4000 UNITS: at 09:05

## 2020-03-03 RX ADMIN — CYANOCOBALAMIN TAB 500 MCG 1000 MCG: 500 TAB at 09:05

## 2020-03-03 RX ADMIN — GABAPENTIN 100 MG: 100 CAPSULE ORAL at 17:57

## 2020-03-03 RX ADMIN — Medication 250 MG: at 09:07

## 2020-03-03 RX ADMIN — VALACYCLOVIR HYDROCHLORIDE 1000 MG: 500 TABLET, FILM COATED ORAL at 21:36

## 2020-03-03 RX ADMIN — ACETAMINOPHEN 650 MG: 325 TABLET ORAL at 13:47

## 2020-03-03 RX ADMIN — PRAMIPEXOLE DIHYDROCHLORIDE 0.25 MG: 0.25 TABLET ORAL at 09:05

## 2020-03-03 RX ADMIN — VALACYCLOVIR HYDROCHLORIDE 1000 MG: 500 TABLET, FILM COATED ORAL at 09:07

## 2020-03-03 RX ADMIN — ENOXAPARIN SODIUM 40 MG: 40 INJECTION SUBCUTANEOUS at 09:05

## 2020-03-03 RX ADMIN — GABAPENTIN 100 MG: 100 CAPSULE ORAL at 09:05

## 2020-03-03 RX ADMIN — ACETAMINOPHEN 650 MG: 325 TABLET ORAL at 21:36

## 2020-03-03 RX ADMIN — PANTOPRAZOLE SODIUM 40 MG: 40 TABLET, DELAYED RELEASE ORAL at 05:19

## 2020-03-03 RX ADMIN — LATANOPROST 1 DROP: 50 SOLUTION OPHTHALMIC at 09:06

## 2020-03-03 RX ADMIN — ACETAMINOPHEN 650 MG: 325 TABLET ORAL at 05:19

## 2020-03-03 RX ADMIN — CALCIUM CARBONATE 500 MG (1,250 MG)-VITAMIN D3 200 UNIT TABLET 1 TABLET: at 16:24

## 2020-03-03 NOTE — CASE MANAGEMENT
CM completed PASSR and placed referral to [facility] via AllScripts per patient's daughter request  CM department will continue to follow through discharge

## 2020-03-03 NOTE — ASSESSMENT & PLAN NOTE
Patient presents to ED with hypotension found at rehab  Creatinine elevated 1 49 (baseline 0 6)  Secondary to dehydration, family reports poor oral intake  She was also on Lasix 40 mg b i d  Status post 2 L IV fluid bolus in ED, continued at 50 mL/hr   Creatinine is 0 64 today  Encourage oral intake, dc IVF  Monitor volume status, she has a history of lymphedema  Currently there is no lower extremity edema at all  BMP in a m

## 2020-03-03 NOTE — ASSESSMENT & PLAN NOTE
Patient presents to ED with hypotension found at rehab  Creatinine elevated 1 49 (baseline 0 6)  Secondary to dehydration, family reports poor oral intake  She was also on Lasix 40 mg b i d  Status post 2 L IV fluid bolus in ED, continued at 50 mL/hr   Creatinine is 0 82 today  Encourage oral intake, continue IV fluids at 50 cc overnight  Monitor volume status, she has a history of lymphedema  Currently there is no lower extremity edema at all  BMP in a m

## 2020-03-03 NOTE — ASSESSMENT & PLAN NOTE
Maintained at home on 40 mg of Lasix b i d    Holding in the setting of dehydration  Will consider restarting it may be tomorrow depending on the volume status

## 2020-03-03 NOTE — ASSESSMENT & PLAN NOTE
Evaluated by ID, appreciate recommendations  Continue Valtrex until 3/4  Discontinue airborne isolation, continue contact isolation  Serial skin exam, ID recommended to cover the lesions  Post herpetic neurology a, gabapentin 100 mg daily increased to b i d , pain is well controlled  Tylenol around the clock  Tramadol p r n   For arthritis prn 12 h, home medication

## 2020-03-03 NOTE — ASSESSMENT & PLAN NOTE
Evaluated by ID, appreciate recommendations  Continue Valtrex until 3/4  Discontinue airborne isolation, continue contact isolation  Serial skin exam, ID recommended to cover the lesions  Post herpetic neurology a, gabapentin 100 mg daily increased to b i d

## 2020-03-03 NOTE — PROGRESS NOTES
Progress Note - Beryle Gutter 9/20/1926, 80 y o  female MRN: [id number]    Unit/Bed#: E5 -01 Encounter: [id number]    Primary Care Provider: Sachin Abdullahi DO   Date and time admitted to hospital: 3/1/2020  8:23 PM        * SUNDAR (acute kidney injury) [facility])  Assessment & Plan  Patient presents to ED with hypotension found at rehab  Creatinine elevated 1 49 (baseline 0 6)  Secondary to dehydration, family reports poor oral intake  She was also on Lasix 40 mg b i d  Status post 2 L IV fluid bolus in ED, continued at 50 mL/hr   Creatinine is 0 82 today  Encourage oral intake, continue IV fluids at 50 cc overnight  Monitor volume status, she has a history of lymphedema  Currently there is no lower extremity edema at all  BMP in a m  Shingles  Assessment & Plan  Evaluated by ID, appreciate recommendations  Continue Valtrex until 3/4  Discontinue airborne isolation, continue contact isolation  Serial skin exam, ID recommended to cover the lesions  Post herpetic neurology a, gabapentin 100 mg daily increased to b i d  Urinary retention  Assessment & Plan  History of chronic UTIs  Had Orantes placed in ED during prior hospitalization  Continue outpatient urology follow-up to determine further management    Restless legs syndrome  Assessment & Plan  Continue Mirapex    Leg edema  Assessment & Plan  Maintained at home on 40 mg of Lasix b i d  Holding in the setting of dehydration  IV fluid hydration, monitor fluid status      Glaucoma  Assessment & Plan  Continue eyedrops    GERD (gastroesophageal reflux disease)  Assessment & Plan  Continue daily Protonix    Decreased ambulation status  Assessment & Plan  Ambulates with walker at baseline  Daughter reports patient has not been able to get out of bed for the past few days secondary to weakness  PT OT        VTE Pharmacologic Prophylaxis:   Pharmacologic: Enoxaparin (Lovenox)  Mechanical VTE Prophylaxis in Place: Yes    Patient Centered Rounds:  I have performed bedside rounds with nursing staff today  Discussions with Specialists or Other Care Team Provider:  Id    Education and Discussions with Family / Patient:  Patient, daughter and son at bedside    Time Spent for Care: 1 hour  More than 50% of total time spent on counseling and coordination of care as described above  Current Length of Stay: 0 day(s)    Current Patient Status: Observation   Certification Statement: The patient will continue to require additional inpatient hospital stay due to A KI, shingles  I spent about 1 hour discussing patient's condition with her daughter and son  Discharge Plan: To be determined    Code Status: Level 3 - DNAR and DNI      Subjective:   Patient was seen and evaluated bedside, she is feeling well, less weak but with pain in the left chest at the site of shingles    Objective:     Vitals:   Temp (24hrs), Av 9 °F (36 6 °C), Min:97 6 °F (36 4 °C), Max:98 °F (36 7 °C)    Temp:  [97 6 °F (36 4 °C)-98 °F (36 7 °C)] 97 8 °F (36 6 °C)  HR:  [64-73] 73  Resp:  [18-21] 18  BP: ()/(49-60) 109/55  SpO2:  [91 %-96 %] 96 %  Body mass index is 31 6 kg/m²  Input and Output Summary (last 24 hours): Intake/Output Summary (Last 24 hours) at 3/2/2020 1913  Last data filed at 3/2/2020 1445  Gross per 24 hour   Intake 3223 33 ml   Output 1400 ml   Net 1823 33 ml       Physical Exam:     Physical Exam   Constitutional: She is oriented to person, place, and time  She appears well-developed and well-nourished  No distress  HENT:   Head: Normocephalic and atraumatic  Eyes: EOM are normal    Neck: Normal range of motion  Neck supple  Cardiovascular: Normal rate, regular rhythm and normal heart sounds  Exam reveals no gallop and no friction rub  No murmur heard  Pulmonary/Chest: Effort normal and breath sounds normal  No respiratory distress  She has no wheezes  Abdominal: Soft  Bowel sounds are normal  She exhibits no distension   There is no tenderness  Musculoskeletal: She exhibits no edema  Neurological: She is alert and oriented to person, place, and time  No cranial nerve deficit  Skin: Skin is warm and dry  Under the left axilla and scapular area crusted lesions   Psychiatric: She has a normal mood and affect  Additional Data:     Labs:    Results from last 7 days   Lab Units 03/02/20  0506 03/01/20  2036   WBC Thousand/uL 6 14 7 56   HEMOGLOBIN g/dL 9 6* 10 0*   HEMATOCRIT % 29 3* 31 2*   PLATELETS Thousands/uL 275 276   NEUTROS PCT %  --  46   LYMPHS PCT %  --  37   MONOS PCT %  --  13*   EOS PCT %  --  2     Results from last 7 days   Lab Units 03/02/20  0506 03/01/20  2036   SODIUM mmol/L 137 133*   POTASSIUM mmol/L 3 5 4 1   CHLORIDE mmol/L 103 98*   CO2 mmol/L 26 25   BUN mg/dL 18 24   CREATININE mg/dL 0 82 1 49*   ANION GAP mmol/L 8 10   CALCIUM mg/dL 8 0* 9 1   ALBUMIN g/dL  --  2 3*   TOTAL BILIRUBIN mg/dL  --  0 34   ALK PHOS U/L  --  88   ALT U/L  --  15   AST U/L  --  19   GLUCOSE RANDOM mg/dL 83 121                 Results from last 7 days   Lab Units 03/01/20  2306 03/01/20  2105   LACTIC ACID mmol/L 1 0 2 2*           * I Have Reviewed All Lab Data Listed Above  * Additional Pertinent Lab Tests Reviewed:  Yelena 66 Admission Reviewed    Imaging:    Imaging Reports Reviewed Today Include: all  Imaging Personally Reviewed by Myself Includes:      Recent Cultures (last 7 days):           Last 24 Hours Medication List:     Current Facility-Administered Medications:  acetaminophen 325 mg Oral Q8H PRN AVIVA Day-BENITA   acetaminophen 650 mg Oral Q8H Olivia Kwok MD   calcium carbonate 1,000 mg Oral Daily PRN Anjali Benitez PA-C   calcium carbonate-vitamin D 1 tablet Oral BID With Meals Anjali Benitez PA-C   chlordiazePOXIDE 25 mg Oral Daily PRN Kristin Varma PA-C   cholecalciferol 4,000 Units Oral Daily Anjali Benitez PA-C   vitamin B-12 1,000 mcg Oral Daily AVIVA Day-BENITA   enoxaparin 40 mg Subcutaneous Daily Anjali Smudde, PA-C   gabapentin 100 mg Oral BID Terry Lewis MD   latanoprost 1 drop Left Eye Daily Anjali Smudde, PA-C   magnesium gluconate 250 mg Oral Daily Anjali Smudde, PA-C   meclizine 25 mg Oral TID PRN Lurene Jared Smudde, PA-C   ondansetron 4 mg Intravenous Q6H PRN Anjali Smudde, PA-C   pantoprazole 40 mg Oral Early Morning Anjali Smudde, PA-C   potassium chloride 20 mEq Oral Daily Anjali Smudde, PA-C   pramipexole 0 25 mg Oral Daily Anjali Smudde, PA-C   senna 1 tablet Oral HS PRN Anjali Smudde, PA-C   traMADol 50 mg Oral Q12H PRN Anjali Smudde, PA-C   valACYclovir 1,000 mg Oral Q12H [address] [place]lon Po, NITZA        Today, Patient Was Seen By: Terry Lewis MD    ** Please Note: Dictation voice to text software may have been used in the creation of this document   **

## 2020-03-03 NOTE — ASSESSMENT & PLAN NOTE
Maintained at home on 40 mg of Lasix b i d    Holding in the setting of dehydration  IV fluid hydration, monitor fluid status

## 2020-03-03 NOTE — PLAN OF CARE
Problem: Potential for Falls  Goal: Patient will remain free of falls  Description  INTERVENTIONS:  - Assess patient frequently for physical needs  -  Identify cognitive and physical deficits and behaviors that affect risk of falls    -  [place] fall precautions as indicated by assessment   - Educate patient/family on patient safety including physical limitations  - Instruct patient to call for assistance with activity based on assessment  - Modify environment to reduce risk of injury  - Consider OT/PT consult to assist with strengthening/mobility  Outcome: Progressing     Problem: Prexisting or High Potential for Compromised Skin Integrity  Goal: Skin integrity is maintained or improved  Description  INTERVENTIONS:  - Identify patients at risk for skin breakdown  - Assess and monitor skin integrity  - Assess and monitor nutrition and hydration status  - Monitor labs   - Assess for incontinence   - Turn and reposition patient  - Assist with mobility/ambulation  - Relieve pressure over bony prominences  - Avoid friction and shearing  - Provide appropriate hygiene as needed including keeping skin clean and dry  - Evaluate need for skin moisturizer/barrier cream  - Collaborate with interdisciplinary team   - Patient/family teaching  - Consider wound care consult   Outcome: Progressing     Problem: GASTROINTESTINAL - ADULT  Goal: Maintains adequate nutritional intake  Description  INTERVENTIONS:  - Monitor percentage of each meal consumed  - Identify factors contributing to decreased intake, treat as appropriate  - Assist with meals as needed  - Monitor I&O, weight, and lab values if indicated  - Obtain nutrition services referral as needed  Outcome: Progressing     Problem: GENITOURINARY - ADULT  Goal: Maintains or returns to baseline urinary function  Description  INTERVENTIONS:  - Assess urinary function  - Encourage oral fluids to ensure adequate hydration if ordered  - Administer IV fluids as ordered to ensure adequate hydration  - Administer ordered medications as needed  - Offer frequent toileting  - Follow urinary retention protocol if ordered  Outcome: Progressing  Goal: Absence of urinary retention  Description  INTERVENTIONS:  - Assess patients ability to void and empty bladder  - Monitor I/O  - Bladder scan as needed  - Discuss with physician/AP medications to alleviate retention as needed  - Discuss catheterization for long term situations as appropriate  Outcome: Progressing  Goal: Urinary catheter remains patent  Description  INTERVENTIONS:  - Assess patency of urinary catheter  - If patient has a chronic ward, consider changing catheter if non-functioning  - Follow guidelines for intermittent irrigation of non-functioning urinary catheter  Outcome: Progressing     Problem: METABOLIC, FLUID AND ELECTROLYTES - ADULT  Goal: Electrolytes maintained within normal limits  Description  INTERVENTIONS:  - Monitor labs and assess patient for signs and symptoms of electrolyte imbalances  - Administer electrolyte replacement as ordered  - Monitor response to electrolyte replacements, including repeat lab results as appropriate  - Instruct patient on fluid and nutrition as appropriate  Outcome: Progressing  Goal: Fluid balance maintained  Description  INTERVENTIONS:  - Monitor labs   - Monitor I/O and WT  - Instruct patient on fluid and nutrition as appropriate  - Assess for signs & symptoms of volume excess or deficit  Outcome: Progressing  Goal: Glucose maintained within target range  Description  INTERVENTIONS:  - Monitor Blood Glucose as ordered  - Assess for signs and symptoms of hyperglycemia and hypoglycemia  - Administer ordered medications to maintain glucose within target range  - Assess nutritional intake and initiate nutrition service referral as needed  Outcome: Progressing     Problem: SKIN/TISSUE INTEGRITY - ADULT  Goal: Skin integrity remains intact  Description  INTERVENTIONS  - Identify patients at risk for skin breakdown  - Assess and monitor skin integrity  - Assess and monitor nutrition and hydration status  - Monitor labs (i e  albumin)  - Assess for incontinence   - Turn and reposition patient  - Assist with mobility/ambulation  - Relieve pressure over bony prominences  - Avoid friction and shearing  - Provide appropriate hygiene as needed including keeping skin clean and dry  - Evaluate need for skin moisturizer/barrier cream  - Collaborate with interdisciplinary team (i e  Nutrition, Rehabilitation, etc )   - Patient/family teaching  Outcome: Progressing  Goal: Incision(s), wounds(s) or drain site(s) healing without S/S of infection  Description  INTERVENTIONS  - Assess and document risk factors for skin impairment   - Assess and document dressing, incision, wound bed, drain sites and surrounding tissue  - Consider nutrition services referral as needed  - Oral mucous membranes remain intact  - Provide patient/ family education  Outcome: Progressing  Goal: Oral mucous membranes remain intact  Description  INTERVENTIONS  - Assess oral mucosa and hygiene practices  - Implement preventative oral hygiene regimen  - Implement oral medicated treatments as ordered  - Initiate Nutrition services referral as needed  Outcome: Progressing     Problem: MUSCULOSKELETAL - ADULT  Goal: Maintain or return mobility to safest level of function  Description  INTERVENTIONS:  - Assess patient's ability to carry out ADLs; assess patient's baseline for ADL function and identify physical deficits which impact ability to perform ADLs (bathing, care of mouth/teeth, toileting, grooming, dressing, etc )  - Assess/evaluate cause of self-care deficits   - Assess range of motion  - Assess patient's mobility  - Assess patient's need for assistive devices and provide as appropriate  - Encourage maximum independence but intervene and supervise when necessary  - Involve family in performance of ADLs  - Assess for home care needs following discharge   - Consider OT consult to assist with ADL evaluation and planning for discharge  - Provide patient education as appropriate  Outcome: Progressing  Goal: Maintain proper alignment of affected body part  Description  INTERVENTIONS:  - Support, maintain and protect limb and body alignment  - Provide patient/ family with appropriate education  Outcome: Progressing     Problem: Nutrition/Hydration-ADULT  Goal: Nutrient/Hydration intake appropriate for improving, restoring or maintaining nutritional needs  Description  Monitor and assess patient's nutrition/hydration status for malnutrition  Collaborate with interdisciplinary team and initiate plan and interventions as ordered  Monitor patient's weight and dietary intake as ordered or per policy  Utilize nutrition screening tool and intervene as necessary  Determine patient's food preferences and provide high-protein, high-caloric foods as appropriate       INTERVENTIONS:  - Monitor oral intake, urinary output, labs, and treatment plans  - Assess nutrition and hydration status and recommend course of action  - Evaluate amount of meals eaten  - Assist patient with eating if necessary   - Allow adequate time for meals  - Recommend/ encourage appropriate diets, oral nutritional supplements, and vitamin/mineral supplements  - Order, calculate, and assess calorie counts as needed  - Recommend, monitor, and adjust tube feedings and TPN/PPN based on assessed needs  - Assess need for intravenous fluids  - Provide specific nutrition/hydration education as appropriate  - Include patient/family/caregiver in decisions related to nutrition  Outcome: Progressing

## 2020-03-03 NOTE — PROGRESS NOTES
Progress Note - Infectious Disease   Oc Stagers 80 y o  female MRN: [id number]  Unit/Bed#: E5 -01 Encounter: [id number]      Impression/Plan:  1  Dermatomal zoster  Patient presents for hypotension and reportedly had not completed treatment for dermatomal zoster  Continues to have pain at the site  Majority of the patient's lesions have crusted over and appear inactive  Continued pain likely post herpetic neuralgia  There is no signs of dissemination on exam   Continue Valtrex 1 g every 12 hours  Continue therapy through 3/4  Last doses of antiviral ordered  Continue contact isolation  Recommend covering lesions, and serial skin exams  Wound Care consult appreciated  Continue to trend fever curve/vitals  Pain control/regimen as per primary  Additional supportive care as per primary  Patient otherwise cleared from ID standpoint for discharge     2  Acute kidney injury  Patient noted with acute elevation in her creatinine from baseline  Possibly prerenal due to issues with her diuretic therapy that were reported  Creatinine improved  Valtrex has been renally dose adjusted  Hydration and diuretics as per primary  Will further dose adjust Valtrex as needed     3  Urinary retention and abnormal UA  Patient with previously documented urinary retention along with recurrent UTIs  Remains with Orantes catheter at this time  Draining clear yellow urine  UA noted to be grossly abnormal the patient without symptoms; likely related to catheter  No antibiotics for this issue  Continue antiviral as above  Maintain Orantes catheter as per primary     4  Advanced Dementia  Patient with known underlying dementia  Appears to be a previously reported baseline  No acute signs of CNS infection at this time  Continue to monitor mental status  Supportive care as per primary     Above plan discussed in detail with the patient at bedside and with nursing      ID consult service will continue to follow  Antibiotics:  Valtrex continue through 3/4    24 hour events:  No acute events noted overnight on chart review  Patient is currently afebrile  No new culture data  No new imaging  Patient's other vitals are stable  Wound care evaluation noted  Labs including differential on CBC unremarkable    Subjective:  Patient currently denies having any nausea, vomiting, chest pain or shortness of breath  She reports that her pain is improving  She has no new complaints at this time  Objective:  Vitals:  Temp:  [97 °F (36 1 °C)-98 4 °F (36 9 °C)] 98 4 °F (36 9 °C)  HR:  [68-73] 68  Resp:  [18] 18  BP: (109-127)/(53-75) 113/53  SpO2:  [92 %-97 %] 97 %  Temp (24hrs), Av 7 °F (36 5 °C), Min:97 °F (36 1 °C), Max:98 4 °F (36 9 °C)  Current: Temperature: 98 4 °F (36 9 °C)    Physical Exam:   General Appearance:  Alert, interactive, nontoxic, no acute distress  Pleasantly confused   Throat: Oropharynx moist without lesions  Lungs:   Clear to auscultation bilaterally; no wheezes, rhonchi or rales; respirations unlabored on room air   Heart:  RRR; systolic murmur present   Abdomen:   Soft, non-tender, non-distended, positive bowel sounds  Extremities: No clubbing, cyanosis or edema   Skin: No new rashes or lesions  No new draining wounds noted  Again patient has some mild drainage noted at the shingles rash on her chest but otherwise the remainder of her rash has largely crusted over and appears in active    No new lesions present on exam        Labs, Imaging, & Other studies:   All pertinent labs and imaging studies were personally reviewed  Results from last 7 days   Lab Units 20  0532 20  0506 20  2036   WBC Thousand/uL 5 55 6 14 7 56   HEMOGLOBIN g/dL 10 1* 9 6* 10 0*   PLATELETS Thousands/uL 275 275 276     Results from last 7 days   Lab Units 20  0532  20  2036   POTASSIUM mmol/L 3 7   < > 4 1   CHLORIDE mmol/L 102   < > 98*   CO2 mmol/L 24   < > 25   BUN mg/dL 14   < > 24 CREATININE mg/dL 0 64   < > 1 49*   EGFR ml/min/1 73sq m 77   < > 30   CALCIUM mg/dL 8 8   < > 9 1   AST U/L  --   --  19   ALT U/L  --   --  15   ALK PHOS U/L  --   --  88    < > = values in this interval not displayed

## 2020-03-03 NOTE — NURSING NOTE
Updated dalyndater on plan of care  Pt  Daughter was here earlier and 200 and very upset that pt  Was OOB for prolonged time  Pt  Assisted back to bed and through skin assesment with dressing changes done,    Pt  Is on Q 2 turning schedule

## 2020-03-03 NOTE — ASSESSMENT & PLAN NOTE
Ambulates with walker at baseline  Daughter reports patient has not been able to get out of bed for the past few days secondary to weakness  PT OT

## 2020-03-03 NOTE — PROGRESS NOTES
Progress Note - Chivo Karen 9/20/1926, 80 y o  female MRN: [id number]    Unit/Bed#: E5 -01 Encounter: [id number]    Primary Care Provider: Solomon Rosario DO   Date and time admitted to hospital: 3/1/2020  8:23 PM        * SUNDAR (acute kidney injury) [facility])  Assessment & Plan  Patient presents to ED with hypotension found at rehab  Creatinine elevated 1 49 (baseline 0 6)  Secondary to dehydration, family reports poor oral intake  She was also on Lasix 40 mg b i d  Status post 2 L IV fluid bolus in ED, continued at 50 mL/hr   Creatinine is 0 64 today  Encourage oral intake, dc IVF  Monitor volume status, she has a history of lymphedema  Currently there is no lower extremity edema at all  BMP in a m  Shingles  Assessment & Plan  Evaluated by ID, appreciate recommendations  Continue Valtrex until 3/4  Discontinue airborne isolation, continue contact isolation  Serial skin exam, ID recommended to cover the lesions  Post herpetic neurology a, gabapentin 100 mg daily increased to b i d , pain is well controlled  Tylenol around the clock  Tramadol p r n  For arthritis prn 12 h, home medication    Urinary retention  Assessment & Plan  History of chronic UTIs  Had Orantes placed in ED during prior hospitalization  Continue outpatient urology follow-up to determine further management    Restless legs syndrome  Assessment & Plan  Continue Mirapex    Leg edema  Assessment & Plan  Maintained at home on 40 mg of Lasix b i d    Holding in the setting of dehydration  Will consider restarting it may be tomorrow depending on the volume status      Glaucoma  Assessment & Plan  Continue eyedrops    GERD (gastroesophageal reflux disease)  Assessment & Plan  Continue daily Protonix    Decreased ambulation status  Assessment & Plan  Ambulates with walker at baseline  Daughter reports patient has not been able to get out of bed for the past few days secondary to weakness  PT OT        VTE Pharmacologic Prophylaxis: Pharmacologic: Enoxaparin (Lovenox)  Mechanical VTE Prophylaxis in Place: Yes    Patient Centered Rounds: I have performed bedside rounds with nursing staff today  Discussions with Specialists or Other Care Team Provider:  Infectious Disease, Care Management    Education and Discussions with Family / Patient:  Patient, daughter at bedside    Time Spent for Care: 45 minutes  More than 50% of total time spent on counseling and coordination of care as described above  Current Length of Stay: 1 day(s)    Current Patient Status: Inpatient   Certification Statement: The patient will continue to require additional inpatient hospital stay due to Monitor kidney function, shingles    Discharge Plan: To be determined    Code Status: Level 3 - DNAR and DNI      Subjective:   Patient was seen and evaluated bedside, pain is much better controlled  Denies headache, visual changes, chest pain, palpitation, shortness of breath nausea vomiting or abdominal pain  Objective:     Vitals:   Temp (24hrs), Av 1 °F (36 7 °C), Min:97 °F (36 1 °C), Max:98 8 °F (37 1 °C)    Temp:  [97 °F (36 1 °C)-98 8 °F (37 1 °C)] 98 8 °F (37 1 °C)  HR:  [68-73] 73  Resp:  [18] 18  BP: (113-138)/(53-75) 138/65  SpO2:  [92 %-98 %] 98 %  Body mass index is 31 6 kg/m²  Input and Output Summary (last 24 hours): Intake/Output Summary (Last 24 hours) at 3/3/2020 1643  Last data filed at 3/3/2020 1052  Gross per 24 hour   Intake 120 ml   Output 1990 ml   Net -1870 ml       Physical Exam:     Physical Exam   Constitutional: She appears well-nourished  No distress  HENT:   Head: Normocephalic and atraumatic  Eyes: EOM are normal    Neck: Normal range of motion  Neck supple  Cardiovascular: Normal rate, regular rhythm and normal heart sounds  Exam reveals no gallop and no friction rub  No murmur heard  Pulmonary/Chest: Effort normal and breath sounds normal  No respiratory distress  She has no wheezes  Abdominal: Soft   Bowel sounds are normal  She exhibits no distension  There is no tenderness  Musculoskeletal: She exhibits no edema  Neurological: She is alert  No cranial nerve deficit  Skin:   Crusted lesions on the left side of the chest from the left breast going back to the scapular area   Psychiatric: She has a normal mood and affect  Additional Data:     Labs:    Results from last 7 days   Lab Units 03/03/20  0532   WBC Thousand/uL 5 55   HEMOGLOBIN g/dL 10 1*   HEMATOCRIT % 31 0*   PLATELETS Thousands/uL 275   NEUTROS PCT % 51   LYMPHS PCT % 34   MONOS PCT % 11   EOS PCT % 3     Results from last 7 days   Lab Units 03/03/20  0532  03/01/20  2036   SODIUM mmol/L 135*   < > 133*   POTASSIUM mmol/L 3 7   < > 4 1   CHLORIDE mmol/L 102   < > 98*   CO2 mmol/L 24   < > 25   BUN mg/dL 14   < > 24   CREATININE mg/dL 0 64   < > 1 49*   ANION GAP mmol/L 9   < > 10   CALCIUM mg/dL 8 8   < > 9 1   ALBUMIN g/dL  --   --  2 3*   TOTAL BILIRUBIN mg/dL  --   --  0 34   ALK PHOS U/L  --   --  88   ALT U/L  --   --  15   AST U/L  --   --  19   GLUCOSE RANDOM mg/dL 98   < > 121    < > = values in this interval not displayed  Results from last 7 days   Lab Units 03/01/20  2306 03/01/20  2105   LACTIC ACID mmol/L 1 0 2 2*           * I Have Reviewed All Lab Data Listed Above  * Additional Pertinent Lab Tests Reviewed:  Yelena 66 Admission Reviewed    Imaging:    Imaging Reports Reviewed Today Include: all  Imaging Personally Reviewed by Myself Includes:      Recent Cultures (last 7 days):           Last 24 Hours Medication List:     Current Facility-Administered Medications:  acetaminophen 325 mg Oral Q8H PRN Anjali Benitez PA-C   acetaminophen 650 mg Oral Q8H Adan Tate MD   calcium carbonate 1,000 mg Oral Daily PRN Anjali Benitez PA-C   calcium carbonate-vitamin D 1 tablet Oral BID With Meals Anjali Benitez PA-C   chlordiazePOXIDE 25 mg Oral Daily PRN Carlos Murray PA-C   cholecalciferol 4,000 Units Oral Daily Anjali Smudde, PA-C   vitamin B-12 1,000 mcg Oral Daily Anjali Smudde, PA-C   enoxaparin 40 mg Subcutaneous Daily Anjali Smudde, PA-C   gabapentin 100 mg Oral BID Elisa Reyna MD   latanoprost 1 drop Left Eye Daily Anjali Smudde, PA-C   lidocaine 1 patch Topical Daily Anjali Smudde, PA-C   magnesium gluconate 250 mg Oral Daily Anjali Smudde, PA-C   meclizine 25 mg Oral TID PRN Phuong Ship Smudde, PA-C   ondansetron 4 mg Intravenous Q6H PRN Anjali Smudde, PA-C   pantoprazole 40 mg Oral Early Morning Anjali Smudde, PA-C   potassium chloride 20 mEq Oral Daily Anjali Smudde, PA-C   pramipexole 0 25 mg Oral Daily Anjali Smudde, PA-C   senna 1 tablet Oral HS PRN Anjali Smudde, PA-C   traMADol 50 mg Oral Q12H PRN Anjali Smudde, PA-C   valACYclovir 1,000 mg Oral Q12H Janak Walsh MD        Today, Patient Was Seen By: Elisa Reyna MD    ** Please Note: Dictation voice to text software may have been used in the creation of this document   **

## 2020-03-03 NOTE — CASE MANAGEMENT
CM met with patient's daughter to assess and address discharge needs  [address] [place] [address] Jimmy Ruth, daughter, [phone] (H)   Primary caregiver-daughter  Support System-daughter and son  Advance Directive-yes, copy on chart   Power of - yes, daughter is POA for healthcare and Genita Kris, [phone] (H)    PCP-Morro Marino, DO  [phone]  Ph[facility]acy[facility] [address], [facility] mail away  [address] voiced at this moment  Mental Health/Drug/ETOH- Patient's daughter states there is no history of dementia, MH or D&A abuse  Prior Living Arrangements/ADL's/Mobility/Home Set-Up- Lives at home with daughter in ranch home with basement; 1 SUSI  Ambulates with RW and daughter provides moderate assistance with ADLs  Patient's daughter states that she has had her mother evaluated by [facility] and they assessed her as skilled eligible and has had non skilled services in past but stated that they were not helpful  Prior Services for HHC/DME/Infusion/Private-Patient admitted from [address] [place] [address] on this admission-Daughter states that pt did not receive her shingle medication on Friday night and Saturday and that her BP was in the 60s and still has ward in place  Patient was at [facility] many years ago  Prior to admission to STR-patient had [facility] for PT in home  DME in home: RW/cane/manual WC/BSC/SC  Current Services in Home or Community-none  Transportation at [address] has transported in past but since last hospitalization states needs someone else to transport at discharge  Family normally uses [facility]    Patient for discharge to STR when medically cleared  CM discussed with daughter who is the POA the recommendation for STR and [place] [facility]   CM informed that she does not want patient to return to [address] [place] [address]  CM informed that the only STR option that she is choosing at this time is [facility] and is very concerned about her mother being discharged too soon and wants to be assured that she is medically stable prior to any discharge arrangements  Patient will require WCV vs  BLS at discharge depending on status when medically cleared  At this time there are no other identified discharge needs  CM department will continue to follow through discharge  CM reviewed d/c planning process including the following: identifying help at home, patient preference for d/c planning needs, availability of treatment team to discuss questions or concerns patient and/or family may have regarding understanding medications and recognizing signs and symptoms once discharged  CM also encouraged patient to follow up with all recommended appointments after discharge  Patient advised of importance for patient and family to participate in managing patient's medical well being

## 2020-03-04 LAB
ANION GAP SERPL CALCULATED.3IONS-SCNC: 8 MMOL/L (ref 4–13)
BUN SERPL-MCNC: 11 MG/DL (ref 5–25)
CALCIUM SERPL-MCNC: 9 MG/DL (ref 8.3–10.1)
CHLORIDE SERPL-SCNC: 103 MMOL/L (ref 100–108)
CO2 SERPL-SCNC: 26 MMOL/L (ref 21–32)
CREAT SERPL-MCNC: 0.6 MG/DL (ref 0.6–1.3)
GFR SERPL CREATININE-BSD FRML MDRD: 79 ML/MIN/1.73SQ M
GLUCOSE SERPL-MCNC: 95 MG/DL (ref 65–140)
POTASSIUM SERPL-SCNC: 3.6 MMOL/L (ref 3.5–5.3)
SODIUM SERPL-SCNC: 137 MMOL/L (ref 136–145)

## 2020-03-04 PROCEDURE — 99232 SBSQ HOSP IP/OBS MODERATE 35: CPT | Performed by: INTERNAL MEDICINE

## 2020-03-04 PROCEDURE — 99232 SBSQ HOSP IP/OBS MODERATE 35: CPT | Performed by: PHYSICIAN ASSISTANT

## 2020-03-04 PROCEDURE — 97530 THERAPEUTIC ACTIVITIES: CPT

## 2020-03-04 PROCEDURE — 97110 THERAPEUTIC EXERCISES: CPT

## 2020-03-04 PROCEDURE — 80048 BASIC METABOLIC PNL TOTAL CA: CPT | Performed by: INTERNAL MEDICINE

## 2020-03-04 RX ADMIN — PRAMIPEXOLE DIHYDROCHLORIDE 0.25 MG: 0.25 TABLET ORAL at 09:33

## 2020-03-04 RX ADMIN — GABAPENTIN 100 MG: 100 CAPSULE ORAL at 16:52

## 2020-03-04 RX ADMIN — GABAPENTIN 100 MG: 100 CAPSULE ORAL at 09:08

## 2020-03-04 RX ADMIN — VALACYCLOVIR HYDROCHLORIDE 1000 MG: 500 TABLET, FILM COATED ORAL at 20:31

## 2020-03-04 RX ADMIN — ACETAMINOPHEN 650 MG: 325 TABLET ORAL at 22:49

## 2020-03-04 RX ADMIN — Medication 250 MG: at 09:36

## 2020-03-04 RX ADMIN — CALCIUM CARBONATE 500 MG (1,250 MG)-VITAMIN D3 200 UNIT TABLET 1 TABLET: at 09:08

## 2020-03-04 RX ADMIN — MELATONIN 4000 UNITS: at 09:09

## 2020-03-04 RX ADMIN — VALACYCLOVIR HYDROCHLORIDE 1000 MG: 500 TABLET, FILM COATED ORAL at 09:35

## 2020-03-04 RX ADMIN — POTASSIUM CHLORIDE 20 MEQ: 1500 TABLET, EXTENDED RELEASE ORAL at 09:09

## 2020-03-04 RX ADMIN — PANTOPRAZOLE SODIUM 40 MG: 40 TABLET, DELAYED RELEASE ORAL at 06:11

## 2020-03-04 RX ADMIN — CHLORDIAZEPOXIDE HYDROCHLORIDE 25 MG: 25 CAPSULE ORAL at 01:22

## 2020-03-04 RX ADMIN — ENOXAPARIN SODIUM 40 MG: 40 INJECTION SUBCUTANEOUS at 09:08

## 2020-03-04 RX ADMIN — CALCIUM CARBONATE 500 MG (1,250 MG)-VITAMIN D3 200 UNIT TABLET 1 TABLET: at 16:53

## 2020-03-04 RX ADMIN — ACETAMINOPHEN 650 MG: 325 TABLET ORAL at 06:10

## 2020-03-04 RX ADMIN — LIDOCAINE 1 PATCH: 50 PATCH TOPICAL at 09:10

## 2020-03-04 RX ADMIN — CYANOCOBALAMIN TAB 500 MCG 1000 MCG: 500 TAB at 09:09

## 2020-03-04 RX ADMIN — LATANOPROST 1 DROP: 50 SOLUTION OPHTHALMIC at 09:35

## 2020-03-04 RX ADMIN — ACETAMINOPHEN 650 MG: 325 TABLET ORAL at 13:35

## 2020-03-04 NOTE — PROGRESS NOTES
Progress Note - Infectious Disease   Libby Taveras 80 y o  female MRN: [id number]  Unit/Bed#: E5 -01 Encounter: [id number]      Impression/Plan:  1  Dermatomal zoster   Patient presents for hypotension and daughter reported interruption of her treatment for dermatomal zoster  Fortunately she has no evidence of dissemination  The majority of the patient's lesions have crusted over and appear inactive  Her pain is reduced but I suspect she has some post herpetic neuralgia  She will complete her oral Valtrex treatment today   -complete oral Valtrex today, 03/04/2020  -monitor CBC and BMP  -monitor vitals  -serial skin exams  -pain control per primary service     2  Acute kidney injury   Suspect prerenal due to issues with her diuretic therapy  Her creatinine has improved, is 0 6 today   -monitor BMP  -antiviral dosed for renal function     3  Urinary retention and abnormal UA   Patient with previously documented urinary retention along with recurrent UTIs   Remains with Orantes catheter at this time  UA noted to be grossly abnormal which I suspect is secondary to catheter  Patient remains without symptoms  A urology consult is pending   -no indication for antibiotics for this issue  -follow-up urology consult     4  Advanced Dementia   Patient with known underlying dementia   Appears to be at previously reported baseline   No acute signs of CNS infection at this time   -monitor mental status  -supportive care    Patient is stable for discharge from ID standpoint  Above plan was discussed in detail with patient at the bedside  Antivirals:  Valtrex 7  Antivirals 7    Subjective:  Limited ROS as patient has dementia  She tells me that the only pain she is having is in her left pinky toe  She asks me if I can reposition her leg  She has no nausea  She tells me she enjoyed her breakfast because her daughter picked it out and it was exactly what she wanted      Objective:  Vitals:  Temp:  [97 5 °F (36 4 °C)-98 8 °F (37 1 °C)] 97 5 °F (36 4 °C)  HR:  [73-80] 80  Resp:  [18-20] 20  BP: (113-138)/(56-65) 113/56  SpO2:  [94 %-98 %] 94 %  Temp (24hrs), Av 2 °F (36 8 °C), Min:97 5 °F (36 4 °C), Max:98 8 °F (37 1 °C)  Current: Temperature: 97 5 °F (36 4 °C)    Physical Exam:   General Appearance:  Alert, interactive with dementia, nontoxic, no acute distress  Throat: Oropharynx moist without lesions  Lungs:   Clear to auscultation bilaterally; no wheezes, rhonchi or rales; respirations unlabored   Heart:  RRR; no murmur, rub or gallop   Abdomen:   Soft, non-tender, non-distended, positive bowel sounds  Extremities: No clubbing or cyanosis, no edema; patient with callus on her left 5th toe, no palpable fluctuance, no spreading erythema, is tender to palpate   Skin: No new rashes, lesions, or draining wounds noted on exposed skin  Left chest rash with reduced edema, crusting remains intact, no active fascicular lesions or weeping     Labs, Imaging, & Other studies:   All pertinent labs and imaging studies were personally reviewed  Results from last 7 days   Lab Units 20  0532 20  0506 20  2036   WBC Thousand/uL 5 55 6 14 7 56   HEMOGLOBIN g/dL 10 1* 9 6* 10 0*   PLATELETS Thousands/uL 275 275 276     Results from last 7 days   Lab Units 20  0517  20  2036   POTASSIUM mmol/L 3 6   < > 4 1   CHLORIDE mmol/L 103   < > 98*   CO2 mmol/L 26   < > 25   BUN mg/dL 11   < > 24   CREATININE mg/dL 0 60   < > 1 49*   EGFR ml/min/1 73sq m 79   < > 30   CALCIUM mg/dL 9 0   < > 9 1   AST U/L  --   --  19   ALT U/L  --   --  15   ALK PHOS U/L  --   --  88    < > = values in this interval not displayed

## 2020-03-04 NOTE — ASSESSMENT & PLAN NOTE
History of chronic UTIs  Had Orantes placed in ED during prior hospitalization  We consulted Urology, plan is to remove the Orantes tomorrow morning and have a voiding trial

## 2020-03-04 NOTE — OCCUPATIONAL THERAPY NOTE
OccupationalTherapy Progress Note     Patient Name: Lois COBURN Date: 3/4/2020  Problem List  Principal Problem:    SUNDAR (acute kidney injury) [facility])  Active Problems:    Decreased ambulation status    GERD (gastroesophageal reflux disease)    Glaucoma    Leg edema    Restless legs syndrome    Urinary retention    Shingles            03/04/20 1537   Restrictions/Precautions   Weight Bearing Precautions Per Order No   Other Precautions Contact/isolation;Pain; Fall Risk;Cognitive; Bed Alarm; Chair Alarm   Pain Assessment   Pain Assessment 0-10   Pain Score 6   Pain Type Chronic pain   Pain Location Buttocks;Knee   Pain Orientation Bilateral   Hospital Pain Intervention(s) Ambulation/increased activity; Emotional support;Repositioned   Response to Interventions tolerated   ADL   Where Assessed Chair   Grooming Assistance 4  Minimal Assistance   Grooming Deficit Setup;Verbal cueing; Increased time to complete;Supervision/safety; Wash/dry face   Grooming Comments increased time to complete   LB Dressing Assistance 1  Total Assistance   LB Dressing Deficit Setup;Verbal cueing;Supervision/safety; Don/doff L sock; Don/doff R sock   Functional Standing Tolerance   Time 1:30 minutes   Activity w/ min assist x2 steadying assist w/ RW support and no LOB   Transfers   Sit to Stand 3  Moderate assistance   Additional items Assist x 2; Increased time required;Verbal cues;Armrests   Stand to Sit 3  Moderate assistance   Additional items Assist x 2; Increased time required;Verbal cues;Armrests   Additional Comments increased time to complete, VCs for hand placement and positioning   Functional Mobility   Functional Mobility 3  Moderate assistance   Additional Comments assist x2 w/ increased time to complete increased pain w/ movement   Additional items Rolling walker   Therapeutic Exercise - ROM   UE-ROM Yes   ROM- Right Upper Extremities   R Shoulder AROM; Flexion; Horizontal ABduction; Extension  (at 90)   R Elbow AROM;Elbow flexion;Elbow extension  (forearm pronation/supination)   R Weight/Reps/Sets 2 sets x 15 reps   RUE ROM Comment tolerated well   ROM - Left Upper Extremities    L Shoulder AROM; Flexion; Extension;Horizontal ABduction  (at 90 degrees)   L Elbow AROM;Elbow flexion;Elbow extension  (forearm pronation/supination)   L Weight/Reps/Sets 2 sets x 15 reps   LUE ROM Comment tolerance well   Cognition   Overall Cognitive Status Impaired   Arousal/Participation Responsive; Cooperative   Attention Attends with cues to redirect   Orientation Level Oriented to person;Oriented to place;Oriented to time   Memory Decreased recall of precautions;Decreased short term memory;Decreased recall of recent events   Following Commands Follows one step commands with increased time or repetition   Comments pt w/ cues for safety, increased processing time   Additional Activities   Additional Activities   (education on positioning and repositioning)   Additional Activities Comments pt on waffle cushion and green wedge cushion on left side for upright posture   Activity Tolerance   Activity Tolerance Patient limited by fatigue;Patient limited by pain   Medical Staff Made Aware appropriate to see per Isabel ARMSTRONG Pulling   Assessment   Assessment Pt seen for skilled OT session focused on ADLs, functional transfers and mobility, UE exercises and grooming  Pt w/ min assist for grooming activities of combing hair  Pt completed b/l UE exercises seen above at 2 sets x 15 reps to increase strength and endurance for ADLs, functional transfers and mobility  Pt w/ MOD assist x2 sit>stand w/ VCs for hand placement and positioning  Pt w/ MOD assist x2 stand>sit and repositioned waffle cushion on pt chair  Pt provided w/ green foam wedge on Left side to promote upright posture  Pt w/ chair alarm intact and all needs met at end of session   Pt continues to be limited due to decreased strength and endurance, impaired balance, impaired activity tolerance, increased pain, impaired STM, impaired functional reach all causing a decline in ADLs, functional transfers and mobility  Recommend STR when medically stable  Will continue to follow to address OT POC  Plan   Treatment Interventions ADL retraining; Endurance training;Functional transfer training;UE strengthening/ROM; Cognitive reorientation;Patient/family training;Equipment evaluation/education; Compensatory technique education; Energy conservation; Activityengagement   Goal Expiration Date 03/16/20   OT Treatment Day 1   OT Frequency 3-5x/wk   Recommendation   OT Discharge Recommendation Short Term Rehab   OT - OK to Discharge   (to rehab when medically stable)   Barthel Index   Feeding 5   Bathing 0   Grooming Score 5   Dressing Score 5   Bladder Score 0   Bowels Score 5   Toilet Use Score 5   Transfers (Bed/Chair) Score 5   Mobility (Level Surface) Score 0   Stairs Score 0   Barthel Index Score 30   Modified Riaz Scale   Modified Walton Scale 4     Documentation completed by: Jovita Guerrero MS, OTR/L

## 2020-03-04 NOTE — ASSESSMENT & PLAN NOTE
Maintained at home on 40 mg of Lasix b i d    Holding in the setting of dehydration and SUNDAR  No lower extremity edema, continue to hold Lasix for now

## 2020-03-04 NOTE — CONSULTS
Consult - Urology   Beryle Gutter 9/20/1926, 80 y o  female MRN: [id number]    Unit/Bed#: E5 -01 Encounter: [id number]    Bedside rounds performed with RN  Discussed with Dr Aurora Sanabria  80year-old female admitted for acute kidney injury, herpes zoster dermatitis after failure to take medications for 24 hr period and became altered, now resolved  Patient with previous hospitalization about 10 days ago for urinary retention likely contributing to the recurrence of urinary tract infections  Orantes catheter was placed on 02/21/2020  Has been draining clear yellow urine since  She has no symptoms related to the catheter itself, she has had no hematuria  Her acute kidney injury quickly resolved this hospitalization for creatinine max 1 49 duct on her baseline of 0 6  Her mentation is back to baseline, though with dementia  Urology has been reconsulted for the management of the urinary catheter  Plan  Recommend void trial early tomorrow morning  Replace Orantes for unable to void and/or postvoid bladder scan >300ml at 8 hrs  Review of Systems   Constitutional: Negative  Respiratory: Negative  Cardiovascular: Negative  Genitourinary: Positive for difficulty urinating  Negative for decreased urine volume, dysuria, flank pain, frequency, hematuria and urgency  Musculoskeletal: Positive for back pain (Tail bone) and gait problem  Objective:  Vitals: Blood pressure 113/56, pulse 80, temperature 97 5 °F (36 4 °C), temperature source Temporal, resp  rate 20, height 4' 7 5" (1 41 m), weight 62 8 kg (138 lb 7 2 oz), SpO2 94 %  ,Body mass index is 31 6 kg/m²        Intake/Output Summary (Last 24 hours) at 3/4/2020 1443  Last data filed at 3/4/2020 1100  Gross per 24 hour   Intake    Output 1600 ml   Net -1600 ml       Invasive Devices     Peripheral Intravenous Line            Peripheral IV 03/01/20 Right Antecubital 2 days          Drain            Urethral Catheter 14 Fr  12 days                Physical Exam   Constitutional: She is oriented to person, place, and time  No distress  Elderly white female seated in bedside chair   HENT:   Head: Normocephalic and atraumatic  Cardiovascular: Normal rate, regular rhythm and intact distal pulses  Pulmonary/Chest: Effort normal and breath sounds normal    Abdominal: Soft  Bowel sounds are normal  She exhibits no distension  There is no tenderness  Genitourinary:   Genitourinary Comments: Orantes catheter draining clear richie urine   Musculoskeletal: She exhibits no edema  Neurological: She is alert and oriented to person, place, and time  Gait normal    Skin: Skin is warm  Capillary refill takes less than 2 seconds  She is not diaphoretic  Psychiatric: She has a normal mood and affect  Her speech is normal and behavior is normal    Nursing note and vitals reviewed          Labs:  Recent Labs     03/01/20 2036 03/02/20  0506 03/03/20  0532   WBC 7 56 6 14 5 55     Recent Labs     03/01/20 2036 03/02/20  0506 03/03/20  0532   HGB 10 0* 9 6* 10 1*       Recent Labs     03/01/20 2036 03/02/20  0506 03/03/20  0532 03/04/20  0517   CREATININE 1 49* 0 82 0 64 0 60         Manuelito Dorado PA-C  Date: 3/4/2020 Time: 2:43 PM

## 2020-03-04 NOTE — ASSESSMENT & PLAN NOTE
Evaluated by ID, appreciate recommendations  Discontinue airborne isolation, continue contact isolation  Continue Valtrex until 3/4, ectocervical seen as outpatient if not given previously  Serial skin exam, local wound care   Post herpetic neurologia, gabapentin 100 mg b i d , pain is well controlled  Tylenol around the clock  Tramadol p r n   For arthritis prn 12 h, home medication

## 2020-03-04 NOTE — PHYSICAL THERAPY NOTE
Physical Therapy Progress Note     03/04/20 1539   Pain Assessment   Pain Assessment 0-10   Pain Score 6   Pain Type Chronic pain   Pain Location Knee   Pain Orientation Left   Hospital Pain Intervention(s) Ambulation/increased activity;Repositioned   Response to Interventions Tolerated  Restrictions/Precautions   Weight Bearing Precautions Per Order No   Other Precautions Contact/isolation; Fall Risk;Pain;Cognitive; Chair Alarm   General   Chart Reviewed Yes   Response to Previous Treatment Patient reporting fatigue but able to participate  Family/Caregiver Present No   Subjective   Subjective Willing to participate in therapy this PM    Transfers   Sit to Stand 3  Moderate assistance   Additional items Assist x 2;Armrests; Increased time required;Verbal cues   Stand to Sit 3  Moderate assistance   Additional items Assist x 2;Armrests; Increased time required;Verbal cues   Balance   Static Sitting Fair -   Dynamic Sitting Poor +   Static Standing Poor   Dynamic Standing Poor -   Endurance Deficit   Endurance Deficit Yes   Endurance Deficit Description fatigue/weakness/pain   Activity Tolerance   Activity Tolerance Patient limited by fatigue;Patient limited by pain   Medical Staff SAM Salcido   Nurse Made Aware Yes   Exercises   TKR Sitting;10 reps;AAROM; Bilateral   Assessment   Prognosis Fair   Problem List Decreased strength;Decreased range of motion;Decreased endurance; Impaired balance;Decreased mobility; Decreased skin integrity;Orthopedic restrictions;Pain; Impaired vision;Decreased safety awareness;Decreased cognition   Assessment Pt  seated in bedside chair upon my arrival  Pt  reporting fatigue/pain, however agreeable to therapeutic intervention  Performance of HEP seated in bedside chair with cues provided for proper completion  Progressed with transfers requiring A of 2 with cues for hand placement/technique  Pt  reporting increased fatigue/pain, requesting to return to seated in bedside chair  Pt  remained seated in bedside chair with alarm active at end of treatment session  PT will continue to recommend d/c to rehab when medically stable for continued improvement of noted impairments above  Barriers to Discharge Inaccessible home environment;Decreased caregiver support   Barriers to Discharge Comments SUSI, level of support at home  Goals   Patient Goals To rest    STG Expiration Date 03/16/20   PT Treatment Day 1   Plan   Treatment/Interventions Functional transfer training;LE strengthening/ROM; Endurance training; Therapeutic exercise;Spoke to nursing;Spoke to case management;OT   Progress Slow progress, decreased activity tolerance   PT Frequency Other (Comment)  (3-5x/wk)   Recommendation   Recommendation Short-term skilled PT   Equipment Recommended Walker  (RW)   PT - OK to Discharge Yes  (if d/c to rehab when medically stable )     Taz Lucero, PTA

## 2020-03-04 NOTE — PROGRESS NOTES
Progress Note - Lonza Landau 9/20/1926, 80 y o  female MRN: [id number]    Unit/Bed#: E5 -01 Encounter: [id number]    Primary Care Provider: Yaa Mcgee,    Date and time admitted to hospital: 3/1/2020  8:23 PM        * SUNDAR (acute kidney injury) [facility])  Assessment & Plan  Patient presents to ED with hypotension suspect secondary to dehydration, she was also on Lasix 40 mg b i d  Creatinine elevated 1 49 (baseline 0 6)  Status post IV fluids, now off IV fluids for almost 48 hours  Creatinine is 0 60 today  Encourage oral intake  Monitor volume status, she has a history of lymphedema  Currently there is no lower extremity edema at all  BMP in a m  Shingles  Assessment & Plan  Evaluated by ID, appreciate recommendations  Discontinue airborne isolation, continue contact isolation  Continue Valtrex until 3/4, ectocervical seen as outpatient if not given previously  Serial skin exam, local wound care   Post herpetic neurologia, gabapentin 100 mg b i d , pain is well controlled  Tylenol around the clock  Tramadol p r n  For arthritis prn 12 h, home medication    Urinary retention  Assessment & Plan  History of chronic UTIs  Had Orantes placed in ED during prior hospitalization  We consulted Urology, plan is to remove the Orantes tomorrow morning and have a voiding trial    Restless legs syndrome  Assessment & Plan  Continue Mirapex    Leg edema  Assessment & Plan  Maintained at home on 40 mg of Lasix b i d    Holding in the setting of dehydration and SUNDAR  No lower extremity edema, continue to hold Lasix for now      Glaucoma  Assessment & Plan  Continue eyedrops    GERD (gastroesophageal reflux disease)  Assessment & Plan  Continue daily Protonix    Decreased ambulation status  Assessment & Plan  Ambulates with walker at baseline  Daughter reports patient has not been able to get out of bed for the past few days secondary to weakness  PT OT    Stage II pressure ulcer in the sacral area, wound care on board     VTE Pharmacologic Prophylaxis:   Pharmacologic: Enoxaparin (Lovenox)  Mechanical VTE Prophylaxis in Place: Yes    Patient Centered Rounds: I have performed bedside rounds with nursing staff today  Discussions with Specialists or Other Care Team Provider:  ID, Urology    Education and Discussions with Family / Patient:  Patient's daughter    Time Spent for Care: 30 minutes  More than 50% of total time spent on counseling and coordination of care as described above  Current Length of Stay: 2 day(s)    Current Patient Status: Inpatient   Certification Statement: The patient will continue to require additional inpatient hospital stay due to Remove Orantes tomorrow for a voiding trial    Discharge Plan: To be determined    Code Status: Level 3 - DNAR and DNI      Subjective:   Patient was seen and evaluated bedside, she is feeling well, pain is well controlled    Objective:     Vitals:   Temp (24hrs), Av 9 °F (36 1 °C), Min:96 3 °F (35 7 °C), Max:97 5 °F (36 4 °C)    Temp:  [96 3 °F (35 7 °C)-97 5 °F (36 4 °C)] 96 3 °F (35 7 °C)  HR:  [76-81] 81  Resp:  [18-20] 18  BP: (113-138)/(56-64) 117/59  SpO2:  [94 %-95 %] 95 %  Body mass index is 31 6 kg/m²  Input and Output Summary (last 24 hours): Intake/Output Summary (Last 24 hours) at 3/4/2020 1709  Last data filed at 3/4/2020 1100  Gross per 24 hour   Intake    Output 1600 ml   Net -1600 ml       Physical Exam:     Physical Exam   Constitutional: She appears well-developed  No distress  HENT:   Head: Normocephalic and atraumatic  Eyes: EOM are normal    Neck: Normal range of motion  Neck supple  Cardiovascular: Normal rate, regular rhythm and normal heart sounds  Exam reveals no gallop and no friction rub  No murmur heard  Pulmonary/Chest: Effort normal and breath sounds normal  No respiratory distress  She has no wheezes  Abdominal: Soft  Bowel sounds are normal  She exhibits no distension  There is no tenderness  Musculoskeletal: She exhibits no edema  Neurological: She is alert  No cranial nerve deficit  Skin:   Healing shingle lesions on the left side of the chest, mostly crusted   Psychiatric: She has a normal mood and affect  Additional Data:     Labs:    Results from last 7 days   Lab Units 03/03/20  0532   WBC Thousand/uL 5 55   HEMOGLOBIN g/dL 10 1*   HEMATOCRIT % 31 0*   PLATELETS Thousands/uL 275   NEUTROS PCT % 51   LYMPHS PCT % 34   MONOS PCT % 11   EOS PCT % 3     Results from last 7 days   Lab Units 03/04/20  0517  03/01/20  2036   SODIUM mmol/L 137   < > 133*   POTASSIUM mmol/L 3 6   < > 4 1   CHLORIDE mmol/L 103   < > 98*   CO2 mmol/L 26   < > 25   BUN mg/dL 11   < > 24   CREATININE mg/dL 0 60   < > 1 49*   ANION GAP mmol/L 8   < > 10   CALCIUM mg/dL 9 0   < > 9 1   ALBUMIN g/dL  --   --  2 3*   TOTAL BILIRUBIN mg/dL  --   --  0 34   ALK PHOS U/L  --   --  88   ALT U/L  --   --  15   AST U/L  --   --  19   GLUCOSE RANDOM mg/dL 95   < > 121    < > = values in this interval not displayed  Results from last 7 days   Lab Units 03/01/20  2306 03/01/20  2105   LACTIC ACID mmol/L 1 0 2 2*           * I Have Reviewed All Lab Data Listed Above  * Additional Pertinent Lab Tests Reviewed:  [facility] Review[facility]    Imaging:    Imaging Reports Reviewed Today Include: all  Imaging Personally Reviewed by Myself Includes:      Recent Cultures (last 7 days):           Last 24 Hours Medication List:     Current Facility-Administered Medications:  acetaminophen 325 mg Oral Q8H PRN Anjali Benitez PA-C   acetaminophen 650 mg Oral Q8H Aimee Schulz MD   calcium carbonate 1,000 mg Oral Daily PRN Anjali Benitez PA-C   calcium carbonate-vitamin D 1 tablet Oral BID With Meals Anjali Benitez PA-C   chlordiazePOXIDE 25 mg Oral Daily PRN Lyn Alexander PA-C   cholecalciferol 4,000 Units Oral Daily Anjali Benitez PA-C   vitamin B-12 1,000 mcg Oral Daily Lyn Alexander PA-C enoxaparin 40 mg Subcutaneous Daily Anjali Smudde, PA-C   gabapentin 100 mg Oral BID Dorian Quigley MD   latanoprost 1 drop Left Eye Daily Anjali Smudde, PA-C   lidocaine 1 patch Topical Daily Anjali Smudde, PA-C   magnesium gluconate 250 mg Oral Daily Anjali Smudde, PA-C   meclizine 25 mg Oral TID PRN Janeth Bad Smudde, PA-C   ondansetron 4 mg Intravenous Q6H PRN Anjali Smudde, PA-C   pantoprazole 40 mg Oral Early Morning Anjali Smudde, PA-C   potassium chloride 20 mEq Oral Daily Anjali Smudde, PA-C   pramipexole 0 25 mg Oral Daily Anjali Smudde, PA-C   senna 1 tablet Oral HS PRN Anjali Smudde, PA-C   traMADol 50 mg Oral Q12H PRN Anjali Smudde, PA-C   valACYclovir 1,000 mg Oral Q12H Geno Palmer MD        Today, Patient Was Seen By: Dorian Quigley MD    ** Please Note: Dictation voice to text software may have been used in the creation of this document   **

## 2020-03-04 NOTE — PLAN OF CARE
Problem: PHYSICAL THERAPY ADULT  Goal: Performs mobility at highest level of function for planned discharge setting  See evaluation for individualized goals  Description  Treatment/Interventions: Functional transfer training, LE strengthening/ROM, Therapeutic exercise, Elevations, Endurance training, Patient/family training, Bed mobility, Gait training, Spoke to nursing, OT  Equipment Recommended: Walker(EARLE)       See flowsheet documentation for full assessment, interventions and recommendations  Outcome: Progressing  Note:   Prognosis: Fair  Problem List: Decreased strength, Decreased range of motion, Decreased endurance, Impaired balance, Decreased mobility, Decreased skin integrity, Orthopedic restrictions, Pain, Impaired vision, Decreased safety awareness, Decreased cognition  Assessment: Pt  seated in bedside chair upon my arrival  Pt  reporting fatigue/pain, however agreeable to therapeutic intervention  Performance of HEP seated in bedside chair with cues provided for proper completion  Progressed with transfers requiring A of 2 with cues for hand placement/technique  Pt  reporting increased fatigue/pain, requesting to return to seated in bedside chair  Pt  remained seated in bedside chair with alarm active at end of treatment session  PT will continue to recommend d/c to rehab when medically stable for continued improvement of noted impairments above  Barriers to Discharge: Inaccessible home environment, Decreased caregiver support  Barriers to Discharge Comments: SUSI, level of support at home  Recommendation: Short-term skilled PT     PT - OK to Discharge: Yes(if d/c to rehab when medically stable )    See flowsheet documentation for full assessment

## 2020-03-04 NOTE — ASSESSMENT & PLAN NOTE
Patient presents to ED with hypotension suspect secondary to dehydration, she was also on Lasix 40 mg b i d  Creatinine elevated 1 49 (baseline 0 6)  Status post IV fluids, now off IV fluids for almost 48 hours  Creatinine is 0 60 today  Encourage oral intake  Monitor volume status, she has a history of lymphedema  Currently there is no lower extremity edema at all  BMP in a m

## 2020-03-04 NOTE — PLAN OF CARE
Problem: OCCUPATIONAL THERAPY ADULT  Goal: Performs self-care activities at highest level of function for planned discharge setting  See evaluation for individualized goals  Description  Treatment Interventions: ADL retraining, Functional transfer training, UE strengthening/ROM, Endurance training, Cognitive reorientation, Equipment evaluation/education, Patient/family training, Compensatory technique education, Energy conservation, Activityengagement          See flowsheet documentation for full assessment, interventions and recommendations  Outcome: Progressing  Note:   Limitation: Decreased ADL status, Decreased UE strength, Decreased Safe judgement during ADL, Decreased cognition, Decreased endurance, Decreased self-care trans, Decreased high-level ADLs  Prognosis: Good  Assessment: Pt seen for skilled OT session focused on ADLs, functional transfers and mobility, UE exercises and grooming  Pt w/ min assist for grooming activities of combing hair  Pt completed b/l UE exercises seen above at 2 sets x 15 reps to increase strength and endurance for ADLs, functional transfers and mobility  Pt w/ MOD assist x2 sit>stand w/ VCs for hand placement and positioning  Pt w/ MOD assist x2 stand>sit and repositioned waffle cushion on pt chair  Pt provided w/ green foam wedge on Left side to promote upright posture  Pt w/ chair alarm intact and all needs met at end of session  Pt continues to be limited due to decreased strength and endurance, impaired balance, impaired activity tolerance, increased pain, impaired STM, impaired functional reach all causing a decline in ADLs, functional transfers and mobility  Recommend STR when medically stable  Will continue to follow to address OT POC       OT Discharge Recommendation: Short Term Rehab  OT - OK to Discharge: (to rehab when medically stable)

## 2020-03-05 LAB
ANION GAP SERPL CALCULATED.3IONS-SCNC: 10 MMOL/L (ref 4–13)
BUN SERPL-MCNC: 11 MG/DL (ref 5–25)
CALCIUM SERPL-MCNC: 9.1 MG/DL (ref 8.3–10.1)
CHLORIDE SERPL-SCNC: 102 MMOL/L (ref 100–108)
CO2 SERPL-SCNC: 25 MMOL/L (ref 21–32)
CREAT SERPL-MCNC: 0.67 MG/DL (ref 0.6–1.3)
GFR SERPL CREATININE-BSD FRML MDRD: 76 ML/MIN/1.73SQ M
GLUCOSE SERPL-MCNC: 94 MG/DL (ref 65–140)
POTASSIUM SERPL-SCNC: 3.8 MMOL/L (ref 3.5–5.3)
SODIUM SERPL-SCNC: 137 MMOL/L (ref 136–145)

## 2020-03-05 PROCEDURE — 99232 SBSQ HOSP IP/OBS MODERATE 35: CPT | Performed by: INTERNAL MEDICINE

## 2020-03-05 PROCEDURE — 80048 BASIC METABOLIC PNL TOTAL CA: CPT | Performed by: INTERNAL MEDICINE

## 2020-03-05 RX ADMIN — LIDOCAINE 1 PATCH: 50 PATCH TOPICAL at 09:06

## 2020-03-05 RX ADMIN — GABAPENTIN 100 MG: 100 CAPSULE ORAL at 17:10

## 2020-03-05 RX ADMIN — POTASSIUM CHLORIDE 20 MEQ: 1500 TABLET, EXTENDED RELEASE ORAL at 09:07

## 2020-03-05 RX ADMIN — LATANOPROST 1 DROP: 50 SOLUTION OPHTHALMIC at 09:08

## 2020-03-05 RX ADMIN — GABAPENTIN 100 MG: 100 CAPSULE ORAL at 09:07

## 2020-03-05 RX ADMIN — ACETAMINOPHEN 650 MG: 325 TABLET ORAL at 05:29

## 2020-03-05 RX ADMIN — ACETAMINOPHEN 650 MG: 325 TABLET ORAL at 21:53

## 2020-03-05 RX ADMIN — PANTOPRAZOLE SODIUM 40 MG: 40 TABLET, DELAYED RELEASE ORAL at 05:32

## 2020-03-05 RX ADMIN — ACETAMINOPHEN 650 MG: 325 TABLET ORAL at 13:43

## 2020-03-05 RX ADMIN — CYANOCOBALAMIN TAB 500 MCG 1000 MCG: 500 TAB at 09:07

## 2020-03-05 RX ADMIN — Medication 250 MG: at 09:08

## 2020-03-05 RX ADMIN — PRAMIPEXOLE DIHYDROCHLORIDE 0.25 MG: 0.25 TABLET ORAL at 09:07

## 2020-03-05 RX ADMIN — CALCIUM CARBONATE 500 MG (1,250 MG)-VITAMIN D3 200 UNIT TABLET 1 TABLET: at 15:43

## 2020-03-05 RX ADMIN — CALCIUM CARBONATE 500 MG (1,250 MG)-VITAMIN D3 200 UNIT TABLET 1 TABLET: at 09:07

## 2020-03-05 RX ADMIN — MELATONIN 4000 UNITS: at 09:07

## 2020-03-05 RX ADMIN — ENOXAPARIN SODIUM 40 MG: 40 INJECTION SUBCUTANEOUS at 09:07

## 2020-03-05 NOTE — PROGRESS NOTES
Progress Note - Infectious Disease   Oc Stagers 80 y o  female MRN: [id number]  Unit/Bed#: E5 -01 Encounter: [id number]      Impression/Plan:  1  Dermatomal zoster   Patient return to [facility] as daughter reported hypotension and interruption of her treatment for zoster at her nursing facility  Fortunately she had no evidence of dissemination on exam  Patient's lesions have crusted over and appear inactive  Her pain has significantly improved but I suspect the pain that lingers represents post herpetic neuralgia  She completed a seven day treatment course with oral Valtrex  No indication for ongoing antibiotic treatment at this time   -monitor patient off additional antiviral treatment  -monitor CBC and BMP  -monitor vitals  -serial skin exams  -pain control per primary service     2  Acute kidney injury   Suspect prerenal due to issues with her diuretic therapy  Her creatinine has improved, is 0 67 today   -monitor BMP  -avoid nephrotoxins     3  Urinary retention and abnormal UA   Patient with previously documented urinary retention along with recurrent UTIs  Diandra Dumont noted to be grossly abnormal which I suspect was secondary to her Orantes catheter  I would not recommend treating for an active UTI at this time  Patient remains without symptoms  Urology is following the patient closely  Her Orantes catheter has been removed and she is on urinary retention protocol   -continue close follow-up with Urology     4  Advanced Dementia   Patient with known underlying dementia   Appears to be at previously reported baseline   No acute signs of CNS infection at this time   -monitor mental status  -supportive care    Patient remains stable for discharge from ID standpoint  Above plan was discussed in detail with patient at the bedside  Antibiotics:  No current antibiotic use    Antivirals:  No current antiviral use    Subjective:  Limited ROS as patient has dementia    She was awake and pleasantly engaged me today  She has no complaints  She did ask for assistance with her dentures and opening her breakfast containers  I did assist with both  She tells me she is not having any pain or itching  She denied nausea and vomiting  She denied cough and difficulty breathing  She denies headaches and dizziness  Objective:  Vitals:  Temp:  [96 3 °F (35 7 °C)-99 7 °F (37 6 °C)] 99 7 °F (37 6 °C)  HR:  [75-81] 75  Resp:  [18] 18  BP: (113-121)/(56-78) 113/56  SpO2:  [92 %-95 %] 92 %  Temp (24hrs), Av 8 °F (36 6 °C), Min:96 3 °F (35 7 °C), Max:99 7 °F (37 6 °C)  Current: Temperature: 99 7 °F (37 6 °C)    Physical Exam:   General Appearance:  Alert, interactive with dementia, nontoxic, no acute distress  Patient appeared comfortable, was sitting up in bed with her breakfast tray  Throat: Oropharynx moist without lesions  Lungs:   Clear to auscultation bilaterally; no wheezes, rhonchi or rales; respirations unlabored; patient is on room air   Heart:  RRR; no murmur, rub or gallop   Abdomen:   Soft, non-tender, non-distended, positive bowel sounds  Extremities: No clubbing or cyanosis, no edema   Skin: No new rashes, lesions, or draining wounds noted on exposed skin    Left chest rash has improved, remains crusted with no new vesicular lesions or weeping, does wrap around to her back which also remains dry, is nonpainful with palpation of the region     Labs, Imaging, & Other studies:   All pertinent labs and imaging studies were personally reviewed  Results from last 7 days   Lab Units 20  0532 20  0506 20  2036   WBC Thousand/uL 5 55 6 14 7 56   HEMOGLOBIN g/dL 10 1* 9 6* 10 0*   PLATELETS Thousands/uL 275 275 276     Results from last 7 days   Lab Units 20  0515  20  2036   POTASSIUM mmol/L 3 8   < > 4 1   CHLORIDE mmol/L 102   < > 98*   CO2 mmol/L 25   < > 25   BUN mg/dL 11   < > 24   CREATININE mg/dL 0 67   < > 1 49*   EGFR ml/min/1 73sq m 76   < > 30 CALCIUM mg/dL 9 1   < > 9 1   AST U/L  --   --  19   ALT U/L  --   --  15   ALK PHOS U/L  --   --  88    < > = values in this interval not displayed

## 2020-03-05 NOTE — TREATMENT PLAN
Treatment Plan - Urology   Mack Armando 80 y o  female MRN: [id number]  Unit/Bed#: E5 -01 Encounter: [id number]    Treatment Plan: Orantes removed yesterday, patient voiding high volumes with low PVR, less than 15 mL  Continue to allow to void on her own without any urologic plans for intervention  Urology will sign off but remain available for any further inpatient needs  Please feel free to call with questions or concerns  Discussed with RAGHAVENDRA Hayward PA-C  Date: 3/5/2020 Time: 10:06 AM

## 2020-03-05 NOTE — NURSING NOTE
Spoke with pt's daughter HS, as she had called  Pt was audibly irritated and verbally expressed this on the phone as well  Pt cont to express how she was angry at the D/C of the ward and how her mom "almost " (r/t hypotension)  Re-assured daughter that pt would have bed alarm in place (as she shared her fear her mom might fall R/T no ward and blindness) and that we would monitor her urine output throughout the night and do multiple checks through the night  Will cont to monitor pt  Had large incontinent void through night @ this time

## 2020-03-06 PROBLEM — L89.152 PRESSURE INJURY OF COCCYGEAL REGION, STAGE 2 (HCC): Status: ACTIVE | Noted: 2020-03-06

## 2020-03-06 PROBLEM — L84 CALLUS: Status: ACTIVE | Noted: 2020-03-06

## 2020-03-06 LAB
ANION GAP SERPL CALCULATED.3IONS-SCNC: 6 MMOL/L (ref 4–13)
BUN SERPL-MCNC: 10 MG/DL (ref 5–25)
CALCIUM SERPL-MCNC: 9.6 MG/DL (ref 8.3–10.1)
CHLORIDE SERPL-SCNC: 103 MMOL/L (ref 100–108)
CO2 SERPL-SCNC: 28 MMOL/L (ref 21–32)
CREAT SERPL-MCNC: 0.73 MG/DL (ref 0.6–1.3)
GFR SERPL CREATININE-BSD FRML MDRD: 71 ML/MIN/1.73SQ M
GLUCOSE SERPL-MCNC: 101 MG/DL (ref 65–140)
POTASSIUM SERPL-SCNC: 4.3 MMOL/L (ref 3.5–5.3)
SODIUM SERPL-SCNC: 137 MMOL/L (ref 136–145)

## 2020-03-06 PROCEDURE — 99232 SBSQ HOSP IP/OBS MODERATE 35: CPT | Performed by: INTERNAL MEDICINE

## 2020-03-06 PROCEDURE — 80048 BASIC METABOLIC PNL TOTAL CA: CPT | Performed by: INTERNAL MEDICINE

## 2020-03-06 RX ADMIN — CHLORDIAZEPOXIDE HYDROCHLORIDE 25 MG: 25 CAPSULE ORAL at 21:25

## 2020-03-06 RX ADMIN — POTASSIUM CHLORIDE 20 MEQ: 1500 TABLET, EXTENDED RELEASE ORAL at 10:03

## 2020-03-06 RX ADMIN — CYANOCOBALAMIN TAB 500 MCG 1000 MCG: 500 TAB at 10:00

## 2020-03-06 RX ADMIN — GABAPENTIN 100 MG: 100 CAPSULE ORAL at 16:21

## 2020-03-06 RX ADMIN — PANTOPRAZOLE SODIUM 40 MG: 40 TABLET, DELAYED RELEASE ORAL at 05:22

## 2020-03-06 RX ADMIN — GABAPENTIN 100 MG: 100 CAPSULE ORAL at 10:00

## 2020-03-06 RX ADMIN — ACETAMINOPHEN 650 MG: 325 TABLET ORAL at 15:00

## 2020-03-06 RX ADMIN — LATANOPROST 1 DROP: 50 SOLUTION OPHTHALMIC at 10:00

## 2020-03-06 RX ADMIN — CALCIUM CARBONATE 500 MG (1,250 MG)-VITAMIN D3 200 UNIT TABLET 1 TABLET: at 10:00

## 2020-03-06 RX ADMIN — Medication 250 MG: at 10:00

## 2020-03-06 RX ADMIN — ACETAMINOPHEN 650 MG: 325 TABLET ORAL at 21:25

## 2020-03-06 RX ADMIN — PRAMIPEXOLE DIHYDROCHLORIDE 0.25 MG: 0.25 TABLET ORAL at 10:00

## 2020-03-06 RX ADMIN — MELATONIN 4000 UNITS: at 10:00

## 2020-03-06 RX ADMIN — TRAMADOL HYDROCHLORIDE 50 MG: 50 TABLET, FILM COATED ORAL at 05:22

## 2020-03-06 RX ADMIN — CALCIUM CARBONATE 500 MG (1,250 MG)-VITAMIN D3 200 UNIT TABLET 1 TABLET: at 16:21

## 2020-03-06 RX ADMIN — ENOXAPARIN SODIUM 40 MG: 40 INJECTION SUBCUTANEOUS at 10:00

## 2020-03-06 RX ADMIN — LIDOCAINE 1 PATCH: 50 PATCH TOPICAL at 10:00

## 2020-03-06 RX ADMIN — ACETAMINOPHEN 650 MG: 325 TABLET ORAL at 05:22

## 2020-03-06 NOTE — ASSESSMENT & PLAN NOTE
Maintained at home on 40 mg of Lasix b i d    Was held in the setting of dehydration and SUNDAR  No lower extremity edema, continue to hold Lasix for now

## 2020-03-06 NOTE — ASSESSMENT & PLAN NOTE
History of chronic UTIs  Had Orantes placed in ED during prior hospitalization 2/21 for urinary retention  Evaluated by Urology, Orantes removed 3/4 for a voiding trial   Patient is voiding, no PVR for 2 days  Per urology, continue to monitor off Orantes, no urologic intervention at this point

## 2020-03-06 NOTE — ASSESSMENT & PLAN NOTE
Wound care on board, she has a pressure injury to coccyx, stage II versus deep tissue injury    Most likely Re absorbing deep tissue injury

## 2020-03-06 NOTE — PROGRESS NOTES
Progress Note - Curry Has 9/20/1926, 80 y o  female MRN: [id number]    Unit/Bed#: E5 -01 Encounter: [id number]    Primary Care Provider: Beba Gastelum DO   Date and time admitted to hospital: 3/1/2020  8:23 PM        * SUNDAR (acute kidney injury) [facility])  Assessment & Plan  Resolved  Patient presents to ED with hypotension suspect secondary to dehydration, she was also on Lasix 40 mg b i d  Creatinine elevated 1 49 (baseline 0 6)  Status post IV fluids, now off IV fluids for 4 days, with good oral intake  Creatinine is 0 73 today  Monitor volume status, she has a history of lymphedema  Currently there is no lower extremity edema  No further intervention indicated, patient is stable for discharge    Shingles  Assessment & Plan  Evaluated by ID, appreciate recommendations  Discontinue airborne isolation, continue contact isolation  Valtrex finished 3/4  Serial skin exam, local wound care   Post herpetic neurologia, gabapentin 100 mg b i d , pain is well controlled  Tylenol around the clock  Tramadol p r n  For arthritis prn 12 h, home medication  From ID standpoint patient is cleared for discharge      Callus  Assessment & Plan  Bilateral feet round callus with deformity, patient follows Podiatry as outpatient  She had callus on the left 5th toe, wound moist and open today  Seen by wound care    Pressure injury of coccygeal region, stage 2 [facility])  Assessment & Plan  Wound care on board, she has a pressure injury to coccyx, stage II versus deep tissue injury    Most likely Re absorbing deep tissue injury    Urinary retention  Assessment & Plan  History of chronic UTIs  Had Orantes placed in ED during prior hospitalization 2/21 for urinary retention  Evaluated by Urology, Orantes removed 3/4 for a voiding trial   Patient is voiding, no PVR for 2 days  Per urology, continue to monitor off Orantes, no urologic intervention at this point    Restless legs syndrome  Assessment & Plan  Continue Mirapex    Leg edema  Assessment & Plan  Maintained at home on 40 mg of Lasix b i d  Was held in the setting of dehydration and SUNDAR  No lower extremity edema, continue to hold Lasix for now      Glaucoma  Assessment & Plan  Continue eyedrops    GERD (gastroesophageal reflux disease)  Assessment & Plan  Continue daily Protonix    Decreased ambulation status  Assessment & Plan  Ambulates with walker at baseline  Daughter reports patient has not been able to get out of bed for the past couple of weeks secondary to weakness  PT OT      VTE Pharmacologic Prophylaxis:   Pharmacologic: Enoxaparin (Lovenox)  Mechanical VTE Prophylaxis in Place: Yes    Patient Centered Rounds: I have performed bedside rounds with nursing staff today  Discussions with Specialists or Other Care Team Provider: ID, case management    Education and Discussions with Family / Patient:  Patient and daughter over the phone    Time Spent for Care: 30 minutes  More than 50% of total time spent on counseling and coordination of care as described above  Current Length of Stay: 4 day(s)    Current Patient Status: Inpatient   Certification Statement: The patient will continue to require additional inpatient hospital stay due to Patient is medically stable for discharge    Discharge Plan:  Patient is medically stable for discharge    Code Status: Level 3 - DNAR and DNI      Subjective:   Patient was seen and evaluated bedside, she is feeling well, pain is well controlled  Denies trouble with urination  Objective:     Vitals:   Temp (24hrs), Av 6 °F (37 °C), Min:98 3 °F (36 8 °C), Max:98 8 °F (37 1 °C)    Temp:  [98 3 °F (36 8 °C)-98 8 °F (37 1 °C)] 98 3 °F (36 8 °C)  HR:  [68-71] 71  Resp:  [18-20] 20  BP: (125-142)/(58-72) 135/62  SpO2:  [94 %-95 %] 94 %  Body mass index is 31 6 kg/m²  Input and Output Summary (last 24 hours):        Intake/Output Summary (Last 24 hours) at 3/6/2020 1630  Last data filed at 3/6/2020 1601  Gross per 24 hour   Intake 480 ml   Output 1093 ml   Net -613 ml       Physical Exam:     Physical Exam   Constitutional: She appears well-nourished  No distress  HENT:   Head: Normocephalic and atraumatic  Eyes: EOM are normal    Neck: Normal range of motion  Neck supple  Cardiovascular: Normal rate, regular rhythm and normal heart sounds  Exam reveals no gallop and no friction rub  No murmur heard  Pulmonary/Chest: Effort normal and breath sounds normal  No respiratory distress  She has no wheezes  Abdominal: Soft  Bowel sounds are normal  She exhibits no distension  There is no tenderness  Musculoskeletal: She exhibits deformity  She exhibits no edema  Neurological: She is alert  No cranial nerve deficit  Skin:   Lesions are scabbed   Psychiatric: She has a normal mood and affect  Additional Data:     Labs:    Results from last 7 days   Lab Units 03/03/20  0532   WBC Thousand/uL 5 55   HEMOGLOBIN g/dL 10 1*   HEMATOCRIT % 31 0*   PLATELETS Thousands/uL 275   NEUTROS PCT % 51   LYMPHS PCT % 34   MONOS PCT % 11   EOS PCT % 3     Results from last 7 days   Lab Units 03/06/20  0623  03/01/20  2036   SODIUM mmol/L 137   < > 133*   POTASSIUM mmol/L 4 3   < > 4 1   CHLORIDE mmol/L 103   < > 98*   CO2 mmol/L 28   < > 25   BUN mg/dL 10   < > 24   CREATININE mg/dL 0 73   < > 1 49*   ANION GAP mmol/L 6   < > 10   CALCIUM mg/dL 9 6   < > 9 1   ALBUMIN g/dL  --   --  2 3*   TOTAL BILIRUBIN mg/dL  --   --  0 34   ALK PHOS U/L  --   --  88   ALT U/L  --   --  15   AST U/L  --   --  19   GLUCOSE RANDOM mg/dL 101   < > 121    < > = values in this interval not displayed  Results from last 7 days   Lab Units 03/01/20  2306 03/01/20  2105   LACTIC ACID mmol/L 1 0 2 2*           * I Have Reviewed All Lab Data Listed Above  * Additional Pertinent Lab Tests Reviewed:  All Labs For Current Hospital Admission Reviewed    Imaging:    Imaging Reports Reviewed Today Include: all  Imaging Personally Reviewed by Myself Includes: Recent Cultures (last 7 days):           Last 24 Hours Medication List:     Current Facility-Administered Medications:  acetaminophen 325 mg Oral Q8H PRN Anjali Smudde, NITZA   acetaminophen 650 mg Oral Q8H [facility] Siomara Sheth MD   calcium carbonate 1,000 mg Oral Daily PRN Anjali Smudde, NITZA   calcium carbonate-vitamin D 1 tablet Oral BID With Meals Anjali Benitez, NITZA   chlordiazePOXIDE 25 mg Oral Daily PRN Krishna Hall, NITZA   cholecalciferol 4,000 Units Oral Daily Anjali Smudde, PA-BENITA   vitamin B-12 1,000 mcg Oral Daily Anjali Smudde, PA-C   enoxaparin 40 mg Subcutaneous Daily Anjali Smudde, PA-C   gabapentin 100 mg Oral BID Siomara Sheth MD   latanoprost 1 drop Left Eye Daily Anjali Smudde, PA-C   lidocaine 1 patch Topical Daily Anjali Smudde, PAZohaibC   magnesium gluconate 250 mg Oral Daily Anjali Smudde, PA-C   meclizine 25 mg Oral TID PRN Manual Patter Smudde, PA-C   ondansetron 4 mg Intravenous Q6H PRN Anjali Smudde, PA-C   pantoprazole 40 mg Oral Early Morning Anjali Smudde, PA-C   potassium chloride 20 mEq Oral Daily Anjali Smudde, PA-C   pramipexole 0 25 mg Oral Daily Anjali Smudde, PA-C   senna 1 tablet Oral HS PRN Anjali Smudde, PA-C   traMADol 50 mg Oral Q12H PRN Krishna Hall PA-C        Today, Patient Was Seen By: Siomara Sheth MD    ** Please Note: Dictation voice to text software may have been used in the creation of this document   **

## 2020-03-06 NOTE — WOUND OSTOMY CARE
Progress Note - Wound   Mariah Gutierrez 80 y o  female MRN: [id number]  Unit/Bed#: E5 -01 Encounter: [id number]        Assessment:   Patient seen for wound care follow-up, agreeable to assessment  Reports pain in her left 5th toe, pain in chest/back is improved  Patient requires assist x 2 to turn/reposition in bed  She is occasionally incontinent of urine  Oval area of dry, scaling to right forehead is unchanged  Blanchable redness is present to the sacrum and left heel  Nutrition team is following  1   Bilateral medial feet with thick, round callus and deformity--patient follows with podiatry as an outpatient  2   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--wound previously dry with adhered eschar  On exam today, wound is moist and open  Patient reporting pain  Laura-wound erythema present  3   Resolving shingles lesions to left back, flank, and wrapping to left anterior chest following dermatome--lesions crusted with scaling and blanchable erythema to chest and flank  There is a small partial thickness open area to the left back  4   Present on admission pressure injury to coccyx (stage 2 vs deep tissue injury)--on exam today, wound with light purple discoloration with appearance of re-absorbing blister  Likely wound is a re-absorbing deep tissue injury        See flowsheet and media for wound details  Wound Care Plan:   1-Sacrum--cleanse with soap and water, pat dry  3M no sting to laura-wound skin  Cover with non-adherent oil emulsion dressing (adaptic) and Allevyn Life foam dressing  2-Left back--cleanse with soap and water, pat dry  Apply non-adherent oil emulsion dressing (adaptic) and ABD  Secure with tape  Change dressing daily  Keep open shingles lesions covered at all times  3-Left 5th toe--Apply dermagran gauze  Cover with 2x2 and wrap with noemí  Change dressing every other day      Skin care plans:  1-Hydraguard to bilateral heels BID and PRN    2-Elevate heels (float off of pillows) to offload pressure  3-Offloading cushion in chair when getting out of bed  4-Moisturize entire body skin and right forehead daily with skin nourishing cream   5-Turn/reposition q2h or when medically stable for pressure re-distribution on skin--use positioning wedges       Wound care team to follow  Plan of care reviewed with primary RN  Dr Anabel carrera texted about left 5th toe wound status change and pain  Wound 02/22/20 Back Left;Medial (Active)   Wound Image   3/6/2020  1:33 PM   Wound Description Pink 3/6/2020  1:34 PM   Ksenia-wound Assessment Erythema 3/6/2020  1:34 PM   Wound Length (cm) 0 7 cm 3/6/2020  1:34 PM   Wound Width (cm) 0 7 cm 3/6/2020  1:34 PM   Wound Depth (cm) 0 1 3/6/2020  1:34 PM   Calculated Wound Area (cm^2) 0 49 cm^2 3/6/2020  1:34 PM   Calculated Wound Volume (cm^3) 0 05 cm^3 3/6/2020  1:34 PM   Change in Wound Size % 80 3/6/2020  1:34 PM   Drainage Amount Scant 3/6/2020  1:34 PM   Drainage Description Serous 3/6/2020  1:34 PM   Non-staged Wound Description Partial thickness 3/6/2020  1:34 PM   Treatments Cleansed 3/3/2020 12:12 PM   Dressing ABD; Non adherent 3/6/2020  1:34 PM   Wound packed? No 3/3/2020 12:12 PM   Dressing Changed Changed 3/3/2020 12:12 PM   Patient Tolerance Tolerated well 3/6/2020  1:34 PM   Dressing Status Dry; Intact; Clean 3/6/2020  1:34 PM       Wound 03/02/20 Pressure Injury Sacrum (Active)   Wound Image   3/6/2020  1:10 PM   Wound Description Light purple 3/6/2020  1:32 PM   Staging Deep Tissue Injury 3/6/2020  1:32 PM   Ksenia-wound Assessment Erythema; Intact 3/6/2020  1:32 PM   Wound Length (cm) 3 cm 3/6/2020  1:32 PM   Wound Width (cm) 1 5 cm 3/6/2020  1:32 PM   Wound Depth (cm) 0 3/6/2020  1:32 PM   Calculated Wound Area (cm^2) 4 5 cm^2 3/6/2020  1:32 PM   Calculated Wound Volume (cm^3) 0 cm^3 3/6/2020  1:32 PM   Drainage Amount None 3/6/2020  1:32 PM   Non-staged Wound Description Not applicable 9/1/8385 26:91 AM   Treatments Cleansed;Site care 3/6/2020  8:00 AM   Dressing Foam, Silicon (eg  Allevyn, etc); Non adherent 3/6/2020  1:32 PM   Dressing Changed New 3/6/2020  8:00 AM   Patient Tolerance Tolerated well 3/6/2020  1:32 PM   Dressing Status Clean;Dry; Intact 3/6/2020  1:32 PM       Wound 03/02/20 Neuropathic/diabetic ulcer Toe (Comment  which one) Anterior; Left (Active)   Wound Image   3/6/2020  1:35 PM   Wound Description Pink 3/6/2020  1:35 PM   Ksenia-wound Assessment Brown 3/6/2020  1:35 PM   Wound Length (cm) 0 5 cm 3/6/2020  1:35 PM   Wound Width (cm) 0 6 cm 3/6/2020  1:35 PM   Wound Depth (cm) 0 2 3/6/2020  1:35 PM   Calculated Wound Area (cm^2) 0 3 cm^2 3/6/2020  1:35 PM   Calculated Wound Volume (cm^3) 0 06 cm^3 3/6/2020  1:35 PM   Drainage Amount Scant 3/6/2020  1:35 PM   Drainage Description Serous 3/6/2020  1:35 PM   Treatments Cleansed 3/6/2020  1:35 PM   Dressing Dermagran gauze;Dry dressing 3/6/2020  1:35 PM   Dressing Changed New 3/6/2020  1:35 PM   Patient Tolerance Tolerated well 3/6/2020  1:35 PM   Dressing Status Clean;Dry; Intact 3/6/2020  1:35 PM     Herrera PAREDESN, RN, [employer]

## 2020-03-06 NOTE — ASSESSMENT & PLAN NOTE
Ambulates with walker at baseline  Daughter reports patient has not been able to get out of bed for the past couple of weeks secondary to weakness  PT OT

## 2020-03-06 NOTE — SOCIAL WORK
Per Dr Danie Banuelos, pt is medically ready for d/c  [facility] is requesting a financial application for patient  Pt does not have capacity to complete this  CM attempted to contact pt's daughter regarding this request today; needed to leave VM  Requested call back as soon as possible  She soon called back  CM interviewed her over the phone regarding the general SNF application with her cooperation  She is adamant that she does not want her mother's discharged before she consents to this  CM explained that CM has received records request from [facility] and CM will comply with this process  CM offered to sit down with her in person to review the Detailed Notice of D/C in person; daughter states she is not coming to the hospital today and that CM should tell her over the phone  Detailed Notice and follow up IMM reviewed over the phone  CM sending records to [facility] per their request   Case ID # [id number]

## 2020-03-06 NOTE — ASSESSMENT & PLAN NOTE
History of chronic UTIs  Had Orantes placed in ED during prior hospitalization 2/21  Evaluated by Urology, Orantes removed yesterday for a voiding trial   Patient is voiding, PVR insignificant  Continue to monitor off Orantes, no urologic intervention at this point

## 2020-03-06 NOTE — ASSESSMENT & PLAN NOTE
Resolved  Patient presents to ED with hypotension suspect secondary to dehydration, she was also on Lasix 40 mg b i d  Creatinine elevated 1 49 (baseline 0 6)  Status post IV fluids, now off IV fluids for 4 days, with good oral intake  Creatinine is 0 73 today  Monitor volume status, she has a history of lymphedema    Currently there is no lower extremity edema  No further intervention indicated, patient is stable for discharge

## 2020-03-06 NOTE — PROGRESS NOTES
Progress Note - Nir Walker 9/20/1926, 80 y o  female MRN: [id number]    Unit/Bed#: E5 -01 Encounter: [id number]    Primary Care Provider: Jonnie Portillo DO   Date and time admitted to hospital: 3/1/2020  8:23 PM        * SUNDAR (acute kidney injury) [facility])  Assessment & Plan  Resolved  Patient presents to ED with hypotension suspect secondary to dehydration, she was also on Lasix 40 mg b i d  Creatinine elevated 1 49 (baseline 0 6)  Status post IV fluids, now off IV fluids for almost 72 hours  Creatinine is 0 6 today  Encourage oral intake  Monitor volume status, she has a history of lymphedema  Currently there is no lower extremity edema at all  BMP in a m  Shingles  Assessment & Plan  Evaluated by ID, appreciate recommendations  Discontinue airborne isolation, continue contact isolation  Valtrex finished 3/4  Serial skin exam, local wound care   Post herpetic neurologia, gabapentin 100 mg b i d , pain is well controlled  Tylenol around the clock  Tramadol p r n  For arthritis prn 12 h, home medication    Urinary retention  Assessment & Plan  History of chronic UTIs  Had Orantes placed in ED during prior hospitalization 2/21  Evaluated by Urology, Orantes removed yesterday for a voiding trial   Patient is voiding, PVR insignificant  Continue to monitor off Orantes, no urologic intervention at this point    Restless legs syndrome  Assessment & Plan  Continue Mirapex    Leg edema  Assessment & Plan  Maintained at home on 40 mg of Lasix b i d    Holding in the setting of dehydration and SUNDAR  No lower extremity edema, continue to hold Lasix for now      Glaucoma  Assessment & Plan  Continue eyedrops    GERD (gastroesophageal reflux disease)  Assessment & Plan  Continue daily Protonix    Decreased ambulation status  Assessment & Plan  Ambulates with walker at baseline  Daughter reports patient has not been able to get out of bed for the past couple of weeks secondary to weakness  PT OT        VTE Pharmacologic Prophylaxis:   Pharmacologic: Enoxaparin (Lovenox)  Mechanical VTE Prophylaxis in Place: Yes    Patient Centered Rounds: I have performed bedside rounds with nursing staff today  Discussions with Specialists or Other Care Team Provider:  Id    Education and Discussions with Family / Patient:  Patient and daughter over the phone    Time Spent for Care: 30 minutes  More than 50% of total time spent on counseling and coordination of care as described above  Current Length of Stay: 3 day(s)    Current Patient Status: Inpatient   Certification Statement: The patient will continue to require additional inpatient hospital stay due to We are going to watch PVR today    Discharge Plan:  Discharge in next 24-48 hours, pending placement to rehab    Code Status: Level 3 - DNAR and DNI      Subjective:   Patient was seen and evaluated bedside, she is feeling well, denies abdominal pain, pain in the affected area which shingle is well controlled    Objective:     Vitals:   Temp (24hrs), Av 4 °F (36 9 °C), Min:97 5 °F (36 4 °C), Max:99 7 °F (37 6 °C)    Temp:  [97 5 °F (36 4 °C)-99 7 °F (37 6 °C)] 98 °F (36 7 °C)  HR:  [73-78] 73  Resp:  [18] 18  BP: (113-121)/(56-78) 115/58  SpO2:  [92 %-97 %] 97 %  Body mass index is 31 6 kg/m²  Input and Output Summary (last 24 hours): Intake/Output Summary (Last 24 hours) at 3/5/2020 1948  Last data filed at 3/5/2020 1630  Gross per 24 hour   Intake    Output 450 ml   Net -450 ml       Physical Exam:     Physical Exam   Constitutional: She appears well-nourished  No distress  HENT:   Head: Normocephalic and atraumatic  Eyes: EOM are normal    Neck: Normal range of motion  Neck supple  Cardiovascular: Normal rate, regular rhythm and normal heart sounds  Exam reveals no gallop and no friction rub  No murmur heard  Pulmonary/Chest: Effort normal and breath sounds normal  No respiratory distress  She has no wheezes  Abdominal: Soft   Bowel sounds are normal  She exhibits no distension  There is no tenderness  Musculoskeletal: She exhibits deformity  She exhibits no edema  Neurological: She is alert  No cranial nerve deficit  Skin:   Shingle lesions on the left side of the chest radiating to the back in the scapular area, almost all of the regions are crusted   Psychiatric: She has a normal mood and affect  Additional Data:     Labs:    Results from last 7 days   Lab Units 03/03/20  0532   WBC Thousand/uL 5 55   HEMOGLOBIN g/dL 10 1*   HEMATOCRIT % 31 0*   PLATELETS Thousands/uL 275   NEUTROS PCT % 51   LYMPHS PCT % 34   MONOS PCT % 11   EOS PCT % 3     Results from last 7 days   Lab Units 03/05/20  0515  03/01/20  2036   SODIUM mmol/L 137   < > 133*   POTASSIUM mmol/L 3 8   < > 4 1   CHLORIDE mmol/L 102   < > 98*   CO2 mmol/L 25   < > 25   BUN mg/dL 11   < > 24   CREATININE mg/dL 0 67   < > 1 49*   ANION GAP mmol/L 10   < > 10   CALCIUM mg/dL 9 1   < > 9 1   ALBUMIN g/dL  --   --  2 3*   TOTAL BILIRUBIN mg/dL  --   --  0 34   ALK PHOS U/L  --   --  88   ALT U/L  --   --  15   AST U/L  --   --  19   GLUCOSE RANDOM mg/dL 94   < > 121    < > = values in this interval not displayed  Results from last 7 days   Lab Units 03/01/20  2306 03/01/20  2105   LACTIC ACID mmol/L 1 0 2 2*           * I Have Reviewed All Lab Data Listed Above  * Additional Pertinent Lab Tests Reviewed:  Yelena Parker Admission Reviewed    Imaging:    Imaging Reports Reviewed Today Include: all  Imaging Personally Reviewed by Myself Includes:      Recent Cultures (last 7 days):           Last 24 Hours Medication List:     Current Facility-Administered Medications:  acetaminophen 325 mg Oral Q8H PRN Anjali Benitez PA-C   acetaminophen 650 mg Oral Q8H [address] Linda Soria MD   calcium carbonate 1,000 mg Oral Daily PRN AVIVA Day-BENITA   calcium carbonate-vitamin D 1 tablet Oral BID With Meals Anjali Benitez PA-C   chlordiazePOXIDE 25 mg Oral Daily PRN Brenden Kitchen PA-C   cholecalciferol 4,000 Units Oral Daily Anjali Smudde, PA-BENITA   vitamin B-12 1,000 mcg Oral Daily Anjali Smudde, PA-C   enoxaparin 40 mg Subcutaneous Daily Anjali Smudde, PA-BENITA   gabapentin 100 mg Oral BID Elisa Reyna MD   latanoprost 1 drop Left Eye Daily Anjali Smudde, PA-BENITA   lidocaine 1 patch Topical Daily Anjali Smudde, PA-C   magnesium gluconate 250 mg Oral Daily Anjali Smudde, PA-C   meclizine 25 mg Oral TID PRN Phuong Ship Smudde, PA-BENITA   ondansetron 4 mg Intravenous Q6H PRN Anjali Smudde, PA-BENITA   pantoprazole 40 mg Oral Early Morning Anjali Smudde, PA-C   potassium chloride 20 mEq Oral Daily Anjali Smudde, PA-C   pramipexole 0 25 mg Oral Daily Anjali Smudde, NITZA   senna 1 tablet Oral HS PRN Anjali Smudde, NITZA   traMADol 50 mg Oral Q12H PRN Brenden Kitchen PA-C        Today, Patient Was Seen By: Elisa Reyna MD    ** Please Note: Dictation voice to text software may have been used in the creation of this document   **

## 2020-03-06 NOTE — ASSESSMENT & PLAN NOTE
Evaluated by ID, appreciate recommendations  Discontinue airborne isolation, continue contact isolation  Valtrex finished 3/4  Serial skin exam, local wound care   Post herpetic neurologia, gabapentin 100 mg b i d , pain is well controlled  Tylenol around the clock  Tramadol p r n   For arthritis prn 12 h, home medication

## 2020-03-06 NOTE — ASSESSMENT & PLAN NOTE
Evaluated by ID, appreciate recommendations  Discontinue airborne isolation, continue contact isolation  Valtrex finished 3/4  Serial skin exam, local wound care   Post herpetic neurologia, gabapentin 100 mg b i d , pain is well controlled  Tylenol around the clock  Tramadol p r n   For arthritis prn 12 h, home medication  From ID standpoint patient is cleared for discharge

## 2020-03-06 NOTE — ASSESSMENT & PLAN NOTE
Bilateral feet round callus with deformity, patient follows Podiatry as outpatient  She had callus on the left 5th toe, wound moist and open today  Seen by wound care

## 2020-03-06 NOTE — PLAN OF CARE
Problem: Potential for Falls  Goal: Patient will remain free of falls  Description  INTERVENTIONS:  - Assess patient frequently for physical needs  -  Identify cognitive and physical deficits and behaviors that affect risk of falls    -  [place] fall precautions as indicated by assessment   - Educate patient/family on patient safety including physical limitations  - Instruct patient to call for assistance with activity based on assessment  - Modify environment to reduce risk of injury  - Consider OT/PT consult to assist with strengthening/mobility  Outcome: Progressing     Problem: Prexisting or High Potential for Compromised Skin Integrity  Goal: Skin integrity is maintained or improved  Description  INTERVENTIONS:  - Identify patients at risk for skin breakdown  - Assess and monitor skin integrity  - Assess and monitor nutrition and hydration status  - Monitor labs   - Assess for incontinence   - Turn and reposition patient  - Assist with mobility/ambulation  - Relieve pressure over bony prominences  - Avoid friction and shearing  - Provide appropriate hygiene as needed including keeping skin clean and dry  - Evaluate need for skin moisturizer/barrier cream  - Collaborate with interdisciplinary team   - Patient/family teaching  - Consider wound care consult   Outcome: Progressing     Problem: GASTROINTESTINAL - ADULT  Goal: Maintains adequate nutritional intake  Description  INTERVENTIONS:  - Monitor percentage of each meal consumed  - Identify factors contributing to decreased intake, treat as appropriate  - Assist with meals as needed  - Monitor I&O, weight, and lab values if indicated  - Obtain nutrition services referral as needed  Outcome: Progressing     Problem: GENITOURINARY - ADULT  Goal: Maintains or returns to baseline urinary function  Description  INTERVENTIONS:  - Assess urinary function  - Encourage oral fluids to ensure adequate hydration if ordered  - Administer IV fluids as ordered to ensure adequate hydration  - Administer ordered medications as needed  - Offer frequent toileting  - Follow urinary retention protocol if ordered  Outcome: Progressing  Goal: Absence of urinary retention  Description  INTERVENTIONS:  - Assess patients ability to void and empty bladder  - Monitor I/O  - Bladder scan as needed  - Discuss with physician/AP medications to alleviate retention as needed  - Discuss catheterization for long term situations as appropriate  Outcome: Progressing  Goal: Urinary catheter remains patent  Description  INTERVENTIONS:  - Assess patency of urinary catheter  - If patient has a chronic ward, consider changing catheter if non-functioning  - Follow guidelines for intermittent irrigation of non-functioning urinary catheter  Outcome: Progressing     Problem: METABOLIC, FLUID AND ELECTROLYTES - ADULT  Goal: Electrolytes maintained within normal limits  Description  INTERVENTIONS:  - Monitor labs and assess patient for signs and symptoms of electrolyte imbalances  - Administer electrolyte replacement as ordered  - Monitor response to electrolyte replacements, including repeat lab results as appropriate  - Instruct patient on fluid and nutrition as appropriate  Outcome: Progressing  Goal: Fluid balance maintained  Description  INTERVENTIONS:  - Monitor labs   - Monitor I/O and WT  - Instruct patient on fluid and nutrition as appropriate  - Assess for signs & symptoms of volume excess or deficit  Outcome: Progressing  Goal: Glucose maintained within target range  Description  INTERVENTIONS:  - Monitor Blood Glucose as ordered  - Assess for signs and symptoms of hyperglycemia and hypoglycemia  - Administer ordered medications to maintain glucose within target range  - Assess nutritional intake and initiate nutrition service referral as needed  Outcome: Progressing     Problem: SKIN/TISSUE INTEGRITY - ADULT  Goal: Skin integrity remains intact  Description  INTERVENTIONS  - Identify patients at risk for skin breakdown  - Assess and monitor skin integrity  - Assess and monitor nutrition and hydration status  - Monitor labs (i e  albumin)  - Assess for incontinence   - Turn and reposition patient  - Assist with mobility/ambulation  - Relieve pressure over bony prominences  - Avoid friction and shearing  - Provide appropriate hygiene as needed including keeping skin clean and dry  - Evaluate need for skin moisturizer/barrier cream  - Collaborate with interdisciplinary team (i e  Nutrition, Rehabilitation, etc )   - Patient/family teaching  Outcome: Progressing  Goal: Incision(s), wounds(s) or drain site(s) healing without S/S of infection  Description  INTERVENTIONS  - Assess and document risk factors for skin impairment   - Assess and document dressing, incision, wound bed, drain sites and surrounding tissue  - Consider nutrition services referral as needed  - Oral mucous membranes remain intact  - Provide patient/ family education  Outcome: Progressing  Goal: Oral mucous membranes remain intact  Description  INTERVENTIONS  - Assess oral mucosa and hygiene practices  - Implement preventative oral hygiene regimen  - Implement oral medicated treatments as ordered  - Initiate Nutrition services referral as needed  Outcome: Progressing     Problem: MUSCULOSKELETAL - ADULT  Goal: Maintain or return mobility to safest level of function  Description  INTERVENTIONS:  - Assess patient's ability to carry out ADLs; assess patient's baseline for ADL function and identify physical deficits which impact ability to perform ADLs (bathing, care of mouth/teeth, toileting, grooming, dressing, etc )  - Assess/evaluate cause of self-care deficits   - Assess range of motion  - Assess patient's mobility  - Assess patient's need for assistive devices and provide as appropriate  - Encourage maximum independence but intervene and supervise when necessary  - Involve family in performance of ADLs  - Assess for home care needs following discharge   - Consider OT consult to assist with ADL evaluation and planning for discharge  - Provide patient education as appropriate  Outcome: Progressing  Goal: Maintain proper alignment of affected body part  Description  INTERVENTIONS:  - Support, maintain and protect limb and body alignment  - Provide patient/ family with appropriate education  Outcome: Progressing     Problem: Nutrition/Hydration-ADULT  Goal: Nutrient/Hydration intake appropriate for improving, restoring or maintaining nutritional needs  Description  Monitor and assess patient's nutrition/hydration status for malnutrition  Collaborate with interdisciplinary team and initiate plan and interventions as ordered  Monitor patient's weight and dietary intake as ordered or per policy  Utilize nutrition screening tool and intervene as necessary  Determine patient's food preferences and provide high-protein, high-caloric foods as appropriate       INTERVENTIONS:  - Monitor oral intake, urinary output, labs, and treatment plans  - Assess nutrition and hydration status and recommend course of action  - Evaluate amount of meals eaten  - Assist patient with eating if necessary   - Allow adequate time for meals  - Recommend/ encourage appropriate diets, oral nutritional supplements, and vitamin/mineral supplements  - Order, calculate, and assess calorie counts as needed  - Recommend, monitor, and adjust tube feedings and TPN/PPN based on assessed needs  - Assess need for intravenous fluids  - Provide specific nutrition/hydration education as appropriate  - Include patient/family/caregiver in decisions related to nutrition  Outcome: Progressing

## 2020-03-06 NOTE — PLAN OF CARE
Problem: Potential for Falls  Goal: Patient will remain free of falls  Description  INTERVENTIONS:  - Assess patient frequently for physical needs  -  Identify cognitive and physical deficits and behaviors that affect risk of falls    -  [place] fall precautions as indicated by assessment   - Educate patient/family on patient safety including physical limitations  - Instruct patient to call for assistance with activity based on assessment  - Modify environment to reduce risk of injury  - Consider OT/PT consult to assist with strengthening/mobility  Outcome: Progressing     Problem: Prexisting or High Potential for Compromised Skin Integrity  Goal: Skin integrity is maintained or improved  Description  INTERVENTIONS:  - Identify patients at risk for skin breakdown  - Assess and monitor skin integrity  - Assess and monitor nutrition and hydration status  - Monitor labs   - Assess for incontinence   - Turn and reposition patient  - Assist with mobility/ambulation  - Relieve pressure over bony prominences  - Avoid friction and shearing  - Provide appropriate hygiene as needed including keeping skin clean and dry  - Evaluate need for skin moisturizer/barrier cream  - Collaborate with interdisciplinary team   - Patient/family teaching  - Consider wound care consult   Outcome: Progressing     Problem: GASTROINTESTINAL - ADULT  Goal: Maintains adequate nutritional intake  Description  INTERVENTIONS:  - Monitor percentage of each meal consumed  - Identify factors contributing to decreased intake, treat as appropriate  - Assist with meals as needed  - Monitor I&O, weight, and lab values if indicated  - Obtain nutrition services referral as needed  Outcome: Progressing     Problem: GENITOURINARY - ADULT  Goal: Maintains or returns to baseline urinary function  Description  INTERVENTIONS:  - Assess urinary function  - Encourage oral fluids to ensure adequate hydration if ordered  - Administer IV fluids as ordered to ensure adequate hydration  - Administer ordered medications as needed  - Offer frequent toileting  - Follow urinary retention protocol if ordered  Outcome: Progressing  Goal: Absence of urinary retention  Description  INTERVENTIONS:  - Assess patients ability to void and empty bladder  - Monitor I/O  - Bladder scan as needed  - Discuss with physician/AP medications to alleviate retention as needed  - Discuss catheterization for long term situations as appropriate  Outcome: Progressing  Goal: Urinary catheter remains patent  Description  INTERVENTIONS:  - Assess patency of urinary catheter  - If patient has a chronic ward, consider changing catheter if non-functioning  - Follow guidelines for intermittent irrigation of non-functioning urinary catheter  Outcome: Progressing     Problem: METABOLIC, FLUID AND ELECTROLYTES - ADULT  Goal: Electrolytes maintained within normal limits  Description  INTERVENTIONS:  - Monitor labs and assess patient for signs and symptoms of electrolyte imbalances  - Administer electrolyte replacement as ordered  - Monitor response to electrolyte replacements, including repeat lab results as appropriate  - Instruct patient on fluid and nutrition as appropriate  Outcome: Progressing  Goal: Fluid balance maintained  Description  INTERVENTIONS:  - Monitor labs   - Monitor I/O and WT  - Instruct patient on fluid and nutrition as appropriate  - Assess for signs & symptoms of volume excess or deficit  Outcome: Progressing  Goal: Glucose maintained within target range  Description  INTERVENTIONS:  - Monitor Blood Glucose as ordered  - Assess for signs and symptoms of hyperglycemia and hypoglycemia  - Administer ordered medications to maintain glucose within target range  - Assess nutritional intake and initiate nutrition service referral as needed  Outcome: Progressing     Problem: SKIN/TISSUE INTEGRITY - ADULT  Goal: Skin integrity remains intact  Description  INTERVENTIONS  - Identify patients at risk for skin breakdown  - Assess and monitor skin integrity  - Assess and monitor nutrition and hydration status  - Monitor labs (i e  albumin)  - Assess for incontinence   - Turn and reposition patient  - Assist with mobility/ambulation  - Relieve pressure over bony prominences  - Avoid friction and shearing  - Provide appropriate hygiene as needed including keeping skin clean and dry  - Evaluate need for skin moisturizer/barrier cream  - Collaborate with interdisciplinary team (i e  Nutrition, Rehabilitation, etc )   - Patient/family teaching  Outcome: Progressing  Goal: Incision(s), wounds(s) or drain site(s) healing without S/S of infection  Description  INTERVENTIONS  - Assess and document risk factors for skin impairment   - Assess and document dressing, incision, wound bed, drain sites and surrounding tissue  - Consider nutrition services referral as needed  - Oral mucous membranes remain intact  - Provide patient/ family education  Outcome: Progressing  Goal: Oral mucous membranes remain intact  Description  INTERVENTIONS  - Assess oral mucosa and hygiene practices  - Implement preventative oral hygiene regimen  - Implement oral medicated treatments as ordered  - Initiate Nutrition services referral as needed  Outcome: Progressing     Problem: MUSCULOSKELETAL - ADULT  Goal: Maintain or return mobility to safest level of function  Description  INTERVENTIONS:  - Assess patient's ability to carry out ADLs; assess patient's baseline for ADL function and identify physical deficits which impact ability to perform ADLs (bathing, care of mouth/teeth, toileting, grooming, dressing, etc )  - Assess/evaluate cause of self-care deficits   - Assess range of motion  - Assess patient's mobility  - Assess patient's need for assistive devices and provide as appropriate  - Encourage maximum independence but intervene and supervise when necessary  - Involve family in performance of ADLs  - Assess for home care needs following discharge   - Consider OT consult to assist with ADL evaluation and planning for discharge  - Provide patient education as appropriate  Outcome: Progressing  Goal: Maintain proper alignment of affected body part  Description  INTERVENTIONS:  - Support, maintain and protect limb and body alignment  - Provide patient/ family with appropriate education  Outcome: Progressing     Problem: Nutrition/Hydration-ADULT  Goal: Nutrient/Hydration intake appropriate for improving, restoring or maintaining nutritional needs  Description  Monitor and assess patient's nutrition/hydration status for malnutrition  Collaborate with interdisciplinary team and initiate plan and interventions as ordered  Monitor patient's weight and dietary intake as ordered or per policy  Utilize nutrition screening tool and intervene as necessary  Determine patient's food preferences and provide high-protein, high-caloric foods as appropriate       INTERVENTIONS:  - Monitor oral intake, urinary output, labs, and treatment plans  - Assess nutrition and hydration status and recommend course of action  - Evaluate amount of meals eaten  - Assist patient with eating if necessary   - Allow adequate time for meals  - Recommend/ encourage appropriate diets, oral nutritional supplements, and vitamin/mineral supplements  - Order, calculate, and assess calorie counts as needed  - Recommend, monitor, and adjust tube feedings and TPN/PPN based on assessed needs  - Assess need for intravenous fluids  - Provide specific nutrition/hydration education as appropriate  - Include patient/family/caregiver in decisions related to nutrition  Outcome: Progressing

## 2020-03-06 NOTE — ASSESSMENT & PLAN NOTE
Resolved  Patient presents to ED with hypotension suspect secondary to dehydration, she was also on Lasix 40 mg b i d  Creatinine elevated 1 49 (baseline 0 6)  Status post IV fluids, now off IV fluids for almost 72 hours  Creatinine is 0 6 today  Encourage oral intake  Monitor volume status, she has a history of lymphedema  Currently there is no lower extremity edema at all  BMP in a m

## 2020-03-07 ENCOUNTER — APPOINTMENT (INPATIENT)
Dept: RADIOLOGY | Facility: HOSPITAL | Age: 85
DRG: 074 | End: 2020-03-07
Payer: MEDICARE

## 2020-03-07 PROCEDURE — 73660 X-RAY EXAM OF TOE(S): CPT

## 2020-03-07 PROCEDURE — 99232 SBSQ HOSP IP/OBS MODERATE 35: CPT | Performed by: INTERNAL MEDICINE

## 2020-03-07 RX ADMIN — POTASSIUM CHLORIDE 20 MEQ: 1500 TABLET, EXTENDED RELEASE ORAL at 10:00

## 2020-03-07 RX ADMIN — GABAPENTIN 100 MG: 100 CAPSULE ORAL at 10:00

## 2020-03-07 RX ADMIN — LIDOCAINE 1 PATCH: 50 PATCH TOPICAL at 10:00

## 2020-03-07 RX ADMIN — MELATONIN 4000 UNITS: at 10:00

## 2020-03-07 RX ADMIN — CALCIUM CARBONATE 500 MG (1,250 MG)-VITAMIN D3 200 UNIT TABLET 1 TABLET: at 10:00

## 2020-03-07 RX ADMIN — ACETAMINOPHEN 650 MG: 325 TABLET ORAL at 22:18

## 2020-03-07 RX ADMIN — CYANOCOBALAMIN TAB 500 MCG 1000 MCG: 500 TAB at 10:00

## 2020-03-07 RX ADMIN — PANTOPRAZOLE SODIUM 40 MG: 40 TABLET, DELAYED RELEASE ORAL at 05:22

## 2020-03-07 RX ADMIN — ACETAMINOPHEN 650 MG: 325 TABLET ORAL at 05:22

## 2020-03-07 RX ADMIN — CALCIUM CARBONATE 500 MG (1,250 MG)-VITAMIN D3 200 UNIT TABLET 1 TABLET: at 16:17

## 2020-03-07 RX ADMIN — PRAMIPEXOLE DIHYDROCHLORIDE 0.25 MG: 0.25 TABLET ORAL at 22:18

## 2020-03-07 RX ADMIN — GABAPENTIN 100 MG: 100 CAPSULE ORAL at 18:17

## 2020-03-07 RX ADMIN — ACETAMINOPHEN 650 MG: 325 TABLET ORAL at 14:18

## 2020-03-07 RX ADMIN — ENOXAPARIN SODIUM 40 MG: 40 INJECTION SUBCUTANEOUS at 10:00

## 2020-03-07 RX ADMIN — LATANOPROST 1 DROP: 50 SOLUTION OPHTHALMIC at 10:00

## 2020-03-07 RX ADMIN — Medication 250 MG: at 10:00

## 2020-03-07 NOTE — PLAN OF CARE
Problem: Potential for Falls  Goal: Patient will remain free of falls  Description  INTERVENTIONS:  - Assess patient frequently for physical needs  -  Identify cognitive and physical deficits and behaviors that affect risk of falls    -  [place] fall precautions as indicated by assessment   - Educate patient/family on patient safety including physical limitations  - Instruct patient to call for assistance with activity based on assessment  - Modify environment to reduce risk of injury  - Consider OT/PT consult to assist with strengthening/mobility  Outcome: Progressing     Problem: Prexisting or High Potential for Compromised Skin Integrity  Goal: Skin integrity is maintained or improved  Description  INTERVENTIONS:  - Identify patients at risk for skin breakdown  - Assess and monitor skin integrity  - Assess and monitor nutrition and hydration status  - Monitor labs   - Assess for incontinence   - Turn and reposition patient  - Assist with mobility/ambulation  - Relieve pressure over bony prominences  - Avoid friction and shearing  - Provide appropriate hygiene as needed including keeping skin clean and dry  - Evaluate need for skin moisturizer/barrier cream  - Collaborate with interdisciplinary team   - Patient/family teaching  - Consider wound care consult   Outcome: Progressing     Problem: GASTROINTESTINAL - ADULT  Goal: Maintains adequate nutritional intake  Description  INTERVENTIONS:  - Monitor percentage of each meal consumed  - Identify factors contributing to decreased intake, treat as appropriate  - Assist with meals as needed  - Monitor I&O, weight, and lab values if indicated  - Obtain nutrition services referral as needed  Outcome: Progressing     Problem: GENITOURINARY - ADULT  Goal: Maintains or returns to baseline urinary function  Description  INTERVENTIONS:  - Assess urinary function  - Encourage oral fluids to ensure adequate hydration if ordered  - Administer IV fluids as ordered to ensure adequate hydration  - Administer ordered medications as needed  - Offer frequent toileting  - Follow urinary retention protocol if ordered  Outcome: Progressing  Goal: Absence of urinary retention  Description  INTERVENTIONS:  - Assess patients ability to void and empty bladder  - Monitor I/O  - Bladder scan as needed  - Discuss with physician/AP medications to alleviate retention as needed  - Discuss catheterization for long term situations as appropriate  Outcome: Progressing  Goal: Urinary catheter remains patent  Description  INTERVENTIONS:  - Assess patency of urinary catheter  - If patient has a chronic ward, consider changing catheter if non-functioning  - Follow guidelines for intermittent irrigation of non-functioning urinary catheter  Outcome: Progressing     Problem: METABOLIC, FLUID AND ELECTROLYTES - ADULT  Goal: Electrolytes maintained within normal limits  Description  INTERVENTIONS:  - Monitor labs and assess patient for signs and symptoms of electrolyte imbalances  - Administer electrolyte replacement as ordered  - Monitor response to electrolyte replacements, including repeat lab results as appropriate  - Instruct patient on fluid and nutrition as appropriate  Outcome: Progressing  Goal: Fluid balance maintained  Description  INTERVENTIONS:  - Monitor labs   - Monitor I/O and WT  - Instruct patient on fluid and nutrition as appropriate  - Assess for signs & symptoms of volume excess or deficit  Outcome: Progressing  Goal: Glucose maintained within target range  Description  INTERVENTIONS:  - Monitor Blood Glucose as ordered  - Assess for signs and symptoms of hyperglycemia and hypoglycemia  - Administer ordered medications to maintain glucose within target range  - Assess nutritional intake and initiate nutrition service referral as needed  Outcome: Progressing     Problem: SKIN/TISSUE INTEGRITY - ADULT  Goal: Skin integrity remains intact  Description  INTERVENTIONS  - Identify patients at risk for skin breakdown  - Assess and monitor skin integrity  - Assess and monitor nutrition and hydration status  - Monitor labs (i e  albumin)  - Assess for incontinence   - Turn and reposition patient  - Assist with mobility/ambulation  - Relieve pressure over bony prominences  - Avoid friction and shearing  - Provide appropriate hygiene as needed including keeping skin clean and dry  - Evaluate need for skin moisturizer/barrier cream  - Collaborate with interdisciplinary team (i e  Nutrition, Rehabilitation, etc )   - Patient/family teaching  Outcome: Progressing  Goal: Incision(s), wounds(s) or drain site(s) healing without S/S of infection  Description  INTERVENTIONS  - Assess and document risk factors for skin impairment   - Assess and document dressing, incision, wound bed, drain sites and surrounding tissue  - Consider nutrition services referral as needed  - Oral mucous membranes remain intact  - Provide patient/ family education  Outcome: Progressing  Goal: Oral mucous membranes remain intact  Description  INTERVENTIONS  - Assess oral mucosa and hygiene practices  - Implement preventative oral hygiene regimen  - Implement oral medicated treatments as ordered  - Initiate Nutrition services referral as needed  Outcome: Progressing     Problem: MUSCULOSKELETAL - ADULT  Goal: Maintain or return mobility to safest level of function  Description  INTERVENTIONS:  - Assess patient's ability to carry out ADLs; assess patient's baseline for ADL function and identify physical deficits which impact ability to perform ADLs (bathing, care of mouth/teeth, toileting, grooming, dressing, etc )  - Assess/evaluate cause of self-care deficits   - Assess range of motion  - Assess patient's mobility  - Assess patient's need for assistive devices and provide as appropriate  - Encourage maximum independence but intervene and supervise when necessary  - Involve family in performance of ADLs  - Assess for home care needs following discharge   - Consider OT consult to assist with ADL evaluation and planning for discharge  - Provide patient education as appropriate  Outcome: Progressing  Goal: Maintain proper alignment of affected body part  Description  INTERVENTIONS:  - Support, maintain and protect limb and body alignment  - Provide patient/ family with appropriate education  Outcome: Progressing     Problem: Nutrition/Hydration-ADULT  Goal: Nutrient/Hydration intake appropriate for improving, restoring or maintaining nutritional needs  Description  Monitor and assess patient's nutrition/hydration status for malnutrition  Collaborate with interdisciplinary team and initiate plan and interventions as ordered  Monitor patient's weight and dietary intake as ordered or per policy  Utilize nutrition screening tool and intervene as necessary  Determine patient's food preferences and provide high-protein, high-caloric foods as appropriate       INTERVENTIONS:  - Monitor oral intake, urinary output, labs, and treatment plans  - Assess nutrition and hydration status and recommend course of action  - Evaluate amount of meals eaten  - Assist patient with eating if necessary   - Allow adequate time for meals  - Recommend/ encourage appropriate diets, oral nutritional supplements, and vitamin/mineral supplements  - Order, calculate, and assess calorie counts as needed  - Recommend, monitor, and adjust tube feedings and TPN/PPN based on assessed needs  - Assess need for intravenous fluids  - Provide specific nutrition/hydration education as appropriate  - Include patient/family/caregiver in decisions related to nutrition  Outcome: Progressing

## 2020-03-07 NOTE — ASSESSMENT & PLAN NOTE
Resolved  Patient presents to ED with hypotension suspect secondary to dehydration, she was also on Lasix 40 mg b i d  Creatinine elevated 1 49 (baseline 0 6)  Status post IV fluids, now off IV fluids for 5 days, with good oral intake  Creatinine yesterday 0 73   Monitor volume status, she has a history of lymphedema    Currently there is no lower extremity edema  No further intervention indicated, patient is stable for discharge

## 2020-03-07 NOTE — ASSESSMENT & PLAN NOTE
Bilateral feet round callus with deformity, patient follows Podiatry as outpatient  She had callus on the left 5th toe, wound moist and open today    Toe is not red today, will do an x-ray  Seen by wound care

## 2020-03-07 NOTE — PROGRESS NOTES
Progress Note - Lois Linares 9/20/1926, 80 y o  female MRN: [id number]    Unit/Bed#: E5 -01 Encounter: [id number]    Primary Care Provider: Melany Jeans, DO   Date and time admitted to hospital: 3/1/2020  8:23 PM        * SUNDAR (acute kidney injury) [facility])  Assessment & Plan  Resolved  Patient presents to ED with hypotension suspect secondary to dehydration, she was also on Lasix 40 mg b i d  Creatinine elevated 1 49 (baseline 0 6)  Status post IV fluids, now off IV fluids for 5 days, with good oral intake  Creatinine yesterday 0 73   Monitor volume status, she has a history of lymphedema  Currently there is no lower extremity edema  No further intervention indicated, patient is stable for discharge    Shingles  Assessment & Plan  Evaluated by ID, appreciate recommendations  Discontinue airborne isolation, continue contact isolation  Valtrex finished 3/4  Serial skin exam, local wound care   Post herpetic neurologia, gabapentin 100 mg b i d , pain is well controlled  Tylenol around the clock  Tramadol p r n  For arthritis prn 12 h, home medication  From ID standpoint patient is cleared for discharge      Callus  Assessment & Plan  Bilateral feet round callus with deformity, patient follows Podiatry as outpatient  She had callus on the left 5th toe, wound moist and open today  Toe is not red today, will do an x-ray  Seen by wound care    Pressure injury of coccygeal region, stage 2 [facility])  Assessment & Plan  Wound care on board, she has a pressure injury to coccyx, stage II versus deep tissue injury    Most likely Re absorbing deep tissue injury    Urinary retention  Assessment & Plan  History of chronic UTIs  Had Orantes placed in ED during prior hospitalization 2/21 for urinary retention  Evaluated by Urology, Orantes removed 3/4 for a voiding trial   Patient is voiding, no PVR   Per urology, continue to monitor off Orantes, no urologic intervention at this point    Restless legs syndrome  Assessment & Plan  Continue Mirapex    Leg edema  Assessment & Plan  Maintained at home on 40 mg of Lasix b i d  Was held in the setting of dehydration and SUNDAR  No lower extremity edema, continue to hold Lasix for now      Glaucoma  Assessment & Plan  Continue eyedrops    GERD (gastroesophageal reflux disease)  Assessment & Plan  Continue daily Protonix    Decreased ambulation status  Assessment & Plan  Ambulates with walker at baseline  Daughter reports patient has not been able to get out of bed for the past couple of weeks secondary to weakness  PT OT        VTE Pharmacologic Prophylaxis:   Pharmacologic: Enoxaparin (Lovenox)  Mechanical VTE Prophylaxis in Place: Yes    Patient Centered Rounds: I have performed bedside rounds with nursing staff today  Discussions with Specialists or Other Care Team Provider:  Nursing, case management    Education and Discussions with Family / Patient:  Patient    Time Spent for Care: 30 minutes  More than 50% of total time spent on counseling and coordination of care as described above  Current Length of Stay: 5 day(s)    Current Patient Status: Inpatient   Certification Statement: The patient will continue to require additional inpatient hospital stay due to Patient is medically cleared for discharge    Discharge Plan:  Patient is medically cleared for discharge    Code Status: Level 3 - DNAR and DNI      Subjective:   Patient was seen and evaluated bedside, she is feeling well, denies chest pain palpitation shortness of breath  Neuropathic pain is well controlled  Denies trouble with urination  Daughter is very focused on her previous discharge to [facility] where she "almost " because medications were not provided for her for 24 hours  When I tried to discuss potential discharge with her on Thursday she started yelling (she was very inappropriate), that we are trying to kick her mother out from the hospital because we need an empty bed    I explained to her that her acute kidney injury resolved, she finished her treatment for shingles and all lesions are crusted  Per ID standpoint no need to stay in the hospital   Orantes was removed, no PVR, per urology no further intervention keep off Orantes  She told me "don't even think about discharging my mother over the weekend "  On Friday morning when I called her with updates, after trying to discuss possible discharge she became very angry and she hung up the phone  Objective:     Vitals:   Temp (24hrs), Av 5 °F (36 4 °C), Min:97 4 °F (36 3 °C), Max:97 7 °F (36 5 °C)    Temp:  [97 4 °F (36 3 °C)-97 7 °F (36 5 °C)] 97 4 °F (36 3 °C)  HR:  [67-73] 73  Resp:  [17-19] 19  BP: (115-150)/(58-72) 150/72  SpO2:  [93 %-97 %] 97 %  Body mass index is 31 6 kg/m²  Input and Output Summary (last 24 hours): Intake/Output Summary (Last 24 hours) at 3/7/2020 1811  Last data filed at 3/7/2020 1150  Gross per 24 hour   Intake 720 ml   Output 93 ml   Net 627 ml       Physical Exam:     Physical Exam   Constitutional: She is oriented to person, place, and time  She appears well-developed and well-nourished  No distress  HENT:   Head: Normocephalic and atraumatic  Eyes: EOM are normal    Neck: Normal range of motion  Neck supple  Cardiovascular: Normal rate, regular rhythm and normal heart sounds  Exam reveals no gallop and no friction rub  No murmur heard  Pulmonary/Chest: Effort normal and breath sounds normal  No respiratory distress  She has no wheezes  Abdominal: Soft  Bowel sounds are normal  She exhibits no distension  There is no tenderness  Musculoskeletal: She exhibits deformity  She exhibits no edema  Calluses on feet   Neurological: She is alert and oriented to person, place, and time  No cranial nerve deficit  Skin: Skin is warm and dry  Psychiatric: She has a normal mood and affect       Additional Data:     Labs:    Results from last 7 days   Lab Units 20  0532   WBC Thousand/uL 5 55   HEMOGLOBIN g/dL 10 1*   HEMATOCRIT % 31 0*   PLATELETS Thousands/uL 275   NEUTROS PCT % 51   LYMPHS PCT % 34   MONOS PCT % 11   EOS PCT % 3     Results from last 7 days   Lab Units 03/06/20  0623  03/01/20  2036   SODIUM mmol/L 137   < > 133*   POTASSIUM mmol/L 4 3   < > 4 1   CHLORIDE mmol/L 103   < > 98*   CO2 mmol/L 28   < > 25   BUN mg/dL 10   < > 24   CREATININE mg/dL 0 73   < > 1 49*   ANION GAP mmol/L 6   < > 10   CALCIUM mg/dL 9 6   < > 9 1   ALBUMIN g/dL  --   --  2 3*   TOTAL BILIRUBIN mg/dL  --   --  0 34   ALK PHOS U/L  --   --  88   ALT U/L  --   --  15   AST U/L  --   --  19   GLUCOSE RANDOM mg/dL 101   < > 121    < > = values in this interval not displayed  Results from last 7 days   Lab Units 03/01/20  2306 03/01/20  2105   LACTIC ACID mmol/L 1 0 2 2*           * I Have Reviewed All Lab Data Listed Above  * Additional Pertinent Lab Tests Reviewed:  Yelena Parker Admission Reviewed    Imaging:    Imaging Reports Reviewed Today Include: all  Imaging Personally Reviewed by Myself Includes:      Recent Cultures (last 7 days):           Last 24 Hours Medication List:     Current Facility-Administered Medications:  acetaminophen 325 mg Oral Q8H PRN Anjali Benitez PA-C   acetaminophen 650 mg Oral Q8H [facility] Siomara Sheth MD   calcium carbonate 1,000 mg Oral Daily PRN Anjali Benitez PA-C   calcium carbonate-vitamin D 1 tablet Oral BID With Meals Anjali Benitez PA-C   chlordiazePOXIDE 25 mg Oral Daily PRN Krishna Hall PA-C   cholecalciferol 4,000 Units Oral Daily Anjali Smaudide, PA-BENITA   vitamin B-12 1,000 mcg Oral Daily Anjali Smaudide, PA-C   enoxaparin 40 mg Subcutaneous Daily Anjali Smaudide, PA-C   gabapentin 100 mg Oral BID Siomara Sheth MD   latanoprost 1 drop Left Eye Daily AVIVA Day-BENITA   lidocaine 1 patch Topical Daily Anjali Smaudide, PA-C   magnesium gluconate 250 mg Oral Daily Anjalisharon Benitez PA-C   meclizine 25 mg Oral TID PRN Anjali Benitez PA-C   ondansetron 4 mg Intravenous Q6H PRN Ish Andrey Benitez, NITZA   pantoprazole 40 mg Oral Early Morning Anjali Smudde, NITZA   potassium chloride 20 mEq Oral Daily Anjali Smudde, PA-BENITA   pramipexole 0 25 mg Oral Daily Anjali Smudde, NITZA   senna 1 tablet Oral HS PRN Anjali Smudde, NITZA   traMADol 50 mg Oral Q12H PRN Loki Hickman PA-C        Today, Patient Was Seen By: John Landers MD    ** Please Note: Dictation voice to text software may have been used in the creation of this document   **

## 2020-03-07 NOTE — ASSESSMENT & PLAN NOTE
History of chronic UTIs  Had Orantes placed in ED during prior hospitalization 2/21 for urinary retention  Evaluated by Urology, Orantes removed 3/4 for a voiding trial   Patient is voiding, no PVR   Per urology, continue to monitor off Orantes, no urologic intervention at this point

## 2020-03-08 PROCEDURE — 99232 SBSQ HOSP IP/OBS MODERATE 35: CPT | Performed by: INTERNAL MEDICINE

## 2020-03-08 RX ORDER — CHLORDIAZEPOXIDE HYDROCHLORIDE 25 MG/1
25 CAPSULE, GELATIN COATED ORAL
Status: DISCONTINUED | OUTPATIENT
Start: 2020-03-08 | End: 2020-03-08

## 2020-03-08 RX ORDER — CHLORDIAZEPOXIDE HYDROCHLORIDE 25 MG/1
25 CAPSULE, GELATIN COATED ORAL
Status: DISCONTINUED | OUTPATIENT
Start: 2020-03-08 | End: 2020-03-09 | Stop reason: HOSPADM

## 2020-03-08 RX ADMIN — GABAPENTIN 100 MG: 100 CAPSULE ORAL at 09:52

## 2020-03-08 RX ADMIN — MELATONIN 4000 UNITS: at 09:52

## 2020-03-08 RX ADMIN — TRAMADOL HYDROCHLORIDE 50 MG: 50 TABLET, FILM COATED ORAL at 00:48

## 2020-03-08 RX ADMIN — CHLORDIAZEPOXIDE HYDROCHLORIDE 25 MG: 25 CAPSULE ORAL at 23:18

## 2020-03-08 RX ADMIN — CALCIUM CARBONATE 500 MG (1,250 MG)-VITAMIN D3 200 UNIT TABLET 1 TABLET: at 08:00

## 2020-03-08 RX ADMIN — ACETAMINOPHEN 325 MG: 325 TABLET ORAL at 10:36

## 2020-03-08 RX ADMIN — Medication 250 MG: at 09:54

## 2020-03-08 RX ADMIN — CYANOCOBALAMIN TAB 500 MCG 1000 MCG: 500 TAB at 09:53

## 2020-03-08 RX ADMIN — ACETAMINOPHEN 650 MG: 325 TABLET ORAL at 21:04

## 2020-03-08 RX ADMIN — GABAPENTIN 100 MG: 100 CAPSULE ORAL at 18:26

## 2020-03-08 RX ADMIN — PANTOPRAZOLE SODIUM 40 MG: 40 TABLET, DELAYED RELEASE ORAL at 06:24

## 2020-03-08 RX ADMIN — POTASSIUM CHLORIDE 20 MEQ: 1500 TABLET, EXTENDED RELEASE ORAL at 09:53

## 2020-03-08 RX ADMIN — ACETAMINOPHEN 650 MG: 325 TABLET ORAL at 15:00

## 2020-03-08 RX ADMIN — TRAMADOL HYDROCHLORIDE 50 MG: 50 TABLET, FILM COATED ORAL at 23:17

## 2020-03-08 RX ADMIN — ACETAMINOPHEN 650 MG: 325 TABLET ORAL at 06:24

## 2020-03-08 RX ADMIN — LIDOCAINE 1 PATCH: 50 PATCH TOPICAL at 09:52

## 2020-03-08 RX ADMIN — LATANOPROST 1 DROP: 50 SOLUTION OPHTHALMIC at 09:54

## 2020-03-08 RX ADMIN — ENOXAPARIN SODIUM 40 MG: 40 INJECTION SUBCUTANEOUS at 09:53

## 2020-03-08 RX ADMIN — CALCIUM CARBONATE 500 MG (1,250 MG)-VITAMIN D3 200 UNIT TABLET 1 TABLET: at 16:07

## 2020-03-08 RX ADMIN — TRAMADOL HYDROCHLORIDE 50 MG: 50 TABLET, FILM COATED ORAL at 12:33

## 2020-03-08 RX ADMIN — PRAMIPEXOLE DIHYDROCHLORIDE 0.25 MG: 0.25 TABLET ORAL at 23:18

## 2020-03-08 NOTE — ASSESSMENT & PLAN NOTE
Feet callus with deformity, patient follows Podiatry as outpatient  She had callus on the left 5th toe, wound moist and open 3 days ago after moving and cleaning the patient  Evaluated by wound care    To looks much better in the past 2 days, no redness, or pain or drainage

## 2020-03-08 NOTE — PROGRESS NOTES
Progress Note - Osvaldo Stabs 9/20/1926, 80 y o  female MRN: [id number]    Unit/Bed#: E5 -01 Encounter: [id number]    Primary Care Provider: Romayne Kasal, DO   Date and time admitted to hospital: 3/1/2020  8:23 PM        * SUNDAR (acute kidney injury) [facility])  Assessment & Plan  Resolved  Patient presents to ED with hypotension suspect secondary to dehydration, she was also on Lasix 40 mg b i d  Creatinine elevated 1 49 (baseline 0 6)  Status post IV fluids, now off IV fluids for 6 days, with good oral intake  Last creatinine 0 73   Monitor volume status, she has a history of lymphedema  Currently there is no lower extremity edema  No further intervention indicated, patient is stable for discharge    Shingles  Assessment & Plan  Evaluated by ID, appreciate recommendations  Discontinue airborne isolation, continue contact isolation  Valtrex finished 3/4  Serial skin exam, local wound care   Post herpetic neurologia, gabapentin 100 mg b i d , pain is well controlled  Tylenol around the clock  Tramadol p r n  For arthritis prn 12 h, home medication  From ID standpoint patient is cleared for discharge      Callus  Assessment & Plan  Feet callus with deformity, patient follows Podiatry as outpatient  She had callus on the left 5th toe, wound moist and open 3 days ago after moving and cleaning the patient  Evaluated by wound care  To looks much better in the past 2 days, no redness, or pain or drainage    Pressure injury of coccygeal region, stage 2 [facility])  Assessment & Plan  Wound care on board, she has a pressure injury to coccyx, stage II versus deep tissue injury    Most likely Re absorbing deep tissue injury    Urinary retention  Assessment & Plan  History of chronic UTIs  Had Orantes placed in ED during prior hospitalization 2/21 for urinary retention  Evaluated by Urology, Orantes removed 3/4 for a voiding trial   Patient is voiding, no PVR   Per urology, continue to monitor off Orantes, no urologic intervention at this point    Restless legs syndrome  Assessment & Plan  Continue Mirapex    Leg edema  Assessment & Plan  Maintained at home on 40 mg of Lasix b i d  Was held in the setting of dehydration and SUNDAR  No lower extremity edema, continue to hold Lasix for now      Glaucoma  Assessment & Plan  Continue eyedrops    GERD (gastroesophageal reflux disease)  Assessment & Plan  Continue daily Protonix    Decreased ambulation status  Assessment & Plan  Ambulates with walker at baseline  Daughter reports patient has not been able to get out of bed for the past couple of weeks secondary to weakness  PT OT    Anxiety  Assessment & Plan  Patients daughter requested her mother to be placed on Librium 25 mg at bedtime which is her home medication        VTE Pharmacologic Prophylaxis:   Pharmacologic: Enoxaparin (Lovenox)  Mechanical VTE Prophylaxis in Place: Yes    Patient Centered Rounds: I have performed bedside rounds with nursing staff today  Discussions with Specialists or Other Care Team Provider: nursing    Education and Discussions with Family / Patient: patient    Time Spent for Care: 30 minutes  More than 50% of total time spent on counseling and coordination of care as described above  Current Length of Stay: 6 day(s)    Current Patient Status: Inpatient   Certification Statement: The patient will continue to require additional inpatient hospital stay due to Patient is medically stable for discharge    Discharge Plan:  Patient is medically stable for discharge    Code Status: Level 3 - DNAR and DNI      Subjective:   Patient was seen and evaluated bedside, she had some tolerable pain in the shingles area this morning which resolved       Objective:     Vitals:   Temp (24hrs), Av 9 °F (36 6 °C), Min:97 6 °F (36 4 °C), Max:98 2 °F (36 8 °C)    Temp:  [97 6 °F (36 4 °C)-98 2 °F (36 8 °C)] 97 6 °F (36 4 °C)  HR:  [67-77] 77  Resp:  [18-19] 19  BP: (112-142)/(59-81) 112/59  SpO2:  [96 %-98 %] 97 %  Body mass index is 31 6 kg/m²  Input and Output Summary (last 24 hours): Intake/Output Summary (Last 24 hours) at 3/8/2020 1808  Last data filed at 3/8/2020 1100  Gross per 24 hour   Intake 240 ml   Output 100 ml   Net 140 ml       Physical Exam:     Physical Exam   Constitutional: She appears well-developed  No distress  HENT:   Head: Normocephalic and atraumatic  Eyes: EOM are normal    Neck: Normal range of motion  Neck supple  Cardiovascular: Normal rate, regular rhythm and normal heart sounds  Exam reveals no gallop and no friction rub  No murmur heard  Pulmonary/Chest: Effort normal and breath sounds normal  No respiratory distress  She has no wheezes  Abdominal: Soft  Bowel sounds are normal  She exhibits no distension  There is no tenderness  Musculoskeletal: She exhibits deformity  She exhibits no edema  Neurological: She is alert  No cranial nerve deficit  Skin: Skin is warm and dry  Shingle lesions on the left side of the chest, crusted   Psychiatric: She has a normal mood and affect  Additional Data:     Labs:    Results from last 7 days   Lab Units 03/03/20  0532   WBC Thousand/uL 5 55   HEMOGLOBIN g/dL 10 1*   HEMATOCRIT % 31 0*   PLATELETS Thousands/uL 275   NEUTROS PCT % 51   LYMPHS PCT % 34   MONOS PCT % 11   EOS PCT % 3     Results from last 7 days   Lab Units 03/06/20  0623  03/01/20  2036   SODIUM mmol/L 137   < > 133*   POTASSIUM mmol/L 4 3   < > 4 1   CHLORIDE mmol/L 103   < > 98*   CO2 mmol/L 28   < > 25   BUN mg/dL 10   < > 24   CREATININE mg/dL 0 73   < > 1 49*   ANION GAP mmol/L 6   < > 10   CALCIUM mg/dL 9 6   < > 9 1   ALBUMIN g/dL  --   --  2 3*   TOTAL BILIRUBIN mg/dL  --   --  0 34   ALK PHOS U/L  --   --  88   ALT U/L  --   --  15   AST U/L  --   --  19   GLUCOSE RANDOM mg/dL 101   < > 121    < > = values in this interval not displayed                   Results from last 7 days   Lab Units 03/01/20  2306 03/01/20  2105   LACTIC ACID mmol/L 1 0 2 2* * I Have Reviewed All Lab Data Listed Above  * Additional Pertinent Lab Tests Reviewed: Yelena 66 Admission Reviewed    Imaging:    Imaging Reports Reviewed Today Include: all  Imaging Personally Reviewed by Myself Includes:      Recent Cultures (last 7 days):           Last 24 Hours Medication List:     Current Facility-Administered Medications:  acetaminophen 325 mg Oral Q8H PRN Anjali Smudde, PA-C   acetaminophen 650 mg Oral Q8H Chiquita Huynh MD   calcium carbonate 1,000 mg Oral Daily PRN Anjali Smudde, PA-C   calcium carbonate-vitamin D 1 tablet Oral BID With Meals Anjali Smudde, PA-C   chlordiazePOXIDE 25 mg Oral HS Fina Ribera MD   cholecalciferol 4,000 Units Oral Daily Anjali Smudde, PA-C   vitamin B-12 1,000 mcg Oral Daily Anjali Smudde, PA-C   enoxaparin 40 mg Subcutaneous Daily Anjali Smudde, PA-C   gabapentin 100 mg Oral BID Fina Ribera MD   latanoprost 1 drop Left Eye Daily Anjali Smudde, PA-C   lidocaine 1 patch Topical Daily Anjali Smudde, PA-C   magnesium gluconate 250 mg Oral Daily Anjali Smudde, PA-C   meclizine 25 mg Oral TID PRN Cristiane Asper Smudde, PA-C   ondansetron 4 mg Intravenous Q6H PRN Anjali Smudde, PA-C   pantoprazole 40 mg Oral Early Morning Anjali Smudde, PA-C   potassium chloride 20 mEq Oral Daily Anjali Smudde, PA-C   pramipexole 0 25 mg Oral Daily Anjali Smudde, PA-C   senna 1 tablet Oral HS PRN Anjali Smudde, PA-C   traMADol 50 mg Oral Q12H PRN True Tinajero, NITZA        Today, Patient Was Seen By: Fina Ribera MD    ** Please Note: Dictation voice to text software may have been used in the creation of this document   **
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
## 2020-03-08 NOTE — ASSESSMENT & PLAN NOTE
Resolved  Patient presents to ED with hypotension suspect secondary to dehydration, she was also on Lasix 40 mg b i d  Creatinine elevated 1 49 (baseline 0 6)  Status post IV fluids, now off IV fluids for 6 days, with good oral intake  Last creatinine 0 73   Monitor volume status, she has a history of lymphedema    Currently there is no lower extremity edema  No further intervention indicated, patient is stable for discharge

## 2020-03-08 NOTE — PLAN OF CARE
Problem: Potential for Falls  Goal: Patient will remain free of falls  Description  INTERVENTIONS:  - Assess patient frequently for physical needs  -  Identify cognitive and physical deficits and behaviors that affect risk of falls    -  [place] fall precautions as indicated by assessment   - Educate patient/family on patient safety including physical limitations  - Instruct patient to call for assistance with activity based on assessment  - Modify environment to reduce risk of injury  - Consider OT/PT consult to assist with strengthening/mobility  Outcome: Progressing     Problem: Prexisting or High Potential for Compromised Skin Integrity  Goal: Skin integrity is maintained or improved  Description  INTERVENTIONS:  - Identify patients at risk for skin breakdown  - Assess and monitor skin integrity  - Assess and monitor nutrition and hydration status  - Monitor labs   - Assess for incontinence   - Turn and reposition patient  - Assist with mobility/ambulation  - Relieve pressure over bony prominences  - Avoid friction and shearing  - Provide appropriate hygiene as needed including keeping skin clean and dry  - Evaluate need for skin moisturizer/barrier cream  - Collaborate with interdisciplinary team   - Patient/family teaching  - Consider wound care consult   Outcome: Progressing     Problem: GASTROINTESTINAL - ADULT  Goal: Maintains adequate nutritional intake  Description  INTERVENTIONS:  - Monitor percentage of each meal consumed  - Identify factors contributing to decreased intake, treat as appropriate  - Assist with meals as needed  - Monitor I&O, weight, and lab values if indicated  - Obtain nutrition services referral as needed  Outcome: Progressing     Problem: GENITOURINARY - ADULT  Goal: Maintains or returns to baseline urinary function  Description  INTERVENTIONS:  - Assess urinary function  - Encourage oral fluids to ensure adequate hydration if ordered  - Administer IV fluids as ordered to ensure adequate hydration  - Administer ordered medications as needed  - Offer frequent toileting  - Follow urinary retention protocol if ordered  Outcome: Progressing  Goal: Absence of urinary retention  Description  INTERVENTIONS:  - Assess patients ability to void and empty bladder  - Monitor I/O  - Bladder scan as needed  - Discuss with physician/AP medications to alleviate retention as needed  - Discuss catheterization for long term situations as appropriate  Outcome: Progressing  Goal: Urinary catheter remains patent  Description  INTERVENTIONS:  - Assess patency of urinary catheter  - If patient has a chronic ward, consider changing catheter if non-functioning  - Follow guidelines for intermittent irrigation of non-functioning urinary catheter  Outcome: Progressing     Problem: METABOLIC, FLUID AND ELECTROLYTES - ADULT  Goal: Electrolytes maintained within normal limits  Description  INTERVENTIONS:  - Monitor labs and assess patient for signs and symptoms of electrolyte imbalances  - Administer electrolyte replacement as ordered  - Monitor response to electrolyte replacements, including repeat lab results as appropriate  - Instruct patient on fluid and nutrition as appropriate  Outcome: Progressing  Goal: Fluid balance maintained  Description  INTERVENTIONS:  - Monitor labs   - Monitor I/O and WT  - Instruct patient on fluid and nutrition as appropriate  - Assess for signs & symptoms of volume excess or deficit  Outcome: Progressing  Goal: Glucose maintained within target range  Description  INTERVENTIONS:  - Monitor Blood Glucose as ordered  - Assess for signs and symptoms of hyperglycemia and hypoglycemia  - Administer ordered medications to maintain glucose within target range  - Assess nutritional intake and initiate nutrition service referral as needed  Outcome: Progressing     Problem: SKIN/TISSUE INTEGRITY - ADULT  Goal: Skin integrity remains intact  Description  INTERVENTIONS  - Identify patients at risk for skin breakdown  - Assess and monitor skin integrity  - Assess and monitor nutrition and hydration status  - Monitor labs (i e  albumin)  - Assess for incontinence   - Turn and reposition patient  - Assist with mobility/ambulation  - Relieve pressure over bony prominences  - Avoid friction and shearing  - Provide appropriate hygiene as needed including keeping skin clean and dry  - Evaluate need for skin moisturizer/barrier cream  - Collaborate with interdisciplinary team (i e  Nutrition, Rehabilitation, etc )   - Patient/family teaching  Outcome: Progressing  Goal: Incision(s), wounds(s) or drain site(s) healing without S/S of infection  Description  INTERVENTIONS  - Assess and document risk factors for skin impairment   - Assess and document dressing, incision, wound bed, drain sites and surrounding tissue  - Consider nutrition services referral as needed  - Oral mucous membranes remain intact  - Provide patient/ family education  Outcome: Progressing  Goal: Oral mucous membranes remain intact  Description  INTERVENTIONS  - Assess oral mucosa and hygiene practices  - Implement preventative oral hygiene regimen  - Implement oral medicated treatments as ordered  - Initiate Nutrition services referral as needed  Outcome: Progressing     Problem: MUSCULOSKELETAL - ADULT  Goal: Maintain or return mobility to safest level of function  Description  INTERVENTIONS:  - Assess patient's ability to carry out ADLs; assess patient's baseline for ADL function and identify physical deficits which impact ability to perform ADLs (bathing, care of mouth/teeth, toileting, grooming, dressing, etc )  - Assess/evaluate cause of self-care deficits   - Assess range of motion  - Assess patient's mobility  - Assess patient's need for assistive devices and provide as appropriate  - Encourage maximum independence but intervene and supervise when necessary  - Involve family in performance of ADLs  - Assess for home care needs following discharge   - Consider OT consult to assist with ADL evaluation and planning for discharge  - Provide patient education as appropriate  Outcome: Progressing  Goal: Maintain proper alignment of affected body part  Description  INTERVENTIONS:  - Support, maintain and protect limb and body alignment  - Provide patient/ family with appropriate education  Outcome: Progressing     Problem: Nutrition/Hydration-ADULT  Goal: Nutrient/Hydration intake appropriate for improving, restoring or maintaining nutritional needs  Description  Monitor and assess patient's nutrition/hydration status for malnutrition  Collaborate with interdisciplinary team and initiate plan and interventions as ordered  Monitor patient's weight and dietary intake as ordered or per policy  Utilize nutrition screening tool and intervene as necessary  Determine patient's food preferences and provide high-protein, high-caloric foods as appropriate       INTERVENTIONS:  - Monitor oral intake, urinary output, labs, and treatment plans  - Assess nutrition and hydration status and recommend course of action  - Evaluate amount of meals eaten  - Assist patient with eating if necessary   - Allow adequate time for meals  - Recommend/ encourage appropriate diets, oral nutritional supplements, and vitamin/mineral supplements  - Order, calculate, and assess calorie counts as needed  - Recommend, monitor, and adjust tube feedings and TPN/PPN based on assessed needs  - Assess need for intravenous fluids  - Provide specific nutrition/hydration education as appropriate  - Include patient/family/caregiver in decisions related to nutrition  Outcome: Progressing

## 2020-03-08 NOTE — PLAN OF CARE
Problem: Potential for Falls  Goal: Patient will remain free of falls  Description  INTERVENTIONS:  - Assess patient frequently for physical needs  -  Identify cognitive and physical deficits and behaviors that affect risk of falls    -  [place] fall precautions as indicated by assessment   - Educate patient/family on patient safety including physical limitations  - Instruct patient to call for assistance with activity based on assessment  - Modify environment to reduce risk of injury  - Consider OT/PT consult to assist with strengthening/mobility  Outcome: Progressing     Problem: Prexisting or High Potential for Compromised Skin Integrity  Goal: Skin integrity is maintained or improved  Description  INTERVENTIONS:  - Identify patients at risk for skin breakdown  - Assess and monitor skin integrity  - Assess and monitor nutrition and hydration status  - Monitor labs   - Assess for incontinence   - Turn and reposition patient  - Assist with mobility/ambulation  - Relieve pressure over bony prominences  - Avoid friction and shearing  - Provide appropriate hygiene as needed including keeping skin clean and dry  - Evaluate need for skin moisturizer/barrier cream  - Collaborate with interdisciplinary team   - Patient/family teaching  - Consider wound care consult   Outcome: Progressing     Problem: GASTROINTESTINAL - ADULT  Goal: Maintains adequate nutritional intake  Description  INTERVENTIONS:  - Monitor percentage of each meal consumed  - Identify factors contributing to decreased intake, treat as appropriate  - Assist with meals as needed  - Monitor I&O, weight, and lab values if indicated  - Obtain nutrition services referral as needed  Outcome: Progressing     Problem: GENITOURINARY - ADULT  Goal: Maintains or returns to baseline urinary function  Description  INTERVENTIONS:  - Assess urinary function  - Encourage oral fluids to ensure adequate hydration if ordered  - Administer IV fluids as ordered to ensure adequate hydration  - Administer ordered medications as needed  - Offer frequent toileting  - Follow urinary retention protocol if ordered  Outcome: Progressing  Goal: Absence of urinary retention  Description  INTERVENTIONS:  - Assess patients ability to void and empty bladder  - Monitor I/O  - Bladder scan as needed  - Discuss with physician/AP medications to alleviate retention as needed  - Discuss catheterization for long term situations as appropriate  Outcome: Progressing  Goal: Urinary catheter remains patent  Description  INTERVENTIONS:  - Assess patency of urinary catheter  - If patient has a chronic ward, consider changing catheter if non-functioning  - Follow guidelines for intermittent irrigation of non-functioning urinary catheter  Outcome: Progressing     Problem: METABOLIC, FLUID AND ELECTROLYTES - ADULT  Goal: Electrolytes maintained within normal limits  Description  INTERVENTIONS:  - Monitor labs and assess patient for signs and symptoms of electrolyte imbalances  - Administer electrolyte replacement as ordered  - Monitor response to electrolyte replacements, including repeat lab results as appropriate  - Instruct patient on fluid and nutrition as appropriate  Outcome: Progressing  Goal: Fluid balance maintained  Description  INTERVENTIONS:  - Monitor labs   - Monitor I/O and WT  - Instruct patient on fluid and nutrition as appropriate  - Assess for signs & symptoms of volume excess or deficit  Outcome: Progressing  Goal: Glucose maintained within target range  Description  INTERVENTIONS:  - Monitor Blood Glucose as ordered  - Assess for signs and symptoms of hyperglycemia and hypoglycemia  - Administer ordered medications to maintain glucose within target range  - Assess nutritional intake and initiate nutrition service referral as needed  Outcome: Progressing     Problem: SKIN/TISSUE INTEGRITY - ADULT  Goal: Skin integrity remains intact  Description  INTERVENTIONS  - Identify patients at risk for skin breakdown  - Assess and monitor skin integrity  - Assess and monitor nutrition and hydration status  - Monitor labs (i e  albumin)  - Assess for incontinence   - Turn and reposition patient  - Assist with mobility/ambulation  - Relieve pressure over bony prominences  - Avoid friction and shearing  - Provide appropriate hygiene as needed including keeping skin clean and dry  - Evaluate need for skin moisturizer/barrier cream  - Collaborate with interdisciplinary team (i e  Nutrition, Rehabilitation, etc )   - Patient/family teaching  Outcome: Progressing  Goal: Incision(s), wounds(s) or drain site(s) healing without S/S of infection  Description  INTERVENTIONS  - Assess and document risk factors for skin impairment   - Assess and document dressing, incision, wound bed, drain sites and surrounding tissue  - Consider nutrition services referral as needed  - Oral mucous membranes remain intact  - Provide patient/ family education  Outcome: Progressing  Goal: Oral mucous membranes remain intact  Description  INTERVENTIONS  - Assess oral mucosa and hygiene practices  - Implement preventative oral hygiene regimen  - Implement oral medicated treatments as ordered  - Initiate Nutrition services referral as needed  Outcome: Progressing     Problem: MUSCULOSKELETAL - ADULT  Goal: Maintain or return mobility to safest level of function  Description  INTERVENTIONS:  - Assess patient's ability to carry out ADLs; assess patient's baseline for ADL function and identify physical deficits which impact ability to perform ADLs (bathing, care of mouth/teeth, toileting, grooming, dressing, etc )  - Assess/evaluate cause of self-care deficits   - Assess range of motion  - Assess patient's mobility  - Assess patient's need for assistive devices and provide as appropriate  - Encourage maximum independence but intervene and supervise when necessary  - Involve family in performance of ADLs  - Assess for home care needs following discharge   - Consider OT consult to assist with ADL evaluation and planning for discharge  - Provide patient education as appropriate  Outcome: Progressing  Goal: Maintain proper alignment of affected body part  Description  INTERVENTIONS:  - Support, maintain and protect limb and body alignment  - Provide patient/ family with appropriate education  Outcome: Progressing     Problem: Nutrition/Hydration-ADULT  Goal: Nutrient/Hydration intake appropriate for improving, restoring or maintaining nutritional needs  Description  Monitor and assess patient's nutrition/hydration status for malnutrition  Collaborate with interdisciplinary team and initiate plan and interventions as ordered  Monitor patient's weight and dietary intake as ordered or per policy  Utilize nutrition screening tool and intervene as necessary  Determine patient's food preferences and provide high-protein, high-caloric foods as appropriate       INTERVENTIONS:  - Monitor oral intake, urinary output, labs, and treatment plans  - Assess nutrition and hydration status and recommend course of action  - Evaluate amount of meals eaten  - Assist patient with eating if necessary   - Allow adequate time for meals  - Recommend/ encourage appropriate diets, oral nutritional supplements, and vitamin/mineral supplements  - Order, calculate, and assess calorie counts as needed  - Recommend, monitor, and adjust tube feedings and TPN/PPN based on assessed needs  - Assess need for intravenous fluids  - Provide specific nutrition/hydration education as appropriate  - Include patient/family/caregiver in decisions related to nutrition  Outcome: Progressing

## 2020-03-08 NOTE — ASSESSMENT & PLAN NOTE
Patients daughter requested her mother to be placed on Librium 25 mg at bedtime which is her home medication

## 2020-03-09 VITALS
HEIGHT: 56 IN | WEIGHT: 138.45 LBS | OXYGEN SATURATION: 96 % | BODY MASS INDEX: 31.14 KG/M2 | TEMPERATURE: 98.5 F | DIASTOLIC BLOOD PRESSURE: 60 MMHG | SYSTOLIC BLOOD PRESSURE: 109 MMHG | RESPIRATION RATE: 18 BRPM | HEART RATE: 67 BPM

## 2020-03-09 PROBLEM — B02.9 SHINGLES: Status: RESOLVED | Noted: 2020-02-25 | Resolved: 2020-03-09

## 2020-03-09 PROBLEM — N17.9 AKI (ACUTE KIDNEY INJURY) (HCC): Status: RESOLVED | Noted: 2020-03-01 | Resolved: 2020-03-09

## 2020-03-09 PROCEDURE — 99239 HOSP IP/OBS DSCHRG MGMT >30: CPT | Performed by: FAMILY MEDICINE

## 2020-03-09 PROCEDURE — 97116 GAIT TRAINING THERAPY: CPT

## 2020-03-09 PROCEDURE — 97535 SELF CARE MNGMENT TRAINING: CPT

## 2020-03-09 PROCEDURE — 97530 THERAPEUTIC ACTIVITIES: CPT

## 2020-03-09 RX ORDER — GABAPENTIN 100 MG/1
100 CAPSULE ORAL 2 TIMES DAILY
Qty: 60 CAPSULE | Refills: 0
Start: 2020-03-09 | End: 2020-03-19

## 2020-03-09 RX ORDER — GABAPENTIN 100 MG/1
100 CAPSULE ORAL 2 TIMES DAILY
Qty: 60 CAPSULE | Refills: 0
Start: 2020-03-09 | End: 2020-03-09

## 2020-03-09 RX ADMIN — PANTOPRAZOLE SODIUM 40 MG: 40 TABLET, DELAYED RELEASE ORAL at 06:34

## 2020-03-09 RX ADMIN — GABAPENTIN 100 MG: 100 CAPSULE ORAL at 08:23

## 2020-03-09 RX ADMIN — ENOXAPARIN SODIUM 40 MG: 40 INJECTION SUBCUTANEOUS at 08:23

## 2020-03-09 RX ADMIN — CYANOCOBALAMIN TAB 500 MCG 1000 MCG: 500 TAB at 08:23

## 2020-03-09 RX ADMIN — LIDOCAINE 1 PATCH: 50 PATCH TOPICAL at 08:23

## 2020-03-09 RX ADMIN — LATANOPROST 1 DROP: 50 SOLUTION OPHTHALMIC at 08:21

## 2020-03-09 RX ADMIN — POTASSIUM CHLORIDE 20 MEQ: 1500 TABLET, EXTENDED RELEASE ORAL at 08:21

## 2020-03-09 RX ADMIN — MELATONIN 4000 UNITS: at 08:22

## 2020-03-09 RX ADMIN — ACETAMINOPHEN 650 MG: 325 TABLET ORAL at 06:34

## 2020-03-09 RX ADMIN — CALCIUM CARBONATE 500 MG (1,250 MG)-VITAMIN D3 200 UNIT TABLET 1 TABLET: at 08:22

## 2020-03-09 RX ADMIN — Medication 250 MG: at 08:21

## 2020-03-09 NOTE — DISCHARGE INSTRUCTIONS
Acute Kidney Injury, Ambulatory Care   GENERAL INFORMATION:   Acute kidney injury  happens when your kidneys suddenly stop working correctly  Normally, the kidneys turn fluid, chemicals, and waste from your blood into urine  In acute kidney injury, your kidneys can no longer do this  In most cases, it is temporary, but it may become a chronic kidney condition  Common symptoms include the following:   · Decreased urination or dark-colored urine    · Swelling in your arms, legs, or feet     · Abdominal or low back pain    · Vomiting, diarrhea, or loss of appetite    · Fatigue     · Skin rash  Seek immediate care for the following symptoms:   · Heart beating faster than normal for you    · Sudden chest pain or trouble breathing    · Seizure  Treatment for acute kidney injury:  Treatment depends upon the cause of your acute kidney injury and how severe it is  Medicines may be given to increase blood flow to your kidneys and protect your kidneys  You may also need medicine to decrease inflammation in your kidneys  You may be given IV fluids to replenish fluids and help your heart pump blood  Dialysis may be needed to remove chemicals and waste from your blood when your kidneys cannot  Manage acute kidney injury:   · Manage other health conditions  Care for your diabetes, high blood pressure, or heart disease  These conditions increase your risk for acute kidney injury  · Talk to your healthcare provider before you take over-the-counter-medicine  NSAIDs, stomach medicine, or laxatives may harm your kidneys and increase your risk for acute kidney injury  Follow up with your healthcare provider as directed:  Write down your questions so you remember to ask them during your visits  CARE AGREEMENT:   You have the right to help plan your care  Learn about your health condition and how it may be treated  Discuss treatment options with your caregivers to decide what care you want to receive   You always have the right to refuse treatment  The above information is an  only  It is not intended as medical advice for individual conditions or treatments  Talk to your doctor, nurse or pharmacist before following any medical regimen to see if it is safe and effective for you  © 2014 [address] is for End User's use only and may not be sold, redistributed or otherwise used for commercial purposes  All illustrations and images included in CareNotes® are the copyrighted property of [employer]  or [employer]   WHAT YOU NEED TO KNOW:   Shingles is a painful infection caused by the same virus that causes chickenpox (varicella-zoster virus)  After you get chickenpox, the virus stays in your body for several years without causing any symptoms  Shingles occurs when the virus becomes active again  Once active, the virus will travel along a nerve to your skin and cause a rash  DISCHARGE INSTRUCTIONS:   Return to the emergency department if:   · You have painful, red, warm skin around the blisters, or the blisters drain pus  · Your neck is stiff or you have trouble moving it  · You have trouble moving your arms, legs, or face  · You have a seizure  · You have weakness in an arm or leg  · You become confused, or have difficulty speaking  · You have dizziness, a severe headache, hearing or vision loss  Contact your healthcare provider if:   · You feel weak or have a headache  · You have a cough, chills, or a fever  · You have abdominal pain, nausea, or vomiting  · Your rash becomes more itchy or painful  · Your rash spreads to other parts of your body  · Your pain worsens and does not go away even after taking medicine  · You have questions or concerns about your condition or care  Medicines:   · Antiviral medicine  helps decrease symptoms and healing time  They may also decrease your risk of developing nerve pain   You will need to start taking them within 3 days of the start of symptoms to prevent nerve pain  · Pain medicine  may be prescribed or suggested by your healthcare provider  You may need NSAIDs, acetaminophen, or opioid medicine depending on how much pain you are in  Do not wait until the pain is severe before you take more pain medicine  · Topical anesthetics  are used to numb the skin and decrease pain  They can be a cream, gel, spray, or patch  · Anticonvulsants  decrease nerve pain and may help you sleep at night  · Antidepressants  may be used to decrease nerve pain  · Take your medicine as directed  Contact your healthcare provider if you think your medicine is not helping or if you have side effects  Tell him of her if you are allergic to any medicine  Keep a list of the medicines, vitamins, and herbs you take  Include the amounts, and when and why you take them  Bring the list or the pill bottles to follow-up visits  Carry your medicine list with you in case of an emergency  Follow up with your healthcare provider as directed:  Write down your questions so you remember to ask them during your visits  Self-care:  Keep your rash clean and dry  Cover your rash with a bandage or clothing  Do not use bandages that stick to your skin  The sticky part may irritate your skin and make your rash last longer  Prevent the spread of shingles: The virus can be passed to a person who has never had chickenpox  This person may get chickenpox, but not shingles  You may pass the virus to others as long as you have a rash  The virus is spread by direct contact with the fluid from the blisters  Usually, you cannot spread the virus once the blisters dry up  Prevent shingles or another shingles outbreak:  A vaccine may be given to help prevent shingles  Ask for more information about this vaccine     © 2017 Janett [employer] Information is for End User's use only and may not be sold, redistributed or otherwise used for commercial purposes  All illustrations and images included in CareNotes® are the copyrighted property of [employer]  or [employer]  The above information is an  only  It is not intended as medical advice for individual conditions or treatments  Talk to your doctor, nurse or pharmacist before following any medical regimen to see if it is safe and effective for you

## 2020-03-09 NOTE — PLAN OF CARE
Problem: PHYSICAL THERAPY ADULT  Goal: Performs mobility at highest level of function for planned discharge setting  See evaluation for individualized goals  Description  Treatment/Interventions: Functional transfer training, LE strengthening/ROM, Therapeutic exercise, Elevations, Endurance training, Patient/family training, Bed mobility, Gait training, Spoke to nursing, OT  Equipment Recommended: Walker(RW)       See flowsheet documentation for full assessment, interventions and recommendations  Outcome: Progressing  Note:   Prognosis: Fair  Problem List: Decreased strength, Decreased endurance, Decreased range of motion, Impaired balance, Decreased mobility, Decreased cognition, Decreased safety awareness, Impaired hearing, Impaired vision, Decreased skin integrity, Impaired judgement  Assessment: Pt seen for mobility assessment  P is demonstrates impaired ability to perform transfers sit to stand, stand to sit requiring mod assist x2 and verbal cues for safe techniques with cues and assistance for safe approach, assistance to steer and manuver Rw  while  backing up to chair and for correct handplacement required  Pt perform transfers to and from toilet with min assist x2 with use of grab bar and verbal cues for safe approach to toilet with use of rw and assistance to steer and manuver walker when turing and backing up to toilet  Pt progressed with ambulation distances to 25' x2 with min assist x2 with chair follow and cues for improved posture and quality of gait required  Pt requires min assist x2 for static standing and dynamic standing balance activities with support of rw and verbal cues for safety  tp demonstrates decreased balance, forward flexed posture and decreased safety awareness during standing balance activities increasing pt's risk for falls  Increased time required for all aspects of mobility    Pt remained seated in recliner with b/l le's elevated, scd's to b/l le's and chair alarm activated  Pt remains at increased risk for falls and is functioning below baseline level of mobility  Recommend STR at d/c     Barriers to Discharge: Inaccessible home environment, Decreased caregiver support  Barriers to Discharge Comments: SUSI, level of support at home  Recommendation: Short-term skilled PT     PT - OK to Discharge: Yes    See flowsheet documentation for full assessment

## 2020-03-09 NOTE — SOCIAL WORK
[facility] has called and they support the hospital's decision to terminate hospital services  CM spoke with pt's daughter, she is aware that pt assumes liability today at 12:00PM   She reports she wants take pt home with VNA; she is preparing for d/c home today  CM explained that [facility] is willing to offer a bed but do not have an open bed until Thursday  She is not interested in pt sending to [facility] or any other SNF  CM discussed home care  A post acute care recommendation was made by your care team for [facility] of Choice with caregiver  List of agencies discussed over the phone, caregiver aware the list is custom filtered for them by preferred and that [facility] post acute providers are designated  She wants [facility]  Referral sent to RN and home PT  She also asked about having [address] rehab in the home; however this is not possible while another VNA is also coming into the home  CM also suggested to pt's daughter that she will need to have a non-skilled home health agency coming to her home to help with her mother's personal care needs  CM offered choice of non-skilled agencies, she did not have a preference  CM told her [address] may be able to help with this  She understands this is a private pay situation  CM later called [facility] and asked them to contact her re setting up care as soon as possible  Pt uses [facility] on [address]  She wants meds called into there  A message was sent to the out patient  with pt's PCP requesting they follow up with patient after d/c

## 2020-03-09 NOTE — TRANSPORTATION MEDICAL NECESSITY
Section I - General Information    Name of Patient: Marcelo Reddy                 : 1926    Medicare #: [id number]  Transport Date: 20 (PCS is valid for round trips on this date and for all repetitive trips in the 60-day range as noted below )  Origin: [address]                                                         Destination: Pt's home [address] [place] [id number] the pt's stay covered under Medicare Part A (PPS/DRG)   [x]     Closest appropriate facility? If no, why is transport to more distant facility required? yes  If hospice pt, is this transport related to pt's terminal illness? No       Section II - Medical Necessity Questionnaire  Ambulance transportation is medically necessary only if other means of transport are contraindicated or would be potentially harmful to the patient  To meet this requirement, the patient must either be "bed confined" or suffer from a condition such that transport by means other than ambulance is contraindicated by the patient's condition  The following questions must be answered by the medical professional signing below for this form to be valid:    1)  Describe the MEDICAL CONDITION (physical and/or mental) of this patient AT [address] [place] [address] that requires the patient to be transported in an ambulance and why transport by other means is contraindicated by the patient's condition:dementia/ shingles  Anxiety  2) Is the patient "bed confined" as defined below? Yes  To be "be confined" the patient must satisfy all three of the following conditions: (1) unable to get up from bed without Assistance; AND (2) unable to ambulate; AND (3) unable to sit in a chair or wheelchair  3) Can this patient safely be transported by car or wheelchair van (i e , seated during transport without a medical attendant or monitoring)?    No    4) In addition to completing questions 1-3 above, please check any of the following conditions that apply*:   *Note: supporting documentation for any boxes checked must be maintained in the patient's medical records  If hosp-hosp transfer, describe services needed at 2nd facility not available at 1st facility? Patient is confused  Special handling/isolation/infection control precautions required       Section III - Signature of Physician or Healthcare Professional  I certify that the above information is true and correct based on my evaluation of this patient, and represent that the patient requires transport by ambulance and that other forms of transport are contraindicated  I understand that this information will be used by the Centers for Medicare and Medicaid Services (CMS) to support the determination of medical necessity for ambulance services, and I represent that I have personal knowledge of the patient's condition at time of transport  [x]  If this box is checked, I also certify that the patient is physically or mentally incapable of signing the ambulance service's claim and that the institution with which I am affiliated has furnished care, services, or assistance to the patient  My signature below is made on behalf of the patient pursuant to 42 CFR §424 36(b)(4)  In accordance with 42 CFR §424 37, the specific reason(s) that the patient is physically or mentally incapable of signing the claim form is as follows: confused   Signature of Physician* or Lisa MSW_____________________________________________________________  Signature Date 03/09/20 (For scheduled repetitive transports, this form is not valid for transports performed more than 60 days after this date)    Printed Name & Credentials of Physician or Healthcare Professional (MD, DO, RN, etc )______Keyla Christianson MSW__________________________  *Form must be signed by patient's attending physician for scheduled, repetitive transports   For non-repetitive, unscheduled ambulance transports, if unable to obtain the signature of the attending physician, any of the following may sign (choose appropriate option below)  [] Physician Assistant []  Clinical Nurse Specialist []  Registered Nurse  []  Nurse Practitioner  [x] Discharge Planner

## 2020-03-09 NOTE — OCCUPATIONAL THERAPY NOTE
OccupationalTherapy Progress Note     Patient Name: Jamaica Cisneros  SFDGP'Y Date: 3/9/2020  Problem List  Active Problems:    Anxiety    Decreased ambulation status    GERD (gastroesophageal reflux disease)    Glaucoma    Leg edema    Restless legs syndrome    Urinary retention    Pressure injury of coccygeal region, stage 2 ([address] )    Callus            03/09/20 1147   Restrictions/Precautions   Weight Bearing Precautions Per Order No   Other Precautions Contact/isolation;Cognitive; Chair Alarm; Bed Alarm; Fall Risk;Visual impairment;Hard of hearing   Pain Assessment   Pain Assessment No/denies pain   Pain Score No Pain   ADL   Where Assessed   (chair and toilet)   LB Dressing Assistance 3  Moderate Assistance   LB Dressing Deficit Thread LLE into pants;Pull up over hips   LB Dressing Comments Able to thread RLE into start of pants, A to thread other leg and pull all the way up, and to tie in front   Toileting Assistance  3  Moderate Assistance   Toileting Deficit Clothing management down;Clothing management up; Increased time to complete;Supervison/safety   Toileting Comments A for clothing management, pt able to complete laura-hygiene with extended time   Bed Mobility   Additional Comments OOB and in chair upon arrival   Transfers   Sit to Stand 3  Moderate assistance   Additional items Assist x 2; Increased time required;Verbal cues   Stand to Sit 3  Moderate assistance   Additional items Assist x 2; Increased time required;Verbal cues   Toilet transfer 4  Minimal assistance   Additional items Assist x 2; Increased time required;Verbal cues;Standard toilet  (grab bars)   Additional Comments increased time; use of RW   Functional Mobility   Functional Mobility 4  Minimal assistance   Additional Comments Ax2, with increased time and VC throughout   Additional items Rolling walker   Toilet Transfers   Toilet Transfer From Rolling walker   Toilet Transfer Type To and from   Toilet Transfer to Standard toilet   Toilet Transfer Technique Ambulating   Toilet Transfers Minimal assistance   Toilet Transfers Comments Ax2; increased time and use of grab bars   Cognition   Overall Cognitive Status Impaired   Arousal/Participation Responsive; Cooperative   Attention Attends with cues to redirect   Orientation Level Disoriented to situation   Memory Decreased recall of precautions;Decreased short term memory;Decreased recall of recent events   Following Commands Follows one step commands with increased time or repetition   Activity Tolerance   Activity Tolerance Patient limited by fatigue;Patient limited by pain   Medical Staff Made Aware Okay to see per RN    Assessment   Assessment Pt agreeable to participate in skilled OT treatment session to address ADL tasks, functional mobility and functional transfers  Pt seated in chair upon arrival  Pt compeleting LB dressing with mod A, and functional transfers with mod A x2  Pt completed functional mobility to bathroom with min A x2 and toilet transfers with min A x2  Pt completed toileting with mod A x1 for A with clothing management and distant supervision while seated  Pt requires extended processing time and increased time for functional mobility throughout  Pt requiring total A for oral hygiene care for cleaning of dentures  Pt noted with increased ability with functional mobility, but is still limited by cognition, mobility, endurance and activity tolerance  From an OT standpoint, pt would benefit from continued skilled OT treatment to address deficits and Short Term Rehab at time of discharge to return to prior level of function      Plan   Goal Expiration Date 03/16/20   OT Treatment Day 2   OT Frequency 3-5x/wk   Recommendation   OT Discharge Recommendation Short Term Rehab   OT - OK to Discharge   (to rehab when medically cleared)              Pt will benefit from continued OT services in order to maximize independence with ADL performance, functional mobility with RW, and improve overall endurance/strength required to complete functional tasks in preparation for discharge      At the end of the session, all needs met and pt seated in bedside chair, chair alarm activated, LEs elevated , SCDs on BLE and turned on and call bell within reach    [facility], OTR/L

## 2020-03-09 NOTE — PHYSICAL THERAPY NOTE
Physical Therapy Treatment Note       03/09/20 1148   Pain Assessment   Pain Assessment No/denies pain   Pain Score No Pain   Restrictions/Precautions   Other Precautions Contact/isolation;Cognitive; Chair Alarm; Fall Risk;Bed Alarm;Visual impairment;Hard of hearing   General   Chart Reviewed Yes   Family/Caregiver Present No   Cognition   Overall Cognitive Status Impaired   Arousal/Participation Responsive; Cooperative   Attention Attends with cues to redirect   Orientation Level Disoriented to situation;Oriented to person;Oriented to place   Memory Decreased recall of precautions;Decreased recall of recent events;Decreased short term memory   Following Commands Follows one step commands with increased time or repetition   Subjective   Subjective " I think I'm leaving today to go to [facility] 1615 "     Bed Mobility   Additional Comments oob in chair upon arrival     Transfers   Sit to Stand 3  Moderate assistance   Additional items Assist x 2;Armrests; Increased time required;Verbal cues   Stand to Sit 3  Moderate assistance   Additional items Assist x 2;Armrests; Increased time required;Verbal cues   Toilet transfer 4  Minimal assistance   Additional items Assist x 2; Increased time required;Verbal cues;Standard toilet  (grab bar)   Ambulation/Elevation   Gait pattern Forward Flexion;Decreased foot clearance; Short stride; Step to;Excessively slow; Inconsistent juan; Shuffling  (b/l le flat foot deformity, increased toeing out b/l)   Gait Assistance 4  Minimal assist   Additional items Assist x 2;Verbal cues  (chair follow)   Assistive Device Rolling walker   Distance 25' x2   Balance   Static Sitting Fair +   Dynamic Sitting Fair   Static Standing Poor   Dynamic Standing Poor -   Ambulatory Poor +   Endurance Deficit   Endurance Deficit Yes   Endurance Deficit Description fatigue, weakness   Activity Tolerance   Activity Tolerance Patient limited by fatigue   Medical Staff Made Aware SAM Carbajal   Equipment Use Comments Pt performed sitting balance activites of owrd reach with b/l ue's, downard reaching with b/l ue's and unilateral ue's,  static and dynamic standing balance with support of Rw and min assist x2 x 20 seconds x1 and 15 seconds x1 and 30 seconds x1  Assessment   Prognosis Fair   Problem List Decreased strength;Decreased endurance;Decreased range of motion; Impaired balance;Decreased mobility; Decreased cognition;Decreased safety awareness; Impaired hearing; Impaired vision;Decreased skin integrity; Impaired judgement   Assessment Pt seen for mobility assessment  P is demonstrates impaired ability to perform transfers sit to stand, stand to sit requiring mod assist x2 and verbal cues for safe techniques with cues and assistance for safe approach, assistance to steer and manuver Rw  while  backing up to chair and for correct handplacement required  Pt perform transfers to and from toilet with min assist x2 with use of grab bar and verbal cues for safe approach to toilet with use of rw and assistance to steer and manuver walker when turing and backing up to toilet  Pt progressed with ambulation distances to 25' x2 with min assist x2 with chair follow and cues for improved posture and quality of gait required  Pt requires min assist x2 for static standing and dynamic standing balance activities with support of rw and verbal cues for safety  tp demonstrates decreased balance, forward flexed posture and decreased safety awareness during standing balance activities increasing pt's risk for falls  Increased time required for all aspects of mobility  Pt remained seated in recliner with b/l le's elevated, scd's to b/l le's and chair alarm activated  Pt remains at increased risk for falls and is functioning below baseline level of mobility  Recommend STR at d/c       Goals   Patient Goals " To get better "    [facility] Expiration Date 03/16/20   PT Treatment Day 2   Plan   Treatment/Interventions Functional transfer training; Endurance training;Patient/family training;Equipment eval/education; Bed mobility;Gait training;Spoke to nursing;OT   Progress Slow progress, decreased activity tolerance   PT Frequency   (3-5x/week)   Recommendation   Recommendation Short-term skilled PT   PT - OK to Discharge Yes        03/09/20 1148   Pain Assessment   Pain Assessment No/denies pain   Pain Score No Pain   Restrictions/Precautions   Other Precautions Contact/isolation;Cognitive; Chair Alarm; Fall Risk;Bed Alarm;Visual impairment;Hard of hearing   General   Chart Reviewed Yes   Family/Caregiver Present No   Cognition   Overall Cognitive Status Impaired   Arousal/Participation Responsive; Cooperative   Attention Attends with cues to redirect   Orientation Level Disoriented to situation;Oriented to person;Oriented to place   Memory Decreased recall of precautions;Decreased recall of recent events;Decreased short term memory   Following Commands Follows one step commands with increased time or repetition   Subjective   Subjective " I think I'm leaving today to go to [facility] 1615 "     Bed Mobility   Additional Comments oob in chair upon arrival     Transfers   Sit to Stand 3  Moderate assistance   Additional items Assist x 2;Armrests; Increased time required;Verbal cues   Stand to Sit 3  Moderate assistance   Additional items Assist x 2;Armrests; Increased time required;Verbal cues   Toilet transfer 4  Minimal assistance   Additional items Assist x 2; Increased time required;Verbal cues;Standard toilet  (grab bar)   Ambulation/Elevation   Gait pattern Forward Flexion;Decreased foot clearance; Short stride; Step to;Excessively slow; Inconsistent juan; Shuffling  (b/l le flat foot deformity, increased toeing out b/l)   Gait Assistance 4  Minimal assist   Additional items Assist x 2;Verbal cues  (chair follow)   Assistive Device Rolling walker   Distance 25' x2   Balance   Static Sitting Fair +   Dynamic Sitting Fair   Static Standing Poor   Dynamic Standing Poor -   Ambulatory Poor +   Endurance Deficit   Endurance Deficit Yes   Endurance Deficit Description fatigue, weakness   Activity Tolerance   Activity Tolerance Patient limited by fatigue   Medical Staff Made Aware SAM leon   Equipment Use   Comments Pt performed sitting balance activites of owrd reach with b/l ue's, downard reaching with b/l ue's and unilateral ue's,  static and dynamic standing balance with support of Rw and min assist x2 x 20 seconds x1 and 15 seconds x1 and 30 seconds x1  Assessment   Prognosis Fair   Problem List Decreased strength;Decreased endurance;Decreased range of motion; Impaired balance;Decreased mobility; Decreased cognition;Decreased safety awareness; Impaired hearing; Impaired vision;Decreased skin integrity; Impaired judgement   Assessment Pt seen for mobility assessment  P is demonstrates impaired ability to perform transfers sit to stand, stand to sit requiring mod assist x2 and verbal cues for safe techniques with cues and assistance for safe approach, assistance to steer and manuver Rw  while  backing up to chair and for correct handplacement required  Pt perform transfers to and from toilet with min assist x2 with use of grab bar and verbal cues for safe approach to toilet with use of rw and assistance to steer and manuver walker when turing and backing up to toilet  Pt progressed with ambulation distances to 25' x2 with min assist x2 with chair follow and cues for improved posture and quality of gait required  Pt requires min assist x2 for static standing and dynamic standing balance activities with support of rw and verbal cues for safety  tp demonstrates decreased balance, forward flexed posture and decreased safety awareness during standing balance activities increasing pt's risk for falls  Increased time required for all aspects of mobility  Pt remained seated in recliner with b/l le's elevated, scd's to b/l le's and chair alarm activated   Pt remains at increased risk for falls and is functioning below baseline level of mobility  Recommend STR at d/c  Goals   Patient Goals " To get better "    [facility] Expiration Date 03/16/20   PT Treatment Day 2   Plan   Treatment/Interventions Functional transfer training; Endurance training;Patient/family training;Equipment eval/education; Bed mobility;Gait training;Spoke to nursing;OT   Progress Slow progress, decreased activity tolerance   PT Frequency   (3-5x/week)   Recommendation   Recommendation Short-term skilled PT   PT - OK to Discharge Yes       Brady Mosley, FÉLIX

## 2020-03-09 NOTE — DISCHARGE INSTR - AVS FIRST PAGE
Wound care instructions:  Wound Care Plan:   1-Sacrum--cleanse with soap and water, pat dry   3M no sting to laura-wound skin   Cover with non-adherent oil emulsion dressing (adaptic) and Allevyn Life foam dressing  2-Left back--cleanse with soap and water, pat dry   Apply non-adherent oil emulsion dressing (adaptic) and ABD   Secure with tape   Change dressing daily  Keep open shingles lesions covered at all times     3-Left 5th toe--Apply dermagran gauze  Cover with 2x2 and wrap with noemí  Change dressing every other day      Skin care plans:  1-Hydraguard to bilateral heels BID and PRN  2-Elevate heels (float off of pillows) to offload pressure  3-Offloading cushion in chair when getting out of bed  4-Moisturize entire body skin and right forehead daily with skin nourishing cream   5-Turn/reposition q2h  for pressure re-distribution on skin--use positioning wedges      -your Lasix and enalapril have been held at this time secondary to soft blood pressures and elevated kidney functions which have normalized at this time  -get your blood work in 3-4 days of discharge  CBC and BMP has been ordered in the system    Go to a [facility] and they will have the orders there for you  -if your symptoms worsen come back to the ER

## 2020-03-09 NOTE — DISCHARGE SUMMARY
Discharge Summary - Viraj Vizcarra 80 y o  female MRN: [id number]    Unit/Bed#: E5 -01 Encounter: [id number]    Admission Date:   Admission Orders (From admission, onward)     Ordered        03/02/20 1928  Inpatient Admission  Once         03/01/20 2228  Place in Observation (expected length of stay for this patient is less than two midnights)  Once                     Admitting Diagnosis: Dehydration [E86 0]  Shingles [B02 9]  Hypotension [I95 9]  SUNDAR (acute kidney injury) ([facility] [address] ) [N17 9]  Indwelling Orantes catheter present [Z96 0]  Hypotension due to hypovolemia [I95 89, E86 1]    HPI: Viraj Vizcarra is a 80 y o  female with past medical history of hypertension, chronic UTIs, dementia, glaucoma, arthritis, breast cancer status post mastectomy who presents with hypotension  Patient was reportedly discharged to [facility] on Friday  Daughter reports patient had not received her Valtrex at the facility as it was unavailable  Daughter also reports there were problems with medications, reporting she was only getting her PO Lasix for the past three days  Patient was found to be hypotensive to the low 80s at the facility so EMS was called  Patient appeared clinically dehydrated per ED, hydrated with 2 L NS  Patient denies any complaints at time of admission  Patient does intermittently ask what is stabbing her, daughter reporting intermittent stabbing pain in her back and side due to shingles rash  Reports patient typically takes Tylenol and Tramadol for pain which she has not had today  Procedures Performed:   Orders Placed This Encounter   Procedures    ED ECG Documentation Only       Summary of Hospital Course:  After hospitalization patient has been seen by ID for shingles and has been cleared  Patient has finished Valtrex treatment  The last dose of Valtrex was 03/04/2020  She has been started on gabapentin for the neuropathic pain from shingles    She is tolerating it well and does not complain of pain at this time  Her SUNDAR has resolved and the Ace inhibitors and the Lasix have been held at this time  Her blood pressures are stable at this time but were initially soft  I have done a complete exam at discharge the shingles have crusted and then there was no vesicular or open lesion at this time  She has wound care going on at this time  Orders have been written in the discharge summary for the [facility] for home wound care  Consults:  ID, Urology, Wound Care    Significant Findings, Care, Treatment and Services Provided:  Shingles which were treated  Acute kidney injury which was treated and has resolved  Complications:  None    Disposition: DC to home under the care of her daughter w/ [facility]    Final Diagnosis:  Acute kidney injury, shingles, decreased ambulatory status, bilateral pedal edema, restless leg syndrome    Discharge exam:  S1-S2 audible, regular  Lungs clear to auscultation bilaterally  Abdomen nontender nondistended bowel sounds audible all 4 quadrants  No focal neurological deficits  NC/AT, PERRLA  Oral mucosa moist, no skin tenting, capillary refill within normal limits  Skin shows post shingle changes with crusted lesions in the left back and on the center of the back  There is a lesion around the tailbone on which there is a foam dressing    There is no vesicular lesion visible at this time to my eyes and all the lesions crusted  Bilateral lower extremity ankle edema which is chronic  Wound images present in the EMR    Resolved Problems  Date Reviewed: 3/8/2020          Resolved    Shingles 3/9/2020     Resolved by  Pao Vinson MD    * (Principal) SUNDAR (acute kidney injury) ([facility] [address] ) 3/9/2020     Resolved by  Pao Vinson MD            Procedures Performed:   Orders Placed This Encounter   Procedures    ED ECG Documentation Only       Resolved Problems  Date Reviewed: 3/8/2020          Resolved    Shingles 3/9/2020     Resolved by  Pao Vinson MD    * (Principal) SUNDAR (acute kidney injury) ([facility] [address] ) 3/9/2020     Resolved by  Josesito Montez MD          Condition at Discharge: stable         Discharge instructions/Information to patient and family:   See after visit summary for information provided to patient and family  Provisions for Follow-Up Care:  See after visit summary for information related to follow-up care and any pertinent home health orders  PCP: Sahil Christopher DO    Disposition: Home with home health    Planned Readmission: No      Discharge Statement   I spent 60 minutes discharging the patient  This time was spent on the day of discharge  I had direct contact with the patient on the day of discharge  Additional documentation is required if more than 30 minutes were spent on discharge  Discharge Medications:  See after visit summary for reconciled discharge medications provided to patient and family

## 2020-03-10 ENCOUNTER — TRANSITIONAL CARE MANAGEMENT (OUTPATIENT)
Dept: FAMILY MEDICINE CLINIC | Facility: CLINIC | Age: 85
End: 2020-03-10

## 2020-03-10 ENCOUNTER — TELEPHONE (OUTPATIENT)
Dept: FAMILY MEDICINE CLINIC | Facility: CLINIC | Age: 85
End: 2020-03-10

## 2020-03-10 DIAGNOSIS — M54.50 CHRONIC BILATERAL LOW BACK PAIN WITHOUT SCIATICA: ICD-10-CM

## 2020-03-10 DIAGNOSIS — M25.562 CHRONIC PAIN OF LEFT KNEE: Primary | ICD-10-CM

## 2020-03-10 DIAGNOSIS — G89.29 CHRONIC BILATERAL LOW BACK PAIN WITHOUT SCIATICA: ICD-10-CM

## 2020-03-10 DIAGNOSIS — G89.4 CHRONIC PAIN SYNDROME: ICD-10-CM

## 2020-03-10 DIAGNOSIS — M25.362 INSTABILITY OF LEFT KNEE JOINT: ICD-10-CM

## 2020-03-10 DIAGNOSIS — G89.29 CHRONIC PAIN OF LEFT KNEE: Primary | ICD-10-CM

## 2020-03-10 DIAGNOSIS — M15.9 OSTEOARTHRITIS OF MULTIPLE JOINTS, UNSPECIFIED OSTEOARTHRITIS TYPE: ICD-10-CM

## 2020-03-10 DIAGNOSIS — Z71.89 COMPLEX CARE COORDINATION: Primary | ICD-10-CM

## 2020-03-10 DIAGNOSIS — R42 DIZZINESS: ICD-10-CM

## 2020-03-10 DIAGNOSIS — Z74.09 IMPAIRED MOBILITY AND ADLS: ICD-10-CM

## 2020-03-10 DIAGNOSIS — Z78.9 IMPAIRED MOBILITY AND ADLS: ICD-10-CM

## 2020-03-10 DIAGNOSIS — Z74.09 DECREASED AMBULATION STATUS: ICD-10-CM

## 2020-03-10 DIAGNOSIS — E55.9 VITAMIN D DEFICIENCY: ICD-10-CM

## 2020-03-10 NOTE — TELEPHONE ENCOUNTER
Pt's daughter called requesting an order for pt to get PT from [facility] states pt has had PT from [facility] in the past  Pt's daughter also states pt is not able to move around so she isn't able to get her to an appt

## 2020-03-10 NOTE — TELEPHONE ENCOUNTER
I did order physical therapy however patient will need to come in with the next 1 month because I need to see her to continue to give orders as per Medicare regulations    If there is difficulty the local ambulance associations can help with transportation

## 2020-03-11 ENCOUNTER — TELEPHONE (OUTPATIENT)
Dept: FAMILY MEDICINE CLINIC | Facility: CLINIC | Age: 85
End: 2020-03-11

## 2020-03-11 NOTE — TELEPHONE ENCOUNTER
Pt's daughter notified  She said they can't use [facility]  Pt is at home with daughter  Daughter says hospital released pt and her BP was 60/30, she said "[facility] almost killed her mom"  Daughter says pt has shingles on 4 different spots  Daughter states mom cannot get around, I told her about the ambulance service  Daughter says she is not bringing pt in with the flu and corona virus going around  Pt's daughter kept rambling on and was complaining about the insurance company  Pt's daughter said she is "not trying to blow you off"

## 2020-03-11 NOTE — TELEPHONE ENCOUNTER
Called from Jose Angel Bartlett with [facility] visiting nurses stating they will start pt with PT and OT, pt will also be getting nursing due to shingles  Jose Angel Bartlett did say pt's daughter seem very overwhelmed  Jose Angel Bartlett can be reached at [phone] if any questions

## 2020-03-17 ENCOUNTER — TELEPHONE (OUTPATIENT)
Dept: FAMILY MEDICINE CLINIC | Facility: CLINIC | Age: 85
End: 2020-03-17

## 2020-03-17 ENCOUNTER — TRANSCRIBE ORDERS (OUTPATIENT)
Dept: LAB | Facility: CLINIC | Age: 85
End: 2020-03-17

## 2020-03-17 ENCOUNTER — APPOINTMENT (OUTPATIENT)
Dept: LAB | Facility: CLINIC | Age: 85
End: 2020-03-17
Payer: MEDICARE

## 2020-03-17 DIAGNOSIS — N17.9 ACUTE RENAL FAILURE, UNSPECIFIED ACUTE RENAL FAILURE TYPE (HCC): ICD-10-CM

## 2020-03-17 DIAGNOSIS — N17.9 ACUTE RENAL FAILURE, UNSPECIFIED ACUTE RENAL FAILURE TYPE (HCC): Primary | ICD-10-CM

## 2020-03-17 DIAGNOSIS — B02.9 SHINGLES: ICD-10-CM

## 2020-03-17 LAB
ANION GAP SERPL CALCULATED.3IONS-SCNC: 6 MMOL/L (ref 4–13)
BASOPHILS # BLD AUTO: 0.04 THOUSANDS/ΜL (ref 0–0.1)
BASOPHILS NFR BLD AUTO: 1 % (ref 0–1)
BUN SERPL-MCNC: 13 MG/DL (ref 5–25)
CALCIUM SERPL-MCNC: 9.5 MG/DL (ref 8.3–10.1)
CHLORIDE SERPL-SCNC: 109 MMOL/L (ref 100–108)
CO2 SERPL-SCNC: 27 MMOL/L (ref 21–32)
CREAT SERPL-MCNC: 0.59 MG/DL (ref 0.6–1.3)
EOSINOPHIL # BLD AUTO: 0.13 THOUSAND/ΜL (ref 0–0.61)
EOSINOPHIL NFR BLD AUTO: 2 % (ref 0–6)
ERYTHROCYTE [DISTWIDTH] IN BLOOD BY AUTOMATED COUNT: 15.7 % (ref 11.6–15.1)
GFR SERPL CREATININE-BSD FRML MDRD: 79 ML/MIN/1.73SQ M
GLUCOSE SERPL-MCNC: 89 MG/DL (ref 65–140)
HCT VFR BLD AUTO: 36.5 % (ref 34.8–46.1)
HGB BLD-MCNC: 11.4 G/DL (ref 11.5–15.4)
IMM GRANULOCYTES # BLD AUTO: 0.02 THOUSAND/UL (ref 0–0.2)
IMM GRANULOCYTES NFR BLD AUTO: 0 % (ref 0–2)
LYMPHOCYTES # BLD AUTO: 1.95 THOUSANDS/ΜL (ref 0.6–4.47)
LYMPHOCYTES NFR BLD AUTO: 33 % (ref 14–44)
MCH RBC QN AUTO: 31.2 PG (ref 26.8–34.3)
MCHC RBC AUTO-ENTMCNC: 31.2 G/DL (ref 31.4–37.4)
MCV RBC AUTO: 100 FL (ref 82–98)
MONOCYTES # BLD AUTO: 0.56 THOUSAND/ΜL (ref 0.17–1.22)
MONOCYTES NFR BLD AUTO: 9 % (ref 4–12)
NEUTROPHILS # BLD AUTO: 3.23 THOUSANDS/ΜL (ref 1.85–7.62)
NEUTS SEG NFR BLD AUTO: 55 % (ref 43–75)
NRBC BLD AUTO-RTO: 0 /100 WBCS
PLATELET # BLD AUTO: 238 THOUSANDS/UL (ref 149–390)
PMV BLD AUTO: 10.9 FL (ref 8.9–12.7)
POTASSIUM SERPL-SCNC: 4.9 MMOL/L (ref 3.5–5.3)
RBC # BLD AUTO: 3.65 MILLION/UL (ref 3.81–5.12)
SODIUM SERPL-SCNC: 142 MMOL/L (ref 136–145)
WBC # BLD AUTO: 5.93 THOUSAND/UL (ref 4.31–10.16)

## 2020-03-17 PROCEDURE — 85025 COMPLETE CBC W/AUTO DIFF WBC: CPT

## 2020-03-17 PROCEDURE — 36415 COLL VENOUS BLD VENIPUNCTURE: CPT

## 2020-03-17 PROCEDURE — 80048 BASIC METABOLIC PNL TOTAL CA: CPT

## 2020-03-17 NOTE — TELEPHONE ENCOUNTER
TS-I spoke to patients daughter and she's been recently diagnosed with Shingles and put on Gabapentin  Patient is still in pain and daughter was asking if the Gabapentin can be increased?  Thanks  Monica

## 2020-03-18 NOTE — TELEPHONE ENCOUNTER
Patient was last seen in office July, 2019  I am unable to complete any orders until patient makes office visit

## 2020-03-19 RX ORDER — GABAPENTIN 100 MG/1
CAPSULE ORAL
Qty: 90 CAPSULE | Refills: 2 | Status: SHIPPED | OUTPATIENT
Start: 2020-03-19 | End: 2020-05-20 | Stop reason: SDUPTHER

## 2020-03-19 NOTE — TELEPHONE ENCOUNTER
Visiting nurse called with same request  Increasing Gabapentin from 1 po BID to 1 po Qam and 2 po QHS  Dr Allan Quan said he would be ok with the increase under the current circumstance  RX sent to the Pharmacy

## 2020-03-20 NOTE — TELEPHONE ENCOUNTER
Called pt's daughter, aware medication was sent, said pt slept all night, said she wanted to thank Ts very much!

## 2020-03-23 ENCOUNTER — PATIENT OUTREACH (OUTPATIENT)
Dept: FAMILY MEDICINE CLINIC | Facility: CLINIC | Age: 85
End: 2020-03-23

## 2020-03-23 NOTE — PROGRESS NOTES
CM spoke to patient's daughter/caregiver Dallas Metzger who reported patient was doing better since hospital discharge  Shingles patches on body had dried up but patient still has some pain  Gabapentin appears to help  aDllas Metzger states she's following all the guidelines for keeping safe from delmy COVID-19  She does not allow any visitors including therapists into her home, keeps food and other necessities stocked and also for her pets  She only goes out when necessary, wearing protective garb including gloves  She thanked this CM for checking in on them and is aware she can call CM with any concerns

## 2020-03-24 ENCOUNTER — TELEPHONE (OUTPATIENT)
Dept: FAMILY MEDICINE CLINIC | Facility: CLINIC | Age: 85
End: 2020-03-24

## 2020-03-24 DIAGNOSIS — F41.9 ANXIETY: Primary | ICD-10-CM

## 2020-03-24 PROBLEM — Z02.89 MEDICATION MANAGEMENT CONTRACT AGREEMENT: Status: ACTIVE | Noted: 2020-03-24

## 2020-03-24 PROBLEM — Z79.891 OPIOID USE AGREEMENT EXISTS: Status: RESOLVED | Noted: 2020-01-30 | Resolved: 2020-03-24

## 2020-03-24 RX ORDER — LORAZEPAM 0.5 MG/1
0.5 TABLET ORAL EVERY 8 HOURS PRN
Qty: 60 TABLET | Refills: 1 | Status: SHIPPED | OUTPATIENT
Start: 2020-03-24 | End: 2020-05-20 | Stop reason: SDUPTHER

## 2020-03-24 NOTE — TELEPHONE ENCOUNTER
We will discontinue chlordiazepoxide changed to lorazepam 0 5 mg twice daily  In light of the corona virus outbreak I am going to send in 30 days of this  But patient needs to make an appointment in 30 days because of state law she needs to sign a contract do urine drug screen in addition patient has not been seen since 07/11/2019  In light of controlled substances if patient does not make an appointment within this time unable to continue medications    PDMP was checked

## 2020-03-24 NOTE — TELEPHONE ENCOUNTER
Called patient's daughter, patient states that would rather decrease the dosage on the chlordiazepoxide  Patient's daughter states that she does not want to start patient on new medication and if she has a side affect she does not want to have to take pt to hospital again  Patient states if she needs to make virtual visit to speak with you she can

## 2020-03-24 NOTE — TELEPHONE ENCOUNTER
I would not  Chlordiazepoxide should not be used in Geriatrics    I would rather use lorazepam which is approved for use in Geriatrics especially with the side effects that she seen

## 2020-03-24 NOTE — TELEPHONE ENCOUNTER
PT DAUGHTER CALLED STATES MOTHER HAS BEEN HAVING HALLUCINATIONS FROM "CHLORDIAZEPOXIDE" STATES MEDICATION WAS PRESCRIBED AS TAKING 1 CAPSULE DAILY AS NEEDED DAUGHTER WAS WONDERING IF DOSAGE CAN BE DECREASED OR INSTEAD DIRECTIONS CAN BE CHANGED TO TAKE EVERY OTHER DAY   PLEASE CALL DAUGHTER BACK [phone]

## 2020-03-25 NOTE — TELEPHONE ENCOUNTER
Daughter called back and was concerned about stopping old med and starting new med  I reassured her to follow Dr Jean Mayer instructions

## 2020-03-26 ENCOUNTER — PATIENT OUTREACH (OUTPATIENT)
Dept: FAMILY MEDICINE CLINIC | Facility: CLINIC | Age: 85
End: 2020-03-26

## 2020-05-18 DIAGNOSIS — F41.9 ANXIETY: ICD-10-CM

## 2020-05-18 DIAGNOSIS — G25.81 RESTLESS LEGS SYNDROME: ICD-10-CM

## 2020-05-18 RX ORDER — PRAMIPEXOLE DIHYDROCHLORIDE 0.25 MG/1
0.25 TABLET ORAL DAILY
Qty: 90 TABLET | Refills: 3 | OUTPATIENT
Start: 2020-05-18

## 2020-05-18 RX ORDER — LORAZEPAM 0.5 MG/1
0.5 TABLET ORAL EVERY 8 HOURS PRN
Qty: 60 TABLET | Refills: 1 | OUTPATIENT
Start: 2020-05-18

## 2020-05-19 DIAGNOSIS — F41.9 ANXIETY: ICD-10-CM

## 2020-05-20 ENCOUNTER — TELEMEDICINE (OUTPATIENT)
Dept: FAMILY MEDICINE CLINIC | Facility: CLINIC | Age: 85
End: 2020-05-20
Payer: MEDICARE

## 2020-05-20 VITALS
BODY MASS INDEX: 28.3 KG/M2 | TEMPERATURE: 97.5 F | SYSTOLIC BLOOD PRESSURE: 139 MMHG | OXYGEN SATURATION: 96 % | WEIGHT: 124 LBS | DIASTOLIC BLOOD PRESSURE: 80 MMHG | HEART RATE: 65 BPM

## 2020-05-20 DIAGNOSIS — E55.9 VITAMIN D DEFICIENCY: ICD-10-CM

## 2020-05-20 DIAGNOSIS — C50.919 CARCINOMA OF FEMALE BREAST, UNSPECIFIED ESTROGEN RECEPTOR STATUS, UNSPECIFIED LATERALITY, UNSPECIFIED SITE OF BREAST (HCC): ICD-10-CM

## 2020-05-20 DIAGNOSIS — G89.4 CHRONIC PAIN SYNDROME: ICD-10-CM

## 2020-05-20 DIAGNOSIS — M54.6 CHRONIC MIDLINE THORACIC BACK PAIN: ICD-10-CM

## 2020-05-20 DIAGNOSIS — R60.0 LEG EDEMA: ICD-10-CM

## 2020-05-20 DIAGNOSIS — K21.9 GASTROESOPHAGEAL REFLUX DISEASE, ESOPHAGITIS PRESENCE NOT SPECIFIED: ICD-10-CM

## 2020-05-20 DIAGNOSIS — I10 ESSENTIAL HYPERTENSION: Primary | ICD-10-CM

## 2020-05-20 DIAGNOSIS — M15.9 OSTEOARTHRITIS OF MULTIPLE JOINTS, UNSPECIFIED OSTEOARTHRITIS TYPE: ICD-10-CM

## 2020-05-20 DIAGNOSIS — G89.29 CHRONIC MIDLINE THORACIC BACK PAIN: ICD-10-CM

## 2020-05-20 DIAGNOSIS — E78.5 HYPERLIPIDEMIA, UNSPECIFIED HYPERLIPIDEMIA TYPE: ICD-10-CM

## 2020-05-20 DIAGNOSIS — B02.9 SHINGLES: ICD-10-CM

## 2020-05-20 DIAGNOSIS — F41.9 ANXIETY: ICD-10-CM

## 2020-05-20 DIAGNOSIS — R73.9 HYPERGLYCEMIA: ICD-10-CM

## 2020-05-20 DIAGNOSIS — R42 DIZZINESS: ICD-10-CM

## 2020-05-20 DIAGNOSIS — G25.81 RESTLESS LEGS SYNDROME: ICD-10-CM

## 2020-05-20 PROCEDURE — 99214 OFFICE O/P EST MOD 30 MIN: CPT | Performed by: FAMILY MEDICINE

## 2020-05-20 RX ORDER — LORAZEPAM 0.5 MG/1
0.5 TABLET ORAL EVERY 8 HOURS PRN
Qty: 60 TABLET | Refills: 1 | Status: SHIPPED | OUTPATIENT
Start: 2020-05-20 | End: 2021-02-16

## 2020-05-20 RX ORDER — LIDOCAINE 4 G/G
PATCH TOPICAL
COMMUNITY
End: 2021-12-19

## 2020-05-20 RX ORDER — LORAZEPAM 0.5 MG/1
0.5 TABLET ORAL EVERY 8 HOURS PRN
Qty: 60 TABLET | Refills: 1 | OUTPATIENT
Start: 2020-05-20

## 2020-05-20 RX ORDER — OMEPRAZOLE 20 MG/1
20 CAPSULE, DELAYED RELEASE ORAL DAILY
Qty: 90 CAPSULE | Refills: 3 | Status: SHIPPED | OUTPATIENT
Start: 2020-05-20 | End: 2020-05-23 | Stop reason: SDUPTHER

## 2020-05-20 RX ORDER — GABAPENTIN 100 MG/1
CAPSULE ORAL
Qty: 90 CAPSULE | Refills: 2 | Status: SHIPPED | OUTPATIENT
Start: 2020-05-20 | End: 2020-05-23 | Stop reason: SDUPTHER

## 2020-05-20 RX ORDER — PRAMIPEXOLE DIHYDROCHLORIDE 0.25 MG/1
0.25 TABLET ORAL DAILY
Qty: 90 TABLET | Refills: 3 | Status: SHIPPED | OUTPATIENT
Start: 2020-05-20 | End: 2020-05-23 | Stop reason: SDUPTHER

## 2020-05-20 RX ORDER — MECLIZINE HYDROCHLORIDE 25 MG/1
25 TABLET ORAL EVERY 8 HOURS PRN
Qty: 30 TABLET | Refills: 1 | Status: SHIPPED | OUTPATIENT
Start: 2020-05-20 | End: 2020-05-23 | Stop reason: SDUPTHER

## 2020-05-23 DIAGNOSIS — K21.9 GASTROESOPHAGEAL REFLUX DISEASE, ESOPHAGITIS PRESENCE NOT SPECIFIED: ICD-10-CM

## 2020-05-23 DIAGNOSIS — G25.81 RESTLESS LEGS SYNDROME: ICD-10-CM

## 2020-05-23 DIAGNOSIS — R42 DIZZINESS: ICD-10-CM

## 2020-05-23 DIAGNOSIS — B02.9 SHINGLES: ICD-10-CM

## 2020-05-25 RX ORDER — GABAPENTIN 100 MG/1
CAPSULE ORAL
Qty: 270 CAPSULE | Refills: 1 | Status: SHIPPED | OUTPATIENT
Start: 2020-05-25 | End: 2020-06-25 | Stop reason: SINTOL

## 2020-05-25 RX ORDER — PRAMIPEXOLE DIHYDROCHLORIDE 0.25 MG/1
0.25 TABLET ORAL DAILY
Qty: 90 TABLET | Refills: 3 | Status: SHIPPED | OUTPATIENT
Start: 2020-05-25 | End: 2021-02-15 | Stop reason: SDUPTHER

## 2020-05-25 RX ORDER — MECLIZINE HYDROCHLORIDE 25 MG/1
25 TABLET ORAL EVERY 8 HOURS PRN
Qty: 270 TABLET | Refills: 3 | Status: SHIPPED | OUTPATIENT
Start: 2020-05-25 | End: 2021-02-15 | Stop reason: SDUPTHER

## 2020-05-25 RX ORDER — OMEPRAZOLE 20 MG/1
20 CAPSULE, DELAYED RELEASE ORAL DAILY
Qty: 90 CAPSULE | Refills: 3 | Status: SHIPPED | OUTPATIENT
Start: 2020-05-25 | End: 2021-02-15 | Stop reason: SDUPTHER

## 2020-05-27 ENCOUNTER — TELEPHONE (OUTPATIENT)
Dept: OTHER | Facility: OTHER | Age: 85
End: 2020-05-27

## 2020-05-28 ENCOUNTER — TELEMEDICINE (OUTPATIENT)
Dept: FAMILY MEDICINE CLINIC | Facility: CLINIC | Age: 85
End: 2020-05-28
Payer: MEDICARE

## 2020-05-28 VITALS
OXYGEN SATURATION: 94 % | DIASTOLIC BLOOD PRESSURE: 74 MMHG | SYSTOLIC BLOOD PRESSURE: 127 MMHG | TEMPERATURE: 98.3 F | HEART RATE: 73 BPM

## 2020-05-28 DIAGNOSIS — Z74.09 IMPAIRED MOBILITY AND ADLS: ICD-10-CM

## 2020-05-28 DIAGNOSIS — I10 ESSENTIAL HYPERTENSION: ICD-10-CM

## 2020-05-28 DIAGNOSIS — C50.919 CARCINOMA OF FEMALE BREAST, UNSPECIFIED ESTROGEN RECEPTOR STATUS, UNSPECIFIED LATERALITY, UNSPECIFIED SITE OF BREAST (HCC): ICD-10-CM

## 2020-05-28 DIAGNOSIS — F41.9 ANXIETY: ICD-10-CM

## 2020-05-28 DIAGNOSIS — I67.1 CEREBRAL ANEURYSM: ICD-10-CM

## 2020-05-28 DIAGNOSIS — R44.1 HALLUCINATION, VISUAL: Primary | ICD-10-CM

## 2020-05-28 DIAGNOSIS — C56.9 MALIGNANT NEOPLASM OF OVARY, UNSPECIFIED LATERALITY (HCC): ICD-10-CM

## 2020-05-28 DIAGNOSIS — Z78.9 IMPAIRED MOBILITY AND ADLS: ICD-10-CM

## 2020-05-28 DIAGNOSIS — B02.9 HERPES ZOSTER WITHOUT COMPLICATION: ICD-10-CM

## 2020-05-28 PROCEDURE — 99213 OFFICE O/P EST LOW 20 MIN: CPT | Performed by: FAMILY MEDICINE

## 2020-06-01 ENCOUNTER — TELEPHONE (OUTPATIENT)
Dept: FAMILY MEDICINE CLINIC | Facility: CLINIC | Age: 85
End: 2020-06-01

## 2020-06-01 NOTE — LETTER
June 1, 2020      [address] , [address]    Dear Margret Arias     In order to maintain a patient-physician relationship, it is important that there be open communication, and that the patient be complaint with recommended treatment plans and follow-up appointments  It has become apparent from our TELEMEDICINE visit on May 28, 2020  that we will no longer be able to maintain a strong physician-patient relationship  [facility] will be available for any necessary non-emergent treatment for 30 days from your receipt of this letter, and if you should have an emergency, please seek treatment in the nearest Emergency Department  We recommend that you place yourself under the care of a clinic without delay  You may wish to contact your insurance carrier as soon as possible for a list of local physicians who will assume responsibility   of your care  Upon your written request, a copy of your medical records will be sent to the physician of your choice  Thank you for your understanding in this very important matter          Sincerely,            Jasmin Holt, DO

## 2020-06-11 ENCOUNTER — OFFICE VISIT (OUTPATIENT)
Dept: GERIATRICS | Age: 85
End: 2020-06-11
Payer: MEDICARE

## 2020-06-11 VITALS
SYSTOLIC BLOOD PRESSURE: 128 MMHG | HEART RATE: 67 BPM | TEMPERATURE: 97.8 F | OXYGEN SATURATION: 97 % | DIASTOLIC BLOOD PRESSURE: 80 MMHG

## 2020-06-11 DIAGNOSIS — H40.9 GLAUCOMA, UNSPECIFIED GLAUCOMA TYPE, UNSPECIFIED LATERALITY: ICD-10-CM

## 2020-06-11 DIAGNOSIS — B02.9 HERPES ZOSTER WITHOUT COMPLICATION: ICD-10-CM

## 2020-06-11 DIAGNOSIS — Z74.09 DECREASED AMBULATION STATUS: ICD-10-CM

## 2020-06-11 DIAGNOSIS — R41.0 CONFUSION: Primary | ICD-10-CM

## 2020-06-11 DIAGNOSIS — E55.9 VITAMIN D DEFICIENCY: ICD-10-CM

## 2020-06-11 DIAGNOSIS — F41.9 ANXIETY: ICD-10-CM

## 2020-06-11 DIAGNOSIS — G25.81 RESTLESS LEGS SYNDROME: ICD-10-CM

## 2020-06-11 PROCEDURE — 99205 OFFICE O/P NEW HI 60 MIN: CPT | Performed by: STUDENT IN AN ORGANIZED HEALTH CARE EDUCATION/TRAINING PROGRAM

## 2020-06-11 PROCEDURE — 3079F DIAST BP 80-89 MM HG: CPT | Performed by: STUDENT IN AN ORGANIZED HEALTH CARE EDUCATION/TRAINING PROGRAM

## 2020-06-11 PROCEDURE — 3074F SYST BP LT 130 MM HG: CPT | Performed by: STUDENT IN AN ORGANIZED HEALTH CARE EDUCATION/TRAINING PROGRAM

## 2020-06-11 PROCEDURE — 4040F PNEUMOC VAC/ADMIN/RCVD: CPT | Performed by: STUDENT IN AN ORGANIZED HEALTH CARE EDUCATION/TRAINING PROGRAM

## 2020-06-11 PROCEDURE — 1160F RVW MEDS BY RX/DR IN RCRD: CPT | Performed by: STUDENT IN AN ORGANIZED HEALTH CARE EDUCATION/TRAINING PROGRAM

## 2020-06-11 PROCEDURE — 1036F TOBACCO NON-USER: CPT | Performed by: STUDENT IN AN ORGANIZED HEALTH CARE EDUCATION/TRAINING PROGRAM

## 2020-06-12 ENCOUNTER — APPOINTMENT (OUTPATIENT)
Dept: LAB | Facility: CLINIC | Age: 85
End: 2020-06-12
Payer: MEDICARE

## 2020-06-12 ENCOUNTER — TELEPHONE (OUTPATIENT)
Dept: LAB | Facility: HOSPITAL | Age: 85
End: 2020-06-12

## 2020-06-12 ENCOUNTER — TELEPHONE (OUTPATIENT)
Dept: GERIATRICS | Age: 85
End: 2020-06-12

## 2020-06-12 LAB
BACTERIA UR QL AUTO: ABNORMAL /HPF
BILIRUB UR QL STRIP: NEGATIVE
CLARITY UR: ABNORMAL
COLOR UR: YELLOW
GLUCOSE UR STRIP-MCNC: NEGATIVE MG/DL
HGB UR QL STRIP.AUTO: NEGATIVE
HYALINE CASTS #/AREA URNS LPF: ABNORMAL /LPF
KETONES UR STRIP-MCNC: NEGATIVE MG/DL
LEUKOCYTE ESTERASE UR QL STRIP: ABNORMAL
NITRITE UR QL STRIP: NEGATIVE
NON-SQ EPI CELLS URNS QL MICRO: ABNORMAL /HPF
PH UR STRIP.AUTO: 7 [PH]
PROT UR STRIP-MCNC: NEGATIVE MG/DL
RBC #/AREA URNS AUTO: ABNORMAL /HPF
SP GR UR STRIP.AUTO: 1.01 (ref 1–1.03)
UROBILINOGEN UR QL STRIP.AUTO: 0.2 E.U./DL
WBC #/AREA URNS AUTO: ABNORMAL /HPF

## 2020-06-12 PROCEDURE — 87086 URINE CULTURE/COLONY COUNT: CPT

## 2020-06-12 PROCEDURE — 81001 URINALYSIS AUTO W/SCOPE: CPT | Performed by: STUDENT IN AN ORGANIZED HEALTH CARE EDUCATION/TRAINING PROGRAM

## 2020-06-13 LAB — BACTERIA UR CULT: NORMAL

## 2020-06-15 ENCOUNTER — TELEPHONE (OUTPATIENT)
Dept: GERIATRICS | Age: 85
End: 2020-06-15

## 2020-06-18 ENCOUNTER — APPOINTMENT (OUTPATIENT)
Dept: LAB | Facility: HOSPITAL | Age: 85
End: 2020-06-18
Attending: STUDENT IN AN ORGANIZED HEALTH CARE EDUCATION/TRAINING PROGRAM
Payer: MEDICARE

## 2020-06-18 DIAGNOSIS — R41.0 CONFUSION: ICD-10-CM

## 2020-06-18 LAB
ALBUMIN SERPL BCP-MCNC: 3.7 G/DL (ref 3.5–5)
ALP SERPL-CCNC: 89 U/L (ref 46–116)
ALT SERPL W P-5'-P-CCNC: 15 U/L (ref 12–78)
ANION GAP SERPL CALCULATED.3IONS-SCNC: 2 MMOL/L (ref 4–13)
AST SERPL W P-5'-P-CCNC: 16 U/L (ref 5–45)
BASOPHILS # BLD AUTO: 0.04 THOUSANDS/ΜL (ref 0–0.1)
BASOPHILS NFR BLD AUTO: 1 % (ref 0–1)
BILIRUB SERPL-MCNC: 0.76 MG/DL (ref 0.2–1)
BUN SERPL-MCNC: 15 MG/DL (ref 5–25)
CALCIUM SERPL-MCNC: 9.5 MG/DL (ref 8.3–10.1)
CHLORIDE SERPL-SCNC: 109 MMOL/L (ref 100–108)
CO2 SERPL-SCNC: 29 MMOL/L (ref 21–32)
CREAT SERPL-MCNC: 0.6 MG/DL (ref 0.6–1.3)
EOSINOPHIL # BLD AUTO: 0.1 THOUSAND/ΜL (ref 0–0.61)
EOSINOPHIL NFR BLD AUTO: 2 % (ref 0–6)
ERYTHROCYTE [DISTWIDTH] IN BLOOD BY AUTOMATED COUNT: 13.3 % (ref 11.6–15.1)
FOLATE SERPL-MCNC: 4.8 NG/ML (ref 3.1–17.5)
GFR SERPL CREATININE-BSD FRML MDRD: 79 ML/MIN/1.73SQ M
GLUCOSE SERPL-MCNC: 65 MG/DL (ref 65–140)
HCT VFR BLD AUTO: 40.1 % (ref 34.8–46.1)
HGB BLD-MCNC: 12.8 G/DL (ref 11.5–15.4)
IMM GRANULOCYTES # BLD AUTO: 0.01 THOUSAND/UL (ref 0–0.2)
IMM GRANULOCYTES NFR BLD AUTO: 0 % (ref 0–2)
LYMPHOCYTES # BLD AUTO: 1.84 THOUSANDS/ΜL (ref 0.6–4.47)
LYMPHOCYTES NFR BLD AUTO: 33 % (ref 14–44)
MCH RBC QN AUTO: 30.8 PG (ref 26.8–34.3)
MCHC RBC AUTO-ENTMCNC: 31.9 G/DL (ref 31.4–37.4)
MCV RBC AUTO: 97 FL (ref 82–98)
MONOCYTES # BLD AUTO: 0.63 THOUSAND/ΜL (ref 0.17–1.22)
MONOCYTES NFR BLD AUTO: 11 % (ref 4–12)
NEUTROPHILS # BLD AUTO: 2.96 THOUSANDS/ΜL (ref 1.85–7.62)
NEUTS SEG NFR BLD AUTO: 53 % (ref 43–75)
NRBC BLD AUTO-RTO: 0 /100 WBCS
PLATELET # BLD AUTO: 203 THOUSANDS/UL (ref 149–390)
PMV BLD AUTO: 11.1 FL (ref 8.9–12.7)
POTASSIUM SERPL-SCNC: 3.6 MMOL/L (ref 3.5–5.3)
PROT SERPL-MCNC: 7.2 G/DL (ref 6.4–8.2)
RBC # BLD AUTO: 4.15 MILLION/UL (ref 3.81–5.12)
SODIUM SERPL-SCNC: 140 MMOL/L (ref 136–145)
TSH SERPL DL<=0.05 MIU/L-ACNC: 1.48 UIU/ML (ref 0.36–3.74)
VIT B12 SERPL-MCNC: 996 PG/ML (ref 100–900)
WBC # BLD AUTO: 5.58 THOUSAND/UL (ref 4.31–10.16)

## 2020-06-18 PROCEDURE — 36415 COLL VENOUS BLD VENIPUNCTURE: CPT

## 2020-06-18 PROCEDURE — 80053 COMPREHEN METABOLIC PANEL: CPT

## 2020-06-18 PROCEDURE — 85025 COMPLETE CBC W/AUTO DIFF WBC: CPT

## 2020-06-18 PROCEDURE — 84443 ASSAY THYROID STIM HORMONE: CPT

## 2020-06-18 PROCEDURE — 82607 VITAMIN B-12: CPT

## 2020-06-18 PROCEDURE — 82746 ASSAY OF FOLIC ACID SERUM: CPT

## 2020-06-25 ENCOUNTER — TELEMEDICINE (OUTPATIENT)
Dept: GERIATRICS | Age: 85
End: 2020-06-25
Payer: MEDICARE

## 2020-06-25 VITALS
HEART RATE: 84 BPM | SYSTOLIC BLOOD PRESSURE: 128 MMHG | TEMPERATURE: 97.5 F | DIASTOLIC BLOOD PRESSURE: 82 MMHG | OXYGEN SATURATION: 90 %

## 2020-06-25 DIAGNOSIS — G89.4 CHRONIC PAIN SYNDROME: ICD-10-CM

## 2020-06-25 DIAGNOSIS — H40.9 GLAUCOMA, UNSPECIFIED GLAUCOMA TYPE, UNSPECIFIED LATERALITY: ICD-10-CM

## 2020-06-25 DIAGNOSIS — E55.9 VITAMIN D DEFICIENCY: ICD-10-CM

## 2020-06-25 DIAGNOSIS — H35.30 MACULAR DEGENERATION, UNSPECIFIED LATERALITY, UNSPECIFIED TYPE: ICD-10-CM

## 2020-06-25 DIAGNOSIS — K21.9 GASTROESOPHAGEAL REFLUX DISEASE, ESOPHAGITIS PRESENCE NOT SPECIFIED: ICD-10-CM

## 2020-06-25 DIAGNOSIS — R41.0 CONFUSION: Primary | ICD-10-CM

## 2020-06-25 DIAGNOSIS — G25.81 RESTLESS LEGS SYNDROME: ICD-10-CM

## 2020-06-25 DIAGNOSIS — Z79.899 POLYPHARMACY: ICD-10-CM

## 2020-06-25 PROBLEM — R30.0 DYSURIA: Status: RESOLVED | Noted: 2018-08-06 | Resolved: 2020-06-25

## 2020-06-25 PROCEDURE — 99215 OFFICE O/P EST HI 40 MIN: CPT | Performed by: STUDENT IN AN ORGANIZED HEALTH CARE EDUCATION/TRAINING PROGRAM

## 2021-02-09 DIAGNOSIS — F41.9 ANXIETY: ICD-10-CM

## 2021-02-09 RX ORDER — LORAZEPAM 0.5 MG/1
0.25 TABLET ORAL
Qty: 30 TABLET | Refills: 1 | Status: CANCELLED | OUTPATIENT
Start: 2021-02-09

## 2021-02-09 NOTE — TELEPHONE ENCOUNTER
1-Not giving any water pills right now since hospital   Put retaining a little fluid in legs past couple of months  2- little more dizzy even with meclizine 3 times a day  Not moving around much  Worse in am   Daughter can redirect and then she seems fine      Please advise

## 2021-02-09 NOTE — TELEPHONE ENCOUNTER
Pt has not been seen for over 7 months  I had spoken to dtr about side effects benzodiazepines in pts like her mom   Can we set up a telemedicine video, as I typically advocate against benzodiazepenes

## 2021-02-12 DIAGNOSIS — Z23 ENCOUNTER FOR IMMUNIZATION: ICD-10-CM

## 2021-02-15 DIAGNOSIS — R42 DIZZINESS: ICD-10-CM

## 2021-02-15 DIAGNOSIS — G25.81 RESTLESS LEGS SYNDROME: ICD-10-CM

## 2021-02-15 DIAGNOSIS — K21.9 GASTROESOPHAGEAL REFLUX DISEASE: ICD-10-CM

## 2021-02-15 RX ORDER — MECLIZINE HYDROCHLORIDE 25 MG/1
25 TABLET ORAL EVERY 8 HOURS PRN
Qty: 60 TABLET | Refills: 3 | Status: SHIPPED | OUTPATIENT
Start: 2021-02-15 | End: 2021-06-06 | Stop reason: SDUPTHER

## 2021-02-15 RX ORDER — PRAMIPEXOLE DIHYDROCHLORIDE 0.25 MG/1
0.25 TABLET ORAL DAILY
Qty: 90 TABLET | Refills: 3 | Status: SHIPPED | OUTPATIENT
Start: 2021-02-15 | End: 2021-06-06 | Stop reason: SDUPTHER

## 2021-02-15 RX ORDER — OMEPRAZOLE 20 MG/1
20 CAPSULE, DELAYED RELEASE ORAL DAILY
Qty: 90 CAPSULE | Refills: 3 | Status: SHIPPED | OUTPATIENT
Start: 2021-02-15 | End: 2021-12-06

## 2021-02-16 ENCOUNTER — TELEMEDICINE (OUTPATIENT)
Dept: GERIATRICS | Age: 86
End: 2021-02-16
Payer: MEDICARE

## 2021-02-16 VITALS — OXYGEN SATURATION: 97 % | TEMPERATURE: 97.8 F | HEART RATE: 83 BPM

## 2021-02-16 DIAGNOSIS — F41.9 ANXIETY: Primary | ICD-10-CM

## 2021-02-16 DIAGNOSIS — Z74.09 DECREASED AMBULATION STATUS: ICD-10-CM

## 2021-02-16 DIAGNOSIS — G25.81 RESTLESS LEGS SYNDROME: ICD-10-CM

## 2021-02-16 DIAGNOSIS — M79.672 LEFT FOOT PAIN: ICD-10-CM

## 2021-02-16 DIAGNOSIS — H35.30 MACULAR DEGENERATION, UNSPECIFIED LATERALITY, UNSPECIFIED TYPE: ICD-10-CM

## 2021-02-16 DIAGNOSIS — B02.9 HERPES ZOSTER WITHOUT COMPLICATION: ICD-10-CM

## 2021-02-16 DIAGNOSIS — G89.4 CHRONIC PAIN SYNDROME: ICD-10-CM

## 2021-02-16 DIAGNOSIS — H40.9 GLAUCOMA, UNSPECIFIED GLAUCOMA TYPE, UNSPECIFIED LATERALITY: ICD-10-CM

## 2021-02-16 DIAGNOSIS — E55.9 VITAMIN D DEFICIENCY: ICD-10-CM

## 2021-02-16 DIAGNOSIS — K21.9 GASTROESOPHAGEAL REFLUX DISEASE, UNSPECIFIED WHETHER ESOPHAGITIS PRESENT: ICD-10-CM

## 2021-02-16 PROBLEM — M79.673 FOOT PAIN: Status: ACTIVE | Noted: 2021-02-16

## 2021-02-16 PROCEDURE — 99215 OFFICE O/P EST HI 40 MIN: CPT | Performed by: STUDENT IN AN ORGANIZED HEALTH CARE EDUCATION/TRAINING PROGRAM

## 2021-02-16 RX ORDER — LORAZEPAM 0.5 MG/1
0.25 TABLET ORAL EVERY 8 HOURS PRN
Qty: 60 TABLET | Refills: 0 | Status: SHIPPED | OUTPATIENT
Start: 2021-02-16 | End: 2021-09-19 | Stop reason: SDUPTHER

## 2021-02-16 NOTE — PROGRESS NOTES
Virtual Regular Visit      Assessment/Plan:    Problem List Items Addressed This Visit        Digestive    GERD (gastroesophageal reflux disease)     Continue omeprazole 20 mg daily   Continue anti reflux measures            Other    Anxiety - Primary      Patient has been successfully weaned from lorazepam 0 5 mg at nighttime to 0 25 mg at nighttime   Discussed with patient's daughter about further weaning to lorazepam 0 25 mg on alternate nites   No overt changes in mood or personality since weaning  Continue social support by family and friends           Relevant Medications    LORazepam (ATIVAN) 0 5 mg tablet    Decreased ambulation status      Previously recommended home physical therapy   Maintain [place] precautions            Glaucoma      Continue latanoprost, combigan ophthalmic drops    Maintain [place] precautions  Follow-up with Ophthalmology as scheduled           Macular degeneration      Maintain [place] precautions  Follow-up with Ophthalmology as scheduled         Restless legs syndrome      Symptoms remain stable   Continue pramipexole   Should patient develop recurrent hallucinations, would plan to wean patient off Pramipexole as side effects may contribute to this            Vitamin D deficiency      Continue vitamin-D supplementation   Maintain [place] precautions         Chronic pain syndrome      Stable   Continue Tylenol 975 mg q 8h  May augment with topical analgesics or lidocaine patches   Maintain [place] precautions         Shingles      Previously on gabapentin however has been weaned off successfully after developing hallucinations   Continue Tylenol as needed         Foot pain      Patient chronic left foot pain secondary to lesion on base of foot   Currently following with Podiatry however was advised no further interventions for lesion   Recommend Tylenol as needed   Also recommended using a cushion device to alleviate contact with the floor when walking                    Reason for visit is   Chief Complaint   Patient presents with    Follow-up    Virtual Regular Visit        Encounter provider Bryon Paz MD    Provider located at [address] [place] [address] Road  [address]  [address] [id number]      Recent Visits  No visits were found meeting these conditions  Showing recent visits within past 7 days and meeting all other requirements     Today's Visits  Date Type Provider Dept   02/16/21 Felicia Corado MD [facility] today's visits and meeting all other requirements     Future Appointments  No visits were found meeting these conditions  Showing future appointments within next 150 days and meeting all other requirements        The patient was identified by name and date of birth  Viraj Vizcarra was informed that this is a telemedicine visit and that the visit is being conducted through zulily and patient was informed that this is a secure, HIPAA-compliant platform  She agrees to proceed     My office door was closed  No one else was in the room  She acknowledged consent and understanding of privacy and security of the video platform  The patient has agreed to participate and understands they can discontinue the visit at any time  Patient is aware this is a billable service  Subjective  Viraj Vizcarra is a 80 y o  female  who presents for routine follow-up of acute and chronic conditions   HPI     This is a very pleasant 60-year-old female with anxiety, ambulatory dysfunction, GERD, glaucoma, macular degeneration,  prior shingles, chronic pain syndrome and left foot pain amongst other medical conditions who presents for routine follow-up of acute and chronic conditions  The patient's daughter who is involved in her primary care taking was present during the entire visit      Today daughter explains that the patient's anxiety remains controlled on her current weaned regimen of lorazepam 0 25 mg at nighttime  At prior visit, patient and daughter were educated on the side effects of chronic benzodiazepine use in elderly frail patients with the potential for causing confusion, memory loss, amnesia, dizziness, drowsiness and an increased risk of fall  Asa result of which the patient has been successfully weaned to her current dosage  Since anxiety symptoms have remained controlled on this regimen, I would recommend further weaning to alternate day dosing of lorazepam at 0 25 mg  Daughter explains that the patient continues to be followed by Podiatry for a painful lesion under her left foot  Per daughter, no further intervention per Podiatry however the patient does continue to have chronic pain for which she uses Tylenol  Patient also states that she continues to have some discomfort occasionally in the area where she did have prior shingles  Of note the  Patient had been on gabapentin however developed hallucinations after which she was weaned off of this with resolution of hallucinations  Recommended continuing Tylenol and lidocaine patches for residual pain and history of chronic pain syndrome  No symptoms of cardiorespiratory distress, fever, chills, URI or urinary symptoms  Daughter explains that the patient has been at home for the most part due to the current COVID-19 pandemic  Discussed recommendations for the patient to obtain the COVID-19 vaccine once available to her  The patient continues on lorazepam fora  history of anxiety with successful weaning of this medication and stability of symptoms  She continues on Burna of [place] and latanoprost eyedrops for a history of glaucoma which remains stable  She also continues on vitamin-D supplementation for a history of vitamin-D deficiency        Past Medical History:   Diagnosis Date    Anxiety     Arthritis     Cancer ([facility] [address] )     Elevated serum alkaline phosphatase level     mild, isoenzymes are normal, resolved 4/24/17 labs , last assessed 11/10/16, resolved 4/24/17    Hematuria     last assessed 9/18/12    Hyperlipidemia 8/13/2012    Hypertension     Pulmonary embolism ([facility] [address] ) 01/2011    last assessed 9/18/12       Past Surgical History:   Procedure Laterality Date    BILATERAL OOPHORECTOMY      Laparoscopic    BREAST SURGERY Left     Mastectomy     CHOLECYSTECTOMY      Laparoscopic    HERNIA REPAIR      HYSTERECTOMY      SMALL INTESTINE SURGERY         Current Outpatient Medications   Medication Sig Dispense Refill    Acetaminophen (TYLENOL ARTHRITIS PAIN PO) Take 1 tablet by mouth 3 (three) times a day      brimonidine-timolol (COMBIGAN) 0 2-0 5 % Administer 1 drop into the left eye 2 (two) times a day      Calcium Carbonate-Vit D-Min (CALCIUM 1200 PO) Take 750 mg by mouth 2 (two) times a day      cholecalciferol (VITAMIN D3) 1,000 units tablet Take 4,000 Units by mouth daily      Cranberry 250 MG TABS Take by mouth daily      latanoprost (XALATAN) 0 005 % ophthalmic solution Administer 1 drop into the left eye daily      LORazepam (ATIVAN) 0 5 mg tablet Take 0 5 tablets (0 25 mg total) by mouth every 8 (eight) hours as needed for anxiety 60 tablet 0    magnesium 30 MG tablet Take 400 mg by mouth daily      meclizine (ANTIVERT) 25 mg tablet Take 1 tablet (25 mg total) by mouth every 8 (eight) hours as needed for dizziness 60 tablet 3    omeprazole (PriLOSEC) 20 mg delayed release capsule Take 1 capsule (20 mg total) by mouth daily 90 capsule 3    pramipexole (MIRAPEX) 0 25 mg tablet Take 1 tablet (0 25 mg total) by mouth daily 90 tablet 3    vitamin B-12 (VITAMIN B-12) 1,000 mcg tablet Take 2,500 mcg by mouth daily      diclofenac sodium (VOLTAREN) 1 % Apply 4 g topically 4 (four) times a day (Patient not taking: Reported on 6/11/2020) 1 Tube 5    Lidocaine 4 % PTCH Apply topically       No current facility-administered medications for this visit           Allergies   Allergen Reactions    Amoxicillin Hives    Celecoxib     Cephalexin     Ciprofloxacin      Other reaction(s): diarrhea  Tolerates Levaquin    Codeine Other (See Comments)     Other reaction(s): Other (See Comments)  takes vicodin @ home  takes vicodin @ home    Epinephrine Other (See Comments)     Other reaction(s): Unknown Reaction    Oxycodone-Acetaminophen Other (See Comments)    Oxycodone-Acetaminophen      Other reaction(s): Unknown Reaction    Propoxyphene Other (See Comments)    Rofecoxib     Sulfa Antibiotics Other (See Comments)     takes at home  takes at home  Other reaction(s): Other (See Comments)  takes at home    Sulfamethoxazole-Trimethoprim     Nitrofurantoin Rash       Review of Systems   Constitutional: Positive for activity change (chronic)  Negative for chills and fever  HENT: Negative for congestion, ear pain, rhinorrhea and sore throat  Eyes: Negative for discharge  Respiratory: Negative for cough, chest tightness, shortness of breath, wheezing and stridor  Cardiovascular: Negative for chest pain, palpitations and leg swelling  Gastrointestinal: Positive for constipation  Negative for abdominal pain, diarrhea, nausea and vomiting  Genitourinary: Negative for decreased urine volume and dysuria  Musculoskeletal: Positive for arthralgias (Chronic) and gait problem  Skin: Negative for color change, pallor, rash and wound  Neurological: Negative for dizziness, weakness and headaches  Psychiatric/Behavioral: Positive for decreased concentration (Chronic)  Negative for confusion, hallucinations and sleep disturbance  The patient is not nervous/anxious  Video Exam    Vitals:    02/16/21 1632   Pulse: 83   Temp: 97 8 °F (36 6 °C)   SpO2: 97%       Physical Exam  Constitutional:       General: She is not in acute distress  Appearance: Normal appearance  She is not ill-appearing or diaphoretic        Comments: Elderly frail female sitting comfortably in chair in no obvious cardiorespiratory or painful distress   HENT:      Head: Normocephalic and atraumatic  Right Ear: External ear normal       Left Ear: External ear normal       Nose: Nose normal       Mouth/Throat:      Mouth: Mucous membranes are moist    Eyes:      General:         Right eye: No discharge  Left eye: No discharge  Conjunctiva/sclera: Conjunctivae normal    Neck:      Musculoskeletal: Normal range of motion  Pulmonary:      Effort: Pulmonary effort is normal  No respiratory distress  Abdominal:      General: There is no distension  Palpations: Abdomen is soft  Tenderness: There is no abdominal tenderness  Musculoskeletal:      Right lower leg: No edema  Left lower leg: No edema  Skin:     General: Skin is dry  Neurological:      Mental Status: She is alert  Mental status is at baseline  Gait: Gait abnormal    Psychiatric:         Mood and Affect: Mood normal           I spent 30 minutes directly with the patient during this visit      VIRTUAL VISIT [address] acknowledges that she has consented to an online visit or consultation  She understands that the online visit is based solely on information provided by her, and that, in the absence of a face-to-face physical evaluation by the physician, the diagnosis she receives is both limited and provisional in terms of accuracy and completeness  This is not intended to replace a full medical face-to-face evaluation by the physician  Marcelo Reddy understands and accepts these terms

## 2021-02-17 NOTE — ASSESSMENT & PLAN NOTE
Patient has been successfully weaned from lorazepam 0 5 mg at nighttime to 0 25 mg at nighttime   Discussed with patient's daughter about further weaning to lorazepam 0 25 mg on alternate nites   No overt changes in mood or personality since weaning  Continue social support by family and friends

## 2021-02-17 NOTE — ASSESSMENT & PLAN NOTE
Symptoms remain stable   Continue pramipexole   Should patient develop recurrent hallucinations, would plan to wean patient off Pramipexole as side effects may contribute to this

## 2021-02-17 NOTE — ASSESSMENT & PLAN NOTE
Previously on gabapentin however has been weaned off successfully after developing hallucinations   Continue Tylenol as needed

## 2021-02-17 NOTE — ASSESSMENT & PLAN NOTE
Continue latanoprost, combigan ophthalmic drops    Maintain [place] precautions  Follow-up with Ophthalmology as scheduled

## 2021-02-17 NOTE — ASSESSMENT & PLAN NOTE
Stable   Continue Tylenol 975 mg q 8h  May augment with topical analgesics or lidocaine patches   Maintain [place] precautions

## 2021-02-17 NOTE — ASSESSMENT & PLAN NOTE
Patient chronic left foot pain secondary to lesion on base of foot   Currently following with Podiatry however was advised no further interventions for lesion   Recommend Tylenol as needed   Also recommended using a cushion device to alleviate contact with the floor when walking

## 2021-03-03 ENCOUNTER — TELEPHONE (OUTPATIENT)
Dept: GERIATRICS | Age: 86
End: 2021-03-03

## 2021-03-03 NOTE — TELEPHONE ENCOUNTER
Daughter contacted office again to relay she called [facility] who provided information regarding caregiver age guidelines  Relayed to daughter that practice administrator should talk with her, provided PA name and transferred the call

## 2021-03-03 NOTE — TELEPHONE ENCOUNTER
Daughter called back to schedule vaccine as well as to schedule for herself as patient's caregiver  Caller relayed she was previously informed that caregivers could be scheduled for vaccine  Relayed that at this time, network recommendation/requirement for caregiver vaccine is placed at 65+ (because of short vaccine supply) with patient vaccine being ages 74+  Caller extremely upset with network recommendation and location of current supply of vaccine [facility])  Caller chose not to schedule patient for vaccine and abruptly hung up

## 2021-03-03 NOTE — TELEPHONE ENCOUNTER
Left message for daughter to call back so I can address her concerns about scheduling the COVID vaccine for her self and her mom

## 2021-03-03 NOTE — TELEPHONE ENCOUNTER
Left message for daughter to return call to schedule patient for COVID vaccine which is currently available

## 2021-03-03 NOTE — TELEPHONE ENCOUNTER
----- Message from Daniel Sterling sent at 3/3/2021 10:33 AM EST -----  Regarding: voicemail message  I transferred a call to your voicemail from daughter of this patient  Please see patient chart for previous call documentation

## 2021-03-05 NOTE — TELEPHONE ENCOUNTER
Received voice mail from daughter    She was able to get appt for her self and her mom at [facility] on 3/11/21

## 2021-04-02 ENCOUNTER — TELEPHONE (OUTPATIENT)
Dept: GERIATRICS | Facility: CLINIC | Age: 86
End: 2021-04-02

## 2021-06-06 DIAGNOSIS — G25.81 RESTLESS LEGS SYNDROME: ICD-10-CM

## 2021-06-06 DIAGNOSIS — R42 DIZZINESS: ICD-10-CM

## 2021-06-07 RX ORDER — PRAMIPEXOLE DIHYDROCHLORIDE 0.25 MG/1
0.25 TABLET ORAL DAILY
Qty: 90 TABLET | Refills: 0 | Status: SHIPPED | OUTPATIENT
Start: 2021-06-07 | End: 2021-08-17 | Stop reason: SDUPTHER

## 2021-06-07 RX ORDER — MECLIZINE HYDROCHLORIDE 25 MG/1
25 TABLET ORAL EVERY 8 HOURS PRN
Qty: 60 TABLET | Refills: 0 | Status: SHIPPED | OUTPATIENT
Start: 2021-06-07 | End: 2021-07-11 | Stop reason: SDUPTHER

## 2021-07-11 DIAGNOSIS — R42 DIZZINESS: ICD-10-CM

## 2021-07-12 RX ORDER — MECLIZINE HYDROCHLORIDE 25 MG/1
25 TABLET ORAL EVERY 8 HOURS PRN
Qty: 60 TABLET | Refills: 0 | Status: SHIPPED | OUTPATIENT
Start: 2021-07-12 | End: 2021-08-17 | Stop reason: SDUPTHER

## 2021-08-17 ENCOUNTER — TELEMEDICINE (OUTPATIENT)
Dept: GERIATRICS | Age: 86
End: 2021-08-17
Payer: MEDICARE

## 2021-08-17 VITALS — HEART RATE: 71 BPM | OXYGEN SATURATION: 98 %

## 2021-08-17 DIAGNOSIS — I89.0 LYMPHEDEMA: ICD-10-CM

## 2021-08-17 DIAGNOSIS — R41.3 MEMORY LOSS: ICD-10-CM

## 2021-08-17 DIAGNOSIS — F41.9 ANXIETY: ICD-10-CM

## 2021-08-17 DIAGNOSIS — H40.9 GLAUCOMA, UNSPECIFIED GLAUCOMA TYPE, UNSPECIFIED LATERALITY: ICD-10-CM

## 2021-08-17 DIAGNOSIS — R73.9 HYPERGLYCEMIA: ICD-10-CM

## 2021-08-17 DIAGNOSIS — G89.4 CHRONIC PAIN SYNDROME: ICD-10-CM

## 2021-08-17 DIAGNOSIS — Z74.09 IMPAIRED MOBILITY AND ADLS: ICD-10-CM

## 2021-08-17 DIAGNOSIS — G25.81 RESTLESS LEGS SYNDROME: ICD-10-CM

## 2021-08-17 DIAGNOSIS — Z78.9 IMPAIRED MOBILITY AND ADLS: ICD-10-CM

## 2021-08-17 DIAGNOSIS — R41.9 UNSPECIFIED SYMPTOMS AND SIGNS INVOLVING COGNITIVE FUNCTIONS AND AWARENESS: ICD-10-CM

## 2021-08-17 DIAGNOSIS — E55.9 VITAMIN D DEFICIENCY: ICD-10-CM

## 2021-08-17 DIAGNOSIS — R68.89 FORGETFULNESS: Primary | ICD-10-CM

## 2021-08-17 DIAGNOSIS — K21.9 GASTROESOPHAGEAL REFLUX DISEASE, UNSPECIFIED WHETHER ESOPHAGITIS PRESENT: ICD-10-CM

## 2021-08-17 DIAGNOSIS — R42 VERTIGO: ICD-10-CM

## 2021-08-17 DIAGNOSIS — R42 DIZZINESS: ICD-10-CM

## 2021-08-17 DIAGNOSIS — I10 ESSENTIAL HYPERTENSION: ICD-10-CM

## 2021-08-17 DIAGNOSIS — E78.5 HYPERLIPIDEMIA, UNSPECIFIED HYPERLIPIDEMIA TYPE: ICD-10-CM

## 2021-08-17 PROCEDURE — 99215 OFFICE O/P EST HI 40 MIN: CPT | Performed by: STUDENT IN AN ORGANIZED HEALTH CARE EDUCATION/TRAINING PROGRAM

## 2021-08-17 PROCEDURE — 1124F ACP DISCUSS-NO DSCNMKR DOCD: CPT | Performed by: STUDENT IN AN ORGANIZED HEALTH CARE EDUCATION/TRAINING PROGRAM

## 2021-08-17 RX ORDER — MECLIZINE HYDROCHLORIDE 25 MG/1
25 TABLET ORAL EVERY 8 HOURS PRN
Qty: 90 TABLET | Refills: 3 | Status: SHIPPED | OUTPATIENT
Start: 2021-08-17 | End: 2021-10-18

## 2021-08-17 RX ORDER — PRAMIPEXOLE DIHYDROCHLORIDE 0.25 MG/1
0.25 TABLET ORAL
Qty: 90 TABLET | Refills: 3 | Status: SHIPPED | OUTPATIENT
Start: 2021-08-17 | End: 2022-07-17 | Stop reason: SDUPTHER

## 2021-08-17 NOTE — PROGRESS NOTES
Virtual Regular Visit    Verification of patient location:    Patient is located in the following state in which I hold an active license [place]      Assessment/Plan:    Problem List Items Addressed This Visit        Digestive    GERD (gastroesophageal reflux disease)      Stable   Continue omeprazole 20 mg daily   Continue anti reflux measures            Cardiovascular and Mediastinum    Hypertension      Patient has been off antihypertensive therapy   Recommend adherence to a heart healthy diet   Physical activity as able         Relevant Orders    CBC and differential    Comprehensive metabolic panel       Other    Anxiety       Patient has been weaned to lorazepam 0 25 mg 3 times weekly  No further hallucinations per daughter   Encourage patient remain active mentally, physically and socially  Continue social support by family and friends         Vertigo      Patient has chronically been on meclizine   Prescription renewed   Maintain [place] precautions            Relevant Medications    meclizine (ANTIVERT) 25 mg tablet    Glaucoma      Continue latanoprost, combigan eyedrops   Maintain [place] precautions  Follow-up with Ophthalmology as scheduled         Hyperglycemia     Prior history of abnormal blood sugars   Will order HbA1c         Relevant Orders    Hemoglobin A1C    Hyperlipidemia      Recommend adherence to a heart healthy diet  Will order lipid panel, CMP         Relevant Orders    Lipid panel    Lymphedema      Stable   History of chronic lymphedema            Restless legs syndrome      Symptoms controlled   Patient continues on pramipexole 0 25 mg daily            Relevant Medications    pramipexole (MIRAPEX) 0 25 mg tablet    Vitamin D deficiency     Will order vitamin-D level         Relevant Orders    Vitamin D 25 hydroxy    Impaired mobility and ADLs      Patient with notable frailty and debility   Ambulatory dysfunction as well  Discussed physical therapy referral in-home or as an outpatient  However daughter hesitant given increasing cases of COVID and exposing the patient to large groups   Recommend patient remain active mentally, physically and socially  Maintain [place] precautions         Chronic pain syndrome      Continue Tylenol 975 mg q 8h p r n  Consider topical analgesics her lidocaine patches as needed   Maintain [place] precautions  Discussed physical therapy referral however daughter hesitant given COVID-19 pandemic risk of exposure with physical therapy         Forgetfulness - Primary      Patient stating she has become more forgetful and is concerned about her memory   Since decreasing lorazepam to 0 25 mg 3 times weekly, patient has had resolution of hallucinations   Patient currently is on pramipexole which may cause confusion mental status changes  However patient states her symptoms have been controlled on current dosage   Patient also chronically on benzodiazepine therapy which may contribute to memory loss, amnesia   Recommend patient have a formal neurocognitive assessment in the future if able   Daughter hesitant about taking the patient out of the home due to increasing covid cases locally    Reorientation redirection as needed  Manage chronic conditions  Maintain [place] precautions   Encourage patient to remain active mentally, physically and socially   Patient to participate in cognitively challenging exercises as able  Will order TSH with reflex T4, B12, folate         Relevant Orders    Vitamin B12    Folate      Other Visit Diagnoses     Memory loss        Relevant Orders    TSH, 3rd generation with T4 reflex    Unspecified symptoms and signs involving cognitive functions and awareness         Relevant Orders    Vitamin B12    Folate               Reason for visit is   Chief Complaint   Patient presents with    Virtual Regular Visit        Encounter provider Yaniv Del Toro MD    Provider located at [address] [place] [address] [address] [facility]  [phone]      Recent Visits  Date Type Provider Dept   08/17/21 Telemedicine Rory Cole MD [facility]   Showing recent visits within past 7 days and meeting all other requirements  Future Appointments  No visits were found meeting these conditions  Showing future appointments within next 150 days and meeting all other requirements       The patient was identified by name and date of birth  Vlad Plaza was informed that this is a telemedicine visit and that the visit is being conducted through [employer] and patient was informed that this is a secure, HIPAA-compliant platform  She agrees to proceed     My office door was closed  No one else was in the room  She acknowledged consent and understanding of privacy and security of the video platform  The patient has agreed to participate and understands they can discontinue the visit at any time  Patient is aware this is a billable service  Subjective  Vlad Plaza is a 80 y o  female who presents for routine follow-up of acute and chronic conditions   HPI     This is a 75-year-old female with forgetfulness, restless leg syndrome, anxiety, HTN, HLD, glaucoma, gait instability, chronic osteoarthritis and GERD amongst other medical conditions who presents for routine follow-up of acute and chronic conditions  Patient's daughter who is her primary caretaker is present during the entire visit  Today the patient was concerned that she is becoming more forgetful  Of note, the patient has been on chronic benzodiazepine therapy with lorazepam which may contribute to amnesia and memory loss which we discussed in the past   She also is on pramipexole chronically which may contribute to confusion and altered mental status  Daughter explains since decreasing lorazepam to 0 25 mg 3 times weekly, patient has had cessation of her hallucinations      The patient is somewhat independent in ADLs and does use an assistive device to ambulate  Daughter does continue to prep the patient's food and has not noted any overt changes in mood, personality or behaviors  Daughter relates that due to the patient's vision impairment, she often is unsure about the time of day  Her daytime activities are mostly sedentary as she watches television and often refuses to be active due to chronic leg pain  She does complain of urinary frequency and we did discuss medication therapies however given her age, history of dizziness and forgetfulness, would avoid any of these agents which may worsen her current chronic symptoms  We discussed behavioral techniques as scheduled toileting every 2 hours and avoiding fluid prior to bedtime  Given patient's progressive debility and frailty, I did discuss recommending in-home physical therapy  However daughter is hesitant about exposing the patient to outside persons given increasing cases of delta  COVID  Recommended a routine for the patient and encouraged her to remain active mentally, physically and socially  The patient may benefit in the future from a neurocognitive assessment as a baseline of her cognitive function  No cardiorespiratory distress, fever, chills, URI or urinary symptoms  The patient continues to tolerate oral intake, has been sleeping well and having regular bowel movements  No recent falls or hospitalization  The patient continues on pramipexole for a history of restless leg syndrome with symptoms remaining stable  She has been compliant with omeprazole for a history of GERD which remains stable  She also continues on [place] and latanoprost for a history of glaucoma  Will order annual labwork which patient is due for        Past Medical History:   Diagnosis Date    Anxiety     Arthritis     Cancer ([facility] [address] )     Elevated serum alkaline phosphatase level     mild, isoenzymes are normal, resolved 4/24/17 labs , last assessed 11/10/16, resolved 4/24/17  Hematuria     last assessed 9/18/12    Hyperlipidemia 8/13/2012    Hypertension     Pulmonary embolism ([facility] [address] ) 01/2011    last assessed 9/18/12       Past Surgical History:   Procedure Laterality Date    BILATERAL OOPHORECTOMY      Laparoscopic    BREAST SURGERY Left     Mastectomy     CHOLECYSTECTOMY      Laparoscopic    HERNIA REPAIR      HYSTERECTOMY      SMALL INTESTINE SURGERY         Current Outpatient Medications   Medication Sig Dispense Refill    Acetaminophen (TYLENOL ARTHRITIS PAIN PO) Take 1 tablet by mouth 3 (three) times a day      brimonidine-timolol (COMBIGAN) 0 2-0 5 % Administer 1 drop into the left eye 2 (two) times a day      Calcium Carbonate-Vit D-Min (CALCIUM 1200 PO) Take 750 mg by mouth 2 (two) times a day      cholecalciferol (VITAMIN D3) 1,000 units tablet Take 4,000 Units by mouth daily      Cranberry 250 MG TABS Take by mouth daily      latanoprost (XALATAN) 0 005 % ophthalmic solution Administer 1 drop into the left eye daily      LORazepam (ATIVAN) 0 5 mg tablet Take 0 5 tablets (0 25 mg total) by mouth every 8 (eight) hours as needed for anxiety 60 tablet 0    magnesium 30 MG tablet Take 400 mg by mouth daily      meclizine (ANTIVERT) 25 mg tablet Take 1 tablet (25 mg total) by mouth every 8 (eight) hours as needed for dizziness 90 tablet 3    omeprazole (PriLOSEC) 20 mg delayed release capsule Take 1 capsule (20 mg total) by mouth daily 90 capsule 3    pramipexole (MIRAPEX) 0 25 mg tablet Take 1 tablet (0 25 mg total) by mouth daily at bedtime 90 tablet 3    vitamin B-12 (VITAMIN B-12) 1,000 mcg tablet Take 2,500 mcg by mouth daily      diclofenac sodium (VOLTAREN) 1 % Apply 4 g topically 4 (four) times a day (Patient not taking: Reported on 6/11/2020) 1 Tube 5    Lidocaine 4 % PTCH Apply topically       No current facility-administered medications for this visit          Allergies   Allergen Reactions    Amoxicillin Hives    Celecoxib     Cephalexin     Ciprofloxacin      Other reaction(s): diarrhea  Tolerates Levaquin    Codeine Other (See Comments)     Other reaction(s): Other (See Comments)  takes vicodin @ home  takes vicodin @ home    Epinephrine Other (See Comments)     Other reaction(s): Unknown Reaction    Oxycodone-Acetaminophen Other (See Comments)    Oxycodone-Acetaminophen      Other reaction(s): Unknown Reaction    Propoxyphene Other (See Comments)    Rofecoxib     Sulfa Antibiotics Other (See Comments)     takes at home  takes at home  Other reaction(s): Other (See Comments)  takes at home    Sulfamethoxazole-Trimethoprim     Nitrofurantoin Rash       Review of Systems   Constitutional: Positive for activity change (Chronic)  Negative for chills and fever  HENT: Positive for hearing loss  Negative for congestion, ear pain, rhinorrhea, sore throat and trouble swallowing  Eyes: Positive for visual disturbance (Chronic)  Negative for discharge  Respiratory: Negative for cough, chest tightness, shortness of breath, wheezing and stridor  Cardiovascular: Negative for chest pain, palpitations and leg swelling  Gastrointestinal: Negative for abdominal pain, constipation, diarrhea, nausea and vomiting  Genitourinary: Positive for frequency (Chronic)  Negative for dysuria  Musculoskeletal: Positive for arthralgias (Chronic), back pain (Chronic) and gait problem  Skin: Negative for color change, pallor, rash and wound  Neurological: Positive for dizziness (chronic)  Negative for speech difficulty  Psychiatric/Behavioral: Positive for decreased concentration  Negative for agitation, behavioral problems, confusion, dysphoric mood, hallucinations and sleep disturbance  Video Exam    Vitals:    08/17/21 1502   Pulse: 71   SpO2: 98%       Physical Exam  Constitutional:       General: She is not in acute distress  Appearance: Normal appearance  She is not ill-appearing or diaphoretic        Comments: Elderly frail female sitting comfortably in chair in no obvious cardiorespiratory or painful distress   HENT:      Head: Normocephalic and atraumatic  Right Ear: External ear normal       Left Ear: External ear normal       Nose: Nose normal  No congestion  Mouth/Throat:      Mouth: Mucous membranes are moist    Eyes:      General: No scleral icterus  Right eye: No discharge  Left eye: No discharge  Conjunctiva/sclera: Conjunctivae normal    Pulmonary:      Effort: Pulmonary effort is normal  No respiratory distress  Abdominal:      General: There is no distension  Palpations: Abdomen is soft  Tenderness: There is no abdominal tenderness  Musculoskeletal:         General: Swelling ( chronic lymphedema) present  Normal range of motion  Cervical back: Normal range of motion  Skin:     General: Skin is dry  Neurological:      Mental Status: She is alert  Mental status is at baseline  Motor: No weakness  Gait: Gait abnormal    Psychiatric:         Mood and Affect: Mood normal           I spent 45 minutes directly with the patient during this visit total    VIRTUAL VISIT [facility] verbally agrees to participate in [facility]  Pt is aware that Virtual Care Services could be limited without vital signs or the ability to perform a full hands-on physical Cornejo Luis understands she or the provider may request at any time to terminate the video visit and request the patient to seek care or treatment in person

## 2021-08-18 PROBLEM — R68.89 FORGETFULNESS: Status: ACTIVE | Noted: 2021-08-18

## 2021-08-18 NOTE — ASSESSMENT & PLAN NOTE
Continue latanoprost, combigan eyedrops   Maintain [place] precautions  Follow-up with Ophthalmology as scheduled

## 2021-08-18 NOTE — ASSESSMENT & PLAN NOTE
Continue Tylenol 975 mg q 8h p r n    Consider topical analgesics her lidocaine patches as needed   Maintain [place] precautions  Discussed physical therapy referral however daughter hesitant given COVID-19 pandemic risk of exposure with physical therapy

## 2021-08-18 NOTE — ASSESSMENT & PLAN NOTE
Status post left mastectomy previously   Would defer any future mammogram given patient's age, frailty, debility and complex comorbidities

## 2021-08-18 NOTE — ASSESSMENT & PLAN NOTE
Patient has been weaned to lorazepam 0 25 mg 3 times weekly  No further hallucinations per daughter   Encourage patient remain active mentally, physically and socially  Continue social support by family and friends

## 2021-08-18 NOTE — ASSESSMENT & PLAN NOTE
Patient has been off antihypertensive therapy   Recommend adherence to a heart healthy diet   Physical activity as able

## 2021-08-18 NOTE — ASSESSMENT & PLAN NOTE
Patient stating she has become more forgetful and is concerned about her memory   Since decreasing lorazepam to 0 25 mg 3 times weekly, patient has had resolution of hallucinations   Patient currently is on pramipexole which may cause confusion mental status changes  However patient states her symptoms have been controlled on current dosage   Patient also chronically on benzodiazepine therapy which may contribute to memory loss, amnesia   Recommend patient have a formal neurocognitive assessment in the future if able   Daughter hesitant about taking the patient out of the home due to increasing covid cases locally    Reorientation redirection as needed  Manage chronic conditions  Maintain [place] precautions   Encourage patient to remain active mentally, physically and socially   Patient to participate in cognitively challenging exercises as able  Will order TSH with reflex T4, B12, folate

## 2021-08-18 NOTE — ASSESSMENT & PLAN NOTE
Patient with notable frailty and debility   Ambulatory dysfunction as well  Discussed physical therapy referral in-home or as an outpatient    However daughter hesitant given increasing cases of COVID and exposing the patient to large groups   Recommend patient remain active mentally, physically and socially  Maintain [place] precautions

## 2021-09-19 DIAGNOSIS — F41.9 ANXIETY: ICD-10-CM

## 2021-09-20 RX ORDER — LORAZEPAM 0.5 MG/1
0.25 TABLET ORAL EVERY 8 HOURS PRN
Qty: 60 TABLET | Refills: 0 | Status: SHIPPED | OUTPATIENT
Start: 2021-09-20 | End: 2022-07-17 | Stop reason: SDUPTHER

## 2021-10-16 ENCOUNTER — PATIENT MESSAGE (OUTPATIENT)
Dept: GERIATRICS | Age: 86
End: 2021-10-16

## 2021-10-16 DIAGNOSIS — R42 VERTIGO: Primary | ICD-10-CM

## 2021-10-18 RX ORDER — MECLIZINE HYDROCHLORIDE 25 MG/1
25 TABLET ORAL EVERY 8 HOURS PRN
Qty: 90 TABLET | Refills: 2 | Status: SHIPPED | OUTPATIENT
Start: 2021-10-18 | End: 2022-01-03 | Stop reason: SDUPTHER

## 2021-12-05 ENCOUNTER — PATIENT MESSAGE (OUTPATIENT)
Dept: GERIATRICS | Age: 86
End: 2021-12-05

## 2021-12-05 DIAGNOSIS — K21.9 GASTROESOPHAGEAL REFLUX DISEASE: ICD-10-CM

## 2021-12-06 ENCOUNTER — TELEPHONE (OUTPATIENT)
Dept: LAB | Facility: HOSPITAL | Age: 86
End: 2021-12-06

## 2021-12-06 RX ORDER — OMEPRAZOLE 20 MG/1
CAPSULE, DELAYED RELEASE ORAL
Qty: 90 CAPSULE | Refills: 3 | Status: SHIPPED | OUTPATIENT
Start: 2021-12-06 | End: 2021-12-06 | Stop reason: SDUPTHER

## 2021-12-06 RX ORDER — OMEPRAZOLE 20 MG/1
20 CAPSULE, DELAYED RELEASE ORAL DAILY
Qty: 90 CAPSULE | Refills: 3 | Status: SHIPPED | OUTPATIENT
Start: 2021-12-06 | End: 2022-07-17 | Stop reason: SDUPTHER

## 2021-12-15 ENCOUNTER — APPOINTMENT (OUTPATIENT)
Dept: LAB | Facility: HOSPITAL | Age: 86
End: 2021-12-15
Attending: STUDENT IN AN ORGANIZED HEALTH CARE EDUCATION/TRAINING PROGRAM
Payer: MEDICARE

## 2021-12-15 DIAGNOSIS — I10 ESSENTIAL HYPERTENSION: ICD-10-CM

## 2021-12-15 DIAGNOSIS — R73.9 HYPERGLYCEMIA: ICD-10-CM

## 2021-12-15 DIAGNOSIS — R41.3 MEMORY LOSS: ICD-10-CM

## 2021-12-15 DIAGNOSIS — E55.9 VITAMIN D DEFICIENCY: ICD-10-CM

## 2021-12-15 DIAGNOSIS — E78.5 HYPERLIPIDEMIA, UNSPECIFIED HYPERLIPIDEMIA TYPE: ICD-10-CM

## 2021-12-15 DIAGNOSIS — R41.9 UNSPECIFIED SYMPTOMS AND SIGNS INVOLVING COGNITIVE FUNCTIONS AND AWARENESS: ICD-10-CM

## 2021-12-15 DIAGNOSIS — R68.89 FORGETFULNESS: ICD-10-CM

## 2021-12-15 LAB
25(OH)D3 SERPL-MCNC: 44.4 NG/ML (ref 30–100)
ALBUMIN SERPL BCP-MCNC: 3.9 G/DL (ref 3.5–5)
ALP SERPL-CCNC: 78 U/L (ref 46–116)
ALT SERPL W P-5'-P-CCNC: 18 U/L (ref 12–78)
ANION GAP SERPL CALCULATED.3IONS-SCNC: 5 MMOL/L (ref 4–13)
AST SERPL W P-5'-P-CCNC: 19 U/L (ref 5–45)
BASOPHILS # BLD AUTO: 0.04 THOUSANDS/ΜL (ref 0–0.1)
BASOPHILS NFR BLD AUTO: 1 % (ref 0–1)
BILIRUB SERPL-MCNC: 0.65 MG/DL (ref 0.2–1)
BUN SERPL-MCNC: 14 MG/DL (ref 5–25)
CALCIUM SERPL-MCNC: 9.8 MG/DL (ref 8.3–10.1)
CHLORIDE SERPL-SCNC: 107 MMOL/L (ref 100–108)
CHOLEST SERPL-MCNC: 191 MG/DL
CO2 SERPL-SCNC: 28 MMOL/L (ref 21–32)
CREAT SERPL-MCNC: 0.59 MG/DL (ref 0.6–1.3)
EOSINOPHIL # BLD AUTO: 0.05 THOUSAND/ΜL (ref 0–0.61)
EOSINOPHIL NFR BLD AUTO: 1 % (ref 0–6)
ERYTHROCYTE [DISTWIDTH] IN BLOOD BY AUTOMATED COUNT: 13.7 % (ref 11.6–15.1)
EST. AVERAGE GLUCOSE BLD GHB EST-MCNC: 105 MG/DL
FOLATE SERPL-MCNC: 9.6 NG/ML (ref 3.1–17.5)
GFR SERPL CREATININE-BSD FRML MDRD: 78 ML/MIN/1.73SQ M
GLUCOSE P FAST SERPL-MCNC: 84 MG/DL (ref 65–99)
HBA1C MFR BLD: 5.3 %
HCT VFR BLD AUTO: 40.7 % (ref 34.8–46.1)
HDLC SERPL-MCNC: 65 MG/DL
HGB BLD-MCNC: 13.5 G/DL (ref 11.5–15.4)
IMM GRANULOCYTES # BLD AUTO: 0.01 THOUSAND/UL (ref 0–0.2)
IMM GRANULOCYTES NFR BLD AUTO: 0 % (ref 0–2)
LDLC SERPL CALC-MCNC: 107 MG/DL (ref 0–100)
LYMPHOCYTES # BLD AUTO: 1.37 THOUSANDS/ΜL (ref 0.6–4.47)
LYMPHOCYTES NFR BLD AUTO: 31 % (ref 14–44)
MCH RBC QN AUTO: 31.2 PG (ref 26.8–34.3)
MCHC RBC AUTO-ENTMCNC: 33.2 G/DL (ref 31.4–37.4)
MCV RBC AUTO: 94 FL (ref 82–98)
MONOCYTES # BLD AUTO: 0.44 THOUSAND/ΜL (ref 0.17–1.22)
MONOCYTES NFR BLD AUTO: 10 % (ref 4–12)
NEUTROPHILS # BLD AUTO: 2.51 THOUSANDS/ΜL (ref 1.85–7.62)
NEUTS SEG NFR BLD AUTO: 57 % (ref 43–75)
NONHDLC SERPL-MCNC: 126 MG/DL
NRBC BLD AUTO-RTO: 0 /100 WBCS
PLATELET # BLD AUTO: 228 THOUSANDS/UL (ref 149–390)
PMV BLD AUTO: 10.3 FL (ref 8.9–12.7)
POTASSIUM SERPL-SCNC: 4.2 MMOL/L (ref 3.5–5.3)
PROT SERPL-MCNC: 7.4 G/DL (ref 6.4–8.2)
RBC # BLD AUTO: 4.33 MILLION/UL (ref 3.81–5.12)
SODIUM SERPL-SCNC: 140 MMOL/L (ref 136–145)
TRIGL SERPL-MCNC: 97 MG/DL
TSH SERPL DL<=0.05 MIU/L-ACNC: 1.35 UIU/ML (ref 0.36–3.74)
VIT B12 SERPL-MCNC: 1539 PG/ML (ref 100–900)
WBC # BLD AUTO: 4.42 THOUSAND/UL (ref 4.31–10.16)

## 2021-12-15 PROCEDURE — 36415 COLL VENOUS BLD VENIPUNCTURE: CPT

## 2021-12-15 PROCEDURE — 82746 ASSAY OF FOLIC ACID SERUM: CPT

## 2021-12-15 PROCEDURE — 84443 ASSAY THYROID STIM HORMONE: CPT

## 2021-12-15 PROCEDURE — 82306 VITAMIN D 25 HYDROXY: CPT

## 2021-12-15 PROCEDURE — 85025 COMPLETE CBC W/AUTO DIFF WBC: CPT

## 2021-12-15 PROCEDURE — 82607 VITAMIN B-12: CPT

## 2021-12-15 PROCEDURE — 80061 LIPID PANEL: CPT

## 2021-12-15 PROCEDURE — 80053 COMPREHEN METABOLIC PANEL: CPT

## 2021-12-15 PROCEDURE — 83036 HEMOGLOBIN GLYCOSYLATED A1C: CPT

## 2021-12-17 ENCOUNTER — TELEMEDICINE (OUTPATIENT)
Dept: GERIATRICS | Age: 86
End: 2021-12-17
Payer: MEDICARE

## 2021-12-17 DIAGNOSIS — R41.89 COGNITIVE IMPAIRMENT: Primary | ICD-10-CM

## 2021-12-17 DIAGNOSIS — E55.9 VITAMIN D DEFICIENCY: ICD-10-CM

## 2021-12-17 DIAGNOSIS — R73.9 HYPERGLYCEMIA: ICD-10-CM

## 2021-12-17 DIAGNOSIS — E78.5 HYPERLIPIDEMIA, UNSPECIFIED HYPERLIPIDEMIA TYPE: ICD-10-CM

## 2021-12-17 DIAGNOSIS — C50.919 CARCINOMA OF FEMALE BREAST, UNSPECIFIED ESTROGEN RECEPTOR STATUS, UNSPECIFIED LATERALITY, UNSPECIFIED SITE OF BREAST (HCC): ICD-10-CM

## 2021-12-17 DIAGNOSIS — I10 PRIMARY HYPERTENSION: ICD-10-CM

## 2021-12-17 DIAGNOSIS — G25.81 RESTLESS LEGS SYNDROME: ICD-10-CM

## 2021-12-17 DIAGNOSIS — H35.30 MACULAR DEGENERATION, UNSPECIFIED LATERALITY, UNSPECIFIED TYPE: ICD-10-CM

## 2021-12-17 DIAGNOSIS — F41.9 ANXIETY: ICD-10-CM

## 2021-12-17 DIAGNOSIS — I89.0 LYMPHEDEMA: ICD-10-CM

## 2021-12-17 PROCEDURE — 99215 OFFICE O/P EST HI 40 MIN: CPT | Performed by: STUDENT IN AN ORGANIZED HEALTH CARE EDUCATION/TRAINING PROGRAM

## 2021-12-19 VITALS — HEART RATE: 72 BPM | OXYGEN SATURATION: 97 % | DIASTOLIC BLOOD PRESSURE: 82 MMHG | SYSTOLIC BLOOD PRESSURE: 144 MMHG

## 2021-12-19 PROBLEM — R41.89 COGNITIVE IMPAIRMENT: Status: ACTIVE | Noted: 2021-08-18

## 2021-12-19 PROBLEM — L89.152 PRESSURE INJURY OF COCCYGEAL REGION, STAGE 2 (HCC): Status: RESOLVED | Noted: 2020-03-06 | Resolved: 2021-12-19

## 2022-01-02 ENCOUNTER — PATIENT MESSAGE (OUTPATIENT)
Dept: GERIATRICS | Age: 87
End: 2022-01-02

## 2022-01-02 DIAGNOSIS — R42 VERTIGO: ICD-10-CM

## 2022-01-04 ENCOUNTER — PATIENT MESSAGE (OUTPATIENT)
Dept: GERIATRICS | Age: 87
End: 2022-01-04

## 2022-01-04 DIAGNOSIS — R42 VERTIGO: ICD-10-CM

## 2022-01-04 RX ORDER — MECLIZINE HYDROCHLORIDE 25 MG/1
25 TABLET ORAL EVERY 8 HOURS PRN
Qty: 90 TABLET | Refills: 2 | Status: SHIPPED | OUTPATIENT
Start: 2022-01-04 | End: 2022-01-05 | Stop reason: SDUPTHER

## 2022-01-05 RX ORDER — MECLIZINE HYDROCHLORIDE 25 MG/1
25 TABLET ORAL EVERY 8 HOURS PRN
Qty: 270 TABLET | Refills: 2 | Status: SHIPPED | OUTPATIENT
Start: 2022-01-05 | End: 2022-07-17 | Stop reason: SDUPTHER

## 2022-01-25 ENCOUNTER — TELEPHONE (OUTPATIENT)
Dept: GERIATRICS | Age: 87
End: 2022-01-25

## 2022-01-25 NOTE — TELEPHONE ENCOUNTER
----- Message from Karoline Vera MD sent at 1/25/2022 10:07 AM EST -----  Can we pls schedule an acute visit for this pt due to chart message sent today by [email or web address]  Thanks

## 2022-01-25 NOTE — TELEPHONE ENCOUNTER
Called daughter to offer 2/28, 4:30PM appointment  Daughter requests virtual appointment   Please advise if can be virtual

## 2022-01-28 ENCOUNTER — TELEMEDICINE (OUTPATIENT)
Dept: GERIATRICS | Age: 87
End: 2022-01-28
Payer: MEDICARE

## 2022-01-28 DIAGNOSIS — C50.919 CARCINOMA OF FEMALE BREAST, UNSPECIFIED ESTROGEN RECEPTOR STATUS, UNSPECIFIED LATERALITY, UNSPECIFIED SITE OF BREAST (HCC): ICD-10-CM

## 2022-01-28 DIAGNOSIS — G25.81 RESTLESS LEGS SYNDROME: ICD-10-CM

## 2022-01-28 DIAGNOSIS — E78.5 HYPERLIPIDEMIA, UNSPECIFIED HYPERLIPIDEMIA TYPE: ICD-10-CM

## 2022-01-28 DIAGNOSIS — I10 PRIMARY HYPERTENSION: ICD-10-CM

## 2022-01-28 DIAGNOSIS — R41.89 COGNITIVE IMPAIRMENT: Primary | ICD-10-CM

## 2022-01-28 DIAGNOSIS — F41.9 ANXIETY: ICD-10-CM

## 2022-01-28 PROCEDURE — 99214 OFFICE O/P EST MOD 30 MIN: CPT | Performed by: STUDENT IN AN ORGANIZED HEALTH CARE EDUCATION/TRAINING PROGRAM

## 2022-01-28 NOTE — PROGRESS NOTES
Virtual Regular Visit    Verification of patient location:    Patient is located in the following state in which I hold an active license [place]      Assessment/Plan:    Problem List Items Addressed This Visit        Cardiovascular and Mediastinum    Hypertension     /76  Patient has been managed on lifestyle changes  Recommended low-sodium, heart healthy diet            Other    Anxiety     Mood remained stable  No overt changes in mood, personality or behavior  Patient has successfully weaned to lorazepam twice weekly as needed  Continue social support by family and friends         Breast carcinoma ([address] )     S/p left mastectomy  Previously followed with Hematology/Oncology  Given patient's frailty, debility and cognitive decline would defer any additional follow-up         Hyperlipidemia     Stable  Continue healthy lifestyle changes         Restless legs syndrome     Stable  Continue primary Mirapex at nighttime         Cognitive impairment - Primary     Patient continues to have a gradual progressive decline in overall cognition  Patient is dependent in ADLs and IADLs  Reorientation and redirection as needed  Manage chronic conditions  Maintain [place] precautions  Encourage patient to remain active mentally, physically and socially  Goal is to keep the patient at home, daughter is the primary caretaker                    Reason for visit is   Chief Complaint   Patient presents with    Virtual Brief Visit    Virtual Regular Visit        Encounter provider Jean Paul Horton MD    Provider located at [address] [place] [address] [address] [place] [address]  [phone]      Recent Visits  Date Type Provider Dept   01/28/22 [address],  [facility] [address]   01/25/22 Telephone [address] recent visits within past 7 days and meeting all other requirements  Future Appointments  No visits were found meeting these conditions  Showing future appointments within next 150 days and meeting all other requirements       The patient was identified by name and date of birth  Latanya Todd was informed that this is a telemedicine visit and that the visit is being conducted through [employer] and patient was informed that this is a secure, HIPAA-compliant platform  She agrees to proceed     My office door was closed  No one else was in the room  She acknowledged consent and understanding of privacy and security of the video platform  The patient has agreed to participate and understands they can discontinue the visit at any time  Patient is aware this is a billable service  Subjective  Latanya Todd is a 80 y o  female who presents after an episode of feeling unwell earlier this week   HPI     Patient presents after complaints of feeling unwell briefly one morning earlier this week  Given patient's cognitive deficits, history and review of systems were obtained from the patient's daughter who is her primary caretaker  Daughter relates a few days ago, the patient awoke from sleep stating that her mouth 'felt funny' and she was 'dying'  Daughter explains that the patient appeared somewhat pale however did not have any cardiorespiratory distress, fever, chills, URI type symptoms, facial droop, weakness, slurred speech or obvious focal neurological deficit  Daughter related that the episode lasted for about 5 minutes during which time the patient was very anxious, after which the patient returned to her baseline  She subsequently gave the patient an aspirin  Daughter confirmed there were no  abnormal seizure activity and her vitals were normal during the episode  The patient has not had recurrent symptoms since and daughter has been administering aspirin 325mg daily  Advised baby aspirin instead can be continued however if any  GI complaints would discontinue    Today the patient was at her baseline, verbalizing appropriately and was alert and oriented x3  She was able to follow commands and per daughter has been tolerating oral intake as per usual   Daughter has been taking precautions ensure the patient is not exposed to [phone] especially with visitors coming into the home or going outdoors  Virtual physical exam remained at baseline  The patient continues on lorazepam twice weekly as needed for anxiety  She has been compliant with Mirapex for a history of restless leg syndrome  She also continues on meclizine for a history of vertigo which remains stable  Daughter advised to call the office or go to the ER should symptoms recur          Past Medical History:   Diagnosis Date    Anxiety     Arthritis     Cancer ([address] )     Elevated serum alkaline phosphatase level     mild, isoenzymes are normal, resolved 4/24/17 labs , last assessed 11/10/16, resolved 4/24/17    Hematuria     last assessed 9/18/12    Hyperlipidemia 8/13/2012    Hypertension     Pressure injury of coccygeal region, stage 2 ([place] [address] ) 3/6/2020    Pulmonary embolism ([place] [address] ) 01/2011    last assessed 9/18/12       Past Surgical History:   Procedure Laterality Date    BILATERAL OOPHORECTOMY      Laparoscopic    BREAST SURGERY Left     Mastectomy     CHOLECYSTECTOMY      Laparoscopic    HERNIA REPAIR      HYSTERECTOMY      SMALL INTESTINE SURGERY         Current Outpatient Medications   Medication Sig Dispense Refill    Acetaminophen (TYLENOL ARTHRITIS PAIN PO) Take 1 tablet by mouth 3 (three) times a day      Calcium Carbonate-Vit D-Min (CALCIUM 1200 PO) Take 750 mg by mouth 2 (two) times a day      cholecalciferol (VITAMIN D3) 1,000 units tablet Take 4,000 Units by mouth daily      Cranberry 250 MG TABS Take by mouth daily      LORazepam (ATIVAN) 0 5 mg tablet Take 0 5 tablets (0 25 mg total) by mouth every 8 (eight) hours as needed for anxiety 60 tablet 0    meclizine (ANTIVERT) 25 mg tablet Take 1 tablet (25 mg total) by mouth every 8 (eight) hours as needed for dizziness 270 tablet 2    omeprazole (PriLOSEC) 20 mg delayed release capsule Take 1 capsule (20 mg total) by mouth daily 90 capsule 3    pramipexole (MIRAPEX) 0 25 mg tablet Take 1 tablet (0 25 mg total) by mouth daily at bedtime 90 tablet 3    vitamin B-12 (VITAMIN B-12) 1,000 mcg tablet Take 2,500 mcg by mouth daily      brimonidine-timolol (COMBIGAN) 0 2-0 5 % Administer 1 drop into the left eye 2 (two) times a day (Patient not taking: Reported on 12/17/2021 )      latanoprost (XALATAN) 0 005 % ophthalmic solution Administer 1 drop into the left eye daily (Patient not taking: Reported on 12/17/2021 )       No current facility-administered medications for this visit  Allergies   Allergen Reactions    Amoxicillin Hives    Celecoxib     Cephalexin     Ciprofloxacin      Other reaction(s): diarrhea  Tolerates Levaquin    Codeine Other (See Comments)     Other reaction(s): Other (See Comments)  takes vicodin @ home  takes vicodin @ home    Epinephrine Other (See Comments)     Other reaction(s): Unknown Reaction    Oxycodone-Acetaminophen Other (See Comments)    Oxycodone-Acetaminophen      Other reaction(s): Unknown Reaction    Propoxyphene Other (See Comments)    Rofecoxib     Sulfa Antibiotics Other (See Comments)     takes at home  takes at home  Other reaction(s): Other (See Comments)  takes at home    Sulfamethoxazole-Trimethoprim     Nitrofurantoin Rash       Review of Systems   Constitutional: Positive for activity change (Chronic) and fatigue (Chronic)  Negative for chills, fever and unexpected weight change  HENT: Negative for trouble swallowing  Respiratory: Negative for cough, chest tightness and shortness of breath  Genitourinary: Negative for decreased urine volume and hematuria  Musculoskeletal: Positive for arthralgias (Chronic) and gait problem  Neurological: Negative for speech difficulty     Hematological: Does not bruise/bleed easily  Psychiatric/Behavioral: Positive for decreased concentration  Negative for dysphoric mood and hallucinations  The patient is not nervous/anxious  Video Exam    Vitals:    01/28/22 1045   BP: 139/76   Pulse: 86   Temp: 98 3 °F (36 8 °C)   SpO2: 98%       Physical Exam  Constitutional:       General: She is not in acute distress  Appearance: Normal appearance  She is not ill-appearing, toxic-appearing or diaphoretic  Comments: Elderly female sitting comfortably in chair in no obvious cardiorespiratory or painful distress  No facial droop noted   HENT:      Head: Normocephalic and atraumatic  Right Ear: External ear normal       Left Ear: External ear normal       Nose: Nose normal       Mouth/Throat:      Mouth: Mucous membranes are moist    Eyes:      General:         Right eye: No discharge  Left eye: No discharge  Conjunctiva/sclera: Conjunctivae normal    Cardiovascular:      Rate and Rhythm: Normal rate  Pulmonary:      Effort: Pulmonary effort is normal  No respiratory distress  Abdominal:      General: There is no distension  Palpations: Abdomen is soft  Tenderness: There is no abdominal tenderness  Musculoskeletal:         General: Normal range of motion  Cervical back: Normal range of motion  Skin:     General: Skin is dry  Neurological:      General: No focal deficit present  Mental Status: She is alert and oriented to person, place, and time  Mental status is at baseline  Gait: Gait abnormal       Comments: Strength at baseline  Moving all limbs  No focal neurological deficit noted  Cranial nerves grossly intact   Psychiatric:         Mood and Affect: Mood normal           I spent 32 minutes directly with the patient during this visit    [address] verbally agrees to participate in [employer]   Pt is aware that Virtual Care Services could be limited without vital signs or the ability to perform a full hands-on physical Cornejo Luis understands she or the provider may request at any time to terminate the video visit and request the patient to seek care or treatment in person

## 2022-01-29 VITALS
SYSTOLIC BLOOD PRESSURE: 139 MMHG | HEART RATE: 86 BPM | DIASTOLIC BLOOD PRESSURE: 76 MMHG | TEMPERATURE: 98.3 F | OXYGEN SATURATION: 98 %

## 2022-01-30 NOTE — ASSESSMENT & PLAN NOTE
S/p left mastectomy  Previously followed with Hematology/Oncology  Given patient's frailty, debility and cognitive decline would defer any additional follow-up

## 2022-01-30 NOTE — ASSESSMENT & PLAN NOTE
Prior DEXA scan showed loss in vertebral height as well as compression fracture  Medication therapy was suggested by prior PCP which patient declined  Continue calcium and vitamin-D supplementation

## 2022-01-30 NOTE — ASSESSMENT & PLAN NOTE
Patient continues to have a gradual progressive decline in overall cognition  Patient is dependent in ADLs and IADLs  Reorientation and redirection as needed  Manage chronic conditions  Maintain [place] precautions  Encourage patient to remain active mentally, physically and socially  Goal is to keep the patient at home, daughter is the primary caretaker

## 2022-01-30 NOTE — ASSESSMENT & PLAN NOTE
Mood remained stable  No overt changes in mood, personality or behavior  Patient has successfully weaned to lorazepam twice weekly as needed  Continue social support by family and friends

## 2022-04-20 ENCOUNTER — PATIENT MESSAGE (OUTPATIENT)
Dept: GERIATRICS | Age: 87
End: 2022-04-20

## 2022-04-20 DIAGNOSIS — N39.0 URINARY TRACT INFECTION WITHOUT HEMATURIA, SITE UNSPECIFIED: Primary | ICD-10-CM

## 2022-04-21 NOTE — TELEPHONE ENCOUNTER
From: Libby Going  To: Yang House MD  Sent: 4/20/2022 10:38 PM EDT  Subject: Possible UTI     Dr Faiza Nur,  [address] [facility],  you are well! I have noticed the past 2 days or so my mom's urine has a strong odor  Also, seems like she is urinating more  Today and into tonight she seems confused and the symptoms seem to those consistent with a UTI  Is it possible to drop off a urine sample possibly at the [facility]  lab close to us on [facility] to have it run to determine if she may need an antibiotic  She hasn't had a UTi is a very long time  The cranberry vitamins seemed to be working  She otherwise seems good  Great appetite  No fever  Just know we are heading into a weekend and I'd like to see if there is something going on  I haven't been taking her out anywhere trying to avoid the COVID  Thank you in advance  My cell is [phone]      [facility] (daughter)

## 2022-05-10 ENCOUNTER — TELEPHONE (OUTPATIENT)
Dept: OTHER | Facility: OTHER | Age: 87
End: 2022-05-10

## 2022-05-10 NOTE — TELEPHONE ENCOUNTER
Patients daughter calling stating they were scheduled for a virtual appt with Dr Rajinder Salgado @ [phone], wondering what to do next, informed pt about power outage within the office and would inform Dr Rajinder Salgado to see if they should r/s or can be completed via telephone  R/s patient for 05/19/22 @ 56 daughter would like a call if any other appts are available

## 2022-05-10 NOTE — TELEPHONE ENCOUNTER
Spoke with start patient is doing well  She had 2nd booster COVID shot yesterday and is doing well  Will reschedule appt to another day

## 2022-05-11 ENCOUNTER — TELEPHONE (OUTPATIENT)
Dept: GERIATRICS | Age: 87
End: 2022-05-11

## 2022-05-18 ENCOUNTER — TELEMEDICINE (OUTPATIENT)
Dept: GERIATRICS | Age: 87
End: 2022-05-18
Payer: MEDICARE

## 2022-05-18 DIAGNOSIS — M81.0 AGE RELATED OSTEOPOROSIS, UNSPECIFIED PATHOLOGICAL FRACTURE PRESENCE: ICD-10-CM

## 2022-05-18 DIAGNOSIS — K21.9 GASTROESOPHAGEAL REFLUX DISEASE, UNSPECIFIED WHETHER ESOPHAGITIS PRESENT: ICD-10-CM

## 2022-05-18 DIAGNOSIS — G25.81 RESTLESS LEGS SYNDROME: ICD-10-CM

## 2022-05-18 DIAGNOSIS — H40.9 GLAUCOMA, UNSPECIFIED GLAUCOMA TYPE, UNSPECIFIED LATERALITY: ICD-10-CM

## 2022-05-18 DIAGNOSIS — F41.9 ANXIETY: ICD-10-CM

## 2022-05-18 DIAGNOSIS — H35.30 MACULAR DEGENERATION, UNSPECIFIED LATERALITY, UNSPECIFIED TYPE: ICD-10-CM

## 2022-05-18 DIAGNOSIS — I10 PRIMARY HYPERTENSION: ICD-10-CM

## 2022-05-18 DIAGNOSIS — E55.9 VITAMIN D DEFICIENCY: ICD-10-CM

## 2022-05-18 DIAGNOSIS — C56.9 MALIGNANT NEOPLASM OF OVARY, UNSPECIFIED LATERALITY (HCC): ICD-10-CM

## 2022-05-18 DIAGNOSIS — R41.89 COGNITIVE IMPAIRMENT: Primary | ICD-10-CM

## 2022-05-18 PROCEDURE — 99214 OFFICE O/P EST MOD 30 MIN: CPT | Performed by: STUDENT IN AN ORGANIZED HEALTH CARE EDUCATION/TRAINING PROGRAM

## 2022-05-18 RX ORDER — ASPIRIN 81 MG/1
81 TABLET ORAL DAILY
COMMUNITY

## 2022-05-18 NOTE — PROGRESS NOTES
Virtual Regular Visit    Verification of patient location:    Patient is located in the following state in which I hold an active license [place]      Assessment/Plan:    Problem List Items Addressed This Visit        Digestive    GERD (gastroesophageal reflux disease)     Overall symptoms remain stable  Continue omeprazole 20 mg daily  Continue anti reflux measures              Endocrine    Ovarian cancer ([facility] [address] )     S/p bilateral oophorectomy in 2000  No longer follows with Hematology/Oncology              Cardiovascular and Mediastinum    Hypertension     Overall blood pressures remain stable  Continue healthy lifestyle changes  Recommend a low-sodium, heart healthy                Musculoskeletal and Integument    Osteoporosis     Patient with a history of osteoporosis  Previously declined any treatment therapies  Maintain on calcium/vitamin-D supplements  Maintain [place] precautions  Recommend strengthening exercises              Other    Anxiety     Patient successfully weaned lorazepam due to side effects in elderly patients  Continue social support by family and friends  Overall mood remains stable           Glaucoma     Previously followed with Ophthalmology however has not followed up during the COVID-19 pandemic  Was previously maintained on latanoprost however per daughter, medications were not refilled due to lack of follow-up with Ophthalmology           Macular degeneration     Maintain [place] precautions  Patient previously followed with Ophthalmology however has not been compliant with follow-up during COVID             Restless legs syndrome     Symptoms remain stable  Continue Mirapex 0 25 mg daily at bedtime           Vitamin D deficiency     Continue vitamin-D supplementation           Cognitive impairment - Primary     Overall no significant change  Patient does have intermittent forgetfulness  No overt changes in mood, personality or behaviors  Continue reorientation redirection  Manage chronic conditions  Maintain [place] precautions  Encourage patient remain active mentally, physically and socially  Patient successfully weaned off Ativan  Will continue to follow                      Reason for visit is   Chief Complaint   Patient presents with    Virtual Brief Visit    Virtual Regular Visit        Encounter provider Raj Albarran MD    Provider located at [place] [address]  [address] [facility] [id number] [phone]      Recent Visits  Date Type Provider Dept   05/18/22 [address], MD [address] [place] [address] recent visits within past 7 days and meeting all other requirements  Future Appointments  No visits were found meeting these conditions  Showing future appointments within next 150 days and meeting all other requirements       The patient was identified by name and date of birth  Nir Walker was informed that this is a telemedicine visit and that the visit is being conducted through [employer] and patient was informed that this is a secure, HIPAA-compliant platform  She agrees to proceed     My office door was closed  No one else was in the room  She acknowledged consent and understanding of privacy and security of the video platform  The patient has agreed to participate and understands they can discontinue the visit at any time  Patient is aware this is a billable service  Subjective  Nri Walker is a 80 y o  female who presents for routine follow-up of acute and chronic conditions   HPI     This is an extremely pleasant 20-year-old female with prior breast, ovarian cancer, osteoporosis, restless leg syndrome, HTN, HLD, cognitive impairment and gait instability amongst other medical conditions who presents for routine follow-up of acute and chronic conditions  She is accompanied by her daughter who is her primary caretaker    Today patient's blood pressure is 153/85 however daughter admits overall blood pressure has remained stable  No new focal neurological deficits and overall cognitive function has remained stable  Patient did recently receive her 2nd COVID-19 booster with no notable side effects  Today daughter explains that the patient continues to drink coffee and ice tea and typically does not drink water  Daughter also explains 1 day last week the patient complained of 'pain in her kidneys' but denies any urinary symptoms, abdominal pain, fever or chills as a  result of which daughter did purchase cranberry pills  Symptoms resolved within 1 day  Daughter also explains that she feels that there are medical complaints only if she leaves her mother alone to go out to do errands  She does have notable caregiver burnout and we did discuss possible home health aides versus placement for respite so that daughter can go on vacation if needed  Will request our MSW to reach out to the patient  Daughter has started the patient on magnesium citrate due to a history of constipation with good effect  She had previously been following with ophthalmology given her history of glaucoma and macular degeneration  However she has not followed up during the [facility] pandemic  Daughter relates that the patient's eyedrops were not refilled as she has not followed up with Ophthalmology  No cardiorespiratory distress, fever, chills, URI or urinary symptoms  The patient continues on pramipexole for a history of restless leg syndrome  She continues on magnesium citrate for a history of constipation    She has been compliant with omeprazole for a history GERD with symptoms remaining stable        Past Medical History:   Diagnosis Date    Anxiety     Arthritis     Cancer ([facility] [address] )     Elevated serum alkaline phosphatase level     mild, isoenzymes are normal, resolved 4/24/17 labs , last assessed 11/10/16, resolved 4/24/17    Hematuria     last assessed 9/18/12    Hyperlipidemia 8/13/2012    Hypertension     Pressure injury of coccygeal region, stage 2 ([facility] [address] ) 3/6/2020    Pulmonary embolism ([facility] [address] [facility] ) 01/2011    last assessed 9/18/12       Past Surgical History:   Procedure Laterality Date    BILATERAL OOPHORECTOMY      Laparoscopic    BREAST SURGERY Left     Mastectomy     CHOLECYSTECTOMY      Laparoscopic    HERNIA REPAIR      HYSTERECTOMY      SMALL INTESTINE SURGERY         Current Outpatient Medications   Medication Sig Dispense Refill    Acetaminophen (TYLENOL ARTHRITIS PAIN PO) Take 1 tablet by mouth 3 (three) times a day      aspirin (ECOTRIN LOW STRENGTH) 81 mg EC tablet Take 81 mg by mouth in the morning   Calcium Carbonate-Vit D-Min (CALCIUM 1200 PO) Take 750 mg by mouth 2 (two) times a day      cholecalciferol (VITAMIN D3) 1,000 units tablet Take 4,000 Units by mouth daily      Cranberry 250 MG TABS Take by mouth daily      LORazepam (ATIVAN) 0 5 mg tablet Take 0 5 tablets (0 25 mg total) by mouth every 8 (eight) hours as needed for anxiety 60 tablet 0    meclizine (ANTIVERT) 25 mg tablet Take 1 tablet (25 mg total) by mouth every 8 (eight) hours as needed for dizziness 270 tablet 2    omeprazole (PriLOSEC) 20 mg delayed release capsule Take 1 capsule (20 mg total) by mouth daily 90 capsule 3    pramipexole (MIRAPEX) 0 25 mg tablet Take 1 tablet (0 25 mg total) by mouth daily at bedtime 90 tablet 3    vitamin B-12 (VITAMIN B-12) 1,000 mcg tablet Take 2,500 mcg by mouth daily      brimonidine-timolol (COMBIGAN) 0 2-0 5 % Administer 1 drop into the left eye 2 (two) times a day (Patient not taking: No sig reported)      latanoprost (XALATAN) 0 005 % ophthalmic solution Administer 1 drop into the left eye daily (Patient not taking: No sig reported)       No current facility-administered medications for this visit  Allergies   Allergen Reactions    Amoxicillin Hives    Celecoxib     Cephalexin     Ciprofloxacin      Other reaction(s): diarrhea   Tolerates Levaquin    Codeine Other (See Comments)     Other reaction(s): Other (See Comments)  takes vicodin @ home  takes vicodin @ home    Epinephrine Other (See Comments)     Other reaction(s): Unknown Reaction    Oxycodone-Acetaminophen Other (See Comments)    Oxycodone-Acetaminophen      Other reaction(s): Unknown Reaction    Propoxyphene Other (See Comments)    Rofecoxib     Sulfa Antibiotics Other (See Comments)     takes at home  takes at home  Other reaction(s): Other (See Comments)  takes at home    Sulfamethoxazole-Trimethoprim     Nitrofurantoin Rash       Review of Systems   Unable to perform ROS: Dementia       Video Exam    There were no vitals filed for this visit  Physical Exam  Constitutional:       General: She is not in acute distress  Appearance: Normal appearance  She is not ill-appearing or diaphoretic  HENT:      Head: Normocephalic and atraumatic  Right Ear: External ear normal       Left Ear: External ear normal       Nose: Nose normal       Mouth/Throat:      Mouth: Mucous membranes are moist    Eyes:      General:         Right eye: No discharge  Left eye: No discharge  Conjunctiva/sclera: Conjunctivae normal    Pulmonary:      Effort: Pulmonary effort is normal  No respiratory distress  Abdominal:      General: There is no distension  Palpations: Abdomen is soft  Tenderness: There is no abdominal tenderness  Musculoskeletal:         General: Normal range of motion  Cervical back: Normal range of motion  Right lower leg: No edema  Left lower leg: No edema  Skin:     General: Skin is dry  Neurological:      General: No focal deficit present  Mental Status: She is alert  Mental status is at baseline        Gait: Gait abnormal    Psychiatric:         Mood and Affect: Mood normal          Behavior: Behavior normal           I spent 30 minutes directly with the patient during this visit (video/phone)    [address] [employer] verbally agrees to participate in [employer]  Pt is aware that Virtual Care Services could be limited without vital signs or the ability to perform a full hands-on physical Cornejo Luis understands she or the provider may request at any time to terminate the video visit and request the patient to seek care or treatment in person

## 2022-05-19 ENCOUNTER — TELEPHONE (OUTPATIENT)
Dept: GERIATRICS | Age: 87
End: 2022-05-19

## 2022-05-19 NOTE — TELEPHONE ENCOUNTER
LSW spoke with daughter-Celia  LSW discussed homecare for additional support as daughter expressed a lot of caregiver burnout  Daughter expressed concerns with having caregivers in the home, LSW stated that they can really support her and her mom with the all the care she is providing  LSW discussed respite care, and offered [employer] to assist with respite options & long term care placement options  Daughter did not like working with [facility]  LSW provided other agencies to try  LSW also discussed adult day cares    LSW emailed the following resources to daughter at the email address on file:       Napoleon Garcia,    Here are the homecare agencies we often refer to for additional care in the home  [facility]  Phone: [phone]  Website: [employer]  [address] of [facility]  Phone: [phone]  Website: [employer]   [facility]  Phone: [phone]  Website: [employer]  [address]  Phone: [phone]  Website: www [employer]   [employer] [employer]  Phone: [phone]  Website: www [employer]  com    Here is an adult day care center:  [facility] [address]  Phone: [phone]  Website: https://info  [employer]/adult-day-services-at-eypqlojikct-yctojvs-your-very-best-day  Transportation is offered (cost: $12 50 one way or $25 round trip)    Here are other agencies that could help you with respite care/long term care placement:  -[address] - [phone]  -[facility] - Claudette Amado - [phone]    I have also attached our Caregiver Support Group if you would be interested in attending  Our next meeting is Thursday, May 26  Feel free to reach out any time if you need more information or have additional questions

## 2022-05-19 NOTE — ASSESSMENT & PLAN NOTE
Overall no significant change  Patient does have intermittent forgetfulness  No overt changes in mood, personality or behaviors  Continue reorientation redirection  Manage chronic conditions  Maintain Falls precautions  Encourage patient remain active mentally, physically and socially  Patient successfully weaned off Ativan  Will continue to follow

## 2022-05-19 NOTE — ASSESSMENT & PLAN NOTE
Previously followed with Ophthalmology however has not followed up during the COVID-19 pandemic  Was previously maintained on latanoprost however per daughter, medications were not refilled due to lack of follow-up with Ophthalmology

## 2022-05-19 NOTE — TELEPHONE ENCOUNTER
----- Message from Roxi Wilson MD sent at 5/18/2022 10:25 AM EDT -----  Noé Hand  Can you pls call the pt's dtr about respite care pls  She is caring for her 79 yo Mom and has sig care giver burn out and was looking for respite or aides to come in if she goes on maria teresa  Thanks

## 2022-05-19 NOTE — ASSESSMENT & PLAN NOTE
Overall blood pressures remain stable  Continue healthy lifestyle changes  Recommend a low-sodium, heart healthy

## 2022-05-19 NOTE — ASSESSMENT & PLAN NOTE
Patient successfully weaned lorazepam due to side effects in elderly patients  Continue social support by family and friends  Overall mood remains stable

## 2022-05-19 NOTE — ASSESSMENT & PLAN NOTE
Maintain Falls precautions  Patient previously followed with Ophthalmology however has not been compliant with follow-up during COVID

## 2022-05-19 NOTE — ASSESSMENT & PLAN NOTE
Patient with a history of osteoporosis  Previously declined any treatment therapies  Maintain on calcium/vitamin-D supplements  Maintain Falls precautions  Recommend strengthening exercises

## 2022-08-09 ENCOUNTER — TELEPHONE (OUTPATIENT)
Dept: GERIATRICS | Age: 87
End: 2022-08-09

## 2022-08-09 NOTE — TELEPHONE ENCOUNTER
Called patient's daughter, Irais Joseph ([phone]) to ask if patient received letter stating Provider leaving practice, and practice closing primary care portion of the practice as of 8/1/22  Yes, letter was received  Did not found new provider at this time  Advise should the patient need primary care she would need to be seen at an urgent care/[facility] if a new primary care relationship has not been established  Daughter expressed understanding

## 2022-09-28 ENCOUNTER — APPOINTMENT (OUTPATIENT)
Dept: LAB | Facility: CLINIC | Age: 87
End: 2022-09-28
Payer: MEDICARE

## 2022-09-28 ENCOUNTER — OFFICE VISIT (OUTPATIENT)
Dept: FAMILY MEDICINE CLINIC | Facility: CLINIC | Age: 87
End: 2022-09-28
Payer: MEDICARE

## 2022-09-28 VITALS — SYSTOLIC BLOOD PRESSURE: 128 MMHG | DIASTOLIC BLOOD PRESSURE: 78 MMHG

## 2022-09-28 DIAGNOSIS — R73.9 HYPERGLYCEMIA: ICD-10-CM

## 2022-09-28 DIAGNOSIS — H35.30 MACULAR DEGENERATION, UNSPECIFIED LATERALITY, UNSPECIFIED TYPE: ICD-10-CM

## 2022-09-28 DIAGNOSIS — L84 CALLUS: ICD-10-CM

## 2022-09-28 DIAGNOSIS — G25.81 RESTLESS LEGS SYNDROME: ICD-10-CM

## 2022-09-28 DIAGNOSIS — E78.2 MIXED HYPERLIPIDEMIA: Primary | ICD-10-CM

## 2022-09-28 DIAGNOSIS — R41.89 COGNITIVE IMPAIRMENT: ICD-10-CM

## 2022-09-28 DIAGNOSIS — H54.8 LEGAL BLINDNESS: ICD-10-CM

## 2022-09-28 DIAGNOSIS — E55.9 VITAMIN D DEFICIENCY: ICD-10-CM

## 2022-09-28 DIAGNOSIS — K21.9 GASTROESOPHAGEAL REFLUX DISEASE, UNSPECIFIED WHETHER ESOPHAGITIS PRESENT: ICD-10-CM

## 2022-09-28 DIAGNOSIS — R42 VERTIGO: ICD-10-CM

## 2022-09-28 DIAGNOSIS — C56.9 MALIGNANT NEOPLASM OF OVARY, UNSPECIFIED LATERALITY (HCC): ICD-10-CM

## 2022-09-28 DIAGNOSIS — C50.919 CARCINOMA OF FEMALE BREAST, UNSPECIFIED ESTROGEN RECEPTOR STATUS, UNSPECIFIED LATERALITY, UNSPECIFIED SITE OF BREAST (HCC): ICD-10-CM

## 2022-09-28 DIAGNOSIS — D49.2 NEOPLASM OF SKIN OF FACE: ICD-10-CM

## 2022-09-28 PROBLEM — N39.0 CHRONIC URINARY TRACT INFECTION: Status: RESOLVED | Noted: 2017-04-03 | Resolved: 2022-09-28

## 2022-09-28 PROBLEM — B02.9 SHINGLES: Status: RESOLVED | Noted: 2020-02-25 | Resolved: 2022-09-28

## 2022-09-28 PROBLEM — Z79.899 POLYPHARMACY: Status: RESOLVED | Noted: 2020-03-24 | Resolved: 2022-09-28

## 2022-09-28 PROBLEM — B37.2 CANDIDAL INTERTRIGO: Status: RESOLVED | Noted: 2018-02-19 | Resolved: 2022-09-28

## 2022-09-28 PROBLEM — R33.9 URINARY RETENTION: Status: RESOLVED | Noted: 2020-02-21 | Resolved: 2022-09-28

## 2022-09-28 LAB
25(OH)D3 SERPL-MCNC: 53.6 NG/ML (ref 30–100)
ALBUMIN SERPL BCP-MCNC: 3.5 G/DL (ref 3.5–5)
ALP SERPL-CCNC: 92 U/L (ref 46–116)
ALT SERPL W P-5'-P-CCNC: 15 U/L (ref 12–78)
ANION GAP SERPL CALCULATED.3IONS-SCNC: 6 MMOL/L (ref 4–13)
AST SERPL W P-5'-P-CCNC: 16 U/L (ref 5–45)
BASOPHILS # BLD AUTO: 0.05 THOUSANDS/ΜL (ref 0–0.1)
BASOPHILS NFR BLD AUTO: 1 % (ref 0–1)
BILIRUB SERPL-MCNC: 0.53 MG/DL (ref 0.2–1)
BUN SERPL-MCNC: 15 MG/DL (ref 5–25)
CALCIUM SERPL-MCNC: 9.4 MG/DL (ref 8.3–10.1)
CHLORIDE SERPL-SCNC: 104 MMOL/L (ref 96–108)
CO2 SERPL-SCNC: 28 MMOL/L (ref 21–32)
CREAT SERPL-MCNC: 0.67 MG/DL (ref 0.6–1.3)
EOSINOPHIL # BLD AUTO: 0.16 THOUSAND/ΜL (ref 0–0.61)
EOSINOPHIL NFR BLD AUTO: 2 % (ref 0–6)
ERYTHROCYTE [DISTWIDTH] IN BLOOD BY AUTOMATED COUNT: 14.7 % (ref 11.6–15.1)
GFR SERPL CREATININE-BSD FRML MDRD: 74 ML/MIN/1.73SQ M
GLUCOSE SERPL-MCNC: 90 MG/DL (ref 65–140)
HCT VFR BLD AUTO: 39.7 % (ref 34.8–46.1)
HGB BLD-MCNC: 12.7 G/DL (ref 11.5–15.4)
IMM GRANULOCYTES # BLD AUTO: 0.03 THOUSAND/UL (ref 0–0.2)
IMM GRANULOCYTES NFR BLD AUTO: 0 % (ref 0–2)
LYMPHOCYTES # BLD AUTO: 1.46 THOUSANDS/ΜL (ref 0.6–4.47)
LYMPHOCYTES NFR BLD AUTO: 20 % (ref 14–44)
MCH RBC QN AUTO: 30 PG (ref 26.8–34.3)
MCHC RBC AUTO-ENTMCNC: 32 G/DL (ref 31.4–37.4)
MCV RBC AUTO: 94 FL (ref 82–98)
MONOCYTES # BLD AUTO: 0.56 THOUSAND/ΜL (ref 0.17–1.22)
MONOCYTES NFR BLD AUTO: 8 % (ref 4–12)
NEUTROPHILS # BLD AUTO: 4.93 THOUSANDS/ΜL (ref 1.85–7.62)
NEUTS SEG NFR BLD AUTO: 69 % (ref 43–75)
NRBC BLD AUTO-RTO: 0 /100 WBCS
PLATELET # BLD AUTO: 243 THOUSANDS/UL (ref 149–390)
PMV BLD AUTO: 10.7 FL (ref 8.9–12.7)
POTASSIUM SERPL-SCNC: 4.4 MMOL/L (ref 3.5–5.3)
PROT SERPL-MCNC: 7.4 G/DL (ref 6.4–8.4)
RBC # BLD AUTO: 4.24 MILLION/UL (ref 3.81–5.12)
SODIUM SERPL-SCNC: 138 MMOL/L (ref 135–147)
WBC # BLD AUTO: 7.19 THOUSAND/UL (ref 4.31–10.16)

## 2022-09-28 PROCEDURE — 82306 VITAMIN D 25 HYDROXY: CPT

## 2022-09-28 PROCEDURE — 80053 COMPREHEN METABOLIC PANEL: CPT

## 2022-09-28 PROCEDURE — 85025 COMPLETE CBC W/AUTO DIFF WBC: CPT

## 2022-09-28 PROCEDURE — 99204 OFFICE O/P NEW MOD 45 MIN: CPT | Performed by: FAMILY MEDICINE

## 2022-09-28 PROCEDURE — 36415 COLL VENOUS BLD VENIPUNCTURE: CPT

## 2022-09-28 NOTE — PROGRESS NOTES
Chief Complaint   Patient presents with    Hypertension    Hyperlipidemia     Name: Kimber Nicole      : 1926      MRN: [id number]  Encounter Provider: Mat Cintron DO  Encounter Date: 2022   Encounter department: [facility] PRIMARY [facility]    Assessment & Plan     1  Mixed hyperlipidemia  Assessment & Plan:  No longer concerned with monitoring of this at her age Continue balanced diet      2  Malignant neoplasm of ovary, unspecified laterality ([address] )    3  Vitamin D deficiency  Assessment & Plan:  conitnue supplement will check labs     Orders:  -     Vitamin D 25 hydroxy; Future; Expected date: 2022    4  Neoplasm of skin of face  Assessment & Plan:  Patient daughter is not interested in surgery for her at this time       5  Cognitive impairment  Assessment & Plan:  Patient is stable per patient and her daughter Her daughter  Is her primary caregiver Patient has no new issues at this time     Orders:  -     Comprehensive metabolic panel; Future; Expected date: 2022    6  Callus  Assessment & Plan:  Patient follows with the podiatry team for this      7  Legal blindness  Assessment & Plan:  Macular degeneration patient has to make appt with eye doctor      8  Macular degeneration, unspecified laterality, unspecified type  Assessment & Plan:  followup with eye doctor      9  Vertigo  Assessment & Plan:  Stable Patient is taking meclizine    Orders:  -     CBC and differential; Future; Expected date: 2022    10  Carcinoma of female breast, unspecified estrogen receptor status, unspecified laterality, unspecified site of breast [facility])  Assessment & Plan:  Via history No longer interested in mammogram      11  Hyperglycemia  -     Comprehensive metabolic panel; Future; Expected date: 2022    12  Restless legs syndrome  Assessment & Plan:  conitnue medication Her sleep habits are good      13   Gastroesophageal reflux disease, unspecified whether esophagitis present  Assessment & Plan:  No complaints on medication patient appetite is good Patient diet is bland diet       BMI Counseling: There is no height or weight on file to calculate BMI  Follow-up plan was not completed due to elderly patient (72 years old) where weight reduction/weight gain would complicate underlying health condition such as: mental illness, dementia, or confusion  Depression Screening and Follow-up Plan: Patient was screened for depression during today's encounter  They screened negative with a PHQ-2 score of 0  Subjective      Patient is a new patient today She is here with her daughter who is here full time caregiver Patient was following with geriatrics but they closed the practice Patient was having issues with hallucinations and worsening memory patient was weaned of gabapentin and xanax and is much better Patient has history of breast cancer hypertension hyperlipidemia She had surgery on her feet and has severe foot deformity Patient sees podiatry Patient is off medication for BP and lipids Patient has macular degeneration and is legally blind She is able to transfer to commode at bedside Patient has good appetite Pateint has a large likely skin cancer and plastics was consulted Pateint and daughter decided it was too much to put her through    Review of Systems   Constitutional: Negative for fatigue, fever and unexpected weight change  HENT: Negative for congestion, sinus pain and trouble swallowing  Eyes: Negative for discharge and visual disturbance  Respiratory: Negative for cough, chest tightness, shortness of breath and wheezing  Cardiovascular: Negative for chest pain, palpitations and leg swelling  Gastrointestinal: Negative for abdominal pain, blood in stool, constipation, diarrhea, nausea and vomiting  Genitourinary: Negative for difficulty urinating, dysuria, frequency and hematuria          Stable incontinence   Musculoskeletal: Negative for arthralgias, gait problem and joint swelling  Skin: Negative for rash and wound  Large skin lesion   Allergic/Immunologic: Negative for environmental allergies and food allergies  Neurological: Negative for dizziness, syncope, weakness, numbness and headaches  Hematological: Negative for adenopathy  Does not bruise/bleed easily  Psychiatric/Behavioral: Negative for confusion, decreased concentration and sleep disturbance  The patient is not nervous/anxious  Current Outpatient Medications on File Prior to Visit   Medication Sig    Acetaminophen (TYLENOL ARTHRITIS PAIN PO) Take 1 tablet by mouth 3 (three) times a day    aspirin (ECOTRIN LOW STRENGTH) 81 mg EC tablet Take 81 mg by mouth in the morning   Calcium Carbonate-Vit D-Min (CALCIUM 1200 PO) Take 750 mg by mouth 2 (two) times a day    cholecalciferol (VITAMIN D3) 1,000 units tablet Take 4,000 Units by mouth daily    Cranberry 250 MG TABS Take by mouth daily    meclizine (ANTIVERT) 25 mg tablet Take 1 tablet (25 mg total) by mouth every 8 (eight) hours as needed for dizziness    omeprazole (PriLOSEC) 20 mg delayed release capsule Take 1 capsule (20 mg total) by mouth daily    pramipexole (MIRAPEX) 0 25 mg tablet Take 1 tablet (0 25 mg total) by mouth daily at bedtime    vitamin B-12 (VITAMIN B-12) 1,000 mcg tablet Take 2,500 mcg by mouth daily    brimonidine-timolol (COMBIGAN) 0 2-0 5 % Administer 1 drop into the left eye 2 (two) times a day (Patient not taking: No sig reported)    latanoprost (XALATAN) 0 005 % ophthalmic solution Administer 1 drop into the left eye daily (Patient not taking: No sig reported)       Objective     /78     Physical Exam  Constitutional:       Appearance: Normal appearance  She is well-developed  HENT:      Head: Normocephalic and atraumatic        Right Ear: Hearing, tympanic membrane and external ear normal       Left Ear: Hearing, tympanic membrane and external ear normal       Nose: Nose normal  Eyes:      Extraocular Movements: Extraocular movements intact  Conjunctiva/sclera: Conjunctivae normal       Pupils: Pupils are equal, round, and reactive to light  Neck:      Thyroid: No thyromegaly  Cardiovascular:      Rate and Rhythm: Normal rate  Heart sounds: Normal heart sounds  Pulmonary:      Effort: Pulmonary effort is normal       Breath sounds: Normal breath sounds  No wheezing or rales  Abdominal:      General: Bowel sounds are normal  There is no distension  Palpations: Abdomen is soft  Tenderness: There is no abdominal tenderness  Musculoskeletal:         General: No tenderness  Cervical back: Neck supple  Lymphadenopathy:      Cervical: No cervical adenopathy  Skin:     General: Skin is warm and dry  Findings: Lesion present  No rash  Comments: Large lesion of rthe right forehead appears to be basal cell carcinoma    Neurological:      General: No focal deficit present  Mental Status: She is alert and oriented to person, place, and time  Cranial Nerves: No cranial nerve deficit  Coordination: Coordination normal    Psychiatric:         Mood and Affect: Mood normal          Behavior: Behavior normal          Thought Content:  Thought content normal          Judgment: Judgment normal        Gretel Riggs DO

## 2022-09-29 NOTE — PATIENT INSTRUCTIONS
Cholesterol and Your Health   WHAT YOU NEED TO KNOW:   What is cholesterol? Cholesterol is a waxy, fat-like substance  Your body uses cholesterol to make hormones and new cells, and to protect nerves  Cholesterol is made by your body  It also comes from certain foods you eat, such as meat and dairy products  Your healthcare provider can help you set goals for your cholesterol levels  He or she can help you create a plan to meet your goals  What are cholesterol level goals? Your cholesterol level goals depend on your risk for heart disease, your age, and your other health conditions  The following are general guidelines: Total cholesterol  includes low-density lipoprotein (LDL), high-density lipoprotein (HDL), and triglyceride levels  The total cholesterol level should be lower than 200 mg/dL and is best at about 150 mg/dL  LDL cholesterol  is called bad cholesterol  because it forms plaque in your arteries  As plaque builds up, your arteries become narrow, and less blood flows through  When plaque decreases blood flow to your heart, you may have chest pain  If plaque completely blocks an artery that brings blood to your heart, you may have a heart attack  Plaque can break off and form blood clots  Blood clots may block arteries in your brain and cause a stroke  The level should be less than 130 mg/dL and is best at about 100 mg/dL  HDL cholesterol  is called good cholesterol  because it helps remove LDL cholesterol from your arteries  It does this by attaching to LDL cholesterol and carrying it to your liver  Your liver breaks down LDL cholesterol so your body can get rid of it  High levels of HDL cholesterol can help prevent a heart attack and stroke  Low levels of HDL cholesterol can increase your risk for heart disease, heart attack, and stroke  The level should be 60 mg/dL or higher  Triglycerides  are a type of fat that store energy from foods you eat   High levels of triglycerides also cause plaque buildup  This can increase your risk for a heart attack or stroke  If your triglyceride level is high, your LDL cholesterol level may also be high  The level should be less than 150 mg/dL  What increases my risk for high cholesterol? Smoking cigarettes    Being overweight or obese, or not getting enough exercise    Drinking large amounts of alcohol    A medical condition such as hypertension (high blood pressure) or diabetes    Certain genes passed from your parents to you    Age older than 72 years    What do I need to know about having my cholesterol levels checked? Adults 21to 39years of age should have their cholesterol levels checked every 4 to 6 years  Adults 45 years or older should have their cholesterol checked every 1 to 2 years  You may need your cholesterol checked more often, or at a younger age, if you have risk factors for heart disease  You may also need to have your cholesterol checked more often if you have other health conditions, such as diabetes  Blood tests are used to check cholesterol levels  Blood tests measure your levels of triglycerides, LDL cholesterol, and HDL cholesterol  How do healthy fats affect my cholesterol levels? Healthy fats, also called unsaturated fats, help lower LDL cholesterol and triglyceride levels  Healthy fats include the following:  Monounsaturated fats  are found in foods such as olive oil, canola oil, avocado, nuts, and olives  Polyunsaturated fats,  such as omega 3 fats, are found in fish, such as salmon, trout, and tuna  They can also be found in plant foods such as flaxseed, walnuts, and soybeans  How do unhealthy fats affect my cholesterol levels? Unhealthy fats increase LDL cholesterol and triglyceride levels  They are found in foods high in cholesterol, saturated fat, and trans fat:  Cholesterol  is found in eggs, dairy, and meat  Saturated fat  is found in butter, cheese, ice cream, whole milk, and coconut oil   Saturated fat is also found in meat, such as sausage, hot dogs, and bologna  Trans fat  is found in liquid oils and is used in fried and baked foods  Foods that contain trans fats include chips, crackers, muffins, sweet rolls, microwave popcorn, and cookies  How is high cholesterol treated? Treatment for high cholesterol will also decrease your risk of heart disease, heart attack, and stroke  Treatment may include any of the following:  Lifestyle changes  may include food, exercise, weight loss, and quitting smoking  You may also need to decrease the amount of alcohol you drink  Your healthcare provider will want you to start with lifestyle changes  Other treatment may be added if lifestyle changes are not enough  Your healthcare provider may recommend you work with a team to manage hyperlipidemia  The team may include medical experts such as a dietitian, an exercise or physical therapist, and a behavior therapist  Your family members may be included in helping you create lifestyle changes  Medicines  may be given to lower your LDL cholesterol, triglyceride levels, or total cholesterol level  You may need medicines to lower your cholesterol if any of the following is true:    You have a history of stroke, TIA, unstable angina, or a heart attack  Your LDL cholesterol level is 190 mg/dL or higher  You are age 36 to 76 years, have diabetes or heart disease risk factors, and your LDL cholesterol is 70 mg/dL or higher  Supplements  include fish oil, red yeast rice, and garlic  Fish oil may help lower your triglyceride and LDL cholesterol levels  It may also increase your HDL cholesterol level  Red yeast rice may help decrease your total cholesterol level and LDL cholesterol level  Garlic may help lower your total cholesterol level  Do not take any supplements without talking to your healthcare provider  What food changes can I make to lower my cholesterol levels?   A dietitian can help you create a healthy eating plan  He or she can show you how to read food labels and choose foods low in saturated fat, trans fats, and cholesterol  Decrease the total amount of fat you eat  Choose lean meats, fat-free or 1% fat milk, and low-fat dairy products, such as yogurt and cheese  Try to limit or avoid red meats  Limit or do not eat fried foods or baked goods, such as cookies  Replace unhealthy fats with healthy fats  Cook foods in olive oil or canola oil  Choose soft margarines that are low in saturated fat and trans fat  Seeds, nuts, and avocados are other examples of healthy fats  Eat foods with omega-3 fats  Examples include salmon, tuna, mackerel, walnuts, and flaxseed  Eat fish 2 times per week  Pregnant women should not eat fish that have high levels of mercury, such as shark, swordfish, and lenore mackerel  Increase the amount of high-fiber foods you eat  High-fiber foods can help lower your LDL cholesterol  Aim to get between 20 and 30 grams of fiber each day  Fruits and vegetables are high in fiber  Eat at least 5 servings each day  Other high-fiber foods are whole-grain or whole-wheat breads, pastas, or cereals, and brown rice  Eat 3 ounces of whole-grain foods each day  Increase fiber slowly  You may have abdominal discomfort, bloating, and gas if you add fiber to your diet too quickly  Eat healthy protein foods  Examples include low-fat dairy products, skinless chicken and turkey, fish, and nuts  Limit foods and drinks that are high in sugar  Your dietitian or healthcare provider can help you create daily limits for high-sugar foods and drinks  The limit may be lower if you have diabetes or another health condition  Limits can also help you lose weight if needed  What lifestyle changes can I make to lower my cholesterol levels? Maintain a healthy weight  Ask your healthcare provider what a healthy weight is for you  Ask him or her to help you create a weight loss plan if needed  Weight loss can decrease your total cholesterol and triglyceride levels  Weight loss may also help keep your blood pressure at a healthy level  Be physically active throughout the day  Physical activity, such as exercise, can help lower your total cholesterol level and maintain a healthy weight  Physical activity can also help increase your HDL cholesterol level  Work with your healthcare provider to create an program that is right for you  Get at least 30 to 40 minutes of moderate physical activity most days of the week  Examples of exercise include brisk walking, swimming, or biking  Also include strength training at least 2 times each week  Your healthcare providers can help you create a physical activity plan  Do not smoke  Nicotine and other chemicals in cigarettes and cigars can raise your cholesterol levels  Ask your healthcare provider for information if you currently smoke and need help to quit  E-cigarettes or smokeless tobacco still contain nicotine  Talk to your healthcare provider before you use these products  Limit or do not drink alcohol  Alcohol can increase your triglyceride levels  Ask your healthcare provider before you drink alcohol  Ask how much is okay for you to drink in 24 hours or 1 week  CARE AGREEMENT:   You have the right to help plan your care  Discuss treatment options with your healthcare provider to decide what care you want to receive  You always have the right to refuse treatment  The above information is an  only  It is not intended as medical advice for individual conditions or treatments  Talk to your doctor, nurse or pharmacist before following any medical regimen to see if it is safe and effective for you  © Copyright [employer] 2022 Information is for End User's use only and may not be sold, redistributed or otherwise used for commercial purposes   All illustrations and images included in CareNotes® are the copyrighted property of [employer]  or [address] [place] [address]

## 2022-09-29 NOTE — ASSESSMENT & PLAN NOTE
Patient is stable per patient and her daughter Her daughter  Is her primary caregiver Patient has no new issues at this time

## 2022-11-28 DIAGNOSIS — R42 VERTIGO: ICD-10-CM

## 2022-11-28 DIAGNOSIS — G25.81 RESTLESS LEGS SYNDROME: ICD-10-CM

## 2022-11-28 DIAGNOSIS — K21.9 GASTROESOPHAGEAL REFLUX DISEASE: ICD-10-CM

## 2022-11-28 RX ORDER — MECLIZINE HYDROCHLORIDE 25 MG/1
25 TABLET ORAL EVERY 8 HOURS PRN
Qty: 180 TABLET | Refills: 0 | Status: SHIPPED | OUTPATIENT
Start: 2022-11-28

## 2022-11-28 RX ORDER — OMEPRAZOLE 20 MG/1
20 CAPSULE, DELAYED RELEASE ORAL DAILY
Qty: 90 CAPSULE | Refills: 0 | Status: SHIPPED | OUTPATIENT
Start: 2022-11-28

## 2022-11-28 RX ORDER — PRAMIPEXOLE DIHYDROCHLORIDE 0.25 MG/1
0.25 TABLET ORAL
Qty: 90 TABLET | Refills: 0 | Status: SHIPPED | OUTPATIENT
Start: 2022-11-28

## 2023-02-05 DIAGNOSIS — G25.81 RESTLESS LEGS SYNDROME: ICD-10-CM

## 2023-02-05 DIAGNOSIS — R42 VERTIGO: ICD-10-CM

## 2023-02-05 DIAGNOSIS — K21.9 GASTROESOPHAGEAL REFLUX DISEASE: ICD-10-CM

## 2023-02-06 DIAGNOSIS — R42 VERTIGO: ICD-10-CM

## 2023-02-06 DIAGNOSIS — G25.81 RESTLESS LEGS SYNDROME: ICD-10-CM

## 2023-02-06 DIAGNOSIS — K21.9 GASTROESOPHAGEAL REFLUX DISEASE: ICD-10-CM

## 2023-02-06 RX ORDER — OMEPRAZOLE 20 MG/1
20 CAPSULE, DELAYED RELEASE ORAL DAILY
Qty: 90 CAPSULE | Refills: 0 | Status: SHIPPED | OUTPATIENT
Start: 2023-02-06

## 2023-02-06 RX ORDER — MECLIZINE HYDROCHLORIDE 25 MG/1
25 TABLET ORAL EVERY 8 HOURS PRN
Qty: 180 TABLET | Refills: 0 | Status: SHIPPED | OUTPATIENT
Start: 2023-02-06

## 2023-02-06 RX ORDER — PRAMIPEXOLE DIHYDROCHLORIDE 0.25 MG/1
0.25 TABLET ORAL
Qty: 90 TABLET | Refills: 0 | Status: SHIPPED | OUTPATIENT
Start: 2023-02-06

## 2023-02-06 RX ORDER — OMEPRAZOLE 20 MG/1
CAPSULE, DELAYED RELEASE ORAL
Qty: 90 CAPSULE | Refills: 0 | Status: SHIPPED | OUTPATIENT
Start: 2023-02-06

## 2023-03-27 ENCOUNTER — TELEMEDICINE (OUTPATIENT)
Dept: FAMILY MEDICINE CLINIC | Facility: CLINIC | Age: 88
End: 2023-03-27

## 2023-03-27 VITALS
SYSTOLIC BLOOD PRESSURE: 149 MMHG | DIASTOLIC BLOOD PRESSURE: 78 MMHG | TEMPERATURE: 97.3 F | HEART RATE: 74 BPM | OXYGEN SATURATION: 95 %

## 2023-03-27 DIAGNOSIS — R42 VERTIGO: ICD-10-CM

## 2023-03-27 DIAGNOSIS — G25.81 RESTLESS LEGS SYNDROME: ICD-10-CM

## 2023-03-27 DIAGNOSIS — M54.6 CHRONIC MIDLINE THORACIC BACK PAIN: ICD-10-CM

## 2023-03-27 DIAGNOSIS — Z00.00 MEDICARE ANNUAL WELLNESS VISIT, SUBSEQUENT: Primary | ICD-10-CM

## 2023-03-27 DIAGNOSIS — E55.9 VITAMIN D DEFICIENCY: ICD-10-CM

## 2023-03-27 DIAGNOSIS — N39.46 MIXED STRESS AND URGE URINARY INCONTINENCE: ICD-10-CM

## 2023-03-27 DIAGNOSIS — G89.29 CHRONIC MIDLINE THORACIC BACK PAIN: ICD-10-CM

## 2023-03-27 NOTE — PATIENT INSTRUCTIONS
Medicare Preventive Visit Patient Instructions  Thank you for completing your Welcome to Medicare Visit or Medicare Annual Wellness Visit today  Your next wellness visit will be due in one year (3/27/2024)  The screening/preventive services that you may require over the next 5-10 years are detailed below  Some tests may not apply to you based off risk factors and/or age  Screening tests ordered at today's visit but not completed yet may show as past due  Also, please note that scanned in results may not display below  Preventive Screenings:  Service Recommendations Previous Testing/Comments   Colorectal Cancer Screening  * Colonoscopy    * Fecal Occult Blood Test (FOBT)/Fecal Immunochemical Test (FIT)  * Fecal DNA/Cologuard Test  * Flexible Sigmoidoscopy Age: 39-70 years old   Colonoscopy: every 10 years (may be performed more frequently if at higher risk)  OR  FOBT/FIT: every 1 year  OR  Cologuard: every 3 years  OR  Sigmoidoscopy: every 5 years  Screening may be recommended earlier than age 39 if at higher risk for colorectal cancer  Also, an individualized decision between you and your healthcare provider will decide whether screening between the ages of 74-80 would be appropriate  Colonoscopy: Not on file  FOBT/FIT: Not on file  Cologuard: Not on file  Sigmoidoscopy: Not on file          Breast Cancer Screening Age: 36 years old  Frequency: every 1-2 years  Not required if history of left and right mastectomy Mammogram: 01/08/2014        Cervical Cancer Screening Between the ages of 21-29, pap smear recommended once every 3 years  Between the ages of 33-67, can perform pap smear with HPV co-testing every 5 years     Recommendations may differ for women with a history of total hysterectomy, cervical cancer, or abnormal pap smears in past  Pap Smear: Not on file        Hepatitis C Screening Once for adults born between [facility]  More frequently in patients at high risk for Hepatitis C Hep C Antibody: Not on file        Diabetes Screening 1-2 times per year if you're at risk for diabetes or have pre-diabetes Fasting glucose: 84 mg/dL (12/15/2021)  A1C: 5 3 % (12/15/2021)      Cholesterol Screening Once every 5 years if you don't have a lipid disorder  May order more often based on risk factors  Lipid panel: 12/15/2021          Other Preventive Screenings Covered by Medicare:  1  Abdominal Aortic Aneurysm (AAA) Screening: covered once if your at risk  You're considered to be at risk if you have a family history of AAA  2  Lung Cancer Screening: covers low dose CT scan once per year if you meet all of the following conditions: (1) Age 50-69; (2) No signs or symptoms of lung cancer; (3) Current smoker or have quit smoking within the last 15 years; (4) You have a tobacco smoking history of at least 20 pack years (packs per day multiplied by number of years you smoked); (5) You get a written order from a healthcare provider  3  Glaucoma Screening: covered annually if you're considered high risk: (1) You have diabetes OR (2) Family history of glaucoma OR (3)  aged 48 and older OR (3)  American aged 72 and older  3  Osteoporosis Screening: covered every 2 years if you meet one of the following conditions: (1) You're estrogen deficient and at risk for osteoporosis based off medical history and other findings; (2) Have a vertebral abnormality; (3) On glucocorticoid therapy for more than 3 months; (4) Have primary hyperparathyroidism; (5) On osteoporosis medications and need to assess response to drug therapy  · Last bone density test (DXA Scan): 07/11/2013  5  HIV Screening: covered annually if you're between the age of 12-76  Also covered annually if you are younger than 13 and older than 72 with risk factors for HIV infection  For pregnant patients, it is covered up to 3 times per pregnancy      Immunizations:  Immunization Recommendations   Influenza Vaccine Annual influenza vaccination during flu season is recommended for all persons aged >= 6 months who do not have contraindications   Pneumococcal Vaccine   * Pneumococcal conjugate vaccine = PCV13 (Prevnar 13), PCV15 (Vaxneuvance), PCV20 (Prevnar 20)  * Pneumococcal polysaccharide vaccine = PPSV23 (Pneumovax) Adults 25-60 years old: 1-3 doses may be recommended based on certain risk factors  Adults 72 years old: 1-2 doses may be recommended based off what pneumonia vaccine you previously received   Hepatitis B Vaccine 3 dose series if at intermediate or high risk (ex: diabetes, end stage renal disease, liver disease)   Tetanus (Td) Vaccine - COST NOT COVERED BY MEDICARE PART B Following completion of primary series, a booster dose should be given every 10 years to maintain immunity against tetanus  Td may also be given as tetanus wound prophylaxis  Tdap Vaccine - COST NOT COVERED BY MEDICARE PART B Recommended at least once for all adults  For pregnant patients, recommended with each pregnancy  Shingles Vaccine (Shingrix) - COST NOT COVERED BY MEDICARE PART B  2 shot series recommended in those aged 48 and above     Health Maintenance Due:  There are no preventive care reminders to display for this patient  Immunizations Due:  There are no preventive care reminders to display for this patient  Advance Directives   What are advance directives? Advance directives are legal documents that state your wishes and plans for medical care  These plans are made ahead of time in case you lose your ability to make decisions for yourself  Advance directives can apply to any medical decision, such as the treatments you want, and if you want to donate organs  What are the types of advance directives? There are many types of advance directives, and each state has rules about how to use them  You may choose a combination of any of the following:  · Living will: This is a written record of the treatment you want   You can also choose which treatments you do not want, which to limit, and which to stop at a certain time  This includes surgery, medicine, IV fluid, and tube feedings  · Durable power of  for healthcare [facility]): This is a written record that states who you want to make healthcare choices for you when you are unable to make them for yourself  This person, called a proxy, is usually a family member or a friend  You may choose more than 1 proxy  · Do not resuscitate (DNR) order:  A DNR order is used in case your heart stops beating or you stop breathing  It is a request not to have certain forms of treatment, such as CPR  A DNR order may be included in other types of advance directives  · Medical directive: This covers the care that you want if you are in a coma, near death, or unable to make decisions for yourself  You can list the treatments you want for each condition  Treatment may include pain medicine, surgery, blood transfusions, dialysis, IV or tube feedings, and a ventilator (breathing machine)  · Values history: This document has questions about your views, beliefs, and how you feel and think about life  This information can help others choose the care that you would choose  Why are advance directives important? An advance directive helps you control your care  Although spoken wishes may be used, it is better to have your wishes written down  Spoken wishes can be misunderstood, or not followed  Treatments may be given even if you do not want them  An advance directive may make it easier for your family to make difficult choices about your care  Urinary Incontinence   Urinary incontinence (UI)  is when you lose control of your bladder  UI develops because your bladder cannot store or empty urine properly  The 3 most common types of UI are stress incontinence, urge incontinence, or both  Medicines:   · May be given to help strengthen your bladder control  Report any side effects of medication to your healthcare provider    Do pelvic muscle exercises often:  Your pelvic muscles help you stop urinating  Squeeze these muscles tight for 5 seconds, then relax for 5 seconds  Gradually work up to squeezing for 10 seconds  Do 3 sets of 15 repetitions a day, or as directed  This will help strengthen your pelvic muscles and improve bladder control  Train your bladder:  Go to the bathroom at set times, such as every 2 hours, even if you do not feel the urge to go  You can also try to hold your urine when you feel the urge to go  For example, hold your urine for 5 minutes when you feel the urge to go  As that becomes easier, hold your urine for 10 minutes  Self-care:   · Keep a UI record  Write down how often you leak urine and how much you leak  Make a note of what you were doing when you leaked urine  · Drink liquids as directed  You may need to limit the amount of liquid you drink to help control your urine leakage  Do not drink any liquid right before you go to bed  Limit or do not have drinks that contain caffeine or alcohol  · Prevent constipation  Eat a variety of high-fiber foods  Good examples are high-fiber cereals, beans, vegetables, and whole-grain breads  Walking is the best way to trigger your intestines to have a bowel movement  · Exercise regularly and maintain a healthy weight  Weight loss and exercise will decrease pressure on your bladder and help you control your leakage  · Use a catheter as directed  to help empty your bladder  A catheter is a tiny, plastic tube that is put into your bladder to drain your urine  · Go to behavior therapy as directed  Behavior therapy may be used to help you learn to control your urge to urinate  © Copyright [employer] 2018 Information is for End User's use only and may not be sold, redistributed or otherwise used for commercial purposes   All illustrations and images included in CareNotes® are the copyrighted property of [employer]  or "[employer]

## 2023-03-27 NOTE — PROGRESS NOTES
Virtual AWV Consent    Verification of patient location:    Patient is located in the following state in which I hold an active license [place]    Reason for visit is medicare wellness and followup of cognitive impairment  gerd restless legs and also vitamin D deficiency     Encounter provider Rosa Anthony DO    Provider located at [facility] [address]  [facility] [phone] [address]  [phone]      Recent Visits  No visits were found meeting these conditions  Showing recent visits within past 7 days and meeting all other requirements  Today's Visits  Date Type Provider Dept   03/27/23 Telemedicine Rosa Anthony 100 [facility]   Showing today's visits and meeting all other requirements  Future Appointments  No visits were found meeting these conditions  Showing future appointments within next 150 days and meeting all other requirements       After connecting through [employer], the patient was identified by name and date of birth  Tanja Jacinto was informed that this is a telemedicine visit and that the visit is being conducted through the [facility] Now platform  She agrees to proceed  My office door was closed  No one else was in the room  She acknowledged consent and understanding of privacy and security of the video platform  Tanja Jacinto verbally agrees to participate in [facility]  Pt is aware that Virtual Care Services could be limited without vital signs or the ability to perform a full hands-on physical Clemente Luis understands she or the provider may request at any time to terminate the video visit and request the patient to seek care or treatment in person  Patient is aware this is a billable service     Assessment and Plan:     Problem List Items Addressed This Visit        Other    Vertigo     Stable on as needed meclizine         Restless legs syndrome     contiue medication Her sleep is good Follow up in 6 months          Thoracic back pain     Patient to continue as needed tylenol         Vitamin D deficiency     Check labs in fall continue current care         Medicare annual wellness visit, subsequent - Primary     Will get labs in fall and come in for visit and flu shot         Mixed stress and urge urinary incontinence     Avoid bladder irritants and will also do timed voiding             Urinary Incontinence Plan of Care: counseling topics discussed: practice Kegel (pelvic floor strengthening) exercises, use restroom every 2 hours and limit alcohol, caffeine, spicy foods, and acidic foods  Preventive health issues were discussed with patient, and age appropriate screening tests were ordered as noted in patient's After Visit Summary  Personalized health advice and appropriate referrals for health education or preventive services given if needed, as noted in patient's After Visit Summary       History of Present Illness:     Patient presents for a Medicare Wellness Visit    Patient presents with her daugther who is her care giver for medicare wellness Patient ahs some mild cognitive impairment and her daughter is assisting in answering questions Patient has refused eye drops so is not taking them She is sleeping ok with the mirapex Patient dizziness is controlled with as needed meclizine She is able to dress herself and comb her hair Her incontinence is stable She does require tylenol at times for her back pain Patient has not had any reflux and no bowel problems Patient is taking her medication as directed      Patient Care Team:  Chata Mustafa DO as PCP - General (Family Medicine)  NITZA Prather PA-C     Review of Systems:     Review of Systems     Problem List:     Patient Active Problem List   Diagnosis   • Allergic rhinitis   • Cerebral aneurysm   • Anxiety   • Back pain   • Breast carcinoma ([facility] [address] )   • Vertigo   • GERD (gastroesophageal reflux disease)   • Glaucoma   • Hyperlipidemia   • Instability of left knee joint   • Lymphedema   • Macular degeneration   • Neoplasm of skin of face   • Osteoarthritis   • Osteoporosis   • Restless legs syndrome   • Thoracic back pain   • Vitamin D deficiency   • Chronic pain of left knee   • Impaired mobility and ADLs   • Chronic pain syndrome   • Callus   • Foot pain   • Cognitive impairment   • Legal blindness   • Medicare annual wellness visit, subsequent   • Mixed stress and urge urinary incontinence      Past Medical and Surgical History:     Past Medical History:   Diagnosis Date   • Anxiety    • Arthritis    • Cancer ([facility] )    • Elevated serum alkaline phosphatase level     mild, isoenzymes are normal, resolved 4/24/17 labs , last assessed 11/10/16, resolved 4/24/17   • Hematuria     last assessed 9/18/12   • Hyperlipidemia 8/13/2012   • Hypertension    • Pressure injury of coccygeal region, stage 2 ([place] [address] ) 3/6/2020   • Pulmonary embolism ([place] [address] ) 01/2011    last assessed 9/18/12     Past Surgical History:   Procedure Laterality Date   • BILATERAL OOPHORECTOMY      Laparoscopic   • BREAST SURGERY Left     Mastectomy    • CHOLECYSTECTOMY      Laparoscopic   • HERNIA REPAIR     • HYSTERECTOMY     • SMALL INTESTINE SURGERY        Family History:     Family History   Problem Relation Age of Onset   • Hypertension Mother    • Blindness Mother    • Cancer Sister    • Cancer Maternal Aunt    • Cancer Maternal Uncle       Social History:     Social History     Socioeconomic History   • Marital status:       Spouse name: Not on file   • Number of children: Not on file   • Years of education: Not on file   • Highest education level: Not on file   Occupational History   • Not on file   Tobacco Use   • Smoking status: Never   • Smokeless tobacco: Never   • Tobacco comments:     denies   Vaping Use   • Vaping Use: Never used   Substance and Sexual Activity   • Alcohol use: No     Alcohol/week: 0 0 standard drinks Comment: denies   • Drug use: No   • Sexual activity: Not Currently   Other Topics Concern   • Not on file   Social History Narrative    Lives with adult children      Social Determinants of Health     Financial Resource Strain: Low Risk    • Difficulty of Paying Living Expenses: Not hard at all   Food Insecurity: Not on file   Transportation Needs: No Transportation Needs   • Lack of Transportation (Medical): No   • Lack of Transportation (Non-Medical): No   Physical Activity: Not on file   Stress: Not on file   Social Connections: Not on file   Intimate Partner Violence: Not on file   Housing Stability: Not on file      Medications and Allergies:     Current Outpatient Medications   Medication Sig Dispense Refill   • Acetaminophen (TYLENOL ARTHRITIS PAIN PO) Take 1 tablet by mouth 3 (three) times a day     • aspirin (ECOTRIN LOW STRENGTH) 81 mg EC tablet Take 81 mg by mouth in the morning  • Calcium Carbonate-Vit D-Min (CALCIUM 1200 PO) Take 750 mg by mouth 2 (two) times a day     • cholecalciferol (VITAMIN D3) 1,000 units tablet Take 4,000 Units by mouth daily     • Cranberry 250 MG TABS Take by mouth daily     • meclizine (ANTIVERT) 25 mg tablet TAKE 1 TABLET (25 MG TOTAL) BY MOUTH EVERY 8 (EIGHT) HOURS AS NEEDED FOR DIZZINESS  180 tablet 0   • omeprazole (PriLOSEC) 20 mg delayed release capsule TAKE 1 CAPSULE BY MOUTH EVERY DAY 90 capsule 0   • pramipexole (MIRAPEX) 0 25 mg tablet TAKE 1 TABLET (0 25 MG TOTAL) BY MOUTH DAILY AT BEDTIME 90 tablet 0   • vitamin B-12 (VITAMIN B-12) 1,000 mcg tablet Take 2,500 mcg by mouth daily       No current facility-administered medications for this visit  Allergies   Allergen Reactions   • Amoxicillin Hives   • Celecoxib    • Cephalexin    • Ciprofloxacin      Other reaction(s): diarrhea  Tolerates Levaquin   • Codeine Other (See Comments)     Other reaction(s):  Other (See Comments)  takes vicodin @ home  takes vicodin @ home   • Epinephrine Other (See Comments) Other reaction(s): Unknown Reaction   • Oxycodone-Acetaminophen Other (See Comments)   • Oxycodone-Acetaminophen      Other reaction(s): Unknown Reaction   • Propoxyphene Other (See Comments)   • Rofecoxib    • Sulfa Antibiotics Other (See Comments)     takes at home  takes at home  Other reaction(s): Other (See Comments)  takes at home   • Sulfamethoxazole-Trimethoprim    • Nitrofurantoin Rash      Immunizations:     Immunization History   Administered Date(s) Administered   • COVID-19 PFIZER VACCINE 0 3 ML IM 03/11/2021, 04/08/2021, 10/08/2021, 05/09/2022, 09/27/2022   • Influenza Quadrivalent Preservative Free 3 years and older IM 10/09/2014, 12/17/2015   • Influenza Split High Dose Preservative Free IM 11/10/2016, 10/19/2017   • Influenza, seasonal, injectable 10/05/2009, 10/27/2010, 10/03/2013   • Pneumococcal Conjugate 13-Valent 06/06/2017   • Pneumococcal Polysaccharide PPV23 10/01/2010      Health Maintenance: There are no preventive care reminders to display for this patient  There are no preventive care reminders to display for this patient  Medicare Screening Tests and Risk Assessments:     Jean Paul Carbone is here for her Subsequent Wellness visit  Historian  Patient cannot answer questions due to cognitive impairment, intelluctual disability, or expressive limitations  Information provided by: caregiver  Health Risk Assessment:   Patient rates overall health as good  Patient feels that their physical health rating is slightly worse  Patient is satisfied with their life  Eyesight was rated as slightly worse  Hearing was rated as same  Patient feels that their emotional and mental health rating is same  Patients states they are never, rarely angry  Patient states they are never, rarely unusually tired/fatigued  Pain experienced in the last 7 days has been none  Patient states that she has experienced no weight loss or gain in last 6 months  Fall Risk Screening:    In the past year, patient has experienced: no history of falling in past year      Urinary Incontinence Screening:   Patient has leaked urine accidently in the last six months  Home Safety:  Patient has trouble with stairs inside or outside of their home  Patient has working smoke alarms and has working carbon monoxide detector  Nutrition:   Current diet is Regular  Medications:   Patient is not currently taking any over-the-counter supplements  Patient is not able to manage medications  Daughter manages    Activities of Daily Living (ADLs)/Instrumental Activities of Daily Living (IADLs):   Walk and transfer into and out of bed and chair?: Yes  Dress and groom yourself?: Yes    Bathe or shower yourself?: No    Feed yourself? Yes  Do your laundry/housekeeping?: No  Manage your money, pay your bills and track your expenses?: No  Make your own meals?: No    Do your own shopping?: No    Previous Hospitalizations:   Any hospitalizations or ED visits within the last 12 months?: No      Advance Care Planning:   Living will: Yes    Durable POA for healthcare: Yes    Advanced directive: Yes    Provider agrees with end of life decisions: Yes      PREVENTIVE SCREENINGS      Cardiovascular Screening:    General: Screening Not Indicated and History Lipid Disorder      Diabetes Screening:     General: Screening Current      Colorectal Cancer Screening:     General: Screening Not Indicated      Breast Cancer Screening:     General: History Breast Cancer      Cervical Cancer Screening:    General: Screening Not Indicated      Osteoporosis Screening:    General: Screening Not Indicated and History Osteoporosis      Lung Cancer Screening:     General: Screening Not Indicated    Screening, Brief Intervention, and Referral to Treatment (SBIRT)    Screening  Typical number of drinks in a day: 0  Typical number of drinks in a week: 0  Interpretation: Low risk drinking behavior      Single Item Drug Screening:  How often have you used an illegal drug (including marijuana) or a prescription medication for non-medical reasons in the past year? never    Single Item Drug Screen Score: 0  Interpretation: Negative screen for possible drug use disorder    No results found  Physical Exam:     /78   Pulse 74   Temp (!) 97 3 °F (36 3 °C)   SpO2 95%     Physical Exam  Vitals and nursing note reviewed  Constitutional:       Appearance: Normal appearance  HENT:      Head: Normocephalic  Right Ear: External ear normal       Left Ear: External ear normal    Eyes:      Extraocular Movements: Extraocular movements intact  Conjunctiva/sclera: Conjunctivae normal       Pupils: Pupils are equal, round, and reactive to light  Pulmonary:      Breath sounds: Normal breath sounds  Neurological:      General: No focal deficit present  Mental Status: She is alert  Mental status is at baseline     Psychiatric:         Mood and Affect: Mood normal          Behavior: Behavior normal           Cristian Burton, DO

## 2023-04-17 DIAGNOSIS — R35.0 URINARY FREQUENCY: Primary | ICD-10-CM

## 2023-04-18 ENCOUNTER — TELEPHONE (OUTPATIENT)
Dept: FAMILY MEDICINE CLINIC | Facility: CLINIC | Age: 88
End: 2023-04-18

## 2023-04-18 ENCOUNTER — APPOINTMENT (OUTPATIENT)
Dept: LAB | Facility: CLINIC | Age: 88
End: 2023-04-18

## 2023-04-18 DIAGNOSIS — R35.0 URINARY FREQUENCY: ICD-10-CM

## 2023-04-18 DIAGNOSIS — R41.89 COGNITIVE IMPAIRMENT: ICD-10-CM

## 2023-04-18 DIAGNOSIS — Z78.9 IMPAIRED MOBILITY AND ADLS: ICD-10-CM

## 2023-04-18 DIAGNOSIS — H35.30 MACULAR DEGENERATION, UNSPECIFIED LATERALITY, UNSPECIFIED TYPE: ICD-10-CM

## 2023-04-18 DIAGNOSIS — H54.8 LEGAL BLINDNESS: ICD-10-CM

## 2023-04-18 DIAGNOSIS — F41.9 ANXIETY: ICD-10-CM

## 2023-04-18 DIAGNOSIS — Z74.09 IMPAIRED MOBILITY AND ADLS: ICD-10-CM

## 2023-04-18 DIAGNOSIS — R42 VERTIGO: ICD-10-CM

## 2023-04-18 DIAGNOSIS — C50.919 CARCINOMA OF FEMALE BREAST, UNSPECIFIED ESTROGEN RECEPTOR STATUS, UNSPECIFIED LATERALITY, UNSPECIFIED SITE OF BREAST (HCC): ICD-10-CM

## 2023-04-18 DIAGNOSIS — R35.0 URINARY FREQUENCY: Primary | ICD-10-CM

## 2023-04-18 RX ORDER — LEVOFLOXACIN 250 MG/1
250 TABLET ORAL DAILY
Qty: 3 TABLET | Refills: 0 | Status: SHIPPED | OUTPATIENT
Start: 2023-04-18 | End: 2023-04-22

## 2023-04-19 LAB — BACTERIA UR CULT: NORMAL

## 2023-04-21 ENCOUNTER — HOSPITAL ENCOUNTER (INPATIENT)
Facility: HOSPITAL | Age: 88
LOS: 1 days | Discharge: HOME | End: 2023-04-22
Attending: EMERGENCY MEDICINE | Admitting: EMERGENCY MEDICINE

## 2023-04-21 ENCOUNTER — APPOINTMENT (EMERGENCY)
Dept: CT IMAGING | Facility: HOSPITAL | Age: 88
End: 2023-04-21

## 2023-04-21 DIAGNOSIS — R44.3 HALLUCINATIONS: Primary | ICD-10-CM

## 2023-04-21 DIAGNOSIS — R41.82 ALTERED MENTAL STATUS: ICD-10-CM

## 2023-04-21 PROBLEM — Z86.79 HISTORY OF HYPERTENSION: Status: ACTIVE | Noted: 2023-04-21

## 2023-04-21 PROBLEM — G93.41 ACUTE METABOLIC ENCEPHALOPATHY: Status: ACTIVE | Noted: 2023-04-21

## 2023-04-21 LAB
ALBUMIN SERPL BCP-MCNC: 4.2 G/DL (ref 3.5–5)
ALP SERPL-CCNC: 70 U/L (ref 34–104)
ALT SERPL W P-5'-P-CCNC: 10 U/L (ref 7–52)
AMMONIA PLAS-SCNC: 21 UMOL/L (ref 18–72)
ANION GAP SERPL CALCULATED.3IONS-SCNC: 8 MMOL/L (ref 4–13)
AST SERPL W P-5'-P-CCNC: 19 U/L (ref 13–39)
ATRIAL RATE: 62 BPM
BASOPHILS # BLD AUTO: 0.04 THOUSANDS/ÂΜL (ref 0–0.1)
BASOPHILS NFR BLD AUTO: 1 % (ref 0–1)
BILIRUB SERPL-MCNC: 0.47 MG/DL (ref 0.2–1)
BILIRUB UR QL STRIP: NEGATIVE
BUN SERPL-MCNC: 16 MG/DL (ref 5–25)
CALCIUM SERPL-MCNC: 10 MG/DL (ref 8.4–10.2)
CARDIAC TROPONIN I PNL SERPL HS: 4 NG/L
CHLORIDE SERPL-SCNC: 102 MMOL/L (ref 96–108)
CLARITY UR: CLEAR
CO2 SERPL-SCNC: 28 MMOL/L (ref 21–32)
COLOR UR: YELLOW
CREAT SERPL-MCNC: 0.59 MG/DL (ref 0.6–1.3)
EOSINOPHIL # BLD AUTO: 0.03 THOUSAND/ÂΜL (ref 0–0.61)
EOSINOPHIL NFR BLD AUTO: 1 % (ref 0–6)
ERYTHROCYTE [DISTWIDTH] IN BLOOD BY AUTOMATED COUNT: 13.8 % (ref 11.6–15.1)
GFR SERPL CREATININE-BSD FRML MDRD: 77 ML/MIN/1.73SQ M
GLUCOSE SERPL-MCNC: 99 MG/DL (ref 65–140)
GLUCOSE UR STRIP-MCNC: NEGATIVE MG/DL
HCT VFR BLD AUTO: 40.8 % (ref 34.8–46.1)
HGB BLD-MCNC: 13.6 G/DL (ref 11.5–15.4)
HGB UR QL STRIP.AUTO: NEGATIVE
IMM GRANULOCYTES # BLD AUTO: 0.03 THOUSAND/UL (ref 0–0.2)
IMM GRANULOCYTES NFR BLD AUTO: 1 % (ref 0–2)
KETONES UR STRIP-MCNC: NEGATIVE MG/DL
LEUKOCYTE ESTERASE UR QL STRIP: NEGATIVE
LYMPHOCYTES # BLD AUTO: 1 THOUSANDS/ÂΜL (ref 0.6–4.47)
LYMPHOCYTES NFR BLD AUTO: 18 % (ref 14–44)
MCH RBC QN AUTO: 30.5 PG (ref 26.8–34.3)
MCHC RBC AUTO-ENTMCNC: 33.3 G/DL (ref 31.4–37.4)
MCV RBC AUTO: 92 FL (ref 82–98)
MONOCYTES # BLD AUTO: 0.5 THOUSAND/ÂΜL (ref 0.17–1.22)
MONOCYTES NFR BLD AUTO: 9 % (ref 4–12)
NEUTROPHILS # BLD AUTO: 4.01 THOUSANDS/ÂΜL (ref 1.85–7.62)
NEUTS SEG NFR BLD AUTO: 70 % (ref 43–75)
NITRITE UR QL STRIP: NEGATIVE
NRBC BLD AUTO-RTO: 0 /100 WBCS
P AXIS: 58 DEGREES
PH UR STRIP.AUTO: 6.5 [PH]
PLATELET # BLD AUTO: 232 THOUSANDS/UL (ref 149–390)
PMV BLD AUTO: 9.6 FL (ref 8.9–12.7)
POTASSIUM SERPL-SCNC: 4.3 MMOL/L (ref 3.5–5.3)
PR INTERVAL: 168 MS
PROT SERPL-MCNC: 7.5 G/DL (ref 6.4–8.4)
PROT UR STRIP-MCNC: NEGATIVE MG/DL
QRS AXIS: 41 DEGREES
QRSD INTERVAL: 72 MS
QT INTERVAL: 420 MS
QTC INTERVAL: 426 MS
RBC # BLD AUTO: 4.46 MILLION/UL (ref 3.81–5.12)
SODIUM SERPL-SCNC: 138 MMOL/L (ref 135–147)
SP GR UR STRIP.AUTO: 1.01 (ref 1–1.03)
T WAVE AXIS: 69 DEGREES
TSH SERPL DL<=0.05 MIU/L-ACNC: 1.57 UIU/ML (ref 0.45–4.5)
UROBILINOGEN UR QL STRIP.AUTO: 0.2 E.U./DL
VENTRICULAR RATE: 62 BPM
WBC # BLD AUTO: 5.61 THOUSAND/UL (ref 4.31–10.16)

## 2023-04-21 RX ORDER — QUETIAPINE FUMARATE 25 MG/1
12.5 TABLET, FILM COATED ORAL
Status: DISCONTINUED | OUTPATIENT
Start: 2023-04-21 | End: 2023-04-22 | Stop reason: HOSPADM

## 2023-04-21 RX ORDER — PRAMIPEXOLE DIHYDROCHLORIDE 0.25 MG/1
0.25 TABLET ORAL
Status: DISCONTINUED | OUTPATIENT
Start: 2023-04-21 | End: 2023-04-22 | Stop reason: HOSPADM

## 2023-04-21 RX ORDER — ACETAMINOPHEN 160 MG/5ML
650 SUSPENSION, ORAL (FINAL DOSE FORM) ORAL EVERY 4 HOURS PRN
Status: DISCONTINUED | OUTPATIENT
Start: 2023-04-21 | End: 2023-04-22 | Stop reason: HOSPADM

## 2023-04-21 RX ORDER — SODIUM CHLORIDE 9 MG/ML
60 INJECTION, SOLUTION INTRAVENOUS CONTINUOUS
Status: DISCONTINUED | OUTPATIENT
Start: 2023-04-21 | End: 2023-04-22 | Stop reason: HOSPADM

## 2023-04-21 RX ORDER — MECLIZINE HCL 12.5 MG/1
25 TABLET ORAL
Status: DISCONTINUED | OUTPATIENT
Start: 2023-04-22 | End: 2023-04-22 | Stop reason: HOSPADM

## 2023-04-21 RX ORDER — LANOLIN ALCOHOL/MO/W.PET/CERES
1 CREAM (GRAM) TOPICAL DAILY
Status: DISCONTINUED | OUTPATIENT
Start: 2023-04-21 | End: 2023-04-22 | Stop reason: HOSPADM

## 2023-04-21 RX ORDER — PANTOPRAZOLE SODIUM 40 MG/1
40 TABLET, DELAYED RELEASE ORAL
Status: DISCONTINUED | OUTPATIENT
Start: 2023-04-21 | End: 2023-04-22 | Stop reason: HOSPADM

## 2023-04-21 RX ORDER — MELATONIN
4000 DAILY
Status: DISCONTINUED | OUTPATIENT
Start: 2023-04-22 | End: 2023-04-22 | Stop reason: HOSPADM

## 2023-04-21 RX ORDER — ASPIRIN 81 MG/1
81 TABLET ORAL
Status: DISCONTINUED | OUTPATIENT
Start: 2023-04-21 | End: 2023-04-22 | Stop reason: HOSPADM

## 2023-04-21 RX ORDER — HEPARIN SODIUM 5000 [USP'U]/ML
5000 INJECTION, SOLUTION INTRAVENOUS; SUBCUTANEOUS EVERY 8 HOURS SCHEDULED
Status: DISCONTINUED | OUTPATIENT
Start: 2023-04-21 | End: 2023-04-22 | Stop reason: HOSPADM

## 2023-04-21 RX ADMIN — ASPIRIN 81 MG: 81 TABLET, COATED ORAL at 22:17

## 2023-04-21 RX ADMIN — Medication 1 TABLET: at 20:39

## 2023-04-21 RX ADMIN — HEPARIN SODIUM 5000 UNITS: 5000 INJECTION INTRAVENOUS; SUBCUTANEOUS at 22:17

## 2023-04-21 RX ADMIN — PRAMIPEXOLE DIHYDROCHLORIDE 0.25 MG: 0.25 TABLET ORAL at 22:17

## 2023-04-21 RX ADMIN — IOHEXOL 85 ML: 350 INJECTION, SOLUTION INTRAVENOUS at 14:29

## 2023-04-21 RX ADMIN — PANTOPRAZOLE SODIUM 40 MG: 40 TABLET, DELAYED RELEASE ORAL at 22:17

## 2023-04-21 RX ADMIN — SODIUM CHLORIDE 60 ML/HR: 0.9 INJECTION, SOLUTION INTRAVENOUS at 20:37

## 2023-04-21 RX ADMIN — QUETIAPINE FUMARATE 12.5 MG: 25 TABLET ORAL at 22:17

## 2023-04-21 NOTE — ED PROVIDER NOTES
History  Chief Complaint   Patient presents with   • Fall     Per daughter, pt was on antibiotic for 3 days for UTI and since has been altered and has fall onto butt today  Pt made comments like 'I am not eating this cereal with grass being in it'  • Altered Mental Status     81 yo F presenting for evaluation of confusion/hallucinations and fall  History obtained from daughter at bedside who lives with pt  Pt has been altered for about 1 week, hallucinations of people not there, making strange statements, not completely oriented  Daughter called PCP on 4/17 regarding behavior and was sent for urine testing  She was started on Levaquin 2 days ago  Urine culture with >100,000 mixed contaminants  Since yesterday/today, mental status getting worse/more confused/hallucinating  She had unwitnessed fall today, found sitting on her buttocks next to the bed  Denies head strike, no AC/AP agents  Has been able to ambulate  C/o pain to L posterior ribs and mid back, possibly L hip  MDM: 81 yo F with confusion/hallucinations- CTH to r/o intracranial bleed/mass, labs to r/o metabolic derangement, UA to r/o UTI, CT scans to assess for injuries               Per independent review of external records:  4/18/23 Urine culture: >100,000 mixed contaminants  4/17 note of daughter calling pcp stating pt acting weird/frequent urination  2/21/20 CTH: neg    Prior to Admission Medications   Prescriptions Last Dose Informant Patient Reported? Taking? Acetaminophen (TYLENOL ARTHRITIS PAIN PO) 4/20/2023  Yes Yes   Sig: Take 1 tablet by mouth if needed   Calcium Carbonate-Vit D-Min (CALCIUM 1200 PO) 4/20/2023  Yes Yes   Sig: Take 750 mg by mouth in the morning At 3 PM   Cranberry 250 MG TABS 4/21/2023  Yes Yes   Sig: Take 2 tablets by mouth 3 (three) times a day   aspirin (ECOTRIN LOW STRENGTH) 81 mg EC tablet 4/20/2023  Yes Yes   Sig: Take 81 mg by mouth in the morning     cholecalciferol (VITAMIN D3) 1,000 units tablet Unknown Yes No   Sig: Take 4,000 Units by mouth daily   levofloxacin (LEVAQUIN) 250 mg tablet Not Taking  No No   Sig: Take 1 tablet (250 mg total) by mouth daily for 3 days   Patient not taking: Reported on 4/21/2023   meclizine (ANTIVERT) 25 mg tablet 4/21/2023  No Yes   Sig: TAKE 1 TABLET (25 MG TOTAL) BY MOUTH EVERY 8 (EIGHT) HOURS AS NEEDED FOR DIZZINESS  omeprazole (PriLOSEC) 20 mg delayed release capsule 4/20/2023  No Yes   Sig: TAKE 1 CAPSULE BY MOUTH EVERY DAY   pramipexole (MIRAPEX) 0 25 mg tablet 4/20/2023  No Yes   Sig: TAKE 1 TABLET (0 25 MG TOTAL) BY MOUTH DAILY AT BEDTIME   vitamin B-12 (VITAMIN B-12) 1,000 mcg tablet Past Week  Yes Yes   Sig: Take 2,500 mcg by mouth every other day      Facility-Administered Medications: None       Past Medical History:   Diagnosis Date   • Anxiety    • Arthritis    • Cancer ([facility] )    • Elevated serum alkaline phosphatase level     mild, isoenzymes are normal, resolved 4/24/17 labs , last assessed 11/10/16, resolved 4/24/17   • Hematuria     last assessed 9/18/12   • Hyperlipidemia 8/13/2012   • Hypertension    • Pressure injury of coccygeal region, stage 2 ([facility] ) 3/6/2020   • Pulmonary embolism ([facility] ) 01/2011    last assessed 9/18/12       Past Surgical History:   Procedure Laterality Date   • BILATERAL OOPHORECTOMY      Laparoscopic   • BREAST SURGERY Left     Mastectomy    • CHOLECYSTECTOMY      Laparoscopic   • HERNIA REPAIR     • HYSTERECTOMY     • SMALL INTESTINE SURGERY         Family History   Problem Relation Age of Onset   • Hypertension Mother    • Blindness Mother    • Cancer Sister    • Cancer Maternal Aunt    • Cancer Maternal Uncle      I have reviewed and agree with the history as documented      E-Cigarette/Vaping   • E-Cigarette Use Never User    • Cartridges/Day 0    • Comments denies      E-Cigarette/Vaping Substances     Social History     Tobacco Use   • Smoking status: Never   • Smokeless tobacco: Never   • Tobacco comments:     denies   Vaping Use   • Vaping Use: Never used   Substance Use Topics   • Alcohol use: No     Alcohol/week: 0 0 standard drinks     Comment: denies   • Drug use: No       Review of Systems    Physical Exam  Physical Exam  Vitals and nursing note reviewed  Constitutional:       General: She is not in acute distress  Appearance: She is well-developed  HENT:      Head: Normocephalic and atraumatic  Nose: Nose normal    Eyes:      Conjunctiva/sclera: Conjunctivae normal    Cardiovascular:      Rate and Rhythm: Normal rate and regular rhythm  Heart sounds: Normal heart sounds  Pulmonary:      Effort: Pulmonary effort is normal  No respiratory distress  Breath sounds: Normal breath sounds  No stridor  No wheezing or rales  Chest:      Chest wall: No tenderness  Abdominal:      General: There is no distension  Palpations: Abdomen is soft  Tenderness: There is no abdominal tenderness  There is no guarding or rebound  Comments: Back: tender to palpation over posterior L mid/low ribs and midline lower thoracic spine, no stepoff/deformity, no skin changes/hematoma/ecchymosis   Musculoskeletal:         General: No deformity  Cervical back: Normal range of motion and neck supple  Skin:     General: Skin is warm and dry  Findings: No rash  Neurological:      General: No focal deficit present  Mental Status: She is alert  Motor: No abnormal muscle tone  Coordination: Coordination normal       Comments: Oriented to person, being at hospital (unsure which one), not to time  Able to state birthday, daughters name, does not know president/grandchild (which she typically would), no hallucinations during exam, no focal deficits, strength 5/5 throughout, sensation grossly intact   Psychiatric:         Thought Content:  Thought content normal          Judgment: Judgment normal          Vital Signs  ED Triage Vitals   Temperature Pulse Respirations Blood Pressure SpO2   04/21/23 1154 04/21/23 1152 04/21/23 1152 04/21/23 1152 04/21/23 1152   98 5 °F (36 9 °C) 98 16 151/71 98 %      Temp Source Heart Rate Source Patient Position - Orthostatic VS BP Location FiO2 (%)   04/21/23 1154 04/21/23 1152 04/21/23 1152 04/21/23 1152 --   Oral Monitor Sitting Right arm       Pain Score       04/21/23 1744       No Pain           Vitals:    04/21/23 1500 04/21/23 1600 04/21/23 1700 04/21/23 1835   BP: 160/72 (!) 181/72 (!) 182/72 164/78   Pulse: 64 65 63 64   Patient Position - Orthostatic VS: Sitting Sitting Sitting Sitting         Visual Acuity      ED Medications  Medications   acetaminophen (TYLENOL) oral suspension 650 mg (has no administration in time range)   iohexol (OMNIPAQUE) 350 MG/ML injection (SINGLE-DOSE) 85 mL (85 mL Intravenous Given 4/21/23 1429)       Diagnostic Studies  Results Reviewed     Procedure Component Value Units Date/Time    UA w Reflex to Microscopic w Reflex to Culture [[id number]] Collected: 04/21/23 1539    Lab Status: Final result Specimen: Urine, Clean Catch Updated: 04/21/23 1557     Color, UA Yellow     Clarity, UA Clear     Specific Gravity, UA 1 010     pH, UA 6 5     Leukocytes, UA Negative     Nitrite, UA Negative     Protein, UA Negative mg/dl      Glucose, UA Negative mg/dl      Ketones, UA Negative mg/dl      Urobilinogen, UA 0 2 E U /dl      Bilirubin, UA Negative     Occult Blood, UA Negative    TSH, 3rd generation with Free T4 reflex [[id number]]  (Normal) Collected: 04/21/23 1259    Lab Status: Final result Specimen: Blood from Arm, Right Updated: 04/21/23 1343     TSH 3RD GENERATON 1 575 uIU/mL     HS Troponin 0hr (reflex protocol) [[id number]]  (Normal) Collected: 04/21/23 1259    Lab Status: Final result Specimen: Blood from Arm, Right Updated: 04/21/23 1336     hs TnI 0hr 4 ng/L     Comprehensive metabolic panel [[id number]]  (Abnormal) Collected: 04/21/23 1259    Lab Status: Final result Specimen: Blood from Arm, Right Updated: 04/21/23 1332     Sodium 138 mmol/L Potassium 4 3 mmol/L      Chloride 102 mmol/L      CO2 28 mmol/L      ANION GAP 8 mmol/L      BUN 16 mg/dL      Creatinine 0 59 mg/dL      Glucose 99 mg/dL      Calcium 10 0 mg/dL      AST 19 U/L      ALT 10 U/L      Alkaline Phosphatase 70 U/L      Total Protein 7 5 g/dL      Albumin 4 2 g/dL      Total Bilirubin 0 47 mg/dL      eGFR 77 ml/min/1 73sq m     Narrative:      National Kid[employer] Disease [employer] guidelines for Chronic Kidney Disease (CKD):   •  Stage 1 with normal or high GFR (GFR > 90 mL/min/1 73 square meters)  •  Stage 2 Mild CKD (GFR = 60-89 mL/min/1 73 square meters)  •  Stage 3A Moderate CKD (GFR = 45-59 mL/min/1 73 square meters)  •  Stage 3B Moderate CKD (GFR = 30-44 mL/min/1 73 square meters)  •  Stage 4 Severe CKD (GFR = 15-29 mL/min/1 73 square meters)  •  Stage 5 End Stage CKD (GFR <15 mL/min/1 73 square meters)  Note: GFR calculation is accurate only with a steady state creatinine    Ammonia [[id number]]  (Normal) Collected: 04/21/23 1259    Lab Status: Final result Specimen: Blood from Arm, Right Updated: 04/21/23 1331     Ammonia 21 umol/L     CBC and differential [[id number]] Collected: 04/21/23 1259    Lab Status: Final result Specimen: Blood from Arm, Right Updated: 04/21/23 1310     WBC 5 61 Thousand/uL      RBC 4 46 Million/uL      Hemoglobin 13 6 g/dL      Hematocrit 40 8 %      MCV 92 fL      MCH 30 5 pg      MCHC 33 3 g/dL      RDW 13 8 %      MPV 9 6 fL      Platelets 085 Thousands/uL      nRBC 0 /100 WBCs      Neutrophils Relative 70 %      Immat GRANS % 1 %      Lymphocytes Relative 18 %      Monocytes Relative 9 %      Eosinophils Relative 1 %      Basophils Relative 1 %      Neutrophils Absolute 4 01 Thousands/µL      Immature Grans Absolute 0 03 Thousand/uL      Lymphocytes Absolute 1 00 Thousands/µL      Monocytes Absolute 0 50 Thousand/µL      Eosinophils Absolute 0 03 Thousand/µL      Basophils Absolute 0 04 Thousands/µL                  CT head without contrast   ED Interpretation by Bev Joaquin DO (04/21 1513)   FINDINGS:     PARENCHYMA: Decreased attenuation is noted in periventricular and subcortical white matter demonstrating an appearance that is statistically most likely to represent moderate microangiopathic change      No CT signs of acute infarction  No intracranial mass, mass effect or midline shift  No acute parenchymal hemorrhage      VENTRICLES AND EXTRA-AXIAL SPACES:  Normal for the patient's age      VISUALIZED ORBITS: Normal visualized orbits      PARANASAL SINUSES: Normal visualized paranasal sinuses      CALVARIUM AND EXTRACRANIAL SOFT TISSUES:  Right frontal scalp laceration      IMPRESSION:     Right frontal scalp laceration  Stable chronic microangiopathic changes within the brain  Final Result by Juan Schneider MD (04/21 1511)      Right frontal scalp laceration  Stable chronic microangiopathic changes within the brain  Workstation performed: [id number]         CT chest abdomen pelvis w contrast   ED Interpretation by Bev Joaquin DO (04/21 6541)   FINDINGS:     CHEST     LUNGS:    Left upper lobe subpleural interstitial thickening, likely postradiation  Image degradation due to respiratory motion  No infiltrates or pneumothorax        There is no tracheal or endobronchial lesion      PLEURA:  Unremarkable      HEART/GREAT VESSELS: Heart is unremarkable for patient's age  No thoracic aortic aneurysm      MEDIASTINUM AND MOR:  Large diaphragmatic hernia containing stomach and other loops of small bowel bowel  CHEST WALL AND LOWER NECK:  Left mastectomy  Surgical clips in the left axilla      ABDOMEN     LIVER/BILIARY TREE:  Extrahepatic biliary ductal dilatation with the common bile duct measuring 10 mm is seen  Relatively stable    There is no pancreatic ductal dilatation      GALLBLADDER:  Gallbladder is surgically absent      SPLEEN:  Unremarkable      PANCREAS:  Oriented vertically with a portion of the tail in the hernia sac      ADRENAL GLANDS:  Unremarkable      KIDNEYS/URETERS:  Left parapelvic cyst is larger than o   n the previous exam, it measures 5 2 cm  No hydronephrosis  No suspicious renal lesions  STOMACH AND BOWEL:  Diaphragmatic hernia containing portion of the stomach  Small bowel loops show normal caliber  Extensive colonic diverticulosis without associated diverticulitis      APPENDIX:  No findings to suggest appendicitis      ABDOMINOPELVIC CAVITY:  No ascites  No pneumoperitoneum  No lymphadenopathy      VESSELS:  Atherosclerotic changes are present  No evidence of aneurysm      PELVIS     REPRODUCTIVE ORGANS:  Surgical changes of prior hysterectomy      URINARY BLADDER:  Unremarkable      ABDOMINAL WALL/INGUINAL REGIONS:  Unremarkable      OSSEOUS STRUCTURES: Levoscoliosis of the thoracic spine and dextroscoliosis of the lumbar spine  No acute fracture or destructive osseous lesion      IMPRESSION:     No acute posttraumatic injury  Large stable diaphragmatic hernia containing portions of the stomach, small bowel and pancreatic tail  Left parapelvic cyst has enlarged  Final Result by Lilia Cortez MD (04/21 1539)      No acute posttraumatic injury  Large stable diaphragmatic hernia containing portions of the stomach, small bowel and pancreatic tail  Left parapelvic cyst has enlarged  Workstation performed: [id number]         CT spine cervical without contrast   ED Interpretation by Rona Robertson DO (04/21 1548)      IMPRESSION:     No cervical spine fracture or traumatic malalignment      Cervical spondylosis with degenerative listhesis  Final Result by Lilia Cortez MD (04/21 151)      No cervical spine fracture or traumatic malalignment  Cervical spondylosis with degenerative listhesis               Workstation performed: [id number]                    Procedures  Procedures         ED Course  ED Course as of 04/21/23 1856 Fri Apr 21, 2023   1322 EKG independently interpreted by myself:  NSR @ 62 bpm, normal axis, normal intervals, qtc 426, no sig st changes   1359 hs TnI 0hr: 4   1435 Daughter updated regarding labs results, all unrevealing  At CT scan now, pending scans and urine  Per daughter, pt having hallucinations/seeing people and still disoriented/confused   [address] independently interpreted by myself; no acute bleed/mass, pending radiology read   [phone] CT A/P independently interpreted by myself: L kidney hydro/cyst, pending radiology read             HEART Risk Score    Flowsheet Row Most Recent Value   Heart Score Risk Calculator    History 0 Filed at: 04/21/2023 1401   ECG 0 Filed at: 04/21/2023 1401   Age 2 Filed at: 04/21/2023 1401   Risk Factors 1 Filed at: 04/21/2023 1401   Troponin 0 Filed at: 04/21/2023 1401   HEART Score 3 Filed at: 04/21/2023 1401                        SBIRT 22yo+    Flowsheet Row Most Recent Value   Initial Alcohol Screen: US AUDIT-C     1  How often do you have a drink containing alcohol? 0 Filed at: 04/21/2023 1305   2  How many drinks containing alcohol do you have on a typical day you are drinking? 0 Filed at: 04/21/2023 1305   3a  Male UNDER 65: How often do you have five or more drinks on one occasion? 0 Filed at: 04/21/2023 1305   3b  FEMALE Any Age, or MALE 65+: How often do you have 4 or more drinks on one occassion? 0 Filed at: 04/21/2023 1305   Audit-C Score 0 Filed at: 04/21/2023 1305   CHANTALE: How many times in the past year have you    Used an illegal drug or used a prescription medication for non-medical reasons?  Never Filed at: 04/21/2023 1305                    Medical Decision Making  79 yo F with acute confusion/hallucinations, unclear etiology  Levaquin may have been contributing, however was started after onset of sx  ED workup unremarkable for cause of confusion/hallucinations and no traumatic injuries found from her fall today    Admitted for further workup/management    Altered mental status: acute illness or injury  Hallucinations: acute illness or injury  Amount and/or Complexity of Data Reviewed  Independent Historian:      Details: patient's daughter providespast medical history and recent events/mental status chagne  External Data Reviewed: labs, radiology, ECG and notes  Labs: ordered  Decision-making details documented in ED Course  Radiology: ordered and independent interpretation performed  Decision-making details documented in ED Course  ECG/medicine tests: ordered and independent interpretation performed  Decision-making details documented in ED Course  Risk  Prescription drug management  Decision regarding hospitalization  Disposition  Final diagnoses:   Hallucinations   Altered mental status     Time reflects when diagnosis was documented in both MDM as applicable and the Disposition within this note     Time User Action Codes Description Comment    4/21/2023  4:43 PM Magdalena Brand Add [R44 3] Hallucinations     4/21/2023  4:43 PM Magdalena Brand Add [R41 82] Altered mental status       ED Disposition     ED Disposition   Admit    Condition   Stable    Date/Time   Fri Apr 21, 2023  4:43 PM    Comment   Case was discussed with RAGHAVENDRA and the patient's admission status was agreed to be Admission Status: inpatient status to the service of Dr Jovita Smith   Follow-up Information    None         Current Discharge Medication List      CONTINUE these medications which have NOT CHANGED    Details   Acetaminophen (TYLENOL ARTHRITIS PAIN PO) Take 1 tablet by mouth if needed      aspirin (ECOTRIN LOW STRENGTH) 81 mg EC tablet Take 81 mg by mouth in the morning  Calcium Carbonate-Vit D-Min (CALCIUM 1200 PO) Take 750 mg by mouth in the morning At 3 PM      Cranberry 250 MG TABS Take 2 tablets by mouth 3 (three) times a day      meclizine (ANTIVERT) 25 mg tablet TAKE 1 TABLET (25 MG TOTAL) BY MOUTH EVERY 8 (EIGHT) HOURS AS NEEDED FOR DIZZINESS    Qty: 180 tablet, Refills: 0    Associated Diagnoses: Vertigo      omeprazole (PriLOSEC) 20 mg delayed release capsule TAKE 1 CAPSULE BY MOUTH EVERY DAY  Qty: 90 capsule, Refills: 0    Associated Diagnoses: Gastroesophageal reflux disease      pramipexole (MIRAPEX) 0 25 mg tablet TAKE 1 TABLET (0 25 MG TOTAL) BY MOUTH DAILY AT BEDTIME  Qty: 90 tablet, Refills: 0    Associated Diagnoses: Restless legs syndrome      vitamin B-12 (VITAMIN B-12) 1,000 mcg tablet Take 2,500 mcg by mouth every other day      cholecalciferol (VITAMIN D3) 1,000 units tablet Take 4,000 Units by mouth daily      levofloxacin (LEVAQUIN) 250 mg tablet Take 1 tablet (250 mg total) by mouth daily for 3 days  Qty: 3 tablet, Refills: 0    Comments: Patient has tolerated levofloxacin prior in hospital  Associated Diagnoses: Urinary frequency             No discharge procedures on file      PDMP Review       Value Time User    PDMP Reviewed  Yes 3/27/2023  3:12 PM Eulalia Jenkins DO          ED Provider  Electronically Signed by           Shaheen Luo DO  04/21/23 [id number]

## 2023-04-21 NOTE — H&P
[facility]  H&P  Name: Tanja Jacinto 80 y o  female I MRN: [id number]  Unit/Bed#: E4 -01 I Date of Admission: 4/21/2023   Date of Service: 4/21/2023 I Hospital Day: 0      Assessment/Plan   * Acute metabolic encephalopathy  Assessment & Plan  Background: 59-year-old female with a history of hypertension hyperlipidemia GERD macular degeneration and UTIs who presents with worsening confusion  · Daughter noticed confusion 4/17/2023  Eventually urinalysis was positive when tested by PCP and started on levofloxacin 4/18/23  · Initially had improvement for 2 days but then yesterday and today started having confusion and visual hallucinations again  · Patient confusion may have been secondary to UTI; current confusion may be secondary to levofloxacin  · Hold levofloxacin  Start gentle IV fluids  Establish sleep/wake cycle with low-dose quetiapine  History of hypertension  Assessment & Plan  · History of hypertension and hyperlipidemia no longer on medications  · Monitor vitals as blood pressure currently on the higher end of normal    Restless legs syndrome  Assessment & Plan  · Continue pramipexole at bedtime    Neoplasm of skin of face  Assessment & Plan  · Known neoplasm of right forehead  Daughter declined further investigation given advanced age    Macular degeneration  Assessment & Plan  · 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    GERD (gastroesophageal reflux disease)  Assessment & Plan  · Continue PPI    VTE Pharmacologic Prophylaxis: VTE Score: 3 Moderate Risk (Score 3-4) - Pharmacological DVT Prophylaxis Ordered: heparin  Code Status: Level 3 - DNAR and DNI  Discussion with family: Updated  (daughter) at bedside  Anticipated Length of Stay: Patient will be admitted on an inpatient basis with an anticipated length of stay of greater than 2 midnights secondary to Metabolic encephalopathy with need for PT/OT evaluations      Total Time Spent on Date of Encounter in care of patient: This time was spent on one or more of the following: performing physical exam; counseling and coordination of care; obtaining or reviewing history; documenting in the medical record; reviewing/ordering tests, medications or procedures; communicating with other healthcare professionals and discussing with patient's family/caregivers  Chief Complaint:     Fall (Per daughter, pt was on antibiotic for 3 days for UTI and since has been altered and has fall onto butt today  Pt made comments like 'I am not eating this cereal with grass being in it' ) and Altered Mental Status    History of Present Illness:    Je Jaramillo is a 80 y o  female with a past medical history of macular degeneration hypertension hyperlipidemia restless leg and GERD who presents with increased confusion  Daughter at bedside to help provide history  The patient no longer requires any antihypertensive medications  On 4/17/2023 the patient was having some orientation and visual hallucinations  On 4/18 urinalysis was tested by PCP concerning for infection  She was started on levofloxacin with almost immediate improvement  Urine culture reportedly mixed contaminants  Last night into this morning the patient had relapse and confusion with auditory hallucinations  The patient had an unwitnessed fall today hitting her buttock  She was brought to the hospital for further evaluation  The patient herself is not a reliable historian  Daughter denies any fevers chills nausea vomiting diarrhea for the patient    Review of Systems:  Review of Systems   Constitutional: Negative for chills, diaphoresis and fever  HENT: Negative for facial swelling and trouble swallowing  Eyes: Positive for visual disturbance (Macular degeneration with blindness and left eye more than right)  Negative for discharge  Respiratory: Negative for shortness of breath  Cardiovascular: Negative for chest pain     Gastrointestinal: Negative for abdominal pain, diarrhea, nausea and vomiting  Genitourinary: Negative for hematuria  Musculoskeletal: Positive for arthralgias (Feet pain)  Negative for myalgias  Skin: Negative for rash  Neurological: Negative for seizures, speech difficulty and numbness  Psychiatric/Behavioral: Positive for confusion  The patient is not nervous/anxious  All other systems reviewed and are negative  Past Medical and Surgical History:   Past Medical History:   Diagnosis Date   • Anxiety    • Arthritis    • Cancer ([facility] )    • Elevated serum alkaline phosphatase level     mild, isoenzymes are normal, resolved 4/24/17 labs , last assessed 11/10/16, resolved 4/24/17   • Hematuria     last assessed 9/18/12   • Hyperlipidemia 08/13/2012   • Hypertension    • Pressure injury of coccygeal region, stage 2 ([place] [address] ) 03/06/2020   • Pulmonary embolism ([place] [address] ) 01/2011    last assessed 9/18/12   • RLS (restless legs syndrome)      Past Surgical History:   Procedure Laterality Date   • BILATERAL OOPHORECTOMY      Laparoscopic   • BREAST SURGERY Left     Mastectomy    • CHOLECYSTECTOMY      Laparoscopic   • HERNIA REPAIR     • HYSTERECTOMY     • SMALL INTESTINE SURGERY       Meds/Allergies: Allergies: Allergies   Allergen Reactions   • Amoxicillin Hives   • Celecoxib    • Cephalexin    • Ciprofloxacin      Other reaction(s): diarrhea  Tolerates Levaquin   • Codeine Other (See Comments)     Other reaction(s): Other (See Comments)  takes vicodin @ home  takes vicodin @ home   • Epinephrine Other (See Comments)     Other reaction(s): Unknown Reaction   • Oxycodone-Acetaminophen Other (See Comments)   • Oxycodone-Acetaminophen      Other reaction(s): Unknown Reaction   • Propoxyphene Other (See Comments)   • Rofecoxib    • Sulfa Antibiotics Other (See Comments)     takes at home  takes at home  Other reaction(s):  Other (See Comments)  takes at home   • Sulfamethoxazole-Trimethoprim    • Nitrofurantoin Rash     Prior to Admission Medications   Prescriptions Last Dose Informant Patient Reported? Taking? Acetaminophen (TYLENOL ARTHRITIS PAIN PO) 4/20/2023  Yes Yes   Sig: Take 1 tablet by mouth if needed   Calcium Carbonate-Vit D-Min (CALCIUM 1200 PO) 4/20/2023  Yes Yes   Sig: Take 750 mg by mouth in the morning At 3 PM   Cranberry 250 MG TABS 4/21/2023  Yes Yes   Sig: Take 2 tablets by mouth 3 (three) times a day   aspirin (ECOTRIN LOW STRENGTH) 81 mg EC tablet 4/20/2023  Yes Yes   Sig: Take 81 mg by mouth in the morning  cholecalciferol (VITAMIN D3) 1,000 units tablet Unknown  Yes No   Sig: Take 4,000 Units by mouth daily   levofloxacin (LEVAQUIN) 250 mg tablet Not Taking  No No   Sig: Take 1 tablet (250 mg total) by mouth daily for 3 days   Patient not taking: Reported on 4/21/2023   meclizine (ANTIVERT) 25 mg tablet 4/21/2023  No Yes   Sig: TAKE 1 TABLET (25 MG TOTAL) BY MOUTH EVERY 8 (EIGHT) HOURS AS NEEDED FOR DIZZINESS  omeprazole (PriLOSEC) 20 mg delayed release capsule 4/20/2023  No Yes   Sig: TAKE 1 CAPSULE BY MOUTH EVERY DAY   pramipexole (MIRAPEX) 0 25 mg tablet 4/20/2023  No Yes   Sig: TAKE 1 TABLET (0 25 MG TOTAL) BY MOUTH DAILY AT BEDTIME   vitamin B-12 (VITAMIN B-12) 1,000 mcg tablet Past Week  Yes Yes   Sig: Take 2,500 mcg by mouth every other day      Facility-Administered Medications: None     Social History:     Social History     Socioeconomic History   • Marital status:       Spouse name: Not on file   • Number of children: Not on file   • Years of education: Not on file   • Highest education level: Not on file   Occupational History   • Not on file   Tobacco Use   • Smoking status: Never   • Smokeless tobacco: Never   • Tobacco comments:     denies   Vaping Use   • Vaping Use: Never used   Substance and Sexual Activity   • Alcohol use: No     Alcohol/week: 0 0 standard drinks     Comment: denies   • Drug use: No   • Sexual activity: Not Currently   Other Topics Concern   • Not on file   Social History Narrative    Lives with adult children      Social Determinants of Health     Financial Resource Strain: Low Risk    • Difficulty of Paying Living Expenses: Not hard at all   Food Insecurity: Not on file   Transportation Needs: No Transportation Needs   • Lack of Transportation (Medical): No   • Lack of Transportation (Non-Medical): No   Physical Activity: Not on file   Stress: Not on file   Social Connections: Not on file   Intimate Partner Violence: Not on file   Housing Stability: Not on file     Patient Pre-hospital Living Situation: Lives with daughter  Patient Pre-hospital Level of Mobility: Canes and walker  Patient Pre-hospital Diet Restrictions:     Family History:  Family History   Problem Relation Age of Onset   • Hypertension Mother    • Blindness Mother    • Cancer Sister    • Cancer Maternal Aunt    • Cancer Maternal Uncle      Physical Exam:   Vitals:   Blood Pressure: 164/78 (04/21/23 1835)  Pulse: 64 (04/21/23 1835)  Temperature: 97 9 °F (36 6 °C) (04/21/23 1835)  Temp Source: Tympanic (04/21/23 1835)  Respirations: 20 (04/21/23 1835)  SpO2: 99 % (04/21/23 1835)    Physical Exam  Vitals reviewed  Constitutional:       General: She is not in acute distress  Appearance: Normal appearance  HENT:      Head: Atraumatic  Mouth/Throat:      Mouth: Mucous membranes are moist    Eyes:      General: No scleral icterus  Extraocular Movements: Extraocular movements intact  Cardiovascular:      Rate and Rhythm: Regular rhythm  Pulmonary:      Breath sounds: Decreased breath sounds present  No wheezing  Abdominal:      General: Bowel sounds are normal       Palpations: Abdomen is soft  Tenderness: There is no guarding or rebound  Musculoskeletal:         General: Deformity (Bilateral feet contracted/deformed) present  No swelling  Cervical back: Normal range of motion  Skin:     General: Skin is warm  Neurological:      Mental Status: She is alert        Cranial Nerves: No cranial nerve deficit  Motor: No weakness  Psychiatric:         Mood and Affect: Mood normal        Lab Results: I have personally reviewed pertinent reports  Results from last 7 days   Lab Units 04/21/23  1259   WBC Thousand/uL 5 61   HEMOGLOBIN g/dL 13 6   HEMATOCRIT % 40 8   PLATELETS Thousands/uL 232   NEUTROS PCT % 70   LYMPHS PCT % 18   MONOS PCT % 9   EOS PCT % 1     Results from last 7 days   Lab Units 04/21/23  1259   SODIUM mmol/L 138   POTASSIUM mmol/L 4 3   CHLORIDE mmol/L 102   CO2 mmol/L 28   ANION GAP mmol/L 8   BUN mg/dL 16   CREATININE mg/dL 0 59*   CALCIUM mg/dL 10 0   ALBUMIN g/dL 4 2   TOTAL BILIRUBIN mg/dL 0 47   ALK PHOS U/L 70   ALT U/L 10   AST U/L 19   EGFR ml/min/1 73sq m 77   GLUCOSE RANDOM mg/dL 99             Results from last 7 days   Lab Units 04/21/23  1259   HS TNI 0HR ng/L 4                  Results from last 7 days   Lab Units 04/21/23  1539   COLOR UA  Yellow   CLARITY UA  Clear   SPEC GRAV UA  1 010   PH UA  6 5   LEUKOCYTES UA  Negative   NITRITE UA  Negative   GLUCOSE UA mg/dl Negative   KETONES UA mg/dl Negative   BILIRUBIN UA  Negative   BLOOD UA  Negative                 Lines/Drains  Invasive Devices     None                 Imaging: I have personally reviewed pertinent films in PACS  CT head without contrast    Result Date: 4/21/2023  Impression: Right frontal scalp laceration  Stable chronic microangiopathic changes within the brain  Workstation performed: [id number]     CT spine cervical without contrast    Result Date: 4/21/2023  Impression: No cervical spine fracture or traumatic malalignment  Cervical spondylosis with degenerative listhesis  Workstation performed: [id number]     CT chest abdomen pelvis w contrast    Result Date: 4/21/2023  Impression: No acute posttraumatic injury  Large stable diaphragmatic hernia containing portions of the stomach, small bowel and pancreatic tail  Left parapelvic cyst has enlarged   Workstation performed: [id number]       EKG, Pathology, and Other Studies Reviewed on Admission:   EKG  Result Date: 04/21/23  Personally reviewed strips with impression of: Normal sinus rhythm 62 bpm    ** Please Note: This note has been constructed using a voice recognition system   **

## 2023-04-22 VITALS
TEMPERATURE: 97.7 F | OXYGEN SATURATION: 95 % | SYSTOLIC BLOOD PRESSURE: 114 MMHG | HEART RATE: 72 BPM | DIASTOLIC BLOOD PRESSURE: 55 MMHG | RESPIRATION RATE: 18 BRPM

## 2023-04-22 LAB
ALBUMIN SERPL BCP-MCNC: 3.5 G/DL (ref 3.5–5)
ALP SERPL-CCNC: 56 U/L (ref 34–104)
ALT SERPL W P-5'-P-CCNC: 8 U/L (ref 7–52)
ANION GAP SERPL CALCULATED.3IONS-SCNC: 5 MMOL/L (ref 4–13)
AST SERPL W P-5'-P-CCNC: 15 U/L (ref 13–39)
BILIRUB SERPL-MCNC: 0.75 MG/DL (ref 0.2–1)
BUN SERPL-MCNC: 14 MG/DL (ref 5–25)
CALCIUM SERPL-MCNC: 9.2 MG/DL (ref 8.4–10.2)
CHLORIDE SERPL-SCNC: 107 MMOL/L (ref 96–108)
CO2 SERPL-SCNC: 26 MMOL/L (ref 21–32)
CREAT SERPL-MCNC: 0.53 MG/DL (ref 0.6–1.3)
ERYTHROCYTE [DISTWIDTH] IN BLOOD BY AUTOMATED COUNT: 14.2 % (ref 11.6–15.1)
GFR SERPL CREATININE-BSD FRML MDRD: 80 ML/MIN/1.73SQ M
GLUCOSE SERPL-MCNC: 80 MG/DL (ref 65–140)
HCT VFR BLD AUTO: 35 % (ref 34.8–46.1)
HGB BLD-MCNC: 11.6 G/DL (ref 11.5–15.4)
MCH RBC QN AUTO: 30.4 PG (ref 26.8–34.3)
MCHC RBC AUTO-ENTMCNC: 33.1 G/DL (ref 31.4–37.4)
MCV RBC AUTO: 92 FL (ref 82–98)
PLATELET # BLD AUTO: 191 THOUSANDS/UL (ref 149–390)
PMV BLD AUTO: 9.9 FL (ref 8.9–12.7)
POTASSIUM SERPL-SCNC: 3.7 MMOL/L (ref 3.5–5.3)
PROT SERPL-MCNC: 6 G/DL (ref 6.4–8.4)
RBC # BLD AUTO: 3.82 MILLION/UL (ref 3.81–5.12)
SODIUM SERPL-SCNC: 138 MMOL/L (ref 135–147)
WBC # BLD AUTO: 5.27 THOUSAND/UL (ref 4.31–10.16)

## 2023-04-22 RX ADMIN — Medication 4000 UNITS: at 08:28

## 2023-04-22 RX ADMIN — HEPARIN SODIUM 5000 UNITS: 5000 INJECTION INTRAVENOUS; SUBCUTANEOUS at 06:39

## 2023-04-22 RX ADMIN — CYANOCOBALAMIN TAB 500 MCG 1000 MCG: 500 TAB at 08:28

## 2023-04-22 NOTE — ASSESSMENT & PLAN NOTE
· History of hypertension and hyperlipidemia no longer on medications  · Monitor vitals as blood pressure currently on the higher end of normal

## 2023-04-22 NOTE — ASSESSMENT & PLAN NOTE
80-year-old female with a history of hypertension, hyperlipidemia GERD macular degeneration and UTIs who presents with worsening confusion  · Daughter noticed confusion 4/17/2023  Treated for UTI by PCP on 4/18 with Levaquin, patient initially improved for 2 days then developed confusion again and visual hallucinations  · Patientinitial confusion may have been secondary to UTI; current confusion may be secondary to levofloxacin  · Hold levofloxacin    Repeat urinalysis without signs of infection, urine culture from 4/18 with mixed contaminants  · Given gentle IVF overnight   · Returned to baseline   · Discharge back home

## 2023-04-22 NOTE — NURSING NOTE
Patient discharged, currently stable  Patient wheeled out by daughter in wheelchair  IV removed, AVS reviewed  No questions at this time  No belongings left in room

## 2023-04-22 NOTE — OCCUPATIONAL THERAPY NOTE
Occupational Therapy Evaluation     Patient Name: Ramos Benjamin  DKZIP'Y Date: 4/22/2023  Problem List  Principal Problem:    Acute metabolic encephalopathy  Active Problems:    GERD (gastroesophageal reflux disease)    Macular degeneration    Neoplasm of skin of face    Restless legs syndrome    History of hypertension    Past Medical History  Past Medical History:   Diagnosis Date    Anxiety     Arthritis     Cancer ([facility] )     Elevated serum alkaline phosphatase level     mild, isoenzymes are normal, resolved 4/24/17 labs , last assessed 11/10/16, resolved 4/24/17    Hematuria     last assessed 9/18/12    Hyperlipidemia 08/13/2012    Hypertension     Pressure injury of coccygeal region, stage 2 ([facility] ) 03/06/2020    Pulmonary embolism ([place] [address] ) 01/2011    last assessed 9/18/12    RLS (restless legs syndrome)      Past Surgical History  Past Surgical History:   Procedure Laterality Date    BILATERAL OOPHORECTOMY      Laparoscopic    BREAST SURGERY Left     Mastectomy     CHOLECYSTECTOMY      Laparoscopic    HERNIA REPAIR      HYSTERECTOMY      SMALL INTESTINE SURGERY             04/22/23 0855   OT Last Visit   OT Visit Date 04/22/23   Note Type   Note type Evaluation   Pain Assessment   Pain Assessment Tool 0-10   Pain Score No Pain   Restrictions/Precautions   Weight Bearing Precautions Per Order No   Other Precautions Cognitive; Chair Alarm; Bed Alarm; Fall Risk;Visual impairment;Limb alert   Home Living   Type of [address] [place] [address] Av One level;Stairs to enter with rails  (2+2 SUSI)   Bathroom Shower/Tub Tub/shower unit  (Sponge bathes)   Bathroom Toilet Standard   Bathroom Equipment Commode   [address]; Wheelchair-manual;Cane   Additional Comments Pt is poor historian, info on home setup and PLOF obtained via chart  Per chart: Pt lives with dtr in a one level house with 2+2 SUSI   Previous notes indicate dtr works, however unknown if dtr still works or if pt has 24/7 care    Prior Function   Level of Foster Needs assistance with ADLs; Needs assistance with functional mobility; Needs assistance with IADLS   Lives With Daughter   Receives Help From Family   IADLs Family/Friend/Other provides transportation; Family/Friend/Other provides meals; Family/Friend/Other provides medication management   Falls in the last 6 months 1 to 4   Vocational Retired   Comments At baseline, pt required assist w/ ADLs, IADLs, and functional transfers/mobility w/ use of RW for short distances and W/C for longer distances and in community  (-)   +Fall PTA  Lifestyle   Autonomy At baseline, pt required assist w/ ADLs, IADLs, and functional transfers/mobility w/ use of RW for short distances and W/C for longer distances and in community  (-)   +Fall PTA  Reciprocal Relationships Dtr   Service to Others Retired   ADL   Where Assessed Chair   Eating Assistance 5  Supervision/Setup   Grooming Assistance 4  [address] [facility] 4  Minimal Assistance   LB [address] 3  Moderate [address] 4  [address] 3  Moderate [address]  3  Moderate Assistance   Functional Assistance 3  Moderate Assistance   Bed Mobility   Supine to Sit 5  Supervision   Additional items Bedrails;HOB elevated; Increased time required;Verbal cues  (max verbal cues for sequencing)   Sit to Supine Unable to assess   Additional Comments Pt seated OOB in chair with chair alarm activated at end of session  Call bell and phone within reach  All needs met and pt reports no further questions for OT at this time  Transfers   Sit to Stand 5  Supervision   Additional items Increased time required;Verbal cues   Stand to Sit 4  Minimal assistance   Additional items Assist x 1;Verbal cues; Impulsive   Additional Comments Cues for safe technique and hand placement   Assist for controlled descent to surface for stand>sit Functional Mobility   Functional Mobility 4  Minimal assistance   Additional Comments Assist x1; EOB>Bedside chair   Additional items Rolling walker   Balance   Static Sitting Fair   Dynamic Sitting Fair -   Static Standing Fair -   Dynamic Standing Poor +   Ambulatory Poor +   Activity Tolerance   Activity Tolerance Patient tolerated treatment well   Medical Staff Made Mark Shultz, PT   Nurse Made Aware yes; NATHANIEL Shah   RUE Assessment   RUE Assessment WFL  (3+/5 throughout)   LUE Assessment   LUE Assessment WFL  (3+/5 throughout)   Hand Function   Gross Motor Coordination Functional   Fine Motor Coordination Functional   Sensation   Light Touch No apparent deficits   Proprioception   Proprioception No apparent deficits   Vision - Complex Assessment   Additional Comments Legally blind per chart  Pt reports decreased acuity B/L, however L>R  Able to accurately identify number of digits held up by therapist   Psychosocial   Psychosocial (WDL) WDL   Perception   Inattention/Neglect Appears intact   Cognition   Overall Cognitive Status Impaired   Arousal/Participation Alert; Cooperative   Attention Attends with cues to redirect   Orientation Level Oriented to person;Disoriented to place; Disoriented to time;Disoriented to situation   Memory Decreased recall of precautions;Decreased recall of recent events;Decreased short term memory   Following Commands Follows one step commands with increased time or repetition   Comments Pleasantly confused   Assessment   Limitation Decreased ADL status; Decreased UE strength;Decreased Safe judgement during ADL;Decreased cognition;Decreased endurance;Visual deficit; Decreased self-care trans;Decreased high-level ADLs   Prognosis Fair   Assessment Pt is a 80 y o  female seen for OT evaluation s/p adm to [facility] on 9/16/6090 w/ Acute metabolic encephalopathy    Comorbidities affecting pt’s functional performance include a significant PMH of Anxiety, Arthritis, CA, HLD, "HTN, PE, RLS  Pt with active OT orders and activity orders for Activity as tolerated  Pt is poor historian, info on home setup and PLOF obtained via chart  Per chart: Pt lives with dtr in a one level house with 2+2 SUSI  Previous notes indicate dtr works, however unknown if dtr still works or if pt has 24/7 care  At baseline, pt required assist w/ ADLs, IADLs, and functional transfers/mobility w/ use of RW for short distances and W/C for longer distances and in community  (-)   +Fall PTA  Upon evaluation, pt currently requires Min A for UB ADLs, Mod A for LB ADLs, Mod A for toileting, Supervision for bed mobility, Supervision for sit>stand transfer, Min A for stand>sit transfer, and Min A for functional mobility 2* the following deficits impacting occupational performance: visual deficits (legally blind), decreased strength, decreased balance, decreased tolerance, impaired memory, impaired problem solving and decreased safety awareness  These impairments, as well at pt’s fall risk, steps to enter environment, limited home support, difficulty performing ADLS, limited insight into deficits and decreased initiation and engagement  limit pt’s ability to safely engage in all baseline areas of occupation  Pt to continue to benefit from continued acute OT services during hospital stay to address defined deficits and to maximize level of functional independence in the following Occupational Performance areas: grooming, bathing/shower, toilet hygiene, dressing, medication management, health maintenance, functional mobility, community mobility, clothing management and social participation  From OT standpoint, recommend Home OT and 24/7 care upon D/C  If family unable to provide appropriate level of care/supervision, pt may benefit from STR   OT will continue to follow pt 2-3x/wk to address the following goals to  w/in 10-14 days:   Goals   Patient Goals \"to go to the bathroom\"   LTG Time Frame 10-14   Long Term Goal " Please refer to LTGs listed below   Plan   Treatment Interventions ADL retraining;Functional transfer training;UE strengthening/ROM; Endurance training;Patient/family training;Equipment evaluation/education; Compensatory technique education;Continued evaluation; Activityengagement   Goal Expiration Date 05/06/23   OT Treatment Day 0   OT Frequency 2-3x/wk   Recommendation   OT Discharge Recommendation Home with home health rehabilitation   Additional Comments  The patient's raw score on the AM-PAC Daily Activity Inpatient Short Form is 15  A raw score of less than 19 suggests the patient may benefit from discharge to post-acute rehabilitation services, however pt requires assist w/ all ADLs and transfers/mobility at baseline  Please refer to the recommendation of the Occupational Therapist for safe discharge planning     AM-PAC Daily Activity Inpatient   Lower Body Dressing 2   Bathing 2   Toileting 2   Upper Body Dressing 3   Grooming 3   Eating 3   Daily Activity Raw Score 15   Daily Activity Standardized Score (Calc for Raw Score >=11) 34 69   AM-PAC Applied Cognition Inpatient   Following a Speech/Presentation 3   Understanding Ordinary Conversation 3   Taking Medications 1   Remembering Where Things Are Placed or Put Away 1   Remembering List of 4-5 Errands 1   Taking Care of Complicated Tasks 1   Applied Cognition Raw Score 10   Applied Cognition Standardized Score 24 98       GOALS    Pt will improve activity tolerance to G for min 30 min txment sessions for increase engagement in functional tasks    Pt will complete bed mobility at a Mod I level w/ G balance/safety demonstrated to decrease caregiver assistance required     Pt will complete UB dressing/self care w/ Supervision using adaptive device and DME as needed     Pt will complete LB dressing/self care w/ min A using adaptive device and DME as needed    Pt will complete toileting w/ min A w/ G hygiene/thoroughness using DME as needed    Pt will improve functional transfers to Supervision on/off all surfaces using DME as needed w/ G balance/safety     Pt will improve functional mobility during ADL/IADL/leisure tasks to Supervision using DME as needed w/ G balance/safety     Pt will be attentive 100% of the time during ongoing cognitive assessment w/ G participation to assist w/ safe d/c planning/recommendations    Pt will increase BUE strength by 1MM grade via AROM/AAROM/PROM exercises to increase independence in ADLs and transfers    Pt will verbalize 3 potential fall hazards and identify appropriate compensatory techniques to decrease fall risk in home environment     Pt will increase standing tolerance to 3-5 mins with Fair dynamic standing balance to increase safety during participation in ADLs       Brian Fraga, OTR/L

## 2023-04-22 NOTE — PHYSICAL THERAPY NOTE
PHYSICAL THERAPY EVALUATION          Patient Name: Denise MCGOWANX Date: 4/22/2023  PT EVALUATION    80 y o     [id number]    Hallucinations [R44 3]  Altered mental status [R41 82]    Past Medical History:   Diagnosis Date    Anxiety     Arthritis     Cancer ([facility] )     Elevated serum alkaline phosphatase level     mild, isoenzymes are normal, resolved 4/24/17 labs , last assessed 11/10/16, resolved 4/24/17    Hematuria     last assessed 9/18/12    Hyperlipidemia 08/13/2012    Hypertension     Pressure injury of coccygeal region, stage 2 ([facility] ) 03/06/2020    Pulmonary embolism ([facility] ) 01/2011    last assessed 9/18/12    RLS (restless legs syndrome)      Past Surgical History:   Procedure Laterality Date    BILATERAL OOPHORECTOMY      Laparoscopic    BREAST SURGERY Left     Mastectomy     CHOLECYSTECTOMY      Laparoscopic    HERNIA REPAIR      HYSTERECTOMY      SMALL INTESTINE SURGERY          04/22/23 0854   PT Last Visit   PT Visit Date 04/22/23   Note Type   Note type Evaluation   Pain Assessment   Pain Assessment Tool 0-10   Pain Score No Pain   Restrictions/Precautions   Other Precautions Cognitive; Bed Alarm; Chair Alarm;Multiple lines; Fall Risk;Visual impairment   Home Living   Type of [address] [place] [address] One level;Stairs to enter with rails  (2+2 SUSI)   [address] [facility],[address]; Wheelchair-manual   Additional Comments pt poor historian, information obtained via chart review   Prior Function   Level of [place] Needs assistance with ADLs; Needs assistance with IADLS;Needs assistance with functional mobility   Lives With Daughter   Receives Help From Family   IADLs Family/Friend/Other provides transportation; Family/Friend/Other provides meals; Family/Friend/Other provides medication management   Falls in the last 6 months 1 to 4   Comments pt unreliable historian  assumed level of assist at home  unclear if dtr still works?  amb w RW short distances, mostly uses wc   General   Additional Pertinent History pt admitted 4/21/23 for acute metabolic encephalopthy  PMHx significant for anxiety, arthritis, CA, RLS, macular degeneration L>R   Cognition   Overall Cognitive Status Impaired   Arousal/Participation Cooperative   Orientation Level Oriented to person;Disoriented to place; Disoriented to time;Disoriented to situation   Memory Decreased recall of precautions;Decreased recall of recent events;Decreased short term memory   Following Commands Follows one step commands with increased time or repetition   Comments unclear baseline mentation   RLE Assessment   RLE Assessment WFL  (ankle deformity, about 4- grossly tested EOB)   LLE Assessment   LLE Assessment WFL  (ankle deformity, about 4- grossly tested EOB)   Coordination   Sensation WFL   Bed Mobility   Supine to Sit 5  Supervision   Additional items HOB elevated; Bedrails; Increased time required;Verbal cues   Transfers   Sit to Stand 5  Supervision   Additional items Assist x 1; Increased time required;Verbal cues; Other  (RW)   Stand to Sit 4  Minimal assistance   Additional items Assist x 1; Increased time required;Verbal cues; Impulsive; Other  (RW)   Ambulation/Elevation   Gait pattern Poor UE support; Forward Flexion; Wide ZOEY; Decreased foot clearance; Improper Weight shift; Excessively slow; Short stride;Redundant gait at times   Gait Assistance 4  Minimal assist   Additional items Assist x 1;Verbal cues; Tactile cues   Assistive Device Rolling walker   Distance 2' bed>chair   Balance   Static Standing Fair -   Dynamic Standing Poor +   Ambulatory Poor +   Activity Tolerance   Activity Tolerance Patient tolerated treatment well   Medical Staff Made Aware Linn Ashraf OT   Nurse Made Aware Alyssa ARMSTRONG   Assessment   Prognosis Fair   Problem List Decreased strength;Decreased range of motion; Impaired balance;Decreased mobility; Decreased cognition; Impaired judgement;Decreased safety awareness; Impaired vision; Impaired hearing   Assessment Will Glez is a 80 y o  female "admitted to [address] on 4/21/2023 for Acute metabolic encephalopathy  PT was consulted and pt was seen on 4/22/2023 for mobility assessment and d/c planning  Pt presents w high fall risk, multiple lines  Pt poor historian secondary to ?acute metabolic encephalopathy  Per chart review amb short distances w RW, otherwise utilizes wc  Unclear level of assist for ADLs and mobility and supervision at home; assumed assist however would benefit from confirmation prior to dc  Pt is currently functioning at a supervision assistance x1 level for bed mobility, sup-min for transfers and min A for ambulation bed>chair w RW  Pt demonstrated deficits of cognition; unclear baseline mentation  Require step by step cuing for mobility technique and safety w good follow through noted  Occ impulsive requiring close S- CGA  Noted gait deviations likely chronic in nature  At risk for falls dt co-morbidities including age, as well as gait deviations as outlined in flowsheet  Pt will benefit from continued skilled IP PT to address the above mentioned impairments  in order to maximize recovery and increase functional independence when completing mobility and ADLs  At this time PT recommendations for d/c are home w 24/7 S and HHPT vs STR pending level of supervision at home, baseline LOF  Barriers to Discharge Other (Comment)   Barriers to Discharge Comments unclear level of supervision at home   Goals   Patient Goals \"go to bathroom\"   STG Expiration Date 05/02/23   Short Term Goal #1 1)  Pt will perform bed mobility (from standard bed) with S demonstrating appropriate technique 100% of the time in order to improve function  2)  Perform all transfers with S demonstrating safe and appropriate technique 100% of the time in order to improve ability to negotiate safely in home environment  3) Amb with least restrictive AD > 10'x2 with CGA in order to demonstrate ability to negotiate in home environment  4)  Improve overall strength and balance 1/2 " grade in order to optimize ability to perform functional tasks and reduce fall risk  5) Increase activity tolerance to 45 minutes in order to improve endurance to functional tasks  6) PT for ongoing patient and family/caregiver education, DME needs and d/c planning in order to promote highest level of function in least restrictive environment  Plan   Treatment/Interventions Functional transfer training;LE strengthening/ROM; Therapeutic exercise;Cognitive reorientation;Patient/family training;Equipment eval/education; Bed mobility;Gait training; Compensatory technique education;Continued evaluation;Spoke to nursing;OT   PT Frequency Other (Comment)  (2-4x)   Recommendation   PT Discharge Recommendation Home with home health rehabilitation  (and 24/7 A vs STR pending level of support/supervision)   AM-PAC Basic Mobility Inpatient   Turning in Flat Bed Without Bedrails 3   Lying on Back to Sitting on Edge of Flat Bed Without Bedrails 2   Moving Bed to Chair 3   Standing Up From Chair Using Arms 3   Walk in Room 3   Climb 3-5 Stairs With Railing 1   Basic Mobility Inpatient Raw Score 15   Basic Mobility Standardized Score 36 97   Highest Level Of Mobility   -HLM Goal 4: Move to chair/commode   -HLM Achieved 4: Move to chair/commode   End of Consult   Patient Position at End of Consult Bedside chair;Bed/Chair alarm activated; All needs within reach   History: co - morbidities, social background, past experience, fall risk, use of assistive device, assumed assist for adl's/iadl's, cognition, multiple lines  Exam: impairments in systems including musculoskeletal (strength), neuromuscular (balance, gait, transfers, motor function and sensation), am-pac, cognition  Clinical: unstable/unpredictable  Complexity:high      Isra Pedro, PT

## 2023-04-22 NOTE — PLAN OF CARE
Problem: PHYSICAL THERAPY ADULT  Goal: Performs mobility at highest level of function for planned discharge setting  See evaluation for individualized goals  Description: Treatment/Interventions: Functional transfer training, LE strengthening/ROM, Therapeutic exercise, Cognitive reorientation, Patient/family training, Equipment eval/education, Bed mobility, Gait training, Compensatory technique education, Continued evaluation, Spoke to nursing, OT          See flowsheet documentation for full assessment, interventions and recommendations  Note: Prognosis: Fair  Problem List: Decreased strength, Decreased range of motion, Impaired balance, Decreased mobility, Decreased cognition, Impaired judgement, Decreased safety awareness, Impaired vision, Impaired hearing  Assessment: Gertrudis Bateman is a 80 y o  female admitted to [facility] on 4/21/2023 for Acute metabolic encephalopathy  PT was consulted and pt was seen on 4/22/2023 for mobility assessment and d/c planning  Pt presents w high fall risk, multiple lines  Pt poor historian secondary to ?acute metabolic encephalopathy  Per chart review amb short distances w RW, otherwise utilizes wc  Unclear level of assist for ADLs and mobility and supervision at home; assumed assist however would benefit from confirmation prior to dc  Pt is currently functioning at a supervision assistance x1 level for bed mobility, sup-min for transfers and min A for ambulation bed>chair w RW  Pt demonstrated deficits of cognition; unclear baseline mentation  Require step by step cuing for mobility technique and safety w good follow through noted  Occ impulsive requiring close S- CGA  Noted gait deviations likely chronic in nature  At risk for falls dt co-morbidities including age, as well as gait deviations as outlined in flowsheet   Pt will benefit from continued skilled IP PT to address the above mentioned impairments  in order to maximize recovery and increase functional independence when completing mobility and ADLs  At this time PT recommendations for d/c are home w 24/7 S and HHPT vs STR pending level of supervision at home, baseline LOF  Barriers to Discharge: Other (Comment)  Barriers to Discharge Comments: unclear level of supervision at home  PT Discharge Recommendation: Home with home health rehabilitation (and 24/7 A vs STR pending level of support/supervision)    See flowsheet documentation for full assessment

## 2023-04-22 NOTE — DISCHARGE SUMMARY
[address] [facility] Waldo Mancusos 9/20/1926, 80 y o  female MRN: [id number]  Unit/Bed#: E4 -01 Encounter: [id number]  Primary Care Provider: Dio Welsh DO   Date and time admitted to hospital: 4/21/2023 12:12 PM    * Acute metabolic encephalopathy  Assessment & Plan   43-year-old female with a history of hypertension, hyperlipidemia GERD macular degeneration and UTIs who presents with worsening confusion  · Daughter noticed confusion 4/17/2023  Treated for UTI by PCP on 4/18 with Levaquin, patient initially improved for 2 days then developed confusion again and visual hallucinations  · Patientinitial confusion may have been secondary to UTI; current confusion may be secondary to levofloxacin  · Hold levofloxacin  Repeat urinalysis without signs of infection, urine culture from 4/18 with mixed contaminants  · Given gentle IVF overnight   · Returned to baseline   · Discharge back home    History of hypertension  Assessment & Plan  · History of hypertension and hyperlipidemia no longer on medications      Restless legs syndrome  Assessment & Plan  · Continue pramipexole at bedtime    Neoplasm of skin of face  Assessment & Plan  · Known neoplasm of right forehead    Daughter declined further investigation given advanced age    Macular degeneration  Assessment & Plan  · 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    GERD (gastroesophageal reflux disease)  Assessment & Plan  · Continue PPI      Medical Problems     Resolved Problems  Date Reviewed: 4/22/2023   None       Discharging Physician / Practitioner: Sina Carlisle PA-C  PCP: Dio Welsh DO  Admission Date:   Admission Orders (From admission, onward)     Ordered        04/21/23 [address]  Once                      Discharge Date: 04/22/23    Consultations During Hospital Stay:  · none    Procedures Performed:   · none    Significant Findings / Test Results:   · CT head:  IMPRESSION:     Right frontal scalp laceration   Stable chronic microangiopathic changes within the brain  Interpreted by Yudi Pacheco DO on 4/21/2023  3:13 PM   Narrative & Impression   CT BRAIN - WITHOUT CONTRAST     INDICATION:   Head trauma, minor (Age >= 65y)  confusion, fall/unknown if head injury      COMPARISON:  CT brain February 21, 2020     TECHNIQUE:  CT examination of the brain was performed  Multiplanar 2D reformatted images were created from the source data      Radiation dose length product (DLP) for this visit:  813 mGy-cm   This examination, like all CT scans performed in the [facility], was performed utilizing techniques to minimize radiation dose exposure, including the use of iterative   reconstruction and automated exposure control        IMAGE QUALITY:  Diagnostic      FINDINGS:     PARENCHYMA: Decreased attenuation is noted in periventricular and subcortical white matter demonstrating an appearance that is statistically most likely to represent moderate microangiopathic change      No CT signs of acute infarction  No intracranial mass, mass effect or midline shift  No acute parenchymal hemorrhage      VENTRICLES AND EXTRA-AXIAL SPACES:  Normal for the patient's age      VISUALIZED ORBITS: Normal visualized orbits      PARANASAL SINUSES: Normal visualized paranasal sinuses      CALVARIUM AND EXTRACRANIAL SOFT TISSUES:  Right frontal scalp laceration      IMPRESSION:     Right frontal scalp laceration  Stable chronic microangiopathic changes within the brain             · CT cervical spine:  FINDINGS:     ALIGNMENT:  There is minimal degenerative listhesis of C3 on C4 and C4 on C5      VERTEBRAE:  No fracture      DEGENERATIVE CHANGES:  Severe multilevel cervical degenerative changes are noted  No critical central canal stenosis    Multilevel disc space narrowing, osteophyte formation and uncovertebral joint hypertrophy, findings most severe at C4-5, C5-6 and C6-7      PREVERTEBRAL AND PARASPINAL SOFT TISSUES: Unremarkable     THORACIC INLET:  Normal      If symptoms persist or patient has neurologic symptoms, repeat CT or evaluation with MRI cervical spine can be considered         IMPRESSION:     No cervical spine fracture or traumatic malalignment      Cervical spondylosis with degenerative listhesis  · CT chest abdomen pelvis:  FINDINGS:       CHEST       LUNGS:     Left upper lobe subpleural interstitial thickening, likely postradiation   Image degradation due to respiratory motion   No infiltrates or pneumothorax          There is no tracheal or endobronchial lesion        PLEURA:  Unremarkable        HEART/GREAT VESSELS: Heart is unremarkable for patient's age   No thoracic aortic aneurysm        MEDIASTINUM AND MOR:  Large diaphragmatic hernia containing stomach and other loops of small bowel bowel  CHEST WALL AND LOWER NECK:  Left mastectomy   Surgical clips in the left axilla        ABDOMEN       LIVER/BILIARY TREE:  Extrahepatic biliary ductal dilatation with the common bile duct measuring 10 mm is seen   Relatively stable   There is no pancreatic ductal dilatation        GALLBLADDER:  Gallbladder is surgically absent        SPLEEN:  Unremarkable        PANCREAS:  Oriented vertically with a portion of the tail in the hernia sac        ADRENAL GLANDS:  Unremarkable        KIDNEYS/URETERS:  Left parapelvic cyst is larger than o   n the previous exam, it measures 5 2 cm   No hydronephrosis  No suspicious renal lesions  STOMACH AND BOWEL:  Diaphragmatic hernia containing portion of the stomach  Small bowel loops show normal caliber     Extensive colonic diverticulosis without associated diverticulitis        APPENDIX:  No findings to suggest appendicitis        ABDOMINOPELVIC CAVITY:  No ascites   No pneumoperitoneum   No lymphadenopathy        VESSELS:  Atherosclerotic changes are present   No evidence of aneurysm        PELVIS       REPRODUCTIVE ORGANS:  Surgical changes of prior hysterectomy        URINARY BLADDER:  Unremarkable        ABDOMINAL WALL/INGUINAL REGIONS:  Unremarkable        OSSEOUS STRUCTURES: Levoscoliosis of the thoracic spine and dextroscoliosis of the lumbar spine   No acute fracture or destructive osseous lesion        IMPRESSION:       No acute posttraumatic injury  Large stable diaphragmatic hernia containing portions of the stomach, small bowel and pancreatic tail  Left parapelvic cyst has enlarged  Interpreted by Judy Sahu DO on 4/21/2023  3:41 PM   Narrative & Impression   CT CHEST, ABDOMEN AND PELVIS WITH IV CONTRAST     INDICATION:   fall, mid thoracic/posterior L rib pain  Urinary tract infection, confusion     COMPARISON: CT thorax 2017, CT abdomen and pelvis February 2020     TECHNIQUE: CT examination of the chest, abdomen and pelvis was performed  Multiplanar 2D reformatted images were created from the source data      Radiation dose length product (DLP) for this visit:  566 mGy-cm   This examination, like all CT scans performed in the [facility], was performed utilizing techniques to minimize radiation dose exposure, including the use of iterative   reconstruction and automated exposure control      IV Contrast:  85 mL of iohexol (OMNIPAQUE)  Enteric Contrast: Enteric contrast was not administered      FINDINGS:     CHEST     LUNGS:    Left upper lobe subpleural interstitial thickening, likely postradiation  Image degradation due to respiratory motion  No infiltrates or pneumothorax        There is no tracheal or endobronchial lesion      PLEURA:  Unremarkable      HEART/GREAT VESSELS: Heart is unremarkable for patient's age  No thoracic aortic aneurysm      MEDIASTINUM AND MOR:  Large diaphragmatic hernia containing stomach and other loops of small bowel bowel  CHEST WALL AND LOWER NECK:  Left mastectomy    Surgical clips in the left axilla      ABDOMEN     LIVER/BILIARY TREE: Extrahepatic biliary ductal dilatation with the common bile duct measuring 10 mm is seen  Relatively stable  There is no pancreatic ductal dilatation      GALLBLADDER:  Gallbladder is surgically absent      SPLEEN:  Unremarkable      PANCREAS:  Oriented vertically with a portion of the tail in the hernia sac      ADRENAL GLANDS:  Unremarkable      KIDNEYS/URETERS:  Left parapelvic cyst is larger than on the previous exam, it measures 5 2 cm  No hydronephrosis  No suspicious renal lesions  STOMACH AND BOWEL:  Diaphragmatic hernia containing portion of the stomach  Small bowel loops show normal caliber  Extensive colonic diverticulosis without associated diverticulitis      APPENDIX:  No findings to suggest appendicitis      ABDOMINOPELVIC CAVITY:  No ascites  No pneumoperitoneum  No lymphadenopathy      VESSELS:  Atherosclerotic changes are present  No evidence of aneurysm      PELVIS     REPRODUCTIVE ORGANS:  Surgical changes of prior hysterectomy      URINARY BLADDER:  Unremarkable      ABDOMINAL WALL/INGUINAL REGIONS:  Unremarkable      OSSEOUS STRUCTURES: Levoscoliosis of the thoracic spine and dextroscoliosis of the lumbar spine  No acute fracture or destructive osseous lesion      IMPRESSION:     No acute posttraumatic injury  Large stable diaphragmatic hernia containing portions of the stomach, small bowel and pancreatic tail  Left parapelvic cyst has enlarged  Incidental Findings:   · Left parapelvic cyst      Test Results Pending at Discharge (will require follow up):   · none     Outpatient Tests Requested:  · PCP    Complications:  none    Reason for Admission: Acute metabolic encephalopathy    Hospital Course:   Tanja Jacinto is a 80 y o  female patient who originally presented to the hospital on 4/21/2023 due to acute metabolic encephalopathy  Patient was recently diagnosed with UTI outpatient by PCP and started on Levaquin started antibiotics on 4/18    She initially improved then developed confusion again per daughter who she lives with  Patient likely initially confused due to UTI and then ongoing confusion due to Levaquin  Repeat urinalysis on admission without ongoing infection  We stopped antibiotics  She was given IV fluid hydration  Renal function and electrolytes all normal   Imaging as noted above  She returned back to baseline  She is being discharged home to prevent hospital delirium  The patient, initially admitted to the hospital as inpatient, was discharged earlier than expected given the following: Patient returned back to baseline without indication for ongoing hospitalization, discharged home with daughter  Please see above list of diagnoses and related plan for additional information  Condition at Discharge: stable    Discharge Day Visit / Exam:   Subjective: No complaints  No events overnight  Vitals: Blood Pressure: 114/55 (04/22/23 0726)  Pulse: 72 (04/22/23 0726)  Temperature: 97 7 °F (36 5 °C) (04/22/23 0726)  Temp Source: Temporal (04/22/23 0726)  Respirations: 18 (04/22/23 0726)  SpO2: 95 % (04/22/23 0726)  Exam:   Physical Exam  Vitals and nursing note reviewed  Constitutional:       General: She is not in acute distress  Appearance: She is not ill-appearing, toxic-appearing or diaphoretic  Cardiovascular:      Rate and Rhythm: Normal rate and regular rhythm  Pulmonary:      Effort: Pulmonary effort is normal       Breath sounds: Normal breath sounds  Abdominal:      General: Bowel sounds are normal       Palpations: Abdomen is soft  Musculoskeletal:      Right lower leg: No edema  Skin:     Comments: Dried skin ulceration right forehead   Neurological:      Mental Status: She is alert  Mental status is at baseline  Comments: orietned to self, month  States it is 0 and knows she is not at home but is near home, reoriented to hospital    Psychiatric:         Behavior: Behavior is not agitated or aggressive   Behavior is cooperative  Cognition and Memory: Cognition is impaired  Memory is impaired  Discussion with Family: Updated  (daughter) via phone  Discharge instructions/Information to patient and family:   See after visit summary for information provided to patient and family  Provisions for Follow-Up Care:  See after visit summary for information related to follow-up care and any pertinent home health orders  Disposition:   Home    Planned Readmission: none     Discharge Statement:  I spent 45 minutes discharging the patient  This time was spent on the day of discharge  I had direct contact with the patient on the day of discharge  Greater than 50% of the total time was spent examining patient, answering all patient questions, arranging and discussing plan of care with patient as well as directly providing post-discharge instructions  Additional time then spent on discharge activities  Discharge Medications:  See after visit summary for reconciled discharge medications provided to patient and/or family        **Please Note: This note may have been constructed using a voice recognition system**

## 2023-04-22 NOTE — ASSESSMENT & PLAN NOTE
Background: 35-year-old female with a history of hypertension hyperlipidemia GERD macular degeneration and UTIs who presents with worsening confusion  · Daughter noticed confusion 4/17/2023  Eventually urinalysis was positive when tested by PCP and started on levofloxacin 4/18/23  · Initially had improvement for 2 days but then yesterday and today started having confusion and visual hallucinations again  · Patient confusion may have been secondary to UTI; current confusion may be secondary to levofloxacin  · Hold levofloxacin  Start gentle IV fluids  Establish sleep/wake cycle with low-dose quetiapine

## 2023-04-22 NOTE — PLAN OF CARE
Problem: OCCUPATIONAL THERAPY ADULT  Goal: Performs self-care activities at highest level of function for planned discharge setting  See evaluation for individualized goals  Description: Treatment Interventions: ADL retraining, Functional transfer training, UE strengthening/ROM, Endurance training, Patient/family training, Equipment evaluation/education, Compensatory technique education, Continued evaluation, Activityengagement          See flowsheet documentation for full assessment, interventions and recommendations  Note: Limitation: Decreased ADL status, Decreased UE strength, Decreased Safe judgement during ADL, Decreased cognition, Decreased endurance, Visual deficit, Decreased self-care trans, Decreased high-level ADLs  Prognosis: Fair  Assessment: Pt is a 80 y o  female seen for OT evaluation s/p adm to [facility] on 9/80/3278 w/ Acute metabolic encephalopathy   Comorbidities affecting pt’s functional performance include a significant PMH of Anxiety, Arthritis, CA, HLD, HTN, PE, RLS  Pt with active OT orders and activity orders for Activity as tolerated  Pt is poor historian, info on home setup and PLOF obtained via chart  Per chart: Pt lives with dtr in a one level house with 2+2 SUSI  Previous notes indicate dtr works, however unknown if dtr still works or if pt has 24/7 care  At baseline, pt required assist w/ ADLs, IADLs, and functional transfers/mobility w/ use of RW for short distances and W/C for longer distances and in community  (-)   +Fall PTA   Upon evaluation, pt currently requires Min A for UB ADLs, Mod A for LB ADLs, Mod A for toileting, Supervision for bed mobility, Supervision for sit>stand transfer, Min A for stand>sit transfer, and Min A for functional mobility 2* the following deficits impacting occupational performance: visual deficits (legally blind), decreased strength, decreased balance, decreased tolerance, impaired memory, impaired problem solving and decreased safety awareness  These impairments, as well at pt’s fall risk, steps to enter environment, limited home support, difficulty performing ADLS, limited insight into deficits and decreased initiation and engagement  limit pt’s ability to safely engage in all baseline areas of occupation  Pt to continue to benefit from continued acute OT services during hospital stay to address defined deficits and to maximize level of functional independence in the following Occupational Performance areas: grooming, bathing/shower, toilet hygiene, dressing, medication management, health maintenance, functional mobility, community mobility, clothing management and social participation  From OT standpoint, recommend Home OT and 24/7 care upon D/C  If family unable to provide appropriate level of care/supervision, pt may benefit from STR   OT will continue to follow pt 2-3x/wk to address the following goals to  w/in 10-14 days:     OT Discharge Recommendation: Home with home health rehabilitation

## 2023-04-22 NOTE — PLAN OF CARE
Problem: Potential for Falls  Goal: Patient will remain free of falls  Description: INTERVENTIONS:  - Educate patient/family on patient safety including physical limitations  - Instruct patient to call for assistance with activity   - Consult OT/PT to assist with strengthening/mobility   - Keep Call bell within reach  - Keep bed low and locked with side rails adjusted as appropriate  - Keep care items and personal belongings within reach  - Initiate and maintain comfort rounds  - Make Fall Risk Sign visible to staff  - Offer Toileting every  Hours, in advance of need  - Initiate/Maintain alarm  - Obtain necessary fall risk management equipment:   - Apply yellow socks and bracelet for high fall risk patients  - Consider moving patient to room near nurses station  Outcome: Progressing     Problem: MOBILITY - ADULT  Goal: Maintain or return to baseline ADL function  Description: INTERVENTIONS:  -  Assess patient's ability to carry out ADLs; assess patient's baseline for ADL function and identify physical deficits which impact ability to perform ADLs (bathing, care of mouth/teeth, toileting, grooming, dressing, etc )  - Assess/evaluate cause of self-care deficits   - Assess range of motion  - Assess patient's mobility; develop plan if impaired  - Assess patient's need for assistive devices and provide as appropriate  - Encourage maximum independence but intervene and supervise when necessary  - Involve family in performance of ADLs  - Assess for home care needs following discharge   - Consider OT consult to assist with ADL evaluation and planning for discharge  - Provide patient education as appropriate  Outcome: Progressing  Goal: Maintains/Returns to pre admission functional level  Description: INTERVENTIONS:  - Perform BMAT or MOVE assessment daily    - Set and communicate daily mobility goal to care team and patient/family/caregiver     - Collaborate with rehabilitation services on mobility goals if consulted  - Perform Range of Motion  times a day  - Reposition patient every  hours  - Dangle patient  times a day  - Stand patient  times a day  - Ambulate patient  times a day  - Out of bed to chair  times a day   - Out of bed for meals times a day  - Out of bed for toileting  - Record patient progress and toleration of activity level   Outcome: Progressing     Problem: PAIN - ADULT  Goal: Verbalizes/displays adequate comfort level or baseline comfort level  Description: Interventions:  - Encourage patient to monitor pain and request assistance  - Assess pain using appropriate pain scale  - Administer analgesics based on type and severity of pain and evaluate response  - Implement non-pharmacological measures as appropriate and evaluate response  - Consider cultural and social influences on pain and pain management  - Notify physician/advanced practitioner if interventions unsuccessful or patient reports new pain  Outcome: Progressing     Problem: DISCHARGE PLANNING  Goal: Discharge to home or other facility with appropriate resources  Description: INTERVENTIONS:  - Identify barriers to discharge w/patient and caregiver  - Arrange for needed discharge resources and transportation as appropriate  - Identify discharge learning needs (meds, wound care, etc )  - Arrange for interpretive services to assist at discharge as needed  - Refer to Case Management Department for coordinating discharge planning if the patient needs post-hospital services based on physician/advanced practitioner order or complex needs related to functional status, cognitive ability, or social support system  Outcome: Progressing     Problem: Knowledge Deficit  Goal: Patient/family/caregiver demonstrates understanding of disease process, treatment plan, medications, and discharge instructions  Description: Complete learning assessment and assess knowledge base    Interventions:  - Provide teaching at level of understanding  - Provide teaching via preferred learning methods  Outcome: Progressing     Problem: NEUROSENSORY - ADULT  Goal: Achieves maximal functionality and self care  Description: INTERVENTIONS  - Monitor swallowing and airway patency with patient fatigue and changes in neurological status  - Encourage and assist patient to increase activity and self care     - Encourage visually impaired, hearing impaired and aphasic patients to use assistive/communication devices  Outcome: Progressing     Problem: METABOLIC, FLUID AND ELECTROLYTES - ADULT  Goal: Fluid balance maintained  Description: INTERVENTIONS:  - Monitor labs   - Monitor I/O and WT  - Instruct patient on fluid and nutrition as appropriate  - Assess for signs & symptoms of volume excess or deficit  Outcome: Progressing     Problem: SKIN/TISSUE INTEGRITY - ADULT  Goal: Skin Integrity remains intact(Skin Breakdown Prevention)  Description: Assess:  -Perform Rosales assessment every   -Clean and moisturize skin every   -Inspect skin when repositioning, toileting, and assisting with ADLS  -Assess under medical devices such as  every   -Assess extremities for adequate circulation and sensation     Bed Management:  -Have minimal linens on bed & keep smooth, unwrinkled  -Change linens as needed when moist or perspiring  -Avoid sitting or lying in one position for more than  hours while in bed  -Keep HOB at degrees     Toileting:  -Offer bedside commode  -Assess for incontinence every   -Use incontinent care products after each incontinent episode such as     Activity:  -Mobilize patient  times a day  -Encourage activity and walks on unit  -Encourage or provide ROM exercises   -Turn and reposition patient every  Hours  -Use appropriate equipment to lift or move patient in bed  -Instruct/ Assist with weight shifting every  when out of bed in chair  -Consider limitation of chair time  hour intervals    Skin Care:  -Avoid use of baby powder, tape, friction and shearing, hot water or constrictive clothing  -Relieve pressure over bony prominences using   -Do not massage red bony areas    Next Steps:  -Teach patient strategies to minimize risks such as    -Consider consults to  interdisciplinary teams such a  Outcome: Progressing

## 2023-04-22 NOTE — PLAN OF CARE
Problem: Potential for Falls  Goal: Patient will remain free of falls  Description: INTERVENTIONS:  - Educate patient/family on patient safety including physical limitations  - Instruct patient to call for assistance with activity   - Consult OT/PT to assist with strengthening/mobility   - Keep Call bell within reach  - Keep bed low and locked with side rails adjusted as appropriate  - Keep care items and personal belongings within reach  - Initiate and maintain comfort rounds  - Make Fall Risk Sign visible to staff  - Offer Toileting every  Hours, in advance of need  - Initiate/Maintain alarm  - Obtain necessary fall risk management equipment:   - Apply yellow socks and bracelet for high fall risk patients  - Consider moving patient to room near nurses station  Outcome: Completed     Problem: MOBILITY - ADULT  Goal: Maintain or return to baseline ADL function  Description: INTERVENTIONS:  -  Assess patient's ability to carry out ADLs; assess patient's baseline for ADL function and identify physical deficits which impact ability to perform ADLs (bathing, care of mouth/teeth, toileting, grooming, dressing, etc )  - Assess/evaluate cause of self-care deficits   - Assess range of motion  - Assess patient's mobility; develop plan if impaired  - Assess patient's need for assistive devices and provide as appropriate  - Encourage maximum independence but intervene and supervise when necessary  - Involve family in performance of ADLs  - Assess for home care needs following discharge   - Consider OT consult to assist with ADL evaluation and planning for discharge  - Provide patient education as appropriate  Outcome: Completed  Goal: Maintains/Returns to pre admission functional level  Description: INTERVENTIONS:  - Perform BMAT or MOVE assessment daily    - Set and communicate daily mobility goal to care team and patient/family/caregiver     - Collaborate with rehabilitation services on mobility goals if consulted  - Perform Range of Motion  times a day  - Reposition patient every  hours  - Dangle patient  times a day  - Stand patient  times a day  - Ambulate patient  times a day  - Out of bed to chair  times a day   - Out of bed for meals  times a day  - Out of bed for toileting  - Record patient progress and toleration of activity level   Outcome: Completed     Problem: PAIN - ADULT  Goal: Verbalizes/displays adequate comfort level or baseline comfort level  Description: Interventions:  - Encourage patient to monitor pain and request assistance  - Assess pain using appropriate pain scale  - Administer analgesics based on type and severity of pain and evaluate response  - Implement non-pharmacological measures as appropriate and evaluate response  - Consider cultural and social influences on pain and pain management  - Notify physician/advanced practitioner if interventions unsuccessful or patient reports new pain  Outcome: Completed     Problem: DISCHARGE PLANNING  Goal: Discharge to home or other facility with appropriate resources  Description: INTERVENTIONS:  - Identify barriers to discharge w/patient and caregiver  - Arrange for needed discharge resources and transportation as appropriate  - Identify discharge learning needs (meds, wound care, etc )  - Arrange for interpretive services to assist at discharge as needed  - Refer to Case Management Department for coordinating discharge planning if the patient needs post-hospital services based on physician/advanced practitioner order or complex needs related to functional status, cognitive ability, or social support system  Outcome: Completed     Problem: Knowledge Deficit  Goal: Patient/family/caregiver demonstrates understanding of disease process, treatment plan, medications, and discharge instructions  Description: Complete learning assessment and assess knowledge base    Interventions:  - Provide teaching at level of understanding  - Provide teaching via preferred learning methods  Outcome: Completed     Problem: NEUROSENSORY - ADULT  Goal: Achieves maximal functionality and self care  Description: INTERVENTIONS  - Monitor swallowing and airway patency with patient fatigue and changes in neurological status  - Encourage and assist patient to increase activity and self care     - Encourage visually impaired, hearing impaired and aphasic patients to use assistive/communication devices  Outcome: Completed     Problem: METABOLIC, FLUID AND ELECTROLYTES - ADULT  Goal: Fluid balance maintained  Description: INTERVENTIONS:  - Monitor labs   - Monitor I/O and WT  - Instruct patient on fluid and nutrition as appropriate  - Assess for signs & symptoms of volume excess or deficit  Outcome: Completed     Problem: SKIN/TISSUE INTEGRITY - ADULT  Goal: Skin Integrity remains intact(Skin Breakdown Prevention)  Description: Assess:  -Perform Rosales assessment every   -Clean and moisturize skin every   -Inspect skin when repositioning, toileting, and assisting with ADLS  -Assess under medical devices such as  every   -Assess extremities for adequate circulation and sensation     Bed Management:  -Have minimal linens on bed & keep smooth, unwrinkled  -Change linens as needed when moist or perspiring  -Avoid sitting or lying in one position for more than  hours while in bed  -Keep HOB at degrees     Toileting:  -Offer bedside commode  -Assess for incontinence every   -Use incontinent care products after each incontinent episode such as     Activity:  -Mobilize patient times a day  -Encourage activity and walks on unit  -Encourage or provide ROM exercises   -Turn and reposition patient every  Hours  -Use appropriate equipment to lift or move patient in bed  -Instruct/ Assist with weight shifting every  when out of bed in chair  -Consider limitation of chair time  hour intervals    Skin Care:  -Avoid use of baby powder, tape, friction and shearing, hot water or constrictive clothing  -Relieve pressure over bony prominences using   -Do not massage red bony areas    Next Steps:  -Teach patient strategies to minimize risks such as    -Consider consults to  interdisciplinary teams such as   Outcome: Completed

## 2023-04-24 ENCOUNTER — TRANSITIONAL CARE MANAGEMENT (OUTPATIENT)
Dept: FAMILY MEDICINE CLINIC | Facility: CLINIC | Age: 88
End: 2023-04-24

## 2023-04-25 ENCOUNTER — TELEMEDICINE (OUTPATIENT)
Dept: FAMILY MEDICINE CLINIC | Facility: CLINIC | Age: 88
End: 2023-04-25

## 2023-04-25 ENCOUNTER — PATIENT OUTREACH (OUTPATIENT)
Dept: FAMILY MEDICINE CLINIC | Facility: CLINIC | Age: 88
End: 2023-04-25

## 2023-04-25 DIAGNOSIS — Z71.89 COMPLEX CARE COORDINATION: Primary | ICD-10-CM

## 2023-04-25 DIAGNOSIS — H35.30 MACULAR DEGENERATION, UNSPECIFIED LATERALITY, UNSPECIFIED TYPE: ICD-10-CM

## 2023-04-25 DIAGNOSIS — G93.41 ACUTE METABOLIC ENCEPHALOPATHY: Primary | ICD-10-CM

## 2023-04-25 DIAGNOSIS — G25.81 RESTLESS LEGS SYNDROME: ICD-10-CM

## 2023-04-25 DIAGNOSIS — N30.00 ACUTE CYSTITIS WITHOUT HEMATURIA: ICD-10-CM

## 2023-04-25 NOTE — PROGRESS NOTES
Called and spoke with pts daughter Rock Anna regarding concerns  Daughter is staying with pt currently in pt's small home, is caregiver  Daughter had home signed over to her years ago  Pt is blind and has difficulty walking due to large thick calluses on the bottoms of her feet  Daughter is unsure if she would like to have pt placed in a facility but she may want to have her do a respite visit  Rock Anna has looked into [facility] and still may consider this for respite  Rock Anna isnt sure if it would work to have some other caregiver come into the home due to its small size  But she would like to know more about the waiver and qualifications  Suggested a home visit by podiatry and provided contact information for Dr Raheel Colby  Daughter may call and encouraged her to inform  ahead of time  Daughter was appreciative of the call and took this CM's contact  Will place referral for social work or [facility] to explain waiver and possibly other options for help in home

## 2023-04-25 NOTE — PROGRESS NOTES
Virtual TCM Visit:    Verification of patient location:    Patient is located at Home in the following state in which I hold an active license [place]    Assessment/Plan:       Problem List Items Addressed This Visit        Nervous and Auditory    Acute metabolic encephalopathy - Primary     resolved            Genitourinary    Acute cystitis without hematuria     resolved            Other    Macular degeneration     followup with eye doctor         Restless legs syndrome     Continue current care Follow up in 6 months             Reason for visit is followup of overnight stay in hospital    Encounter provider Ramos Augustine DO     Provider located at [address] [place] [address] [address] [facility] [address] [place] [address]  [phone]    Recent Visits  Date Type Provider Dept   04/18/23 Telephone Ramos Augustine, 100  Primary Care   04/18/23 Telephone Ramos Augustine, 100  Primary Care   Showing recent visits within past 7 days and meeting all other requirements  Today's Visits  Date Type Provider Dept   04/25/23 [address] [place] [address]  Primary Care   Showing today's visits and meeting all other requirements  Future Appointments  No visits were found meeting these conditions  Showing future appointments within next 150 days and meeting all other requirements       After connecting through [employer], the patient was identified by name and date of birth  Kojo Stroud was informed that this is a telemedicine visit and that the visit is being conducted through the [address] [employer] platform  She agrees to proceed     My office door was closed  No one else was in the room  She acknowledged consent and understanding of privacy and security of the video platform  The patient has agreed to participate and understands they can discontinue the visit at any time    Patient daughter was providing history and was present with patient   Patient is aware this is a billable service  Transitional Care Management Review:  Marguerite Lopez is a 80 y o  female here for TCM follow up  During the TCM phone call patient stated:    TCM Call     Date and time call was made  4/24/2023  9:52 AM    Hospital care reviewed  Records reviewed    Patient was hospitial[facility] at  [facility]    Date of Admission  04/21/23    Date of discharge  04/22/23    Diagnosis  acute metabolic encephalopathy    Disposition  Home    Were the patients medications reviewed and updated  No    Current Symptoms  None      TCM Call     Post hospital issues  Reduced activity    Should patient be enrolled in anticoag monitoring? No    Scheduled for follow up? Yes    Did you obtain your prescribed medications  Yes    Do you need help managing your prescriptions or medications  Yes    Why type of assitance do you need  managing and taking meds  as prescribed    Is transportation to your appointment needed  Yes    Specify why  pt unable to drive-pt 's daughter requested virtual visit    I have advised the patient to call PCP with any new or worsening symptoms  Erika Macias        Subjective:     Patient ID: Marguerite Lopez is a 80 y o  female      Patient was confused and urinating often on 4/17/2023 Patient daughter provided a urine sample to the office Patient initial UA showed red and white blood cells Patient was started on levaquin at 250mg daily for 3 days She completed that the day of admission  Patinet initial urine culture showed mixed contaminants Patient was doing better for 2 days  However on 4/21/2023 she was confused again and seeing things that were not there Patient was brought in via ambulance to [facility] Patient had labs done and a repeat urine culture It was negative and labs were stabke She was given some IV fluids and monitored overnight Patient was back to baseline by morning It was thought that the levaquin could have caused the confusion Patient since she is home is doing well She has no pain Patient has good appetite No fever or chills Patient urination is stable also     Review of Systems   Constitutional: Negative for activity change, appetite change, fatigue and fever  Respiratory: Negative for shortness of breath and wheezing  Cardiovascular: Negative for chest pain, palpitations and leg swelling  Gastrointestinal: Negative for abdominal pain, nausea and vomiting  Genitourinary: Negative for difficulty urinating, dysuria, frequency, pelvic pain and urgency  Objective: There were no vitals filed for this visit  Physical Exam  Constitutional:       Appearance: Normal appearance  HENT:      Head: Normocephalic  Right Ear: External ear normal    Eyes:      Extraocular Movements: Extraocular movements intact  Conjunctiva/sclera: Conjunctivae normal       Pupils: Pupils are equal, round, and reactive to light  Pulmonary:      Breath sounds: Normal breath sounds  Abdominal:      Palpations: Abdomen is soft  Tenderness: There is no abdominal tenderness  Neurological:      General: No focal deficit present  Mental Status: She is alert and oriented to person, place, and time  Psychiatric:         Mood and Affect: Mood normal          Behavior: Behavior normal          Medications have been reviewed by provider in current encounter    I spent 15 minutes with the patient during this visit  Cristian Burton DO      VIRTUAL VISIT [address] [place] [address] verbally agrees to participate in [employer]  Pt is aware that Virtual Care Services could be limited without vital signs or the ability to perform a full hands-on physical Clemente Smith understands she or the provider may request at any time to terminate the video visit and request the patient to seek care or treatment in person

## 2023-04-26 ENCOUNTER — PATIENT OUTREACH (OUTPATIENT)
Dept: FAMILY MEDICINE CLINIC | Facility: CLINIC | Age: 88
End: 2023-04-26

## 2023-04-28 ENCOUNTER — PATIENT OUTREACH (OUTPATIENT)
Dept: FAMILY MEDICINE CLINIC | Facility: CLINIC | Age: 88
End: 2023-04-28

## 2023-04-28 NOTE — LETTER
April 28, 2023 [address] [place] [address]    Patient: Rosemarie Phillips   YOB: 1926   Date of Visit:        Dear Ms Ray:    I am the  that covers [place]  I was reaching out to you to see if you need assistance with any community resources such as transportation, medication cost, mental health services, etc   Please feel free to contact me at [phone]    I work Monday through Friday from 8am to 430pm        Sincerely,        JUSTIN Christianson        CC: No Recipients

## 2023-05-26 PROBLEM — Z00.00 MEDICARE ANNUAL WELLNESS VISIT, SUBSEQUENT: Status: RESOLVED | Noted: 2023-03-27 | Resolved: 2023-05-26

## 2023-06-04 DIAGNOSIS — R42 VERTIGO: ICD-10-CM

## 2023-06-04 DIAGNOSIS — G25.81 RESTLESS LEGS SYNDROME: ICD-10-CM

## 2023-06-04 DIAGNOSIS — K21.9 GASTROESOPHAGEAL REFLUX DISEASE: ICD-10-CM

## 2023-06-05 RX ORDER — OMEPRAZOLE 20 MG/1
20 CAPSULE, DELAYED RELEASE ORAL DAILY
Qty: 90 CAPSULE | Refills: 0 | Status: SHIPPED | OUTPATIENT
Start: 2023-06-05

## 2023-06-05 RX ORDER — PRAMIPEXOLE DIHYDROCHLORIDE 0.25 MG/1
0.25 TABLET ORAL
Qty: 90 TABLET | Refills: 0 | Status: SHIPPED | OUTPATIENT
Start: 2023-06-05

## 2023-06-05 RX ORDER — MECLIZINE HYDROCHLORIDE 25 MG/1
25 TABLET ORAL EVERY 8 HOURS PRN
Qty: 180 TABLET | Refills: 0 | Status: SHIPPED | OUTPATIENT
Start: 2023-06-05

## 2023-06-24 PROBLEM — N30.00 ACUTE CYSTITIS WITHOUT HEMATURIA: Status: RESOLVED | Noted: 2023-04-25 | Resolved: 2023-06-24

## 2023-08-13 DIAGNOSIS — G25.81 RESTLESS LEGS SYNDROME: ICD-10-CM

## 2023-08-13 DIAGNOSIS — K21.9 GASTROESOPHAGEAL REFLUX DISEASE: ICD-10-CM

## 2023-08-13 DIAGNOSIS — R42 VERTIGO: ICD-10-CM

## 2023-08-14 RX ORDER — MECLIZINE HYDROCHLORIDE 25 MG/1
25 TABLET ORAL EVERY 8 HOURS PRN
Qty: 180 TABLET | Refills: 0 | Status: SHIPPED | OUTPATIENT
Start: 2023-08-14

## 2023-08-14 RX ORDER — PRAMIPEXOLE DIHYDROCHLORIDE 0.25 MG/1
0.25 TABLET ORAL
Qty: 90 TABLET | Refills: 0 | Status: SHIPPED | OUTPATIENT
Start: 2023-08-14

## 2023-08-14 RX ORDER — OMEPRAZOLE 20 MG/1
20 CAPSULE, DELAYED RELEASE ORAL DAILY
Qty: 90 CAPSULE | Refills: 0 | Status: SHIPPED | OUTPATIENT
Start: 2023-08-14

## 2023-10-08 DIAGNOSIS — R42 VERTIGO: ICD-10-CM

## 2023-10-09 RX ORDER — MECLIZINE HYDROCHLORIDE 25 MG/1
25 TABLET ORAL EVERY 8 HOURS PRN
Qty: 180 TABLET | Refills: 0 | Status: SHIPPED | OUTPATIENT
Start: 2023-10-09

## 2023-11-10 DIAGNOSIS — K21.9 GASTROESOPHAGEAL REFLUX DISEASE: ICD-10-CM

## 2023-11-10 RX ORDER — OMEPRAZOLE 20 MG/1
20 CAPSULE, DELAYED RELEASE ORAL DAILY
Qty: 90 CAPSULE | Refills: 0 | Status: SHIPPED | OUTPATIENT
Start: 2023-11-10

## 2023-12-03 DIAGNOSIS — R42 VERTIGO: ICD-10-CM

## 2023-12-03 DIAGNOSIS — G25.81 RESTLESS LEGS SYNDROME: ICD-10-CM

## 2023-12-04 RX ORDER — PRAMIPEXOLE DIHYDROCHLORIDE 0.25 MG/1
0.25 TABLET ORAL
Qty: 90 TABLET | Refills: 0 | Status: SHIPPED | OUTPATIENT
Start: 2023-12-04

## 2023-12-04 RX ORDER — MECLIZINE HYDROCHLORIDE 25 MG/1
25 TABLET ORAL EVERY 8 HOURS PRN
Qty: 180 TABLET | Refills: 0 | Status: SHIPPED | OUTPATIENT
Start: 2023-12-04

## 2024-02-15 DIAGNOSIS — K21.9 GASTROESOPHAGEAL REFLUX DISEASE: ICD-10-CM

## 2024-02-15 DIAGNOSIS — G25.81 RESTLESS LEGS SYNDROME: ICD-10-CM

## 2024-02-15 DIAGNOSIS — R42 VERTIGO: ICD-10-CM

## 2024-02-16 RX ORDER — PRAMIPEXOLE DIHYDROCHLORIDE 0.25 MG/1
0.25 TABLET ORAL
Qty: 90 TABLET | Refills: 0 | Status: SHIPPED | OUTPATIENT
Start: 2024-02-16

## 2024-02-16 RX ORDER — OMEPRAZOLE 20 MG/1
20 CAPSULE, DELAYED RELEASE ORAL DAILY
Qty: 90 CAPSULE | Refills: 0 | Status: SHIPPED | OUTPATIENT
Start: 2024-02-16

## 2024-02-16 RX ORDER — MECLIZINE HYDROCHLORIDE 25 MG/1
25 TABLET ORAL EVERY 8 HOURS PRN
Qty: 180 TABLET | Refills: 0 | Status: SHIPPED | OUTPATIENT
Start: 2024-02-16

## 2024-02-19 ENCOUNTER — RA CDI HCC (OUTPATIENT)
Dept: OTHER | Facility: HOSPITAL | Age: 89
End: 2024-02-19

## 2024-03-12 DIAGNOSIS — H54.8 LEGAL BLINDNESS: ICD-10-CM

## 2024-03-12 DIAGNOSIS — S22.20XD CLOSED FRACTURE OF STERNUM WITH ROUTINE HEALING, UNSPECIFIED PORTION OF STERNUM, SUBSEQUENT ENCOUNTER: ICD-10-CM

## 2024-03-12 DIAGNOSIS — Z74.09 IMPAIRED MOBILITY AND ADLS: ICD-10-CM

## 2024-03-12 DIAGNOSIS — M81.0 AGE RELATED OSTEOPOROSIS, UNSPECIFIED PATHOLOGICAL FRACTURE PRESENCE: Primary | ICD-10-CM

## 2024-03-12 DIAGNOSIS — I89.0 LYMPHEDEMA: ICD-10-CM

## 2024-03-12 DIAGNOSIS — H35.30 MACULAR DEGENERATION, UNSPECIFIED LATERALITY, UNSPECIFIED TYPE: ICD-10-CM

## 2024-03-12 DIAGNOSIS — Z78.9 IMPAIRED MOBILITY AND ADLS: ICD-10-CM

## 2024-03-12 DIAGNOSIS — R41.89 COGNITIVE IMPAIRMENT: ICD-10-CM

## 2024-03-13 ENCOUNTER — TELEPHONE (OUTPATIENT)
Dept: FAMILY MEDICINE CLINIC | Facility: CLINIC | Age: 89
End: 2024-03-13

## 2024-03-13 ENCOUNTER — TELEPHONE (OUTPATIENT)
Age: 89
End: 2024-03-13

## 2024-03-13 NOTE — TELEPHONE ENCOUNTER
Spoke with [facility], the two referrals for PT and OT have to be changed to a referral to Home health.what is in the computer now is for out patient. Daughter wants to bring patient home and have someone come in the home.

## 2024-03-13 NOTE — TELEPHONE ENCOUNTER
Patients daughter Celia called in and wanted to know who would be contacting her for the patients Occupational therapy / Physical therapy / Complex care.    Please call Celia at .    Thank you.

## 2024-03-14 DIAGNOSIS — S22.20XD CLOSED FRACTURE OF STERNUM WITH ROUTINE HEALING, UNSPECIFIED PORTION OF STERNUM, SUBSEQUENT ENCOUNTER: ICD-10-CM

## 2024-03-14 DIAGNOSIS — H54.8 LEGAL BLINDNESS: ICD-10-CM

## 2024-03-14 DIAGNOSIS — M15.9 OSTEOARTHRITIS OF MULTIPLE JOINTS, UNSPECIFIED OSTEOARTHRITIS TYPE: Primary | ICD-10-CM

## 2024-03-14 DIAGNOSIS — Z78.9 IMPAIRED MOBILITY AND ADLS: ICD-10-CM

## 2024-03-14 DIAGNOSIS — Z74.09 IMPAIRED MOBILITY AND ADLS: ICD-10-CM

## 2024-03-18 ENCOUNTER — PATIENT OUTREACH (OUTPATIENT)
Dept: FAMILY MEDICINE CLINIC | Facility: CLINIC | Age: 89
End: 2024-03-18

## 2024-03-18 NOTE — PROGRESS NOTES
Yung referral on pt received from provider. Chart reviewed. Daughter is caregiver. Call placed and spoke with daughter Celia. Pt currently has a caregiver there at the home speaking with daughter. Celia states that she will call this CM back when available.

## 2024-03-18 NOTE — PROGRESS NOTES
Daughter Celia returned call. She verbalized frustration with the facility that her mother had been in after [facility] admission. Pt has [facility] in home now for PT/OT, nursing. Daughter currently has chemo treatment for cancer and has [facility] in home to stay with pt while she has treatment.  Daughter did inquire about hospice. She will look into the possibility of this once visiting nurses discharge pt after meeting goals. She denies any need for outreach and has this Cm's contact number for future concerns.

## 2024-03-18 NOTE — TELEPHONE ENCOUNTER
Conchita called from [facility] asking if the provider will sign forms for pt. Please review and advise

## 2024-03-20 NOTE — TELEPHONE ENCOUNTER
Chey from [facility] called to get verbal orders for PT approval for pt to start treatment. Brent transferred her to [facility] to further assist.

## 2024-03-29 ENCOUNTER — TELEPHONE (OUTPATIENT)
Dept: FAMILY MEDICINE CLINIC | Facility: CLINIC | Age: 89
End: 2024-03-29

## 2024-03-29 ENCOUNTER — TELEMEDICINE (OUTPATIENT)
Dept: FAMILY MEDICINE CLINIC | Facility: CLINIC | Age: 89
End: 2024-03-29
Payer: MEDICARE

## 2024-03-29 DIAGNOSIS — L03.116 BILATERAL CELLULITIS OF LOWER LEG: Primary | ICD-10-CM

## 2024-03-29 DIAGNOSIS — L03.115 BILATERAL CELLULITIS OF LOWER LEG: Primary | ICD-10-CM

## 2024-03-29 DIAGNOSIS — I89.0 LYMPHEDEMA: ICD-10-CM

## 2024-03-29 PROBLEM — R26.89 OTHER ABNORMALITIES OF GAIT AND MOBILITY: Status: ACTIVE | Noted: 2024-02-27

## 2024-03-29 PROBLEM — K44.9 DIAPHRAGMATIC HERNIA WITHOUT OBSTRUCTION OR GANGRENE: Status: ACTIVE | Noted: 2024-02-27

## 2024-03-29 PROBLEM — R41.841 COGNITIVE COMMUNICATION DEFICIT: Status: ACTIVE | Noted: 2024-02-27

## 2024-03-29 PROBLEM — S22.20XA STERNAL FRACTURE: Status: ACTIVE | Noted: 2024-02-23

## 2024-03-29 PROBLEM — G93.41 ACUTE METABOLIC ENCEPHALOPATHY: Status: RESOLVED | Noted: 2023-04-21 | Resolved: 2024-03-29

## 2024-03-29 PROBLEM — R13.12 DYSPHAGIA, OROPHARYNGEAL PHASE: Status: ACTIVE | Noted: 2024-02-27
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
## 2024-03-29 PROCEDURE — 99214 OFFICE O/P EST MOD 30 MIN: CPT | Performed by: FAMILY MEDICINE

## 2024-03-29 PROCEDURE — G2211 COMPLEX E/M VISIT ADD ON: HCPCS | Performed by: FAMILY MEDICINE

## 2024-03-29 RX ORDER — CEFADROXIL 500 MG/1
500 CAPSULE ORAL EVERY 12 HOURS SCHEDULED
Qty: 14 CAPSULE | Refills: 0 | Status: SHIPPED | OUTPATIENT
Start: 2024-03-29 | End: 2024-04-05

## 2024-03-29 NOTE — ASSESSMENT & PLAN NOTE
Antibiotic as directed Patient to followup if any GI upset worsening symptoms or any fever or chills

## 2024-03-29 NOTE — TELEPHONE ENCOUNTER
If the pharmacy calls regarding the RX Cefadroxil or Duricef, stating she has a reaction to it, Dr. Rowe stated that the patient did have it in the hospital and she tolerated the medication and to dispense the medication as sent over.

## 2024-03-29 NOTE — PROGRESS NOTES
Virtual Regular Visit    Verification of patient location:    Patient is located at Home in the following state in which I hold an active license [place]      Assessment/Plan:    Problem List Items Addressed This Visit          Orthopedic/Musculoskeletal    Bilateral cellulitis of lower leg - Primary     Antibiotic as directed Patient to followup if any GI upset worsening symptoms or any fever or chills          Relevant Medications    cefadroxil (DURICEF) 500 mg capsule       Other    Lymphedema     Continue to keep legs elevated                 Reason for visit is No chief complaint on file.       Encounter provider Amina Rowe DO    Provider located at [facility]  [address]  [address]  [place] [address] [phone]      Recent Visits  No visits were found meeting these conditions.  Showing recent visits within past 7 days and meeting all other requirements  Today's Visits  Date Type Provider Dept   03/29/24 Telephone [facility]   03/29/24 Telemedicine Amina Rowe DO [facility]   Showing today's visits and meeting all other requirements  Future Appointments  No visits were found meeting these conditions.  Showing future appointments within next 150 days and meeting all other requirements       The patient was identified by name and date of birth. Latanya Ray was informed that this is a telemedicine visit and that the visit is being conducted through the [employer] platform. She agrees to proceed..  My office door was closed. No one else was in the room.  She acknowledged consent and understanding of privacy and security of the video platform. The patient has agreed to participate and understands they can discontinue the visit at any time.    Patient is aware this is a billable service.     Subjective  Latanya Ray is a 89 y.o. female redness and swelling of both legs for last 2  days Patient daughter is with her  .      Patient presents with her daughter for worsening of her lymphedema and now has red areas on the lower legs and some blistering for last 2 days Patient had cellulitis in 2/2024 and was placed in IV ceftriaxzone Patient is listed as Penicillin allergic due to hives Cephalexin is also listed as allergy However daughter states that it was GI upset Patitn has no fever or chills Patient has no drainage from the legs at this time Patient is eating and drinking well     Rash  This is a new problem. Episode onset: 2 days. The problem has been gradually worsening since onset. Location: bilateral lower extremities anterior shin. The rash is characterized by blistering and redness. She was exposed to nothing. Pertinent negatives include no anorexia, congestion, cough, diarrhea, fatigue, fever, itching, joint pain, shortness of breath or vomiting. Treatments tried: daughter is elevating legs. The treatment provided no relief. There is no history of allergies, asthma, eczema or varicella. There were no sick contacts.        Past Medical History:   Diagnosis Date    Anxiety     Arthritis     Cancer (HCC)     Cellulitis of left lower limb 02/27/2024    Elevated serum alkaline phosphatase level     mild, isoenzymes are normal, resolved 4/24/17 labs , last assessed 11/10/16, resolved 4/24/17    Hematuria     last assessed 9/18/12    Hyperlipidemia 08/13/2012    Hypertension     Pressure injury of coccygeal region, stage 2 ([facility]) 03/06/2020    Pulmonary embolism ([facility]) 01/2011    last assessed 9/18/12    RLS (restless legs syndrome)        Past Surgical History:   Procedure Laterality Date    BILATERAL OOPHORECTOMY      Laparoscopic    BREAST SURGERY Left     Mastectomy     CHOLECYSTECTOMY      Laparoscopic    HERNIA REPAIR      HYSTERECTOMY      SMALL INTESTINE SURGERY         Current Outpatient Medications   Medication Sig Dispense Refill    cefadroxil (DURICEF) 500 mg capsule Take 1 capsule  (500 mg total) by mouth every 12 (twelve) hours for 7 days 14 capsule 0    Acetaminophen (TYLENOL ARTHRITIS PAIN PO) Take 1 tablet by mouth if needed      aspirin (ECOTRIN LOW STRENGTH) 81 mg EC tablet Take 81 mg by mouth in the morning.      Calcium Carbonate-Vit D-Min (CALCIUM 1200 PO) Take 750 mg by mouth in the morning At 3 PM      cholecalciferol (VITAMIN D3) 1,000 units tablet Take 4,000 Units by mouth daily      Cranberry 250 MG TABS Take 2 tablets by mouth 3 (three) times a day      meclizine (ANTIVERT) 25 mg tablet Take 1 tablet (25 mg total) by mouth every 8 (eight) hours as needed for dizziness 180 tablet 0    Multiple Vitamins-Minerals (PRESERVISION AREDS 2 PO) Take 1 capsule by mouth 2 (two) times a day      omeprazole (PriLOSEC) 20 mg delayed release capsule Take 1 capsule (20 mg total) by mouth daily 90 capsule 0    pramipexole (MIRAPEX) 0.25 mg tablet Take 1 tablet (0.25 mg total) by mouth daily at bedtime 90 tablet 0    vitamin B-12 (VITAMIN B-12) 1,000 mcg tablet Take 2,500 mcg by mouth every other day       No current facility-administered medications for this visit.        Allergies   Allergen Reactions    Amoxicillin Hives    Celecoxib     Ciprofloxacin      Other reaction(s): diarrhea. Tolerates Levaquin    Codeine Other (See Comments)     Other reaction(s): Other (See Comments)  takes vicodin @ home  takes vicodin @ home    Epinephrine Other (See Comments)     Other reaction(s): Unknown Reaction    Oxycodone-Acetaminophen Other (See Comments)    Oxycodone-Acetaminophen      Other reaction(s): Unknown Reaction    Propoxyphene Other (See Comments)    Rofecoxib     Sulfa Antibiotics Other (See Comments)     takes at home  takes at home  Other reaction(s): Other (See Comments)  takes at home    Sulfamethoxazole-Trimethoprim     Nitrofurantoin Rash       Review of Systems   Constitutional:  Negative for fatigue and fever.   HENT:  Negative for congestion.    Respiratory:  Negative for cough and  shortness of breath.    Cardiovascular:  Positive for leg swelling.        Chronic lymphedema   Gastrointestinal:  Negative for anorexia, diarrhea and vomiting.   Musculoskeletal:  Negative for joint pain.   Skin:  Positive for rash. Negative for itching.       Video Exam    There were no vitals filed for this visit.    Physical Exam  Constitutional:       Appearance: Normal appearance.   Pulmonary:      Effort: Pulmonary effort is normal.   Skin:     Comments: Area of induration redness and swelling on both anterior shin There is some blistering noted on left leg    Neurological:      Mental Status: She is alert. Mental status is at baseline.          Visit Time  Total Visit Duration: 15

## 2024-04-03 ENCOUNTER — TELEPHONE (OUTPATIENT)
Age: 89
End: 2024-04-03

## 2024-04-03 NOTE — TELEPHONE ENCOUNTER
Mayra MORALES from [facility], states she has a diagnosis for patient of cystitis-unspecified without hematuria for patient. Mayra states that she needs a physician's note that addresses/assesses the diagnosis along with a plan of care included in the note. Mayra states that the diagnosis must be addressed in the note, cannot just be listed at the top of the note. Mayra states that she has information from 12/17-04/16, but the notes do not specifically state and explain how the cystitis is being addressed. Mayra states the physician's note can be faxed to , ATTN: Mayra MORALES.       Please advise.

## 2024-04-29 ENCOUNTER — APPOINTMENT (EMERGENCY)
Dept: RADIOLOGY | Facility: HOSPITAL | Age: 89
DRG: 871 | End: 2024-04-29
Payer: MEDICARE

## 2024-04-29 ENCOUNTER — APPOINTMENT (EMERGENCY)
Dept: CT IMAGING | Facility: HOSPITAL | Age: 89
DRG: 871 | End: 2024-04-29
Payer: MEDICARE

## 2024-04-29 ENCOUNTER — HOSPITAL ENCOUNTER (INPATIENT)
Facility: HOSPITAL | Age: 89
LOS: 7 days | Discharge: NURSING, REHAB OR LONG-TERM CARE | DRG: 871 | End: 2024-05-06
Attending: EMERGENCY MEDICINE | Admitting: INTERNAL MEDICINE
Payer: MEDICARE

## 2024-04-29 DIAGNOSIS — N12 PYELONEPHRITIS: Primary | ICD-10-CM

## 2024-04-29 DIAGNOSIS — K30 INDIGESTION: ICD-10-CM

## 2024-04-29 DIAGNOSIS — N39.0 SEPSIS SECONDARY TO UTI  (HCC): ICD-10-CM

## 2024-04-29 DIAGNOSIS — R65.21 SEPTIC SHOCK (HCC): ICD-10-CM

## 2024-04-29 DIAGNOSIS — K59.00 CONSTIPATION: ICD-10-CM

## 2024-04-29 DIAGNOSIS — A41.9 SEPSIS SECONDARY TO UTI  (HCC): ICD-10-CM

## 2024-04-29 DIAGNOSIS — A41.9 SEPTIC SHOCK (HCC): ICD-10-CM

## 2024-04-29 PROBLEM — Z86.79 HISTORY OF HYPERTENSION: Status: RESOLVED | Noted: 2023-04-21 | Resolved: 2024-04-29

## 2024-04-29 PROBLEM — R26.89 OTHER ABNORMALITIES OF GAIT AND MOBILITY: Status: RESOLVED | Noted: 2024-02-27 | Resolved: 2024-04-29

## 2024-04-29 PROBLEM — J30.9 ALLERGIC RHINITIS: Status: RESOLVED | Noted: 2017-12-06 | Resolved: 2024-04-29

## 2024-04-29 PROBLEM — Z74.09 IMPAIRED MOBILITY AND ADLS: Status: RESOLVED | Noted: 2019-10-30 | Resolved: 2024-04-29

## 2024-04-29 PROBLEM — G89.29 CHRONIC PAIN OF LEFT KNEE: Status: RESOLVED | Noted: 2019-07-11 | Resolved: 2024-04-29

## 2024-04-29 PROBLEM — M79.673 FOOT PAIN: Status: RESOLVED | Noted: 2021-02-16 | Resolved: 2024-04-29

## 2024-04-29 PROBLEM — R41.841 COGNITIVE COMMUNICATION DEFICIT: Status: RESOLVED | Noted: 2024-02-27 | Resolved: 2024-04-29

## 2024-04-29 PROBLEM — M54.6 THORACIC BACK PAIN: Status: RESOLVED | Noted: 2017-10-19 | Resolved: 2024-04-29

## 2024-04-29 PROBLEM — G89.4 CHRONIC PAIN SYNDROME: Status: RESOLVED | Noted: 2020-01-28 | Resolved: 2024-04-29

## 2024-04-29 PROBLEM — L03.115 BILATERAL CELLULITIS OF LOWER LEG: Status: RESOLVED | Noted: 2024-03-29 | Resolved: 2024-04-29
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
## 2024-04-29 PROBLEM — N39.46 MIXED STRESS AND URGE URINARY INCONTINENCE: Status: RESOLVED | Noted: 2023-03-27 | Resolved: 2024-04-29

## 2024-04-29 PROBLEM — L03.116 BILATERAL CELLULITIS OF LOWER LEG: Status: RESOLVED | Noted: 2024-03-29 | Resolved: 2024-04-29

## 2024-04-29 PROBLEM — L84 CALLUS: Status: RESOLVED | Noted: 2020-03-06 | Resolved: 2024-04-29

## 2024-04-29 PROBLEM — D49.2 NEOPLASM OF SKIN OF FACE: Status: RESOLVED | Noted: 2017-10-19 | Resolved: 2024-04-29

## 2024-04-29 PROBLEM — R42 VERTIGO: Status: RESOLVED | Noted: 2017-02-01 | Resolved: 2024-04-29

## 2024-04-29 PROBLEM — Z78.9 IMPAIRED MOBILITY AND ADLS: Status: RESOLVED | Noted: 2019-10-30 | Resolved: 2024-04-29

## 2024-04-29 PROBLEM — M25.562 CHRONIC PAIN OF LEFT KNEE: Status: RESOLVED | Noted: 2019-07-11 | Resolved: 2024-04-29

## 2024-04-29 LAB
2HR DELTA HS TROPONIN: 0 NG/L
ALBUMIN SERPL BCP-MCNC: 3.7 G/DL (ref 3.5–5)
ALP SERPL-CCNC: 93 U/L (ref 34–104)
ALT SERPL W P-5'-P-CCNC: 9 U/L (ref 7–52)
ANION GAP SERPL CALCULATED.3IONS-SCNC: 8 MMOL/L (ref 4–13)
APTT PPP: 32 SECONDS (ref 23–37)
AST SERPL W P-5'-P-CCNC: 14 U/L (ref 13–39)
ATRIAL RATE: 72 BPM
ATRIAL RATE: 86 BPM
BACTERIA UR QL AUTO: ABNORMAL /HPF
BASOPHILS # BLD AUTO: 0.04 THOUSANDS/ÂΜL (ref 0–0.1)
BASOPHILS NFR BLD AUTO: 0 % (ref 0–1)
BILIRUB SERPL-MCNC: 0.6 MG/DL (ref 0.2–1)
BILIRUB UR QL STRIP: NEGATIVE
BUN SERPL-MCNC: 19 MG/DL (ref 5–25)
CALCIUM SERPL-MCNC: 9.2 MG/DL (ref 8.4–10.2)
CAOX CRY URNS QL MICRO: ABNORMAL /HPF
CARDIAC TROPONIN I PNL SERPL HS: 4 NG/L
CARDIAC TROPONIN I PNL SERPL HS: 4 NG/L
CHLORIDE SERPL-SCNC: 99 MMOL/L (ref 96–108)
CLARITY UR: CLEAR
CO2 SERPL-SCNC: 26 MMOL/L (ref 21–32)
COLOR UR: YELLOW
CREAT SERPL-MCNC: 0.61 MG/DL (ref 0.6–1.3)
EOSINOPHIL # BLD AUTO: 0 THOUSAND/ÂΜL (ref 0–0.61)
EOSINOPHIL NFR BLD AUTO: 0 % (ref 0–6)
ERYTHROCYTE [DISTWIDTH] IN BLOOD BY AUTOMATED COUNT: 14.9 % (ref 11.6–15.1)
GFR SERPL CREATININE-BSD FRML MDRD: 76 ML/MIN/1.73SQ M
GLUCOSE SERPL-MCNC: 161 MG/DL (ref 65–140)
GLUCOSE UR STRIP-MCNC: NEGATIVE MG/DL
HCT VFR BLD AUTO: 35.5 % (ref 34.8–46.1)
HGB BLD-MCNC: 11.8 G/DL (ref 11.5–15.4)
HGB UR QL STRIP.AUTO: ABNORMAL
IMM GRANULOCYTES # BLD AUTO: 0.05 THOUSAND/UL (ref 0–0.2)
IMM GRANULOCYTES NFR BLD AUTO: 0 % (ref 0–2)
INR PPP: 1.03 (ref 0.84–1.19)
KETONES UR STRIP-MCNC: NEGATIVE MG/DL
LACTATE SERPL-SCNC: 0.9 MMOL/L (ref 0.5–2)
LEUKOCYTE ESTERASE UR QL STRIP: ABNORMAL
LYMPHOCYTES # BLD AUTO: 0.66 THOUSANDS/ÂΜL (ref 0.6–4.47)
LYMPHOCYTES NFR BLD AUTO: 6 % (ref 14–44)
MCH RBC QN AUTO: 29.6 PG (ref 26.8–34.3)
MCHC RBC AUTO-ENTMCNC: 33.2 G/DL (ref 31.4–37.4)
MCV RBC AUTO: 89 FL (ref 82–98)
MONOCYTES # BLD AUTO: 1.01 THOUSAND/ÂΜL (ref 0.17–1.22)
MONOCYTES NFR BLD AUTO: 9 % (ref 4–12)
NEUTROPHILS # BLD AUTO: 9.5 THOUSANDS/ÂΜL (ref 1.85–7.62)
NEUTS SEG NFR BLD AUTO: 85 % (ref 43–75)
NITRITE UR QL STRIP: NEGATIVE
NON-SQ EPI CELLS URNS QL MICRO: ABNORMAL /HPF
NRBC BLD AUTO-RTO: 0 /100 WBCS
P AXIS: 70 DEGREES
P AXIS: 75 DEGREES
PH UR STRIP.AUTO: >=9 [PH] (ref 4.5–8)
PLATELET # BLD AUTO: 275 THOUSANDS/UL (ref 149–390)
PMV BLD AUTO: 9.8 FL (ref 8.9–12.7)
POTASSIUM SERPL-SCNC: 3.5 MMOL/L (ref 3.5–5.3)
PR INTERVAL: 142 MS
PR INTERVAL: 164 MS
PROCALCITONIN SERPL-MCNC: 0.14 NG/ML
PROT SERPL-MCNC: 7.1 G/DL (ref 6.4–8.4)
PROT UR STRIP-MCNC: ABNORMAL MG/DL
PROTHROMBIN TIME: 13.7 SECONDS (ref 11.6–14.5)
QRS AXIS: 59 DEGREES
QRS AXIS: 62 DEGREES
QRSD INTERVAL: 76 MS
QRSD INTERVAL: 82 MS
QT INTERVAL: 348 MS
QT INTERVAL: 418 MS
QTC INTERVAL: 416 MS
QTC INTERVAL: 457 MS
RBC # BLD AUTO: 3.99 MILLION/UL (ref 3.81–5.12)
RBC #/AREA URNS AUTO: ABNORMAL /HPF
SODIUM SERPL-SCNC: 133 MMOL/L (ref 135–147)
SP GR UR STRIP.AUTO: 1.01 (ref 1–1.03)
T WAVE AXIS: 71 DEGREES
T WAVE AXIS: 76 DEGREES
UROBILINOGEN UR QL STRIP.AUTO: 0.2 E.U./DL
VENTRICULAR RATE: 72 BPM
VENTRICULAR RATE: 86 BPM
WBC # BLD AUTO: 11.26 THOUSAND/UL (ref 4.31–10.16)
WBC #/AREA URNS AUTO: ABNORMAL /HPF
WBC CLUMPS # UR AUTO: PRESENT /UL

## 2024-04-29 PROCEDURE — 81001 URINALYSIS AUTO W/SCOPE: CPT

## 2024-04-29 PROCEDURE — 93005 ELECTROCARDIOGRAM TRACING: CPT

## 2024-04-29 PROCEDURE — 80053 COMPREHEN METABOLIC PANEL: CPT

## 2024-04-29 PROCEDURE — 99285 EMERGENCY DEPT VISIT HI MDM: CPT

## 2024-04-29 PROCEDURE — 96361 HYDRATE IV INFUSION ADD-ON: CPT

## 2024-04-29 PROCEDURE — 97530 THERAPEUTIC ACTIVITIES: CPT

## 2024-04-29 PROCEDURE — 87186 SC STD MICRODIL/AGAR DIL: CPT

## 2024-04-29 PROCEDURE — 87077 CULTURE AEROBIC IDENTIFY: CPT

## 2024-04-29 PROCEDURE — 83605 ASSAY OF LACTIC ACID: CPT

## 2024-04-29 PROCEDURE — 96365 THER/PROPH/DIAG IV INF INIT: CPT

## 2024-04-29 PROCEDURE — 84484 ASSAY OF TROPONIN QUANT: CPT

## 2024-04-29 PROCEDURE — 97163 PT EVAL HIGH COMPLEX 45 MIN: CPT

## 2024-04-29 PROCEDURE — 84145 PROCALCITONIN (PCT): CPT

## 2024-04-29 PROCEDURE — NC001 PR NO CHARGE: Performed by: NURSE PRACTITIONER

## 2024-04-29 PROCEDURE — 93010 ELECTROCARDIOGRAM REPORT: CPT | Performed by: INTERNAL MEDICINE

## 2024-04-29 PROCEDURE — 71045 X-RAY EXAM CHEST 1 VIEW: CPT

## 2024-04-29 PROCEDURE — 74177 CT ABD & PELVIS W/CONTRAST: CPT

## 2024-04-29 PROCEDURE — 99223 1ST HOSP IP/OBS HIGH 75: CPT | Performed by: INTERNAL MEDICINE

## 2024-04-29 PROCEDURE — 87086 URINE CULTURE/COLONY COUNT: CPT

## 2024-04-29 PROCEDURE — 87040 BLOOD CULTURE FOR BACTERIA: CPT

## 2024-04-29 PROCEDURE — 85730 THROMBOPLASTIN TIME PARTIAL: CPT

## 2024-04-29 PROCEDURE — 85610 PROTHROMBIN TIME: CPT

## 2024-04-29 PROCEDURE — 36415 COLL VENOUS BLD VENIPUNCTURE: CPT

## 2024-04-29 PROCEDURE — 71260 CT THORAX DX C+: CPT

## 2024-04-29 PROCEDURE — 96367 TX/PROPH/DG ADDL SEQ IV INF: CPT

## 2024-04-29 PROCEDURE — 97167 OT EVAL HIGH COMPLEX 60 MIN: CPT

## 2024-04-29 PROCEDURE — 1123F ACP DISCUSS/DSCN MKR DOCD: CPT | Performed by: INTERNAL MEDICINE

## 2024-04-29 PROCEDURE — 85025 COMPLETE CBC W/AUTO DIFF WBC: CPT

## 2024-04-29 RX ORDER — BISACODYL 10 MG
10 SUPPOSITORY, RECTAL RECTAL DAILY PRN
Status: DISCONTINUED | OUTPATIENT
Start: 2024-04-29 | End: 2024-05-06 | Stop reason: HOSPADM

## 2024-04-29 RX ORDER — CEFEPIME HYDROCHLORIDE 2 G/50ML
2000 INJECTION, SOLUTION INTRAVENOUS ONCE
Status: DISCONTINUED | OUTPATIENT
Start: 2024-04-29 | End: 2024-04-29

## 2024-04-29 RX ORDER — ENOXAPARIN SODIUM 100 MG/ML
40 INJECTION SUBCUTANEOUS DAILY
Status: DISCONTINUED | OUTPATIENT
Start: 2024-04-29 | End: 2024-05-06 | Stop reason: HOSPADM

## 2024-04-29 RX ORDER — SIMETHICONE 80 MG
80 TABLET,CHEWABLE ORAL 4 TIMES DAILY PRN
Status: DISCONTINUED | OUTPATIENT
Start: 2024-04-29 | End: 2024-05-06 | Stop reason: HOSPADM

## 2024-04-29 RX ORDER — PRAMIPEXOLE DIHYDROCHLORIDE 0.25 MG/1
0.25 TABLET ORAL
Status: DISCONTINUED | OUTPATIENT
Start: 2024-04-29 | End: 2024-05-06 | Stop reason: HOSPADM

## 2024-04-29 RX ORDER — ONDANSETRON 2 MG/ML
4 INJECTION INTRAMUSCULAR; INTRAVENOUS EVERY 6 HOURS PRN
Status: DISCONTINUED | OUTPATIENT
Start: 2024-04-29 | End: 2024-05-06 | Stop reason: HOSPADM

## 2024-04-29 RX ORDER — LANOLIN ALCOHOL/MO/W.PET/CERES
1 CREAM (GRAM) TOPICAL
Status: DISCONTINUED | OUTPATIENT
Start: 2024-04-29 | End: 2024-05-06 | Stop reason: HOSPADM

## 2024-04-29 RX ORDER — MECLIZINE HCL 12.5 MG/1
25 TABLET ORAL EVERY 8 HOURS PRN
Status: DISCONTINUED | OUTPATIENT
Start: 2024-04-29 | End: 2024-05-06 | Stop reason: HOSPADM

## 2024-04-29 RX ORDER — CEFTRIAXONE 2 G/50ML
2000 INJECTION, SOLUTION INTRAVENOUS EVERY 24 HOURS
Status: DISCONTINUED | OUTPATIENT
Start: 2024-04-30 | End: 2024-05-01

## 2024-04-29 RX ORDER — PANTOPRAZOLE SODIUM 20 MG/1
20 TABLET, DELAYED RELEASE ORAL
Status: DISCONTINUED | OUTPATIENT
Start: 2024-04-29 | End: 2024-05-06 | Stop reason: HOSPADM

## 2024-04-29 RX ORDER — SODIUM CHLORIDE 9 MG/ML
50 INJECTION, SOLUTION INTRAVENOUS CONTINUOUS
Status: DISPENSED | OUTPATIENT
Start: 2024-04-29 | End: 2024-04-29

## 2024-04-29 RX ORDER — CEFTRIAXONE 1 G/50ML
1000 INJECTION, SOLUTION INTRAVENOUS ONCE
Status: COMPLETED | OUTPATIENT
Start: 2024-04-29 | End: 2024-04-29

## 2024-04-29 RX ORDER — MAGNESIUM HYDROXIDE/ALUMINUM HYDROXICE/SIMETHICONE 120; 1200; 1200 MG/30ML; MG/30ML; MG/30ML
30 SUSPENSION ORAL EVERY 6 HOURS PRN
Status: DISCONTINUED | OUTPATIENT
Start: 2024-04-29 | End: 2024-05-06 | Stop reason: HOSPADM

## 2024-04-29 RX ORDER — ALBUMIN, HUMAN INJ 5% 5 %
12.5 SOLUTION INTRAVENOUS ONCE
Status: COMPLETED | OUTPATIENT
Start: 2024-04-29 | End: 2024-04-29

## 2024-04-29 RX ORDER — ACETAMINOPHEN 325 MG/1
650 TABLET ORAL EVERY 6 HOURS PRN
Status: DISCONTINUED | OUTPATIENT
Start: 2024-04-29 | End: 2024-05-06 | Stop reason: HOSPADM

## 2024-04-29 RX ADMIN — NOREPINEPHRINE BITARTRATE 5 MCG/MIN: 1 INJECTION, SOLUTION, CONCENTRATE INTRAVENOUS at 04:03

## 2024-04-29 RX ADMIN — ALBUMIN (HUMAN) 12.5 G: 12.5 INJECTION, SOLUTION INTRAVENOUS at 05:22

## 2024-04-29 RX ADMIN — SODIUM CHLORIDE 1000 ML: 0.9 INJECTION, SOLUTION INTRAVENOUS at 01:43

## 2024-04-29 RX ADMIN — IOHEXOL 100 ML: 350 INJECTION, SOLUTION INTRAVENOUS at 02:45

## 2024-04-29 RX ADMIN — SODIUM CHLORIDE 50 ML/HR: 0.9 INJECTION, SOLUTION INTRAVENOUS at 08:07

## 2024-04-29 RX ADMIN — CEFTRIAXONE 1000 MG: 1 INJECTION, SOLUTION INTRAVENOUS at 03:28

## 2024-04-29 RX ADMIN — MECLIZINE HYDROCHLORIDE 25 MG: 12.5 TABLET ORAL at 18:44

## 2024-04-29 RX ADMIN — PANTOPRAZOLE SODIUM 20 MG: 20 TABLET, DELAYED RELEASE ORAL at 08:07

## 2024-04-29 RX ADMIN — ACETAMINOPHEN 650 MG: 325 TABLET, FILM COATED ORAL at 08:23

## 2024-04-29 RX ADMIN — Medication 1000 UNITS: at 08:07

## 2024-04-29 RX ADMIN — CYANOCOBALAMIN TAB 500 MCG 1000 MCG: 500 TAB at 08:07

## 2024-04-29 RX ADMIN — ACETAMINOPHEN 650 MG: 325 TABLET, FILM COATED ORAL at 19:02

## 2024-04-29 RX ADMIN — ENOXAPARIN SODIUM 40 MG: 40 INJECTION SUBCUTANEOUS at 08:07

## 2024-04-29 RX ADMIN — MULTIPLE VITAMINS W/ MINERALS TAB 1 TABLET: TAB ORAL at 08:07

## 2024-04-29 RX ADMIN — PRAMIPEXOLE DIHYDROCHLORIDE 0.25 MG: 0.25 TABLET ORAL at 21:05

## 2024-04-29 RX ADMIN — SODIUM CHLORIDE 500 ML: 0.9 INJECTION, SOLUTION INTRAVENOUS at 03:10

## 2024-04-29 RX ADMIN — SODIUM CHLORIDE 250 ML: 0.9 INJECTION, SOLUTION INTRAVENOUS at 04:11

## 2024-04-29 RX ADMIN — ASPIRIN 81 MG: 81 TABLET, COATED ORAL at 21:05

## 2024-04-29 RX ADMIN — Medication 1 TABLET: at 08:07

## 2024-04-29 NOTE — ASSESSMENT & PLAN NOTE
Presents with sepsis, secondary to cystitis/pyelonephritis   U/A large leuks, innumerable WBC/bacteria   Previous urine cultures pansensitive e. Coli and aerococcus viridans    Plan:  Monitor I&Os, urinary retention protocol  Continue ceftriaxone   Follow urine cultures

## 2024-04-29 NOTE — RESTORATIVE TECHNICIAN NOTE
Restorative Technician Note      Patient Name: Latanya Ray     Restorative Tech Visit Date: 04/29/24  Note Type: Mobility  Patient Position Upon Consult: Supine  Mobility / Activity Provided: Assisted OT with getting pt to chair  Activity Performed: Transferred; Stood; Repositioned  Assistive Device: Other (Comment); Roller walker (Quick move)  Patient Position at End of Consult: Bedside chair; All needs within reach

## 2024-04-29 NOTE — H&P
[facility]  H&P  Name: Latanya Ray 89 y.o. female I MRN: [id number]  Unit/Bed#: MARIK[facility] POOL I Date of Admission: 4/29/2024   Date of Service: 4/29/2024 I Hospital Day: 0      Assessment/Plan   * Septic shock (HCC)  Assessment & Plan  Presents with fever, tachycardia and hypotension. PCT/lactate wnl. Source cystitis/pyelonephritis     Plan:  Received sepsis bolus, briefly required norepinephrine in ED titrated to 5mcg then BP increased to 170s then titrated off, received albumin.  BP improved  Continue volume resuscitation   ABX: IV Ceftriaxone   Trend sepsis markers  Follow urine and blood cultures     Pyelonephritis  Assessment & Plan  Presents with sepsis, secondary to cystitis/pyelonephritis   U/A large leuks, innumerable WBC/bacteria   Previous urine cultures pansensitive e. Coli and aerococcus viridans    Plan:  Monitor I&Os, urinary retention protocol  Continue ceftriaxone   Follow urine cultures     Diaphragmatic hernia without obstruction or gangrene  Assessment & Plan  Noted stable hernia on CT abd/pelvis    Legal blindness  Assessment & Plan  History of macular degeneration  Safety precautions    Cognitive impairment  Assessment & Plan  Supportive care    Neoplasm of skin of face  Assessment & Plan  History of basal cell carcinoma. Daughter not interested in treatment given her advanced age    Breast carcinoma (HCC)  Assessment & Plan  Status post left mastectomy          VTE Prophylaxis: Enoxaparin (Lovenox)  / sequential compression device   Code Status: DNR/I  POLST: POLST is not applicable to this patient  Discussion with family: Daughter and son-in-law at bedside    Anticipated Length of Stay:  Patient will be admitted on an Inpatient basis with an anticipated length of stay of  > 2 midnights.   Justification for Hospital Stay: septic shock    Total Time for Visit, including Counseling / Coordination of Care: 60 minutes.  Greater than 50% of this total time spent on  direct patient counseling and coordination of care.    Chief Complaint:       History of Present Illness:    Latanya Ray is a 89 y.o. female who presents with c/o weakness and not behaving like her normal self. PMH as above. HPI obtained by daughter at bedside who states patient was in her normal state of health today, ate breakfast, lunch and dinner and carried out her ADLs.  Tonight she went to the bathroom and sat on the floor, she was confused and was too weak to stand. At baseline she ambulates independently with a walker.  She called 911, paramedics recorded fever of 102.2F.  She was unaware patient had a fever, denies chills, reports normal urine output. Denies dysuria or malodorous urine output.  Reports recurrent UTIs.  Admitted at [facility] in February and treated for UTI.  Recently treated by PMD for UTI in March.        Review of Systems:    Review of Systems   Unable to perform ROS: Acuity of condition       Past Medical and Surgical History:     Past Medical History:   Diagnosis Date    Anxiety     Arthritis     Breast carcinoma ([facility]) 08/13/2012    Description: s/p L mastectomy. Dr. Diaz    Cancer ([facility])     Cellulitis of left lower limb 02/27/2024    Chronic pain of left knee 07/11/2019    Chronic pain syndrome 01/28/2020    Elevated serum alkaline phosphatase level     mild, isoenzymes are normal, resolved 4/24/17 labs , last assessed 11/10/16, resolved 4/24/17    GERD (gastroesophageal reflux disease) 06/20/2012    Glaucoma 08/12/2014    Hematuria     last assessed 9/18/12    Hyperlipidemia 08/13/2012    Hypertension     Macular degeneration 08/12/2014    Mixed stress and urge urinary incontinence 03/27/2023    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    06/06/2018 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    Osteoarthritis 06/05/2012    Osteoporosis 06/05/2012    Description: Dexa 7/13 read as normal, patient declines medication    Pressure injury of coccygeal region, stage 2 ([facility]) 03/06/2020    Pulmonary embolism ([facility])  01/2011    last assessed 9/18/12    Restless legs syndrome 06/05/2012    RLS (restless legs syndrome)     Thoracic back pain 10/19/2017       Past Surgical History:   Procedure Laterality Date    BILATERAL OOPHORECTOMY      Laparoscopic    BREAST SURGERY Left     Mastectomy     CHOLECYSTECTOMY      Laparoscopic    HERNIA REPAIR      HYSTERECTOMY      SMALL INTESTINE SURGERY         Meds/Allergies:    Prior to Admission medications    Medication Sig Start Date End Date Taking? Authorizing Provider   Acetaminophen (TYLENOL ARTHRITIS PAIN PO) Take 1 tablet by mouth if needed   Yes Historical Provider, MD   aspirin (ECOTRIN LOW STRENGTH) 81 mg EC tablet Take 81 mg by mouth in the morning.   Yes Historical Provider, MD   Calcium Carbonate-Vit D-Min (CALCIUM 1200 PO) Take 750 mg by mouth in the morning At 3 PM   Yes Historical Provider, MD   cholecalciferol (VITAMIN D3) 1,000 units tablet Take 4,000 Units by mouth daily   Yes Historical Provider, MD   Cranberry 250 MG TABS Take 2 tablets by mouth 3 (three) times a day   Yes Historical Provider, MD   meclizine (ANTIVERT) 25 mg tablet Take 1 tablet (25 mg total) by mouth every 8 (eight) hours as needed for dizziness 2/16/24  Yes Amina Rowe,    Multiple Vitamins-Minerals (PRESERVISION AREDS 2 PO) Take 1 capsule by mouth 2 (two) times a day   Yes Historical Provider, MD   omeprazole (PriLOSEC) 20 mg delayed release capsule Take 1 capsule (20 mg total) by mouth daily 2/16/24  Yes Amina Rowe DO   pramipexole (MIRAPEX) 0.25 mg tablet Take 1 tablet (0.25 mg total) by mouth daily at bedtime 2/16/24  Yes Amina Rowe,    vitamin B-12 (VITAMIN B-12) 1,000 mcg tablet Take 2,500 mcg by mouth every other day   Yes Historical Provider, MD     I have reviewed home medications using allscripts.    Allergies:   Allergies   Allergen Reactions    Amoxicillin Hives    Celecoxib     Ciprofloxacin      Other reaction(s): diarrhea. Tolerates Levaquin    Codeine Other (See  Comments)     Other reaction(s): Other (See Comments)  takes vicodin @ home  takes vicodin @ home    Epinephrine Other (See Comments)     Other reaction(s): Unknown Reaction    Oxycodone-Acetaminophen Other (See Comments)    Oxycodone-Acetaminophen      Other reaction(s): Unknown Reaction    Propoxyphene Other (See Comments)    Rofecoxib     Sulfa Antibiotics Other (See Comments)     takes at home  takes at home  Other reaction(s): Other (See Comments)  takes at home    Sulfamethoxazole-Trimethoprim     Nitrofurantoin Rash       Social History:     Marital Status:    Occupation: retired  Patient Pre-hospital Living Situation: resides with daughter  Patient Pre-hospital Level of Mobility: walker  Patient Pre-hospital Diet Restrictions: reg diet  Substance Use History:   Social History     Substance and Sexual Activity   Alcohol Use No    Alcohol/week: 0.0 standard drinks of alcohol    Comment: denies     Social History     Tobacco Use   Smoking Status Never   Smokeless Tobacco Never   Tobacco Comments    denies     Social History     Substance and Sexual Activity   Drug Use No       Family History:    Family History   Problem Relation Age of Onset    Hypertension Mother     Blindness Mother     Cancer Sister     Cancer Maternal Aunt     Cancer Maternal Uncle        Physical Exam:     Vitals:   Blood Pressure: 134/60 (04/29/24 0615)  Pulse: 87 (04/29/24 0615)  Temperature: 98.3 °F (36.8 °C) (04/29/24 0336)  Temp Source: Oral (04/29/24 0336)  Respirations: 20 (04/29/24 0615)  Weight - Scale: 57 kg (125 lb 10.6 oz) (04/29/24 0101)  SpO2: 98 % (04/29/24 0615)    Physical Exam  Constitutional:       General: She is not in acute distress.     Appearance: Normal appearance. She is not ill-appearing, toxic-appearing or diaphoretic.   HENT:      Head: Normocephalic and atraumatic.      Comments: Right lateral forehead skin changes from basal cell carcinoma      Mouth/Throat:      Mouth: Mucous membranes are dry.    Cardiovascular:      Rate and Rhythm: Normal rate and regular rhythm.      Heart sounds: Murmur heard.   Pulmonary:      Effort: Pulmonary effort is normal.      Breath sounds: Normal breath sounds.   Abdominal:      General: Bowel sounds are normal.      Palpations: Abdomen is soft.   Musculoskeletal:      Comments: Left breast mastectomy   Skin:     General: Skin is warm.      Coloration: Skin is pale.      Findings: No bruising.      Comments: B/LLE PVD with hyperpigmentation L>R  Left foot large plantar callus   Neurological:      Mental Status: She is alert.   Psychiatric:      Comments: Somnolent       Additional Data:     Lab Results: I have personally reviewed pertinent reports.      Results from last 7 days   Lab Units 04/29/24  0141   WBC Thousand/uL 11.26*   HEMOGLOBIN g/dL 11.8   HEMATOCRIT % 35.5   PLATELETS Thousands/uL 275   SEGS PCT % 85*   LYMPHO PCT % 6*   MONO PCT % 9   EOS PCT % 0     Results from last 7 days   Lab Units 04/29/24  0141   SODIUM mmol/L 133*   POTASSIUM mmol/L 3.5   CHLORIDE mmol/L 99   CO2 mmol/L 26   BUN mg/dL 19   CREATININE mg/dL 0.61   ANION GAP mmol/L 8   CALCIUM mg/dL 9.2   ALBUMIN g/dL 3.7   TOTAL BILIRUBIN mg/dL 0.60   ALK PHOS U/L 93   ALT U/L 9   AST U/L 14   GLUCOSE RANDOM mg/dL 161*     Results from last 7 days   Lab Units 04/29/24  0141   INR  1.03             Results from last 7 days   Lab Units 04/29/24  0141   LACTIC ACID mmol/L 0.9   PROCALCITONIN ng/ml 0.14       Imaging: I have personally reviewed pertinent reports.      CT chest abdomen pelvis w contrast   Final Result by Yari Goode MD (04/29 0308)      Left pyelonephritis.      Cystitis.      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, pancreatic tail and numerous loops of small bowel         Workstation performed: [id number]         XR chest 1 view portable    (Results Pending)       EKG, Pathology, and Other Studies Reviewed on Admission:   CT    Allscripts / Epic Records Reviewed:  Yes     ** Please Note: This note has been constructed using a voice recognition system. **

## 2024-04-29 NOTE — PLAN OF CARE
Problem: PHYSICAL THERAPY ADULT  Goal: Performs mobility at highest level of function for planned discharge setting.  See evaluation for individualized goals.  Description: Treatment/Interventions: Functional transfer training, LE strengthening/ROM, Therapeutic exercise, Endurance training, Cognitive reorientation, Equipment eval/education, Bed mobility, Gait training, Patient/family training, Compensatory technique education, Continued evaluation, Spoke to nursing          See flowsheet documentation for full assessment, interventions and recommendations.  Note: Prognosis: Guarded  Problem List: Decreased strength, Decreased endurance, Impaired balance, Decreased mobility, Decreased cognition, Impaired vision, Pain  Assessment: Pt is a 89 y.o. female y/o presenting to [facility] 4/29/2024 after being found on floor, weak, confused. Primary dx: Septic shock (HCC) likely 2/2 pyelonephritis w/ (+) fever, tachycardia and hypotension requiring pressor management during hospital stay. Significant pmhx per chart: PMH UTI(recent-2/24, 3/24), blindness, Ca, arthritis, chronic pain, HTN, macular degeneration, OP, PE, OA, h/o sternal fx (2/2024). PT consulted to assess strength/functional mobility, activity tolerance and d/c needs. Active PT orders and activity orders for Up and OOB as tolerated .Per chart, pt recently requiring assistance w/ ADLs, transfers, and ambulation using RW w/ recent decline in functional mobility. (+) Falls (-) home alone.  Pt amenable to PT session. During PT IE, pt presenting with above (see flowsheet) outlined functional impairments including dec strength, balance, gait, and deficits that limit functional mobility and activity tolerance relative to baseline. Pt currently requires moderate assistance x1 for bed mobility, max Ax2 to achieve partial upright w/ transfers using RW, and unable to ambulate. Pt w/ significant fear of falling that limits mobility and pt willingness to  "stand/ambulate. Pt currently demonstrating inc fall risk 2/2 hx of falls, impaired balance, use of ambulatory aid, impaired coordination, altered vision, fear of falling, multiple co-morbidities, varying levels of pain, acuity of medical illness, and ongoing medical treatment of primary dx. Pt stating \"ow\" multiple times, however denies pain stating \"I am just afraid.\" Fall risk education provided with good understanding. Elevated BP noted during session. RN aware. Denied additional sxs throughout session. Recommend quick move w/ appropriate chair for OOB to chair vs. Mac if needed.   Pt will benefit from continued PT services to progress mobility independence necessary for return to PLOF. Based on pt presentation and impairments, pt would most appropriately benefit from level II (mod) rehab intensity resources  pending progress.Additional treatment session following IE. Assessment below.  Barriers to Discharge: Inaccessible home environment, Decreased caregiver support (significant assistance w/ mobility,)     Rehab Resource Intensity Level, PT: II (Moderate Resource Intensity) (pending progress)    See flowsheet documentation for full assessment.        "

## 2024-04-29 NOTE — PHYSICAL THERAPY NOTE
PHYSICAL THERAPY EVALUATION (6922-0519) and treatment (0950-3922)    NAME:  Latanya Ray  DATE: 04/29/24    AGE:   89 y.o.  Mrn:   [id number]  ADMIT DX:  Pyelonephritis [N12]  Fever [R50.9]  Septic shock (HCC) [A41.9, R65.21]    Patient Active Problem List   Diagnosis    Cerebral aneurysm    Anxiety    Breast carcinoma ([facility])    Hyperlipidemia    Neoplasm of skin of face    Vitamin D deficiency    Cognitive impairment    Legal blindness    Diaphragmatic hernia without obstruction or gangrene    Dysphagia, oropharyngeal phase    Sternal fracture    Septic shock ([facility])    Pyelonephritis       Past Medical History:   Diagnosis Date    Anxiety     Arthritis     Breast carcinoma ([facility]) 08/13/2012    Description: s/p L mastectomy. Dr. Diaz    Cancer ([facility])     Cellulitis of left lower limb 02/27/2024    Chronic pain of left knee 07/11/2019    Chronic pain syndrome 01/28/2020    Elevated serum alkaline phosphatase level     mild, isoenzymes are normal, resolved 4/24/17 labs , last assessed 11/10/16, resolved 4/24/17    GERD (gastroesophageal reflux disease) 06/20/2012    Glaucoma 08/12/2014    Hematuria     last assessed 9/18/12    Hyperlipidemia 08/13/2012    Hypertension     Macular degeneration 08/12/2014    Mixed stress and urge urinary incontinence 03/27/2023    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    06/06/2018 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    Osteoarthritis 06/05/2012    Osteoporosis 06/05/2012    Description: Dexa 7/13 read as normal, patient declines medication    Pressure injury of coccygeal region, stage 2 ([facility]) 03/06/2020    Pulmonary embolism ([facility]) 01/2011    last assessed 9/18/12    Restless legs syndrome 06/05/2012    RLS (restless legs syndrome)     Thoracic back pain 10/19/2017       Past Surgical History:   Procedure Laterality Date    BILATERAL OOPHORECTOMY      Laparoscopic    BREAST SURGERY Left     Mastectomy     CHOLECYSTECTOMY      Laparoscopic    HERNIA REPAIR      HYSTERECTOMY      SMALL INTESTINE  SURGERY         Imaging Studies:  CT chest abdomen pelvis w contrast   Final Result by Yari Goode MD (04/29 1300)      Left pyelonephritis.      Cystitis.      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, pancreatic tail and numerous loops of small bowel         Workstation performed: [id number]         XR chest 1 view portable   Final Result by Liam Woodard MD (04/29 1505)         1. No consolidation or alveolar edema.      2. Bibasilar atelectasis and chronic mild interstitial prominence, similar to the CT of April 2023.      3. Hiatal hernia.            Workstation performed: [id number]             Past Medical History:   Diagnosis Date    Anxiety     Arthritis     Breast carcinoma ([facility]) 08/13/2012    Description: s/p L mastectomy. Dr. Diaz    Cancer ([facility])     Cellulitis of left lower limb 02/27/2024    Chronic pain of left knee 07/11/2019    Chronic pain syndrome 01/28/2020    Elevated serum alkaline phosphatase level     mild, isoenzymes are normal, resolved 4/24/17 labs , last assessed 11/10/16, resolved 4/24/17    GERD (gastroesophageal reflux disease) 06/20/2012    Glaucoma 08/12/2014    Hematuria     last assessed 9/18/12    Hyperlipidemia 08/13/2012    Hypertension     Macular degeneration 08/12/2014    Mixed stress and urge urinary incontinence 03/27/2023    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    06/06/2018 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    Osteoarthritis 06/05/2012    Osteoporosis 06/05/2012    Description: Dexa 7/13 read as normal, patient declines medication    Pressure injury of coccygeal region, stage 2 ([facility]) 03/06/2020    Pulmonary embolism ([facility]) 01/2011    last assessed 9/18/12    Restless legs syndrome 06/05/2012    RLS (restless legs syndrome)     Thoracic back pain 10/19/2017     Length Of Stay: 0  Performed at least 2 patient identifiers during session: Name and Birthday  PHYSICAL THERAPY EVALUATION :        04/29/24 1337   PT Last Visit   PT Visit Date 04/29/24   Note Type    Note type Evaluation  (and treatment)   End of Consult   Patient Position at End of Consult Bedside chair;All needs within reach;Bed/Chair alarm activated   Pain Assessment   Pain Assessment Tool FLACC   Pain Score No Pain   Pain Rating: FLACC (Rest) - Face 0   Pain Rating: FLACC (Rest) - Legs 0   Pain Rating: FLACC (Rest) - Activity 0   Pain Rating: FLACC (Rest) - Cry 0   Pain Rating: FLACC (Rest) - Consolability 0   Score: FLACC (Rest) 0   Pain Rating: FLACC (Activity) - Face 0   Pain Rating: FLACC (Activity) - Legs 0   Pain Rating: FLACC (Activity) - Activity 0   Pain Rating: FLACC (Activity) - Cry 1   Pain Rating: FLACC (Activity) - Consolability 0   Score: FLACC (Activity) 1   Restrictions/Precautions   Weight Bearing Precautions Per Order No   Other Precautions Cognitive;Chair Alarm;Bed Alarm;Fall Risk;Multiple lines;Visual impairment   Home Living   Type of Home House   Home Layout One level   Bathroom Shower/Tub Tub/shower unit   Bathroom Toilet Standard   Bathroom Equipment Commode   Home Equipment Walker;Cane;Wheelchair-manual  (transport chair, lift chair)   Prior Function   Level of [place] Needs assistance with functional mobility;Needs assistance with ADLs   Lives With Daughter   Falls in the last 6 months 1 to 4  (3)   Comments Per chart, pt recently requiring assistance w/ ADLs, transfers, and ambulation using RW w/ recent decline in functional mobility. (+) Falls (-) home alone.   General   Additional Pertinent History PMH UTI(recent-2/24, 3/24), blindness, Ca, arthritis, chronic pain, HTN, macular degeneration, OP, PE, OA .   Family/Caregiver Present No   Cognition   Overall Cognitive Status Impaired   Attention Attends with cues to redirect   Orientation Level Oriented to person;Disoriented to place;Disoriented to time;Disoriented to situation   Memory Decreased short term memory;Decreased recall of recent events;Decreased recall of biographical information;Decreased recall of precautions  "  Following Commands Follows one step commands with increased time or repetition   Comments Significant fear of falling requiring reassurance. Tactile cues throughout 2/2 visually impaired   Subjective   Subjective \"I'm afraid I'm going to fall.\"   RLE Assessment   RLE Assessment WFL  (4-/5 major muscle groups, b/l ankle and foot deformities)   LLE Assessment   LLE Assessment WFL  (4-/5 major muscle groups, b/l ankle and foot deformities)   Bed Mobility   Supine to Sit 3  Moderate assistance   Additional items Assist x 1;HOB elevated;Increased time required;Verbal cues;Bedrails   Additional Comments HOB 45. Multimodal cues for safe technique/sequencing. EOB inially mod Ax1 progression to close supervision   Transfers   Sit to Stand 2  Maximal assistance   Additional items Assist x 2;Verbal cues;Bedrails   Stand to Sit 2  Maximal assistance   Additional items Assist x 1   Additional Comments unable to achieve full upright w/ max Ax2 during 2 attempts using RW for stability w/ b/l knee blocks. Pt reporting fear of falling. Quick move initiated in additional tx session.   Ambulation/Elevation   Ambulation/Elevation Additional Comments unable to assess.   Balance   Static Sitting Fair   Dynamic Sitting Fair -   Static Standing Poor -   Dynamic Standing Poor -   Activity Tolerance   Activity Tolerance (S)  Patient limited by fatigue;Other (Comment)  (cognition; BP supine 154/71, BP /85 (), RN notified.)   Medical Staff Made Aware Rosi Pack RN Naima   Nurse Made Aware cleared/updated   Assessment   Prognosis Guarded   Problem List Decreased strength;Decreased endurance;Impaired balance;Decreased mobility;Decreased cognition;Impaired vision;Pain   Assessment Pt is a 89 y.o. female y/o presenting to [facility] on 4/29/2024 after being found on floor, weak, confused. Primary dx: Septic shock (HCC) likely 2/2 pyelonephritis w/ (+) fever, tachycardia and hypotension requiring pressor management " "during hospital stay. Significant pmhx per chart: PMH UTI(recent-2/24, 3/24), blindness, Ca, arthritis, chronic pain, HTN, macular degeneration, OP, PE, OA, h/o sternal fx (2/2024). PT consulted to assess strength/functional mobility, activity tolerance and d/c needs. Active PT orders and activity orders for Up and OOB as tolerated .Per chart, pt recently requiring assistance w/ ADLs, transfers, and ambulation using RW w/ recent decline in functional mobility. (+) Falls (-) home alone.  Pt amenable to PT session. During PT IE, pt presenting with above (see flowsheet) outlined functional impairments including dec strength, balance, gait, and deficits that limit functional mobility and activity tolerance relative to baseline. Pt currently requires moderate assistance x1 for bed mobility, max Ax2 to achieve partial upright w/ transfers using RW, and unable to ambulate. Pt w/ significant fear of falling that limits mobility and pt willingness to stand/ambulate. Pt currently demonstrating inc fall risk 2/2 hx of falls, impaired balance, use of ambulatory aid, impaired coordination, altered vision, fear of falling, multiple co-morbidities, varying levels of pain, acuity of medical illness, and ongoing medical treatment of primary dx. Pt stating \"ow\" multiple times, however denies pain stating \"I am just afraid.\" Fall risk education provided with good understanding. Elevated BP noted during session. RN aware. Denied additional sxs throughout session. Recommend quick move w/ appropriate chair for OOB to chair vs. Mac if needed.   Pt will benefit from continued PT services to progress mobility independence necessary for return to PLOF. Based on pt presentation and impairments, pt would most appropriately benefit from level II (mod) rehab intensity resources  pending progress.Additional treatment session following IE. Assessment below.   Barriers to Discharge Inaccessible home environment;Decreased caregiver " support  (significant assistance w/ mobility,)   Goals   Patient Goals unable to state 2/2 cognition   STG Expiration Date 05/13/24   Short Term Goal #1 1).  Pt will perform bed mobility with min Ax1 demonstrating appropriate technique 100% of the time in order to improve function. 2)  Perform all transfers with min Ax1 demonstrating safe and appropriate technique 100% of the time in order to improve ability to negotiate safely in home environment. 3) reassess gait goals pending progress 4)  Improve overall strength and balance 1/2 grade in order to optimize ability to perform functional tasks and reduce fall risk. 5) Increase activity tolerance to 45 minutes in order to improve endurance to functional tasks. 6) Pt will be able to stand w/ min Ax1 for 3 minutes to promote progression to pre-gait activities 7) PT for ongoing patient and family/caregiver education, DME needs and d/c planning in order to promote highest level of function in least restrictive environment.  9) Further functional assessment required. Will review and update goals following further assessment.   PT Treatment Day 0   Plan   Treatment/Interventions Functional transfer training;LE strengthening/ROM;Therapeutic exercise;Endurance training;Cognitive reorientation;Equipment eval/education;Bed mobility;Gait training;Patient/family training;Compensatory technique education;Continued evaluation;Spoke to nursing   PT Frequency 3-5x/wk;2-3x/wk   Discharge Recommendation   Rehab Resource Intensity Level, PT II (Moderate Resource Intensity)  (pending progress)   Additional Comments Recommend quick move w/ appropriate chair vs. clark for OOB to chair   Additional Comments 2 The patient's AM-PAC Basic Mobility Inpatient Short Form Raw Score is 8. A Raw score of less than or equal to 16 suggests the patient may benefit from discharge to post-acute rehab services. Please also refer to the recommendation of the Physical Therapist for safe discharge planning.    AM-PAC Basic Mobility Inpatient   Turning in Flat Bed Without Bedrails 2   Lying on Back to Sitting on Edge of Flat Bed Without Bedrails 2   Moving Bed to Chair 1   Standing Up From Chair Using Arms 1   Walk in Room 1   Climb 3-5 Stairs With Railing 1   Basic Mobility Inpatient Raw Score 8   [facility] Highest Level Of Mobility   -[facility] Goal 3: Sit at edge of bed   -HL Achieved 4: Move to chair/commode   Additional Treatment Session   Start Time 1326   End Time 1337   Treatment Assessment Additional treatment session following IE w/ focus on functional transfers using quick move.Progress towards functional goals noted. Pt requiring Mod Ax1 to stand using quick move x2 reps and then Max Ax1 on 3rd rep w/ b/l ischial tuberosity assist to achieve adequate buttock clearance and promote upright posture. Mod Ax1 for stand to sit in bedside chair.  Increased resistance to movement w/ fear of falling. Pt w/ noted incontinence. Pt returned to bedside chair and dependent for laura-care. Max Ax1 for rolling L/R. Pt Left in care of RN. Cont to recommend use of quickmove vs. clark for OOB to chair. RN aware.   Equipment Use quick move   Additional Treatment Day 1   End of Consult   Patient Position at End of Consult Bedside chair;Bed/Chair alarm activated;All needs within reach;Other (comment)  (RN in room.)       (Please find full objective findings from PT assessment regarding body systems outlined above).     Hx/personal factors: advanced age, behavioral pattern, inability to navigate level surfaces without external assistance, and recent fall(s)/fall history, co-morbidities, mutliple lines, use of AD, dec cognition, fall risk, h/o of falls, and assist w/ ADL's, fear of falling, recurrent UTI's  Examination: assessed/impairments of systems including multiple body structures involved; dec mobility, dec balance, dec endurance, dec amb, risk for falls, pain, impairements in locomotion, musculoskeletal, balance, endurance,  posture, coordination, assessed cognition, activity limitations (difficulties executing an action) , participation restrictions (problems associate w/ involvement in life situations), AM-PAC score suggesting inc assistance/supervision vs baseline, use of more restrictive AD/brace/orthosis/prosthesis, impaired judgment/safety awareness/impulsivity, gait deviations , dec activity tolerance/endurance vs baseline ,   Clinical: unpredictable (ongoing medical status, abnormal lab values, risk for falls, need for input for mobility technique/safety, fear/retreat, and bed/chair alarm, )  Complexity: high       Mark Verdugo, PT,DPT   04/29/24

## 2024-04-29 NOTE — SEPSIS NOTE
Sepsis Note   Latanya Ray 89 y.o. female MRN: [id number]  Unit/Bed#: ED-28 Encounter: [id number]       Initial Sepsis Screening       Row Name 04/29/24 0214                Is the patient's history suggestive of a new or worsening infection? Yes (Proceed)  -EW        Suspected source of infection suspect infection, source unknown  -EW        Indicate SIRS criteria Hyperthemia > 38.3C (100.9F) OR Hypothermia <36C (96.8F);Tachypnea > 20 resp per min  -EW        Are two or more of the above signs & symptoms of infection both present and new to the patient? Yes (Proceed)  -EW        Assess for evidence of organ dysfunction: Are any of the below criteria present within 6 hours of suspected infection and SIRS criteria that are NOT considered to be chronic conditions? --                  User Key  (r) = Recorded By, (t) = Taken By, (c) = Cosigned By      Initials Name Provider Type    EW Lauren Cárdenas PA-C Physician Assistant                        Body mass index is 28.68 kg/m².  Wt Readings from Last 1 Encounters:   04/29/24 57 kg (125 lb 10.6 oz)        Ideal body weight: 43.2 kg (95 lb 2.3 oz)  Adjusted ideal body weight: 48.7 kg (107 lb 5.6 oz)

## 2024-04-29 NOTE — PLAN OF CARE
Problem: OCCUPATIONAL THERAPY ADULT  Goal: Performs self-care activities at highest level of function for planned discharge setting.  See evaluation for individualized goals.  Description: Treatment Interventions: ADL retraining, Functional transfer training, UE strengthening/ROM, Endurance training, Cognitive reorientation, Patient/family training, Compensatory technique education, Continued evaluation          See flowsheet documentation for full assessment, interventions and recommendations.   Note: Limitation: Decreased ADL status, Decreased UE strength, Decreased Safe judgement during ADL, Decreased cognition, Decreased endurance, Decreased high-level ADLs  Prognosis: Fair  Assessment: Pt is a 89y/o female admitted to the hospital after being found on the flr, weak and confused. Pt noted with a fever, septic shock, pyelonephritis. Pt with PMH UTI(recent-2/24, 3/24), blindness, Ca, arthritis, chronic pain, HTN, macular degeneration, OP, PE, OA. PTA family(dtr) states pt required assistance with her ADLs, transfers, ambulation--with RW; pt with recent decline in her functional mobilities; +falls=3, neg home alone. During initial eval, pt demonstrated deficits with her functional balance, functional mobility, ADL status, transfer safety, b/l UE strength, activity tolerance(currently fair=15-20mins), and cognition(i.e.orientation, memory, problem-solving, judgement/safety). Pt would benefit from continued OT tx for the above deficits. 2-5xwk/1-2wks. The patient's raw score on the AM-PAC Daily Activity Inpatient Short Form is 13. A raw score of less than 19 suggests the patient may benefit from discharge to post-acute rehabilitation services. Please refer to the recommendation of the Occupational Therapist for safe discharge planning.     Rehab Resource Intensity Level, OT: II (Moderate Resource Intensity)

## 2024-04-29 NOTE — ED PROVIDER NOTES
History  Chief Complaint   Patient presents with    Fever     Pt BIBA from home, weakness noted by daughter at home. EMS got 102.2 temp and gave 650 tylenol.      Latanya is a 89-year-old female with history of hypertension, hyperlipidemia, PE, basal cell carcinoma, multiple abdominal surgeries presenting to the emergency department with fever and weakness x 1 day.  She presents with her daughter who states that she called the ambulance because she was having difficulty standing out of her reclining chair earlier today.  She states that she also had confused her with her cousin.  On evaluation she is alert and oriented to person, place, time and is now able to identify her daughter.  She has no complaints at this time and no pain.  Temperature for EMS was 102.2.  Temperature on arrival after second and 650 mg of Tylenol was 101.2.      Fever  Associated symptoms: no abdominal pain, no chest pain, no cough, no diarrhea, no ear pain, no fever, no headaches, no nausea, no rash, no shortness of breath, no sore throat and no vomiting        Prior to Admission Medications   Prescriptions Last Dose Informant Patient Reported? Taking?   Acetaminophen (TYLENOL ARTHRITIS PAIN PO)   Yes Yes   Sig: Take 1 tablet by mouth if needed   Calcium Carbonate-Vit D-Min (CALCIUM 1200 PO)   Yes Yes   Sig: Take 750 mg by mouth in the morning At 3 PM   Cranberry 250 MG TABS   Yes Yes   Sig: Take 2 tablets by mouth 3 (three) times a day   Multiple Vitamins-Minerals (PRESERVISION AREDS 2 PO)   Yes Yes   Sig: Take 1 capsule by mouth 2 (two) times a day   aspirin (ECOTRIN LOW STRENGTH) 81 mg EC tablet   Yes Yes   Sig: Take 81 mg by mouth in the morning.   cholecalciferol (VITAMIN D3) 1,000 units tablet   Yes Yes   Sig: Take 4,000 Units by mouth daily   meclizine (ANTIVERT) 25 mg tablet   No Yes   Sig: Take 1 tablet (25 mg total) by mouth every 8 (eight) hours as needed for dizziness   omeprazole (PriLOSEC) 20 mg delayed release capsule   No Yes    Sig: Take 1 capsule (20 mg total) by mouth daily   pramipexole (MIRAPEX) 0.25 mg tablet   No Yes   Sig: Take 1 tablet (0.25 mg total) by mouth daily at bedtime   vitamin B-12 (VITAMIN B-12) 1,000 mcg tablet   Yes Yes   Sig: Take 2,500 mcg by mouth every other day      Facility-Administered Medications: None       Past Medical History:   Diagnosis Date    Anxiety     Arthritis     Breast carcinoma ([facility]) 08/13/2012    Description: s/p L mastectomy. Dr. Diaz    Cancer ([facility])     Cellulitis of left lower limb 02/27/2024    Chronic pain of left knee 07/11/2019    Chronic pain syndrome 01/28/2020    Elevated serum alkaline phosphatase level     mild, isoenzymes are normal, resolved 4/24/17 labs , last assessed 11/10/16, resolved 4/24/17    GERD (gastroesophageal reflux disease) 06/20/2012    Glaucoma 08/12/2014    Hematuria     last assessed 9/18/12    Hyperlipidemia 08/13/2012    Hypertension     Macular degeneration 08/12/2014    Mixed stress and urge urinary incontinence 03/27/2023    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    06/06/2018 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    Osteoarthritis 06/05/2012    Osteoporosis 06/05/2012    Description: Dexa 7/13 read as normal, patient declines medication    Pressure injury of coccygeal region, stage 2 ([facility]) 03/06/2020    Pulmonary embolism ([facility]) 01/2011    last assessed 9/18/12    Restless legs syndrome 06/05/2012    RLS (restless legs syndrome)     Thoracic back pain 10/19/2017       Past Surgical History:   Procedure Laterality Date    BILATERAL OOPHORECTOMY      Laparoscopic    BREAST SURGERY Left     Mastectomy     CHOLECYSTECTOMY      Laparoscopic    HERNIA REPAIR      HYSTERECTOMY      SMALL INTESTINE SURGERY         Family History   Problem Relation Age of Onset    Hypertension Mother     Blindness Mother     Cancer Sister     Cancer Maternal Aunt     Cancer Maternal Uncle      I have reviewed and agree with the history as documented.    E-Cigarette/Vaping    E-Cigarette Use Never  User     Cartridges/Day 0     Comments denies      E-Cigarette/Vaping Substances     Social History     Tobacco Use    Smoking status: Never    Smokeless tobacco: Never    Tobacco comments:     denies   Vaping Use    Vaping status: Never Used   Substance Use Topics    Alcohol use: No     Alcohol/week: 0.0 standard drinks of alcohol     Comment: denies    Drug use: No       Review of Systems   Constitutional:  Negative for chills and fever.   HENT:  Negative for ear pain and sore throat.    Eyes:  Negative for pain and visual disturbance.   Respiratory:  Negative for cough, choking, chest tightness and shortness of breath.    Cardiovascular:  Negative for chest pain and palpitations.   Gastrointestinal:  Negative for abdominal pain, diarrhea, nausea and vomiting.   Genitourinary:  Negative for dysuria, flank pain, hematuria and pelvic pain.   Musculoskeletal:  Negative for arthralgias and back pain.   Skin:  Negative for color change and rash.   Neurological:  Positive for weakness. Negative for dizziness, seizures, syncope and headaches.   All other systems reviewed and are negative.      Physical Exam  Physical Exam  Vitals and nursing note reviewed.   Constitutional:       General: She is not in acute distress.     Appearance: She is well-developed. She is not diaphoretic.   HENT:      Head: Normocephalic and atraumatic.      Right Ear: Tympanic membrane, ear canal and external ear normal.      Left Ear: Tympanic membrane, ear canal and external ear normal.      Nose: Nose normal. No congestion.      Mouth/Throat:      Mouth: Mucous membranes are moist.   Eyes:      Conjunctiva/sclera: Conjunctivae normal.      Pupils: Pupils are equal, round, and reactive to light.   Cardiovascular:      Rate and Rhythm: Normal rate and regular rhythm.      Heart sounds: No murmur heard.  Pulmonary:      Effort: Pulmonary effort is normal. No respiratory distress.      Breath sounds: Normal breath sounds. No wheezing, rhonchi or  rales.   Abdominal:      General: Bowel sounds are normal. There is no distension.      Palpations: Abdomen is soft.      Tenderness: There is no abdominal tenderness. There is no right CVA tenderness or left CVA tenderness.   Musculoskeletal:         General: No swelling.      Cervical back: Neck supple.      Comments: Deformities of bilateral feet and ankles.   Skin:     General: Skin is warm and dry.      Capillary Refill: Capillary refill takes less than 2 seconds.      Comments: Bilateral shins brawny.  DP pulses 1+ bilaterally.  No sharp demarcated erythema of the skin.  No weeping or discharge. onychomycosis and bunions present.   Neurological:      General: No focal deficit present.      Mental Status: She is alert and oriented to person, place, and time.      Cranial Nerves: No cranial nerve deficit.      Sensory: No sensory deficit.   Psychiatric:         Mood and Affect: Mood normal.         Vital Signs  ED Triage Vitals   Temperature Pulse Respirations Blood Pressure SpO2   04/29/24 0101 04/29/24 0101 04/29/24 0101 04/29/24 0104 04/29/24 0101   (!) 101.2 °F (38.4 °C) 90 18 130/61 94 %      Temp Source Heart Rate Source Patient Position - Orthostatic VS BP Location FiO2 (%)   04/29/24 0101 04/29/24 0101 04/29/24 0101 04/29/24 0101 --   Oral Monitor Lying Right arm       Pain Score       04/29/24 0101       No Pain           Vitals:    04/29/24 0500 04/29/24 0505 04/29/24 0530 04/29/24 0535   BP: 101/50 95/52 112/53 127/60   Pulse: 76 85 79 88   Patient Position - Orthostatic VS: Lying Lying Lying Lying         Visual Acuity      ED Medications  Medications   sodium chloride 0.9 % bolus 1,000 mL (0 mL Intravenous Stopped 4/29/24 0304)   iohexol (OMNIPAQUE) 350 MG/ML injection (MULTI-DOSE) 100 mL (100 mL Intravenous Given 4/29/24 0245)   sodium chloride 0.9 % bolus 500 mL (0 mL Intravenous Stopped 4/29/24 0359)   cefTRIAXone (ROCEPHIN) IVPB (premix in dextrose) 1,000 mg 50 mL (0 mg Intravenous Stopped  4/29/24 0359)   sodium chloride 0.9 % bolus 250 mL (0 mL Intravenous Stopped 4/29/24 0522)   albumin human (FLEXBUMIN) 5 % injection 12.5 g (12.5 g Intravenous New Bag 4/29/24 0522)       Diagnostic Studies  Results Reviewed       Procedure Component Value Units Date/Time    Urine Microscopic [[id number]]  (Abnormal) Collected: 04/29/24 0325    Lab Status: Final result Specimen: Urine, Clean Catch Updated: 04/29/24 0417     RBC, UA Innumerable /hpf      WBC, UA Innumerable /hpf      Epithelial Cells None Seen /hpf      Bacteria, UA Innumerable /hpf      Ca Oxalate Lacy, UA Moderate /hpf      WBC Clumps Present    Urine culture [[id number]] Collected: 04/29/24 0325    Lab Status: In process Specimen: Urine, Clean Catch Updated: 04/29/24 0417    HS Troponin I 2hr [[id number]]  (Normal) Collected: 04/29/24 0342    Lab Status: Final result Specimen: Blood from Arm, Right Updated: 04/29/24 0410     hs TnI 2hr 4 ng/L      Delta 2hr hsTnI 0 ng/L     HS Troponin I 4hr [[id number]]     Lab Status: No result Specimen: Blood     Urine culture [[id number]] Collected: 04/29/24 0324    Lab Status: In process Specimen: Urine, Clean Catch Updated: 04/29/24 0332    Urine Macroscopic, POC [[id number]]  (Abnormal) Collected: 04/29/24 0325    Lab Status: Final result Specimen: Urine Updated: 04/29/24 0327     Color, UA Yellow     Clarity, UA Clear     pH, UA >=9.0     Leukocytes, UA Large     Nitrite, UA Negative     Protein,  (2+) mg/dl      Glucose, UA Negative mg/dl      Ketones, UA Negative mg/dl      Urobilinogen, UA 0.2 E.U./dl      Bilirubin, UA Negative     Occult Blood, UA Small     Specific [place], UA 1.015    Narrative:      CLINITEK RESULT    Lactic acid, plasma (w/reflex if result > 2.0) [[id number]]  (Normal) Collected: 04/29/24 0141    Lab Status: Final result Specimen: Blood from Arm, Right Updated: 04/29/24 0221     LACTIC ACID 0.9 mmol/L     Narrative:      Result may be elevated if tourniquet was used during  collection.    Blood culture #2 [[id number]] Collected: 04/29/24 0214    Lab Status: In process Specimen: Blood from Hand, Right Updated: 04/29/24 0218    Blood culture #1 [[id number]] Collected: 04/29/24 0115    Lab Status: In process Specimen: Blood from Hand, Right Updated: 04/29/24 0218    Procalcitonin [[id number]]  (Normal) Collected: 04/29/24 0141    Lab Status: Final result Specimen: Blood from Arm, Right Updated: 04/29/24 0213     Procalcitonin 0.14 ng/ml     HS Troponin 0hr (reflex protocol) [[id number]]  (Normal) Collected: 04/29/24 0141    Lab Status: Final result Specimen: Blood from Arm, Right Updated: 04/29/24 0210     hs TnI 0hr 4 ng/L     Comprehensive metabolic panel [[id number]]  (Abnormal) Collected: 04/29/24 0141    Lab Status: Final result Specimen: Blood from Arm, Right Updated: 04/29/24 0206     Sodium 133 mmol/L      Potassium 3.5 mmol/L      Chloride 99 mmol/L      CO2 26 mmol/L      ANION GAP 8 mmol/L      BUN 19 mg/dL      Creatinine 0.61 mg/dL      Glucose 161 mg/dL      Calcium 9.2 mg/dL      AST 14 U/L      ALT 9 U/L      Alkaline Phosphatase 93 U/L      Total Protein 7.1 g/dL      Albumin 3.7 g/dL      Total Bilirubin 0.60 mg/dL      eGFR 76 ml/min/1.73sq m     Narrative:      [employer] guidelines for Chronic Kidney Disease (CKD):     Stage 1 with normal or high GFR (GFR > 90 mL/min/1.73 square meters)    Stage 2 Mild CKD (GFR = 60-89 mL/min/1.73 square meters)    Stage 3A Moderate CKD (GFR = 45-59 mL/min/1.73 square meters)    Stage 3B Moderate CKD (GFR = 30-44 mL/min/1.73 square meters)    Stage 4 Severe CKD (GFR = 15-29 mL/min/1.73 square meters)    Stage 5 End Stage CKD (GFR <15 mL/min/1.73 square meters)  Note: GFR calculation is accurate only with a steady state creatinine    Protime-INR [[id number]]  (Normal) Collected: 04/29/24 0141    Lab Status: Final result Specimen: Blood from Arm, Right Updated: 04/29/24 0202     Protime 13.7 seconds      INR 1.03     APTT [[id number]]  (Normal) Collected: 04/29/24 0141    Lab Status: Final result Specimen: Blood from Arm, Right Updated: 04/29/24 0202     PTT 32 seconds     CBC and differential [[id number]]  (Abnormal) Collected: 04/29/24 0141    Lab Status: Final result Specimen: Blood from Arm, Right Updated: 04/29/24 0148     WBC 11.26 Thousand/uL      RBC 3.99 Million/uL      Hemoglobin 11.8 g/dL      Hematocrit 35.5 %      MCV 89 fL      MCH 29.6 pg      MCHC 33.2 g/dL      RDW 14.9 %      MPV 9.8 fL      Platelets 275 Thousands/uL      nRBC 0 /100 WBCs      Segmented % 85 %      Immature Grans % 0 %      Lymphocytes % 6 %      Monocytes % 9 %      Eosinophils Relative 0 %      Basophils Relative 0 %      Absolute Neutrophils 9.50 Thousands/µL      Absolute Immature Grans 0.05 Thousand/uL      Absolute Lymphocytes 0.66 Thousands/µL      Absolute Monocytes 1.01 Thousand/µL      Eosinophils Absolute 0.00 Thousand/µL      Basophils Absolute 0.04 Thousands/µL                    CT chest abdomen pelvis w contrast   Final Result by Yari Goode MD (04/29 0308)      Left pyelonephritis.      Cystitis.      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   0319 Left pyelo and cystitis. Ceftriaxone initiated                              Initial Sepsis Screening       Row Name 04/29/24 0214                Is the patient's history suggestive of a new or worsening infection? Yes (Proceed)  -EW        Suspected source of infection suspect infection, source unknown  -EW        Indicate SIRS criteria Hyperthemia > 38.3C (100.9F) OR Hypothermia <36C (96.8F);Tachypnea > 20 resp per min  -EW         Are two or more of the above signs & symptoms of infection both present and new to the patient? Yes (Proceed)  -EW        Assess for evidence of organ dysfunction: Are any of the below criteria present within 6 hours of suspected infection and SIRS criteria that are NOT considered to be chronic conditions? --                  User Key  (r) = Recorded By, (t) = Taken By, (c) = Cosigned By      Initials Name Provider Type    LISANDRO Cárdenas PA-C Physician Assistant                                  Medical Decision Making  Patient presents with fever of unknown origin.  DDx includes but is not limited to sepsis, pneumonia, lung abscess, bowel perforation, pyelonephritis, UTI, cellulitis, obstructed stone, appendicitis, septic joint.  Tylenol given prior to arrival.  Decreased from 102.2 to 101.2 to 98.3.  1.75 L fluid given based on 57 kilogram weight.  No recorded echo.  UA shows significant leukocytes.  CT chest abdomen pelvis shows pyelonephritis and cystitis.  Consistent with UA.  Ceftriaxone started.  Sepsis criteria met based on temperature, respiratory rate, source of infection.  Pro-Jordan and lactic not elevated.  Blood pressure fluctuated from systolic low 90s to mid 90s following completion of 1.5 L of fluid.  Based on consistent systolic blood pressure in the low 90s with lowest blood pressure 82/39 Levophed was initiated.  Titrated from 5-6.  Blood pressure rapidly increased to 174/68, tapered back down for trial from 5-0 at the recommendation of critical care.  Blood pressure decreased to 85 systolic after discontinuing levo.  Albumin added as supplement.  Blood pressure stable at 112/53.  After consultation with critical care DULCE, decided on general hospital admission to [facility].     Amount and/or Complexity of Data Reviewed  Labs: ordered.  Radiology: ordered.  ECG/medicine tests: independent interpretation performed.     Details: Normal sinus rhythm    Risk  Prescription drug management.              Disposition  Final diagnoses:   Pyelonephritis   Septic shock (HCC)     Time reflects when diagnosis was documented in both MDM as applicable and the Disposition within this note       Time User Action Codes Description Comment    4/29/2024  5:27 AM Lauren Cárdenas [N12] Pyelonephritis     4/29/2024  5:27 AM Lauren Cárdenas [A41.9,  R65.21] Septic shock (HCC)           ED Disposition       ED Disposition   Admit    Condition   Stable    Date/Time   Mon Apr 29, 2024  5:40 AM    Comment   Case was discussed with Aiyana De La Torre and the patient's admission status was agreed to be Admission Status: inpatient status to the service of Dr. Jimenez .               Follow-up Information    None         Patient's Medications   Discharge Prescriptions    No medications on file       No discharge procedures on file.    PDMP Review         Value Time User    PDMP Reviewed  Yes 4/25/2023  5:40 PM Amina Rowe DO            ED Provider  Electronically Signed by             Lauren Cárdenas PA-C  04/29/24 0544

## 2024-04-29 NOTE — PROGRESS NOTES
Asked to see patient for possible admission to the ICU. The patient is a 89 yo female with a recurrent UTI and left sided pyelonephritis. She had some transient low blood pressures in the ED to a systolic of 82. She has received IVF resuscitation and her BP was slightly improved tot he mid 90's. She was trialed on some low dose peripheral levophed but her BP was markedly elevated to the 150-170 range.     In reviewing the patients records, it appears her BPs run in the mid 110s. Our goal for a SBP should not be much higher than that.     She is a DNR/DNI    Off levophed, the patient's BP is now in the 110's range. She is ok to be admitted to [facility]. Would continue gentle fluid administration and antibiotics as previously prescribed.     We are available should there be any questions or concerns

## 2024-04-29 NOTE — RESTORATIVE TECHNICIAN NOTE
Restorative Technician Note      Patient Name: Latanya Ray     Restorative Tech Visit Date: 04/29/24  Note Type: Mobility  Patient Position Upon Consult: Bedside chair  Mobility / Activity Provided: Assisted RN & PCA with getting pt back to bed  Activity Performed: Transferred; Repositioned  Assistive Device: Other (Comment) (Mac lift)  Patient Position at End of Consult: Supine; All needs within reach

## 2024-04-29 NOTE — ASSESSMENT & PLAN NOTE
Presents with fever, tachycardia and hypotension. PCT/lactate wnl. Source cystitis/pyelonephritis     Plan:  Received sepsis bolus, briefly required norepinephrine in ED titrated to 5mcg then BP increased to 170s then titrated off, received albumin.  BP improved  Continue volume resuscitation   ABX: IV Ceftriaxone   Trend sepsis markers  Follow urine and blood cultures

## 2024-04-29 NOTE — OCCUPATIONAL THERAPY NOTE
Occupational Therapy Evaluation     Patient Name: Latanya Ray  Today's Date: 4/29/2024  Problem List  Principal Problem:    Septic shock (HCC)  Active Problems:    Breast carcinoma (HCC)    Neoplasm of skin of face    Cognitive impairment    Legal blindness    Diaphragmatic hernia without obstruction or gangrene    Pyelonephritis    Past Medical History  Past Medical History:   Diagnosis Date    Anxiety     Arthritis     Breast carcinoma ([facility]) 08/13/2012    Description: s/p L mastectomy. Dr. Diaz    Cancer ([facility])     Cellulitis of left lower limb 02/27/2024    Chronic pain of left knee 07/11/2019    Chronic pain syndrome 01/28/2020    Elevated serum alkaline phosphatase level     mild, isoenzymes are normal, resolved 4/24/17 labs , last assessed 11/10/16, resolved 4/24/17    GERD (gastroesophageal reflux disease) 06/20/2012    Glaucoma 08/12/2014    Hematuria     last assessed 9/18/12    Hyperlipidemia 08/13/2012    Hypertension     Macular degeneration 08/12/2014    Mixed stress and urge urinary incontinence 03/27/2023    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    06/06/2018 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    Osteoarthritis 06/05/2012    Osteoporosis 06/05/2012    Description: Dexa 7/13 read as normal, patient declines medication    Pressure injury of coccygeal region, stage 2 ([facility]) 03/06/2020    Pulmonary embolism ([facility]) 01/2011    last assessed 9/18/12    Restless legs syndrome 06/05/2012    RLS (restless legs syndrome)     Thoracic back pain 10/19/2017     Past Surgical History  Past Surgical History:   Procedure Laterality Date    BILATERAL OOPHORECTOMY      Laparoscopic    BREAST SURGERY Left     Mastectomy     CHOLECYSTECTOMY      Laparoscopic    HERNIA REPAIR      HYSTERECTOMY      SMALL INTESTINE SURGERY             04/29/24 0859   Note Type   Note type Evaluation   Pain Assessment   Pain Assessment Tool FLACC   Pain Rating: FLACC (Rest) - Face 0   Pain Rating: FLACC (Rest) - Legs 0   Pain Rating: FLACC (Rest)  "- Activity 0   Pain Rating: FLACC (Rest) - Cry 0   Pain Rating: FLACC (Rest) - Consolability 0   Score: FLACC (Rest) 0   Restrictions/Precautions   Weight Bearing Precautions Per Order No   Other Precautions Cognitive;Chair Alarm;Bed Alarm;Fall Risk;Multiple lines;Visual impairment   Home Living   Type of Home House   Home Layout One level   Bathroom Shower/Tub Tub/shower unit   Bathroom Toilet Standard   Bathroom Equipment Commode   Home Equipment Walker;Cane;Wheelchair-manual  (transport chair, liftchair)   Prior Function   Level of [place] Needs assistance with functional mobility;Needs assistance with ADLs   Lives With Daughter   Falls in the last 6 months 1 to 4  (3)   Comments PTA family(dtr) states pt required assistance with her ADLs, transfers, ambulation--with RW; pt with recent decline in her functional mobilities; +falls=3, neg home alone   Lifestyle   Autonomy PTA family(dtr) states pt required assistance with her ADLs, transfers, ambulation--with RW; pt with recent decline in her functional mobilities; +falls=3, neg home alone   Reciprocal Relationships supportive dtr, neighbor   Service to Others homemaker   Intrinsic Gratification listening to TV   Subjective   Subjective \"I don't know how old I am.\"   ADL   Where Assessed Edge of bed   Eating Assistance 4  Minimal Assistance   Grooming Assistance 4  Minimal Assistance   UB Bathing Assistance 4  Minimal Assistance   LB Bathing Assistance 2  Maximal Assistance   UB Dressing Assistance 4  Minimal Assistance   LB Dressing Assistance 2  Maximal Assistance   Toileting Assistance  1  Total Assistance   Bed Mobility   Rolling R 2  Maximal assistance   Additional items Assist x 1;Increased time required;Verbal cues;LE management   Rolling L 2  Maximal assistance   Additional items LE management;Verbal cues   Supine to Sit 2  Maximal assistance   Additional items Assist x 1;Increased time required;Verbal cues;LE management   Sit to Supine 2  Maximal " "assistance   Additional items Assist x 1;Increased time required;LE management;Verbal cues   Transfers   Sit to Stand 2  Maximal assistance   Additional items Assist x 1;Increased time required;Verbal cues   Stand to Sit 2  Maximal assistance   Additional items Assist x 1   Additional Comments bp's=89/47(supine), 94/46(return to supine after EOB/standing)--nsg aware   Functional Mobility   Functional Mobility   (recommend \"quick-move\" for OOB with nsg)   Balance   Static Sitting Fair +   Dynamic Sitting Fair   Static Standing Poor +   Dynamic Standing Poor   Activity Tolerance   Activity Tolerance Patient limited by fatigue;Other (Comment)  (cognition)   RUE Assessment   RUE Assessment WFL   RUE Strength   RUE Overall Strength Within Functional Limits - able to perform ADL tasks with strength  (4/5 throughout)   LUE Assessment   LUE Assessment WFL   LUE Strength   LUE Overall Strength Within Functional Limits - able to perform ADL tasks with strength  (4/5 throughout)   Hand Function   Gross Motor Coordination Functional   Fine Motor Coordination Functional   Sensation   Light Touch No apparent deficits   Proprioception   Proprioception No apparent deficits   Vision-Basic Assessment   Current Vision   (no glasses noted)   Visual History Macular degeneration  (blind)   Vision - Complex Assessment   Acuity   (poor)   Psychosocial   Psychosocial (WDL) X   Patient Behaviors/Mood [place] affect;Cooperative   Perception   Inattention/Neglect Appears intact   Cognition   Overall Cognitive Status Impaired   Arousal/Participation Alert   Attention Attends with cues to redirect   Orientation Level Oriented to person;Disoriented to place;Disoriented to time;Disoriented to situation   Memory Decreased short term memory;Decreased recall of recent events;Decreased recall of biographical information;Decreased recall of precautions   Following Commands Follows one step commands with increased time or repetition   Assessment "   Limitation Decreased ADL status;Decreased UE strength;Decreased Safe judgement during ADL;Decreased cognition;Decreased endurance;Decreased high-level ADLs   Prognosis Fair   Assessment Pt is a 89y/o female admitted to the hospital after being found on the flr, weak and confused. Pt noted with a fever, septic shock, pyelonephritis. Pt with PMH UTI(recent-2/24, 3/24), blindness, Ca, arthritis, chronic pain, HTN, macular degeneration, OP, PE, OA. PTA family(dtr) states pt required assistance with her ADLs, transfers, ambulation--with RW; pt with recent decline in her functional mobilities; +falls=3, neg home alone. During initial eval, pt demonstrated deficits with her functional balance, functional mobility, ADL status, transfer safety, b/l UE strength, activity tolerance(currently fair=15-20mins), and cognition(i.e.orientation, memory, problem-solving, judgement/safety). Pt would benefit from continued OT tx for the above deficits. 2-5xwk/1-2wks. The patient's raw score on the AM-PAC Daily Activity Inpatient Short Form is 13. A raw score of less than 19 suggests the patient may benefit from discharge to post-acute rehabilitation services. Please refer to the recommendation of the Occupational Therapist for safe discharge planning.   Goals   Patient Goals unable to fully assess   STG Time Frame   (1-7 days)   Short Term Goal #1 Pt will demonstrate improved activity tolerance to good(20-30mins) and standing tolerance to 1-3mins to assist with ADLs.   Short Term Goal #2 Pt will tolerate continued cognitive/home-safety assessment and appropriate d/c recommendations will be provided.   Short Term Goal  Pt will demonstrate Natali with their bed mobility to facilitate EOB ADLs.   LTG Time Frame   (7-14 days)   Long Term Goal #1 Pt will demonstrate Natali with their sit-stand transfers to assist with completion of their LE dressing.   Long Term Goal #2 Pt will demonstrate improved functional balance by 1 grade to assist with  ADLs/transfers.   Long Term Goal Pt will demonstrate Natali with her UE and mod A with her LE bathing/dressing.   Plan   Treatment Interventions ADL retraining;Functional transfer training;UE strengthening/ROM;Endurance training;Cognitive reorientation;Patient/family training;Compensatory technique education;Continued evaluation   Goal Expiration Date 05/13/24   OT Treatment Day 0   OT Frequency   (2-5xwk/1-2wks)   Discharge Recommendation   Rehab Resource Intensity Level, OT II (Moderate Resource Intensity)   AM-PAC Daily Activity Inpatient   Lower Body Dressing 2   Bathing 2   Toileting 1   Upper Body Dressing 2   Grooming 3   Eating 3   Daily Activity Raw Score 13   Daily Activity Standardized Score (Calc for Raw Score >=11) 32.03   AM-PAC Applied Cognition Inpatient   Following a Speech/Presentation 3   Understanding Ordinary Conversation 3   Taking Medications 1   Remembering Where Things Are Placed or Put Away 1   Remembering List of 4-5 Errands 1   Taking Care of Complicated Tasks 1   Applied Cognition Raw Score 10   Applied Cognition Standardized Score 24.98   Talha Angel

## 2024-04-29 NOTE — PLAN OF CARE
Problem: Potential for Falls  Goal: Patient will remain free of falls  Description: INTERVENTIONS:  - Educate patient/family on patient safety including physical limitations  - Instruct patient to call for assistance with activity   - Consult OT/PT to assist with strengthening/mobility   - Keep Call bell within reach  - Keep bed low and locked with side rails adjusted as appropriate  - Keep care items and personal belongings within reach  - Initiate and maintain comfort rounds  - Make Fall Risk Sign visible to staff  - Offer Toileting every 2 Hours, in advance of need  - Initiate/Maintain bed alarm  - Obtain necessary fall risk management equipment:    - Apply yellow socks and bracelet for high fall risk patients  - Consider moving patient to room near nurses station  4/29/2024 0747 by Krissy Medina RN  Outcome: Progressing     Problem: Prexisting or High Potential for Compromised Skin Integrity  Goal: Skin integrity is maintained or improved  Description: INTERVENTIONS:  - Identify patients at risk for skin breakdown  - Assess and monitor skin integrity  - Assess and monitor nutrition and hydration status  - Monitor labs   - Assess for incontinence   - Turn and reposition patient  - Assist with mobility/ambulation  - Relieve pressure over bony prominences  - Avoid friction and shearing  - Provide appropriate hygiene as needed including keeping skin clean and dry  - Evaluate need for skin moisturizer/barrier cream  - Collaborate with interdisciplinary team   - Patient/family teaching  - Consider wound care consult   4/29/2024 0747 by Krissy Medina RN  Outcome: Progressing     Problem: PAIN - ADULT  Goal: Verbalizes/displays adequate comfort level or baseline comfort level  Description: Interventions:  - Encourage patient to monitor pain and request assistance  - Assess pain using appropriate pain scale  - Administer analgesics based on type and severity of pain and evaluate response  - Implement  non-pharmacological measures as appropriate and evaluate response  - Consider cultural and social influences on pain and pain management  - Notify physician/advanced practitioner if interventions unsuccessful or patient reports new pain  4/29/2024 0747 by Krissy Medina RN  Outcome: Progressing     Problem: INFECTION - ADULT  Goal: Absence or prevention of progression during hospitalization  Description: INTERVENTIONS:  - Assess and monitor for signs and symptoms of infection  - Monitor lab/diagnostic results  - Monitor all insertion sites, i.e. indwelling lines, tubes, and drains  - Monitor endotracheal if appropriate and nasal secretions for changes in amount and color  - [place] appropriate cooling/warming therapies per order  - Administer medications as ordered  - Instruct and encourage patient and family to use good hand hygiene technique  - Identify and instruct in appropriate isolation precautions for identified infection/condition  4/29/2024 0747 by Krissy Medina RN  Outcome: Progressing     Problem: SAFETY ADULT  Goal: Patient will remain free of falls  Description: INTERVENTIONS:  - Educate patient/family on patient safety including physical limitations  - Instruct patient to call for assistance with activity   - Consult OT/PT to assist with strengthening/mobility   - Keep Call bell within reach  - Keep bed low and locked with side rails adjusted as appropriate  - Keep care items and personal belongings within reach  - Initiate and maintain comfort rounds  - Make Fall Risk Sign visible to staff  - Offer Toileting every 2 Hours, in advance of need  - Initiate/Maintain bed alarm  - Obtain necessary fall risk management equipment:    - Apply yellow socks and bracelet for high fall risk patients  - Consider moving patient to room near nurses station  4/29/2024 0747 by Krissy Medina RN  Outcome: Progressing     Problem: SAFETY ADULT  Goal: Maintains/Returns to pre admission functional  level  Description: INTERVENTIONS:  - Educate patient/family on patient safety including physical limitations  - Instruct patient to call for assistance with activity   - Consult OT/PT to assist with strengthening/mobility   - Keep Call bell within reach  - Keep bed low and locked with side rails adjusted as appropriate  - Keep care items and personal belongings within reach  - Initiate and maintain comfort rounds  - Make Fall Risk Sign visible to staff  - Offer Toileting every 2 Hours, in advance of need  - Initiate/Maintain bed alarm  - Obtain necessary fall risk management equipment:    - Apply yellow socks and bracelet for high fall risk patients  - Consider moving patient to room near nurses station  4/29/2024 0747 by Krissy Medina RN  Outcome: Progressing     Problem: DISCHARGE PLANNING  Goal: Discharge to home or other facility with appropriate resources  Description: INTERVENTIONS:  - Identify barriers to discharge w/patient and caregiver  - Arrange for needed discharge resources and transportation as appropriate  - Identify discharge learning needs (meds, wound care, etc.)  - Arrange for interpretive services to assist at discharge as needed  - Refer to Case Management Department for coordinating discharge planning if the patient needs post-hospital services based on physician/advanced practitioner order or complex needs related to functional status, cognitive ability, or social support system  4/29/2024 0747 by Krissy Medina, RN  Outcome: Progressing     Problem: GENITOURINARY - ADULT  Goal: Absence of urinary retention  Description: INTERVENTIONS:  - Assess patient’s ability to void and empty bladder  - Monitor I/O  - Bladder scan as needed  - Discuss with physician/AP medications to alleviate retention as needed  - Discuss catheterization for long term situations as appropriate  4/29/2024 0747 by Krissy Medina RN  Outcome: Progressing     Problem: SKIN/TISSUE INTEGRITY - ADULT  Goal: Skin  Integrity remains intact(Skin Breakdown Prevention)  Description: Assess:  -Perform [place] assessment every shift  -Clean and moisturize skin every shift  -Inspect skin when repositioning, toileting, and assisting with ADLS  -Assess extremities for adequate circulation and sensation     Bed Management:  -Have minimal linens on bed & keep smooth, unwrinkled  -Change linens as needed when moist or perspiring  -Avoid sitting or lying in one position for more than 2 hours while in bed  -Keep HOB at 30degrees     Toileting:  -Offer bedside commode  -Assess for incontinence every shift  -Use incontinent care products after each incontinent episode     Activity:  -Mobilize patient 3 times a day  -Encourage activity and walks on unit  -Encourage or provide ROM exercises   -Turn and reposition patient every 2 Hours  -Use appropriate equipment to lift or move patient in bed  -Instruct/ Assist with weight shifting every 2 hrs when out of bed in chair  -Consider limitation of chair time 2 hour intervals    Skin Care:  -Avoid use of baby powder, tape, friction and shearing, hot water or constrictive clothing  -Relieve pressure over bony prominences using allevyn foam  -Do not massage red bony areas    4/29/2024 0747 by Krissy Medina RN  Outcome: Progressing

## 2024-04-30 LAB
ANION GAP SERPL CALCULATED.3IONS-SCNC: 7 MMOL/L (ref 4–13)
BASOPHILS # BLD AUTO: 0.05 THOUSANDS/ÂΜL (ref 0–0.1)
BASOPHILS NFR BLD AUTO: 1 % (ref 0–1)
BUN SERPL-MCNC: 13 MG/DL (ref 5–25)
CALCIUM SERPL-MCNC: 8.3 MG/DL (ref 8.4–10.2)
CHLORIDE SERPL-SCNC: 104 MMOL/L (ref 96–108)
CO2 SERPL-SCNC: 24 MMOL/L (ref 21–32)
CREAT SERPL-MCNC: 0.57 MG/DL (ref 0.6–1.3)
EOSINOPHIL # BLD AUTO: 0.01 THOUSAND/ÂΜL (ref 0–0.61)
EOSINOPHIL NFR BLD AUTO: 0 % (ref 0–6)
ERYTHROCYTE [DISTWIDTH] IN BLOOD BY AUTOMATED COUNT: 15.3 % (ref 11.6–15.1)
GFR SERPL CREATININE-BSD FRML MDRD: 77 ML/MIN/1.73SQ M
GLUCOSE SERPL-MCNC: 100 MG/DL (ref 65–140)
HCT VFR BLD AUTO: 32 % (ref 34.8–46.1)
HGB BLD-MCNC: 10.5 G/DL (ref 11.5–15.4)
IMM GRANULOCYTES # BLD AUTO: 0.05 THOUSAND/UL (ref 0–0.2)
IMM GRANULOCYTES NFR BLD AUTO: 1 % (ref 0–2)
LYMPHOCYTES # BLD AUTO: 1.86 THOUSANDS/ÂΜL (ref 0.6–4.47)
LYMPHOCYTES NFR BLD AUTO: 20 % (ref 14–44)
MCH RBC QN AUTO: 29.5 PG (ref 26.8–34.3)
MCHC RBC AUTO-ENTMCNC: 32.8 G/DL (ref 31.4–37.4)
MCV RBC AUTO: 90 FL (ref 82–98)
MONOCYTES # BLD AUTO: 0.97 THOUSAND/ÂΜL (ref 0.17–1.22)
MONOCYTES NFR BLD AUTO: 10 % (ref 4–12)
NEUTROPHILS # BLD AUTO: 6.45 THOUSANDS/ÂΜL (ref 1.85–7.62)
NEUTS SEG NFR BLD AUTO: 68 % (ref 43–75)
NRBC BLD AUTO-RTO: 0 /100 WBCS
PLATELET # BLD AUTO: 203 THOUSANDS/UL (ref 149–390)
PMV BLD AUTO: 9.6 FL (ref 8.9–12.7)
POTASSIUM SERPL-SCNC: 3.3 MMOL/L (ref 3.5–5.3)
PROCALCITONIN SERPL-MCNC: 0.3 NG/ML
RBC # BLD AUTO: 3.56 MILLION/UL (ref 3.81–5.12)
SODIUM SERPL-SCNC: 135 MMOL/L (ref 135–147)
WBC # BLD AUTO: 9.39 THOUSAND/UL (ref 4.31–10.16)

## 2024-04-30 PROCEDURE — 99232 SBSQ HOSP IP/OBS MODERATE 35: CPT | Performed by: INTERNAL MEDICINE

## 2024-04-30 PROCEDURE — 85025 COMPLETE CBC W/AUTO DIFF WBC: CPT | Performed by: NURSE PRACTITIONER

## 2024-04-30 PROCEDURE — 84145 PROCALCITONIN (PCT): CPT | Performed by: NURSE PRACTITIONER

## 2024-04-30 PROCEDURE — 80048 BASIC METABOLIC PNL TOTAL CA: CPT | Performed by: NURSE PRACTITIONER

## 2024-04-30 RX ORDER — POTASSIUM CHLORIDE 20 MEQ/1
40 TABLET, EXTENDED RELEASE ORAL ONCE
Status: COMPLETED | OUTPATIENT
Start: 2024-04-30 | End: 2024-04-30

## 2024-04-30 RX ADMIN — CEFTRIAXONE 2000 MG: 2 INJECTION, SOLUTION INTRAVENOUS at 03:34

## 2024-04-30 RX ADMIN — ENOXAPARIN SODIUM 40 MG: 40 INJECTION SUBCUTANEOUS at 08:05

## 2024-04-30 RX ADMIN — Medication 1000 UNITS: at 08:05

## 2024-04-30 RX ADMIN — ACETAMINOPHEN 650 MG: 325 TABLET, FILM COATED ORAL at 07:11

## 2024-04-30 RX ADMIN — ASPIRIN 81 MG: 81 TABLET, COATED ORAL at 21:52

## 2024-04-30 RX ADMIN — Medication 1 TABLET: at 08:05

## 2024-04-30 RX ADMIN — MULTIPLE VITAMINS W/ MINERALS TAB 1 TABLET: TAB ORAL at 08:05

## 2024-04-30 RX ADMIN — PANTOPRAZOLE SODIUM 20 MG: 20 TABLET, DELAYED RELEASE ORAL at 05:34

## 2024-04-30 RX ADMIN — PRAMIPEXOLE DIHYDROCHLORIDE 0.25 MG: 0.25 TABLET ORAL at 21:52

## 2024-04-30 RX ADMIN — POTASSIUM CHLORIDE 40 MEQ: 1500 TABLET, EXTENDED RELEASE ORAL at 18:31

## 2024-04-30 RX ADMIN — ACETAMINOPHEN 650 MG: 325 TABLET, FILM COATED ORAL at 19:44

## 2024-04-30 NOTE — PLAN OF CARE
Problem: Potential for Falls  Goal: Patient will remain free of falls  Description: INTERVENTIONS:  - Educate patient/family on patient safety including physical limitations  - Instruct patient to call for assistance with activity   - Consult OT/PT to assist with strengthening/mobility   - Keep Call bell within reach  - Keep bed low and locked with side rails adjusted as appropriate  - Keep care items and personal belongings within reach  - Initiate and maintain comfort rounds  - Make Fall Risk Sign visible to staff  - Offer Toileting every 2 Hours, in advance of need  - Initiate/Maintain bed alarm  - Obtain necessary fall risk management equipment:    - Apply yellow socks and bracelet for high fall risk patients  - Consider moving patient to room near nurses station  Outcome: Progressing     Problem: PAIN - ADULT  Goal: Verbalizes/displays adequate comfort level or baseline comfort level  Description: Interventions:  - Encourage patient to monitor pain and request assistance  - Assess pain using appropriate pain scale  - Administer analgesics based on type and severity of pain and evaluate response  - Implement non-pharmacological measures as appropriate and evaluate response  - Consider cultural and social influences on pain and pain management  - Notify physician/advanced practitioner if interventions unsuccessful or patient reports new pain  Outcome: Progressing     Problem: SAFETY ADULT  Goal: Patient will remain free of falls  Description: INTERVENTIONS:  - Educate patient/family on patient safety including physical limitations  - Instruct patient to call for assistance with activity   - Consult OT/PT to assist with strengthening/mobility   - Keep Call bell within reach  - Keep bed low and locked with side rails adjusted as appropriate  - Keep care items and personal belongings within reach  - Initiate and maintain comfort rounds  - Make Fall Risk Sign visible to staff  - Offer Toileting every 2 Hours, in  advance of need  - Initiate/Maintain bed alarm  - Obtain necessary fall risk management equipment:    - Apply yellow socks and bracelet for high fall risk patients  - Consider moving patient to room near nurses station  Outcome: Progressing

## 2024-04-30 NOTE — PROGRESS NOTES
Formerly [facility], later [facility]  Progress Note  Name: Latanya Ray I  MRN: [id number]  Unit/Bed#: E2 -01 I Date of Admission: 4/29/2024   Date of Service: 4/30/2024 I Hospital Day: 1    Assessment/Plan   * Septic shock (HCC)  Assessment & Plan  History of cancer malignancy macular degeneration with cognitive decline presents to the hospital for weakness found to have severe sepsis/shock secondary to pyelonephritis  Briefly required norepinephrine and ED.  Continue IV ceftriaxone.    Awaiting urine culture.  Blood cultures no growth to date.  Disposition: To rehab when medically stable    Results from last 7 days   Lab Units 04/29/24  0214 04/29/24  0115   BLOOD CULTURE  No Growth at 24 hrs. No Growth at 24 hrs.       Legal blindness  Assessment & Plan  History of macular degeneration legally blind    Neoplasm of skin of face  Assessment & Plan  History of basal cell carcinoma reportedly family had declined intervention due to advanced age    Breast carcinoma (HCC)  Assessment & Plan  History of breast cancer status postmastectomy.    VTE Pharmacologic Prophylaxis: VTE Score: 7 High Risk (Score >/= 5) - Pharmacological DVT Prophylaxis Ordered: enoxaparin (Lovenox). Sequential Compression Devices Ordered.    Mobility:  Basic Mobility Inpatient Raw Score: 8  JH-HLM Goal: 3: Sit at edge of bed  JH-HLM Achieved: 1: Laying in bed  JH-HLM Goal NOT achieved. Continue with multidisciplinary rounding and encourage appropriate mobility to improve upon JH-HLM goals.    Patient Centered Rounds: I have performed bedside rounds with nursing staff today.  Discussions with Specialists or Other Care Team Provider: Case management    Education and Discussions with Family / Patient: Attempted to update  (daughter) via phone. Unable to contact.    Time Spent for Care:   This time was spent on one or more of the following: performing physical exam; counseling and coordination of care; obtaining or reviewing  "history; documenting in the medical record; reviewing/ordering tests, medications or procedures; communicating with other healthcare professionals and discussing with patient's family/caregivers.    Current Length of Stay: 1 day(s)  Current Patient Status: Inpatient   Certification Statement: The patient will continue to require additional inpatient hospital stay due to ongoing urine and blood cultures  Discharge Plan: Anticipate discharge in 24-48 hrs to rehab facility.    Code Status: Level 3 - DNAR and DNI      Subjective:   Patient seen and examined.  No new complaints, still weak.    Objective:   Vitals: Blood pressure 116/60, pulse 69, temperature 100.3 °F (37.9 °C), temperature source Temporal, resp. rate 17, height 4' 11\" (1.499 m), weight 58.5 kg (128 lb 15.5 oz), SpO2 94%.    Intake/Output Summary (Last 24 hours) at 4/30/2024 1755  Last data filed at 4/30/2024 0501  Gross per 24 hour   Intake 537.5 ml   Output 1200 ml   Net -662.5 ml       Physical Exam  Vitals reviewed.   Constitutional:       General: She is not in acute distress.     Appearance: Normal appearance.   HENT:      Head: Atraumatic.   Cardiovascular:      Rate and Rhythm: Regular rhythm.   Pulmonary:      Breath sounds: Decreased breath sounds present. No wheezing.   Abdominal:      General: Bowel sounds are normal.      Palpations: Abdomen is soft.      Tenderness: There is no guarding.   Musculoskeletal:         General: No swelling.   Skin:     General: Skin is warm.   Neurological:      Mental Status: She is disoriented.       Additional Data:   Labs:  Results from last 7 days   Lab Units 04/30/24  0455 04/29/24  0141   WBC Thousand/uL 9.39 11.26*   HEMOGLOBIN g/dL 10.5* 11.8   PLATELETS Thousands/uL 203 275   MCV fL 90 89   INR   --  1.03     Results from last 7 days   Lab Units 04/30/24  0455 04/29/24  0141   SODIUM mmol/L 135 133*   POTASSIUM mmol/L 3.3* 3.5   CHLORIDE mmol/L 104 99   CO2 mmol/L 24 26   ANION GAP mmol/L 7 8   BUN " mg/dL 13 19   CREATININE mg/dL 0.57* 0.61   CALCIUM mg/dL 8.3* 9.2   ALBUMIN g/dL  --  3.7   TOTAL BILIRUBIN mg/dL  --  0.60   ALK PHOS U/L  --  93   ALT U/L  --  9   AST U/L  --  14   EGFR ml/min/1.73sq m 77 76   GLUCOSE RANDOM mg/dL 100 161*             Results from last 7 days   Lab Units 04/29/24  0342 04/29/24  0141   HS TNI 0HR ng/L  --  4   HS TNI 2HR ng/L 4  --           Results from last 7 days   Lab Units 04/30/24  0455 04/29/24  0141   LACTIC ACID mmol/L  --  0.9   PROCALCITONIN ng/ml 0.30* 0.14                 * I Have Reviewed All Lab Data Listed Above.    Recent cultures:   Results from last 7 days   Lab Units 04/29/24  0325 04/29/24  0324 04/29/24  0214 04/29/24  0115   BLOOD CULTURE   --   --  No Growth at 24 hrs. No Growth at 24 hrs.   URINE CULTURE  >100,000 cfu/ml Gram Negative Yang* >100,000 cfu/ml Gram Negative Yang*  --   --                  Lines/Drains:  Invasive Devices       Peripheral Intravenous Line  Duration             Peripheral IV 04/29/24 Dorsal (posterior);Right Forearm 1 day    Peripheral IV 04/29/24 Right Antecubital 1 day              Drain  Duration             External Urinary Catheter 1 day                          Telemetry:      Imaging:  Imaging Reports Reviewed Today Include:   XR chest 1 view portable    Result Date: 4/29/2024  Impression: 1. No consolidation or alveolar edema. 2. Bibasilar atelectasis and chronic mild interstitial prominence, similar to the CT of April 2023. 3. Hiatal hernia. Workstation performed: [id number]     CT chest abdomen pelvis w contrast    Result Date: 4/29/2024  Impression: Left pyelonephritis. Cystitis. 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, pancreatic tail and numerous loops of small bowel Workstation performed: [id number]       Scheduled Meds:  Current Facility-Administered Medications   Medication Dose Route Frequency Provider Last Rate    acetaminophen  650 mg Oral Q6H PRN MARINA Wooten       aluminum-magnesium hydroxide-simethicone  30 mL Oral Q6H PRN MARINA Wooten      aspirin  81 mg Oral Daily MARINA Wooten      bisacodyl  10 mg Rectal Daily PRN MARINA Wooten      calcium carbonate-vitamin D  1 tablet Oral Daily With Breakfast MARINA Wooten      cefTRIAXone  2,000 mg Intravenous Q24H MARINA Wooten 2,000 mg (04/30/24 0334)    Cholecalciferol  1,000 Units Oral Daily MARINA Wooten      vitamin B-12  1,000 mcg Oral Every Other Day MARINA Wooten      enoxaparin  40 mg Subcutaneous Daily MARINA Wooten      meclizine  25 mg Oral Q8H PRN MARINA Wooten      multivitamin-minerals  1 tablet Oral Daily MARINA Wooten      ondansetron  4 mg Intravenous Q6H PRN MARINA Wooten      pantoprazole  20 mg Oral Early Morning MARINA Wooten      pramipexole  0.25 mg Oral HS MARINA Wooten      simethicone  80 mg Oral 4x Daily PRN MARINA Wooten         Today, Patient Was Seen By: Norberto Ruiz DO    ** Please Note: Dictation voice to text software may have been used in the creation of this document. **

## 2024-04-30 NOTE — TREATMENT PLAN
Documentation clarification    Acute metabolic encephalopathy  Assessment & Plan  Present on admission secondary to sepsis/pyelonephritis.          Norberto Ruiz DO

## 2024-04-30 NOTE — APP STUDENT NOTE
"[facility] STUDENT  Inpatient Progress Note for TRAINING ONLY  Not Part of Legal Medical Record       Progress Note - Latanya Ray 89 y.o. female MRN: [id number]    Unit/Bed#: E2 -01 Encounter: [id number]      Assessment/Plan:  Septic shock  Patient presents with fever, tachycardia,and hypotension due to cystitis/pyelonephritis   Lactic acid= 0.9 (WNL)  Procalcitonin increased from 0.14 to 0.3- continue to trend   Received sepsis bolus; briefly required norepinephrine in ED titrated to 5mcg then BP increased to 170s then titrated off, received albumin. BP improved  Continue volume resuscitation   Blood cultures had no growth in 24 hours  Urine culture showed > 100,000 cfu/ml of gram neg rods   Continue IV ceftriaxone     Pyelonephritis  Patient presents with fever, tachycardia,and hypotension due to cystitis/pyelonephritis   UA showed small blood, large leukocytes, pH>=9, 2+ protein,  innumerable RBC, WBC, bacteria; and moderate Ca oxalate crystals   Blood cultures had no growth in 24 hours  Urine cultures showed > 100,000 cfu/ml of gram neg rods  Previous urine cultures pansensitive e. Coli and aerococcus viridans   Monitor I&O's  Continue IV ceftriaxone     Legal blindness  Hx of macular degeneration  Safety precautions     Breast carcinoma   Hx of breast cancer s/p left mastectomy     Neoplasm of skin of face   Hx of basal cell carcinoma   Daughter not interested in treatment due to age     Diaphragmatic hernia w/o obstruction or gangrene  Noted atble hernia on CT abd/pel    Cognitive Impairment   Supportive care       Subjective:   Patient is laying in bed comfortably. She has no complaints today and is feeling much better. Denies chills, sweats, SOB, chest pain, palpitations, abdominal pain, pain with peeing, N/V/D.     Objective:     Vitals: Blood pressure 116/60, pulse 69, temperature 100.3 °F (37.9 °C), temperature source Temporal, resp. rate 17, height 4' 11\" (1.499 m), weight 58.5 kg (128 lb 15.5 oz), " SpO2 94%.,Body mass index is 26.05 kg/m².      Intake/Output Summary (Last 24 hours) at 4/30/2024 0957  Last data filed at 4/30/2024 0501  Gross per 24 hour   Intake 537.5 ml   Output 1350 ml   Net -812.5 ml       Physical Exam  Vitals reviewed.   Constitutional:       General: She is not in acute distress.     Appearance: Normal appearance.   HENT:      Head: Normocephalic.      Comments: R lateral forehead skin changes due to basal cell carcinoma  Cardiovascular:      Rate and Rhythm: Normal rate and regular rhythm.   Pulmonary:      Effort: Pulmonary effort is normal.      Breath sounds: Normal breath sounds.   Abdominal:      General: Bowel sounds are normal.      Palpations: Abdomen is soft.      Tenderness: There is no abdominal tenderness.   Musculoskeletal:         General: Normal range of motion.      Cervical back: Normal range of motion.   Skin:     General: Skin is warm.   Neurological:      General: No focal deficit present.      Mental Status: She is alert.   Psychiatric:         Mood and Affect: Mood normal.         Behavior: Behavior normal.          Invasive Devices       Peripheral Intravenous Line  Duration             Peripheral IV 04/29/24 Dorsal (posterior);Right Forearm 1 day    Peripheral IV 04/29/24 Right Antecubital 1 day              Drain  Duration             External Urinary Catheter 1 day                    Lab, Imaging and other studies: I have personally reviewed pertinent reports.    VTE Pharmacologic Prophylaxis: Enoxaparin (Lovenox)  VTE Mechanical Prophylaxis: sequential compression device

## 2024-04-30 NOTE — ASSESSMENT & PLAN NOTE
History of cancer malignancy macular degeneration with cognitive decline presents to the hospital for weakness found to have severe sepsis/shock secondary to pyelonephritis  Briefly required norepinephrine and ED.  Continue IV ceftriaxone.    Awaiting urine culture.  Blood cultures no growth to date.  Disposition: To rehab when medically stable    Results from last 7 days   Lab Units 04/29/24  0214 04/29/24  0115   BLOOD CULTURE  No Growth at 24 hrs. No Growth at 24 hrs.

## 2024-04-30 NOTE — PLAN OF CARE
Problem: Potential for Falls  Goal: Patient will remain free of falls  Description: INTERVENTIONS:  - Educate patient/family on patient safety including physical limitations  - Instruct patient to call for assistance with activity   - Consult OT/PT to assist with strengthening/mobility   - Keep Call bell within reach  - Keep bed low and locked with side rails adjusted as appropriate  - Keep care items and personal belongings within reach  - Initiate and maintain comfort rounds  - Make Fall Risk Sign visible to staff  - Offer Toileting every 2 Hours, in advance of need  - Initiate/Maintain bed alarm  - Obtain necessary fall risk management equipment:    - Apply yellow socks and bracelet for high fall risk patients  - Consider moving patient to room near nurses station  Outcome: Progressing     Problem: Prexisting or High Potential for Compromised Skin Integrity  Goal: Skin integrity is maintained or improved  Description: INTERVENTIONS:  - Identify patients at risk for skin breakdown  - Assess and monitor skin integrity  - Assess and monitor nutrition and hydration status  - Monitor labs   - Assess for incontinence   - Turn and reposition patient  - Assist with mobility/ambulation  - Relieve pressure over bony prominences  - Avoid friction and shearing  - Provide appropriate hygiene as needed including keeping skin clean and dry  - Evaluate need for skin moisturizer/barrier cream  - Collaborate with interdisciplinary team   - Patient/family teaching  - Consider wound care consult   Outcome: Progressing     Problem: PAIN - ADULT  Goal: Verbalizes/displays adequate comfort level or baseline comfort level  Description: Interventions:  - Encourage patient to monitor pain and request assistance  - Assess pain using appropriate pain scale  - Administer analgesics based on type and severity of pain and evaluate response  - Implement non-pharmacological measures as appropriate and evaluate response  - Consider cultural and  social influences on pain and pain management  - Notify physician/advanced practitioner if interventions unsuccessful or patient reports new pain  Outcome: Progressing     Problem: INFECTION - ADULT  Goal: Absence or prevention of progression during hospitalization  Description: INTERVENTIONS:  - Assess and monitor for signs and symptoms of infection  - Monitor lab/diagnostic results  - Monitor all insertion sites, i.e. indwelling lines, tubes, and drains  - Monitor endotracheal if appropriate and nasal secretions for changes in amount and color  - [place] appropriate cooling/warming therapies per order  - Administer medications as ordered  - Instruct and encourage patient and family to use good hand hygiene technique  - Identify and instruct in appropriate isolation precautions for identified infection/condition  Outcome: Progressing     Problem: SAFETY ADULT  Goal: Patient will remain free of falls  Description: INTERVENTIONS:  - Educate patient/family on patient safety including physical limitations  - Instruct patient to call for assistance with activity   - Consult OT/PT to assist with strengthening/mobility   - Keep Call bell within reach  - Keep bed low and locked with side rails adjusted as appropriate  - Keep care items and personal belongings within reach  - Initiate and maintain comfort rounds  - Make Fall Risk Sign visible to staff  - Offer Toileting every 2 Hours, in advance of need  - Initiate/Maintain bed alarm  - Obtain necessary fall risk management equipment:    - Apply yellow socks and bracelet for high fall risk patients  - Consider moving patient to room near nurses station  Outcome: Progressing  Goal: Maintains/Returns to pre admission functional level  Description: INTERVENTIONS:  - Perform AM-PAC 6 Click Basic Mobility/ Daily Activity assessment daily.  - Set and communicate daily mobility goal to care team and patient/family/caregiver.   - Collaborate with rehabilitation services on  mobility goals if consulted  - Perform Range of Motion 3 times a day.  - Reposition patient every 2 hours.  - Dangle patient 3 times a day  - Stand patient 3 times a day  - Out of bed to chair 3 times a day   - Out of bed for meals 3 times a day  - Out of bed for toileting  - Record patient progress and toleration of activity level   Outcome: Progressing     Problem: DISCHARGE PLANNING  Goal: Discharge to home or other facility with appropriate resources  Description: INTERVENTIONS:  - Identify barriers to discharge w/patient and caregiver  - Arrange for needed discharge resources and transportation as appropriate  - Identify discharge learning needs (meds, wound care, etc.)  - Arrange for interpretive services to assist at discharge as needed  - Refer to Case Management Department for coordinating discharge planning if the patient needs post-hospital services based on physician/advanced practitioner order or complex needs related to functional status, cognitive ability, or social support system  Outcome: Progressing     Problem: GENITOURINARY - ADULT  Goal: Absence of urinary retention  Description: INTERVENTIONS:  - Assess patient’s ability to void and empty bladder  - Monitor I/O  - Bladder scan as needed  - Discuss with physician/AP medications to alleviate retention as needed  - Discuss catheterization for long term situations as appropriate  Outcome: Progressing     Problem: SKIN/TISSUE INTEGRITY - ADULT  Goal: Skin Integrity remains intact(Skin Breakdown Prevention)  Description: Assess:  -Perform Rosales assessment every shift  -Clean and moisturize skin every shift  -Inspect skin when repositioning, toileting, and assisting with ADLS  -Assess extremities for adequate circulation and sensation     Bed Management:  -Have minimal linens on bed & keep smooth, unwrinkled  -Change linens as needed when moist or perspiring  -Avoid sitting or lying in one position for more than 2 hours while in bed  -Keep HOB at  30degrees     Toileting:  -Offer bedside commode  -Assess for incontinence every shift  -Use incontinent care products after each incontinent episode     Activity:  -Mobilize patient 3 times a day  -Encourage activity and walks on unit  -Encourage or provide ROM exercises   -Turn and reposition patient every 2 Hours  -Use appropriate equipment to lift or move patient in bed  -Instruct/ Assist with weight shifting every 2 hrs when out of bed in chair  -Consider limitation of chair time 2 hour intervals    Skin Care:  -Avoid use of baby powder, tape, friction and shearing, hot water or constrictive clothing  -Relieve pressure over bony prominences using allevyn foam  -Do not massage red bony areas    Outcome: Progressing

## 2024-04-30 NOTE — CASE MANAGEMENT
Case Management Assessment & Discharge Planning Note    Patient name Latanya Ray  Location [facility] /E2 -* MRN [id number]  : 1926 Date 2024       Current Admission Date: 2024  Current Admission Diagnosis:Septic shock (HCC)   Patient Active Problem List    Diagnosis Date Noted    Septic shock (HCC) 2024    Pyelonephritis 2024    Diaphragmatic hernia without obstruction or gangrene 2024    Dysphagia, oropharyngeal phase 2024    Sternal fracture 2024    Legal blindness 2022    Cognitive impairment 2021    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    Vitamin D deficiency 2012    Cerebral aneurysm 2012    Anxiety 2012    Breast carcinoma (HCC) 2012    Hyperlipidemia 2012      LOS (days): 1  Geometric Mean LOS (GMLOS) (days): 5.1  Days to GMLOS:3.7     OBJECTIVE:    Risk of Unplanned Readmission Score: 14.99         Current admission status: Inpatient       Preferred Pharmacy:   [facility]/pharmacy #[id number] - [place] [address]  Phone: [phone] Fax: [phone]    [facility] - [address] [address]  Phone: [phone] Fax: [phone]    Primary Care Provider: Amina Rowe DO    Primary Insurance: MEDICARE  Secondary Insurance: AARP    ASSESSMENT:  Active Health Care Proxies       Celia Gilbert Health Care Agent - Child   Primary Phone: [phone] (Home)                 Advance Directives  Does patient have a Health Care POA?: Yes  Does patient have Advance Directives?: Yes  Advance Directives: Living will, Power of  for health care, Power of  for finance  Primary Contact: Celia Gilbert (daughter) [phone]              Patient Information  Admitted from:: Home  Mental Status: Alert  During Assessment patient was accompanied by: Not  accompanied during assessment  Assessment information provided by:: Daughter (over the phone)  Primary Caregiver: Child  Caregiver's Name:: Celia Gilbert  Caregiver's Relationship to Patient:: Family Member  Caregiver's Telephone Number:: [phone]  Support Systems: Daughter, Son  County of Residence: [place]  What [facility] do you live in?: [place]  Type of Current Residence: [facility]  Living Arrangements: Lives w/ Daughter    Activities of Daily Living Prior to Admission  Functional Status: Independent  Completes ADLs independently?: No  Level of ADL dependence: Assistance  Ambulates independently?: No  Level of ambulatory dependence: Assistance  Does patient use assisted devices?: Yes  Assisted Devices (DME) used: Bedside Commode, Wheelchair, Walker, Other (Comment) (lift chair)  Does patient currently own DME?: Yes  What DME does the patient currently own?: Bedside Commode, Wheelchair, Other (Comment), Walker, Straight Cane (lift chair)  Does the patient have a history of Short-Term Rehab?: Yes ([facility])  Does patient have a history of HHC?: Yes         Patient Information Continued  Income Source: SSI/SSD  Does patient have prescription coverage?: Yes  Does patient receive dialysis treatments?: No  Does patient have a history of substance abuse?: No  Does patient have a history of Mental Health Diagnosis?: Yes (Anxiety)         Means of Transportation  Means of Transport to [facility]:: Other (Comment) (transport through [facility] or family)      Social Determinants of Health (SDOH)      Flowsheet Row Most Recent Value   Housing Stability    In the last 12 months, was there a time when you were not able to pay the mortgage or rent on time? N   In the last 12 months, how many places have you lived? 1   In the last 12 months, was there a time when you did not have a steady place to sleep or slept in a shelter (including now)? N   Transportation Needs    In the past 12 months, has lack of transportation kept you from  medical appointments or from getting medications? no   In the past 12 months, has lack of transportation kept you from meetings, work, or from getting things needed for daily living? No   Food Insecurity    Within the past 12 months, you worried that your food would run out before you got the money to buy more. Never true   Within the past 12 months, the food you bought just didn't last and you didn't have money to get more. Never true   Utilities    In the past 12 months has the electric, gas, oil, or water company threatened to shut off services in your home? No            DISCHARGE DETAILS:    Discharge planning discussed with:: DaughterCelia  Freedom of Choice: Yes  Comments - Freedom of Choice: Choice list reviewed with daughter for short term rehab. Additional referral placed to [facility]. Will follow up with daughter regarding decision from [facility]  CM contacted family/caregiver?: Yes  Were Treatment Team discharge recommendations reviewed with patient/caregiver?: Yes  Did patient/caregiver verbalize understanding of patient care needs?: N/A- going to facility       Contacts  Patient Contacts: Celia  Relationship to Patient:: Family  Contact Method: Phone  Phone Number: [phone]  Reason/Outcome: Discharge Planning    Requested Home Health Care         Is the patient interested in HHC at discharge?: No    DME Referral Provided  Referral made for DME?: No    Other Referral/Resources/Interventions Provided:  Interventions: Short Term Rehab  Referral Comments: referrals placed via aidin         Treatment Team Recommendation: Short Term Rehab                                            Additional Comments: CM spoke with pt's daughter, Celia, regarding short term rehab placement. Pt had a poor experience at [facility] and [facility] which has the family hesitant about sending to a SNF level rehab. Referral was placed to [facility] to see if they would be an option for rehab. Awaiting determination from  [facility]. CM did provide the name of accepting SNFs for rehab at this time for daughter to look through if  TCF is not able to accept. CM will continue to follow.

## 2024-05-01 LAB
BACTERIA UR CULT: ABNORMAL
ERYTHROCYTE [DISTWIDTH] IN BLOOD BY AUTOMATED COUNT: 15.5 % (ref 11.6–15.1)
HCT VFR BLD AUTO: 32 % (ref 34.8–46.1)
HGB BLD-MCNC: 10.5 G/DL (ref 11.5–15.4)
MAGNESIUM SERPL-MCNC: 1.9 MG/DL (ref 1.9–2.7)
MCH RBC QN AUTO: 30 PG (ref 26.8–34.3)
MCHC RBC AUTO-ENTMCNC: 32.8 G/DL (ref 31.4–37.4)
MCV RBC AUTO: 91 FL (ref 82–98)
PHOSPHATE SERPL-MCNC: 2.5 MG/DL (ref 2.3–4.1)
PLATELET # BLD AUTO: 223 THOUSANDS/UL (ref 149–390)
PMV BLD AUTO: 10.2 FL (ref 8.9–12.7)
PROCALCITONIN SERPL-MCNC: 0.21 NG/ML
RBC # BLD AUTO: 3.5 MILLION/UL (ref 3.81–5.12)
WBC # BLD AUTO: 7.84 THOUSAND/UL (ref 4.31–10.16)

## 2024-05-01 PROCEDURE — 84145 PROCALCITONIN (PCT): CPT | Performed by: INTERNAL MEDICINE

## 2024-05-01 PROCEDURE — 97110 THERAPEUTIC EXERCISES: CPT

## 2024-05-01 PROCEDURE — 85027 COMPLETE CBC AUTOMATED: CPT | Performed by: INTERNAL MEDICINE

## 2024-05-01 PROCEDURE — 99223 1ST HOSP IP/OBS HIGH 75: CPT | Performed by: INTERNAL MEDICINE

## 2024-05-01 PROCEDURE — 97530 THERAPEUTIC ACTIVITIES: CPT

## 2024-05-01 PROCEDURE — 80053 COMPREHEN METABOLIC PANEL: CPT | Performed by: INTERNAL MEDICINE

## 2024-05-01 PROCEDURE — 97535 SELF CARE MNGMENT TRAINING: CPT

## 2024-05-01 PROCEDURE — 99233 SBSQ HOSP IP/OBS HIGH 50: CPT | Performed by: FAMILY MEDICINE

## 2024-05-01 PROCEDURE — 83735 ASSAY OF MAGNESIUM: CPT | Performed by: INTERNAL MEDICINE

## 2024-05-01 PROCEDURE — 84100 ASSAY OF PHOSPHORUS: CPT | Performed by: INTERNAL MEDICINE

## 2024-05-01 RX ORDER — CEFAZOLIN SODIUM 2 G/50ML
2000 SOLUTION INTRAVENOUS EVERY 8 HOURS
Status: DISCONTINUED | OUTPATIENT
Start: 2024-05-01 | End: 2024-05-03

## 2024-05-01 RX ADMIN — CEFTRIAXONE 2000 MG: 2 INJECTION, SOLUTION INTRAVENOUS at 03:15

## 2024-05-01 RX ADMIN — Medication 1 TABLET: at 07:34

## 2024-05-01 RX ADMIN — CYANOCOBALAMIN TAB 500 MCG 1000 MCG: 500 TAB at 09:37

## 2024-05-01 RX ADMIN — PRAMIPEXOLE DIHYDROCHLORIDE 0.25 MG: 0.25 TABLET ORAL at 21:40

## 2024-05-01 RX ADMIN — CEFAZOLIN SODIUM 2000 MG: 2 SOLUTION INTRAVENOUS at 13:30

## 2024-05-01 RX ADMIN — ACETAMINOPHEN 650 MG: 325 TABLET, FILM COATED ORAL at 15:30

## 2024-05-01 RX ADMIN — MULTIPLE VITAMINS W/ MINERALS TAB 1 TABLET: TAB ORAL at 09:37

## 2024-05-01 RX ADMIN — ENOXAPARIN SODIUM 40 MG: 40 INJECTION SUBCUTANEOUS at 09:39

## 2024-05-01 RX ADMIN — CEFAZOLIN SODIUM 2000 MG: 2 SOLUTION INTRAVENOUS at 21:40

## 2024-05-01 RX ADMIN — ASPIRIN 81 MG: 81 TABLET, COATED ORAL at 21:40

## 2024-05-01 RX ADMIN — Medication 1000 UNITS: at 09:37

## 2024-05-01 RX ADMIN — PANTOPRAZOLE SODIUM 20 MG: 20 TABLET, DELAYED RELEASE ORAL at 05:07

## 2024-05-01 NOTE — RESTORATIVE TECHNICIAN NOTE
Restorative Technician Note      Patient Name: Latanya Ray     Restorative Tech Visit Date: 05/01/24  Note Type: Mobility  Patient Position Upon Consult: Seated edge of bed  Mobility / Activity Provided: Assisted PT with attempt to get pt to chair  Activity Performed: Dangled; Repositioned; Range of motion  Assistive Device: Roller walker; Other (Comment) (Quick move)  Patient Position at End of Consult: Seated edge of bed; All needs within reach; Other (comment) (Left in care of PT & OT)

## 2024-05-01 NOTE — PHYSICAL THERAPY NOTE
"PT PROGRESS NOTE    Name: Latanya Ray  AGE: 89 y.o.  MRN: [id number]  LENGTH OF STAY: 2    Admitting Dx:  Pyelonephritis [N12]  Fever [R50.9]  Septic shock (HCC) [A41.9, R65.21]    Patient Active Problem List   Diagnosis    Cerebral aneurysm    Anxiety    Breast carcinoma (HCC)    Hyperlipidemia    Neoplasm of skin of face    Vitamin D deficiency    Cognitive impairment    Legal blindness    Acute metabolic encephalopathy    Diaphragmatic hernia without obstruction or gangrene    Dysphagia, oropharyngeal phase    Sternal fracture    Septic shock (HCC)    Pyelonephritis       Performed at least 2 patient identifiers during session: Name and Birthday       05/01/24 1024   PT Last Visit   PT Visit Date 05/01/24   Note Type   Note Type Treatment   Pain Assessment   Pain Assessment Tool 0-10   Pain Score No Pain   Restrictions/Precautions   Weight Bearing Precautions Per Order No   Other Precautions Cognitive;Chair Alarm;Bed Alarm;Fall Risk;Visual impairment   Cognition   Overall Cognitive Status Impaired   Orientation Level Oriented to person  (\"[place]\" unable to identify general location w/ options.)   Comments Significant fear of falling limiting mobility. Multimodal cues 2/2 visual impairment   Subjective   Subjective \"Oh Hank, I'm going to fall.\"   Bed Mobility   Rolling L 3  Moderate assistance   Additional items Assist x 1;Increased time required;Bedrails;Verbal cues;LE management   Supine to Sit 3  Moderate assistance   Additional items Assist x 1;Increased time required;Verbal cues;HOB elevated;Bedrails  (HOB 10, trunk)   Additional Comments Multimodal cues for safe technique/sequencing w/ rail. Trunk/ LE support. Max Ax1 for lateral scooting, MOD Ax1 for anterior scooting using b/l rails.   Transfers   Sit to Stand 2  Maximal assistance   Additional items Assist x 2;Bedrails;Increased time required;Verbal cues   Stand to Sit 2  Maximal assistance   Additional items Assist x 2;Increased time " required;Verbal cues   Additional Comments unable to achieve full upright at EOB w/ Max Ax2 using RW and cues for upright. Able to achieve ~50% upright prior to fear of falling returning to sit. Max Ax2 w/ quick move x2 attempts w/ standing tolerance 1 minute each with. Max Ax2 w/ RW from bedside chair w/ standing tolerance ~1 minute each w/ max Ax2 to maintain upright. Cues for weight shifting and technique throughout. Dec fear of falling w/ use of towel support to ischial tuberosities and b/l knee block.   Ambulation/Elevation   Ambulation/Elevation Additional Comments Unable to elevate feet in standing/weight shift, unsafe to attempt   Balance   Static Sitting Fair   Dynamic Sitting Fair -   Static Standing Zero  (zero w/ RW, poor- w/ quick move)   Ambulatory Zero   Activity Tolerance   Activity Tolerance Patient limited by fatigue;Other (Comment)  (significant fear of falling)   Medical Staff Made Aware GERONIMO Jose   Nurse Made Aware RN cleared/updated   Exercises   Knee AROM Long Arc Quad Sitting;20 reps   Marching Sitting;20 reps   Balance training  EOB tolerance ~20 minutes w/ bed rail support and cues for upright. Dec posture and upright w/ fatigue but able to recorrect.  Lateral weight shifts using UE to realign x10 reps each side.   Assessment   Prognosis Guarded   Problem List Decreased strength;Decreased endurance;Impaired balance;Decreased mobility;Decreased cognition;Impaired vision;Pain   Assessment Pt seen per PT POC. Pt amenable to PT session. Intervention with focus on  functional transfers, therapeutic activity , therapeutic exercise , and balance training at EOB . Pt with fair tolerance to session with limitations of fatigue and weakness, and fear of falling that limits pt progress. Transfers w/ quick move vs. RW. Dec assistance required w/ use of quick move. Pt is continuing to progress towards functional goals w/ improved standing tolerance of up to 1 minute w/ max Ax2 RW.  Currently unable to complete gait 2/2 significant assistance to stand. Skilled cues for technique/safety and reassurance to minimize fear of falling. Improved EOB tolerance noted and ability to maintain upright using UE support. Denies reports of dizziness or SOB t/o session. Returned seated in bedside chair in care of OT at end of session. Continued to recommend use of clark for OOB to chair. Edu provided on fall precautions and reinforced w/ good understanding. Please refer to flowsheet for objective data above. Pt continues to demonstrate deficits relative to PLOF and would benefit from continued skilled PT services per POC to improve indep and safety with mobility. PT d/c recommendations: Anticipate level II (mod) rehab intensity resources  at d/c when medically appropriate pending progress.     Pt seen for partial co-treatment with skilled Occupational Therapist 2* clinically unstable/unpredictable presentation, medical complexity, fall risk, cognitive impairments, functional/physical limitations, impaired functional balance, decreased safety awareness, limited activity tolerance.   Barriers to Discharge Inaccessible home environment;Decreased caregiver support  (inc assistance for mobility)   Goals   PT Treatment Day 1   Plan   Treatment/Interventions Functional transfer training;LE strengthening/ROM;Therapeutic exercise;Endurance training;Cognitive reorientation;Equipment eval/education;Bed mobility;Gait training;Patient/family training;Compensatory technique education;Continued evaluation;Spoke to nursing   Discharge Recommendation   Rehab Resource Intensity Level, PT II (Moderate Resource Intensity)  (Pending progress)   Additional Comments 2 The patient's AM-PAC Basic Mobility Inpatient Short Form Raw Score is 8. A Raw score of less than or equal to 16 suggests the patient may benefit from discharge to post-acute rehab services. Please also refer to the recommendation of the Physical Therapist for safe  discharge planning.   AM-PAC Basic Mobility Inpatient   Turning in Flat Bed Without Bedrails 2   Lying on Back to Sitting on Edge of Flat Bed Without Bedrails 2   Moving Bed to Chair 1   Standing Up From Chair Using Arms 1   Walk in Room 1   Climb 3-5 Stairs With Railing 1   Basic Mobility Inpatient Raw Score 8   Turning Head Towards Sound 3   Follow Simple Instructions 3   Low Function Basic Mobility Raw Score  14   Low Function Basic Mobility Standardized Score  22.01   [facility] Highest Level Of Mobility   -[facility] Goal 3: Sit at edge of bed   -[facility] Achieved 5: Stand (1 or more minutes)  (w/ Max Ax2)   Education   Education Provided Mobility training;Assistive device   Patient Reinforcement needed   End of Consult   Patient Position at End of Consult Bedside chair;All needs within reach  (Left in care of OT.)           Mark Verdugo DPT   05/01/24

## 2024-05-01 NOTE — CONSULTS
Consultation - Infectious Disease   Latanya Ray 89 y.o. female MRN: [id number]  Unit/Bed#: E2 -01 Encounter: [id number]      IMPRESSION & RECOMMENDATIONS:   1.  Sepsis.  Appears to be secondary to a urinary source of infection.  No other clear source appreciated.  Patient is stabilized, clinically improved, with a decreasing temperature curve.  White cell count is decreased.  She seems to be tolerating antibiotics without difficulty.  -Continue antibiotics as below  -Follow-up blood cultures  -Recheck CBC with differential and CMP to make sure no developing toxicities  -Supportive care    2.  Acute pyelonephritis.  Based upon clinical, radiographic, and microbiologic findings.  Urine cultures growing Proteus repeatedly.  No resistant organisms isolated.  CT scan with evidence of left-sided pyelonephritis but no abscess or hydronephrosis seen.  No definitive urinary symptomatology and the patient is not retaining urine.  -Discontinue ceftriaxone  -Begin cefazolin 2 g IV every 8 hours  -Monitor temperature and symptomatology  -Once patient afebrile for 24 hours, will likely transition to oral antibiotics  -Monitor urine output    3.  Breast carcinoma.  Status post history of left-sided mastectomy.  Not on any current therapy.    Have discussed the above management plan in detail with the primary service    Extensive review of the medical records in epic including review of the notes, radiographs, and laboratory results     HISTORY OF PRESENT ILLNESS:  Reason for Consult: Septic shock secondary to UTI  HPI: Latanya Ray is a 89 y.o. year old female with breast carcinoma status post left mastectomy and cognitive impairment admitted to [facility] with global weakness and cognitive decline and found to have sepsis secondary to UTI who were asked to assist with antibiotic management.  Patient had been admitted to [facility] in February of this year with a UTI by report.  At  baseline she can carry out her normal ADLs but on the day of admission she apparently became confused was globally weak and sat down on the floor in the bathroom and then get up.  911 was called and the paramedics reported a fever of 102.2.  Because of these findings the patient was brought to the hospital for further evaluation.  The patient was found to be febrile, tachycardic, with hypotension.  She had blood cultures taken and was started on ceftriaxone admitted for further management.  Urinalysis was consistent with UTI with pyuria and bacteria.  CT of the chest abdomen pelvis revealed evidence of left-sided pyelonephritis.  The patient continued to have intermittent fever but her temperature has been trending down.  She has remained hemodynamically stable and seems to be tolerating the antibiotics without difficulty.  She is not having any nausea vomiting or diarrhea, no increased cough or shortness of breath, no dysuria or hematuria.  She has been undergoing urinary retention protocol and has not required a [place] placement.    REVIEW OF SYSTEMS:  A complete review of systems is negative other than that noted in the HPI.    PAST MEDICAL HISTORY:  Past Medical History:   Diagnosis Date    Anxiety     Arthritis     Breast carcinoma (HCC) 08/13/2012    Description: s/p L mastectomy. Dr. Diaz    Cancer ([facility])     Cellulitis of left lower limb 02/27/2024    Chronic pain of left knee 07/11/2019    Chronic pain syndrome 01/28/2020    Elevated serum alkaline phosphatase level     mild, isoenzymes are normal, resolved 4/24/17 labs , last assessed 11/10/16, resolved 4/24/17    GERD (gastroesophageal reflux disease) 06/20/2012    Glaucoma 08/12/2014    Hematuria     last assessed 9/18/12    Hyperlipidemia 08/13/2012    Hypertension     Macular degeneration 08/12/2014    Mixed stress and urge urinary incontinence 03/27/2023    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    06/06/2018 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    Osteoarthritis  06/05/2012    Osteoporosis 06/05/2012    Description: Dexa 7/13 read as normal, patient declines medication    Pressure injury of coccygeal region, stage 2 ([facility]) 03/06/2020    Pulmonary embolism ([facility]) 01/2011    last assessed 9/18/12    Restless legs syndrome 06/05/2012    RLS (restless legs syndrome)     Thoracic back pain 10/19/2017     Past Surgical History:   Procedure Laterality Date    BILATERAL OOPHORECTOMY      Laparoscopic    BREAST SURGERY Left     Mastectomy     CHOLECYSTECTOMY      Laparoscopic    HERNIA REPAIR      HYSTERECTOMY      SMALL INTESTINE SURGERY         FAMILY HISTORY:  Non-contributory    SOCIAL HISTORY:  Social History   Social History     Substance and Sexual Activity   Alcohol Use No    Alcohol/week: 0.0 standard drinks of alcohol    Comment: denies     Social History     Substance and Sexual Activity   Drug Use No     Social History     Tobacco Use   Smoking Status Never   Smokeless Tobacco Never   Tobacco Comments    denies       ALLERGIES:  Allergies   Allergen Reactions    Amoxicillin Hives    Celecoxib     Ciprofloxacin      Other reaction(s): diarrhea. Tolerates Levaquin    Codeine Other (See Comments)     Other reaction(s): Other (See Comments)  takes vicodin @ home  takes vicodin @ home    Epinephrine Other (See Comments)     Heart racing    Oxycodone-Acetaminophen Other (See Comments)     Nausea?    Oxycodone-Acetaminophen      Other reaction(s): Unknown Reaction    Propoxyphene Other (See Comments)    Rofecoxib     Sulfa Antibiotics Other (See Comments)     takes at home  takes at home  Other reaction(s): Other (See Comments)  takes at home    Sulfamethoxazole-Trimethoprim Other (See Comments)     Unknown, perhaps hives?    Nitrofurantoin Rash       MEDICATIONS:  All current active medications have been reviewed.  Antibiotics: Ceftriaxone    PHYSICAL EXAM:  Temp:  [97.4 °F (36.3 °C)-100.9 °F (38.3 °C)] 98.9 °F (37.2 °C)  HR:  [] 64  Resp:  [17-28] 17  BP: (110-132)/(50-68)  132/61  SpO2:  [93 %-95 %] 94 %  Temp (24hrs), Av.1 °F (37.3 °C), Min:97.4 °F (36.3 °C), Max:100.9 °F (38.3 °C)  Current: Temperature: 98.9 °F (37.2 °C)    Intake/Output Summary (Last 24 hours) at 2024 1256  Last data filed at 2024 1214  Gross per 24 hour   Intake 240 ml   Output 1000 ml   Net -760 ml       General Appearance:  Appearing well, nontoxic, and in no distress   Head:  Normocephalic, without obvious abnormality, atraumatic   Eyes:  Conjunctiva pink and sclera anicteric, both eyes   Nose: Nares normal, mucosa normal, no drainage   Throat: Oropharynx moist without lesions   Neck: Supple, symmetrical, no adenopathy, no tenderness/mass/nodules   Back:   Symmetric, no curvature, ROM normal, no CVA tenderness   Lungs:   Clear to auscultation bilaterally, respirations unlabored   Chest Wall:  No tenderness or deformity   Heart:  RRR; no murmur, rub or gallop   Abdomen:   Soft, non-tender, non-distended, positive bowel sounds    Extremities: No cyanosis, clubbing or edema   Skin: No rashes or lesions. No draining wounds noted.   Lymph nodes: Cervical, supraclavicular nodes normal   Neurologic: Alert and oriented times 3, extremity strength 5/5 and symmetric       LABS, IMAGING, & OTHER STUDIES:  Lab Results:  I have personally reviewed pertinent labs.  Results from last 7 days   Lab Units 24  0141   WBC Thousand/uL 7.84 9.39 11.26*   HEMOGLOBIN g/dL 10.5* 10.5* 11.8   PLATELETS Thousands/uL 223 203 275     Results from last 7 days   Lab Units 24  0624  0455 24  0141   SODIUM mmol/L 140 135 133*   POTASSIUM mmol/L 3.7 3.3* 3.5   CHLORIDE mmol/L 107 104 99   CO2 mmol/L 26 24 26   BUN mg/dL 12 13 19   CREATININE mg/dL 0.53* 0.57* 0.61   EGFR ml/min/1.73sq m 79 77 76   CALCIUM mg/dL 8.2* 8.3* 9.2   AST U/L 20  --  14   ALT U/L 20  --  9   ALK PHOS U/L 74  --  93     Results from last 7 days   Lab Units 24  0325 24  0324 24  0214  04/29/24  0115   BLOOD CULTURE   --   --  No Growth at 48 hrs. No Growth at 48 hrs.   URINE CULTURE  >100,000 cfu/ml Proteus mirabilis*  10,000-19,000 cfu/ml >100,000 cfu/ml Proteus mirabilis*  20,000-29,000 cfu/ml  --   --      Results from last 7 days   Lab Units 05/01/24  0617 04/30/24  0455 04/29/24  0141   PROCALCITONIN ng/ml 0.21 0.30* 0.14                   Imaging Studies:     CT chest abdomen pelvis.  Basilar atelectasis.  No area of dense consolidation.  Patchy hypoenhancement left renal parenchyma consistent with pyelonephritis.  No hydronephrosis.    Images personally reviewed by me in PACS and interpreted by me

## 2024-05-01 NOTE — PLAN OF CARE
Problem: OCCUPATIONAL THERAPY ADULT  Goal: Performs self-care activities at highest level of function for planned discharge setting.  See evaluation for individualized goals.  Description: Treatment Interventions: ADL retraining, Functional transfer training, UE strengthening/ROM, Endurance training, Cognitive reorientation, Patient/family training, Equipment evaluation/education, Compensatory technique education          See flowsheet documentation for full assessment, interventions and recommendations.   Note: Limitation: Decreased ADL status, Decreased UE strength, Decreased Safe judgement during ADL, Decreased cognition, Decreased endurance, Decreased high-level ADLs  Prognosis: Fair  Assessment: Pt seen for 30 min tx session with focus on functional balance, functional mobility, ADL status, transfer safety, and cognition. Pt able to tolerate OOB mobility; sitting balance=f-/p+, standing balance=p+/p. Pt required heavy assistance with all functional mobility tasks. Pt demonstrating need for assistance with UE and LE ADLs. Cognitive deficits noted--i.e.orientation, memory, problem-solving. Pt continues to demonstrate appropriateness for inpt rehab to improve her overall level of independence. Pt pleasant and cooperative with tx session. The patient's raw score on the AM-PAC Daily Activity Inpatient Short Form is 13. A raw score of less than 19 suggests the patient may benefit from discharge to post-acute rehabilitation services. Please refer to the recommendation of the Occupational Therapist for safe discharge planning.     Rehab Resource Intensity Level, OT: II (Moderate Resource Intensity)

## 2024-05-01 NOTE — ASSESSMENT & PLAN NOTE
History of cancer malignancy macular degeneration with cognitive decline presents to the hospital for weakness found to have severe sepsis/shock secondary to pyelonephritis  Briefly required norepinephrine and ED.  Continue IV ceftriaxone.    Urine cultures with Proteus mirabilis, sensitivities available -appreciate ID input, transitioned to cefazolin  Urine cultures no growth at 48 hours  Disposition: To rehab when medically stable -as below, plan to transition to p.o. antibiotics after afebrile 24 hours    Results from last 7 days   Lab Units 04/29/24  0214 04/29/24  0115   BLOOD CULTURE  No Growth at 48 hrs. No Growth at 48 hrs.

## 2024-05-01 NOTE — PROGRESS NOTES
[facility]  Progress Note  Name: Latanya Ray I  MRN: [id number]  Unit/Bed#: E2 -01 I Date of Admission: 4/29/2024   Date of Service: 5/1/2024 I Hospital Day: 2    Assessment/Plan   * Septic shock (HCC)  Assessment & Plan  History of cancer malignancy macular degeneration with cognitive decline presents to the hospital for weakness found to have severe sepsis/shock secondary to pyelonephritis  Briefly required norepinephrine and ED.  Continue IV ceftriaxone.    Urine cultures with Proteus mirabilis, sensitivities available -appreciate ID input, transitioned to cefazolin  Urine cultures no growth at 48 hours  Disposition: To rehab when medically stable -as below, plan to transition to p.o. antibiotics after afebrile 24 hours    Results from last 7 days   Lab Units 04/29/24  0214 04/29/24  0115   BLOOD CULTURE  No Growth at 48 hrs. No Growth at 48 hrs.         Pyelonephritis  Assessment & Plan  Presents with sepsis, secondary to cystitis/pyelonephritis   U/A large leuks, innumerable WBC/bacteria   Urine cultures with Proteus mirabilis, sensitivities available  Appreciate ID input, discontinued ceftriaxone, transition to cefazolin -anticipate 10 days of antibiotics  Transitioning to oral regimen once afebrile 24 hours    Diaphragmatic hernia without obstruction or gangrene  Assessment & Plan  Noted stable hernia on CT abd/pelvis    Acute metabolic encephalopathy  Assessment & Plan  With underlying cognitive decline  Present on admission secondary to sepsis/pyelonephritis.      Cognitive impairment  Assessment & Plan  Supportive care    Neoplasm of skin of face  Assessment & Plan  History of basal cell carcinoma reportedly family had declined intervention due to advanced age    Breast carcinoma (HCC)  Assessment & Plan  History of breast cancer status postmastectomy.               VTE Pharmacologic Prophylaxis: VTE Score: 7 High Risk (Score >/= 5) - Pharmacological DVT Prophylaxis  Ordered: enoxaparin (Lovenox). Sequential Compression Devices Ordered.    Mobility:   Basic Mobility Inpatient Raw Score: 8  JH-HLM Goal: 3: Sit at edge of bed  JH-HLM Achieved: 3: Sit at edge of bed  JH-HLM Goal achieved. Continue to encourage appropriate mobility.    Patient Centered Rounds: I performed bedside rounds with nursing staff today.   Discussions with Specialists or Other Care Team Provider: BETITO SANDERS    Education and Discussions with Family / Patient: Updated  (daughter) via phone.    Total Time Spent on Date of Encounter in care of patient: 50 mins. This time was spent on one or more of the following: performing physical exam; counseling and coordination of care; obtaining or reviewing history; documenting in the medical record; reviewing/ordering tests, medications or procedures; communicating with other healthcare professionals and discussing with patient's family/caregivers.    Current Length of Stay: 2 day(s)  Current Patient Status: Inpatient   Certification Statement: The patient will continue to require additional inpatient hospital stay due to IV Rx  Discharge Plan:  TBD    Code Status: Level 3 - DNAR and DNI    Subjective:   Patient does not contribute significant history.  Had low-grade fever last night.    Objective:     Vitals:   Temp (24hrs), Av.1 °F (37.3 °C), Min:97.4 °F (36.3 °C), Max:100.9 °F (38.3 °C)    Temp:  [97.4 °F (36.3 °C)-100.9 °F (38.3 °C)] 98.9 °F (37.2 °C)  HR:  [] 64  Resp:  [17-28] 17  BP: (110-132)/(50-68) 132/61  SpO2:  [93 %-95 %] 94 %  Body mass index is 26.05 kg/m².     Input and Output Summary (last 24 hours):     Intake/Output Summary (Last 24 hours) at 2024 1359  Last data filed at 2024 1214  Gross per 24 hour   Intake 240 ml   Output 1000 ml   Net -760 ml       Physical Exam:   Physical Exam  Constitutional:       General: She is not in acute distress.  HENT:      Head: Normocephalic and atraumatic.      Mouth/Throat:      Pharynx:  Oropharynx is clear.   Cardiovascular:      Rate and Rhythm: Normal rate and regular rhythm.   Pulmonary:      Effort: No respiratory distress.   Abdominal:      General: There is no distension.   Musculoskeletal:      Right lower leg: No edema.      Left lower leg: No edema.   Skin:     General: Skin is warm and dry.      Comments: Keratotic lesion on forehead   Neurological:      Mental Status: She is disoriented.          Additional Data:     Labs:  Results from last 7 days   Lab Units 05/01/24  0617 04/30/24  0455   WBC Thousand/uL 7.84 9.39   HEMOGLOBIN g/dL 10.5* 10.5*   HEMATOCRIT % 32.0* 32.0*   PLATELETS Thousands/uL 223 203   SEGS PCT %  --  68   LYMPHO PCT %  --  20   MONO PCT %  --  10   EOS PCT %  --  0     Results from last 7 days   Lab Units 05/01/24  0617   SODIUM mmol/L 140   POTASSIUM mmol/L 3.7   CHLORIDE mmol/L 107   CO2 mmol/L 26   BUN mg/dL 12   CREATININE mg/dL 0.53*   ANION GAP mmol/L 7   CALCIUM mg/dL 8.2*   ALBUMIN g/dL 3.0*   TOTAL BILIRUBIN mg/dL 0.27   ALK PHOS U/L 74   ALT U/L 20   AST U/L 20   GLUCOSE RANDOM mg/dL 93     Results from last 7 days   Lab Units 04/29/24  0141   INR  1.03             Results from last 7 days   Lab Units 05/01/24  0617 04/30/24  0455 04/29/24  0141   LACTIC ACID mmol/L  --   --  0.9   PROCALCITONIN ng/ml 0.21 0.30* 0.14       Lines/Drains:  Invasive Devices       Peripheral Intravenous Line  Duration             Peripheral IV 04/29/24 Dorsal (posterior);Right Forearm 2 days    Peripheral IV 04/29/24 Right Antecubital 2 days              Drain  Duration             External Urinary Catheter 2 days                      Imaging: Reviewed radiology reports from this admission including: abdominal/pelvic CT and Personally reviewed the following imaging: abdominal/pelvic CT    Recent Cultures (last 7 days):   Results from last 7 days   Lab Units 04/29/24  0325 04/29/24  0324 04/29/24  0214 04/29/24  0115   BLOOD CULTURE   --   --  No Growth at 48 hrs. No Growth at  48 hrs.   URINE CULTURE  >100,000 cfu/ml Proteus mirabilis*  10,000-19,000 cfu/ml >100,000 cfu/ml Proteus mirabilis*  20,000-29,000 cfu/ml  --   --        Last 24 Hours Medication List:   Current Facility-Administered Medications   Medication Dose Route Frequency Provider Last Rate    acetaminophen  650 mg Oral Q6H PRN MARINA Wooten      aluminum-magnesium hydroxide-simethicone  30 mL Oral Q6H PRN MARINA Wooten      aspirin  81 mg Oral Daily MARINA Wooten      bisacodyl  10 mg Rectal Daily PRN MARINA Wooten      calcium carbonate-vitamin D  1 tablet Oral Daily With Breakfast MARINA Wooten      cefazolin  2,000 mg Intravenous Q8H Vick Chan MD 2,000 mg (05/01/24 1330)    Cholecalciferol  1,000 Units Oral Daily MARINA Wooten      vitamin B-12  1,000 mcg Oral Every Other Day MARINA Wooten      enoxaparin  40 mg Subcutaneous Daily MARINA Wooten      meclizine  25 mg Oral Q8H PRN MARINA Wooten      multivitamin-minerals  1 tablet Oral Daily MARINA Wooten      ondansetron  4 mg Intravenous Q6H PRN MARINA Wooten      pantoprazole  20 mg Oral Early Morning MARINA Wooten      pramipexole  0.25 mg Oral HS MARINA Wooten      simethicone  80 mg Oral 4x Daily PRN MARINA Wooten          Today, Patient Was Seen By: Loretta Piper MD    **Please Note: This note may have been constructed using a voice recognition system.**

## 2024-05-01 NOTE — OCCUPATIONAL THERAPY NOTE
Occupational Therapy Progress Note     Patient Name: Latanya Ray  Today's Date: 5/1/2024  Problem List  Principal Problem:    Septic shock (HCC)  Active Problems:    Breast carcinoma (HCC)    Neoplasm of skin of face    Cognitive impairment    Legal blindness    Acute metabolic encephalopathy    Diaphragmatic hernia without obstruction or gangrene    Pyelonephritis              05/01/24 1010   Note Type   Note Type Treatment   Pain Assessment   Pain Assessment Tool FLACC   Pain Rating: FLACC (Rest) - Face 0   Pain Rating: FLACC (Rest) - Legs 0   Pain Rating: FLACC (Rest) - Activity 0   Pain Rating: FLACC (Rest) - Cry 0   Pain Rating: FLACC (Rest) - Consolability 0   Score: FLACC (Rest) 0   Restrictions/Precautions   Weight Bearing Precautions Per Order No   Other Precautions Cognitive;Chair Alarm;Bed Alarm;Fall Risk;Hard of hearing;Visual impairment   ADL   Where Assessed Edge of bed   Eating Assistance 4  Minimal Assistance   Grooming Assistance 3  Moderate Assistance   UB Bathing Assistance 2  Maximal Assistance   LB Bathing Assistance 2  Maximal Assistance   UB Dressing Assistance 2  Maximal Assistance   LB Dressing Assistance 1  Total Assistance   Toileting Assistance  1  Total Assistance   Functional Standing Tolerance   Time 30-60secs x 3   Transfers   Sit to Stand 2  Maximal assistance   Additional items Assist x 2;Increased time required;Verbal cues   Stand to Sit 2  Maximal assistance   Additional items Assist x 2;Increased time required;Verbal cues   Functional Mobility   Functional Mobility 2  Maximal assistance   Additional Comments x2   Additional items   (with quick-move)   Therapeutic Exercise - ROM   UE-ROM Yes   Cognition   Overall Cognitive Status Impaired   Arousal/Participation Alert   Attention Attends with cues to redirect   Orientation Level Oriented to person;Disoriented to place;Disoriented to time;Disoriented to situation   Memory Decreased short term memory;Decreased recall of  precautions   Following Commands Follows one step commands with increased time or repetition   Activity Tolerance   Activity Tolerance Patient limited by fatigue   Medical Staff Made Aware nsg, P.T.   Assessment   Assessment Pt seen for 30 min tx session with focus on functional balance, functional mobility, ADL status, transfer safety, and cognition. Pt able to tolerate OOB mobility; sitting balance=f-/p+, standing balance=p+/p. Pt required heavy assistance with all functional mobility tasks. Pt demonstrating need for assistance with UE and LE ADLs. Cognitive deficits noted--i.e.orientation, memory, problem-solving. Pt continues to demonstrate appropriateness for inpt rehab to improve her overall level of independence. Pt pleasant and cooperative with tx session. The patient's raw score on the AM-PAC Daily Activity Inpatient Short Form is 13. A raw score of less than 19 suggests the patient may benefit from discharge to post-acute rehabilitation services. Please refer to the recommendation of the Occupational Therapist for safe discharge planning.   Plan   Treatment Interventions ADL retraining;Functional transfer training;UE strengthening/ROM;Endurance training;Cognitive reorientation;Patient/family training;Equipment evaluation/education;Compensatory technique education   Goal Expiration Date 05/13/24   OT Treatment Day 1   OT Frequency   (2-5xwk)   Discharge Recommendation   Rehab Resource Intensity Level, OT II (Moderate Resource Intensity)   AM-PAC Daily Activity Inpatient   Lower Body Dressing 2   Bathing 2   Toileting 1   Upper Body Dressing 2   Grooming 3   Eating 3   Daily Activity Raw Score 13   Daily Activity Standardized Score (Calc for Raw Score >=11) 32.03   AM-PAC Applied Cognition Inpatient   Following a Speech/Presentation 3   Understanding Ordinary Conversation 3   Taking Medications 2   Remembering Where Things Are Placed or Put Away 2   Remembering List of 4-5 Errands 1   Taking Care of  Complicated Tasks 1   Applied Cognition Raw Score 12   Applied Cognition Standardized Score 28.82     Talha Angel

## 2024-05-01 NOTE — CASE MANAGEMENT
Case Management Progress Note    Patient name Latanya Ray  Location [facility] /E2 -* MRN [id number]  : 1926 Date 2024       LOS (days): 2  Geometric Mean LOS (GMLOS) (days): 5.1  Days to GMLOS:2.8        OBJECTIVE:        Current admission status: Inpatient  Preferred Pharmacy:   [id number] [address]  [address] [address]  Phone: [phone] Fax: [phone]    [facility] - [place], [place] - [address]  [address]  [address]  [place] [address]  Phone: [phone] Fax: [phone]    Primary Care Provider: Amina Rowe DO    Primary Insurance: MEDICARE  Secondary Insurance: AARP    PROGRESS NOTE:    CM contacted pt's daughter, Celia, to discuss rehab options. Informed daughter that [facility] is unable to accept as they feel pt might require a longer length of stay than they are able to accommodate. CM explained to daughter that pt is likely to be cleared for discharge tomorrow and re-reviewed the options currently available for short term rehab ([facility], [facility], [facility], [facility], [facility], and [facility]). Once this CM brought up the potential of pt being discharged tomorrow, pt's daughter began yelling on the phone. She reported that we are just trying to kick her mother out and do not care about her mother. CM provided active listening and tried to deescalate the situation. CM explained that pt's blood cultures have come back negative and that she is currently being treated with an IV antibiotic and would likely discharge on an oral antibiotic and that this CM would have the provider call to further explain the current and expected medical treatment. Daughter reported that she will not allow the pt to leave the hospital until we can prove that the infection is completely resolved. CM explained to the daughter that if pt is medically cleared and she does not agree with  that determination, she can appeal the discharge at that time. Explained that if Medicare were to agree that pt is cleared for discharge, any day after that determination is made would be an out of pocket expense. Daughter became angrier after this was discussed and stated she would be reporting that to higher ups (state rep and president) and would be pursuing a legal case. CM again provided active listening and attempted to de-escalate the situation by ensuring her that the pt would not be discharged until she is medically cleared. Daughter refused to pick a facility as she does not want her mother going to any of these places. CM asked if she wanted the pt to come home to which she stated pt cannot return home. CM asked if she would like the referrals placed further out distance wise to which the daughter declined. CM informed daughter that she would reach out to the provider about giving her a call to review the medical condition of her mom and potential discharge to which daughter was amendable. CM will follow up once determination is made about discharge to see if daughter has decided on a rehab or if she will be appealing the discharge at that time. CM informed medical provider of this situation along with the .

## 2024-05-01 NOTE — PLAN OF CARE
Problem: Potential for Falls  Goal: Patient will remain free of falls  Description: INTERVENTIONS:  - Educate patient/family on patient safety including physical limitations  - Instruct patient to call for assistance with activity   - Consult OT/PT to assist with strengthening/mobility   - Keep Call bell within reach  - Keep bed low and locked with side rails adjusted as appropriate  - Keep care items and personal belongings within reach  - Initiate and maintain comfort rounds  - Make Fall Risk Sign visible to staff  - Offer Toileting every 2 Hours, in advance of need  - Initiate/Maintain bed alarm  - Obtain necessary fall risk management equipment:    - Apply yellow socks and bracelet for high fall risk patients  - Consider moving patient to room near nurses station  Outcome: Progressing     Problem: Prexisting or High Potential for Compromised Skin Integrity  Goal: Skin integrity is maintained or improved  Description: INTERVENTIONS:  - Identify patients at risk for skin breakdown  - Assess and monitor skin integrity  - Assess and monitor nutrition and hydration status  - Monitor labs   - Assess for incontinence   - Turn and reposition patient  - Assist with mobility/ambulation  - Relieve pressure over bony prominences  - Avoid friction and shearing  - Provide appropriate hygiene as needed including keeping skin clean and dry  - Evaluate need for skin moisturizer/barrier cream  - Collaborate with interdisciplinary team   - Patient/family teaching  - Consider wound care consult   Outcome: Progressing     Problem: PAIN - ADULT  Goal: Verbalizes/displays adequate comfort level or baseline comfort level  Description: Interventions:  - Encourage patient to monitor pain and request assistance  - Assess pain using appropriate pain scale  - Administer analgesics based on type and severity of pain and evaluate response  - Implement non-pharmacological measures as appropriate and evaluate response  - Consider cultural and  social influences on pain and pain management  - Notify physician/advanced practitioner if interventions unsuccessful or patient reports new pain  Outcome: Progressing     Problem: INFECTION - ADULT  Goal: Absence or prevention of progression during hospitalization  Description: INTERVENTIONS:  - Assess and monitor for signs and symptoms of infection  - Monitor lab/diagnostic results  - Monitor all insertion sites, i.e. indwelling lines, tubes, and drains  - Monitor endotracheal if appropriate and nasal secretions for changes in amount and color  - [place] appropriate cooling/warming therapies per order  - Administer medications as ordered  - Instruct and encourage patient and family to use good hand hygiene technique  - Identify and instruct in appropriate isolation precautions for identified infection/condition  Outcome: Progressing     Problem: SAFETY ADULT  Goal: Patient will remain free of falls  Description: INTERVENTIONS:  - Educate patient/family on patient safety including physical limitations  - Instruct patient to call for assistance with activity   - Consult OT/PT to assist with strengthening/mobility   - Keep Call bell within reach  - Keep bed low and locked with side rails adjusted as appropriate  - Keep care items and personal belongings within reach  - Initiate and maintain comfort rounds  - Make Fall Risk Sign visible to staff  - Offer Toileting every 2 Hours, in advance of need  - Initiate/Maintain bed alarm  - Obtain necessary fall risk management equipment:    - Apply yellow socks and bracelet for high fall risk patients  - Consider moving patient to room near nurses station  Outcome: Progressing  Goal: Maintains/Returns to pre admission functional level  Description: INTERVENTIONS:  - Perform AM-PAC 6 Click Basic Mobility/ Daily Activity assessment daily.  - Set and communicate daily mobility goal to care team and patient/family/caregiver.   - Collaborate with rehabilitation services on  mobility goals if consulted  - Perform Range of Motion 3 times a day.  - Reposition patient every 2 hours.  - Dangle patient 3 times a day  - Stand patient 3 times a day  - Out of bed to chair 3 times a day   - Out of bed for meals 3 times a day  - Out of bed for toileting  - Record patient progress and toleration of activity level   Outcome: Progressing     Problem: DISCHARGE PLANNING  Goal: Discharge to home or other facility with appropriate resources  Description: INTERVENTIONS:  - Identify barriers to discharge w/patient and caregiver  - Arrange for needed discharge resources and transportation as appropriate  - Identify discharge learning needs (meds, wound care, etc.)  - Arrange for interpretive services to assist at discharge as needed  - Refer to Case Management Department for coordinating discharge planning if the patient needs post-hospital services based on physician/advanced practitioner order or complex needs related to functional status, cognitive ability, or social support system  Outcome: Progressing     Problem: GENITOURINARY - ADULT  Goal: Absence of urinary retention  Description: INTERVENTIONS:  - Assess patient’s ability to void and empty bladder  - Monitor I/O  - Bladder scan as needed  - Discuss with physician/AP medications to alleviate retention as needed  - Discuss catheterization for long term situations as appropriate  Outcome: Progressing     Problem: SKIN/TISSUE INTEGRITY - ADULT  Goal: Skin Integrity remains intact(Skin Breakdown Prevention)  Description: Assess:  -Perform Rosales assessment every shift  -Clean and moisturize skin every shift  -Inspect skin when repositioning, toileting, and assisting with ADLS  -Assess extremities for adequate circulation and sensation     Bed Management:  -Have minimal linens on bed & keep smooth, unwrinkled  -Change linens as needed when moist or perspiring  -Avoid sitting or lying in one position for more than 2 hours while in bed  -Keep HOB at  30degrees     Toileting:  -Offer bedside commode  -Assess for incontinence every shift  -Use incontinent care products after each incontinent episode     Activity:  -Mobilize patient 3 times a day  -Encourage activity and walks on unit  -Encourage or provide ROM exercises   -Turn and reposition patient every 2 Hours  -Use appropriate equipment to lift or move patient in bed  -Instruct/ Assist with weight shifting every 2 hrs when out of bed in chair  -Consider limitation of chair time 2 hour intervals    Skin Care:  -Avoid use of baby powder, tape, friction and shearing, hot water or constrictive clothing  -Relieve pressure over bony prominences using allevyn foam  -Do not massage red bony areas    Outcome: Progressing     Problem: Nutrition/Hydration-ADULT  Goal: Nutrient/Hydration intake appropriate for improving, restoring or maintaining nutritional needs  Description: Monitor and assess patient's nutrition/hydration status for malnutrition. Collaborate with interdisciplinary team and initiate plan and interventions as ordered.  Monitor patient's weight and dietary intake as ordered or per policy. Utilize nutrition screening tool and intervene as necessary. Determine patient's food preferences and provide high-protein, high-caloric foods as appropriate.     INTERVENTIONS:  - Monitor oral intake, urinary output, labs, and treatment plans  - Assess nutrition and hydration status and recommend course of action  - Evaluate amount of meals eaten  - Assist patient with eating if necessary   - Allow adequate time for meals  - Recommend/ encourage appropriate diets, oral nutritional supplements, and vitamin/mineral supplements  - Order, calculate, and assess calorie counts as needed  - Recommend, monitor, and adjust tube feedings and TPN/PPN based on assessed needs  - Assess need for intravenous fluids  - Provide specific nutrition/hydration education as appropriate  - Include patient/family/caregiver in decisions  related to nutrition  Outcome: Progressing

## 2024-05-01 NOTE — PLAN OF CARE
Problem: PHYSICAL THERAPY ADULT  Goal: Performs mobility at highest level of function for planned discharge setting.  See evaluation for individualized goals.  Description: Treatment/Interventions: Functional transfer training, LE strengthening/ROM, Therapeutic exercise, Endurance training, Cognitive reorientation, Equipment eval/education, Bed mobility, Gait training, Patient/family training, Compensatory technique education, Continued evaluation, Spoke to nursing          See flowsheet documentation for full assessment, interventions and recommendations.  Outcome: Progressing  Note: Prognosis: Guarded  Problem List: Decreased strength, Decreased endurance, Impaired balance, Decreased mobility, Decreased cognition, Impaired vision, Pain  Assessment: Pt seen per PT POC. Pt amenable to PT session. Intervention with focus on  functional transfers, therapeutic activity , therapeutic exercise , and balance training at EOB . Pt with fair tolerance to session with limitations of fatigue and weakness, and fear of falling that limits pt progress. Transfers w/ quick move vs. RW. Dec assistance required w/ use of quick move. Pt is continuing to progress towards functional goals w/ improved standing tolerance of up to 1 minute w/ max Ax2 RW. Currently unable to complete gait 2/2 significant assistance to stand. Skilled cues for technique/safety and reassurance to minimize fear of falling. Improved EOB tolerance noted and ability to maintain upright using UE support. Denies reports of dizziness or SOB t/o session. Returned seated in bedside chair in care of OT at end of session. Continued to recommend use of clark for OOB to chair. Edu provided on fall precautions and reinforced w/ good understanding. Please refer to flowsheet for objective data above. Pt continues to demonstrate deficits relative to PLOF and would benefit from continued skilled PT services per POC to improve indep and safety with mobility. PT d/c  recommendations: Anticipate level II (mod) rehab intensity resources  at d/c when medically appropriate pending progress.     Pt seen for partial co-treatment with skilled Occupational Therapist 2* clinically unstable/unpredictable presentation, medical complexity, fall risk, cognitive impairments, functional/physical limitations, impaired functional balance, decreased safety awareness, limited activity tolerance.  Barriers to Discharge: Inaccessible home environment, Decreased caregiver support (inc assistance for mobility)     Rehab Resource Intensity Level, PT: II (Moderate Resource Intensity) (Pending progress)    See flowsheet documentation for full assessment.

## 2024-05-01 NOTE — ASSESSMENT & PLAN NOTE
Presents with sepsis, secondary to cystitis/pyelonephritis   U/A large leuks, innumerable WBC/bacteria   Urine cultures with Proteus mirabilis, sensitivities available  Appreciate ID input, discontinued ceftriaxone, transition to cefazolin -anticipate 10 days of antibiotics  Transitioning to oral regimen once afebrile 24 hours

## 2024-05-02 LAB
BASOPHILS # BLD AUTO: 0.05 THOUSANDS/ÂΜL (ref 0–0.1)
BASOPHILS NFR BLD AUTO: 1 % (ref 0–1)
EOSINOPHIL # BLD AUTO: 0.17 THOUSAND/ÂΜL (ref 0–0.61)
EOSINOPHIL NFR BLD AUTO: 3 % (ref 0–6)
ERYTHROCYTE [DISTWIDTH] IN BLOOD BY AUTOMATED COUNT: 15.3 % (ref 11.6–15.1)
HCT VFR BLD AUTO: 32.2 % (ref 34.8–46.1)
HGB BLD-MCNC: 10.9 G/DL (ref 11.5–15.4)
IMM GRANULOCYTES # BLD AUTO: 0.02 THOUSAND/UL (ref 0–0.2)
IMM GRANULOCYTES NFR BLD AUTO: 0 % (ref 0–2)
LYMPHOCYTES # BLD AUTO: 1.72 THOUSANDS/ÂΜL (ref 0.6–4.47)
LYMPHOCYTES NFR BLD AUTO: 26 % (ref 14–44)
MCH RBC QN AUTO: 30.6 PG (ref 26.8–34.3)
MCHC RBC AUTO-ENTMCNC: 33.9 G/DL (ref 31.4–37.4)
MCV RBC AUTO: 90 FL (ref 82–98)
MONOCYTES # BLD AUTO: 0.54 THOUSAND/ÂΜL (ref 0.17–1.22)
MONOCYTES NFR BLD AUTO: 8 % (ref 4–12)
NEUTROPHILS # BLD AUTO: 4.15 THOUSANDS/ÂΜL (ref 1.85–7.62)
NEUTS SEG NFR BLD AUTO: 62 % (ref 43–75)
NRBC BLD AUTO-RTO: 0 /100 WBCS
PLATELET # BLD AUTO: 261 THOUSANDS/UL (ref 149–390)
PMV BLD AUTO: 10.1 FL (ref 8.9–12.7)
RBC # BLD AUTO: 3.56 MILLION/UL (ref 3.81–5.12)
WBC # BLD AUTO: 6.65 THOUSAND/UL (ref 4.31–10.16)

## 2024-05-02 PROCEDURE — 80053 COMPREHEN METABOLIC PANEL: CPT | Performed by: FAMILY MEDICINE

## 2024-05-02 PROCEDURE — 99232 SBSQ HOSP IP/OBS MODERATE 35: CPT | Performed by: FAMILY MEDICINE

## 2024-05-02 PROCEDURE — 85025 COMPLETE CBC W/AUTO DIFF WBC: CPT | Performed by: FAMILY MEDICINE

## 2024-05-02 PROCEDURE — 99233 SBSQ HOSP IP/OBS HIGH 50: CPT | Performed by: INTERNAL MEDICINE

## 2024-05-02 RX ADMIN — CEFAZOLIN SODIUM 2000 MG: 2 SOLUTION INTRAVENOUS at 12:48

## 2024-05-02 RX ADMIN — Medication 1 TABLET: at 09:21

## 2024-05-02 RX ADMIN — PANTOPRAZOLE SODIUM 20 MG: 20 TABLET, DELAYED RELEASE ORAL at 05:26

## 2024-05-02 RX ADMIN — MULTIPLE VITAMINS W/ MINERALS TAB 1 TABLET: TAB ORAL at 09:21

## 2024-05-02 RX ADMIN — PRAMIPEXOLE DIHYDROCHLORIDE 0.25 MG: 0.25 TABLET ORAL at 21:57

## 2024-05-02 RX ADMIN — CEFAZOLIN SODIUM 2000 MG: 2 SOLUTION INTRAVENOUS at 21:55

## 2024-05-02 RX ADMIN — ASPIRIN 81 MG: 81 TABLET, COATED ORAL at 21:56

## 2024-05-02 RX ADMIN — ENOXAPARIN SODIUM 40 MG: 40 INJECTION SUBCUTANEOUS at 09:21

## 2024-05-02 RX ADMIN — Medication 1000 UNITS: at 09:21

## 2024-05-02 RX ADMIN — CEFAZOLIN SODIUM 2000 MG: 2 SOLUTION INTRAVENOUS at 05:26

## 2024-05-02 NOTE — PLAN OF CARE
Problem: Potential for Falls  Goal: Patient will remain free of falls  Description: INTERVENTIONS:  - Educate patient/family on patient safety including physical limitations  - Instruct patient to call for assistance with activity   - Consult OT/PT to assist with strengthening/mobility   - Keep Call bell within reach  - Keep bed low and locked with side rails adjusted as appropriate  - Keep care items and personal belongings within reach  - Initiate and maintain comfort rounds  - Make Fall Risk Sign visible to staff  - Offer Toileting every 2 Hours, in advance of need  - Initiate/Maintain bed alarm  - Obtain necessary fall risk management equipment:    - Apply yellow socks and bracelet for high fall risk patients  - Consider moving patient to room near nurses station  Outcome: Progressing     Problem: Prexisting or High Potential for Compromised Skin Integrity  Goal: Skin integrity is maintained or improved  Description: INTERVENTIONS:  - Identify patients at risk for skin breakdown  - Assess and monitor skin integrity  - Assess and monitor nutrition and hydration status  - Monitor labs   - Assess for incontinence   - Turn and reposition patient  - Assist with mobility/ambulation  - Relieve pressure over bony prominences  - Avoid friction and shearing  - Provide appropriate hygiene as needed including keeping skin clean and dry  - Evaluate need for skin moisturizer/barrier cream  - Collaborate with interdisciplinary team   - Patient/family teaching  - Consider wound care consult   Outcome: Progressing     Problem: PAIN - ADULT  Goal: Verbalizes/displays adequate comfort level or baseline comfort level  Description: Interventions:  - Encourage patient to monitor pain and request assistance  - Assess pain using appropriate pain scale  - Administer analgesics based on type and severity of pain and evaluate response  - Implement non-pharmacological measures as appropriate and evaluate response  - Consider cultural and  social influences on pain and pain management  - Notify physician/advanced practitioner if interventions unsuccessful or patient reports new pain  Outcome: Progressing     Problem: SAFETY ADULT  Goal: Patient will remain free of falls  Description: INTERVENTIONS:  - Educate patient/family on patient safety including physical limitations  - Instruct patient to call for assistance with activity   - Consult OT/PT to assist with strengthening/mobility   - Keep Call bell within reach  - Keep bed low and locked with side rails adjusted as appropriate  - Keep care items and personal belongings within reach  - Initiate and maintain comfort rounds  - Make Fall Risk Sign visible to staff  - Offer Toileting every 2 Hours, in advance of need  - Initiate/Maintain bed alarm  - Obtain necessary fall risk management equipment:    - Apply yellow socks and bracelet for high fall risk patients  - Consider moving patient to room near nurses station  Outcome: Progressing

## 2024-05-02 NOTE — ASSESSMENT & PLAN NOTE
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      Urine cultures with Proteus mirabilis, sensitivities available -appreciate ID input, transitioned to cefazolin  Urine cultures no growth at 48 hours  Disposition: To rehab when medically stable -as below, plan to transition to p.o. antibiotics after afebrile 24 hours    Results from last 7 days   Lab Units 04/29/24  0214 04/29/24  0115   BLOOD CULTURE  No Growth at 48 hrs. No Growth at 48 hrs.

## 2024-05-02 NOTE — PLAN OF CARE
Problem: Potential for Falls  Goal: Patient will remain free of falls  Description: INTERVENTIONS:  - Educate patient/family on patient safety including physical limitations  - Instruct patient to call for assistance with activity   - Consult OT/PT to assist with strengthening/mobility   - Keep Call bell within reach  - Keep bed low and locked with side rails adjusted as appropriate  - Keep care items and personal belongings within reach  - Initiate and maintain comfort rounds  - Make Fall Risk Sign visible to staff  - Offer Toileting every 2 Hours, in advance of need  - Initiate/Maintain bed alarm  - Obtain necessary fall risk management equipment:    - Apply yellow socks and bracelet for high fall risk patients  - Consider moving patient to room near nurses station  Outcome: Progressing     Problem: Prexisting or High Potential for Compromised Skin Integrity  Goal: Skin integrity is maintained or improved  Description: INTERVENTIONS:  - Identify patients at risk for skin breakdown  - Assess and monitor skin integrity  - Assess and monitor nutrition and hydration status  - Monitor labs   - Assess for incontinence   - Turn and reposition patient  - Assist with mobility/ambulation  - Relieve pressure over bony prominences  - Avoid friction and shearing  - Provide appropriate hygiene as needed including keeping skin clean and dry  - Evaluate need for skin moisturizer/barrier cream  - Collaborate with interdisciplinary team   - Patient/family teaching  - Consider wound care consult   Outcome: Progressing     Problem: PAIN - ADULT  Goal: Verbalizes/displays adequate comfort level or baseline comfort level  Description: Interventions:  - Encourage patient to monitor pain and request assistance  - Assess pain using appropriate pain scale  - Administer analgesics based on type and severity of pain and evaluate response  - Implement non-pharmacological measures as appropriate and evaluate response  - Consider cultural and  social influences on pain and pain management  - Notify physician/advanced practitioner if interventions unsuccessful or patient reports new pain  Outcome: Progressing     Problem: INFECTION - ADULT  Goal: Absence or prevention of progression during hospitalization  Description: INTERVENTIONS:  - Assess and monitor for signs and symptoms of infection  - Monitor lab/diagnostic results  - Monitor all insertion sites, i.e. indwelling lines, tubes, and drains  - Monitor endotracheal if appropriate and nasal secretions for changes in amount and color  - [place] appropriate cooling/warming therapies per order  - Administer medications as ordered  - Instruct and encourage patient and family to use good hand hygiene technique  - Identify and instruct in appropriate isolation precautions for identified infection/condition  Outcome: Progressing     Problem: SAFETY ADULT  Goal: Patient will remain free of falls  Description: INTERVENTIONS:  - Educate patient/family on patient safety including physical limitations  - Instruct patient to call for assistance with activity   - Consult OT/PT to assist with strengthening/mobility   - Keep Call bell within reach  - Keep bed low and locked with side rails adjusted as appropriate  - Keep care items and personal belongings within reach  - Initiate and maintain comfort rounds  - Make Fall Risk Sign visible to staff  - Offer Toileting every 2 Hours, in advance of need  - Initiate/Maintain bed alarm  - Obtain necessary fall risk management equipment:    - Apply yellow socks and bracelet for high fall risk patients  - Consider moving patient to room near nurses station  Outcome: Progressing  Goal: Maintains/Returns to pre admission functional level  Description: INTERVENTIONS:  - Perform AM-PAC 6 Click Basic Mobility/ Daily Activity assessment daily.  - Set and communicate daily mobility goal to care team and patient/family/caregiver.   - Collaborate with rehabilitation services on  mobility goals if consulted  - Perform Range of Motion 3 times a day.  - Reposition patient every 2 hours.  - Dangle patient 3 times a day  - Stand patient 3 times a day  - Out of bed to chair 3 times a day   - Out of bed for meals 3 times a day  - Out of bed for toileting  - Record patient progress and toleration of activity level   Outcome: Progressing     Problem: DISCHARGE PLANNING  Goal: Discharge to home or other facility with appropriate resources  Description: INTERVENTIONS:  - Identify barriers to discharge w/patient and caregiver  - Arrange for needed discharge resources and transportation as appropriate  - Identify discharge learning needs (meds, wound care, etc.)  - Arrange for interpretive services to assist at discharge as needed  - Refer to Case Management Department for coordinating discharge planning if the patient needs post-hospital services based on physician/advanced practitioner order or complex needs related to functional status, cognitive ability, or social support system  Outcome: Progressing     Problem: GENITOURINARY - ADULT  Goal: Absence of urinary retention  Description: INTERVENTIONS:  - Assess patient’s ability to void and empty bladder  - Monitor I/O  - Bladder scan as needed  - Discuss with physician/AP medications to alleviate retention as needed  - Discuss catheterization for long term situations as appropriate  Outcome: Progressing     Problem: SKIN/TISSUE INTEGRITY - ADULT  Goal: Skin Integrity remains intact(Skin Breakdown Prevention)  Description: Assess:  -Perform Rosales assessment every shift  -Clean and moisturize skin every shift  -Inspect skin when repositioning, toileting, and assisting with ADLS  -Assess extremities for adequate circulation and sensation     Bed Management:  -Have minimal linens on bed & keep smooth, unwrinkled  -Change linens as needed when moist or perspiring  -Avoid sitting or lying in one position for more than 2 hours while in bed  -Keep HOB at  30degrees     Toileting:  -Offer bedside commode  -Assess for incontinence every shift  -Use incontinent care products after each incontinent episode     Activity:  -Mobilize patient 3 times a day  -Encourage activity and walks on unit  -Encourage or provide ROM exercises   -Turn and reposition patient every 2 Hours  -Use appropriate equipment to lift or move patient in bed  -Instruct/ Assist with weight shifting every 2 hrs when out of bed in chair  -Consider limitation of chair time 2 hour intervals    Skin Care:  -Avoid use of baby powder, tape, friction and shearing, hot water or constrictive clothing  -Relieve pressure over bony prominences using allevyn foam  -Do not massage red bony areas    Outcome: Progressing     Problem: Nutrition/Hydration-ADULT  Goal: Nutrient/Hydration intake appropriate for improving, restoring or maintaining nutritional needs  Description: Monitor and assess patient's nutrition/hydration status for malnutrition. Collaborate with interdisciplinary team and initiate plan and interventions as ordered.  Monitor patient's weight and dietary intake as ordered or per policy. Utilize nutrition screening tool and intervene as necessary. Determine patient's food preferences and provide high-protein, high-caloric foods as appropriate.     INTERVENTIONS:  - Monitor oral intake, urinary output, labs, and treatment plans  - Assess nutrition and hydration status and recommend course of action  - Evaluate amount of meals eaten  - Assist patient with eating if necessary   - Allow adequate time for meals  - Recommend/ encourage appropriate diets, oral nutritional supplements, and vitamin/mineral supplements  - Order, calculate, and assess calorie counts as needed  - Recommend, monitor, and adjust tube feedings and TPN/PPN based on assessed needs  - Assess need for intravenous fluids  - Provide specific nutrition/hydration education as appropriate  - Include patient/family/caregiver in decisions  related to nutrition  Outcome: Progressing

## 2024-05-02 NOTE — PROGRESS NOTES
Medicus With urology paged and stated she and other urologists were not available to check ward catheter  Stated for RN to flush catheter  RN flushed catheter per orders with no visualization of leakage  RN reported this to Joovn, who stated to page general surgical service to see if they could come to troubleshoot  RN paged general surgery   Awaiting call back      Dereck Torres RN 2/28/2020 3:19 PM Medication: pantoprazole (PROTONIX) 20 MG tablet  passed protocol.   Last office visit date: 04/01/2024  Next appointment scheduled?: Yes 07/01/2024  Number of refills given: 0

## 2024-05-02 NOTE — ASSESSMENT & PLAN NOTE
Presents with sepsis, secondary to cystitis/pyelonephritis   U/A large leuks, innumerable WBC/bacteria   Urine cultures with Proteus mirabilis, sensitivities available  Appreciate ID input, discontinued ceftriaxone, transition to cefazolin -anticipate 10 days of antibiotics  Transitioning to oral regimen once afebrile 24 hours - likely tomorrow

## 2024-05-02 NOTE — PROGRESS NOTES
Progress Note - Infectious Disease   Latanya Ray 89 y.o. female MRN: [id number]  Unit/Bed#: E2 -01 Encounter: [id number]      Impression/Plan:  1.  Sepsis.  Appears to be secondary to a urinary source of infection.  No other clear source appreciated.  Patient is stabilized, clinically improved, with a decreasing temperature curve.  White cell count is decreased.  She seems to be tolerating antibiotics without difficulty.  Clinically improving.  -Continue antibiotics as below  -Follow-up blood cultures  -Recheck CBC with differential and CMP to make sure no developing toxicities  -Supportive care     2.  Acute pyelonephritis.  Based upon clinical, radiographic, and microbiologic findings.  Urine cultures growing Proteus repeatedly.  No resistant organisms isolated.  CT scan with evidence of left-sided pyelonephritis but no abscess or hydronephrosis seen.  No definitive urinary symptomatology and the patient is not retaining urine.  -Continue cefazolin  -Monitor temperature and symptomatology  -Once patient afebrile for 24 hours, will likely transition to oral Ceftin to complete 10 days total treatment  -Monitor urine output     3.  Breast carcinoma.  Status post history of left-sided mastectomy.  Not on any current therapy.    Discussed with the primary service the plan to continue the cefazolin and likely transition to oral antibiotics tomorrow if she continues to improve and they agree with the plan    Antibiotics:  Cefazolin 2  Antibiotics 4    Subjective:  Patient has no fever, chills, sweats; had slight elevation of temperature yesterday; no nausea, vomiting, diarrhea; no cough, shortness of breath; no pain. No new symptoms.    Objective:  Vitals:  Temp:  [97.5 °F (36.4 °C)-100.1 °F (37.8 °C)] 98.2 °F (36.8 °C)  HR:  [75-86] 86  Resp:  [18-20] 18  BP: (127-166)/(65-84) 166/78  SpO2:  [94 %-96 %] 95 %  Temp (24hrs), Av.6 °F (37 °C), Min:97.5 °F (36.4 °C), Max:100.1 °F (37.8 °C)  Current:  Temperature: 98.2 °F (36.8 °C)    Physical Exam:   General Appearance:  Alert, interactive, nontoxic, no acute distress.   Throat: Oropharynx moist without lesions.    Lungs:   Decreased breath sounds bilaterally; no wheezes, rhonchi or rales; respirations unlabored   Heart:  RRR; no murmur, rub or gallop   Abdomen:   Soft, non-tender, non-distended, positive bowel sounds.     Extremities: No clubbing, cyanosis or edema   Skin: No new rashes or lesions. No draining wounds noted.       Labs, Imaging, & Other studies:   All pertinent labs and imaging studies were personally reviewed  Results from last 7 days   Lab Units 05/02/24 0528 05/01/24  0617 04/30/24  0455   WBC Thousand/uL 6.65 7.84 9.39   HEMOGLOBIN g/dL 10.9* 10.5* 10.5*   PLATELETS Thousands/uL 261 223 203     Results from last 7 days   Lab Units 05/02/24 0528 05/01/24 0617 04/30/24  0455 04/29/24  0141   SODIUM mmol/L 138 140 135 133*   POTASSIUM mmol/L 3.6 3.7 3.3* 3.5   CHLORIDE mmol/L 106 107 104 99   CO2 mmol/L 24 26 24 26   BUN mg/dL 12 12 13 19   CREATININE mg/dL 0.45* 0.53* 0.57* 0.61   EGFR ml/min/1.73sq m 84 79 77 76   CALCIUM mg/dL 8.2* 8.2* 8.3* 9.2   AST U/L 22 20  --  14   ALT U/L 18 20  --  9   ALK PHOS U/L 89 74  --  93     Results from last 7 days   Lab Units 04/29/24  0325 04/29/24  0324 04/29/24  0214 04/29/24  0115   BLOOD CULTURE   --   --  No Growth at 72 hrs. No Growth at 72 hrs.   URINE CULTURE  >100,000 cfu/ml Proteus mirabilis*  10,000-19,000 cfu/ml >100,000 cfu/ml Proteus mirabilis*  20,000-29,000 cfu/ml  --   --      Results from last 7 days   Lab Units 05/01/24  0617 04/30/24  0455 04/29/24  0141   PROCALCITONIN ng/ml 0.21 0.30* 0.14

## 2024-05-02 NOTE — PROGRESS NOTES
Counts include 234 beds at the [facility]  Progress Note  Name: Latanya Ray I  MRN: [id number]  Unit/Bed#: E2 -01 I Date of Admission: 4/29/2024   Date of Service: 5/2/2024 I Hospital Day: 3    Assessment/Plan   * Septic shock (HCC)  Assessment & Plan  History of cancer malignancy macular degeneration with cognitive decline presents to the hospital for weakness found to have severe sepsis/shock secondary to pyelonephritis  Briefly required norepinephrine in ED.      Urine cultures with Proteus mirabilis, sensitivities available -appreciate ID input, transitioned to cefazolin  Urine cultures no growth at 48 hours  Disposition: To rehab when medically stable -as below, plan to transition to p.o. antibiotics after afebrile 24 hours    Results from last 7 days   Lab Units 04/29/24  0214 04/29/24  0115   BLOOD CULTURE  No Growth at 48 hrs. No Growth at 48 hrs.         Pyelonephritis  Assessment & Plan  Presents with sepsis, secondary to cystitis/pyelonephritis   U/A large leuks, innumerable WBC/bacteria   Urine cultures with Proteus mirabilis, sensitivities available  Appreciate ID input, discontinued ceftriaxone, transition to cefazolin -anticipate 10 days of antibiotics  Transitioning to oral regimen once afebrile 24 hours - likely tomorrow    Diaphragmatic hernia without obstruction or gangrene  Assessment & Plan  Noted stable hernia on CT abd/pelvis    Acute metabolic encephalopathy  Assessment & Plan  With underlying cognitive decline  Present on admission secondary to sepsis/pyelonephritis.    Mental status appears to have returned to her to baseline    Legal blindness  Assessment & Plan  History of macular degeneration legally blind    Cognitive impairment  Assessment & Plan  Supportive care    Neoplasm of skin of face  Assessment & Plan  History of basal cell carcinoma; reportedly family had declined intervention due to advanced age    Breast carcinoma (HCC)  Assessment & Plan  History of breast cancer  status post mastectomy.               VTE Pharmacologic Prophylaxis: VTE Score: 7 High Risk (Score >/= 5) - Pharmacological DVT Prophylaxis Ordered: enoxaparin (Lovenox). Sequential Compression Devices Ordered.    Mobility:   Basic Mobility Inpatient Raw Score: 7  JH-HLM Goal: 2: Bed activities/Dependent transfer  JH-HLM Achieved: 2: Bed activities/Dependent transfer  JH-HLM Goal NOT achieved. Continue with multidisciplinary rounding and encourage appropriate mobility to improve upon JH-HLM goals.    Patient Centered Rounds: I performed bedside rounds with nursing staff today.   Discussions with Specialists or Other Care Team Provider: BETITO SANDERS    Education and Discussions with Family / Patient: Updated  (daughter) via phone.    Total Time Spent on Date of Encounter in care of patient: 35 mins. This time was spent on one or more of the following: performing physical exam; counseling and coordination of care; obtaining or reviewing history; documenting in the medical record; reviewing/ordering tests, medications or procedures; communicating with other healthcare professionals and discussing with patient's family/caregivers.    Current Length of Stay: 3 day(s)  Current Patient Status: Inpatient   Certification Statement: The patient will continue to require additional inpatient hospital stay due to IV Abx, close monitoring  Discharge Plan:  Monday to rehab    Code Status: Level 3 - DNAR and DNI    Subjective:   Patient voices no acute complaints.  No overnight events reported.    Objective:     Vitals:   Temp (24hrs), Av.2 °F (36.8 °C), Min:97.5 °F (36.4 °C), Max:98.8 °F (37.1 °C)    Temp:  [97.5 °F (36.4 °C)-98.8 °F (37.1 °C)] 98.8 °F (37.1 °C)  HR:  [75-86] 77  Resp:  [18] 18  BP: (137-173)/(65-78) 150/72  SpO2:  [94 %-95 %] 95 %  Body mass index is 26.05 kg/m².     Input and Output Summary (last 24 hours):     Intake/Output Summary (Last 24 hours) at 2024 5721  Last data filed at 2024  0909  Gross per 24 hour   Intake 660 ml   Output 550 ml   Net 110 ml       Physical Exam:   Physical Exam  Constitutional:       General: She is not in acute distress.  HENT:      Head: Normocephalic and atraumatic.   Eyes:      Conjunctiva/sclera: Conjunctivae normal.   Cardiovascular:      Rate and Rhythm: Normal rate and regular rhythm.   Pulmonary:      Effort: No respiratory distress.      Breath sounds: No wheezing or rales.   Abdominal:      General: There is no distension.      Tenderness: There is no abdominal tenderness. There is no guarding.   Musculoskeletal:      Right lower leg: No edema.      Left lower leg: No edema.   Skin:     Comments: Keratotic lesions forehead   Neurological:      Mental Status: Mental status is at baseline.          Additional Data:     Labs:  Results from last 7 days   Lab Units 05/02/24  0528   WBC Thousand/uL 6.65   HEMOGLOBIN g/dL 10.9*   HEMATOCRIT % 32.2*   PLATELETS Thousands/uL 261   SEGS PCT % 62   LYMPHO PCT % 26   MONO PCT % 8   EOS PCT % 3     Results from last 7 days   Lab Units 05/02/24  0528   SODIUM mmol/L 138   POTASSIUM mmol/L 3.6   CHLORIDE mmol/L 106   CO2 mmol/L 24   BUN mg/dL 12   CREATININE mg/dL 0.45*   ANION GAP mmol/L 8   CALCIUM mg/dL 8.2*   ALBUMIN g/dL 3.1*   TOTAL BILIRUBIN mg/dL 0.36   ALK PHOS U/L 89   ALT U/L 18   AST U/L 22   GLUCOSE RANDOM mg/dL 93     Results from last 7 days   Lab Units 04/29/24  0141   INR  1.03             Results from last 7 days   Lab Units 05/01/24  0617 04/30/24  0455 04/29/24  0141   LACTIC ACID mmol/L  --   --  0.9   PROCALCITONIN ng/ml 0.21 0.30* 0.14       Lines/Drains:  Invasive Devices       Peripheral Intravenous Line  Duration             Peripheral IV 04/29/24 Right Antecubital 3 days              Drain  Duration             External Urinary Catheter 3 days                    Imaging: no new    Recent Cultures (last 7 days):   Results from last 7 days   Lab Units 04/29/24  0325 04/29/24  0324 04/29/24  0214  04/29/24  0115   BLOOD CULTURE   --   --  No Growth at 72 hrs. No Growth at 72 hrs.   URINE CULTURE  >100,000 cfu/ml Proteus mirabilis*  10,000-19,000 cfu/ml >100,000 cfu/ml Proteus mirabilis*  20,000-29,000 cfu/ml  --   --        Last 24 Hours Medication List:   Current Facility-Administered Medications   Medication Dose Route Frequency Provider Last Rate    acetaminophen  650 mg Oral Q6H PRN MARINA Wooten      aluminum-magnesium hydroxide-simethicone  30 mL Oral Q6H PRN MARINA Wooten      aspirin  81 mg Oral Daily MARINA Wooten      bisacodyl  10 mg Rectal Daily PRN MARINA Wooten      calcium carbonate-vitamin D  1 tablet Oral Daily With Breakfast MARINA Wooten      cefazolin  2,000 mg Intravenous Q8H Vick Chan MD 2,000 mg (05/02/24 1248)    Cholecalciferol  1,000 Units Oral Daily MARINA Wooten      vitamin B-12  1,000 mcg Oral Every Other Day MARINA Wooten      enoxaparin  40 mg Subcutaneous Daily MARINA Wooten      meclizine  25 mg Oral Q8H PRN MARINA Wooten      multivitamin-minerals  1 tablet Oral Daily MARINA Wooten      ondansetron  4 mg Intravenous Q6H PRN MARINA Wooten      pantoprazole  20 mg Oral Early Morning AMRINA Wooten      pramipexole  0.25 mg Oral HS MARINA Wooten      simethicone  80 mg Oral 4x Daily PRN MARINA Wooten          Today, Patient Was Seen By: Loretta Piper MD    **Please Note: This note may have been constructed using a voice recognition system.**

## 2024-05-02 NOTE — ASSESSMENT & PLAN NOTE
With underlying cognitive decline  Present on admission secondary to sepsis/pyelonephritis.    Mental status appears to have returned to her to baseline

## 2024-05-03 LAB
ANION GAP SERPL CALCULATED.3IONS-SCNC: 7 MMOL/L (ref 4–13)
BASOPHILS # BLD AUTO: 0.05 THOUSANDS/ÂΜL (ref 0–0.1)
BASOPHILS NFR BLD AUTO: 1 % (ref 0–1)
BUN SERPL-MCNC: 13 MG/DL (ref 5–25)
CALCIUM SERPL-MCNC: 9.1 MG/DL (ref 8.4–10.2)
CHLORIDE SERPL-SCNC: 104 MMOL/L (ref 96–108)
CO2 SERPL-SCNC: 27 MMOL/L (ref 21–32)
CREAT SERPL-MCNC: 0.55 MG/DL (ref 0.6–1.3)
EOSINOPHIL # BLD AUTO: 0.18 THOUSAND/ÂΜL (ref 0–0.61)
EOSINOPHIL NFR BLD AUTO: 2 % (ref 0–6)
ERYTHROCYTE [DISTWIDTH] IN BLOOD BY AUTOMATED COUNT: 15.1 % (ref 11.6–15.1)
FLUAV RNA RESP QL NAA+PROBE: NEGATIVE
FLUBV RNA RESP QL NAA+PROBE: NEGATIVE
GFR SERPL CREATININE-BSD FRML MDRD: 78 ML/MIN/1.73SQ M
GLUCOSE SERPL-MCNC: 100 MG/DL (ref 65–140)
HCT VFR BLD AUTO: 37 % (ref 34.8–46.1)
HGB BLD-MCNC: 12.1 G/DL (ref 11.5–15.4)
IMM GRANULOCYTES # BLD AUTO: 0.03 THOUSAND/UL (ref 0–0.2)
IMM GRANULOCYTES NFR BLD AUTO: 0 % (ref 0–2)
LYMPHOCYTES # BLD AUTO: 2.12 THOUSANDS/ÂΜL (ref 0.6–4.47)
LYMPHOCYTES NFR BLD AUTO: 27 % (ref 14–44)
MCH RBC QN AUTO: 29.2 PG (ref 26.8–34.3)
MCHC RBC AUTO-ENTMCNC: 32.7 G/DL (ref 31.4–37.4)
MCV RBC AUTO: 89 FL (ref 82–98)
MONOCYTES # BLD AUTO: 0.52 THOUSAND/ÂΜL (ref 0.17–1.22)
MONOCYTES NFR BLD AUTO: 7 % (ref 4–12)
NEUTROPHILS # BLD AUTO: 4.85 THOUSANDS/ÂΜL (ref 1.85–7.62)
NEUTS SEG NFR BLD AUTO: 63 % (ref 43–75)
NRBC BLD AUTO-RTO: 0 /100 WBCS
PLATELET # BLD AUTO: 314 THOUSANDS/UL (ref 149–390)
PMV BLD AUTO: 10.3 FL (ref 8.9–12.7)
POTASSIUM SERPL-SCNC: 3.6 MMOL/L (ref 3.5–5.3)
RBC # BLD AUTO: 4.15 MILLION/UL (ref 3.81–5.12)
RSV RNA RESP QL NAA+PROBE: NEGATIVE
SARS-COV-2 RNA RESP QL NAA+PROBE: NEGATIVE
SODIUM SERPL-SCNC: 138 MMOL/L (ref 135–147)
WBC # BLD AUTO: 7.75 THOUSAND/UL (ref 4.31–10.16)

## 2024-05-03 PROCEDURE — 85025 COMPLETE CBC W/AUTO DIFF WBC: CPT | Performed by: FAMILY MEDICINE

## 2024-05-03 PROCEDURE — 99232 SBSQ HOSP IP/OBS MODERATE 35: CPT | Performed by: INTERNAL MEDICINE

## 2024-05-03 PROCEDURE — 80048 BASIC METABOLIC PNL TOTAL CA: CPT | Performed by: FAMILY MEDICINE

## 2024-05-03 PROCEDURE — 99233 SBSQ HOSP IP/OBS HIGH 50: CPT | Performed by: INTERNAL MEDICINE

## 2024-05-03 PROCEDURE — 0241U HB NFCT DS VIR RESP RNA 4 TRGT: CPT | Performed by: INTERNAL MEDICINE

## 2024-05-03 RX ORDER — CEFUROXIME AXETIL 250 MG/1
500 TABLET ORAL EVERY 12 HOURS SCHEDULED
Status: DISCONTINUED | OUTPATIENT
Start: 2024-05-03 | End: 2024-05-06 | Stop reason: HOSPADM

## 2024-05-03 RX ADMIN — CYANOCOBALAMIN TAB 500 MCG 1000 MCG: 500 TAB at 08:47

## 2024-05-03 RX ADMIN — MULTIPLE VITAMINS W/ MINERALS TAB 1 TABLET: TAB ORAL at 08:47

## 2024-05-03 RX ADMIN — Medication 1000 UNITS: at 08:47

## 2024-05-03 RX ADMIN — PANTOPRAZOLE SODIUM 20 MG: 20 TABLET, DELAYED RELEASE ORAL at 04:57

## 2024-05-03 RX ADMIN — ASPIRIN 81 MG: 81 TABLET, COATED ORAL at 21:10

## 2024-05-03 RX ADMIN — ENOXAPARIN SODIUM 40 MG: 40 INJECTION SUBCUTANEOUS at 08:47

## 2024-05-03 RX ADMIN — PRAMIPEXOLE DIHYDROCHLORIDE 0.25 MG: 0.25 TABLET ORAL at 21:11

## 2024-05-03 RX ADMIN — CEFAZOLIN SODIUM 2000 MG: 2 SOLUTION INTRAVENOUS at 04:55

## 2024-05-03 RX ADMIN — Medication 1 TABLET: at 07:21

## 2024-05-03 RX ADMIN — CEFUROXIME AXETIL 500 MG: 250 TABLET, FILM COATED ORAL at 11:10

## 2024-05-03 RX ADMIN — CEFUROXIME AXETIL 500 MG: 250 TABLET, FILM COATED ORAL at 21:10

## 2024-05-03 NOTE — PLAN OF CARE
Problem: Potential for Falls  Goal: Patient will remain free of falls  Description: INTERVENTIONS:  - Educate patient/family on patient safety including physical limitations  - Instruct patient to call for assistance with activity   - Consult OT/PT to assist with strengthening/mobility   - Keep Call bell within reach  - Keep bed low and locked with side rails adjusted as appropriate  - Keep care items and personal belongings within reach  - Initiate and maintain comfort rounds  - Make Fall Risk Sign visible to staff  - Offer Toileting every 2 Hours, in advance of need  - Initiate/Maintain bed alarm  - Obtain necessary fall risk management equipment:    - Apply yellow socks and bracelet for high fall risk patients  - Consider moving patient to room near nurses station  Outcome: Progressing     Problem: Prexisting or High Potential for Compromised Skin Integrity  Goal: Skin integrity is maintained or improved  Description: INTERVENTIONS:  - Identify patients at risk for skin breakdown  - Assess and monitor skin integrity  - Assess and monitor nutrition and hydration status  - Monitor labs   - Assess for incontinence   - Turn and reposition patient  - Assist with mobility/ambulation  - Relieve pressure over bony prominences  - Avoid friction and shearing  - Provide appropriate hygiene as needed including keeping skin clean and dry  - Evaluate need for skin moisturizer/barrier cream  - Collaborate with interdisciplinary team   - Patient/family teaching  - Consider wound care consult   Outcome: Progressing     Problem: PAIN - ADULT  Goal: Verbalizes/displays adequate comfort level or baseline comfort level  Description: Interventions:  - Encourage patient to monitor pain and request assistance  - Assess pain using appropriate pain scale  - Administer analgesics based on type and severity of pain and evaluate response  - Implement non-pharmacological measures as appropriate and evaluate response  - Consider cultural and  social influences on pain and pain management  - Notify physician/advanced practitioner if interventions unsuccessful or patient reports new pain  Outcome: Progressing     Problem: INFECTION - ADULT  Goal: Absence or prevention of progression during hospitalization  Description: INTERVENTIONS:  - Assess and monitor for signs and symptoms of infection  - Monitor lab/diagnostic results  - Monitor all insertion sites, i.e. indwelling lines, tubes, and drains  - Monitor endotracheal if appropriate and nasal secretions for changes in amount and color  - [place] appropriate cooling/warming therapies per order  - Administer medications as ordered  - Instruct and encourage patient and family to use good hand hygiene technique  - Identify and instruct in appropriate isolation precautions for identified infection/condition  Outcome: Progressing     Problem: SAFETY ADULT  Goal: Patient will remain free of falls  Description: INTERVENTIONS:  - Educate patient/family on patient safety including physical limitations  - Instruct patient to call for assistance with activity   - Consult OT/PT to assist with strengthening/mobility   - Keep Call bell within reach  - Keep bed low and locked with side rails adjusted as appropriate  - Keep care items and personal belongings within reach  - Initiate and maintain comfort rounds  - Make Fall Risk Sign visible to staff  - Offer Toileting every 2 Hours, in advance of need  - Initiate/Maintain bed alarm  - Obtain necessary fall risk management equipment:    - Apply yellow socks and bracelet for high fall risk patients  - Consider moving patient to room near nurses station  Outcome: Progressing  Goal: Maintains/Returns to pre admission functional level  Description: INTERVENTIONS:  - Perform AM-PAC 6 Click Basic Mobility/ Daily Activity assessment daily.  - Set and communicate daily mobility goal to care team and patient/family/caregiver.   - Collaborate with rehabilitation services on  mobility goals if consulted  - Perform Range of Motion 3 times a day.  - Reposition patient every 2 hours.  - Dangle patient 3 times a day  - Stand patient 3 times a day  - Out of bed to chair 3 times a day   - Out of bed for meals 3 times a day  - Out of bed for toileting  - Record patient progress and toleration of activity level   Outcome: Progressing     Problem: DISCHARGE PLANNING  Goal: Discharge to home or other facility with appropriate resources  Description: INTERVENTIONS:  - Identify barriers to discharge w/patient and caregiver  - Arrange for needed discharge resources and transportation as appropriate  - Identify discharge learning needs (meds, wound care, etc.)  - Arrange for interpretive services to assist at discharge as needed  - Refer to Case Management Department for coordinating discharge planning if the patient needs post-hospital services based on physician/advanced practitioner order or complex needs related to functional status, cognitive ability, or social support system  Outcome: Progressing     Problem: GENITOURINARY - ADULT  Goal: Absence of urinary retention  Description: INTERVENTIONS:  - Assess patient’s ability to void and empty bladder  - Monitor I/O  - Bladder scan as needed  - Discuss with physician/AP medications to alleviate retention as needed  - Discuss catheterization for long term situations as appropriate  Outcome: Progressing     Problem: SKIN/TISSUE INTEGRITY - ADULT  Goal: Skin Integrity remains intact(Skin Breakdown Prevention)  Description: Assess:  -Perform Rosales assessment every shift  -Clean and moisturize skin every shift  -Inspect skin when repositioning, toileting, and assisting with ADLS  -Assess extremities for adequate circulation and sensation     Bed Management:  -Have minimal linens on bed & keep smooth, unwrinkled  -Change linens as needed when moist or perspiring  -Avoid sitting or lying in one position for more than 2 hours while in bed  -Keep HOB at  30degrees     Toileting:  -Offer bedside commode  -Assess for incontinence every shift  -Use incontinent care products after each incontinent episode     Activity:  -Mobilize patient 3 times a day  -Encourage activity and walks on unit  -Encourage or provide ROM exercises   -Turn and reposition patient every 2 Hours  -Use appropriate equipment to lift or move patient in bed  -Instruct/ Assist with weight shifting every 2 hrs when out of bed in chair  -Consider limitation of chair time 2 hour intervals    Skin Care:  -Avoid use of baby powder, tape, friction and shearing, hot water or constrictive clothing  -Relieve pressure over bony prominences using allevyn foam  -Do not massage red bony areas    Outcome: Progressing     Problem: Nutrition/Hydration-ADULT  Goal: Nutrient/Hydration intake appropriate for improving, restoring or maintaining nutritional needs  Description: Monitor and assess patient's nutrition/hydration status for malnutrition. Collaborate with interdisciplinary team and initiate plan and interventions as ordered.  Monitor patient's weight and dietary intake as ordered or per policy. Utilize nutrition screening tool and intervene as necessary. Determine patient's food preferences and provide high-protein, high-caloric foods as appropriate.     INTERVENTIONS:  - Monitor oral intake, urinary output, labs, and treatment plans  - Assess nutrition and hydration status and recommend course of action  - Evaluate amount of meals eaten  - Assist patient with eating if necessary   - Allow adequate time for meals  - Recommend/ encourage appropriate diets, oral nutritional supplements, and vitamin/mineral supplements  - Order, calculate, and assess calorie counts as needed  - Recommend, monitor, and adjust tube feedings and TPN/PPN based on assessed needs  - Assess need for intravenous fluids  - Provide specific nutrition/hydration education as appropriate  - Include patient/family/caregiver in decisions  related to nutrition  Outcome: Progressing

## 2024-05-03 NOTE — ASSESSMENT & PLAN NOTE
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      Urine cultures with Proteus multiple sensitivities.  Was on cefazolin and ID has transitioned over to cefuroxime.  Disposition: Attempted discharge to rehab today.  Medically stable and off IV antibiotics.    Results from last 7 days   Lab Units 04/29/24  0214 04/29/24  0115   BLOOD CULTURE  No Growth After 4 Days. No Growth After 4 Days.

## 2024-05-03 NOTE — APP STUDENT NOTE
[facility] STUDENT  Inpatient Progress Note for TRAINING ONLY  Not Part of Legal Medical Record       Progress Note - Latanya Ray 89 y.o. female MRN: [id number]    Unit/Bed#: E2 -01 Encounter: [id number]      Assessment/Plan:  Septic shock   Patient presents with fever, tachycardia, and hypotension due to cystitis/pylonephritis  Lactic acid= 0.9 (WNL)  Procalcitonin= 0.21, decreased from previous 0.3   Received sepsis bolus; briefly required norepinephrine in ED titrated to 5mcg then BP increased to 170s then titrated off, received albumin --BP improved  Blood culture had no growth in 4 days  Urine culture showed > 100,000 cfu/ml of proteus mirabilis and 10,000-19,000 cfu/ml of mixed contaminents x2  Id consulted and Transition to PO ceftin 500mg q12hrs    Pyelonephritis  Patient presents with fever, tachycardia, and hypotension due to cystitis/pyelonephritis  UA showed small blood, large leukocytes, pH>=9, 2+ protein, innumerable RBC, WBC, bacteria; and moderate Ca oxalate crystals   CT showed L pyelonephritis and cystitis  Blood cultures had no growth in 4 days   Urine culture showed >100,000 cfu/ml of proteus mirabilis and 10,000-19,000 cfu/ml of mixed contaiments x2   Monitor I&O's  ID consulted- recommend discontinuing IV ceftriaxone and starting IV cefazoline 2g q8hrs, once afebrile for >24hrs transition to PO abx, monitor urine output, CBC/CMP, and blood cultures   Transition to PO cefin 500mg q12hrs    Acute metabolic encephalopathy  Acute metabolic encephalopathy secondary to sepsis/pyelonephritis  Mental status appears to be at baseline     Legal blindness   Hx of macular degeneration   Safety precautions     Breast carcinoma   Hx of breast cancer s/p left mastectomy     Neoplasm of skin of face   Hx of basal cell carcinoma   Daughter not interested in treatment due to age     Diaphrgmatic hernia w/o obstruction or gangrene  Noted stable hernia on CT abd/pel    Cognitive impairment   Supportive care  "    Subjective:   Patient is laying in bed comfortably. She is asking when she can go home and if she has time to make a bowel movement or will she miss the bus. She has some abdominal pain but thinks its because she needs to make a bowel movement. Otherwise she has no complaints.    Objective:     Vitals: Blood pressure 143/56, pulse 95, temperature 98 °F (36.7 °C), temperature source Temporal, resp. rate 16, height 4' 11\" (1.499 m), weight 58.5 kg (128 lb 15.5 oz), SpO2 92%.,Body mass index is 26.05 kg/m².      Intake/Output Summary (Last 24 hours) at 5/3/2024 1007  Last data filed at 5/3/2024 0827  Gross per 24 hour   Intake 480 ml   Output 1300 ml   Net -820 ml       Physical Exam  Vitals reviewed.   Constitutional:       General: She is not in acute distress.     Appearance: She is not ill-appearing or toxic-appearing.   HENT:      Head: Normocephalic.      Comments: R lateral forehead changes due to Basal Cell Carcinoma  Eyes:      Extraocular Movements: Extraocular movements intact.   Cardiovascular:      Rate and Rhythm: Normal rate and regular rhythm.      Heart sounds: Normal heart sounds. No murmur heard.  Pulmonary:      Effort: Pulmonary effort is normal. No respiratory distress.      Breath sounds: Normal breath sounds.   Abdominal:      General: Bowel sounds are normal. There is no distension.      Palpations: Abdomen is soft.      Tenderness: There is abdominal tenderness.   Musculoskeletal:         General: Normal range of motion.      Cervical back: Normal range of motion.   Skin:     General: Skin is warm.   Neurological:      General: No focal deficit present.      Mental Status: She is alert.   Psychiatric:         Mood and Affect: Mood normal.         Behavior: Behavior normal.          Invasive Devices       Peripheral Intravenous Line  Duration             Peripheral IV 05/03/24 Right;Ventral (anterior) Forearm <1 day                    Lab, Imaging and other studies: I have personally " reviewed pertinent reports.    VTE Pharmacologic Prophylaxis: Enoxaparin (Lovenox)  VTE Mechanical Prophylaxis: sequential compression device

## 2024-05-03 NOTE — CASE MANAGEMENT
Case Management Discharge Planning Note    Patient name Latanya Ray  Location [facility] /E2 -* MRN [id number]  : 1926 Date 5/3/2024       Current Admission Date: 2024  Current Admission Diagnosis:Septic shock (HCC)   Patient Active Problem List    Diagnosis Date Noted    Septic shock (HCC) 2024    Pyelonephritis 2024    Diaphragmatic hernia without obstruction or gangrene 2024    Dysphagia, oropharyngeal phase 2024    Sternal fracture 2024    Acute metabolic encephalopathy 2023    Legal blindness 2022    Cognitive impairment 2021    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    Vitamin D deficiency 2012    Cerebral aneurysm 2012    Anxiety 2012    Breast carcinoma (HCC) 2012    Hyperlipidemia 2012      LOS (days): 4  Geometric Mean LOS (GMLOS) (days): 5.1  Days to GMLOS:0.7     OBJECTIVE:  Risk of Unplanned Readmission Score: 13.3         Current admission status: Inpatient   Preferred Pharmacy:   [id number] - [place], [place] - [address] [place] [address]  Phone: [phone] Fax: [phone]    [facility] - [address] [address]  Phone: [phone] Fax: [phone]    Primary Care Provider: Amina Rowe DO    Primary Insurance: MEDICARE  Secondary Insurance: [facility]    DISCHARGE DETAILS:    Discharge planning discussed with:: Daughter, Celia  Freedom of Choice: Yes  Comments - Freedom of Choice: Choice made for [facility], but daughter does not want her discharging over the weekend  CM contacted family/caregiver?: Yes  Were Treatment Team discharge recommendations reviewed with patient/caregiver?: Yes  Did patient/caregiver verbalize understanding of patient care needs?: N/A- going to facility       Contacts  Patient Contacts: Celia  Relationship to Patient:: Family  Contact  Method: Phone  Phone Number: [phone]  Reason/Outcome: Discharge Planning    Requested Home Health Care         Is the patient interested in [facility] at discharge?: Yes (referral placed for home health in case daughter would state she is taking pt home)  Home Health Discipline requested:: Occupational Therapy, Physical Therapy, Nursing  Home Health Agency Name:: Other  Home Health Follow-Up Provider:: PCP  Home Health Services Needed:: Evaluate Functional Status and Safety, Strengthening/Theraputic Exercises to Improve Function, Gait/ADL Training  Homebound Criteria Met:: Requires the Assistance of Another Person for Safe Ambulation or to Leave the Home, Uses an Assist Device (i.e. cane, walker, etc)  Supporting Clincal Findings:: Bed Bound or Wheelchair Bound, Limited Endurance    DME Referral Provided  Referral made for DME?: No    Other Referral/Resources/Interventions Provided:  Interventions: Short Term Rehab, C         Treatment Team Recommendation: Short Term Rehab                                   IMM Given (Date):: 05/03/24  IMM Given to:: Family (daughter refused signing of the IMM. Daughter appealed discharge.)          Additional Comments: CM and MD called pt's daughter, Celia, over the phone to discuss discharge planning as pt has been deemed medically cleared. MD provided updated medical information supporting why pt has been cleared from a medical standpoint. Daughter became angry as she does not want her mother going to a rehab facility ([facility]) over the weekend as she stated she spoke with a staff member there who said there is no therapy over the weekend and only one nurse for the rehab floor. She stated that her mother needs to stay at the hospital until Monday because she will not be taken care of at rehab over the weekend. MD was understanding and empathetic with Celia's concerns but explained that if pt is medically cleared they cannot keep her at the hospital. At this time, this CM  entered the conversation to discuss the discharge options. Once this CM said she was on the line, Celia started becoming extremely derogatory. Celia called this CM a liar and a f*cktard which was heard by the MD. This CM explained that since pt is medically cleared we have to look at the discharge plan and getting her over to [facility] which was the chosen rehab facility. Celia was screaming on the phone making it hard for this CM or the MD to really speak. She started telling us to send the patient home, but once this CM started talking about transportation, she stated that the pt was not leaving the hospital. CM explained that if she felt pt should not be discharged she could call [facility] to appeal the discharge. Celia told this CM to make the call. When this CM explained that it is a family appeal, and she would need to call, she stated that this CM is “terrible at the job and should work for the [employer] as a  because all she is good at is taking reservations”. Phone number was provided for [employer]. MD tried to express that Celia should be happy that her mom has made such an improvement that she is able to be discharge but Celia continued to yell and refused discharge.    CM did receive a voicemail from Celia stating that she called [employer] and put in her appeal. Case #[id number]. All information sent to CM discharge support to follow through with appeal. On the voicemail, Celia stated that this CM has done nothing but try to “kill her mother on Monday, Tuesday, and Wednesday”. She also stated that since this CM has just been trying to “kill her mother” she will send this CM an “invite to her ”. In the voicemail, she also spoke negatively about this CM's parents stating that she “hopes someone slams the door in my face if your parents ever need help”. This was all reported to CM supervisor and . At this time, this CM will no longer be handling the patient's case and  will be handing it off to the clinical coordinator if patient is still here on Monday. All information for case provided to CMs working the weekend in case Medicare appeal determination comes through over the weekend.

## 2024-05-03 NOTE — PLAN OF CARE
Problem: Potential for Falls  Goal: Patient will remain free of falls  Description: INTERVENTIONS:  - Educate patient/family on patient safety including physical limitations  - Instruct patient to call for assistance with activity   - Consult OT/PT to assist with strengthening/mobility   - Keep Call bell within reach  - Keep bed low and locked with side rails adjusted as appropriate  - Keep care items and personal belongings within reach  - Initiate and maintain comfort rounds  - Make Fall Risk Sign visible to staff  - Offer Toileting every 2 Hours, in advance of need  - Initiate/Maintain bed alarm  - Obtain necessary fall risk management equipment:    - Apply yellow socks and bracelet for high fall risk patients  - Consider moving patient to room near nurses station  Outcome: Progressing     Problem: Prexisting or High Potential for Compromised Skin Integrity  Goal: Skin integrity is maintained or improved  Description: INTERVENTIONS:  - Identify patients at risk for skin breakdown  - Assess and monitor skin integrity  - Assess and monitor nutrition and hydration status  - Monitor labs   - Assess for incontinence   - Turn and reposition patient  - Assist with mobility/ambulation  - Relieve pressure over bony prominences  - Avoid friction and shearing  - Provide appropriate hygiene as needed including keeping skin clean and dry  - Evaluate need for skin moisturizer/barrier cream  - Collaborate with interdisciplinary team   - Patient/family teaching  - Consider wound care consult   Outcome: Progressing     Problem: SAFETY ADULT  Goal: Patient will remain free of falls  Description: INTERVENTIONS:  - Educate patient/family on patient safety including physical limitations  - Instruct patient to call for assistance with activity   - Consult OT/PT to assist with strengthening/mobility   - Keep Call bell within reach  - Keep bed low and locked with side rails adjusted as appropriate  - Keep care items and personal  belongings within reach  - Initiate and maintain comfort rounds  - Make Fall Risk Sign visible to staff  - Offer Toileting every 2 Hours, in advance of need  - Initiate/Maintain bed alarm  - Obtain necessary fall risk management equipment:    - Apply yellow socks and bracelet for high fall risk patients  - Consider moving patient to room near nurses station  Outcome: Progressing     Problem: GENITOURINARY - ADULT  Goal: Absence of urinary retention  Description: INTERVENTIONS:  - Assess patient’s ability to void and empty bladder  - Monitor I/O  - Bladder scan as needed  - Discuss with physician/AP medications to alleviate retention as needed  - Discuss catheterization for long term situations as appropriate  Outcome: Progressing     Problem: SKIN/TISSUE INTEGRITY - ADULT  Goal: Skin Integrity remains intact(Skin Breakdown Prevention)  Description: Assess:  -Perform Rosales assessment every shift  -Clean and moisturize skin every shift  -Inspect skin when repositioning, toileting, and assisting with ADLS  -Assess extremities for adequate circulation and sensation     Bed Management:  -Have minimal linens on bed & keep smooth, unwrinkled  -Change linens as needed when moist or perspiring  -Avoid sitting or lying in one position for more than 2 hours while in bed  -Keep HOB at 30degrees     Toileting:  -Offer bedside commode  -Assess for incontinence every shift  -Use incontinent care products after each incontinent episode     Activity:  -Mobilize patient 3 times a day  -Encourage activity and walks on unit  -Encourage or provide ROM exercises   -Turn and reposition patient every 2 Hours  -Use appropriate equipment to lift or move patient in bed  -Instruct/ Assist with weight shifting every 2 hrs when out of bed in chair  -Consider limitation of chair time 2 hour intervals    Skin Care:  -Avoid use of baby powder, tape, friction and shearing, hot water or constrictive clothing  -Relieve pressure over bony prominences  using allevyn foam  -Do not massage red bony areas    Outcome: Progressing

## 2024-05-03 NOTE — PROGRESS NOTES
Progress Note - Infectious Disease   Latanya Ray 89 y.o. female MRN: [id number]  Unit/Bed#: E2 -01 Encounter: [id number]      Impression/Plan:  1.  Sepsis.  Appears to be secondary to a urinary source of infection.  No other clear source appreciated.  Patient is stabilized, clinically improved, with a decreasing temperature curve.  White cell count is decreased.  She seems to be tolerating antibiotics without difficulty.  Clinically improving.  No recurrence of the fever  -Continue antibiotics as below  -Follow-up blood cultures  -Recheck CBC with differential and CMP to make sure no developing toxicities  -Supportive care     2.  Acute pyelonephritis.  Based upon clinical, radiographic, and microbiologic findings.  Urine cultures growing Proteus repeatedly.  No resistant organisms isolated.  CT scan with evidence of left-sided pyelonephritis but no abscess or hydronephrosis seen.  No definitive urinary symptomatology and the patient is not retaining urine.  -Discontinue cefazolin  -Begin cefuroxime 500 mg p.o. every 12 hours to be continued through 2024 which would be 10 days total treatment  -Monitor temperature and symptomatology  -Monitor urine output     3.  Breast carcinoma.  Status post history of left-sided mastectomy.  Not on any current therapy.    Discussed with the primary service the plan to change to oral cefuroxime and they agree with the plan    Antibiotics:  Cefazolin 3  Antibiotics 5    Subjective:  Patient has no fever, chills, sweats; no nausea, vomiting, diarrhea; no cough, shortness of breath; no pain. No new symptoms.    Objective:  Vitals:  Temp:  [97.7 °F (36.5 °C)-98.8 °F (37.1 °C)] 98 °F (36.7 °C)  HR:  [77-95] 95  Resp:  [16-18] 16  BP: (143-173)/(56-87) 143/56  SpO2:  [92 %-95 %] 92 %  Temp (24hrs), Av.2 °F (36.8 °C), Min:97.7 °F (36.5 °C), Max:98.8 °F (37.1 °C)  Current: Temperature: 98 °F (36.7 °C)    Physical Exam:   General Appearance:  Elderly, debilitated,  nontoxic, no acute distress.   Throat: Oropharynx moist without lesions.    Lungs:   Clear to auscultation bilaterally; no wheezes, rhonchi or rales; respirations unlabored   Heart:  RRR; no murmur, rub or gallop   Abdomen:   Soft, non-tender, non-distended, positive bowel sounds.     Extremities: No clubbing, cyanosis or edema   Skin: No new rashes or lesions. No draining wounds noted.       Labs, Imaging, & Other studies:   All pertinent labs and imaging studies were personally reviewed  Results from last 7 days   Lab Units 05/03/24 0450 05/02/24 0528 05/01/24  0617   WBC Thousand/uL 7.75 6.65 7.84   HEMOGLOBIN g/dL 12.1 10.9* 10.5*   PLATELETS Thousands/uL 314 261 223     Results from last 7 days   Lab Units 05/03/24  0450 05/02/24  0528 05/01/24  0617 04/30/24  0455 04/29/24  0141   SODIUM mmol/L 138 138 140   < > 133*   POTASSIUM mmol/L 3.6 3.6 3.7   < > 3.5   CHLORIDE mmol/L 104 106 107   < > 99   CO2 mmol/L 27 24 26   < > 26   BUN mg/dL 13 12 12   < > 19   CREATININE mg/dL 0.55* 0.45* 0.53*   < > 0.61   EGFR ml/min/1.73sq m 78 84 79   < > 76   CALCIUM mg/dL 9.1 8.2* 8.2*   < > 9.2   AST U/L  --  22 20  --  14   ALT U/L  --  18 20  --  9   ALK PHOS U/L  --  89 74  --  93    < > = values in this interval not displayed.     Results from last 7 days   Lab Units 04/29/24  0325 04/29/24  0324 04/29/24  0214 04/29/24  0115   BLOOD CULTURE   --   --  No Growth After 4 Days. No Growth After 4 Days.   URINE CULTURE  >100,000 cfu/ml Proteus mirabilis*  10,000-19,000 cfu/ml >100,000 cfu/ml Proteus mirabilis*  20,000-29,000 cfu/ml  --   --      Results from last 7 days   Lab Units 05/01/24  0617 04/30/24 0455 04/29/24  0141   PROCALCITONIN ng/ml 0.21 0.30* 0.14

## 2024-05-03 NOTE — ASSESSMENT & PLAN NOTE
Acute metabolic encephalopathy with underlying cognitive decline  Present on admission secondary to sepsis/pyelonephritis.    Mental status appears to have returned to her to baseline

## 2024-05-03 NOTE — PROGRESS NOTES
[facility]  Progress Note  Name: Latanya Ray I  MRN: [id number]  Unit/Bed#: E2 -01 I Date of Admission: 4/29/2024   Date of Service: 5/3/2024 I Hospital Day: 4    Assessment/Plan   * Septic shock (HCC)  Assessment & Plan  History of cancer malignancy macular degeneration with cognitive decline presents to the hospital for weakness found to have severe sepsis/shock secondary to pyelonephritis  Briefly required norepinephrine in ED.      Urine cultures with Proteus multiple sensitivities.  Was on cefazolin and ID has transitioned over to cefuroxime.  Disposition: Attempted discharge to rehab today.  Medically stable and off IV antibiotics.    Results from last 7 days   Lab Units 04/29/24  0214 04/29/24  0115   BLOOD CULTURE  No Growth After 4 Days. No Growth After 4 Days.       Acute metabolic encephalopathy  Assessment & Plan  Acute metabolic encephalopathy with underlying cognitive decline  Present on admission secondary to sepsis/pyelonephritis.    Mental status appears to have returned to her to baseline    Legal blindness  Assessment & Plan  History of macular degeneration legally blind    Neoplasm of skin of face  Assessment & Plan  History of basal cell carcinoma; family had declined intervention due to advanced age    Breast carcinoma (HCC)  Assessment & Plan  History of breast cancer status post mastectomy.    VTE Pharmacologic Prophylaxis: VTE Score: 7 Moderate Risk (Score 3-4) - Pharmacological DVT Prophylaxis Ordered: enoxaparin (Lovenox).    Mobility:  Basic Mobility Inpatient Raw Score: 7  JH-HLM Goal: 2: Bed activities/Dependent transfer  JH-HLM Achieved: 2: Bed activities/Dependent transfer  JH-HLM Goal NOT achieved. Continue with multidisciplinary rounding and encourage appropriate mobility to improve upon JH-HLM goals.    Patient Centered Rounds: I have performed bedside rounds with nursing staff today.  Discussions with Specialists or Other Care Team Provider:  "Case management and infectious disease    Education and Discussions with Family / Patient: Updated  (daughter) via phone.    Time Spent for Care:   This time was spent on one or more of the following: performing physical exam; counseling and coordination of care; obtaining or reviewing history; documenting in the medical record; reviewing/ordering tests, medications or procedures; communicating with other healthcare professionals and discussing with patient's family/caregivers.    Current Length of Stay: 4 day(s)  Current Patient Status: Inpatient   Certification Statement:  Medically stable for discharge.  Discharge Plan:  Daughter wishes to appeal Medicare for discharge    Code Status: Level 3 - DNAR and DNI      Subjective:   Patient seen and examined.  No complaints.  No pain    Objective:   Vitals: Blood pressure 143/56, pulse 95, temperature 98 °F (36.7 °C), temperature source Temporal, resp. rate 16, height 4' 11\" (1.499 m), weight 58.5 kg (128 lb 15.5 oz), SpO2 92%.    Intake/Output Summary (Last 24 hours) at 5/3/2024 1256  Last data filed at 5/3/2024 0827  Gross per 24 hour   Intake 480 ml   Output 1300 ml   Net -820 ml       Physical Exam  Vitals reviewed.   Constitutional:       General: She is not in acute distress.     Appearance: Normal appearance.   Eyes:      Conjunctiva/sclera: Conjunctivae normal.   Cardiovascular:      Rate and Rhythm: Regular rhythm.   Pulmonary:      Breath sounds: Normal breath sounds. No wheezing.   Abdominal:      General: Bowel sounds are normal.      Palpations: Abdomen is soft.      Tenderness: There is no abdominal tenderness. There is no guarding.   Musculoskeletal:         General: No swelling.   Skin:     Comments: Lesion on forehead   Neurological:      Mental Status: She is alert. Mental status is at baseline. She is disoriented.   Psychiatric:         Mood and Affect: Mood normal.       Additional Data:   Labs:  Results from last 7 days   Lab Units " 05/03/24 0450 05/02/24 0528 05/01/24 0617 04/30/24 0455 04/29/24  0141   WBC Thousand/uL 7.75 6.65 7.84   < > 11.26*   HEMOGLOBIN g/dL 12.1 10.9* 10.5*   < > 11.8   PLATELETS Thousands/uL 314 261 223   < > 275   MCV fL 89 90 91   < > 89   INR   --   --   --   --  1.03    < > = values in this interval not displayed.     Results from last 7 days   Lab Units 05/03/24 0450 05/02/24 0528 05/01/24 0617 04/30/24 0455 04/29/24  0141   SODIUM mmol/L 138 138 140   < > 133*   POTASSIUM mmol/L 3.6 3.6 3.7   < > 3.5   CHLORIDE mmol/L 104 106 107   < > 99   CO2 mmol/L 27 24 26   < > 26   ANION GAP mmol/L 7 8 7   < > 8   BUN mg/dL 13 12 12   < > 19   CREATININE mg/dL 0.55* 0.45* 0.53*   < > 0.61   CALCIUM mg/dL 9.1 8.2* 8.2*   < > 9.2   ALBUMIN g/dL  --  3.1* 3.0*  --  3.7   TOTAL BILIRUBIN mg/dL  --  0.36 0.27  --  0.60   ALK PHOS U/L  --  89 74  --  93   ALT U/L  --  18 20  --  9   AST U/L  --  22 20  --  14   EGFR ml/min/1.73sq m 78 84 79   < > 76   GLUCOSE RANDOM mg/dL 100 93 93   < > 161*    < > = values in this interval not displayed.     Results from last 7 days   Lab Units 05/01/24  0617   MAGNESIUM mg/dL 1.9   PHOSPHORUS mg/dL 2.5         Results from last 7 days   Lab Units 04/29/24  0342 04/29/24  0141   HS TNI 0HR ng/L  --  4   HS TNI 2HR ng/L 4  --           Results from last 7 days   Lab Units 05/01/24  0617 04/30/24 0455 04/29/24  0141   LACTIC ACID mmol/L  --   --  0.9   PROCALCITONIN ng/ml 0.21   < > 0.14    < > = values in this interval not displayed.                 * I Have Reviewed All Lab Data Listed Above.    Recent cultures:   Results from last 7 days   Lab Units 04/29/24  0325 04/29/24  0324 04/29/24  0214 04/29/24  0115   BLOOD CULTURE   --   --  No Growth After 4 Days. No Growth After 4 Days.   URINE CULTURE  >100,000 cfu/ml Proteus mirabilis*  10,000-19,000 cfu/ml >100,000 cfu/ml Proteus mirabilis*  20,000-29,000 cfu/ml  --   --                  Lines/Drains:  Invasive Devices        Peripheral Intravenous Line  Duration             Peripheral IV 05/03/24 Right;Ventral (anterior) Forearm <1 day                          Telemetry:      Imaging:  Imaging Reports Reviewed Today Include:   XR chest 1 view portable    Result Date: 4/29/2024  Impression: 1. No consolidation or alveolar edema. 2. Bibasilar atelectasis and chronic mild interstitial prominence, similar to the CT of April 2023. 3. Hiatal hernia. Workstation performed: [id number]     CT chest abdomen pelvis w contrast    Result Date: 4/29/2024  Impression: Left pyelonephritis. Cystitis. 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, pancreatic tail and numerous loops of small bowel Workstation performed: [id number]       Scheduled Meds:  Current Facility-Administered Medications   Medication Dose Route Frequency Provider Last Rate    acetaminophen  650 mg Oral Q6H PRN MARINA Wooten      aluminum-magnesium hydroxide-simethicone  30 mL Oral Q6H PRN MARINA Wooten      aspirin  81 mg Oral Daily MARINA Wooten      bisacodyl  10 mg Rectal Daily PRN MARINA Wooten      calcium carbonate-vitamin D  1 tablet Oral Daily With Breakfast MARINA Wooten      cefuroxime  500 mg Oral Q12H [facility] Vick Chan MD      Cholecalciferol  1,000 Units Oral Daily MARINA Wooten      vitamin B-12  1,000 mcg Oral Every Other Day MARINA Wooten      enoxaparin  40 mg Subcutaneous Daily MARINA Wooten      meclizine  25 mg Oral Q8H PRN MARINA Wooten      multivitamin-minerals  1 tablet Oral Daily MARINA Wooten      ondansetron  4 mg Intravenous Q6H PRN MARINA Wooten      pantoprazole  20 mg Oral Early Morning MARINA Wooten      pramipexole  0.25 mg Oral HS MARINA Wooten      simethicone  80 mg Oral 4x Daily PRN MARINA Wooten         Today, Patient Was Seen By: Norberto Ruiz DO    ** Please Note: Dictation  voice to text software may have been used in the creation of this document. **

## 2024-05-04 PROBLEM — N39.0 SEPSIS SECONDARY TO UTI  (HCC): Status: ACTIVE | Noted: 2024-04-29

## 2024-05-04 LAB
BACTERIA BLD CULT: NORMAL
BACTERIA BLD CULT: NORMAL

## 2024-05-04 PROCEDURE — 99233 SBSQ HOSP IP/OBS HIGH 50: CPT | Performed by: INTERNAL MEDICINE

## 2024-05-04 PROCEDURE — 99232 SBSQ HOSP IP/OBS MODERATE 35: CPT | Performed by: INTERNAL MEDICINE

## 2024-05-04 RX ADMIN — PANTOPRAZOLE SODIUM 20 MG: 20 TABLET, DELAYED RELEASE ORAL at 05:41

## 2024-05-04 RX ADMIN — ASPIRIN 81 MG: 81 TABLET, COATED ORAL at 21:03

## 2024-05-04 RX ADMIN — CEFUROXIME AXETIL 500 MG: 250 TABLET, FILM COATED ORAL at 21:03

## 2024-05-04 RX ADMIN — CEFUROXIME AXETIL 500 MG: 250 TABLET, FILM COATED ORAL at 09:24

## 2024-05-04 RX ADMIN — Medication 1000 UNITS: at 09:23

## 2024-05-04 RX ADMIN — MULTIPLE VITAMINS W/ MINERALS TAB 1 TABLET: TAB ORAL at 09:23

## 2024-05-04 RX ADMIN — Medication 1 TABLET: at 09:22

## 2024-05-04 RX ADMIN — PRAMIPEXOLE DIHYDROCHLORIDE 0.25 MG: 0.25 TABLET ORAL at 21:03

## 2024-05-04 RX ADMIN — ENOXAPARIN SODIUM 40 MG: 40 INJECTION SUBCUTANEOUS at 09:23

## 2024-05-04 NOTE — PROGRESS NOTES
Progress Note - Infectious Disease   Latanya Ray 89 y.o. female MRN: [id number]  Unit/Bed#: E2 -01 Encounter: [id number]      Impression/Plan:  1.  Sepsis.  Appears to be secondary to a urinary source of infection.  No other clear source appreciated.  Patient is stabilized, clinically improved, with a decreasing temperature curve.  White cell count is decreased.  She seems to be tolerating antibiotics without difficulty.  Clinically improving.  No recurrence of the fever  -Continue antibiotics as below  -Follow-up blood cultures  -Recheck CBC with differential and CMP to make sure no developing toxicities  -Supportive care     2.  Acute pyelonephritis.  Based upon clinical, radiographic, and microbiologic findings.  Urine cultures growing Proteus repeatedly.  No resistant organisms isolated.  CT scan with evidence of left-sided pyelonephritis but no abscess or hydronephrosis seen.  No definitive urinary symptomatology and the patient is not retaining urine.  -Continue cefuroxime 500 mg p.o. every 12 hours to be continued through 2024 which would be 10 days total treatment  -Monitor temperature and symptomatology  -Monitor urine output     3.  Breast carcinoma.  Status post history of left-sided mastectomy.  Not on any current therapy.    Discussed with the primary service the plan to continue the oral Ceftin as above and they agree with the plan    Infectious disease service will see the patient again 2024 if not discharged.  Please call if questions    Antibiotics:  Ceftin 2  Antibiotics 6    Subjective:  Patient has no fever, chills, sweats; no nausea, vomiting, diarrhea; no cough, shortness of breath; no pain. No new symptoms.    Objective:  Vitals:  Temp:  [97.4 °F (36.3 °C)-98.3 °F (36.8 °C)] 97.4 °F (36.3 °C)  HR:  [76-83] 76  Resp:  [16-20] 20  BP: (117-151)/(63-90) 117/63  SpO2:  [95 %-98 %] 95 %  Temp (24hrs), Av.7 °F (36.5 °C), Min:97.4 °F (36.3 °C), Max:98.3 °F (36.8 °C)  Current:  Temperature: (!) 97.4 °F (36.3 °C)    Physical Exam:   General Appearance:  Elderly, debilitated, nontoxic, no acute distress.   Throat: Oropharynx moist without lesions.    Lungs:   Clear to auscultation bilaterally; no wheezes, rhonchi or rales; respirations unlabored   Heart:  RRR; no murmur, rub or gallop   Abdomen:   Soft, non-tender, non-distended, positive bowel sounds.     Extremities: No clubbing, cyanosis or edema   Skin: No new rashes or lesions. No draining wounds noted.       Labs, Imaging, & Other studies:   All pertinent labs and imaging studies were personally reviewed  Results from last 7 days   Lab Units 05/03/24  0450 05/02/24  0528 05/01/24  0617   WBC Thousand/uL 7.75 6.65 7.84   HEMOGLOBIN g/dL 12.1 10.9* 10.5*   PLATELETS Thousands/uL 314 261 223     Results from last 7 days   Lab Units 05/03/24  0450 05/02/24  0528 05/01/24  0617 04/30/24  0455 04/29/24  0141   SODIUM mmol/L 138 138 140   < > 133*   POTASSIUM mmol/L 3.6 3.6 3.7   < > 3.5   CHLORIDE mmol/L 104 106 107   < > 99   CO2 mmol/L 27 24 26   < > 26   BUN mg/dL 13 12 12   < > 19   CREATININE mg/dL 0.55* 0.45* 0.53*   < > 0.61   EGFR ml/min/1.73sq m 78 84 79   < > 76   CALCIUM mg/dL 9.1 8.2* 8.2*   < > 9.2   AST U/L  --  22 20  --  14   ALT U/L  --  18 20  --  9   ALK PHOS U/L  --  89 74  --  93    < > = values in this interval not displayed.     Results from last 7 days   Lab Units 04/29/24  0325 04/29/24  0324 04/29/24  0214 04/29/24  0115   BLOOD CULTURE   --   --  No Growth After 5 Days. No Growth After 5 Days.   URINE CULTURE  >100,000 cfu/ml Proteus mirabilis*  10,000-19,000 cfu/ml >100,000 cfu/ml Proteus mirabilis*  20,000-29,000 cfu/ml  --   --      Results from last 7 days   Lab Units 05/01/24  0617 04/30/24  0455 04/29/24  0141   PROCALCITONIN ng/ml 0.21 0.30* 0.14

## 2024-05-04 NOTE — PLAN OF CARE
Problem: Potential for Falls  Goal: Patient will remain free of falls  Description: INTERVENTIONS:  - Educate patient/family on patient safety including physical limitations  - Instruct patient to call for assistance with activity   - Consult OT/PT to assist with strengthening/mobility   - Keep Call bell within reach  - Keep bed low and locked with side rails adjusted as appropriate  - Keep care items and personal belongings within reach  - Initiate and maintain comfort rounds  - Make Fall Risk Sign visible to staff  - Offer Toileting every 2 Hours, in advance of need  - Initiate/Maintain bed alarm  - Obtain necessary fall risk management equipment:    - Apply yellow socks and bracelet for high fall risk patients  - Consider moving patient to room near nurses station  Outcome: Progressing     Problem: Prexisting or High Potential for Compromised Skin Integrity  Goal: Skin integrity is maintained or improved  Description: INTERVENTIONS:  - Identify patients at risk for skin breakdown  - Assess and monitor skin integrity  - Assess and monitor nutrition and hydration status  - Monitor labs   - Assess for incontinence   - Turn and reposition patient  - Assist with mobility/ambulation  - Relieve pressure over bony prominences  - Avoid friction and shearing  - Provide appropriate hygiene as needed including keeping skin clean and dry  - Evaluate need for skin moisturizer/barrier cream  - Collaborate with interdisciplinary team   - Patient/family teaching  - Consider wound care consult   Outcome: Progressing     Problem: PAIN - ADULT  Goal: Verbalizes/displays adequate comfort level or baseline comfort level  Description: Interventions:  - Encourage patient to monitor pain and request assistance  - Assess pain using appropriate pain scale  - Administer analgesics based on type and severity of pain and evaluate response  - Implement non-pharmacological measures as appropriate and evaluate response  - Consider cultural and  social influences on pain and pain management  - Notify physician/advanced practitioner if interventions unsuccessful or patient reports new pain  Outcome: Progressing     Problem: INFECTION - ADULT  Goal: Absence or prevention of progression during hospitalization  Description: INTERVENTIONS:  - Assess and monitor for signs and symptoms of infection  - Monitor lab/diagnostic results  - Monitor all insertion sites, i.e. indwelling lines, tubes, and drains  - Monitor endotracheal if appropriate and nasal secretions for changes in amount and color  - [place] appropriate cooling/warming therapies per order  - Administer medications as ordered  - Instruct and encourage patient and family to use good hand hygiene technique  - Identify and instruct in appropriate isolation precautions for identified infection/condition  Outcome: Progressing     Problem: SAFETY ADULT  Goal: Patient will remain free of falls  Description: INTERVENTIONS:  - Educate patient/family on patient safety including physical limitations  - Instruct patient to call for assistance with activity   - Consult OT/PT to assist with strengthening/mobility   - Keep Call bell within reach  - Keep bed low and locked with side rails adjusted as appropriate  - Keep care items and personal belongings within reach  - Initiate and maintain comfort rounds  - Make Fall Risk Sign visible to staff  - Offer Toileting every 2 Hours, in advance of need  - Initiate/Maintain bed alarm  - Obtain necessary fall risk management equipment:    - Apply yellow socks and bracelet for high fall risk patients  - Consider moving patient to room near nurses station  Outcome: Progressing  Goal: Maintains/Returns to pre admission functional level  Description: INTERVENTIONS:  - Perform AM-PAC 6 Click Basic Mobility/ Daily Activity assessment daily.  - Set and communicate daily mobility goal to care team and patient/family/caregiver.   - Collaborate with rehabilitation services on  mobility goals if consulted  - Perform Range of Motion 3 times a day.  - Reposition patient every 2 hours.  - Dangle patient 3 times a day  - Stand patient 3 times a day  - Out of bed to chair 3 times a day   - Out of bed for meals 3 times a day  - Out of bed for toileting  - Record patient progress and toleration of activity level   Outcome: Progressing     Problem: DISCHARGE PLANNING  Goal: Discharge to home or other facility with appropriate resources  Description: INTERVENTIONS:  - Identify barriers to discharge w/patient and caregiver  - Arrange for needed discharge resources and transportation as appropriate  - Identify discharge learning needs (meds, wound care, etc.)  - Arrange for interpretive services to assist at discharge as needed  - Refer to Case Management Department for coordinating discharge planning if the patient needs post-hospital services based on physician/advanced practitioner order or complex needs related to functional status, cognitive ability, or social support system  Outcome: Progressing     Problem: GENITOURINARY - ADULT  Goal: Absence of urinary retention  Description: INTERVENTIONS:  - Assess patient’s ability to void and empty bladder  - Monitor I/O  - Bladder scan as needed  - Discuss with physician/AP medications to alleviate retention as needed  - Discuss catheterization for long term situations as appropriate  Outcome: Progressing     Problem: SKIN/TISSUE INTEGRITY - ADULT  Goal: Skin Integrity remains intact(Skin Breakdown Prevention)  Description: Assess:  -Perform Rosales assessment every shift  -Clean and moisturize skin every shift  -Inspect skin when repositioning, toileting, and assisting with ADLS  -Assess extremities for adequate circulation and sensation     Bed Management:  -Have minimal linens on bed & keep smooth, unwrinkled  -Change linens as needed when moist or perspiring  -Avoid sitting or lying in one position for more than 2 hours while in bed  -Keep HOB at  30degrees     Toileting:  -Offer bedside commode  -Assess for incontinence every shift  -Use incontinent care products after each incontinent episode     Activity:  -Mobilize patient 3 times a day  -Encourage activity and walks on unit  -Encourage or provide ROM exercises   -Turn and reposition patient every 2 Hours  -Use appropriate equipment to lift or move patient in bed  -Instruct/ Assist with weight shifting every 2 hrs when out of bed in chair  -Consider limitation of chair time 2 hour intervals    Skin Care:  -Avoid use of baby powder, tape, friction and shearing, hot water or constrictive clothing  -Relieve pressure over bony prominences using allevyn foam  -Do not massage red bony areas    Outcome: Progressing     Problem: Nutrition/Hydration-ADULT  Goal: Nutrient/Hydration intake appropriate for improving, restoring or maintaining nutritional needs  Description: Monitor and assess patient's nutrition/hydration status for malnutrition. Collaborate with interdisciplinary team and initiate plan and interventions as ordered.  Monitor patient's weight and dietary intake as ordered or per policy. Utilize nutrition screening tool and intervene as necessary. Determine patient's food preferences and provide high-protein, high-caloric foods as appropriate.     INTERVENTIONS:  - Monitor oral intake, urinary output, labs, and treatment plans  - Assess nutrition and hydration status and recommend course of action  - Evaluate amount of meals eaten  - Assist patient with eating if necessary   - Allow adequate time for meals  - Recommend/ encourage appropriate diets, oral nutritional supplements, and vitamin/mineral supplements  - Order, calculate, and assess calorie counts as needed  - Recommend, monitor, and adjust tube feedings and TPN/PPN based on assessed needs  - Assess need for intravenous fluids  - Provide specific nutrition/hydration education as appropriate  - Include patient/family/caregiver in decisions  related to nutrition  Outcome: Progressing

## 2024-05-04 NOTE — PROGRESS NOTES
[facility]  Progress Note  Name: Latanya Ray I  MRN: [id number]  Unit/Bed#: E2 -01 I Date of Admission: 4/29/2024   Date of Service: 5/4/2024 I Hospital Day: 5    Assessment/Plan   * Sepsis secondary to UTI  (HCC)  Assessment & Plan  History of cancer malignancy macular degeneration with cognitive decline presents to the hospital for weakness found to have severe sepsis/shock secondary to pyelonephritis  Briefly required norepinephrine in ED.      Urine cultures with Proteus multiple sensitivities.  Was on cefazolin and ID has transitioned over to cefuroxime.  Disposition: Attempted discharge to rehab yesterday.  Medically stable and off IV antibiotics.  Daughter appealing discharge with Medicare.  Awaiting decision.      Results from last 7 days   Lab Units 04/29/24  0214 04/29/24  0115   BLOOD CULTURE  No Growth After 5 Days. No Growth After 5 Days.       Acute metabolic encephalopathy  Assessment & Plan  Acute metabolic encephalopathy with underlying cognitive decline  Present on admission secondary to sepsis/pyelonephritis.    Mental status appears to have returned to her to baseline    Legal blindness  Assessment & Plan  History of macular degeneration legally blind    Neoplasm of skin of face  Assessment & Plan  History of basal cell carcinoma; family had declined intervention due to advanced age    Breast carcinoma (HCC)  Assessment & Plan  History of breast cancer status post mastectomy.    VTE Pharmacologic Prophylaxis: VTE Score: 7 High Risk (Score >/= 5) - Pharmacological DVT Prophylaxis Ordered: enoxaparin (Lovenox). Sequential Compression Devices Ordered.    Mobility:  Basic Mobility Inpatient Raw Score: 7  JH-HLM Goal: 2: Bed activities/Dependent transfer  JH-HLM Achieved: 2: Bed activities/Dependent transfer  JH-HLM Goal achieved. Continue to encourage appropriate mobility.    Patient Centered Rounds: I have performed bedside rounds with nursing staff  "today.  Discussions with Specialists or Other Care Team Provider: Case management    Education and Discussions with Family / Patient: Attempted to update  (daughter) via phone. Left voicemail.     Time Spent for Care:   This time was spent on one or more of the following: performing physical exam; counseling and coordination of care; obtaining or reviewing history; documenting in the medical record; reviewing/ordering tests, medications or procedures; communicating with other healthcare professionals and discussing with patient's family/caregivers.    Current Length of Stay: 5 day(s)  Current Patient Status: Inpatient   Certification Statement: The patient will continue to require additional inpatient hospital stay due to awaiting Medicare decision for appeal  Discharge Plan:  Medically stable for discharge to rehab    Code Status: Level 3 - DNAR and DNI      Subjective:   Patient seen and examined.  No complaints no events.  Comfortable.    Objective:   Vitals: Blood pressure 117/63, pulse 76, temperature (!) 97.4 °F (36.3 °C), temperature source Temporal, resp. rate 20, height 4' 11\" (1.499 m), weight 58.5 kg (128 lb 15.5 oz), SpO2 95%.    Intake/Output Summary (Last 24 hours) at 5/4/2024 1508  Last data filed at 5/4/2024 0549  Gross per 24 hour   Intake 240 ml   Output 900 ml   Net -660 ml       Physical Exam  Vitals reviewed.   Constitutional:       General: She is not in acute distress.     Appearance: Normal appearance.   HENT:      Head: Atraumatic.   Cardiovascular:      Rate and Rhythm: Regular rhythm.   Pulmonary:      Breath sounds: No stridor.   Abdominal:      General: Bowel sounds are normal. There is no distension.      Palpations: Abdomen is soft.   Musculoskeletal:         General: No swelling.   Skin:     General: Skin is warm.   Neurological:      Mental Status: She is alert. Mental status is at baseline. She is disoriented.   Psychiatric:         Mood and Affect: Mood normal. "       Additional Data:   Labs:  Results from last 7 days   Lab Units 05/03/24  0450 05/02/24 0528 05/01/24 0617 04/30/24 0455 04/29/24  0141   WBC Thousand/uL 7.75 6.65 7.84   < > 11.26*   HEMOGLOBIN g/dL 12.1 10.9* 10.5*   < > 11.8   PLATELETS Thousands/uL 314 261 223   < > 275   MCV fL 89 90 91   < > 89   INR   --   --   --   --  1.03    < > = values in this interval not displayed.     Results from last 7 days   Lab Units 05/03/24 0450 05/02/24 0528 05/01/24 0617 04/30/24 0455 04/29/24  0141   SODIUM mmol/L 138 138 140   < > 133*   POTASSIUM mmol/L 3.6 3.6 3.7   < > 3.5   CHLORIDE mmol/L 104 106 107   < > 99   CO2 mmol/L 27 24 26   < > 26   ANION GAP mmol/L 7 8 7   < > 8   BUN mg/dL 13 12 12   < > 19   CREATININE mg/dL 0.55* 0.45* 0.53*   < > 0.61   CALCIUM mg/dL 9.1 8.2* 8.2*   < > 9.2   ALBUMIN g/dL  --  3.1* 3.0*  --  3.7   TOTAL BILIRUBIN mg/dL  --  0.36 0.27  --  0.60   ALK PHOS U/L  --  89 74  --  93   ALT U/L  --  18 20  --  9   AST U/L  --  22 20  --  14   EGFR ml/min/1.73sq m 78 84 79   < > 76   GLUCOSE RANDOM mg/dL 100 93 93   < > 161*    < > = values in this interval not displayed.     Results from last 7 days   Lab Units 05/01/24  0617   MAGNESIUM mg/dL 1.9   PHOSPHORUS mg/dL 2.5         Results from last 7 days   Lab Units 04/29/24 0342 04/29/24  0141   HS TNI 0HR ng/L  --  4   HS TNI 2HR ng/L 4  --           Results from last 7 days   Lab Units 05/01/24 0617 04/30/24 0455 04/29/24  0141   LACTIC ACID mmol/L  --   --  0.9   PROCALCITONIN ng/ml 0.21   < > 0.14    < > = values in this interval not displayed.                 * I Have Reviewed All Lab Data Listed Above.    Recent cultures:   Results from last 7 days   Lab Units 05/03/24  1533 04/29/24  0325 04/29/24  0324 04/29/24  0214 04/29/24  0115   BLOOD CULTURE   --   --   --  No Growth After 5 Days. No Growth After 5 Days.   URINE CULTURE   --  >100,000 cfu/ml Proteus mirabilis*  10,000-19,000 cfu/ml >100,000 cfu/ml Proteus mirabilis*   20,000-29,000 cfu/ml  --   --    INFLUENZA A PCR  Negative  --   --   --   --        Results from last 7 days   Lab Units 05/03/24  1533   SARS-COV-2  Negative   INFLUENZA A PCR  Negative   INFLUENZA B PCR  Negative   RSV PCR  Negative           Lines/Drains:  Invasive Devices       Peripheral Intravenous Line  Duration             Peripheral IV 05/03/24 Right;Ventral (anterior) Forearm 1 day              Drain  Duration             External Urinary Catheter <1 day                          Telemetry:      Imaging:  Imaging Reports Reviewed Today Include:   XR chest 1 view portable    Result Date: 4/29/2024  Impression: 1. No consolidation or alveolar edema. 2. Bibasilar atelectasis and chronic mild interstitial prominence, similar to the CT of April 2023. 3. Hiatal hernia. Workstation performed: [id number]     CT chest abdomen pelvis w contrast    Result Date: 4/29/2024  Impression: Left pyelonephritis. Cystitis. 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, pancreatic tail and numerous loops of small bowel Workstation performed: [id number]       Scheduled Meds:  Current Facility-Administered Medications   Medication Dose Route Frequency Provider Last Rate    acetaminophen  650 mg Oral Q6H PRN MARINA Wooten      aluminum-magnesium hydroxide-simethicone  30 mL Oral Q6H PRN MARINA Wooten      aspirin  81 mg Oral Daily MARINA Wooten      bisacodyl  10 mg Rectal Daily PRN MARINA Wooten      calcium carbonate-vitamin D  1 tablet Oral Daily With Breakfast MARINA Wooten      cefuroxime  500 mg Oral Q12H HERMAN Chan MD      Cholecalciferol  1,000 Units Oral Daily MARINA Wooten      vitamin B-12  1,000 mcg Oral Every Other Day MARINA Wooten      enoxaparin  40 mg Subcutaneous Daily MARINA Wooten      meclizine  25 mg Oral Q8H PRN MARINA Wooten      multivitamin-minerals  1 tablet Oral Daily Aiyana  MARINA De La Torre      ondansetron  4 mg Intravenous Q6H PRN MARINA Wooten      pantoprazole  20 mg Oral Early Morning MARINA Wooten      pramipexole  0.25 mg Oral HS MARINA Wooten      simethicone  80 mg Oral 4x Daily PRN MARINA Wooten         Today, Patient Was Seen By: Norberto Ruiz DO    ** Please Note: Dictation voice to text software may have been used in the creation of this document. **

## 2024-05-04 NOTE — PLAN OF CARE
Problem: Potential for Falls  Goal: Patient will remain free of falls  Description: INTERVENTIONS:  - Educate patient/family on patient safety including physical limitations  - Instruct patient to call for assistance with activity   - Consult OT/PT to assist with strengthening/mobility   - Keep Call bell within reach  - Keep bed low and locked with side rails adjusted as appropriate  - Keep care items and personal belongings within reach  - Initiate and maintain comfort rounds  - Make Fall Risk Sign visible to staff  - Offer Toileting every 2 Hours, in advance of need  - Initiate/Maintain bed alarm  - Obtain necessary fall risk management equipment:    - Apply yellow socks and bracelet for high fall risk patients  - Consider moving patient to room near nurses station  Outcome: Progressing     Problem: Prexisting or High Potential for Compromised Skin Integrity  Goal: Skin integrity is maintained or improved  Description: INTERVENTIONS:  - Identify patients at risk for skin breakdown  - Assess and monitor skin integrity  - Assess and monitor nutrition and hydration status  - Monitor labs   - Assess for incontinence   - Turn and reposition patient  - Assist with mobility/ambulation  - Relieve pressure over bony prominences  - Avoid friction and shearing  - Provide appropriate hygiene as needed including keeping skin clean and dry  - Evaluate need for skin moisturizer/barrier cream  - Collaborate with interdisciplinary team   - Patient/family teaching  - Consider wound care consult   Outcome: Progressing     Problem: PAIN - ADULT  Goal: Verbalizes/displays adequate comfort level or baseline comfort level  Description: Interventions:  - Encourage patient to monitor pain and request assistance  - Assess pain using appropriate pain scale  - Administer analgesics based on type and severity of pain and evaluate response  - Implement non-pharmacological measures as appropriate and evaluate response  - Consider cultural and  social influences on pain and pain management  - Notify physician/advanced practitioner if interventions unsuccessful or patient reports new pain  Outcome: Progressing     Problem: INFECTION - ADULT  Goal: Absence or prevention of progression during hospitalization  Description: INTERVENTIONS:  - Assess and monitor for signs and symptoms of infection  - Monitor lab/diagnostic results  - Monitor all insertion sites, i.e. indwelling lines, tubes, and drains  - Monitor endotracheal if appropriate and nasal secretions for changes in amount and color  - [place] appropriate cooling/warming therapies per order  - Administer medications as ordered  - Instruct and encourage patient and family to use good hand hygiene technique  - Identify and instruct in appropriate isolation precautions for identified infection/condition  Outcome: Progressing     Problem: SAFETY ADULT  Goal: Patient will remain free of falls  Description: INTERVENTIONS:  - Educate patient/family on patient safety including physical limitations  - Instruct patient to call for assistance with activity   - Consult OT/PT to assist with strengthening/mobility   - Keep Call bell within reach  - Keep bed low and locked with side rails adjusted as appropriate  - Keep care items and personal belongings within reach  - Initiate and maintain comfort rounds  - Make Fall Risk Sign visible to staff  - Offer Toileting every 2 Hours, in advance of need  - Initiate/Maintain bed alarm  - Obtain necessary fall risk management equipment:    - Apply yellow socks and bracelet for high fall risk patients  - Consider moving patient to room near nurses station  Outcome: Progressing  Goal: Maintains/Returns to pre admission functional level  Description: INTERVENTIONS:  - Perform AM-PAC 6 Click Basic Mobility/ Daily Activity assessment daily.  - Set and communicate daily mobility goal to care team and patient/family/caregiver.   - Collaborate with rehabilitation services on  mobility goals if consulted  - Perform Range of Motion 3 times a day.  - Reposition patient every 2 hours.  - Dangle patient 3 times a day  - Stand patient 3 times a day  - Out of bed to chair 3 times a day   - Out of bed for meals 3 times a day  - Out of bed for toileting  - Record patient progress and toleration of activity level   Outcome: Progressing     Problem: DISCHARGE PLANNING  Goal: Discharge to home or other facility with appropriate resources  Description: INTERVENTIONS:  - Identify barriers to discharge w/patient and caregiver  - Arrange for needed discharge resources and transportation as appropriate  - Identify discharge learning needs (meds, wound care, etc.)  - Arrange for interpretive services to assist at discharge as needed  - Refer to Case Management Department for coordinating discharge planning if the patient needs post-hospital services based on physician/advanced practitioner order or complex needs related to functional status, cognitive ability, or social support system  Outcome: Progressing     Problem: GENITOURINARY - ADULT  Goal: Absence of urinary retention  Description: INTERVENTIONS:  - Assess patient’s ability to void and empty bladder  - Monitor I/O  - Bladder scan as needed  - Discuss with physician/AP medications to alleviate retention as needed  - Discuss catheterization for long term situations as appropriate  Outcome: Progressing     Problem: SKIN/TISSUE INTEGRITY - ADULT  Goal: Skin Integrity remains intact(Skin Breakdown Prevention)  Description: Assess:  -Perform Rosales assessment every shift  -Clean and moisturize skin every shift  -Inspect skin when repositioning, toileting, and assisting with ADLS  -Assess extremities for adequate circulation and sensation     Bed Management:  -Have minimal linens on bed & keep smooth, unwrinkled  -Change linens as needed when moist or perspiring  -Avoid sitting or lying in one position for more than 2 hours while in bed  -Keep HOB at  30degrees     Toileting:  -Offer bedside commode  -Assess for incontinence every shift  -Use incontinent care products after each incontinent episode     Activity:  -Mobilize patient 3 times a day  -Encourage activity and walks on unit  -Encourage or provide ROM exercises   -Turn and reposition patient every 2 Hours  -Use appropriate equipment to lift or move patient in bed  -Instruct/ Assist with weight shifting every 2 hrs when out of bed in chair  -Consider limitation of chair time 2 hour intervals    Skin Care:  -Avoid use of baby powder, tape, friction and shearing, hot water or constrictive clothing  -Relieve pressure over bony prominences using allevyn foam  -Do not massage red bony areas    Outcome: Progressing     Problem: Nutrition/Hydration-ADULT  Goal: Nutrient/Hydration intake appropriate for improving, restoring or maintaining nutritional needs  Description: Monitor and assess patient's nutrition/hydration status for malnutrition. Collaborate with interdisciplinary team and initiate plan and interventions as ordered.  Monitor patient's weight and dietary intake as ordered or per policy. Utilize nutrition screening tool and intervene as necessary. Determine patient's food preferences and provide high-protein, high-caloric foods as appropriate.     INTERVENTIONS:  - Monitor oral intake, urinary output, labs, and treatment plans  - Assess nutrition and hydration status and recommend course of action  - Evaluate amount of meals eaten  - Assist patient with eating if necessary   - Allow adequate time for meals  - Recommend/ encourage appropriate diets, oral nutritional supplements, and vitamin/mineral supplements  - Order, calculate, and assess calorie counts as needed  - Recommend, monitor, and adjust tube feedings and TPN/PPN based on assessed needs  - Assess need for intravenous fluids  - Provide specific nutrition/hydration education as appropriate  - Include patient/family/caregiver in decisions  related to nutrition  Outcome: Progressing

## 2024-05-04 NOTE — PLAN OF CARE
Problem: Potential for Falls  Goal: Patient will remain free of falls  Description: INTERVENTIONS:  - Educate patient/family on patient safety including physical limitations  - Instruct patient to call for assistance with activity   - Consult OT/PT to assist with strengthening/mobility   - Keep Call bell within reach  - Keep bed low and locked with side rails adjusted as appropriate  - Keep care items and personal belongings within reach  - Initiate and maintain comfort rounds  - Make Fall Risk Sign visible to staff  - Offer Toileting every 2 Hours, in advance of need  - Initiate/Maintain bed alarm  - Obtain necessary fall risk management equipment:    - Apply yellow socks and bracelet for high fall risk patients  - Consider moving patient to room near nurses station  Outcome: Progressing     Problem: Prexisting or High Potential for Compromised Skin Integrity  Goal: Skin integrity is maintained or improved  Description: INTERVENTIONS:  - Identify patients at risk for skin breakdown  - Assess and monitor skin integrity  - Assess and monitor nutrition and hydration status  - Monitor labs   - Assess for incontinence   - Turn and reposition patient  - Assist with mobility/ambulation  - Relieve pressure over bony prominences  - Avoid friction and shearing  - Provide appropriate hygiene as needed including keeping skin clean and dry  - Evaluate need for skin moisturizer/barrier cream  - Collaborate with interdisciplinary team   - Patient/family teaching  - Consider wound care consult   Outcome: Progressing     Problem: PAIN - ADULT  Goal: Verbalizes/displays adequate comfort level or baseline comfort level  Description: Interventions:  - Encourage patient to monitor pain and request assistance  - Assess pain using appropriate pain scale  - Administer analgesics based on type and severity of pain and evaluate response  - Implement non-pharmacological measures as appropriate and evaluate response  - Consider cultural and  social influences on pain and pain management  - Notify physician/advanced practitioner if interventions unsuccessful or patient reports new pain  Outcome: Progressing     Problem: INFECTION - ADULT  Goal: Absence or prevention of progression during hospitalization  Description: INTERVENTIONS:  - Assess and monitor for signs and symptoms of infection  - Monitor lab/diagnostic results  - Monitor all insertion sites, i.e. indwelling lines, tubes, and drains  - Monitor endotracheal if appropriate and nasal secretions for changes in amount and color  - [place] appropriate cooling/warming therapies per order  - Administer medications as ordered  - Instruct and encourage patient and family to use good hand hygiene technique  - Identify and instruct in appropriate isolation precautions for identified infection/condition  Outcome: Progressing     Problem: SAFETY ADULT  Goal: Patient will remain free of falls  Description: INTERVENTIONS:  - Educate patient/family on patient safety including physical limitations  - Instruct patient to call for assistance with activity   - Consult OT/PT to assist with strengthening/mobility   - Keep Call bell within reach  - Keep bed low and locked with side rails adjusted as appropriate  - Keep care items and personal belongings within reach  - Initiate and maintain comfort rounds  - Make Fall Risk Sign visible to staff  - Offer Toileting every 2 Hours, in advance of need  - Initiate/Maintain bed alarm  - Obtain necessary fall risk management equipment:    - Apply yellow socks and bracelet for high fall risk patients  - Consider moving patient to room near nurses station  Outcome: Progressing  Goal: Maintains/Returns to pre admission functional level  Description: INTERVENTIONS:  - Perform AM-PAC 6 Click Basic Mobility/ Daily Activity assessment daily.  - Set and communicate daily mobility goal to care team and patient/family/caregiver.   - Collaborate with rehabilitation services on  mobility goals if consulted  - Perform Range of Motion 3 times a day.  - Reposition patient every 2 hours.  - Dangle patient 3 times a day  - Stand patient 3 times a day  - Out of bed to chair 3 times a day   - Out of bed for meals 3 times a day  - Out of bed for toileting  - Record patient progress and toleration of activity level   Outcome: Progressing     Problem: DISCHARGE PLANNING  Goal: Discharge to home or other facility with appropriate resources  Description: INTERVENTIONS:  - Identify barriers to discharge w/patient and caregiver  - Arrange for needed discharge resources and transportation as appropriate  - Identify discharge learning needs (meds, wound care, etc.)  - Arrange for interpretive services to assist at discharge as needed  - Refer to Case Management Department for coordinating discharge planning if the patient needs post-hospital services based on physician/advanced practitioner order or complex needs related to functional status, cognitive ability, or social support system  Outcome: Progressing     Problem: GENITOURINARY - ADULT  Goal: Absence of urinary retention  Description: INTERVENTIONS:  - Assess patient’s ability to void and empty bladder  - Monitor I/O  - Bladder scan as needed  - Discuss with physician/AP medications to alleviate retention as needed  - Discuss catheterization for long term situations as appropriate  Outcome: Progressing     Problem: SKIN/TISSUE INTEGRITY - ADULT  Goal: Skin Integrity remains intact(Skin Breakdown Prevention)  Description: Assess:  -Perform Rosales assessment every shift  -Clean and moisturize skin every shift  -Inspect skin when repositioning, toileting, and assisting with ADLS  -Assess extremities for adequate circulation and sensation     Bed Management:  -Have minimal linens on bed & keep smooth, unwrinkled  -Change linens as needed when moist or perspiring  -Avoid sitting or lying in one position for more than 2 hours while in bed  -Keep HOB at  30degrees     Toileting:  -Offer bedside commode  -Assess for incontinence every shift  -Use incontinent care products after each incontinent episode     Activity:  -Mobilize patient 3 times a day  -Encourage activity and walks on unit  -Encourage or provide ROM exercises   -Turn and reposition patient every 2 Hours  -Use appropriate equipment to lift or move patient in bed  -Instruct/ Assist with weight shifting every 2 hrs when out of bed in chair  -Consider limitation of chair time 2 hour intervals    Skin Care:  -Avoid use of baby powder, tape, friction and shearing, hot water or constrictive clothing  -Relieve pressure over bony prominences using allevyn foam  -Do not massage red bony areas    Outcome: Progressing     Problem: Nutrition/Hydration-ADULT  Goal: Nutrient/Hydration intake appropriate for improving, restoring or maintaining nutritional needs  Description: Monitor and assess patient's nutrition/hydration status for malnutrition. Collaborate with interdisciplinary team and initiate plan and interventions as ordered.  Monitor patient's weight and dietary intake as ordered or per policy. Utilize nutrition screening tool and intervene as necessary. Determine patient's food preferences and provide high-protein, high-caloric foods as appropriate.     INTERVENTIONS:  - Monitor oral intake, urinary output, labs, and treatment plans  - Assess nutrition and hydration status and recommend course of action  - Evaluate amount of meals eaten  - Assist patient with eating if necessary   - Allow adequate time for meals  - Recommend/ encourage appropriate diets, oral nutritional supplements, and vitamin/mineral supplements  - Order, calculate, and assess calorie counts as needed  - Recommend, monitor, and adjust tube feedings and TPN/PPN based on assessed needs  - Assess need for intravenous fluids  - Provide specific nutrition/hydration education as appropriate  - Include patient/family/caregiver in decisions  related to nutrition  Outcome: Progressing

## 2024-05-04 NOTE — ASSESSMENT & PLAN NOTE
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      Urine cultures with Proteus multiple sensitivities.  Was on cefazolin and ID has transitioned over to cefuroxime.  Disposition: Attempted discharge to rehab yesterday.  Medically stable and off IV antibiotics.  Daughter appealing discharge with Medicare.  Awaiting decision.      Results from last 7 days   Lab Units 04/29/24  0214 04/29/24  0115   BLOOD CULTURE  No Growth After 5 Days. No Growth After 5 Days.

## 2024-05-05 PROCEDURE — 97110 THERAPEUTIC EXERCISES: CPT

## 2024-05-05 PROCEDURE — 97530 THERAPEUTIC ACTIVITIES: CPT

## 2024-05-05 PROCEDURE — 99232 SBSQ HOSP IP/OBS MODERATE 35: CPT | Performed by: INTERNAL MEDICINE

## 2024-05-05 RX ADMIN — MULTIPLE VITAMINS W/ MINERALS TAB 1 TABLET: TAB ORAL at 08:31

## 2024-05-05 RX ADMIN — Medication 1 TABLET: at 08:30

## 2024-05-05 RX ADMIN — CEFUROXIME AXETIL 500 MG: 250 TABLET, FILM COATED ORAL at 08:31

## 2024-05-05 RX ADMIN — Medication 1000 UNITS: at 08:31

## 2024-05-05 RX ADMIN — PANTOPRAZOLE SODIUM 20 MG: 20 TABLET, DELAYED RELEASE ORAL at 05:42

## 2024-05-05 RX ADMIN — ENOXAPARIN SODIUM 40 MG: 40 INJECTION SUBCUTANEOUS at 08:31

## 2024-05-05 RX ADMIN — ASPIRIN 81 MG: 81 TABLET, COATED ORAL at 22:08

## 2024-05-05 RX ADMIN — CEFUROXIME AXETIL 500 MG: 250 TABLET, FILM COATED ORAL at 22:08

## 2024-05-05 RX ADMIN — CYANOCOBALAMIN TAB 500 MCG 1000 MCG: 500 TAB at 08:30

## 2024-05-05 RX ADMIN — PRAMIPEXOLE DIHYDROCHLORIDE 0.25 MG: 0.25 TABLET ORAL at 22:08

## 2024-05-05 NOTE — PLAN OF CARE
Problem: PHYSICAL THERAPY ADULT  Goal: Performs mobility at highest level of function for planned discharge setting.  See evaluation for individualized goals.  Description: Treatment/Interventions: Functional transfer training, LE strengthening/ROM, Therapeutic exercise, Endurance training, Cognitive reorientation, Equipment eval/education, Bed mobility, Gait training, Patient/family training, Compensatory technique education, Continued evaluation, Spoke to nursing          See flowsheet documentation for full assessment, interventions and recommendations.  Outcome: Progressing  Note: Prognosis: Guarded  Problem List: Impaired balance, Impaired judgement, Decreased cognition, Decreased coordination, Decreased mobility, Decreased skin integrity, Impaired vision, Decreased safety awareness  Assessment: Pt. was pleasant and cooperartive t/o session. Performed STS transfers 5x with min A x 2 and with cues. pt. performed better with hands on the RW for transfers. pt. needed Min-Mod A x 2 to maintain static standing with RW and total A for pericare. Cues for feet placement and not to push posteriorly with her trunk and take steps forward when standing. Cues for postural correction when standing however pt. unable to follow. Needed B/L feet to be blocked from patient taking steps or from sliding. Attempted SPT to transfer from chair to bed however pt. was not able to follow cues and was very fearful hence used clark to transfer her to bed. Pt. able to perform long sitting in bed and for rolling to both sides in supine with Min A and cues. Pt. also performed seated LE Jeny. Pt. in bed with bed alarm engaged and all needs within reach at the end of the session.  Barriers to Discharge: None     Rehab Resource Intensity Level, PT: II (Moderate Resource Intensity)    See flowsheet documentation for full assessment.

## 2024-05-05 NOTE — PLAN OF CARE
Problem: OCCUPATIONAL THERAPY ADULT  Goal: Performs self-care activities at highest level of function for planned discharge setting.  See evaluation for individualized goals.  Description: Treatment Interventions: ADL retraining, Functional transfer training, UE strengthening/ROM, Endurance training, Cognitive reorientation, Patient/family training, Compensatory technique education, Continued evaluation          See flowsheet documentation for full assessment, interventions and recommendations.   Outcome: Progressing  Note: Limitation: Decreased ADL status, Decreased UE strength, Decreased Safe judgement during ADL, Decreased cognition, Decreased endurance, Decreased high-level ADLs  Prognosis: Fair  Assessment: Pt seen for additional skilled OT f/u tx session this date. Refer to flowsheet for functional performance. pt incontinent of bowel during standing trials thus required totalA for hygiene due to impaired vision, decreased balance, Ax2 to stand and maintain standing. minAx2 for STS, mod-maxAx2 for standing balance as pt retropulsive, fear of falling, generalized weakness. Pt would continue to benefit from skilled OT intervention to address listed deficits, will continue to see pt per POC.     Rehab Resource Intensity Level, OT: II (Moderate Resource Intensity)

## 2024-05-05 NOTE — ASSESSMENT & PLAN NOTE
Acute metabolic encephalopathy with underlying cognitive decline  Present on admission secondary to sepsis/pyelonephritis.    Mental status has returned to her to baseline

## 2024-05-05 NOTE — PHYSICAL THERAPY NOTE
"Physical Therapy Treatment Note     05/05/24 1539   PT Last Visit   PT Visit Date 05/05/24   Note Type   Note Type Treatment   Pain Assessment   Pain Assessment Tool 0-10   Pain Score No Pain   Restrictions/Precautions   Weight Bearing Precautions Per Order No   Other Precautions Cognitive;Chair Alarm;Bed Alarm;Fall Risk;Visual impairment   General   Chart Reviewed Yes   Family/Caregiver Present No   Subjective   Subjective Pt. agreeable to PT. \"DOnt let me fall\".   Bed Mobility   Rolling R 4  Minimal assistance   Additional items Assist x 1;Bedrails;Verbal cues   Rolling L 4  Minimal assistance   Additional items Assist x 1;Bedrails;Increased time required   Additional Comments Long sitting in bed with Min A x 1   Transfers   Sit to Stand 4  Minimal assistance   Additional items Assist x 2;Increased time required;Other   Stand to Sit 4  Minimal assistance   Additional items Assist x 2;Increased time required;Verbal cues   Stand pivot   (Attempted however unable to as pt. was pushing posteriorly and taking steps forward and yelling I am falling\)   Activity Tolerance   Activity Tolerance Patient tolerated treatment well   Nurse Made Aware yes   Exercises   TKR Sitting;Bilateral;AROM;10 reps   Assessment   Prognosis Guarded   Problem List Impaired balance;Impaired judgement;Decreased cognition;Decreased coordination;Decreased mobility;Decreased skin integrity;Impaired vision;Decreased safety awareness   Assessment Pt. was pleasant and cooperartive t/o session. Performed STS transfers 5x with min A x 2 and with cues. pt. performed better with hands on the RW for transfers. pt. needed Min-Mod A x 2 to maintain static standing with RW and total A for pericare. Cues for feet placement and not to push posteriorly with her trunk and take steps forward when standing. Cues for postural correction when standing however pt. unable to follow. Needed B/L feet to be blocked from patient taking steps or from sliding. Attempted SPT " to transfer from chair to bed however pt. was not able to follow cues and was very fearful hence used clark to transfer her to bed. Pt. able to perform long sitting in bed and for rolling to both sides in supine with Min A and cues. Pt. also performed seated LE Jeny. Pt. in bed with bed alarm engaged and all needs within reach at the end of the session.   Barriers to Discharge None   Goals   Patient Goals None reported   STG Expiration Date 05/13/24   PT Treatment Day 2   Plan   Treatment/Interventions Functional transfer training;LE strengthening/ROM;Therapeutic exercise;Spoke to nursing;Bed mobility;Patient/family training;OT;Equipment eval/education   Progress Slow progress, decreased activity tolerance   PT Frequency 3-5x/wk   Discharge Recommendation   Rehab Resource Intensity Level, PT II (Moderate Resource Intensity)   AM-PAC Basic Mobility Inpatient   Turning in Flat Bed Without Bedrails 3   Lying on Back to Sitting on Edge of Flat Bed Without Bedrails 3   Moving Bed to Chair 2   Standing Up From Chair Using Arms 3   Walk in Room 2   Climb 3-5 Stairs With Railing 1   Basic Mobility Inpatient Raw Score 14   Basic Mobility Standardized Score 35.55   Turning Head Towards Sound 4   Follow Simple Instructions 3   Low Function Basic Mobility Raw Score  21   Low Function Basic Mobility Standardized Score  34.39   [facility] Highest Level Of Mobility   -[facility] Goal 4: Move to chair/commode   -HLM Achieved 5: Stand (1 or more minutes)           Ramo Crane PTA    An AM-PAC basic mobility standardized score less than 42.9 suggest the patient may benefit from discharge to post-acute rehab services.

## 2024-05-05 NOTE — PROGRESS NOTES
[facility]  Progress Note  Name: Latanya Ray I  MRN: [id number]  Unit/Bed#: E2 -01 I Date of Admission: 4/29/2024   Date of Service: 5/5/2024 I Hospital Day: 6    Assessment/Plan   * Sepsis secondary to UTI  (HCC)  Assessment & Plan  History of cancer malignancy macular degeneration with cognitive decline presents to the hospital for weakness found to have severe sepsis/shock secondary to pyelonephritis  Briefly required norepinephrine in ED.      Urine cultures with Proteus multiple sensitivities.  Was on cefazolin and ID has transitioned over to cefuroxime.  Disposition: Attempted discharge to rehab on Friday.  Medically stable and off IV antibiotics.  Daughter appealing discharge with Medicare.  Awaiting decision.      Results from last 7 days   Lab Units 04/29/24  0214 04/29/24  0115   BLOOD CULTURE  No Growth After 5 Days. No Growth After 5 Days.         Acute metabolic encephalopathy  Assessment & Plan  Acute metabolic encephalopathy with underlying cognitive decline  Present on admission secondary to sepsis/pyelonephritis.    Mental status has returned to her to baseline    Legal blindness  Assessment & Plan  History of macular degeneration legally blind    Neoplasm of skin of face  Assessment & Plan  History of basal cell carcinoma; family had declined intervention due to advanced age    Breast carcinoma (HCC)  Assessment & Plan  History of breast cancer status post mastectomy.    VTE Pharmacologic Prophylaxis: VTE Score: 7 High Risk (Score >/= 5) - Pharmacological DVT Prophylaxis Ordered: enoxaparin (Lovenox). Sequential Compression Devices Ordered.    Mobility:  Basic Mobility Inpatient Raw Score: 7  JH-HLM Goal: 2: Bed activities/Dependent transfer  JH-HLM Achieved: 4: Move to chair/commode  JH-HLM Goal achieved. Continue to encourage appropriate mobility.    Patient Centered Rounds: I have performed bedside rounds with nursing staff today.  Discussions with  "Specialists or Other Care Team Provider: Case management    Education and Discussions with Family / Patient: Attempted to update  (daughter) via phone. Left voicemail.     Time Spent for Care:   This time was spent on one or more of the following: performing physical exam; counseling and coordination of care; obtaining or reviewing history; documenting in the medical record; reviewing/ordering tests, medications or procedures; communicating with other healthcare professionals and discussing with patient's family/caregivers.    Current Length of Stay: 6 day(s)  Current Patient Status: Inpatient   Certification Statement: The patient will continue to require additional inpatient hospital stay due to awaiting Medicaid decision for family appeal with  Discharge Plan:  Has been medically stable since Friday.  Accepted over at [facility] for SNF.    Code Status: Level 3 - DNAR and DNI      Subjective:   Patient seen and examined sitting in a chair.  No complaints.    Objective:   Vitals: Blood pressure 127/74, pulse 86, temperature 98.5 °F (36.9 °C), temperature source Temporal, resp. rate 16, height 4' 11\" (1.499 m), weight 58.5 kg (128 lb 15.5 oz), SpO2 96%.    Intake/Output Summary (Last 24 hours) at 5/5/2024 1620  Last data filed at 5/5/2024 1515  Gross per 24 hour   Intake 770 ml   Output 1600 ml   Net -830 ml       Physical Exam  Vitals reviewed.   Constitutional:       General: She is not in acute distress.     Appearance: Normal appearance.   HENT:      Head: Atraumatic.   Eyes:      Conjunctiva/sclera: Conjunctivae normal.   Cardiovascular:      Rate and Rhythm: Regular rhythm.      Heart sounds: Normal heart sounds.   Pulmonary:      Breath sounds: Decreased breath sounds present. No wheezing.   Abdominal:      General: Bowel sounds are normal.      Palpations: Abdomen is soft.      Tenderness: There is no abdominal tenderness. There is no guarding.   Musculoskeletal:         General: No swelling. "   Skin:     Comments: Large lesion over forehead   Neurological:      Mental Status: She is alert.      Cranial Nerves: No dysarthria.   Psychiatric:         Mood and Affect: Mood normal.       Additional Data:   Labs:  Results from last 7 days   Lab Units 05/03/24 0450 05/02/24 0528 05/01/24 0617 04/30/24 0455 04/29/24  0141   WBC Thousand/uL 7.75 6.65 7.84   < > 11.26*   HEMOGLOBIN g/dL 12.1 10.9* 10.5*   < > 11.8   PLATELETS Thousands/uL 314 261 223   < > 275   MCV fL 89 90 91   < > 89   INR   --   --   --   --  1.03    < > = values in this interval not displayed.     Results from last 7 days   Lab Units 05/03/24 0450 05/02/24 0528 05/01/24 0617 04/30/24 0455 04/29/24  0141   SODIUM mmol/L 138 138 140   < > 133*   POTASSIUM mmol/L 3.6 3.6 3.7   < > 3.5   CHLORIDE mmol/L 104 106 107   < > 99   CO2 mmol/L 27 24 26   < > 26   ANION GAP mmol/L 7 8 7   < > 8   BUN mg/dL 13 12 12   < > 19   CREATININE mg/dL 0.55* 0.45* 0.53*   < > 0.61   CALCIUM mg/dL 9.1 8.2* 8.2*   < > 9.2   ALBUMIN g/dL  --  3.1* 3.0*  --  3.7   TOTAL BILIRUBIN mg/dL  --  0.36 0.27  --  0.60   ALK PHOS U/L  --  89 74  --  93   ALT U/L  --  18 20  --  9   AST U/L  --  22 20  --  14   EGFR ml/min/1.73sq m 78 84 79   < > 76   GLUCOSE RANDOM mg/dL 100 93 93   < > 161*    < > = values in this interval not displayed.     Results from last 7 days   Lab Units 05/01/24 0617   MAGNESIUM mg/dL 1.9   PHOSPHORUS mg/dL 2.5         Results from last 7 days   Lab Units 04/29/24 0342 04/29/24  0141   HS TNI 0HR ng/L  --  4   HS TNI 2HR ng/L 4  --           Results from last 7 days   Lab Units 05/01/24  0617 04/30/24  0455 04/29/24  0141   LACTIC ACID mmol/L  --   --  0.9   PROCALCITONIN ng/ml 0.21   < > 0.14    < > = values in this interval not displayed.                 * I Have Reviewed All Lab Data Listed Above.    Recent cultures:   Results from last 7 days   Lab Units 05/03/24  1533 04/29/24  0325 04/29/24  0324 04/29/24  0214 04/29/24  0115   BLOOD  CULTURE   --   --   --  No Growth After 5 Days. No Growth After 5 Days.   URINE CULTURE   --  >100,000 cfu/ml Proteus mirabilis*  10,000-19,000 cfu/ml >100,000 cfu/ml Proteus mirabilis*  20,000-29,000 cfu/ml  --   --    INFLUENZA A PCR  Negative  --   --   --   --        Results from last 7 days   Lab Units 05/03/24  1533   SARS-COV-2  Negative   INFLUENZA A PCR  Negative   INFLUENZA B PCR  Negative   RSV PCR  Negative           Lines/Drains:  Invasive Devices       Peripheral Intravenous Line  Duration             Peripheral IV 05/03/24 Right;Ventral (anterior) Forearm 2 days              Drain  Duration             External Urinary Catheter 2 days                          Telemetry:      Imaging:  Imaging Reports Reviewed Today Include:   XR chest 1 view portable    Result Date: 4/29/2024  Impression: 1. No consolidation or alveolar edema. 2. Bibasilar atelectasis and chronic mild interstitial prominence, similar to the CT of April 2023. 3. Hiatal hernia. Workstation performed: [id number]     CT chest abdomen pelvis w contrast    Result Date: 4/29/2024  Impression: Left pyelonephritis. Cystitis. 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, pancreatic tail and numerous loops of small bowel Workstation performed: [id number]       Scheduled Meds:  Current Facility-Administered Medications   Medication Dose Route Frequency Provider Last Rate    acetaminophen  650 mg Oral Q6H PRN MARINA Wooten      aluminum-magnesium hydroxide-simethicone  30 mL Oral Q6H PRN MARINA Wooten      aspirin  81 mg Oral Daily MARINA Wooten      bisacodyl  10 mg Rectal Daily PRN MARINA Wooten      calcium carbonate-vitamin D  1 tablet Oral Daily With Breakfast MARINA Wooten      cefuroxime  500 mg Oral Q12H HERMAN Chan MD      Cholecalciferol  1,000 Units Oral Daily MARINA Wooten      vitamin B-12  1,000 mcg Oral Every Other Day MARINA Wooten       enoxaparin  40 mg Subcutaneous Daily MARINA Wooten      meclizine  25 mg Oral Q8H PRN MARINA Wooten      multivitamin-minerals  1 tablet Oral Daily MARINA Wooten      ondansetron  4 mg Intravenous Q6H PRN MARINA Wooten      pantoprazole  20 mg Oral Early Morning MARINA Wooten      pramipexole  0.25 mg Oral HS MARINA Wooten      simethicone  80 mg Oral 4x Daily PRN MARINA Wooten         Today, Patient Was Seen By: Norberto Ruiz DO    ** Please Note: Dictation voice to text software may have been used in the creation of this document. **

## 2024-05-05 NOTE — OCCUPATIONAL THERAPY NOTE
"  Occupational Therapy Progress Note     Patient Name: Latanya Ray  Today's Date: 5/5/2024  Problem List  Principal Problem:    Sepsis secondary to UTI  (HCC)  Active Problems:    Breast carcinoma (HCC)    Neoplasm of skin of face    Cognitive impairment    Legal blindness    Acute metabolic encephalopathy    Diaphragmatic hernia without obstruction or gangrene    Pyelonephritis       05/05/24 1505   OT Last Visit   OT Visit Date 05/05/24   Note Type   Note Type Treatment   Pain Assessment   Pain Assessment Tool 0-10   Pain Score No Pain   Restrictions/Precautions   Weight Bearing Precautions Per Order No   Other Precautions Cognitive;Chair Alarm;Bed Alarm;Fall Risk;Visual impairment   ADL   LB Dressing Assistance 1  Total Assistance   LB Dressing Deficit Don/doff R sock;Don/doff L sock   Toileting Assistance  1  Total Assistance   Toileting Deficit Perineal hygiene   Transfers   Sit to Stand 4  Minimal assistance   Additional items Assist x 2;Armrests;Increased time required;Verbal cues;Impulsive;Other  (RW - occasional (A) from PCA to hold walker down)   Stand to Sit 4  Minimal assistance   Additional items Assist x 2;Armrests;Increased time required;Verbal cues;Impulsive;Other  (RW)   Additional Comments 5 STS trials total. MinAx2 for STS however mod-maxAx2 for standing balance as pt retropulsive at times, would also attempt to take steps forward w/ posterior lean. Was going to trial SPT transfer however unsafe at this time therefore elected to clark pt back to bed Ax3.   Subjective   Subjective \"Oh, thank you for your help!\"   Cognition   Overall Cognitive Status Impaired   Arousal/Participation Alert;Responsive;Cooperative   Attention Attends with cues to redirect   Orientation Level Oriented to person;Disoriented to place;Disoriented to time;Disoriented to situation   Memory Decreased short term memory;Decreased recall of recent events;Decreased recall of biographical information;Decreased recall of " precautions   Following Commands Follows one step commands with increased time or repetition   Comments Significant fear of falling requiring reassurance. Tactile cues throughout 2/2 visually impaired   Activity Tolerance   Activity Tolerance Patient limited by fatigue;Treatment limited secondary to medical complications (Comment);Other (Comment)  (impaired vision, fear of falling)   Medical Staff Made Aware PTA, RN   Assessment   Assessment Pt seen for additional skilled OT f/u tx session this date. Refer to flowsheet for functional performance. pt incontinent of bowel during standing trials thus required totalA for hygiene due to impaired vision, decreased balance, Ax2 to stand and maintain standing. minAx2 for STS, mod-maxAx2 for standing balance as pt retropulsive, fear of falling, generalized weakness. Pt would continue to benefit from skilled OT intervention to address listed deficits, will continue to see pt per POC.   Plan   Treatment Interventions ADL retraining;Functional transfer training;UE strengthening/ROM;Endurance training;Cognitive reorientation;Patient/family training;Compensatory technique education;Continued evaluation   Goal Expiration Date 05/13/24   OT Treatment Day 2   OT Frequency 3-5x/wk;2-3x/wk   Discharge Recommendation   Rehab Resource Intensity Level, OT II (Moderate Resource Intensity)   AM-PAC Daily Activity Inpatient   Lower Body Dressing 1   Bathing 2   Toileting 1   Upper Body Dressing 2   Grooming 2   Eating 3   Daily Activity Raw Score 11   Daily Activity Standardized Score (Calc for Raw Score >=11) 29.04   AM-PAC Applied Cognition Inpatient   Following a Speech/Presentation 3   Understanding Ordinary Conversation 3   Taking Medications 2   Remembering Where Things Are Placed or Put Away 2   Remembering List of 4-5 Errands 1   Taking Care of Complicated Tasks 1   Applied Cognition Raw Score 12   Applied Cognition Standardized Score 28.82     Zelda Nicholson

## 2024-05-05 NOTE — ASSESSMENT & PLAN NOTE
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      Urine cultures with Proteus multiple sensitivities.  Was on cefazolin and ID has transitioned over to cefuroxime.  Disposition: Attempted discharge to rehab on Friday.  Medically stable and off IV antibiotics.  Daughter appealing discharge with Medicare.  Awaiting decision.      Results from last 7 days   Lab Units 04/29/24  0214 04/29/24  0115   BLOOD CULTURE  No Growth After 5 Days. No Growth After 5 Days.

## 2024-05-05 NOTE — PLAN OF CARE
Problem: PAIN - ADULT  Goal: Verbalizes/displays adequate comfort level or baseline comfort level  Description: Interventions:  - Encourage patient to monitor pain and request assistance  - Assess pain using appropriate pain scale  - Administer analgesics based on type and severity of pain and evaluate response  - Implement non-pharmacological measures as appropriate and evaluate response  - Consider cultural and social influences on pain and pain management  - Notify physician/advanced practitioner if interventions unsuccessful or patient reports new pain  Outcome: Progressing     Problem: INFECTION - ADULT  Goal: Absence or prevention of progression during hospitalization  Description: INTERVENTIONS:  - Assess and monitor for signs and symptoms of infection  - Monitor lab/diagnostic results  - Monitor all insertion sites, i.e. indwelling lines, tubes, and drains  - Monitor endotracheal if appropriate and nasal secretions for changes in amount and color  - [place] appropriate cooling/warming therapies per order  - Administer medications as ordered  - Instruct and encourage patient and family to use good hand hygiene technique  - Identify and instruct in appropriate isolation precautions for identified infection/condition  Outcome: Progressing     Problem: GENITOURINARY - ADULT  Goal: Absence of urinary retention  Description: INTERVENTIONS:  - Assess patient’s ability to void and empty bladder  - Monitor I/O  - Bladder scan as needed  - Discuss with physician/AP medications to alleviate retention as needed  - Discuss catheterization for long term situations as appropriate  Outcome: Progressing     Problem: Nutrition/Hydration-ADULT  Goal: Nutrient/Hydration intake appropriate for improving, restoring or maintaining nutritional needs  Description: Monitor and assess patient's nutrition/hydration status for malnutrition. Collaborate with interdisciplinary team and initiate plan and interventions as ordered.   Monitor patient's weight and dietary intake as ordered or per policy. Utilize nutrition screening tool and intervene as necessary. Determine patient's food preferences and provide high-protein, high-caloric foods as appropriate.     INTERVENTIONS:  - Monitor oral intake, urinary output, labs, and treatment plans  - Assess nutrition and hydration status and recommend course of action  - Evaluate amount of meals eaten  - Assist patient with eating if necessary   - Allow adequate time for meals  - Recommend/ encourage appropriate diets, oral nutritional supplements, and vitamin/mineral supplements  - Order, calculate, and assess calorie counts as needed  - Recommend, monitor, and adjust tube feedings and TPN/PPN based on assessed needs  - Assess need for intravenous fluids  - Provide specific nutrition/hydration education as appropriate  - Include patient/family/caregiver in decisions related to nutrition  Outcome: Progressing

## 2024-05-06 VITALS
BODY MASS INDEX: 26 KG/M2 | WEIGHT: 128.97 LBS | RESPIRATION RATE: 16 BRPM | SYSTOLIC BLOOD PRESSURE: 129 MMHG | TEMPERATURE: 97.4 F | OXYGEN SATURATION: 97 % | DIASTOLIC BLOOD PRESSURE: 59 MMHG | HEIGHT: 59 IN | HEART RATE: 66 BPM

## 2024-05-06 LAB
ANION GAP SERPL CALCULATED.3IONS-SCNC: 6 MMOL/L (ref 4–13)
BUN SERPL-MCNC: 14 MG/DL (ref 5–25)
CALCIUM SERPL-MCNC: 9 MG/DL (ref 8.4–10.2)
CHLORIDE SERPL-SCNC: 104 MMOL/L (ref 96–108)
CO2 SERPL-SCNC: 27 MMOL/L (ref 21–32)
CREAT SERPL-MCNC: 0.49 MG/DL (ref 0.6–1.3)
ERYTHROCYTE [DISTWIDTH] IN BLOOD BY AUTOMATED COUNT: 15.5 % (ref 11.6–15.1)
GFR SERPL CREATININE-BSD FRML MDRD: 81 ML/MIN/1.73SQ M
GLUCOSE SERPL-MCNC: 88 MG/DL (ref 65–140)
HCT VFR BLD AUTO: 34.7 % (ref 34.8–46.1)
HGB BLD-MCNC: 11.3 G/DL (ref 11.5–15.4)
MCH RBC QN AUTO: 30 PG (ref 26.8–34.3)
MCHC RBC AUTO-ENTMCNC: 32.6 G/DL (ref 31.4–37.4)
MCV RBC AUTO: 92 FL (ref 82–98)
PLATELET # BLD AUTO: 347 THOUSANDS/UL (ref 149–390)
PMV BLD AUTO: 9.6 FL (ref 8.9–12.7)
POTASSIUM SERPL-SCNC: 4.1 MMOL/L (ref 3.5–5.3)
RBC # BLD AUTO: 3.77 MILLION/UL (ref 3.81–5.12)
SARS-COV-2 RNA RESP QL NAA+PROBE: NEGATIVE
SODIUM SERPL-SCNC: 137 MMOL/L (ref 135–147)
WBC # BLD AUTO: 8.45 THOUSAND/UL (ref 4.31–10.16)

## 2024-05-06 PROCEDURE — 87635 SARS-COV-2 COVID-19 AMP PRB: CPT | Performed by: FAMILY MEDICINE

## 2024-05-06 PROCEDURE — 85027 COMPLETE CBC AUTOMATED: CPT | Performed by: INTERNAL MEDICINE

## 2024-05-06 PROCEDURE — 99233 SBSQ HOSP IP/OBS HIGH 50: CPT | Performed by: INTERNAL MEDICINE

## 2024-05-06 PROCEDURE — 99239 HOSP IP/OBS DSCHRG MGMT >30: CPT | Performed by: FAMILY MEDICINE

## 2024-05-06 PROCEDURE — 80048 BASIC METABOLIC PNL TOTAL CA: CPT | Performed by: INTERNAL MEDICINE

## 2024-05-06 RX ORDER — BISACODYL 10 MG
10 SUPPOSITORY, RECTAL RECTAL DAILY PRN
Start: 2024-05-06

## 2024-05-06 RX ORDER — CEFUROXIME AXETIL 500 MG/1
500 TABLET ORAL EVERY 12 HOURS SCHEDULED
Start: 2024-05-06 | End: 2024-05-08

## 2024-05-06 RX ORDER — MAGNESIUM HYDROXIDE/ALUMINUM HYDROXICE/SIMETHICONE 120; 1200; 1200 MG/30ML; MG/30ML; MG/30ML
30 SUSPENSION ORAL EVERY 6 HOURS PRN
Start: 2024-05-06

## 2024-05-06 RX ADMIN — PANTOPRAZOLE SODIUM 20 MG: 20 TABLET, DELAYED RELEASE ORAL at 05:55

## 2024-05-06 RX ADMIN — MULTIPLE VITAMINS W/ MINERALS TAB 1 TABLET: TAB ORAL at 09:01

## 2024-05-06 RX ADMIN — ENOXAPARIN SODIUM 40 MG: 40 INJECTION SUBCUTANEOUS at 09:01

## 2024-05-06 RX ADMIN — CEFUROXIME AXETIL 500 MG: 250 TABLET, FILM COATED ORAL at 09:01

## 2024-05-06 RX ADMIN — Medication 1 TABLET: at 09:04

## 2024-05-06 RX ADMIN — Medication 1000 UNITS: at 09:01

## 2024-05-06 NOTE — PLAN OF CARE
Problem: Potential for Falls  Goal: Patient will remain free of falls  Description: INTERVENTIONS:  - Educate patient/family on patient safety including physical limitations  - Instruct patient to call for assistance with activity   - Consult OT/PT to assist with strengthening/mobility   - Keep Call bell within reach  - Keep bed low and locked with side rails adjusted as appropriate  - Keep care items and personal belongings within reach  - Initiate and maintain comfort rounds  - Make Fall Risk Sign visible to staff  - Offer Toileting every 2 Hours, in advance of need  - Initiate/Maintain bed alarm  - Obtain necessary fall risk management equipment:    - Apply yellow socks and bracelet for high fall risk patients  - Consider moving patient to room near nurses station  5/6/2024 1643 by Padmini Ray RN  Outcome: Adequate for Discharge  5/6/2024 1642 by Padmini aRy RN  Outcome: Adequate for Discharge  5/6/2024 0947 by Padmini Ray RN  Outcome: Progressing

## 2024-05-06 NOTE — CASE MANAGEMENT
Case Management Discharge Planning Note    Patient name Latanya Ray  [facility] /E2 -* MRN [id number]  : 1926 Date 2024       Current Admission Date: 2024  Current Admission Diagnosis:Sepsis secondary to UTI  (HCC)   Patient Active Problem List    Diagnosis Date Noted    Sepsis secondary to UTI  (HCC) 2024    Pyelonephritis 2024    Diaphragmatic hernia without obstruction or gangrene 2024    Dysphagia, oropharyngeal phase 2024    Sternal fracture 2024    Acute metabolic encephalopathy 2023    Legal blindness 2022    Cognitive impairment 2021    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    Vitamin D deficiency 2012    Cerebral aneurysm 2012    Anxiety 2012    Breast carcinoma (HCC) 2012    Hyperlipidemia 2012      LOS (days): 7  Geometric Mean LOS (GMLOS) (days): 5.1  Days to GMLOS:-2.1     OBJECTIVE:  Risk of Unplanned Readmission Score: 13.89         Current admission status: Inpatient   Preferred Pharmacy:   [id number] [place] [address]  Phone: [phone] Fax: [phone]    [facility] - [address] [address]  Phone: [phone] Fax: [phone]    Primary Care Provider: Amina Rowe DO    Primary Insurance: MEDICARE  Secondary Insurance: [facility]    DISCHARGE DETAILS:              Transport at Discharge : [facility]  Dispatcher Contacted: Yes  Number/Name of Dispatcher: DAVID     ETA of Transport (Date): 24  ETA of Transport (Time): 1430     Transfer Mode: Stretcher         Additional Comments: Message received from [employer], determination has been received, appeal denied.  Patient medically cleared for discharge today to [facility] for inpatient rehab.  Transportation arranged and confirmed via [facility] at 1430.   Patient's daughter Celia, provider, nursing and facility all aware of discharge arrangements.    Accepting Facility Name, City & State : [facility], [place]  Receiving Facility/Agency Phone Number: [phone]  Facility/Agency Fax Number: [phone]

## 2024-05-06 NOTE — PLAN OF CARE
Problem: Potential for Falls  Goal: Patient will remain free of falls  Description: INTERVENTIONS:  - Educate patient/family on patient safety including physical limitations  - Instruct patient to call for assistance with activity   - Consult OT/PT to assist with strengthening/mobility   - Keep Call bell within reach  - Keep bed low and locked with side rails adjusted as appropriate  - Keep care items and personal belongings within reach  - Initiate and maintain comfort rounds  - Make Fall Risk Sign visible to staff  - Offer Toileting every 2 Hours, in advance of need  - Initiate/Maintain bed alarm  - Obtain necessary fall risk management equipment:    - Apply yellow socks and bracelet for high fall risk patients  - Consider moving patient to room near nurses station  Outcome: Progressing     Problem: Prexisting or High Potential for Compromised Skin Integrity  Goal: Skin integrity is maintained or improved  Description: INTERVENTIONS:  - Identify patients at risk for skin breakdown  - Assess and monitor skin integrity  - Assess and monitor nutrition and hydration status  - Monitor labs   - Assess for incontinence   - Turn and reposition patient  - Assist with mobility/ambulation  - Relieve pressure over bony prominences  - Avoid friction and shearing  - Provide appropriate hygiene as needed including keeping skin clean and dry  - Evaluate need for skin moisturizer/barrier cream  - Collaborate with interdisciplinary team   - Patient/family teaching  - Consider wound care consult   Outcome: Progressing     Problem: DISCHARGE PLANNING  Goal: Discharge to home or other facility with appropriate resources  Description: INTERVENTIONS:  - Identify barriers to discharge w/patient and caregiver  - Arrange for needed discharge resources and transportation as appropriate  - Identify discharge learning needs (meds, wound care, etc.)  - Arrange for interpretive services to assist at discharge as needed  - Refer to Case  Management Department for coordinating discharge planning if the patient needs post-hospital services based on physician/advanced practitioner order or complex needs related to functional status, cognitive ability, or social support system  Outcome: Progressing

## 2024-05-06 NOTE — CASE MANAGEMENT
[facility] received determination for [place] Appeal.   Med Director agrees with termination of services.   Liability starts:  05/05/2024    Care Manager notified: Jeff HUANG    Please reach out to CM for updates on any clinical information.

## 2024-05-06 NOTE — PROGRESS NOTES
Progress Note - Infectious Disease   Latanya Ray 89 y.o. female MRN: [id number]  Unit/Bed#: E2 -01 Encounter: [id number]      Impression/Plan:  1.  Sepsis.  Appears to be secondary to a urinary source of infection.  No other clear source appreciated.  Patient is stabilized, clinically improved, with a decreasing temperature curve.  White cell count is decreased.  She seems to be tolerating antibiotics without difficulty.  Clinically improving.  No recurrence of the fever  -Continue antibiotics as below  -Follow-up blood cultures  -Recheck CBC with differential and CMP to make sure no developing toxicities  -Supportive care     2.  Acute pyelonephritis.  Based upon clinical, radiographic, and microbiologic findings.  Urine cultures growing Proteus repeatedly.  No resistant organisms isolated.  CT scan with evidence of left-sided pyelonephritis but no abscess or hydronephrosis seen.  No definitive urinary symptomatology and the patient is not retaining urine.  -Continue cefuroxime 500 mg p.o. every 12 hours to be continued through 2024 which would be 10 days total treatment  -Monitor temperature and symptomatology  -Monitor urine output     3.  Breast carcinoma.  Status post history of left-sided mastectomy.  Not on any current therapy.    Discussed with the primary service the plan to continue the Ceftin as above and they agree with the plan    Antibiotics:  Ceftin 3  Antibiotics 7    Subjective:  Patient has no fever, chills, sweats; no nausea, vomiting, diarrhea; no cough, shortness of breath; no pain. No new symptoms.    Objective:  Vitals:  Temp:  [97.4 °F (36.3 °C)-98.5 °F (36.9 °C)] 97.4 °F (36.3 °C)  HR:  [66-87] 66  Resp:  [16] 16  BP: (123-129)/(59-74) 129/59  SpO2:  [94 %-97 %] 97 %  Temp (24hrs), Av.8 °F (36.6 °C), Min:97.4 °F (36.3 °C), Max:98.5 °F (36.9 °C)  Current: Temperature: (!) 97.4 °F (36.3 °C)    Physical Exam:   General Appearance:  Elderly, debilitated, nontoxic, no acute  distress.   Throat: Oropharynx moist without lesions.    Lungs:   Clear to auscultation bilaterally; no wheezes, rhonchi or rales; respirations unlabored   Heart:  RRR; no murmur, rub or gallop   Abdomen:   Soft, non-tender, non-distended, positive bowel sounds.     Extremities: No clubbing, cyanosis or edema   Skin: No new rashes or lesions. No draining wounds noted.       Labs, Imaging, & Other studies:   All pertinent labs and imaging studies were personally reviewed  Results from last 7 days   Lab Units 05/06/24  0609 05/03/24  0450 05/02/24  0528   WBC Thousand/uL 8.45 7.75 6.65   HEMOGLOBIN g/dL 11.3* 12.1 10.9*   PLATELETS Thousands/uL 347 314 261     Results from last 7 days   Lab Units 05/06/24  0609 05/03/24  0450 05/02/24  0528 05/01/24  0617   SODIUM mmol/L 137 138 138 140   POTASSIUM mmol/L 4.1 3.6 3.6 3.7   CHLORIDE mmol/L 104 104 106 107   CO2 mmol/L 27 27 24 26   BUN mg/dL 14 13 12 12   CREATININE mg/dL 0.49* 0.55* 0.45* 0.53*   EGFR ml/min/1.73sq m 81 78 84 79   CALCIUM mg/dL 9.0 9.1 8.2* 8.2*   AST U/L  --   --  22 20   ALT U/L  --   --  18 20   ALK PHOS U/L  --   --  89 74         Results from last 7 days   Lab Units 05/01/24  0617 04/30/24  0455   PROCALCITONIN ng/ml 0.21 0.30*

## 2024-05-06 NOTE — NURSING NOTE
"Late entry:Spoke to Celia Gilbert on Friday May 3, 2024 in the afternoon at approximately 1330. Celia spoke with the   earlier on Friday and was very rude.  She called  her \"fucktard\",lazy, and accused Lian of trying to kill her mother.  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 She told Lian she hopes someone slams the door on her face if her parents ever need help and hopes that her parents go through the same thing, wanting to kill them.  After speaking with Lian who was visually shaken, I contacted Celia Gilbert to see how I could help.  Celia was upset about her mothers care.  She was upset that Dr. Ruiz wanted to discharge her mom back to [facility] and that the doctor the day before wanted to keep her mom until Monday.  She stated that we did not take her mother's temp or properly treat her temp.  She stated that the staff did not know how to use the clark lift and she was upset about that. There was a BP issue that we treated and ended up giving her mom high BP..  She stated that we were trying to kill her mother and she also told me that she herself is terminal with cancer and that we are elevating her own BP to the point she is going to die also.  I tried to calm her down, validated her concerns and let her know that I was the manager on the floor and that I will look at her concerns and get back to her.  I also let her know that there is a proper way to address concerns and that creating Concerns can be professionally addressed by her and the hospital team so that the patient gets the best care possible.  This type of drama is not good for anyone.  I let her know that my concern was to help her mother and to ensure we are providing good care.  She stated that I needed to wheel her mother to the lobby now and she would pick her up.  She is not going to go to [facility] at this time.  I tried to reason with her with the concerns " that her mother cannot do much for herself and it would not be a save discharge given that the team uses the clark lift among other things to provide care.  I let her know that due to the nature of her outbursts and how she has been treating staff that perhaps it would be best for her at this time not to visit her mom until she can calm down.  No matter what I did to try to calm her and deescalate the situation, Celia continued to raise her voice was angry, and belligerent with me on the phone as well.  She was not able to be redirected at this time.

## 2024-05-07 NOTE — ASSESSMENT & PLAN NOTE
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      Urine cultures with Proteus multiple sensitivities.  Was on cefazolin and ID has transitioned over to cefuroxime.  Disposition: Attempted discharge to rehab on Friday.  Medically stable and off IV antibiotics.  Daughter appealing discharge with Medicare.  Appeal was denied.    Patient remains stable for discharge. She is now discharged to rehab facility at [facility] and rehab

## 2024-05-07 NOTE — DISCHARGE SUMMARY
[facility]  Discharge- Latanya Ray 9/20/1926, 89 y.o. 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      Urine cultures with Proteus multiple sensitivities.  Was on cefazolin and ID has transitioned over to cefuroxime.  Disposition: Attempted discharge to rehab on Friday.  Medically stable and off IV antibiotics.  Daughter appealing discharge with Medicare.  Appeal was denied.    Patient remains stable for discharge. She is now discharged to rehab facility at [facility] and rehab            Pyelonephritis  Assessment & Plan  Presents with sepsis, secondary to cystitis/pyelonephritis   U/A large leuks, innumerable WBC/bacteria   Urine cultures with Proteus mirabilis, sensitivities available  Appreciate ID input, discontinued ceftriaxone, transition to cefazolin -anticipate 10 days of antibiotics  Transitioning to Ceftin through 5/7/24    Diaphragmatic hernia without obstruction or gangrene  Assessment & Plan  Noted stable hernia on CT abd/pelvis    Acute metabolic encephalopathy  Assessment & Plan  Acute metabolic encephalopathy with underlying cognitive decline  Present on admission secondary to sepsis/pyelonephritis.    Mental status has returned to her to baseline    Cognitive impairment  Assessment & Plan  Supportive care  Being discharged to facility    Breast carcinoma (HCC)  Assessment & Plan  History of breast cancer status post mastectomy.      Medical Problems       Resolved Problems  Date Reviewed: 5/7/2024   None       Discharging Physician / Practitioner: Loretta Piper,  MD  PCP: Amina Rowe DO  Admission Date:   Admission Orders (From admission, onward)       Ordered        04/29/24 0542  INPATIENT ADMISSION  Once                          Discharge Date: 05/06/24    Consultations During Hospital Stay:  ID, PT, OT, CM    Procedures Performed:   CT chest abdomen pelvis w contrast   Final Result by Yari Goode MD (04/29 0308)      Left pyelonephritis.      Cystitis.      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, pancreatic tail and numerous loops of small bowel         Workstation performed: [id number]         XR chest 1 view portable   Final Result by Liam Woodard MD (04/29 0854)         1. No consolidation or alveolar edema.      2. Bibasilar atelectasis and chronic mild interstitial prominence, similar to the CT of April 2023.      3. Hiatal hernia.            Workstation performed: [id number]               Significant Findings / Test Results:   Results from last 7 days   Lab Units 05/06/24  0609   WBC Thousand/uL 8.45   HEMOGLOBIN g/dL 11.3*   HEMATOCRIT % 34.7*   PLATELETS Thousands/uL 347     Results from last 7 days   Lab Units 05/06/24  0609   SODIUM mmol/L 137   POTASSIUM mmol/L 4.1   CHLORIDE mmol/L 104   CO2 mmol/L 27   BUN mg/dL 14   CREATININE mg/dL 0.49*   CALCIUM mg/dL 9.0         Test Results Pending at Discharge (will require follow up):   None     Outpatient Tests Requested:  None    Complications:  None    Reason for Admission: altered mental status, weakenss    Hospital Course:   Latanya Ray is a 89 y.o. female patient who originally presented to the hospital on 4/29/2024 due to altered mental status, weakness.  Found to have septic shock secondary to pyelonephritis.  Patient improved with volume resuscitation and IV antibiotics with her mental status returning to her baseline.  She was evaluated by infectious disease and recommendations for antibiotic therapy were followed.  The patient was discharged to facility in [facility]  "and stable condition.  Patient's daughter was updated on day of discharge.      Please see above list of diagnoses and related plan for additional information.     Condition at Discharge: stable    Discharge Day Visit / Exam:   Subjective: No acute complaints overnight events.  Vitals: Blood Pressure: 129/59 (05/06/24 0741)  Pulse: 66 (05/06/24 0741)  Temperature: (!) 97.4 °F (36.3 °C) (05/06/24 0741)  Temp Source: Temporal (05/06/24 0741)  Respirations: 16 (05/06/24 0741)  Height: 4' 11\" (149.9 cm) (04/29/24 0641)  Weight - Scale: 58.5 kg (128 lb 15.5 oz) (04/29/24 0641)  SpO2: 97 % (05/06/24 0741)  Exam:   Physical Exam  Constitutional:       General: She is not in acute distress.  HENT:      Head: Normocephalic and atraumatic.   Eyes:      Conjunctiva/sclera: Conjunctivae normal.   Cardiovascular:      Rate and Rhythm: Normal rate and regular rhythm.   Pulmonary:      Effort: No respiratory distress.      Breath sounds: No wheezing or rales.   Abdominal:      General: There is no distension.      Tenderness: There is no abdominal tenderness. There is no guarding.   Skin:     Comments: Skin lesion on forehead   Neurological:      Mental Status: Mental status is at baseline.          Discussion with Family: Updated  (daughter) via phone.    Discharge instructions/Information to patient and family:   See after visit summary for information provided to patient and family.      Provisions for Follow-Up Care:  See after visit summary for information related to follow-up care and any pertinent home health orders.      Mobility at time of Discharge:   Basic Mobility Inpatient Raw Score: 9  JH-HLM Goal: 3: Sit at edge of bed  JH-HLM Achieved: 2: Bed activities/Dependent transfer  HLM Goal NOT achieved. Continue to encourage mobility in post discharge setting.     Disposition:   Other Skilled Nursing Facility at [facility]    Planned Readmission: No     Discharge Statement:  I spent 35 minutes discharging the " patient. This time was spent on the day of discharge. I had direct contact with the patient on the day of discharge. Greater than 50% of the total time was spent examining patient, answering all patient questions, arranging and discussing plan of care with patient as well as directly providing post-discharge instructions.  Additional time then spent on discharge activities.    Discharge Medications:  See after visit summary for reconciled discharge medications provided to patient and/or family.      **Please Note: This note may have been constructed using a voice recognition system**

## 2024-05-07 NOTE — ASSESSMENT & PLAN NOTE
Presents with sepsis, secondary to cystitis/pyelonephritis   U/A large leuks, innumerable WBC/bacteria   Urine cultures with Proteus mirabilis, sensitivities available  Appreciate ID input, discontinued ceftriaxone, transition to cefazolin -anticipate 10 days of antibiotics  Transitioning to Ceftin through 5/7/24

## 2024-05-08 ENCOUNTER — PATIENT OUTREACH (OUTPATIENT)
Dept: CASE MANAGEMENT | Facility: OTHER | Age: 89
End: 2024-05-08

## 2024-05-08 LAB
ALBUMIN SERPL BCP-MCNC: 3 G/DL (ref 3.5–5)
ALBUMIN SERPL BCP-MCNC: 3.1 G/DL (ref 3.5–5)
ALP SERPL-CCNC: 74 U/L (ref 34–104)
ALP SERPL-CCNC: 89 U/L (ref 34–104)
ALT SERPL W P-5'-P-CCNC: 18 U/L (ref 7–52)
ALT SERPL W P-5'-P-CCNC: 20 U/L (ref 7–52)
ANION GAP SERPL CALCULATED.3IONS-SCNC: 7 MMOL/L (ref 4–13)
ANION GAP SERPL CALCULATED.3IONS-SCNC: 8 MMOL/L (ref 4–13)
AST SERPL W P-5'-P-CCNC: 20 U/L (ref 13–39)
AST SERPL W P-5'-P-CCNC: 22 U/L (ref 13–39)
BILIRUB SERPL-MCNC: 0.27 MG/DL (ref 0.2–1)
BILIRUB SERPL-MCNC: 0.36 MG/DL (ref 0.2–1)
BUN SERPL-MCNC: 12 MG/DL (ref 5–25)
BUN SERPL-MCNC: 12 MG/DL (ref 5–25)
CALCIUM ALBUM COR SERPL-MCNC: 8.9 MG/DL (ref 8.3–10.1)
CALCIUM ALBUM COR SERPL-MCNC: 9 MG/DL (ref 8.3–10.1)
CALCIUM SERPL-MCNC: 8.2 MG/DL (ref 8.4–10.2)
CALCIUM SERPL-MCNC: 8.2 MG/DL (ref 8.4–10.2)
CHLORIDE SERPL-SCNC: 106 MMOL/L (ref 96–108)
CHLORIDE SERPL-SCNC: 107 MMOL/L (ref 96–108)
CO2 SERPL-SCNC: 24 MMOL/L (ref 21–32)
CO2 SERPL-SCNC: 26 MMOL/L (ref 21–32)
CREAT SERPL-MCNC: 0.45 MG/DL (ref 0.6–1.3)
CREAT SERPL-MCNC: 0.53 MG/DL (ref 0.6–1.3)
GFR SERPL CREATININE-BSD FRML MDRD: 79 ML/MIN/1.73SQ M
GFR SERPL CREATININE-BSD FRML MDRD: 84 ML/MIN/1.73SQ M
GLUCOSE SERPL-MCNC: 93 MG/DL (ref 65–140)
GLUCOSE SERPL-MCNC: 93 MG/DL (ref 65–140)
POTASSIUM SERPL-SCNC: 3.6 MMOL/L (ref 3.5–5.3)
POTASSIUM SERPL-SCNC: 3.7 MMOL/L (ref 3.5–5.3)
PROT SERPL-MCNC: 6 G/DL (ref 6.4–8.4)
PROT SERPL-MCNC: 6.4 G/DL (ref 6.4–8.4)
SODIUM SERPL-SCNC: 138 MMOL/L (ref 135–147)
SODIUM SERPL-SCNC: 140 MMOL/L (ref 135–147)

## 2024-05-08 NOTE — PROGRESS NOTES
Outpatient Care Management [facility]SNF Pathway. Discharged 5/6/24 to [facility]. Email sent to facility to inform them the patient is on the SUNDAR Pathway and I will be following them during their skilled stay.  This Admin Coordinator will continue to monitor via chart review.

## 2024-05-13 DIAGNOSIS — K21.9 GASTROESOPHAGEAL REFLUX DISEASE: ICD-10-CM

## 2024-05-13 DIAGNOSIS — G25.81 RESTLESS LEGS SYNDROME: ICD-10-CM

## 2024-05-14 RX ORDER — PRAMIPEXOLE DIHYDROCHLORIDE 0.25 MG/1
0.25 TABLET ORAL
Qty: 30 TABLET | Refills: 0 | Status: SHIPPED | OUTPATIENT
Start: 2024-05-14

## 2024-05-14 RX ORDER — OMEPRAZOLE 20 MG/1
20 CAPSULE, DELAYED RELEASE ORAL DAILY
Qty: 90 CAPSULE | Refills: 1 | Status: SHIPPED | OUTPATIENT
Start: 2024-05-14

## 2024-05-16 ENCOUNTER — PATIENT OUTREACH (OUTPATIENT)
Dept: CASE MANAGEMENT | Facility: OTHER | Age: 89
End: 2024-05-16

## 2024-05-23 ENCOUNTER — PATIENT OUTREACH (OUTPATIENT)
Dept: CASE MANAGEMENT | Facility: OTHER | Age: 89
End: 2024-05-23

## 2024-05-23 NOTE — PROGRESS NOTES
Chart review complete update obtained from [facility] the patient is currently admitted to SNF for STR. LCD 6/3/24. Patient may stay longer. This Admin Coordinator will continue to monitor via chart review.

## 2024-05-29 PROBLEM — N12 PYELONEPHRITIS: Status: RESOLVED | Noted: 2024-04-29 | Resolved: 2024-05-29

## 2024-05-30 ENCOUNTER — PATIENT OUTREACH (OUTPATIENT)
Dept: CASE MANAGEMENT | Facility: OTHER | Age: 89
End: 2024-05-30

## 2024-05-30 NOTE — PROGRESS NOTES
Chart review complete update obtained from [facility] the patient is currently admitted to SNF for STR. LCD 6/3/24 and will transition to LTC at [facility]. This Admin Coordinator will continue to monitor via chart review.

## 2024-06-03 PROBLEM — A41.9 SEPSIS SECONDARY TO UTI  (HCC): Status: RESOLVED | Noted: 2024-04-29 | Resolved: 2024-06-03

## 2024-06-03 PROBLEM — N39.0 SEPSIS SECONDARY TO UTI  (HCC): Status: RESOLVED | Noted: 2024-04-29 | Resolved: 2024-06-03

## 2024-06-05 ENCOUNTER — PATIENT OUTREACH (OUTPATIENT)
Dept: CASE MANAGEMENT | Facility: OTHER | Age: 89
End: 2024-06-05

## 2024-06-05 NOTE — PROGRESS NOTES
Chart review complete the patient transitioned to LTC on 6/4/24 at [facility]. I have removed myself from the care team, updated the Care Coordination note, and closed the episode.

## 2024-06-17 ENCOUNTER — TELEPHONE (OUTPATIENT)
Age: 89
End: 2024-06-17

## 2024-06-17 DIAGNOSIS — I67.1 CEREBRAL ANEURYSM: Primary | ICD-10-CM

## 2024-06-17 DIAGNOSIS — R41.89 COGNITIVE IMPAIRMENT: ICD-10-CM

## 2024-06-17 DIAGNOSIS — H54.8 LEGAL BLINDNESS: ICD-10-CM

## 2024-06-17 NOTE — TELEPHONE ENCOUNTER
Hetal called from [facility] requesting new orders for PT and OT. The patient is getting discharged from [facility] this afternoon. Please fax orders to [phone]

## 2024-06-18 ENCOUNTER — TELEPHONE (OUTPATIENT)
Age: 89
End: 2024-06-18

## 2024-06-18 NOTE — TELEPHONE ENCOUNTER
Luis from [facility] (short term rehab) called to schedule TCM for patient, who is discharging TODAY at 3pm.  Attempted warm transfer, please give her a call back at her direct number, [phone].

## 2024-06-19 DIAGNOSIS — B37.2 CANDIDA INFECTION OF FLEXURAL SKIN: Primary | ICD-10-CM

## 2024-06-19 RX ORDER — NYSTATIN 100000 [USP'U]/G
POWDER TOPICAL 2 TIMES DAILY
Qty: 60 G | Refills: 1 | Status: SHIPPED | OUTPATIENT
Start: 2024-06-19

## 2024-07-08 ENCOUNTER — TELEPHONE (OUTPATIENT)
Age: 89
End: 2024-07-08

## 2024-07-08 NOTE — TELEPHONE ENCOUNTER
Patients dghtr called.  She stated that she was told when booking this appt it was a virtual.  She can not get the patient to the office as her A/C is broke in her car and she has cancer.  I did speak to the office and they advised me to advise the dghtr it had to be face to face since ppwk needed to be filled out. I do not see an opening for Dr. Soledad alfredo August.  They do not want to see anyone else.  She did stated that her mother was doing very well with the nurse and the PT coming into the home.  The dghtr did stated she has seniors helping seniors coming to help oout and will see if the can assist , as the the dghtr is doing worse currently than the patient.

## 2024-08-13 ENCOUNTER — OFFICE VISIT (OUTPATIENT)
Dept: FAMILY MEDICINE CLINIC | Facility: CLINIC | Age: 89
End: 2024-08-13
Payer: MEDICARE

## 2024-08-13 VITALS
SYSTOLIC BLOOD PRESSURE: 110 MMHG | HEIGHT: 59 IN | TEMPERATURE: 97.6 F | BODY MASS INDEX: 25.8 KG/M2 | HEART RATE: 69 BPM | WEIGHT: 128 LBS | OXYGEN SATURATION: 97 % | DIASTOLIC BLOOD PRESSURE: 70 MMHG

## 2024-08-13 DIAGNOSIS — Z00.00 MEDICARE ANNUAL WELLNESS VISIT, SUBSEQUENT: Primary | ICD-10-CM

## 2024-08-13 DIAGNOSIS — R13.12 DYSPHAGIA, OROPHARYNGEAL PHASE: ICD-10-CM

## 2024-08-13 DIAGNOSIS — E55.9 VITAMIN D DEFICIENCY: ICD-10-CM

## 2024-08-13 DIAGNOSIS — H54.8 LEGAL BLINDNESS: ICD-10-CM

## 2024-08-13 DIAGNOSIS — G25.81 RESTLESS LEG SYNDROME: ICD-10-CM

## 2024-08-13 DIAGNOSIS — Z85.3 HISTORY OF LOBULAR CARCINOMA OF BREAST: ICD-10-CM

## 2024-08-13 DIAGNOSIS — R41.89 COGNITIVE IMPAIRMENT: ICD-10-CM

## 2024-08-13 PROBLEM — S22.20XA STERNAL FRACTURE: Status: RESOLVED | Noted: 2024-02-23 | Resolved: 2024-08-13

## 2024-08-13 PROBLEM — G93.41 ACUTE METABOLIC ENCEPHALOPATHY: Status: RESOLVED | Noted: 2023-04-21 | Resolved: 2024-08-13

## 2024-08-13 PROBLEM — M62.81 GENERALIZED MUSCLE WEAKNESS: Status: ACTIVE | Noted: 2024-05-06

## 2024-08-13 PROCEDURE — 99214 OFFICE O/P EST MOD 30 MIN: CPT | Performed by: FAMILY MEDICINE

## 2024-08-13 PROCEDURE — G0439 PPPS, SUBSEQ VISIT: HCPCS | Performed by: FAMILY MEDICINE

## 2024-08-13 NOTE — PATIENT INSTRUCTIONS

## 2024-08-13 NOTE — PROGRESS NOTES
Ambulatory Visit  Name: Latanya Ray      : 1926      MRN: [id number]  Encounter Provider: Amina Rowe DO  Encounter Date: 2024   Encounter department: [facility] PRIMARY CARE    Assessment & Plan   1. Medicare annual wellness visit, subsequent  Assessment & Plan:  Has advanced directives recommend flu shot continue with current care  2. Legal blindness  Assessment & Plan:  Continues to see eye doctor and will see me in 6 months   3. History of lobular carcinoma of breast  Assessment & Plan:  Has mass but is uninterested in any intervention  4. Cognitive impairment  Assessment & Plan:  Memory is stable will monitor  5. Vitamin D deficiency  Assessment & Plan:  Continue supplement  check labs followup in 6 months   6. Restless leg syndrome  Assessment & Plan:  Stable on mirapex and she will continue that and see me in 6 months   7. Dysphagia, oropharyngeal phase  Assessment & Plan:  Stable on omeprazole and will continue that       Depression Screening and Follow-up Plan: Patient was screened for depression during today's encounter. They screened negative with a PHQ-2 score of 0.    Falls Plan of Care: balance, strength, and gait training instructions were provided. not completed because patient not ambulatory, bed ridden, immobile, confined to chair, or wheelchair bound.     Urinary Incontinence Plan of Care: counseling topics discussed: practice Kegel (pelvic floor strengthening) exercises.       Preventive health issues were discussed with patient, and age appropriate screening tests were ordered as noted in patient's After Visit Summary. Personalized health advice and appropriate referrals for health education or preventive services given if needed, as noted in patient's After Visit Summary.    History of Present Illness     Heriberto is here for medicare wellness and followup of breast cancer cognitive impairment restless legs vitamin D deficiency and her blindness Heriberto is  getting PT and OT and is doing so much better Patient has great appetite She still has the pain and also the mass in breast as she has declined any treatment She is doing well with her restless leg medication daniela has no compalints today He daughter is providing 24 hr care for her at this time Patient memory is stable        Patient Care Team:  Amina Rowe DO as PCP - General (Family Medicine)  NITZA Salazar PA-C    Review of Systems   Constitutional:  Negative for fatigue, fever and unexpected weight change.   HENT:  Negative for congestion, sinus pain and trouble swallowing.    Eyes:  Negative for discharge and visual disturbance.   Respiratory:  Negative for cough, chest tightness, shortness of breath and wheezing.    Cardiovascular:  Negative for chest pain, palpitations and leg swelling.   Gastrointestinal:  Negative for abdominal pain, blood in stool, constipation, diarrhea, nausea and vomiting.   Genitourinary:  Negative for difficulty urinating, dysuria, frequency and hematuria.   Musculoskeletal:  Negative for arthralgias, gait problem and joint swelling.   Skin:  Negative for rash and wound.   Allergic/Immunologic: Negative for environmental allergies and food allergies.   Neurological:  Negative for dizziness, syncope, weakness, numbness and headaches.   Hematological:  Negative for adenopathy. Does not bruise/bleed easily.   Psychiatric/Behavioral:  Negative for confusion, decreased concentration and sleep disturbance. The patient is not nervous/anxious.      Medical History Reviewed by provider this encounter:  Tobacco  Allergies  Meds  Problems  Med Hx  Surg Hx  Fam Hx       Annual Wellness Visit Questionnaire   Latanya is here for her Subsequent Wellness visit.     Historian  Patient cannot answer questions due to cognitive impairment, intelluctual disability, or expressive limitations.     Health Risk Assessment:   Patient rates overall health as good.  Patient feels that their physical health rating is same. Patient is satisfied with their life. Eyesight was rated as much worse. Hearing was rated as same. Patient feels that their emotional and mental health rating is same. Patients states they are never, rarely angry. Patient states they are often unusually tired/fatigued. Pain experienced in the last 7 days has been none. Patient states that she has experienced no weight loss or gain in last 6 months.     Depression Screening:   PHQ-2 Score: 0      Fall Risk Screening:   In the past year, patient has experienced: history of falling in past year    Number of falls: 1  Injured during fall?: Yes    Feels unsteady when standing or walking?: No    Worried about falling?: No      Urinary Incontinence Screening:   Patient has leaked urine accidently in the last six months.     Home Safety:  Patient has trouble with stairs inside or outside of their home. Patient has working smoke alarms and has working carbon monoxide detector. Home safety hazards include: none.     Nutrition:   Current diet is Regular.     Medications:   Patient is currently taking over-the-counter supplements. OTC medications include: see medication list. Patient is not able to manage medications.     Activities of Daily Living (ADLs)/Instrumental Activities of Daily Living (IADLs):   Walk and transfer into and out of bed and chair?: Yes  Dress and groom yourself?: No    Bathe or shower yourself?: No    Feed yourself? No  Do your laundry/housekeeping?: No  Manage your money, pay your bills and track your expenses?: No  Make your own meals?: No    Do your own shopping?: No    Previous Hospitalizations:   Any hospitalizations or ED visits within the last 12 months?: Yes    How many hospitalizations have you had in the last year?: 1-2    Hospitalization Comments: sepsis    Advance Care Planning:   Living will: Yes    Durable POA for healthcare: Yes    Advanced directive: Yes      PREVENTIVE  SCREENINGS      Cardiovascular Screening:    General: Screening Not Indicated and History Lipid Disorder      Diabetes Screening:     General: Screening Current      Colorectal Cancer Screening:     General: Screening Not Indicated      Breast Cancer Screening:     General: History Breast Cancer      Cervical Cancer Screening:    General: Screening Not Indicated      Osteoporosis Screening:    General: Screening Not Indicated and History Osteoporosis      Lung Cancer Screening:     General: Screening Not Indicated    Screening, Brief Intervention, and Referral to Treatment (SBIRT)    Screening  Typical number of drinks in a day: 0  Typical number of drinks in a week: 0  Interpretation: Low risk drinking behavior.    AUDIT-C Screenin) How often did you have a drink containing alcohol in the past year? never  2) How many drinks did you have on a typical day when you were drinking in the past year? 0  3) How often did you have 6 or more drinks on one occasion in the past year? never    AUDIT-C Score: 0  Interpretation: Score 0-2 (female): Negative screen for alcohol misuse    Single Item Drug Screening:  How often have you used an illegal drug (including marijuana) or a prescription medication for non-medical reasons in the past year? never    Single Item Drug Screen Score: 0  Interpretation: Negative screen for possible drug use disorder    SDOH Risk Assessment  Social determinants of health (SDOH) risk assesment tool was completed. The tool at a minimum covered housing stability, food insecurity, transportation needs, and utility difficulty. Patient had at risk responses for the following SDOH domains: transportation needs.     Social Determinants of Health     Financial Resource Strain: Low Risk  (2024)    [facility]    Overall Financial Resource Strain (CARDIA)     Difficulty of Paying Living Expenses: Not hard at all   Food Insecurity: No Food  "Insecurity (8/13/2024)    Hunger Vital Sign     Worried About Running Out of Food in the Last Year: Never true     Ran Out of Food in the Last Year: Never true   Transportation Needs: Unmet Transportation Needs (8/13/2024)    PRAPARE - Transportation     Lack of Transportation (Medical): Yes     Lack of Transportation (Non-Medical): No   Housing Stability: Low Risk  (8/13/2024)    Housing Stability Vital Sign     Unable to Pay for Housing in the Last Year: No     Number of Times Moved in the Last Year: 1     Homeless in the Last Year: No   Utilities: Not At Risk (8/13/2024)    [facility] Utilities     Threatened with loss of utilities: No     No results found.    Objective     /70   Pulse 69   Temp 97.6 °F (36.4 °C) (Temporal)   Ht 4' 11\" (1.499 m)   Wt 58.1 kg (128 lb) Comment: 04/29/2024  SpO2 97%   BMI 25.85 kg/m²     Physical Exam  Vitals and nursing note reviewed.   Constitutional:       Appearance: Normal appearance. She is well-developed.   HENT:      Head: Normocephalic and atraumatic.      Right Ear: Hearing, tympanic membrane and external ear normal.      Left Ear: Hearing, tympanic membrane and external ear normal.   Eyes:      Extraocular Movements: Extraocular movements intact.      Conjunctiva/sclera: Conjunctivae normal.      Pupils: Pupils are equal, round, and reactive to light.   Neck:      Thyroid: No thyromegaly.   Cardiovascular:      Rate and Rhythm: Normal rate.      Heart sounds: Normal heart sounds.   Pulmonary:      Effort: Pulmonary effort is normal.      Breath sounds: Normal breath sounds. No wheezing or rales.   Abdominal:      General: Bowel sounds are normal. There is no distension.      Palpations: Abdomen is soft.      Tenderness: There is no abdominal tenderness.   Musculoskeletal:         General: No tenderness.      Cervical back: Neck supple.   Lymphadenopathy:      Cervical: No cervical adenopathy.   Skin:     General: Skin is warm and dry.      Findings: Lesion present. "      Comments: Lesion on right temple is stable   Neurological:      General: No focal deficit present.      Mental Status: She is alert and oriented to person, place, and time.      Cranial Nerves: No cranial nerve deficit.      Coordination: Coordination normal.   Psychiatric:         Mood and Affect: Mood normal.         Behavior: Behavior normal.         Thought Content: Thought content normal.         Judgment: Judgment normal.

## 2024-08-21 ENCOUNTER — APPOINTMENT (OUTPATIENT)
Dept: LAB | Facility: CLINIC | Age: 89
End: 2024-08-21
Payer: MEDICARE

## 2024-08-21 ENCOUNTER — PATIENT MESSAGE (OUTPATIENT)
Dept: FAMILY MEDICINE CLINIC | Facility: CLINIC | Age: 89
End: 2024-08-21

## 2024-08-21 ENCOUNTER — TELEPHONE (OUTPATIENT)
Age: 89
End: 2024-08-21

## 2024-08-21 DIAGNOSIS — N30.00 ACUTE CYSTITIS WITHOUT HEMATURIA: Primary | ICD-10-CM

## 2024-08-21 DIAGNOSIS — R35.0 URINARY FREQUENCY: ICD-10-CM

## 2024-08-21 DIAGNOSIS — R35.0 URINARY FREQUENCY: Primary | ICD-10-CM

## 2024-08-21 LAB
BACTERIA UR QL AUTO: ABNORMAL /HPF
BILIRUB UR QL STRIP: NEGATIVE
CLARITY UR: ABNORMAL
COLOR UR: YELLOW
GLUCOSE UR STRIP-MCNC: NEGATIVE MG/DL
HGB UR QL STRIP.AUTO: ABNORMAL
KETONES UR STRIP-MCNC: NEGATIVE MG/DL
LEUKOCYTE ESTERASE UR QL STRIP: ABNORMAL
NITRITE UR QL STRIP: NEGATIVE
NON-SQ EPI CELLS URNS QL MICRO: ABNORMAL /HPF
PH UR STRIP.AUTO: 7 [PH]
PROT UR STRIP-MCNC: ABNORMAL MG/DL
RBC #/AREA URNS AUTO: ABNORMAL /HPF
SP GR UR STRIP.AUTO: 1.01 (ref 1–1.03)
UROBILINOGEN UR STRIP-ACNC: <2 MG/DL
WBC #/AREA URNS AUTO: ABNORMAL /HPF
WBC CLUMPS # UR AUTO: PRESENT /UL

## 2024-08-21 PROCEDURE — 81001 URINALYSIS AUTO W/SCOPE: CPT

## 2024-08-21 PROCEDURE — 87086 URINE CULTURE/COLONY COUNT: CPT

## 2024-08-21 RX ORDER — CEFUROXIME AXETIL 500 MG/1
500 TABLET ORAL EVERY 12 HOURS SCHEDULED
Qty: 20 TABLET | Refills: 0 | Status: SHIPPED | OUTPATIENT
Start: 2024-08-21 | End: 2024-08-31

## 2024-08-21 NOTE — TELEPHONE ENCOUNTER
Mother has been with been with frequent, urgency urination x 2 days.  Requesting U/A to be order and or placed so that [facility] can collect it as well.

## 2024-08-22 LAB — BACTERIA UR CULT: NORMAL

## 2024-09-01 DIAGNOSIS — G25.81 RESTLESS LEGS SYNDROME: ICD-10-CM

## 2024-09-01 DIAGNOSIS — K21.9 GASTROESOPHAGEAL REFLUX DISEASE: ICD-10-CM

## 2024-09-02 RX ORDER — PRAMIPEXOLE DIHYDROCHLORIDE 0.25 MG/1
0.25 TABLET ORAL
Qty: 90 TABLET | Refills: 1 | Status: SHIPPED | OUTPATIENT
Start: 2024-09-02

## 2024-09-04 ENCOUNTER — APPOINTMENT (EMERGENCY)
Dept: CT IMAGING | Facility: HOSPITAL | Age: 89
DRG: 872 | End: 2024-09-04
Payer: MEDICARE

## 2024-09-04 ENCOUNTER — APPOINTMENT (OUTPATIENT)
Dept: LAB | Facility: CLINIC | Age: 89
DRG: 872 | End: 2024-09-04
Payer: MEDICARE

## 2024-09-04 ENCOUNTER — TELEPHONE (OUTPATIENT)
Age: 89
End: 2024-09-04

## 2024-09-04 ENCOUNTER — HOSPITAL ENCOUNTER (INPATIENT)
Facility: HOSPITAL | Age: 89
LOS: 5 days | Discharge: HOME HEALTH | DRG: 872 | End: 2024-09-09
Attending: EMERGENCY MEDICINE | Admitting: INTERNAL MEDICINE
Payer: MEDICARE

## 2024-09-04 DIAGNOSIS — R53.1 WEAKNESS: ICD-10-CM

## 2024-09-04 DIAGNOSIS — R41.0 CONFUSION: ICD-10-CM

## 2024-09-04 DIAGNOSIS — R30.0 DYSURIA: ICD-10-CM

## 2024-09-04 DIAGNOSIS — R50.9 FEVER: Primary | ICD-10-CM

## 2024-09-04 DIAGNOSIS — R41.0 CONFUSION: Primary | ICD-10-CM

## 2024-09-04 DIAGNOSIS — R78.81 GRAM-NEGATIVE BACTEREMIA: ICD-10-CM

## 2024-09-04 LAB
ALBUMIN SERPL BCG-MCNC: 3.7 G/DL (ref 3.5–5)
ALP SERPL-CCNC: 99 U/L (ref 34–104)
ALT SERPL W P-5'-P-CCNC: 9 U/L (ref 7–52)
ANION GAP SERPL CALCULATED.3IONS-SCNC: 10 MMOL/L (ref 4–13)
APTT PPP: 30 SECONDS (ref 23–34)
AST SERPL W P-5'-P-CCNC: 14 U/L (ref 13–39)
BACTERIA UR QL AUTO: ABNORMAL /HPF
BASOPHILS # BLD AUTO: 0.05 THOUSANDS/ÂΜL (ref 0–0.1)
BASOPHILS NFR BLD AUTO: 0 % (ref 0–1)
BILIRUB SERPL-MCNC: 0.52 MG/DL (ref 0.2–1)
BILIRUB UR QL STRIP: NEGATIVE
BUN SERPL-MCNC: 18 MG/DL (ref 5–25)
CALCIUM SERPL-MCNC: 9.2 MG/DL (ref 8.4–10.2)
CHLORIDE SERPL-SCNC: 102 MMOL/L (ref 96–108)
CLARITY UR: ABNORMAL
CO2 SERPL-SCNC: 24 MMOL/L (ref 21–32)
COLOR UR: YELLOW
CREAT SERPL-MCNC: 0.53 MG/DL (ref 0.6–1.3)
EOSINOPHIL # BLD AUTO: 0.01 THOUSAND/ÂΜL (ref 0–0.61)
EOSINOPHIL NFR BLD AUTO: 0 % (ref 0–6)
ERYTHROCYTE [DISTWIDTH] IN BLOOD BY AUTOMATED COUNT: 14.7 % (ref 11.6–15.1)
FLUAV RNA RESP QL NAA+PROBE: NEGATIVE
FLUBV RNA RESP QL NAA+PROBE: NEGATIVE
GFR SERPL CREATININE-BSD FRML MDRD: 79 ML/MIN/1.73SQ M
GLUCOSE SERPL-MCNC: 144 MG/DL (ref 65–140)
GLUCOSE UR STRIP-MCNC: NEGATIVE MG/DL
HCT VFR BLD AUTO: 38.7 % (ref 34.8–46.1)
HGB BLD-MCNC: 12.7 G/DL (ref 11.5–15.4)
HGB UR QL STRIP.AUTO: ABNORMAL
IMM GRANULOCYTES # BLD AUTO: 0.07 THOUSAND/UL (ref 0–0.2)
IMM GRANULOCYTES NFR BLD AUTO: 1 % (ref 0–2)
INR PPP: 0.98 (ref 0.85–1.19)
KETONES UR STRIP-MCNC: NEGATIVE MG/DL
LACTATE SERPL-SCNC: 1.4 MMOL/L (ref 0.5–2)
LACTATE SERPL-SCNC: 2.4 MMOL/L (ref 0.5–2)
LEUKOCYTE ESTERASE UR QL STRIP: ABNORMAL
LYMPHOCYTES # BLD AUTO: 1.22 THOUSANDS/ÂΜL (ref 0.6–4.47)
LYMPHOCYTES NFR BLD AUTO: 9 % (ref 14–44)
MAGNESIUM SERPL-MCNC: 2 MG/DL (ref 1.9–2.7)
MCH RBC QN AUTO: 29.2 PG (ref 26.8–34.3)
MCHC RBC AUTO-ENTMCNC: 32.8 G/DL (ref 31.4–37.4)
MCV RBC AUTO: 89 FL (ref 82–98)
MONOCYTES # BLD AUTO: 0.94 THOUSAND/ÂΜL (ref 0.17–1.22)
MONOCYTES NFR BLD AUTO: 7 % (ref 4–12)
MUCOUS THREADS UR QL AUTO: ABNORMAL
NEUTROPHILS # BLD AUTO: 10.79 THOUSANDS/ÂΜL (ref 1.85–7.62)
NEUTS SEG NFR BLD AUTO: 83 % (ref 43–75)
NITRITE UR QL STRIP: NEGATIVE
NON-SQ EPI CELLS URNS QL MICRO: ABNORMAL /HPF
NRBC BLD AUTO-RTO: 0 /100 WBCS
PH UR STRIP.AUTO: 5.5 [PH]
PLATELET # BLD AUTO: 258 THOUSANDS/UL (ref 149–390)
PMV BLD AUTO: 10.2 FL (ref 8.9–12.7)
POTASSIUM SERPL-SCNC: 3.8 MMOL/L (ref 3.5–5.3)
PROCALCITONIN SERPL-MCNC: 0.12 NG/ML
PROT SERPL-MCNC: 7.3 G/DL (ref 6.4–8.4)
PROT UR STRIP-MCNC: ABNORMAL MG/DL
PROTHROMBIN TIME: 13.2 SECONDS (ref 12.3–15)
RBC # BLD AUTO: 4.35 MILLION/UL (ref 3.81–5.12)
RBC #/AREA URNS AUTO: ABNORMAL /HPF
RSV RNA RESP QL NAA+PROBE: NEGATIVE
SARS-COV-2 RNA RESP QL NAA+PROBE: NEGATIVE
SODIUM SERPL-SCNC: 136 MMOL/L (ref 135–147)
SP GR UR STRIP.AUTO: 1.02 (ref 1–1.03)
UROBILINOGEN UR STRIP-ACNC: <2 MG/DL
WBC # BLD AUTO: 13.08 THOUSAND/UL (ref 4.31–10.16)
WBC #/AREA URNS AUTO: ABNORMAL /HPF
WBC CLUMPS # UR AUTO: PRESENT /UL

## 2024-09-04 PROCEDURE — 99285 EMERGENCY DEPT VISIT HI MDM: CPT | Performed by: EMERGENCY MEDICINE

## 2024-09-04 PROCEDURE — 87077 CULTURE AEROBIC IDENTIFY: CPT | Performed by: EMERGENCY MEDICINE

## 2024-09-04 PROCEDURE — 83605 ASSAY OF LACTIC ACID: CPT | Performed by: EMERGENCY MEDICINE

## 2024-09-04 PROCEDURE — 85730 THROMBOPLASTIN TIME PARTIAL: CPT | Performed by: EMERGENCY MEDICINE

## 2024-09-04 PROCEDURE — 99285 EMERGENCY DEPT VISIT HI MDM: CPT

## 2024-09-04 PROCEDURE — 81001 URINALYSIS AUTO W/SCOPE: CPT | Performed by: EMERGENCY MEDICINE

## 2024-09-04 PROCEDURE — 83735 ASSAY OF MAGNESIUM: CPT | Performed by: EMERGENCY MEDICINE

## 2024-09-04 PROCEDURE — 99223 1ST HOSP IP/OBS HIGH 75: CPT | Performed by: INTERNAL MEDICINE

## 2024-09-04 PROCEDURE — 85610 PROTHROMBIN TIME: CPT | Performed by: EMERGENCY MEDICINE

## 2024-09-04 PROCEDURE — 36415 COLL VENOUS BLD VENIPUNCTURE: CPT | Performed by: EMERGENCY MEDICINE

## 2024-09-04 PROCEDURE — 70450 CT HEAD/BRAIN W/O DYE: CPT

## 2024-09-04 PROCEDURE — 87086 URINE CULTURE/COLONY COUNT: CPT | Performed by: EMERGENCY MEDICINE

## 2024-09-04 PROCEDURE — 84145 PROCALCITONIN (PCT): CPT | Performed by: EMERGENCY MEDICINE

## 2024-09-04 PROCEDURE — 87186 SC STD MICRODIL/AGAR DIL: CPT | Performed by: EMERGENCY MEDICINE

## 2024-09-04 PROCEDURE — 71250 CT THORAX DX C-: CPT

## 2024-09-04 PROCEDURE — 87186 SC STD MICRODIL/AGAR DIL: CPT | Performed by: INTERNAL MEDICINE

## 2024-09-04 PROCEDURE — 0241U HB NFCT DS VIR RESP RNA 4 TRGT: CPT | Performed by: EMERGENCY MEDICINE

## 2024-09-04 PROCEDURE — 96365 THER/PROPH/DIAG IV INF INIT: CPT

## 2024-09-04 PROCEDURE — 80053 COMPREHEN METABOLIC PANEL: CPT | Performed by: EMERGENCY MEDICINE

## 2024-09-04 PROCEDURE — 87154 CUL TYP ID BLD PTHGN 6+ TRGT: CPT | Performed by: INTERNAL MEDICINE

## 2024-09-04 PROCEDURE — 87040 BLOOD CULTURE FOR BACTERIA: CPT | Performed by: INTERNAL MEDICINE

## 2024-09-04 PROCEDURE — 96367 TX/PROPH/DG ADDL SEQ IV INF: CPT

## 2024-09-04 PROCEDURE — 85025 COMPLETE CBC W/AUTO DIFF WBC: CPT | Performed by: EMERGENCY MEDICINE

## 2024-09-04 PROCEDURE — 87086 URINE CULTURE/COLONY COUNT: CPT

## 2024-09-04 PROCEDURE — 93005 ELECTROCARDIOGRAM TRACING: CPT

## 2024-09-04 RX ORDER — ACETAMINOPHEN 10 MG/ML
1000 INJECTION, SOLUTION INTRAVENOUS ONCE
Status: COMPLETED | OUTPATIENT
Start: 2024-09-04 | End: 2024-09-04

## 2024-09-04 RX ORDER — PRAMIPEXOLE DIHYDROCHLORIDE 0.25 MG/1
0.25 TABLET ORAL
Status: DISCONTINUED | OUTPATIENT
Start: 2024-09-04 | End: 2024-09-09 | Stop reason: HOSPADM

## 2024-09-04 RX ORDER — HEPARIN SODIUM 5000 [USP'U]/ML
5000 INJECTION, SOLUTION INTRAVENOUS; SUBCUTANEOUS EVERY 8 HOURS SCHEDULED
Status: DISCONTINUED | OUTPATIENT
Start: 2024-09-04 | End: 2024-09-09 | Stop reason: HOSPADM

## 2024-09-04 RX ORDER — PANTOPRAZOLE SODIUM 20 MG/1
20 TABLET, DELAYED RELEASE ORAL
Status: DISCONTINUED | OUTPATIENT
Start: 2024-09-05 | End: 2024-09-09 | Stop reason: HOSPADM

## 2024-09-04 RX ORDER — SODIUM CHLORIDE 9 MG/ML
150 INJECTION, SOLUTION INTRAVENOUS CONTINUOUS
Status: DISCONTINUED | OUTPATIENT
Start: 2024-09-04 | End: 2024-09-04

## 2024-09-04 RX ADMIN — PRAMIPEXOLE DIHYDROCHLORIDE 0.25 MG: 0.25 TABLET ORAL at 23:51

## 2024-09-04 RX ADMIN — SODIUM CHLORIDE 1000 ML: 0.9 INJECTION, SOLUTION INTRAVENOUS at 18:26

## 2024-09-04 RX ADMIN — DEXTROSE 2000 MG: 50 INJECTION, SOLUTION INTRAVENOUS at 17:30

## 2024-09-04 RX ADMIN — SODIUM CHLORIDE 150 ML/HR: 0.9 INJECTION, SOLUTION INTRAVENOUS at 19:41

## 2024-09-04 RX ADMIN — HEPARIN SODIUM 5000 UNITS: 5000 INJECTION INTRAVENOUS; SUBCUTANEOUS at 23:51

## 2024-09-04 RX ADMIN — ACETAMINOPHEN 1000 MG: 10 INJECTION INTRAVENOUS at 16:54

## 2024-09-04 RX ADMIN — ASPIRIN 81 MG: 81 TABLET, COATED ORAL at 23:51

## 2024-09-04 RX ADMIN — SODIUM CHLORIDE 1000 ML: 0.9 INJECTION, SOLUTION INTRAVENOUS at 16:54

## 2024-09-04 NOTE — TELEPHONE ENCOUNTER
Patient's CG-daughter Celia called again regarding the urine symptoms. Cg was able to drop off a urine specimen. I read Dr. Rowe's note to the CG:    Amina Rowe, DO   to Ruma Drew       9/4/24 10:45 AM   Call patient family Her last urine culture did not even show a UTI It showed there was mixed contaminants from skin winifred Thus another antibiotic is not necessary at this point. I can and will order another urine culture on her. I also encourage them to have patient drink plenty of water at least 48 oz daily  and also be sure she drinks 8 oz of cranberry juice daily    CG states that the patient is taking cranberry pills.

## 2024-09-04 NOTE — SEPSIS NOTE
Sepsis Note   Latanya Ray 89 y.o. female MRN: [id number]  Unit/Bed#: ED-07 Encounter: [id number]       Initial Sepsis Screening       Row Name 09/04/24 1703                Is the patient's history suggestive of a new or worsening infection? Yes (Proceed)  -NB        Suspected source of infection suspect infection, source unknown  -NB        Indicate SIRS criteria Leukocytosis (WBC > 65531 IJL) OR Leukopenia (WBC <4000 IJL) OR Bandemia (WBC >10% bands)  -NB        Are two or more of the above signs & symptoms of infection both present and new to the patient? No  -NB                  User Key  (r) = Recorded By, (t) = Taken By, (c) = Cosigned By      Initials Name Provider Type    NB Dang Smalls DO Physician                        Body mass index is 24.58 kg/m².  Wt Readings from Last 1 Encounters:   09/04/24 55.2 kg (121 lb 11.1 oz)        Ideal body weight: 48.8 kg (107 lb 8.4 oz)  Adjusted ideal body weight: 51.3 kg (113 lb 3.1 oz)

## 2024-09-04 NOTE — ASSESSMENT & PLAN NOTE
She has multiple right breast nodules and skin thickening on CT.  This is reflected on physical exam.  No open areas or sign of cellulitis.

## 2024-09-04 NOTE — ED PROVIDER NOTES
History  Chief Complaint   Patient presents with    Possible UTI     Pt brought in via ems, pt was Dx with a uti by family , meant to be on antibiotics. Family wanted pt checked out for inc weakness.     Weakness - Generalized     Patient is a 89-year-old female here with daughter who provides history.  Patient has a history of restless leg syndrome, legal blindness as well as history of right-sided lobular carcinoma of the breast, history of left mastectomy due to breast cancer, skin cancer on the right side of his forehead coming in today with fevers and weakness.  Daughter states that she has been in normal state of health last night.  When she went to wake her mother up in the morning, she noticed that her mom was generalized the week.  She took her vital signs and noticed that her temperature was 100.7.  She states that her mom recently just finished a 10-day course of antibiotics for UTI.  She called her physician today to ask for repeat round of antibiotics.  Per daughter, the doctor states that the urine culture did not grow any bacteria to be retreated and suggested come in for further evaluation.  Patient has no falls, head injury, vomiting, diarrhea.  Daughter states that she did notice that her mother was coughing throughout the night.  She typically walks with a walker but was having difficulty getting up and ambulating due to weakness      History provided by:  Medical records, patient, EMS personnel and relative   used: No    Fever  Location:  100.7  Severity:  Moderate  Onset quality:  Gradual  Timing:  Constant  Progression:  Unchanged  Chronicity:  New  Associated symptoms: congestion, cough, fatigue and fever    Associated symptoms: no abdominal pain, no chest pain, no diarrhea, no ear pain, no headaches, no loss of consciousness, no myalgias, no nausea, no rash, no rhinorrhea, no shortness of breath, no sore throat, no vomiting and no wheezing            Prior to Admission  Medications   Prescriptions Last Dose Informant Patient Reported? Taking?   Acetaminophen (TYLENOL ARTHRITIS PAIN PO)   Yes No   Sig: Take 1 tablet by mouth if needed   Patient not taking: Reported on 4/29/2024   Calcium Carbonate-Vit D-Min (CALCIUM 1200 PO)   Yes No   Sig: Take 750 mg by mouth in the morning At 3pm   Cranberry 250 MG TABS   Yes No   Sig: Take 2 tablets by mouth 3 (three) times a day   Multiple Vitamins-Minerals (PRESERVISION AREDS 2 PO)   Yes No   Sig: Take 1 capsule by mouth 2 (two) times a day   aluminum-magnesium hydroxide-simethicone (MAALOX) 0257-4100-361 mg/30 mL suspension   No No   Sig: Take 30 mL by mouth every 6 (six) hours as needed for indigestion or heartburn   aspirin (ECOTRIN LOW STRENGTH) 81 mg EC tablet   Yes No   Sig: Take 81 mg by mouth daily at bedtime   bisacodyl (DULCOLAX) 10 mg suppository   No No   Sig: Insert 1 suppository (10 mg total) into the rectum daily as needed for constipation   cholecalciferol (VITAMIN D3) 1,000 units tablet   Yes No   Sig: Take 4,000 Units by mouth daily   meclizine (ANTIVERT) 25 mg tablet   No No   Sig: Take 1 tablet (25 mg total) by mouth every 8 (eight) hours as needed for dizziness   Patient not taking: Reported on 8/13/2024   nystatin (MYCOSTATIN) powder   No No   Sig: Apply topically 2 (two) times a day   omeprazole (PriLOSEC) 20 mg delayed release capsule   No No   Sig: Take 1 capsule (20 mg total) by mouth daily   pramipexole (MIRAPEX) 0.25 mg tablet   No No   Sig: Take 1 tablet (0.25 mg total) by mouth daily at bedtime   vitamin B-12 (VITAMIN B-12) 1,000 mcg tablet   Yes No   Sig: Take 2,500 mcg by mouth every other day      Facility-Administered Medications: None       Past Medical History:   Diagnosis Date    Anxiety     Arthritis     Breast carcinoma (HCC) 08/13/2012    Description: s/p L mastectomy. Dr. Diaz    Cancer ([facility])     Cellulitis of left lower limb 02/27/2024    Chronic pain of left knee 07/11/2019    Chronic pain syndrome  01/28/2020    Elevated serum alkaline phosphatase level     mild, isoenzymes are normal, resolved 4/24/17 labs , last assessed 11/10/16, resolved 4/24/17    GERD (gastroesophageal reflux disease) 06/20/2012    Glaucoma 08/12/2014    Hematuria     last assessed 9/18/12    Hyperlipidemia 08/13/2012    Hypertension     Macular degeneration 08/12/2014    Mixed stress and urge urinary incontinence 03/27/2023    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    06/06/2018 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    Osteoarthritis 06/05/2012    Osteoporosis 06/05/2012    Description: Dexa 7/13 read as normal, patient declines medication    Pressure injury of coccygeal region, stage 2 (HCC) 03/06/2020    Pulmonary embolism (HCC) 01/2011    last assessed 9/18/12    Restless legs syndrome 06/05/2012    RLS (restless legs syndrome)     Thoracic back pain 10/19/2017       Past Surgical History:   Procedure Laterality Date    BILATERAL OOPHORECTOMY      Laparoscopic    BREAST SURGERY Left     Mastectomy     CHOLECYSTECTOMY      Laparoscopic    HERNIA REPAIR      HYSTERECTOMY      SMALL INTESTINE SURGERY         Family History   Problem Relation Age of Onset    Hypertension Mother     Blindness Mother     Cancer Sister     Cancer Maternal Aunt     Cancer Maternal Uncle      I have reviewed and agree with the history as documented.    E-Cigarette/Vaping    E-Cigarette Use Never User     Cartridges/Day 0     Comments denies      E-Cigarette/Vaping Substances     Social History     Tobacco Use    Smoking status: Never    Smokeless tobacco: Never    Tobacco comments:     denies   Vaping Use    Vaping status: Never Used   Substance Use Topics    Alcohol use: No     Alcohol/week: 0.0 standard drinks of alcohol     Comment: denies    Drug use: No       Review of Systems   Constitutional:  Positive for activity change, appetite change, fatigue and fever. Negative for chills.   HENT:  Positive for congestion. Negative for ear pain, rhinorrhea and sore throat.    Eyes:  Negative.  Negative for pain and visual disturbance.   Respiratory:  Positive for cough. Negative for shortness of breath and wheezing.    Cardiovascular: Negative.  Negative for chest pain and palpitations.   Gastrointestinal: Negative.  Negative for abdominal pain, diarrhea, nausea and vomiting.   Endocrine: Negative.    Genitourinary: Negative.  Negative for dysuria and hematuria.   Musculoskeletal: Negative.  Negative for arthralgias, back pain and myalgias.   Skin:  Negative for color change and rash.   Neurological:  Negative for seizures, loss of consciousness, syncope, weakness and headaches.   Hematological: Negative.    Psychiatric/Behavioral: Negative.     All other systems reviewed and are negative.      Physical Exam  Physical Exam  Vitals and nursing note reviewed.   Constitutional:       General: She is sleeping. She is not in acute distress.     Appearance: She is well-developed and overweight.   HENT:      Head: Normocephalic and atraumatic.        Mouth/Throat:      Comments: Patient maintaining airway and secretions. No stridor . No brawniness under tongue.     No posterior pharyngeal erythema or exudate.  Uvula midline without edema.  Dry mucous membranes  Eyes:      General: Lids are normal. Gaze aligned appropriately.      Extraocular Movements: Extraocular movements intact.      Conjunctiva/sclera: Conjunctivae normal.      Pupils: Pupils are equal, round, and reactive to light.   Neck:      Trachea: Trachea normal.   Cardiovascular:      Rate and Rhythm: Normal rate and regular rhythm.      Pulses:           Radial pulses are 2+ on the right side and 2+ on the left side.        Dorsalis pedis pulses are 2+ on the right side and 2+ on the left side.      Heart sounds: Normal heart sounds, S1 normal and S2 normal. No murmur heard.  Pulmonary:      Effort: Pulmonary effort is normal. No respiratory distress.      Breath sounds: Normal breath sounds.   Chest:       Abdominal:      General: Bowel  sounds are normal.      Palpations: Abdomen is soft.      Tenderness: There is no abdominal tenderness. There is no guarding or rebound.   Musculoskeletal:         General: No swelling.      Cervical back: Normal range of motion and neck supple. No rigidity.      Right lower leg: No edema.      Left lower leg: No edema.   Lymphadenopathy:      Cervical: No cervical adenopathy.   Skin:     General: Skin is warm and dry.      Capillary Refill: Capillary refill takes less than 2 seconds.   Neurological:      General: No focal deficit present.      Mental Status: She is oriented to person, place, and time.      GCS: GCS eye subscore is 4. GCS verbal subscore is 5. GCS motor subscore is 6.      Cranial Nerves: Cranial nerves 2-12 are intact.      Sensory: Sensation is intact.      Motor: Motor function is intact.      Comments: No slurred speech, facial asymmetry, tongue deviation.  Patient does fall asleep however awakens easily   Psychiatric:         Attention and Perception: Attention and perception normal.         Mood and Affect: Mood normal. Affect is flat.         Behavior: Behavior is cooperative.         Vital Signs  ED Triage Vitals   Temperature Pulse Respirations Blood Pressure SpO2   09/04/24 1558 09/04/24 1558 09/04/24 1558 09/04/24 1702 09/04/24 1558   100.3 °F (37.9 °C) 68 17 122/58 100 %      Temp src Heart Rate Source Patient Position - Orthostatic VS BP Location FiO2 (%)   -- 09/04/24 1558 09/04/24 1558 09/04/24 1558 --    Monitor Sitting Right arm       Pain Score       09/04/24 1826       No Pain           Vitals:    09/04/24 1558 09/04/24 1702 09/04/24 1826   BP:  122/58 134/62   Pulse: 68  (!) 122   Patient Position - Orthostatic VS: Sitting  Lying         Visual Acuity      ED Medications  Medications   sodium chloride 0.9 % infusion (has no administration in time range)   sodium chloride 0.9 % bolus 1,000 mL (0 mL Intravenous Stopped 9/4/24 1822)     Followed by   sodium chloride 0.9 % bolus  1,000 mL (1,000 mL Intravenous New Bag 9/4/24 1826)   acetaminophen (Ofirmev) injection 1,000 mg (0 mg Intravenous Stopped 9/4/24 1711)   ceftriaxone (ROCEPHIN) 2 g/50 mL in dextrose IVPB (0 mg Intravenous Stopped 9/4/24 1800)       Diagnostic Studies  Results Reviewed       Procedure Component Value Units Date/Time    Blood culture [[id number]]     Lab Status: No result Specimen: Blood     Blood culture [[id number]]     Lab Status: No result Specimen: Blood     FLU/RSV/COVID - if FLU/RSV clinically relevant [[id number]]  (Normal) Collected: 09/04/24 1655    Lab Status: Final result Specimen: Nares from Nose Updated: 09/04/24 1830     SARS-CoV-2 Negative     INFLUENZA A PCR Negative     INFLUENZA B PCR Negative     RSV PCR Negative    Narrative:      This test has been performed using the CoV-2/Flu/RSV plus assay on the [employer] GeneXpert platform. This test has been validated by the  and verified by the performing laboratory.     This test is designed to amplify and detect the following: nucleocapsid (N), envelope (E), and RNA-dependent RNA polymerase (RdRP) genes of the SARS-CoV-2 genome; matrix (M), basic polymerase (PB2), and acidic protein (PA) segments of the influenza A genome; matrix (M) and non-structural protein (NS) segments of the influenza B genome, and the nucleocapsid genes of RSV A and RSV B.     Positive results are indicative of the presence of Flu A, Flu B, RSV, and/or SARS-CoV-2 RNA. Positive results for SARS-CoV-2 or suspected novel influenza should be reported to state, local, or federal health departments according to local reporting requirements.      All results should be assessed in conjunction with clinical presentation and other laboratory markers for clinical management.     FOR PEDIATRIC PATIENTS - copy/paste COVID Guidelines URL to browser: [email or web address]       Procalcitonin [[id number]]  (Normal) Collected: 09/04/24  1633    Lab Status: Final result Specimen: Blood from Arm, Right Updated: 09/04/24 1711     Procalcitonin 0.12 ng/ml     Lactic acid [[id number]]  (Abnormal) Collected: 09/04/24 1633    Lab Status: Final result Specimen: Blood from Arm, Right Updated: 09/04/24 1703     LACTIC ACID 2.4 mmol/L     Narrative:      Result may be elevated if tourniquet was used during collection.    Lactic acid 2 Hours [[id number]]     Lab Status: No result Specimen: Blood     Comprehensive metabolic panel [[id number]]  (Abnormal) Collected: 09/04/24 1633    Lab Status: Final result Specimen: Blood from Arm, Right Updated: 09/04/24 1701     Sodium 136 mmol/L      Potassium 3.8 mmol/L      Chloride 102 mmol/L      CO2 24 mmol/L      ANION GAP 10 mmol/L      BUN 18 mg/dL      Creatinine 0.53 mg/dL      Glucose 144 mg/dL      Calcium 9.2 mg/dL      AST 14 U/L      ALT 9 U/L      Alkaline Phosphatase 99 U/L      Total Protein 7.3 g/dL      Albumin 3.7 g/dL      Total Bilirubin 0.52 mg/dL      eGFR 79 ml/min/1.73sq m     Narrative:      National Kidney Disease Foundation guidelines for Chronic Kidney Disease (CKD):     Stage 1 with normal or high GFR (GFR > 90 mL/min/1.73 square meters)    Stage 2 Mild CKD (GFR = 60-89 mL/min/1.73 square meters)    Stage 3A Moderate CKD (GFR = 45-59 mL/min/1.73 square meters)    Stage 3B Moderate CKD (GFR = 30-44 mL/min/1.73 square meters)    Stage 4 Severe CKD (GFR = 15-29 mL/min/1.73 square meters)    Stage 5 End Stage CKD (GFR <15 mL/min/1.73 square meters)  Note: GFR calculation is accurate only with a steady state creatinine    Magnesium [[id number]]  (Normal) Collected: 09/04/24 1633    Lab Status: Final result Specimen: Blood from Arm, Right Updated: 09/04/24 1701     Magnesium 2.0 mg/dL     Protime-INR [[id number]]  (Normal) Collected: 09/04/24 1633    Lab Status: Final result Specimen: Blood from Arm, Right Updated: 09/04/24 1658     Protime 13.2 seconds      INR 0.98    Narrative:      INR Therapeutic  Range    Indication                                             INR Range      Atrial Fibrillation                                               2.0-3.0  Hypercoagulable State                                    2.0.2.3  Left Ventricular Asist Device                            2.0-3.0  Mechanical Heart Valve                                  -    Aortic(with afib, MI, embolism, HF, LA enlargement,    and/or coagulopathy)                                     2.0-3.0 (2.5-3.5)     Mitral                                                             2.5-3.5  Prosthetic/Bioprosthetic Heart Valve               2.0-3.0  Venous thromboembolism (VTE: VT, PE        2.0-3.0    APTT [[id number]]  (Normal) Collected: 09/04/24 1633    Lab Status: Final result Specimen: Blood from Arm, Right Updated: 09/04/24 1658     PTT 30 seconds     CBC and differential [[id number]]  (Abnormal) Collected: 09/04/24 1633    Lab Status: Final result Specimen: Blood from Arm, Right Updated: 09/04/24 1644     WBC 13.08 Thousand/uL      RBC 4.35 Million/uL      Hemoglobin 12.7 g/dL      Hematocrit 38.7 %      MCV 89 fL      MCH 29.2 pg      MCHC 32.8 g/dL      RDW 14.7 %      MPV 10.2 fL      Platelets 258 Thousands/uL      nRBC 0 /100 WBCs      Segmented % 83 %      Immature Grans % 1 %      Lymphocytes % 9 %      Monocytes % 7 %      Eosinophils Relative 0 %      Basophils Relative 0 %      Absolute Neutrophils 10.79 Thousands/µL      Absolute Immature Grans 0.07 Thousand/uL      Absolute Lymphocytes 1.22 Thousands/µL      Absolute Monocytes 0.94 Thousand/µL      Eosinophils Absolute 0.01 Thousand/µL      Basophils Absolute 0.05 Thousands/µL     UA w Reflex to Microscopic w Reflex to Culture [[id number]]     Lab Status: No result Specimen: Urine, Clean Catch                    CT head without contrast   Final Result by Raman Husain MD (09/04 1658)      No acute intracranial abnormality.   Stable, moderate chronic microvascular ischemia.        "        Workstation performed: [id number]         CT chest without contrast   Final Result by Angus Meier MD (09/04 1718)      No pneumonia.      Multiple right breast nodules and skin thickening consistent with neoplastic process. Correlate with physical exam and history.               Workstation performed: [id number]                    Procedures  ECG 12 Lead Documentation Only    Date/Time: 9/4/2024 5:04 PM    Performed by: Dang Smalls DO  Authorized by: Dang Smalls DO    Indications / Diagnosis:  Fever  ECG reviewed by me, the ED Provider: yes    Patient location:  ED  Previous ECG:     Previous ECG:  Compared to current    Comparison ECG info:  4/2024    Similarity:  No change  Interpretation:     Interpretation: normal    Quality:     Tracing quality:  Limited by artifact  Rate:     ECG rate:  70    ECG rate assessment: normal    Rhythm:     Rhythm: sinus rhythm    Ectopy:     Ectopy: none    QRS:     QRS axis:  Normal    QRS intervals:  Normal  Conduction:     Conduction: normal    ST segments:     ST segments:  Non-specific  T waves:     T waves: non-specific    Comments:      Normal axis  QTC @ 425 ms  Diffuse artifact            ED Course  ED Course as of 09/04/24 1916   Wed Sep 04, 2024   1629 Patient is a 89 year old female coming in with daughter at bedside who is primary historian.  Per family, patient had completed a 10-day course of antibiotics 5 days ago and was improving until this morning.  On exam patient appears lethargic but alert to person and place and time.  She falls asleep easily.  Will start septic workup      Disclosure: Voice to text software was used in the preparation of this document and could have resulted in translational errors.      Occasional wrong word or \"sound a like\" substitutions may have occurred due to the inherent limitations of voice recognition software.  Read the chart carefully and recognize, using context, where substitutions have occurred.       I " have independently reviewed external records are available to me to the level of detail possible within the time constraints of my patient care responsibilities in the ED.       1709   Noted temp of 100.7, elevated wbc as well as elevated lactic acid - will continue with 2 liters IVF and antibiotics.       1807   Patient resting in bed.  Patient's family at bedside and updated patient remains sleeping but easily weak.  We did start antibiotics as patient did have a urine culture earlier today.  Will reach out to medicine for admission       1830   COVID/FLU/RSV negative    Patient initially did not meet sepsis criteria as she only had 1 criteria of 13,000.  She did not have a temperature or vital signs to establish otherwise.  She did have an elevated lactic with 2 L IV fluid.  Pending repeat lactic.  Will also give another fluid bolus to be run at 150 cc an hour                              Initial Sepsis Screening       Row Name 09/04/24 1703                Is the patient's history suggestive of a new or worsening infection? Yes (Proceed)  -NB        Suspected source of infection suspect infection, source unknown  -NB        Indicate SIRS criteria Leukocytosis (WBC > 58088 IJL) OR Leukopenia (WBC <4000 IJL) OR Bandemia (WBC >10% bands)  -NB        Are two or more of the above signs & symptoms of infection both present and new to the patient? No  -NB                  User Key  (r) = Recorded By, (t) = Taken By, (c) = Cosigned By      Initials Name Provider Type    BRITT Smalls DO Physician                    SBIRT 20yo+      Flowsheet Row Most Recent Value   Initial Alcohol Screen: US AUDIT-C     1. How often do you have a drink containing alcohol? 0 Filed at: 09/04/2024 1603   2. How many drinks containing alcohol do you have on a typical day you are drinking?  0 Filed at: 09/04/2024 1603   3a. Male UNDER 65: How often do you have five or more drinks on one occasion? 0 Filed at: 09/04/2024 1603   3b.  FEMALE Any Age, or MALE 65+: How often do you have 4 or more drinks on one occassion? 0 Filed at: 09/04/2024 1603   Audit-C Score 0 Filed at: 09/04/2024 1603   [place]: How many times in the past year have you...    Used an illegal drug or used a prescription medication for non-medical reasons? Never Filed at: 09/04/2024 1603                      Medical Decision Making      Differential diagnosis includes but not limited to:  Intracranial hemorrhage, intracranial tumor, seizures, hypertensive encephalopathy, hypoglycemia, DKA, HHNK, dementia, Dwain's paralysis, hypoglycemia/hyperglycemia, hypocalcemia/hypercalcemia, hypothermia, hyperthermia, hypoxia, hypercarbia, overdose (opioids, barbiturates, alcohol, benzodiazepines, Etc), meningitis, abscess, encephalitis, complicated migraine, shock, uremia        Problems Addressed:  Fever: acute illness or injury  Weakness: acute illness or injury    Amount and/or Complexity of Data Reviewed  Independent Historian: caregiver     Details:   Daughter at bedside      External Data Reviewed: notes.     Details:   Noted patient had urine culture on 8/21/24 which did not grow bacteria - mixed skin winifred; however was on Ceftin for total of 10 days. No old echo's         Labs: ordered. Decision-making details documented in ED Course.     Details:   Leukocytosis without any bands.  No anemia or thrombocytopenia  No acute kidney injury without any electrolyte abnormalities   coags within normal range  Lactic acid mildly elevated at 2.4  Procalcitonin within normal range  COVID/flu/RSV negative          Radiology: ordered. Decision-making details documented in ED Course.     Details:   CT head interpreted by radiologist as  No acute intracranial abnormality.  Stable, moderate chronic microvascular ischemia.    CT chest interpreted by radiologist as No pneumonia.     Multiple right breast nodules and skin thickening consistent with neoplastic process. Correlate with physical exam and  history.         ECG/medicine tests: ordered and independent interpretation performed. Decision-making details documented in ED Course.     Details:   No ischemia or arrhythmia     Risk  Prescription drug management.  Decision regarding hospitalization.               Disposition  Final diagnoses:   Fever   Weakness     Time reflects when diagnosis was documented in both MDM as applicable and the Disposition within this note       Time User Action Codes Description Comment    9/4/2024  6:49 PM Dang Smalls [R50.9] Fever     9/4/2024  6:49 PM Dang Smalls [R53.1] Weakness           ED Disposition       ED Disposition   Admit    Condition   Stable    Date/Time   Wed Sep 4, 2024 1808    Comment   Case was discussed with   and the patient's admission status was agreed to be Admission Status: inpatient status to the service of    .               Follow-up Information    None         Patient's Medications   Discharge Prescriptions    No medications on file       No discharge procedures on file.    PDMP Review         Value Time User    PDMP Reviewed  Yes 8/13/2024  6:36 PM Amina Rowe DO            ED Provider  Electronically Signed by             Dang Smalls DO  09/04/24 6812

## 2024-09-04 NOTE — TELEPHONE ENCOUNTER
Call from daughter who said that patient finished abx for a UTI and she think the symptoms are coming back. Patient is confused this am and lethargic, has a temp of 100.7.  Daughter thinks she needs another abx. States she cannot bring patient to the office or take her to ED/UC because daughter has to leave this afternoon to get chemo in [facility]. Patient's son is going to come and stay with patient. She would like to know if an abx can be ordered for patient, she can pick it up before she leaves and they can start giving it to her. Told her that all of the providers are very busy and may not be able to respond until later today because they are seeing patients and daughter started to cry. Patient has a history of urosepsis and was hospitalized for it. Please follow up with daughter.     Attempted to call the office, no answer.

## 2024-09-04 NOTE — ASSESSMENT & PLAN NOTE
Sepsis likely from recurrent urinary tract infection versus nonflu/RSV/COVID viral systemic illness  CT chest negative for pneumonia  She has no GI symptoms  She has no leg swelling/tenderness  Abdomen is benign  The  skin in the right chest does not look infected  Continue ceftriaxone and follow-up on repeat urine cultures.

## 2024-09-04 NOTE — ASSESSMENT & PLAN NOTE
High risk for delirium.  She also is legally blind so she will need supervision with feeding and activities  Make sure she has fall precautions/ aspiration precautions

## 2024-09-04 NOTE — H&P
"[facility]  H&P  Name: Latanya Ray 89 y.o. female I MRN: [id number]  Unit/Bed#: ED-07 I Date of Admission: 9/4/2024   Date of Service: 9/4/2024 I Hospital Day: 0      Assessment & Plan   Restless leg syndrome  Assessment & Plan  Continue pramipexole at bedtime    Sepsis (HCC)  Assessment & Plan  Sepsis likely from recurrent urinary tract infection.  CT chest negative for pneumonia  She has no GI symptoms  She has no leg swelling/tenderness  Abdomen is benign  The  skin in the right chest does not look infected  Continue ceftriaxone and follow-up on repeat urine cultures.    Cognitive impairment  Assessment & Plan  High risk for delirium.  She also is legally blind so she will need supervision with feeding and activities  Make sure she has fall precautions/ aspiration precautions    History of lobular carcinoma of breast  Assessment & Plan  She has multiple right breast nodules and skin thickening on CT.  This is reflected on physical exam.  No open areas or sign of cellulitis.         VTE Pharmacologic Prophylaxis: VTE Score: 7 High Risk (Score >/= 5) - Pharmacological DVT Prophylaxis Ordered: heparin. Sequential Compression Devices Ordered.  Code Status: Prior dnr  Discussion with family: Updated  (daughter) via phone.    Anticipated Length of Stay: Patient will be admitted on an inpatient basis with an anticipated length of stay of greater than 2 midnights secondary to sepsis.    Chief Complaint: \"off\"    History of Present Illness:  Latanya Ray is a 89 y.o. female with a PMH of cancer, restless leg syndrome, anxiety, cognitive decline and legal blindness who presents with weakness and being \"off.\"  History was obtained from her daughter who is involved in her care.  The patient's current illness started late August, August 21, 2024 where he started having urinary frequency.  She had outpatient urine culture and was started on Ceftin for 10 days which she " completed.  According to her daughter she did well with antibiotic and had improvement in her wellbeing and resolution of urinary frequency.  However last night she noted that she was not acting herself, weaker than usual and spiked a fever 100.7.  She noticed that the patient appeared congested with  clear nasal discharge.  The patient is usually able to do certain ADLs like walk to the bathroom.  She is incontinent at baseline and uses adult diapers.  Her daughter she had no diarrhea and did not complain of abdominal pain    Review of Systems:  Review of Systems   Unable to perform ROS: Age   Constitutional:  Positive for fever.   Genitourinary:  Positive for frequency.       Past Medical and Surgical History:   Past Medical History:   Diagnosis Date    Anxiety     Arthritis     Breast carcinoma ([facility]) 08/13/2012    Description: s/p L mastectomy. Dr. 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    06/06/2018 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    Osteoarthritis 06/05/2012    Osteoporosis 06/05/2012    Description: Dexa 7/13 read as normal, patient declines medication    Pressure injury of coccygeal region, stage 2 ([facility]) 03/06/2020    Pulmonary embolism ([facility]) 01/2011    last assessed 9/18/12    Restless legs syndrome 06/05/2012    RLS (restless legs syndrome)     Thoracic back pain 10/19/2017       Past Surgical History:   Procedure Laterality Date    BILATERAL OOPHORECTOMY      Laparoscopic    BREAST  SURGERY Left     Mastectomy     CHOLECYSTECTOMY      Laparoscopic    HERNIA REPAIR      HYSTERECTOMY      SMALL INTESTINE SURGERY         Meds/Allergies:  Prior to Admission medications    Medication Sig Start Date End Date Taking? Authorizing Provider   Acetaminophen (TYLENOL ARTHRITIS PAIN PO) Take 1 tablet by mouth if needed  Patient not taking: Reported on 4/29/2024    Historical Provider, MD   aluminum-magnesium hydroxide-simethicone (MAALOX) 2950-7802-955 mg/30 mL suspension Take 30 mL by mouth every 6 (six) hours as needed for indigestion or heartburn 5/6/24   Loretta Piper MD   aspirin (ECOTRIN LOW STRENGTH) 81 mg EC tablet Take 81 mg by mouth daily at bedtime    Historical Provider, MD   bisacodyl (DULCOLAX) 10 mg suppository Insert 1 suppository (10 mg total) into the rectum daily as needed for constipation 5/6/24   Loretta Piper MD   Calcium Carbonate-Vit D-Min (CALCIUM 1200 PO) Take 750 mg by mouth in the morning At 3pm    Historical Provider, MD   cholecalciferol (VITAMIN D3) 1,000 units tablet Take 4,000 Units by mouth daily    Historical Provider, MD   Cranberry 250 MG TABS Take 2 tablets by mouth 3 (three) times a day    Historical Provider, MD   meclizine (ANTIVERT) 25 mg tablet Take 1 tablet (25 mg total) by mouth every 8 (eight) hours as needed for dizziness  Patient not taking: Reported on 8/13/2024 2/16/24   Amina Rowe DO   Multiple Vitamins-Minerals (PRESERVISION AREDS 2 PO) Take 1 capsule by mouth 2 (two) times a day    Historical Provider, MD   nystatin (MYCOSTATIN) powder Apply topically 2 (two) times a day 6/19/24   Amina Rowe DO   omeprazole (PriLOSEC) 20 mg delayed release capsule Take 1 capsule (20 mg total) by mouth daily 9/2/24   Amina Rowe DO   pramipexole (MIRAPEX) 0.25 mg tablet Take 1 tablet (0.25 mg total) by mouth daily at bedtime 9/2/24   Amina Rowe DO   vitamin B-12 (VITAMIN B-12) 1,000 mcg tablet Take 2,500 mcg by mouth every  other day    Historical Provider, MD     I have reviewed home medications with patient family member.    Allergies:   Allergies   Allergen Reactions    Amoxicillin Hives    Celecoxib     Ciprofloxacin      Other reaction(s): diarrhea. Tolerates Levaquin    Codeine Other (See Comments)     Other reaction(s): Other (See Comments)  takes vicodin @ home  takes vicodin @ home    Epinephrine Other (See Comments)     Heart racing    Oxycodone-Acetaminophen Other (See Comments)     Nausea?    Oxycodone-Acetaminophen      Other reaction(s): Unknown Reaction    Propoxyphene Other (See Comments)    Rofecoxib     Sulfa Antibiotics Other (See Comments)     takes at home  takes at home  Other reaction(s): Other (See Comments)  takes at home    Sulfamethoxazole-Trimethoprim Other (See Comments)     Unknown, perhaps hives?    Nitrofurantoin Rash       Social History:  Marital Status:    Occupation: none  Patient Pre-hospital Living Situation: Home  Patient Pre-hospital Level of Mobility: walks with walker  Patient Pre-hospital Diet Restrictions: none  Substance Use History:   Social History     Substance and Sexual Activity   Alcohol Use No    Alcohol/week: 0.0 standard drinks of alcohol    Comment: denies     Social History     Tobacco Use   Smoking Status Never   Smokeless Tobacco Never   Tobacco Comments    denies     Social History     Substance and Sexual Activity   Drug Use No       Family History:  Family History   Problem Relation Age of Onset    Hypertension Mother     Blindness Mother     Cancer Sister     Cancer Maternal Aunt     Cancer Maternal Uncle        Physical Exam:     Vitals:   Blood Pressure: 134/62 (09/04/24 1826)  Pulse: (!) 122 (09/04/24 1826)  Temperature: 100.3 °F (37.9 °C) (09/04/24 1558)  Respirations: 16 (09/04/24 1826)  Weight - Scale: 55.2 kg (121 lb 11.1 oz) (09/04/24 1558)  SpO2: 96 % (09/04/24 1826)    Physical Exam  Vitals reviewed.   HENT:      Head: Atraumatic.      Nose: No congestion or  rhinorrhea.   Cardiovascular:      Rate and Rhythm: Normal rate and regular rhythm.   Pulmonary:      Breath sounds: No wheezing, rhonchi or rales.      Comments: Nodular right upper chest/breast area without open areas or warmth  Abdominal:      Tenderness: There is no abdominal tenderness.   Musculoskeletal:         General: No swelling or tenderness.      Right lower leg: No edema.      Left lower leg: No edema.   Skin:     Comments: Lesion right forehead   Neurological:      Mental Status: She is alert. She is disoriented.   Psychiatric:         Mood and Affect: Mood normal.         Behavior: Behavior normal.          Additional Data:     Lab Results:  Results from last 7 days   Lab Units 09/04/24  1633   WBC Thousand/uL 13.08*   HEMOGLOBIN g/dL 12.7   HEMATOCRIT % 38.7   PLATELETS Thousands/uL 258   SEGS PCT % 83*   LYMPHO PCT % 9*   MONO PCT % 7   EOS PCT % 0     Results from last 7 days   Lab Units 09/04/24  1633   SODIUM mmol/L 136   POTASSIUM mmol/L 3.8   CHLORIDE mmol/L 102   CO2 mmol/L 24   BUN mg/dL 18   CREATININE mg/dL 0.53*   ANION GAP mmol/L 10   CALCIUM mg/dL 9.2   ALBUMIN g/dL 3.7   TOTAL BILIRUBIN mg/dL 0.52   ALK PHOS U/L 99   ALT U/L 9   AST U/L 14   GLUCOSE RANDOM mg/dL 144*     Results from last 7 days   Lab Units 09/04/24  1633   INR  0.98         Lab Results   Component Value Date    HGBA1C 5.3 12/15/2021    HGBA1C 5.4 06/06/2018    HGBA1C 5.5 04/20/2017     Results from last 7 days   Lab Units 09/04/24  1633   LACTIC ACID mmol/L 2.4*   PROCALCITONIN ng/ml 0.12       Lines/Drains:  Invasive Devices       Peripheral Intravenous Line  Duration             Peripheral IV 09/04/24 Proximal;Right;Ventral (anterior) Forearm <1 day    Peripheral IV 09/04/24 Right;Ventral (anterior) Forearm <1 day                        Imaging: Reviewed radiology reports from this admission including: chest CT scan  CT head without contrast   Final Result by Raman Husain MD (09/04 1658)      No acute  intracranial abnormality.   Stable, moderate chronic microvascular ischemia.               Workstation performed: [id number]         CT chest without contrast   Final Result by Angus Meier MD (09/04 1718)      No pneumonia.      Multiple right breast nodules and skin thickening consistent with neoplastic process. Correlate with physical exam and history.               Workstation performed: [id number]             EKG and Other Studies Reviewed on Admission:   EKG: Sinus rhythm with PAC    ** Please Note: This note has been constructed using a voice recognition system. **

## 2024-09-05 PROBLEM — R78.81 GRAM-NEGATIVE BACTEREMIA: Status: ACTIVE | Noted: 2024-09-05

## 2024-09-05 LAB
ANION GAP SERPL CALCULATED.3IONS-SCNC: 7 MMOL/L (ref 4–13)
ATRIAL RATE: 68 BPM
BACTERIA UR CULT: NORMAL
BUN SERPL-MCNC: 14 MG/DL (ref 5–25)
CALCIUM SERPL-MCNC: 8.6 MG/DL (ref 8.4–10.2)
CHLORIDE SERPL-SCNC: 105 MMOL/L (ref 96–108)
CO2 SERPL-SCNC: 25 MMOL/L (ref 21–32)
CREAT SERPL-MCNC: 0.47 MG/DL (ref 0.6–1.3)
ERYTHROCYTE [DISTWIDTH] IN BLOOD BY AUTOMATED COUNT: 14.8 % (ref 11.6–15.1)
GFR SERPL CREATININE-BSD FRML MDRD: 83 ML/MIN/1.73SQ M
GLUCOSE SERPL-MCNC: 94 MG/DL (ref 65–140)
HCT VFR BLD AUTO: 34.5 % (ref 34.8–46.1)
HGB BLD-MCNC: 11.3 G/DL (ref 11.5–15.4)
MCH RBC QN AUTO: 30.5 PG (ref 26.8–34.3)
MCHC RBC AUTO-ENTMCNC: 32.8 G/DL (ref 31.4–37.4)
MCV RBC AUTO: 93 FL (ref 82–98)
P AXIS: 58 DEGREES
PLATELET # BLD AUTO: 199 THOUSANDS/UL (ref 149–390)
PLATELET # BLD AUTO: 204 THOUSANDS/UL (ref 149–390)
PMV BLD AUTO: 10.5 FL (ref 8.9–12.7)
PMV BLD AUTO: 9.6 FL (ref 8.9–12.7)
POTASSIUM SERPL-SCNC: 3.8 MMOL/L (ref 3.5–5.3)
PR INTERVAL: 164 MS
PROCALCITONIN SERPL-MCNC: 0.13 NG/ML
QRS AXIS: 29 DEGREES
QRSD INTERVAL: 80 MS
QT INTERVAL: 400 MS
QTC INTERVAL: 425 MS
RBC # BLD AUTO: 3.71 MILLION/UL (ref 3.81–5.12)
SARS-COV-2 RNA RESP QL NAA+PROBE: NEGATIVE
SODIUM SERPL-SCNC: 137 MMOL/L (ref 135–147)
T WAVE AXIS: 48 DEGREES
VENTRICULAR RATE: 68 BPM
WBC # BLD AUTO: 11.28 THOUSAND/UL (ref 4.31–10.16)

## 2024-09-05 PROCEDURE — 85027 COMPLETE CBC AUTOMATED: CPT | Performed by: INTERNAL MEDICINE

## 2024-09-05 PROCEDURE — 99232 SBSQ HOSP IP/OBS MODERATE 35: CPT | Performed by: INTERNAL MEDICINE

## 2024-09-05 PROCEDURE — 80048 BASIC METABOLIC PNL TOTAL CA: CPT | Performed by: INTERNAL MEDICINE

## 2024-09-05 PROCEDURE — 93010 ELECTROCARDIOGRAM REPORT: CPT | Performed by: INTERNAL MEDICINE

## 2024-09-05 PROCEDURE — 87635 SARS-COV-2 COVID-19 AMP PRB: CPT | Performed by: INTERNAL MEDICINE

## 2024-09-05 PROCEDURE — 84145 PROCALCITONIN (PCT): CPT | Performed by: INTERNAL MEDICINE

## 2024-09-05 PROCEDURE — 85049 AUTOMATED PLATELET COUNT: CPT | Performed by: INTERNAL MEDICINE

## 2024-09-05 RX ORDER — ACETAMINOPHEN 325 MG/1
650 TABLET ORAL EVERY 6 HOURS PRN
Status: DISCONTINUED | OUTPATIENT
Start: 2024-09-05 | End: 2024-09-09 | Stop reason: HOSPADM

## 2024-09-05 RX ADMIN — ACETAMINOPHEN 650 MG: 325 TABLET ORAL at 10:20

## 2024-09-05 RX ADMIN — PRAMIPEXOLE DIHYDROCHLORIDE 0.25 MG: 0.25 TABLET ORAL at 22:32

## 2024-09-05 RX ADMIN — ASPIRIN 81 MG: 81 TABLET, COATED ORAL at 22:32

## 2024-09-05 RX ADMIN — HEPARIN SODIUM 5000 UNITS: 5000 INJECTION INTRAVENOUS; SUBCUTANEOUS at 05:55

## 2024-09-05 RX ADMIN — PANTOPRAZOLE SODIUM 20 MG: 20 TABLET, DELAYED RELEASE ORAL at 05:55

## 2024-09-05 RX ADMIN — DEXTROSE 1000 MG: 50 INJECTION, SOLUTION INTRAVENOUS at 17:33

## 2024-09-05 RX ADMIN — HEPARIN SODIUM 5000 UNITS: 5000 INJECTION INTRAVENOUS; SUBCUTANEOUS at 22:32

## 2024-09-05 RX ADMIN — HEPARIN SODIUM 5000 UNITS: 5000 INJECTION INTRAVENOUS; SUBCUTANEOUS at 15:25

## 2024-09-05 NOTE — WOUND OSTOMY CARE
Consult Note - Wound   Latanya Ray 89 y.o. female MRN: [id number]  Unit/Bed#: [place] [address] -01 Encounter: [id number]      History and Present Illness:  89 year old female presented to the hospital with increased weakness and fevers.  Patient's history significant for breast cancer with left mastectomy, legally blind.    Assessment Findings:   Patient agreeable to assessment.  She requires assist x 1 to turn in bed.  Positioning wedges in use.  Incontinent of urine on exam--laura care provided.  Abdominal and groin folds intact.  Bilateral heels intact and blanchable with preventative foam dressings in place and elevated off of bed with pillows--not appropriate for offloading heel boots due to foot angulation/deformity.  Blanchable erythema over left medial foot bony prominence with preventative foam dressing in place.  Buttocks, sacrum, and posterior thighs--intact erythematous, light purple, entirely blanchable hyperpigmentation secondary to chronic incontinence.  No evidence of pressure injury at this time.    Malignant wound to right forehead--dry, brown/black eschars and scaling.  Laura-wound intact.  No drainage.  Appears chronic and stable.  Recommend keeping open to air.  Right chest--epidermal thickening with scaling.  Laura-wound pink and blanchable proximally.  Otherwise within normal limits.  No drainage.  Patient denies pain on palpation.  No fluctuance or induration appreciated.  Likely malignant.  CT scan of chest on 9/4/24 showing multiple right breast nodules and skin thickening consistent with neoplastic process.   Recommend keeping open to air at this time with serial assessments.  If begins to drain or cause discomfort, cover with dry, non-adherent dressing.      See flowsheet for wound details.  Consider [employer] positioning system--discussed with primary RN.      Wound Care Plan:   1-Apply silicone bordered foam dressings to bilateral heels for prevention.  Derrick with P.  Peel back for skin  assessments at least daily and re-apply.  Change dressings every 3 days and as needed.  2-Elevate heels off of bed/chair surface to offload pressure.  3-Offloading air cushion in chair when out of bed.  4-Apply moisturizing skin cream to body daily and as needed.  5-Turn/reposition every 2 hours while in bed and weight shift frequently while in chair for pressure re-distribution on skin.   6-Buttocks/sacrum--cleanse with comfort shield incontinence wipes, allow to dry.  Apply silicone cream three times daily and as needed with incontinence care.    Wound care team will sign-off at this time.  Plan of care reviewed with primary RN.    Depending on goals of care, patient should follow-up with dermatology and oncology as an outpatient.      Wound 09/04/24 Plantar Left;Plantar (Active)   Wound Image   09/04/24 2038   Wound Description Dry;Intact;Pink 09/05/24 1217   Dressing Foam, Silicon (eg. Allevyn, etc) 09/05/24 1217   Patient Tolerance Tolerated well 09/05/24 1217   Dressing Status Clean;Dry;Intact 09/05/24 1217       Wound 09/04/24 Pressure Injury Buttocks (Active)   Wound Image    09/05/24 1217   Wound Description Dry;Intact;Clean;Light purple 09/05/24 1217   Ksenia-wound Assessment Intact 09/05/24 1217   Wound Length (cm) 0 cm 09/05/24 1217   Wound Width (cm) 0 cm 09/05/24 1217   Wound Depth (cm) 0 cm 09/05/24 1217   Wound Surface Area (cm^2) 0 cm^2 09/05/24 1217   Wound Volume (cm^3) 0 cm^3 09/05/24 1217   Calculated Wound Volume (cm^3) 0 cm^3 09/05/24 1217   Treatments Cleansed 09/05/24 1217   Dressing Moisture barrier 09/05/24 1217   Patient Tolerance Tolerated well 09/05/24 1217       Wound 09/04/24 Malignant  Face Right;Upper (Active)   Wound Image   09/05/24 1215   Wound Description Dry;Brown;Black 09/05/24 1215   Ksenia-wound Assessment Scaly 09/05/24 1215   Wound Length (cm) 3 cm 09/05/24 1215   Wound Width (cm) 6.2 cm 09/05/24 1215   Wound Depth (cm) 0 cm 09/05/24 1215   Wound Surface Area (cm^2) 18.6 cm^2  09/05/24 1215   Wound Volume (cm^3) 0 cm^3 09/05/24 1215   Calculated Wound Volume (cm^3) 0 cm^3 09/05/24 1215   Drainage Amount None 09/05/24 1215   Dressing Open to air 09/05/24 1215       Wound 09/04/24 Chest Right;Upper (Active)   Wound Image   09/05/24 1214   Wound Description Dry;Intact;Pink 09/05/24 1214   Ksenia-wound Assessment Scaly 09/05/24 1214   Drainage Amount None 09/05/24 1214   Dressing Open to air 09/05/24 1214       Krissy Dela Cruz RN, BSN, CWON

## 2024-09-05 NOTE — PLAN OF CARE
Problem: Potential for Falls  Goal: Patient will remain free of falls  Description: INTERVENTIONS:  - Educate patient/family on patient safety including physical limitations  - Instruct patient to call for assistance with activity   - Consult OT/PT to assist with strengthening/mobility   - Keep Call bell within reach  - Keep bed low and locked with side rails adjusted as appropriate  - Keep care items and personal belongings within reach  - Initiate and maintain comfort rounds  - Make Fall Risk Sign visible to staff  - Offer Toileting every 2 Hours, in advance of need  - Initiate/Maintain bed alarm  - Obtain necessary fall risk management equipment: bed alarm  - Apply yellow socks and bracelet for high fall risk patients  - Consider moving patient to room near nurses station  Outcome: Progressing     Problem: Prexisting or High Potential for Compromised Skin Integrity  Goal: Skin integrity is maintained or improved  Description: INTERVENTIONS:  - Identify patients at risk for skin breakdown  - Assess and monitor skin integrity  - Assess and monitor nutrition and hydration status  - Monitor labs   - Assess for incontinence   - Turn and reposition patient  - Assist with mobility/ambulation  - Relieve pressure over bony prominences  - Avoid friction and shearing  - Provide appropriate hygiene as needed including keeping skin clean and dry  - Evaluate need for skin moisturizer/barrier cream  - Collaborate with interdisciplinary team   - Patient/family teaching  - Consider wound care consult   Outcome: Progressing     Problem: INFECTION - ADULT  Goal: Absence or prevention of progression during hospitalization  Description: INTERVENTIONS:  - Assess and monitor for signs and symptoms of infection  - Monitor lab/diagnostic results  - Monitor all insertion sites, i.e. indwelling lines, tubes, and drains  - Monitor endotracheal if appropriate and nasal secretions for changes in amount and color  - Jeffrey appropriate  cooling/warming therapies per order  - Administer medications as ordered  - Instruct and encourage patient and family to use good hand hygiene technique  - Identify and instruct in appropriate isolation precautions for identified infection/condition  Outcome: Progressing     Problem: SAFETY ADULT  Goal: Patient will remain free of falls  Description: INTERVENTIONS:  - Educate patient/family on patient safety including physical limitations  - Instruct patient to call for assistance with activity   - Consult OT/PT to assist with strengthening/mobility   - Keep Call bell within reach  - Keep bed low and locked with side rails adjusted as appropriate  - Keep care items and personal belongings within reach  - Initiate and maintain comfort rounds  - Make Fall Risk Sign visible to staff  - Offer Toileting every 2 Hours, in advance of need  - Initiate/Maintain bed alarm  - Obtain necessary fall risk management equipment: bed alarm  - Apply yellow socks and bracelet for high fall risk patients  - Consider moving patient to room near nurses station  Outcome: Progressing     Problem: GENITOURINARY - ADULT  Goal: Maintains or returns to baseline urinary function  Description: INTERVENTIONS:  - Assess urinary function  - Encourage oral fluids to ensure adequate hydration if ordered  - Administer IV fluids as ordered to ensure adequate hydration  - Administer ordered medications as needed  - Offer frequent toileting  - Follow urinary retention protocol if ordered  Outcome: Progressing

## 2024-09-05 NOTE — PROGRESS NOTES
Formerly [facility], later [facility]  Progress Note  Name: Latanya Ray I  MRN: [id number]  Unit/Bed#: [place] [address] -01 I Date of Admission: 9/4/2024   Date of Service: 9/5/2024 I Hospital Day: 1    Assessment & Plan   Sepsis (HCC)  Assessment & Plan  Sepsis likely from recurrent urinary tract infection versus nonflu/RSV/COVID viral systemic illness  CT chest negative for pneumonia  She has no GI symptoms  She has no leg swelling/tenderness  Abdomen is benign  The  skin in the right chest does not look infected    Treating for UTI  Continue ceftriaxone and follow-up on repeat urine cultures.    Restless leg syndrome  Assessment & Plan  Continue pramipexole at bedtime    Cognitive impairment  Assessment & Plan  High risk for delirium.  She also is legally blind so she will need supervision with feeding and activities  fall precautions/ aspiration precautions    History of lobular carcinoma of breast  Assessment & Plan  She has multiple right breast nodules and skin thickening on CT.  This is reflected on physical exam.  No open areas or sign of cellulitis.           VTE Pharmacologic Prophylaxis: VTE Score: 7 High Risk (Score >/= 5) - Pharmacological DVT Prophylaxis Ordered: heparin. Sequential Compression Devices Ordered.    Mobility:   Basic Mobility Inpatient Raw Score: 10  JH-HLM Goal: 4: Move to chair/commode  JH-HLM Achieved: 2: Bed activities/Dependent transfer  JH-HLM Goal NOT achieved. Continue with multidisciplinary rounding and encourage appropriate mobility to improve upon JH-HLM goals.    Patient Centered Rounds: I performed bedside rounds with nursing staff today.   Discussions with Specialists or Other Care Team Provider: BETITO.    Education and Discussions with Family / Patient: Updated  (daughter) via phone.    Total Time Spent on Date of Encounter in care of patient: 35 mins. This time was spent on one or more of the following: performing physical exam; counseling and coordination  of care; obtaining or reviewing history; documenting in the medical record; reviewing/ordering tests, medications or procedures; communicating with other healthcare professionals and discussing with patient's family/caregivers.    Current Length of Stay: 1 day(s)  Current Patient Status: Inpatient   Certification Statement: The patient will continue to require additional inpatient hospital stay due to the antibiotics, cultures, clinical monitoring, serial lab monitoring  Discharge Plan: Anticipate discharge in 24-48 hrs to home.    Code Status: Level 3 - DNAR and DNI    Subjective:   Patient seen and examined.  No acute events reported overnight.  No complaints this morning.  Afebrile.  Tolerating antibiotics without obvious side effect.    Objective:     Vitals:   Temp (24hrs), Av.7 °F (37.1 °C), Min:97.7 °F (36.5 °C), Max:100.3 °F (37.9 °C)    Temp:  [97.7 °F (36.5 °C)-100.3 °F (37.9 °C)] 99.8 °F (37.7 °C)  HR:  [] 64  Resp:  [16-18] 18  BP: (103-134)/(55-88) 108/61  SpO2:  [96 %-100 %] 96 %  Body mass index is 24.76 kg/m².     Input and Output Summary (last 24 hours):     Intake/Output Summary (Last 24 hours) at 2024 0946  Last data filed at 2024 0513  Gross per 24 hour   Intake 2800 ml   Output 261 ml   Net 2539 ml       PHYSICAL EXAM:    Vitals signs reviewed  Constitutional   Awake and cooperative. NAD.   Head/Neck   Normocephalic. Atraumatic.  Right frontal scalp lesion noted.   HEENT   No scleral icterus. EOMI.   Heart   Regular rate and rhythm. No murmers.   Lungs   Clear to auscultation bilaterally. Respirations unlaboured.   Abdomen   Soft. Nontender. Nondistended.    Skin   Skin color normal. No rashes.   Extremities   No deformities. No peripheral edema.   Neuro   Alert and oriented. No new deficits.   Psych   Mood stable. Affect normal.         Additional Data:     Labs:  Results from last 7 days   Lab Units 24  0420 24  0258 24  1633   WBC Thousand/uL 11.28*  --   13.08*   HEMOGLOBIN g/dL 11.3*  --  12.7   HEMATOCRIT % 34.5*  --  38.7   PLATELETS Thousands/uL 204   < > 258   SEGS PCT %  --   --  83*   LYMPHO PCT %  --   --  9*   MONO PCT %  --   --  7   EOS PCT %  --   --  0    < > = values in this interval not displayed.     Results from last 7 days   Lab Units 09/05/24  0420 09/04/24  1633   SODIUM mmol/L 137 136   POTASSIUM mmol/L 3.8 3.8   CHLORIDE mmol/L 105 102   CO2 mmol/L 25 24   BUN mg/dL 14 18   CREATININE mg/dL 0.47* 0.53*   ANION GAP mmol/L 7 10   CALCIUM mg/dL 8.6 9.2   ALBUMIN g/dL  --  3.7   TOTAL BILIRUBIN mg/dL  --  0.52   ALK PHOS U/L  --  99   ALT U/L  --  9   AST U/L  --  14   GLUCOSE RANDOM mg/dL 94 144*     Results from last 7 days   Lab Units 09/04/24  1633   INR  0.98             Results from last 7 days   Lab Units 09/05/24  0420 09/04/24  1923 09/04/24  1633   LACTIC ACID mmol/L  --  1.4 2.4*   PROCALCITONIN ng/ml 0.13  --  0.12       Lines/Drains:  Invasive Devices       Peripheral Intravenous Line  Duration             Peripheral IV 09/04/24 Proximal;Right;Ventral (anterior) Forearm <1 day    Peripheral IV 09/04/24 Right;Ventral (anterior) Forearm <1 day                          Imaging: No pertinent imaging reviewed.    Recent Cultures (last 7 days):   Results from last 7 days   Lab Units 09/04/24 1923   BLOOD CULTURE  Received in Microbiology Lab. Culture in Progress.  Received in Microbiology Lab. Culture in Progress.       Last 24 Hours Medication List:   Current Facility-Administered Medications   Medication Dose Route Frequency Provider Last Rate    aspirin  81 mg Oral HS Joe Brown MD      cefTRIAXone  1,000 mg Intravenous Q24H Joe Brown MD      heparin (porcine)  5,000 Units Subcutaneous Q8H HERMAN Joe Brown MD      pantoprazole  20 mg Oral Early Morning Joe Brown MD      pramipexole  0.25 mg Oral HS Joe Brown MD          Today, Patient Was Seen By:  Allen Burnette, DO    **Please Note: This note may have been constructed using a voice recognition system.**

## 2024-09-05 NOTE — ASSESSMENT & PLAN NOTE
Sepsis likely from recurrent urinary tract infection versus nonflu/RSV/COVID viral systemic illness  CT chest negative for pneumonia  She has no GI symptoms  She has no leg swelling/tenderness  Abdomen is benign  The  skin in the right chest does not look infected    Treating for UTI  Continue ceftriaxone and follow-up on repeat urine cultures.

## 2024-09-05 NOTE — DISCHARGE INSTR - OTHER ORDERS
Skin Care Plan:   1-Apply silicone bordered foam dressings to bilateral heels for prevention.  Change dressings every 3 days and as needed.  2-Elevate heels off of bed/chair surface to offload pressure.  3-Offloading air cushion in chair when out of bed.  4-Apply lotion to body daily and as needed.  5-Turn/reposition every 2 hours while in bed and weight shift frequently while in chair for pressure re-distribution on skin.   6-Buttocks/sacrum--cleanse, pat dry.  Apply silicone cream three times daily and as needed with incontinence care.

## 2024-09-05 NOTE — ASSESSMENT & PLAN NOTE
High risk for delirium.  She also is legally blind so she will need supervision with feeding and activities  fall precautions/ aspiration precautions

## 2024-09-06 LAB
BACTERIA UR CULT: ABNORMAL
BACTERIA UR CULT: ABNORMAL

## 2024-09-06 PROCEDURE — 99232 SBSQ HOSP IP/OBS MODERATE 35: CPT | Performed by: INTERNAL MEDICINE

## 2024-09-06 RX ADMIN — HEPARIN SODIUM 5000 UNITS: 5000 INJECTION INTRAVENOUS; SUBCUTANEOUS at 21:23

## 2024-09-06 RX ADMIN — HEPARIN SODIUM 5000 UNITS: 5000 INJECTION INTRAVENOUS; SUBCUTANEOUS at 15:09

## 2024-09-06 RX ADMIN — PRAMIPEXOLE DIHYDROCHLORIDE 0.25 MG: 0.25 TABLET ORAL at 21:23

## 2024-09-06 RX ADMIN — HEPARIN SODIUM 5000 UNITS: 5000 INJECTION INTRAVENOUS; SUBCUTANEOUS at 05:56

## 2024-09-06 RX ADMIN — ASPIRIN 81 MG: 81 TABLET, COATED ORAL at 21:23

## 2024-09-06 RX ADMIN — PANTOPRAZOLE SODIUM 20 MG: 20 TABLET, DELAYED RELEASE ORAL at 05:57

## 2024-09-06 RX ADMIN — CEFEPIME 2000 MG: 2 INJECTION, POWDER, FOR SOLUTION INTRAVENOUS at 09:17

## 2024-09-06 RX ADMIN — CEFEPIME 2000 MG: 2 INJECTION, POWDER, FOR SOLUTION INTRAVENOUS at 19:20

## 2024-09-06 NOTE — CASE MANAGEMENT
Case Management Assessment & Discharge Planning Note    Patient name Latanya Ray  Location [facility] 2 M* MRN [id number]  : 1926 Date 2024       Current Admission Date: 2024  Current Admission Diagnosis:Sepsis (HCC)   Patient Active Problem List    Diagnosis Date Noted Date Diagnosed    Gram-negative bacteremia 2024     Restless leg syndrome 2024     Generalized muscle weakness 2024     Sepsis (HCC) 2024     Diaphragmatic hernia without obstruction or gangrene 2024     Dysphagia, oropharyngeal phase 2024     Medicare annual wellness visit, subsequent 2023     Legal blindness 2022     Cognitive impairment 2021     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     Vitamin D deficiency 2012     Cerebral aneurysm 2012     Anxiety 2012     History of lobular carcinoma of breast 2012       LOS (days): 2  Geometric Mean LOS (GMLOS) (days):   Days to GMLOS:     OBJECTIVE:    Risk of Unplanned Readmission Score: 11.91         Current admission status: Inpatient       Preferred Pharmacy:   [id number] - [place] [address]  Phone: [phone] Fax: [phone]    [facility] - [address] [address]  Phone: [phone] Fax: [phone]    Primary Care Provider: Amina Rowe DO    Primary Insurance: MEDICARE  Secondary Insurance: AARP    ASSESSMENT:  Active Health Care Proxies       Celia Gilbert Health Care Agent - Child   Primary Phone: [phone] (Home)                 Advance Directives  Does patient have a Health Care POA?: Yes  Does patient have Advance Directives?: Yes  Advance Directives: Living will, Power of  for health care, Power of  for finance  Primary Contact: Celia Gilbert, daughter         Readmission Root Cause  30 Day  Readmission: No    Patient Information  Admitted from:: Home  Mental Status: Alert  During Assessment patient was accompanied by: Not accompanied during assessment  Assessment information provided by:: Daughter  Primary Caregiver: Family  Caregiver's Name:: Celia Gilbert, daughter  Caregiver's Relationship to Patient:: Family Member  Caregiver's Telephone Number:: [phone]  Support Systems: Children  County of Residence: [place]  What city do you live in?: [place]  Home entry access options. Select all that apply.: Stairs  Number of steps to enter home.: 2  Type of Current Residence: [facility]  Living Arrangements: Lives w/ Daughter  Is patient a ?: No    Activities of Daily Living Prior to Admission  Functional Status: Assistance (Daughter assists with bathing, dressing, toileting)  Completes ADLs independently?: No  Level of ADL dependence: Assistance  Ambulates independently?: Yes  Does patient use assisted devices?: Yes  Assisted Devices (DME) used: Bedside Commode, Straight Cane, Walker, Other (Comment) (Lift chair, bars in tub and around toilet, tub transfer bench,)  Does patient currently own DME?: Yes  What DME does the patient currently own?: Bedside Commode, Other (Comment), Straight Cane, Walker (Lift chair, bars in tub and around toilet, tub transfer bench,)  Does patient have a history of Outpatient Therapy (PT/OT)?: No  Does the patient have a history of Short-Term Rehab?: Yes (Pt was last at [facility] in April.)  Does patient have a history of HHC?: Yes ([facility])  Does patient currently have HHC?: No         Patient Information Continued  Income Source: Pension/FPC  Does patient have prescription coverage?: Yes  Does patient receive dialysis treatments?: No  Does patient have a history of substance abuse?: No  Does patient have a history of Mental Health Diagnosis?: Yes (Anxiety)  Is patient receiving treatment for mental health?:  (Per daughter, pt is no longer being treated for  this.)  Has patient received inpatient treatment related to mental health in the last 2 years?: No         Means of Transportation  Means of Transport to Appts:: Family transport      Social Determinants of Health (SDOH)      Flowsheet Row Most Recent Value   Housing Stability    In the last 12 months, was there a time when you were not able to pay the mortgage or rent on time? N   In the past 12 months, how many times have you moved where you were living? 0   At any time in the past 12 months, were you homeless or living in a shelter (including now)? N   Transportation Needs    In the past 12 months, has lack of transportation kept you from medical appointments or from getting medications? no   In the past 12 months, has lack of transportation kept you from meetings, work, or from getting things needed for daily living? No   Food Insecurity    Within the past 12 months, you worried that your food would run out before you got the money to buy more. Never true   Within the past 12 months, the food you bought just didn't last and you didn't have money to get more. Never true   Utilities    In the past 12 months has the electric, gas, oil, or water company threatened to shut off services in your home? No            DISCHARGE DETAILS:    Discharge planning discussed with:: Erin Barbera  Freedom of Choice: Yes     CM contacted family/caregiver?: Yes  Were Treatment Team discharge recommendations reviewed with patient/caregiver?: Yes  Did patient/caregiver verbalize understanding of patient care needs?: Yes  Were patient/caregiver advised of the risks associated with not following Treatment Team discharge recommendations?: Yes    Contacts  Patient Contacts: Celia Gilbert, daughter  Relationship to Patient:: Family  Contact Method: Phone  Phone Number: [phone]  Reason/Outcome: Continuity of Care, Emergency Contact, Discharge Planning    Treatment Team Recommendation: Home with Home Health Care  Discharge  Destination Plan:: Home with Home Health Care    Additional Comments: CM called pt's daughter/POA, Celia, in order to complete an assessment and discuss CM's role. Pt lives with her daughter in a ranch home with 1+1 SUSI.  Celia assists with dressing, bathing and toileting.  Pt sleeps in her lift chair.  Celia is also receiving chemo at [facility].  When Celia is receiving treatment, either her brother stays with the pt or they use [facility].

## 2024-09-06 NOTE — PLAN OF CARE
Problem: Potential for Falls  Goal: Patient will remain free of falls  Description: INTERVENTIONS:  - Educate patient/family on patient safety including physical limitations  - Instruct patient to call for assistance with activity   - Consult OT/PT to assist with strengthening/mobility   - Keep Call bell within reach  - Keep bed low and locked with side rails adjusted as appropriate  - Keep care items and personal belongings within reach  - Initiate and maintain comfort rounds  - Make Fall Risk Sign visible to staff  - Offer Toileting every 2 Hours, in advance of need  - Initiate/Maintain bed alarm  - Obtain necessary fall risk management equipment: bed alarm  - Apply yellow socks and bracelet for high fall risk patients  - Consider moving patient to room near nurses station  Outcome: Progressing     Problem: Prexisting or High Potential for Compromised Skin Integrity  Goal: Skin integrity is maintained or improved  Description: INTERVENTIONS:  - Identify patients at risk for skin breakdown  - Assess and monitor skin integrity  - Assess and monitor nutrition and hydration status  - Monitor labs   - Assess for incontinence   - Turn and reposition patient  - Assist with mobility/ambulation  - Relieve pressure over bony prominences  - Avoid friction and shearing  - Provide appropriate hygiene as needed including keeping skin clean and dry  - Evaluate need for skin moisturizer/barrier cream  - Collaborate with interdisciplinary team   - Patient/family teaching  - Consider wound care consult   Outcome: Progressing     Problem: INFECTION - ADULT  Goal: Absence or prevention of progression during hospitalization  Description: INTERVENTIONS:  - Assess and monitor for signs and symptoms of infection  - Monitor lab/diagnostic results  - Monitor all insertion sites, i.e. indwelling lines, tubes, and drains  - Monitor endotracheal if appropriate and nasal secretions for changes in amount and color  - [place] appropriate  cooling/warming therapies per order  - Administer medications as ordered  - Instruct and encourage patient and family to use good hand hygiene technique  - Identify and instruct in appropriate isolation precautions for identified infection/condition  Outcome: Progressing     Problem: SAFETY ADULT  Goal: Patient will remain free of falls  Description: INTERVENTIONS:  - Educate patient/family on patient safety including physical limitations  - Instruct patient to call for assistance with activity   - Consult OT/PT to assist with strengthening/mobility   - Keep Call bell within reach  - Keep bed low and locked with side rails adjusted as appropriate  - Keep care items and personal belongings within reach  - Initiate and maintain comfort rounds  - Make Fall Risk Sign visible to staff  - Offer Toileting every 2 Hours, in advance of need  - Initiate/Maintain bed alarm  - Obtain necessary fall risk management equipment: bed alarm  - Apply yellow socks and bracelet for high fall risk patients  - Consider moving patient to room near nurses station  Outcome: Progressing     Problem: GENITOURINARY - ADULT  Goal: Maintains or returns to baseline urinary function  Description: INTERVENTIONS:  - Assess urinary function  - Encourage oral fluids to ensure adequate hydration if ordered  - Administer IV fluids as ordered to ensure adequate hydration  - Administer ordered medications as needed  - Offer frequent toileting  - Follow urinary retention protocol if ordered  Outcome: Progressing

## 2024-09-06 NOTE — PLAN OF CARE
Problem: Potential for Falls  Goal: Patient will remain free of falls  Description: INTERVENTIONS:  - Educate patient/family on patient safety including physical limitations  - Instruct patient to call for assistance with activity   - Consult OT/PT to assist with strengthening/mobility   - Keep Call bell within reach  - Keep bed low and locked with side rails adjusted as appropriate  - Keep care items and personal belongings within reach  - Initiate and maintain comfort rounds  - Make Fall Risk Sign visible to staff  - Offer Toileting every 2 Hours, in advance of need  - Initiate/Maintain bed alarm  - Obtain necessary fall risk management equipment: bed alarm  - Apply yellow socks and bracelet for high fall risk patients  - Consider moving patient to room near nurses station  Outcome: Progressing     Problem: Prexisting or High Potential for Compromised Skin Integrity  Goal: Skin integrity is maintained or improved  Description: INTERVENTIONS:  - Identify patients at risk for skin breakdown  - Assess and monitor skin integrity  - Assess and monitor nutrition and hydration status  - Monitor labs   - Assess for incontinence   - Turn and reposition patient  - Assist with mobility/ambulation  - Relieve pressure over bony prominences  - Avoid friction and shearing  - Provide appropriate hygiene as needed including keeping skin clean and dry  - Evaluate need for skin moisturizer/barrier cream  - Collaborate with interdisciplinary team   - Patient/family teaching  - Consider wound care consult   Outcome: Progressing     Problem: INFECTION - ADULT  Goal: Absence or prevention of progression during hospitalization  Description: INTERVENTIONS:  - Assess and monitor for signs and symptoms of infection  - Monitor lab/diagnostic results  - Monitor all insertion sites, i.e. indwelling lines, tubes, and drains  - Monitor endotracheal if appropriate and nasal secretions for changes in amount and color  - [place] appropriate  cooling/warming therapies per order  - Administer medications as ordered  - Instruct and encourage patient and family to use good hand hygiene technique  - Identify and instruct in appropriate isolation precautions for identified infection/condition  Outcome: Progressing     Problem: SAFETY ADULT  Goal: Patient will remain free of falls  Description: INTERVENTIONS:  - Educate patient/family on patient safety including physical limitations  - Instruct patient to call for assistance with activity   - Consult OT/PT to assist with strengthening/mobility   - Keep Call bell within reach  - Keep bed low and locked with side rails adjusted as appropriate  - Keep care items and personal belongings within reach  - Initiate and maintain comfort rounds  - Make Fall Risk Sign visible to staff  - Offer Toileting every 2 Hours, in advance of need  - Initiate/Maintain bed alarm  - Obtain necessary fall risk management equipment: bed alarm  - Apply yellow socks and bracelet for high fall risk patients  - Consider moving patient to room near nurses station  Outcome: Progressing     Problem: GENITOURINARY - ADULT  Goal: Maintains or returns to baseline urinary function  Description: INTERVENTIONS:  - Assess urinary function  - Encourage oral fluids to ensure adequate hydration if ordered  - Administer IV fluids as ordered to ensure adequate hydration  - Administer ordered medications as needed  - Offer frequent toileting  - Follow urinary retention protocol if ordered  Outcome: Progressing

## 2024-09-06 NOTE — PLAN OF CARE
Problem: Potential for Falls  Goal: Patient will remain free of falls  Description: INTERVENTIONS:  - Educate patient/family on patient safety including physical limitations  - Instruct patient to call for assistance with activity   - Consult OT/PT to assist with strengthening/mobility   - Keep Call bell within reach  - Keep bed low and locked with side rails adjusted as appropriate  - Keep care items and personal belongings within reach  - Initiate and maintain comfort rounds  - Make Fall Risk Sign visible to staff  - Offer Toileting every 2 Hours, in advance of need  - Initiate/Maintain bed alarm  - Obtain necessary fall risk management equipment: bed alarm  - Apply yellow socks and bracelet for high fall risk patients  - Consider moving patient to room near nurses station  Outcome: Progressing     Problem: Prexisting or High Potential for Compromised Skin Integrity  Goal: Skin integrity is maintained or improved  Description: INTERVENTIONS:  - Identify patients at risk for skin breakdown  - Assess and monitor skin integrity  - Assess and monitor nutrition and hydration status  - Monitor labs   - Assess for incontinence   - Turn and reposition patient  - Assist with mobility/ambulation  - Relieve pressure over bony prominences  - Avoid friction and shearing  - Provide appropriate hygiene as needed including keeping skin clean and dry  - Evaluate need for skin moisturizer/barrier cream  - Collaborate with interdisciplinary team   - Patient/family teaching  - Consider wound care consult   Outcome: Progressing     Problem: INFECTION - ADULT  Goal: Absence or prevention of progression during hospitalization  Description: INTERVENTIONS:  - Assess and monitor for signs and symptoms of infection  - Monitor lab/diagnostic results  - Monitor all insertion sites, i.e. indwelling lines, tubes, and drains  - Monitor endotracheal if appropriate and nasal secretions for changes in amount and color  - [place] appropriate  cooling/warming therapies per order  - Administer medications as ordered  - Instruct and encourage patient and family to use good hand hygiene technique  - Identify and instruct in appropriate isolation precautions for identified infection/condition  Outcome: Progressing     Problem: SAFETY ADULT  Goal: Patient will remain free of falls  Description: INTERVENTIONS:  - Educate patient/family on patient safety including physical limitations  - Instruct patient to call for assistance with activity   - Consult OT/PT to assist with strengthening/mobility   - Keep Call bell within reach  - Keep bed low and locked with side rails adjusted as appropriate  - Keep care items and personal belongings within reach  - Initiate and maintain comfort rounds  - Make Fall Risk Sign visible to staff  - Offer Toileting every 2 Hours, in advance of need  - Initiate/Maintain bed alarm  - Obtain necessary fall risk management equipment: bed alarm  - Apply yellow socks and bracelet for high fall risk patients  - Consider moving patient to room near nurses station  Outcome: Progressing     Problem: GENITOURINARY - ADULT  Goal: Maintains or returns to baseline urinary function  Description: INTERVENTIONS:  - Assess urinary function  - Encourage oral fluids to ensure adequate hydration if ordered  - Administer IV fluids as ordered to ensure adequate hydration  - Administer ordered medications as needed  - Offer frequent toileting  - Follow urinary retention protocol if ordered  Outcome: Progressing

## 2024-09-06 NOTE — PROGRESS NOTES
[facility]  Progress Note  Name: Latanya Ray I  MRN: [id number]  Unit/Bed#: [place] [address] -01 I Date of Admission: 9/4/2024   Date of Service: 9/6/2024 I Hospital Day: 2    Assessment & Plan   * Sepsis (HCC)  Assessment & Plan  Sepsis secondary to UTI and gram-negative bacteremia  Blood identification panel showing Enterobacter, urine culture showing this as well    Discontinue IV ceftriaxone  Started on IV cefepime  Monitor final blood and urine cultures  Monitor WBC count and fever curve    Gram-negative bacteremia  Assessment & Plan  Secondary to UTI  Cultures showing Enterobacter  Changed antibiotics to IV cefepime  Follow-up final sensitivities    Restless leg syndrome  Assessment & Plan  Continue pramipexole at bedtime    Cognitive impairment  Assessment & Plan  High risk for delirium.  She also is legally blind so she will need supervision with feeding and activities  fall precautions/ aspiration precautions    History of lobular carcinoma of breast  Assessment & Plan  She has multiple right breast nodules and skin thickening on CT.  This is reflected on physical exam.  No open areas or sign of cellulitis.           VTE Pharmacologic Prophylaxis: VTE Score: 7 High Risk (Score >/= 5) - Pharmacological DVT Prophylaxis Ordered: heparin. Sequential Compression Devices Ordered.    Mobility:   Basic Mobility Inpatient Raw Score: 10  JH-HLM Goal: 4: Move to chair/commode  JH-HLM Achieved: 2: Bed activities/Dependent transfer  JH-HLM Goal NOT achieved. Continue with multidisciplinary rounding and encourage appropriate mobility to improve upon JH-HLM goals.    Patient Centered Rounds: I performed bedside rounds with nursing staff today.   Discussions with Specialists or Other Care Team Provider: BETITO.    Education and Discussions with Family / Patient: Updated  (daughter) via phone.    Total Time Spent on Date of Encounter in care of patient: 35 mins. This time was spent on  one or more of the following: performing physical exam; counseling and coordination of care; obtaining or reviewing history; documenting in the medical record; reviewing/ordering tests, medications or procedures; communicating with other healthcare professionals and discussing with patient's family/caregivers.    Current Length of Stay: 2 day(s)  Current Patient Status: Inpatient   Certification Statement: The patient will continue to require additional inpatient hospital stay due to the antibiotics, cultures, clinical monitoring, serial lab monitoring  Discharge Plan: Anticipate discharge in 24-48 hrs to home.    Code Status: Level 3 - DNAR and DNI    Subjective:   Patient seen and examined.  Febrile yesterday but no events overnight.  No complaints this morning.    Objective:     Vitals:   Temp (24hrs), Av.7 °F (37.6 °C), Min:98.3 °F (36.8 °C), Max:101 °F (38.3 °C)    Temp:  [98.3 °F (36.8 °C)-101 °F (38.3 °C)] 98.8 °F (37.1 °C)  HR:  [] 67  Resp:  [18-21] 21  BP: ()/(47-65) 112/64  SpO2:  [94 %-96 %] 96 %  Body mass index is 24.76 kg/m².     Input and Output Summary (last 24 hours):     Intake/Output Summary (Last 24 hours) at 2024 0845  Last data filed at 2024  Gross per 24 hour   Intake 1250 ml   Output 200 ml   Net 1050 ml       PHYSICAL EXAM:    Vitals signs reviewed  Constitutional   Awake and cooperative. NAD.   Head/Neck   Normocephalic. Atraumatic.  Right frontal scalp lesion noted.   HEENT   No scleral icterus. EOMI.   Heart   Regular rate and rhythm. No murmers.   Lungs   Clear to auscultation bilaterally. Respirations unlaboured.   Abdomen   Soft. Nontender. Nondistended.    Skin   Skin color normal. No rashes.   Extremities   No deformities. No peripheral edema.   Neuro   Alert and oriented. No new deficits.   Psych   Mood stable. Affect normal.         Additional Data:     Labs:  Results from last 7 days   Lab Units 24  0420 24  0258 24  1633   WBC  Thousand/uL 11.28*  --  13.08*   HEMOGLOBIN g/dL 11.3*  --  12.7   HEMATOCRIT % 34.5*  --  38.7   PLATELETS Thousands/uL 204   < > 258   SEGS PCT %  --   --  83*   LYMPHO PCT %  --   --  9*   MONO PCT %  --   --  7   EOS PCT %  --   --  0    < > = values in this interval not displayed.     Results from last 7 days   Lab Units 09/05/24  0420 09/04/24  1633   SODIUM mmol/L 137 136   POTASSIUM mmol/L 3.8 3.8   CHLORIDE mmol/L 105 102   CO2 mmol/L 25 24   BUN mg/dL 14 18   CREATININE mg/dL 0.47* 0.53*   ANION GAP mmol/L 7 10   CALCIUM mg/dL 8.6 9.2   ALBUMIN g/dL  --  3.7   TOTAL BILIRUBIN mg/dL  --  0.52   ALK PHOS U/L  --  99   ALT U/L  --  9   AST U/L  --  14   GLUCOSE RANDOM mg/dL 94 144*     Results from last 7 days   Lab Units 09/04/24  1633   INR  0.98             Results from last 7 days   Lab Units 09/05/24  0420 09/04/24 1923 09/04/24  1633   LACTIC ACID mmol/L  --  1.4 2.4*   PROCALCITONIN ng/ml 0.13  --  0.12       Lines/Drains:  Invasive Devices       Peripheral Intravenous Line  Duration             Peripheral IV 09/04/24 Proximal;Right;Ventral (anterior) Forearm 1 day    Peripheral IV 09/04/24 Right;Ventral (anterior) Forearm 1 day                          Imaging: No pertinent imaging reviewed.    Recent Cultures (last 7 days):   Results from last 7 days   Lab Units 09/04/24  1923 09/04/24  1102   BLOOD CULTURE  No Growth at 24 hrs.  --    GRAM STAIN RESULT  Gram negative rods*  --    URINE CULTURE  >100,000 cfu/ml Enterobacter cloacae complex*  >100,000 cfu/ml Escherichia coli* >100,000 cfu/ml       Last 24 Hours Medication List:   Current Facility-Administered Medications   Medication Dose Route Frequency Provider Last Rate    acetaminophen  650 mg Oral Q6H PRN Allen Burnette DO      aspirin  81 mg Oral HS Joe Brown MD      cefepime  2,000 mg Intravenous Q12H Allen Burnette DO      heparin (porcine)  5,000 Units Subcutaneous Q8H [facility] Joe Brown MD       pantoprazole  20 mg Oral Early Morning Joe Brown MD      pramipexole  0.25 mg Oral HS Joe Brown MD          Today, Patient Was Seen By: Allen Burnette DO    **Please Note: This note may have been constructed using a voice recognition system.**

## 2024-09-06 NOTE — ASSESSMENT & PLAN NOTE
Sepsis secondary to UTI and Enterobacter bacteremia  Enterobacter cloacae on blood culture and urine culture    Continue IV cefepime through today; will transition to p.o. Levaquin tomorrow to complete a 7-day course of antibiotic therapy  Monitor WBC count and fever curve  Disposition: Discharge home with home health care tomorrow

## 2024-09-06 NOTE — ASSESSMENT & PLAN NOTE
1 out of 2 cultures on admission positive for Enterobacter  Secondary to UTI  Will complete 3 days of IV cefepime; transition to p.o. Levaquin on discharge to complete a 7-day course of antibiotic therapy

## 2024-09-07 LAB
ANION GAP SERPL CALCULATED.3IONS-SCNC: 7 MMOL/L (ref 4–13)
BACTERIA BLD CULT: ABNORMAL
BASOPHILS # BLD AUTO: 0.04 THOUSANDS/ÂΜL (ref 0–0.1)
BASOPHILS NFR BLD AUTO: 1 % (ref 0–1)
BUN SERPL-MCNC: 13 MG/DL (ref 5–25)
CALCIUM SERPL-MCNC: 8.7 MG/DL (ref 8.4–10.2)
CHLORIDE SERPL-SCNC: 105 MMOL/L (ref 96–108)
CO2 SERPL-SCNC: 26 MMOL/L (ref 21–32)
CREAT SERPL-MCNC: 0.53 MG/DL (ref 0.6–1.3)
E CLOAC COMP DNA BLD POS NAA+NON-PROBE: DETECTED
EOSINOPHIL # BLD AUTO: 0.18 THOUSAND/ÂΜL (ref 0–0.61)
EOSINOPHIL NFR BLD AUTO: 3 % (ref 0–6)
ERYTHROCYTE [DISTWIDTH] IN BLOOD BY AUTOMATED COUNT: 14.5 % (ref 11.6–15.1)
GFR SERPL CREATININE-BSD FRML MDRD: 79 ML/MIN/1.73SQ M
GLUCOSE SERPL-MCNC: 91 MG/DL (ref 65–140)
GRAM STN SPEC: ABNORMAL
HCT VFR BLD AUTO: 35.6 % (ref 34.8–46.1)
HGB BLD-MCNC: 11.7 G/DL (ref 11.5–15.4)
IMM GRANULOCYTES # BLD AUTO: 0.02 THOUSAND/UL (ref 0–0.2)
IMM GRANULOCYTES NFR BLD AUTO: 0 % (ref 0–2)
LYMPHOCYTES # BLD AUTO: 2.2 THOUSANDS/ÂΜL (ref 0.6–4.47)
LYMPHOCYTES NFR BLD AUTO: 37 % (ref 14–44)
MCH RBC QN AUTO: 29 PG (ref 26.8–34.3)
MCHC RBC AUTO-ENTMCNC: 32.9 G/DL (ref 31.4–37.4)
MCV RBC AUTO: 88 FL (ref 82–98)
MONOCYTES # BLD AUTO: 0.61 THOUSAND/ÂΜL (ref 0.17–1.22)
MONOCYTES NFR BLD AUTO: 10 % (ref 4–12)
NEUTROPHILS # BLD AUTO: 2.83 THOUSANDS/ÂΜL (ref 1.85–7.62)
NEUTS SEG NFR BLD AUTO: 49 % (ref 43–75)
NRBC BLD AUTO-RTO: 0 /100 WBCS
PLATELET # BLD AUTO: 214 THOUSANDS/UL (ref 149–390)
PMV BLD AUTO: 10.8 FL (ref 8.9–12.7)
POTASSIUM SERPL-SCNC: 4 MMOL/L (ref 3.5–5.3)
RBC # BLD AUTO: 4.04 MILLION/UL (ref 3.81–5.12)
SODIUM SERPL-SCNC: 138 MMOL/L (ref 135–147)
WBC # BLD AUTO: 5.88 THOUSAND/UL (ref 4.31–10.16)

## 2024-09-07 PROCEDURE — 80048 BASIC METABOLIC PNL TOTAL CA: CPT | Performed by: INTERNAL MEDICINE

## 2024-09-07 PROCEDURE — 85025 COMPLETE CBC W/AUTO DIFF WBC: CPT | Performed by: INTERNAL MEDICINE

## 2024-09-07 PROCEDURE — 99232 SBSQ HOSP IP/OBS MODERATE 35: CPT | Performed by: INTERNAL MEDICINE

## 2024-09-07 RX ADMIN — PANTOPRAZOLE SODIUM 20 MG: 20 TABLET, DELAYED RELEASE ORAL at 05:36

## 2024-09-07 RX ADMIN — HEPARIN SODIUM 5000 UNITS: 5000 INJECTION INTRAVENOUS; SUBCUTANEOUS at 05:36

## 2024-09-07 RX ADMIN — CEFEPIME 2000 MG: 2 INJECTION, POWDER, FOR SOLUTION INTRAVENOUS at 20:04

## 2024-09-07 RX ADMIN — HEPARIN SODIUM 5000 UNITS: 5000 INJECTION INTRAVENOUS; SUBCUTANEOUS at 14:44

## 2024-09-07 RX ADMIN — CEFEPIME 2000 MG: 2 INJECTION, POWDER, FOR SOLUTION INTRAVENOUS at 07:35

## 2024-09-07 RX ADMIN — ASPIRIN 81 MG: 81 TABLET, COATED ORAL at 22:46

## 2024-09-07 RX ADMIN — HEPARIN SODIUM 5000 UNITS: 5000 INJECTION INTRAVENOUS; SUBCUTANEOUS at 22:46

## 2024-09-07 RX ADMIN — PRAMIPEXOLE DIHYDROCHLORIDE 0.25 MG: 0.25 TABLET ORAL at 22:46

## 2024-09-07 NOTE — PLAN OF CARE
Problem: Potential for Falls  Goal: Patient will remain free of falls  Description: INTERVENTIONS:  - Educate patient/family on patient safety including physical limitations  - Instruct patient to call for assistance with activity   - Consult OT/PT to assist with strengthening/mobility   - Keep Call bell within reach  - Keep bed low and locked with side rails adjusted as appropriate  - Keep care items and personal belongings within reach  - Initiate and maintain comfort rounds  - Make Fall Risk Sign visible to staff  - Offer Toileting every 2 Hours, in advance of need  - Initiate/Maintain bed alarm  - Obtain necessary fall risk management equipment: bed alarm  - Apply yellow socks and bracelet for high fall risk patients  - Consider moving patient to room near nurses station  Outcome: Progressing     Problem: Prexisting or High Potential for Compromised Skin Integrity  Goal: Skin integrity is maintained or improved  Description: INTERVENTIONS:  - Identify patients at risk for skin breakdown  - Assess and monitor skin integrity  - Assess and monitor nutrition and hydration status  - Monitor labs   - Assess for incontinence   - Turn and reposition patient  - Assist with mobility/ambulation  - Relieve pressure over bony prominences  - Avoid friction and shearing  - Provide appropriate hygiene as needed including keeping skin clean and dry  - Evaluate need for skin moisturizer/barrier cream  - Collaborate with interdisciplinary team   - Patient/family teaching  - Consider wound care consult   Outcome: Progressing     Problem: INFECTION - ADULT  Goal: Absence or prevention of progression during hospitalization  Description: INTERVENTIONS:  - Assess and monitor for signs and symptoms of infection  - Monitor lab/diagnostic results  - Monitor all insertion sites, i.e. indwelling lines, tubes, and drains  - Monitor endotracheal if appropriate and nasal secretions for changes in amount and color  - [place] appropriate  cooling/warming therapies per order  - Administer medications as ordered  - Instruct and encourage patient and family to use good hand hygiene technique  - Identify and instruct in appropriate isolation precautions for identified infection/condition  Outcome: Progressing     Problem: SAFETY ADULT  Goal: Patient will remain free of falls  Description: INTERVENTIONS:  - Educate patient/family on patient safety including physical limitations  - Instruct patient to call for assistance with activity   - Consult OT/PT to assist with strengthening/mobility   - Keep Call bell within reach  - Keep bed low and locked with side rails adjusted as appropriate  - Keep care items and personal belongings within reach  - Initiate and maintain comfort rounds  - Make Fall Risk Sign visible to staff  - Offer Toileting every 2 Hours, in advance of need  - Initiate/Maintain bed alarm  - Obtain necessary fall risk management equipment: bed alarm  - Apply yellow socks and bracelet for high fall risk patients  - Consider moving patient to room near nurses station  Outcome: Progressing     Problem: GENITOURINARY - ADULT  Goal: Maintains or returns to baseline urinary function  Description: INTERVENTIONS:  - Assess urinary function  - Encourage oral fluids to ensure adequate hydration if ordered  - Administer IV fluids as ordered to ensure adequate hydration  - Administer ordered medications as needed  - Offer frequent toileting  - Follow urinary retention protocol if ordered  Outcome: Progressing

## 2024-09-07 NOTE — PROGRESS NOTES
[facility]  Progress Note  Name: Latanya Ray I  MRN: [id number]  Unit/Bed#: [place] [address] -01 I Date of Admission: 9/4/2024   Date of Service: 9/7/2024 I Hospital Day: 3    Assessment & Plan   * Sepsis (HCC)  Assessment & Plan  Sepsis secondary to UTI and Enterobacter bacteremia  Enterobacter cloacae on blood culture and urine culture    Continue IV cefepime  Monitor WBC count and fever curve  Anticipate transition to p.o. antibiotics tomorrow and discharge home    Gram-negative bacteremia  Assessment & Plan  Secondary to UTI  Cultures showing Enterobacter  Continue IV cefepime  Monitor WBC count and fever curve    Restless leg syndrome  Assessment & Plan  Continue pramipexole at bedtime    Cognitive impairment  Assessment & Plan  High risk for delirium.  She also is legally blind so she will need supervision with feeding and activities  fall precautions/ aspiration precautions    History of lobular carcinoma of breast  Assessment & Plan  She has multiple right breast nodules and skin thickening on CT.  This is reflected on physical exam.  No open areas or sign of cellulitis.           VTE Pharmacologic Prophylaxis: VTE Score: 7 High Risk (Score >/= 5) - Pharmacological DVT Prophylaxis Ordered: heparin. Sequential Compression Devices Ordered.    Mobility:   Basic Mobility Inpatient Raw Score: 10  JH-HLM Goal: 4: Move to chair/commode  JH-HLM Achieved: 2: Bed activities/Dependent transfer  JH-HLM Goal NOT achieved. Continue with multidisciplinary rounding and encourage appropriate mobility to improve upon JH-HLM goals.    Patient Centered Rounds: I performed bedside rounds with nursing staff today.   Discussions with Specialists or Other Care Team Provider: BETITO.    Education and Discussions with Family / Patient: Updated  (daughter) via phone.    Total Time Spent on Date of Encounter in care of patient: 35 mins. This time was spent on one or more of the following:  performing physical exam; counseling and coordination of care; obtaining or reviewing history; documenting in the medical record; reviewing/ordering tests, medications or procedures; communicating with other healthcare professionals and discussing with patient's family/caregivers.    Current Length of Stay: 3 day(s)  Current Patient Status: Inpatient   Certification Statement: The patient will continue to require additional inpatient hospital stay due to the antibiotics, cultures, clinical monitoring, serial lab monitoring  Discharge Plan: Anticipate discharge in 24-48 hrs to home.    Code Status: Level 3 - DNAR and DNI    Subjective:   Patient seen and examined.  Febrile yesterday but no events overnight.  No complaints this morning.    Objective:     Vitals:   Temp (24hrs), Av.7 °F (37.1 °C), Min:98.3 °F (36.8 °C), Max:99.2 °F (37.3 °C)    Temp:  [98.3 °F (36.8 °C)-99.2 °F (37.3 °C)] 98.5 °F (36.9 °C)  HR:  [58-83] 58  Resp:  [18] 18  BP: (117-152)/(58-69) 143/68  SpO2:  [94 %-96 %] 96 %  Body mass index is 24.76 kg/m².     Input and Output Summary (last 24 hours):     Intake/Output Summary (Last 24 hours) at 2024 1024  Last data filed at 2024 0048  Gross per 24 hour   Intake 1000 ml   Output 630 ml   Net 370 ml       PHYSICAL EXAM:    Vitals signs reviewed  Constitutional   Awake and cooperative. NAD.   Head/Neck   Normocephalic. Atraumatic.  Right frontal scalp lesion noted.   HEENT   No scleral icterus. EOMI.   Heart   Regular rate and rhythm. No murmers.   Lungs   Clear to auscultation bilaterally. Respirations unlaboured.   Abdomen   Soft. Nontender. Nondistended.    Skin   Skin color normal. No rashes.   Extremities   No deformities. No peripheral edema.   Neuro   Alert and oriented. No new deficits.   Psych   Mood stable. Affect normal.         Additional Data:     Labs:  Results from last 7 days   Lab Units 24  0515   WBC Thousand/uL 5.88   HEMOGLOBIN g/dL 11.7   HEMATOCRIT % 35.6    PLATELETS Thousands/uL 214   SEGS PCT % 49   LYMPHO PCT % 37   MONO PCT % 10   EOS PCT % 3     Results from last 7 days   Lab Units 09/07/24  0515 09/05/24  0420 09/04/24  1633   SODIUM mmol/L 138   < > 136   POTASSIUM mmol/L 4.0   < > 3.8   CHLORIDE mmol/L 105   < > 102   CO2 mmol/L 26   < > 24   BUN mg/dL 13   < > 18   CREATININE mg/dL 0.53*   < > 0.53*   ANION GAP mmol/L 7   < > 10   CALCIUM mg/dL 8.7   < > 9.2   ALBUMIN g/dL  --   --  3.7   TOTAL BILIRUBIN mg/dL  --   --  0.52   ALK PHOS U/L  --   --  99   ALT U/L  --   --  9   AST U/L  --   --  14   GLUCOSE RANDOM mg/dL 91   < > 144*    < > = values in this interval not displayed.     Results from last 7 days   Lab Units 09/04/24  1633   INR  0.98             Results from last 7 days   Lab Units 09/05/24 0420 09/04/24 1923 09/04/24  1633   LACTIC ACID mmol/L  --  1.4 2.4*   PROCALCITONIN ng/ml 0.13  --  0.12       Lines/Drains:  Invasive Devices       Peripheral Intravenous Line  Duration             Peripheral IV 09/04/24 Proximal;Right;Ventral (anterior) Forearm 2 days    Peripheral IV 09/04/24 Right;Ventral (anterior) Forearm 2 days                          Imaging: No pertinent imaging reviewed.    Recent Cultures (last 7 days):   Results from last 7 days   Lab Units 09/04/24 1923 09/04/24  1102   BLOOD CULTURE  Enterobacter cloacae*  No Growth at 48 hrs.  --    GRAM STAIN RESULT  Gram negative rods*  --    URINE CULTURE  >100,000 cfu/ml Enterobacter cloacae complex*  >100,000 cfu/ml Escherichia coli* >100,000 cfu/ml       Last 24 Hours Medication List:   Current Facility-Administered Medications   Medication Dose Route Frequency Provider Last Rate    acetaminophen  650 mg Oral Q6H PRN Allen Burnette DO      aspirin  81 mg Oral HS Joe Brown MD      cefepime  2,000 mg Intravenous Q12H Allen Burnette DO 2,000 mg (09/07/24 0735)    heparin (porcine)  5,000 Units Subcutaneous Q8H [facility] Joe Brown MD       pantoprazole  20 mg Oral Early Morning Joe Brown MD      pramipexole  0.25 mg Oral HS Joe Brown MD          Today, Patient Was Seen By: Allen Burnette DO    **Please Note: This note may have been constructed using a voice recognition system.**

## 2024-09-07 NOTE — CASE MANAGEMENT
Case Management Discharge Planning Note    Patient name Latanya Ray  Location [facility] 2 /South 2 M* MRN [id number]  : 1926 Date 2024       Current Admission Date: 2024  Current Admission Diagnosis:Sepsis (HCC)   Patient Active Problem List    Diagnosis Date Noted Date Diagnosed    Gram-negative bacteremia 2024     Restless leg syndrome 2024     Generalized muscle weakness 2024     Sepsis (HCC) 2024     Diaphragmatic hernia without obstruction or gangrene 2024     Dysphagia, oropharyngeal phase 2024     Medicare annual wellness visit, subsequent 2023     Legal blindness 2022     Cognitive impairment 2021     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     Vitamin D deficiency 2012     Cerebral aneurysm 2012     Anxiety 2012     History of lobular carcinoma of breast 2012       LOS (days): 3  Geometric Mean LOS (GMLOS) (days):   Days to GMLOS:     OBJECTIVE:  Risk of Unplanned Readmission Score: 10.95         Current admission status: Inpatient   Preferred Pharmacy:   [facility]/pharmacy #[id number] - [place], [place] - [address] [place] [address]  Phone: [phone] Fax: [phone]    [facility] - [address] [address]  Phone: [phone] Fax: [phone]    Primary Care Provider: Amina Rowe DO    Primary Insurance: MEDICARE  Secondary Insurance: [facility]    DISCHARGE DETAILS:    Discharge planning discussed with:: Patient's daughter, Celia     Comments - Freedom of Choice: Patient's [facility]  CM contacted family/caregiver?: Yes  Were Treatment Team discharge recommendations reviewed with patient/caregiver?: Yes  Did patient/caregiver verbalize understanding of patient care needs?: Yes  Were patient/caregiver advised of the risks associated with not following  Treatment Team discharge recommendations?: Yes    Contacts  Patient Contacts: Celia Gilbert, nette  Relationship to Patient:: Family  Contact Method: Phone  Phone Number: [phone]  Reason/Outcome: Emergency Contact, Discharge Planning, Continuity of Care    Requested Home Health Care         Is the patient interested in HHC at discharge?: Yes  Home Health Discipline requested:: Nursing, Occupational Therapy, Physical Therapy  Home Health Agency Name:: [facility]  HHA External Referral Reason (only applicable if external HHA name selected): Patient has established relationship with provider  Home Health Follow-Up Provider:: PCP  Home Health Services Needed:: Evaluate Functional Status and Safety, Gait/ADL Training, Strengthening/Theraputic Exercises to Improve Function  Homebound Criteria Met:: Requires the Assistance of Another Person for Safe Ambulation or to Leave the Home, Uses an Assist Device (i.e. cane, walker, etc)  Supporting Clincal Findings:: Limited Endurance, Fatigues Easliy in Short Distances    DME Referral Provided  Referral made for DME?: No    Other Referral/Resources/Interventions Provided:  Interventions: HHC  Referral Comments: HHC (SN, PT, OT) [facility]      Treatment Team Recommendation: Home with Home Health Care  Discharge Destination Plan:: Home with Home Health Care  [facility]          Additional Comments: CM spoke with patient's daughter, Celia. Patient's daughter chose HHC with [facility] (SN, PT/OT). CM confirmed choices for HHC, patient's daughter chose [facility] with [facility]. CM reserved in [place]. CM discussed HHA wavier eligibility, which patient's daughter confirmed applied previously and deemed ineligible due to financial requirements. CM reserved [facility] in [place] and updated discharge plan. CM to follow for further discharge planning needs.    Nakita Lebron,

## 2024-09-08 LAB
ANION GAP SERPL CALCULATED.3IONS-SCNC: 10 MMOL/L (ref 4–13)
BUN SERPL-MCNC: 19 MG/DL (ref 5–25)
CALCIUM SERPL-MCNC: 8.9 MG/DL (ref 8.4–10.2)
CHLORIDE SERPL-SCNC: 104 MMOL/L (ref 96–108)
CO2 SERPL-SCNC: 24 MMOL/L (ref 21–32)
CREAT SERPL-MCNC: 0.54 MG/DL (ref 0.6–1.3)
ERYTHROCYTE [DISTWIDTH] IN BLOOD BY AUTOMATED COUNT: 14.7 % (ref 11.6–15.1)
GFR SERPL CREATININE-BSD FRML MDRD: 79 ML/MIN/1.73SQ M
GLUCOSE SERPL-MCNC: 94 MG/DL (ref 65–140)
HCT VFR BLD AUTO: 37 % (ref 34.8–46.1)
HGB BLD-MCNC: 12.3 G/DL (ref 11.5–15.4)
MCH RBC QN AUTO: 29.4 PG (ref 26.8–34.3)
MCHC RBC AUTO-ENTMCNC: 33.2 G/DL (ref 31.4–37.4)
MCV RBC AUTO: 89 FL (ref 82–98)
PLATELET # BLD AUTO: 248 THOUSANDS/UL (ref 149–390)
PMV BLD AUTO: 11 FL (ref 8.9–12.7)
POTASSIUM SERPL-SCNC: 3.8 MMOL/L (ref 3.5–5.3)
RBC # BLD AUTO: 4.18 MILLION/UL (ref 3.81–5.12)
SODIUM SERPL-SCNC: 138 MMOL/L (ref 135–147)
WBC # BLD AUTO: 6.13 THOUSAND/UL (ref 4.31–10.16)

## 2024-09-08 PROCEDURE — 99232 SBSQ HOSP IP/OBS MODERATE 35: CPT | Performed by: INTERNAL MEDICINE

## 2024-09-08 PROCEDURE — 97167 OT EVAL HIGH COMPLEX 60 MIN: CPT

## 2024-09-08 PROCEDURE — 85027 COMPLETE CBC AUTOMATED: CPT | Performed by: INTERNAL MEDICINE

## 2024-09-08 PROCEDURE — 97163 PT EVAL HIGH COMPLEX 45 MIN: CPT

## 2024-09-08 PROCEDURE — 92610 EVALUATE SWALLOWING FUNCTION: CPT

## 2024-09-08 PROCEDURE — 80048 BASIC METABOLIC PNL TOTAL CA: CPT | Performed by: INTERNAL MEDICINE

## 2024-09-08 RX ORDER — LEVOFLOXACIN 750 MG/1
750 TABLET, FILM COATED ORAL EVERY OTHER DAY
Status: DISCONTINUED | OUTPATIENT
Start: 2024-09-09 | End: 2024-09-09 | Stop reason: HOSPADM

## 2024-09-08 RX ADMIN — HEPARIN SODIUM 5000 UNITS: 5000 INJECTION INTRAVENOUS; SUBCUTANEOUS at 15:05

## 2024-09-08 RX ADMIN — CEFEPIME 2000 MG: 2 INJECTION, POWDER, FOR SOLUTION INTRAVENOUS at 08:04

## 2024-09-08 RX ADMIN — HEPARIN SODIUM 5000 UNITS: 5000 INJECTION INTRAVENOUS; SUBCUTANEOUS at 22:13

## 2024-09-08 RX ADMIN — ASPIRIN 81 MG: 81 TABLET, COATED ORAL at 22:13

## 2024-09-08 RX ADMIN — PRAMIPEXOLE DIHYDROCHLORIDE 0.25 MG: 0.25 TABLET ORAL at 22:13

## 2024-09-08 RX ADMIN — CEFEPIME 2000 MG: 2 INJECTION, POWDER, FOR SOLUTION INTRAVENOUS at 19:35

## 2024-09-08 RX ADMIN — HEPARIN SODIUM 5000 UNITS: 5000 INJECTION INTRAVENOUS; SUBCUTANEOUS at 05:23

## 2024-09-08 RX ADMIN — PANTOPRAZOLE SODIUM 20 MG: 20 TABLET, DELAYED RELEASE ORAL at 05:23

## 2024-09-08 NOTE — PHYSICAL THERAPY NOTE
PHYSICAL THERAPY EVALUATION          Patient Name: Latanya Ray  Today's Date: 9/8/2024 09/08/24 1119   PT Last Visit   PT Visit Date 09/08/24   Note Type   Note type Evaluation   Pain Assessment   Pain Assessment Tool FLACC   Pain Score No Pain   Pain Rating: FLACC (Rest) - Face 0   Pain Rating: FLACC (Rest) - Legs 0   Pain Rating: FLACC (Rest) - Activity 0   Pain Rating: FLACC (Rest) - Cry 0   Pain Rating: FLACC (Rest) - Consolability 0   Score: FLACC (Rest) 0   Pain Rating: FLACC (Activity) - Face 0   Pain Rating: FLACC (Activity) - Legs 0   Pain Rating: FLACC (Activity) - Activity 0   Pain Rating: FLACC (Activity) - Cry 1   Pain Rating: FLACC (Activity) - Consolability 0   Score: FLACC (Activity) 1   Restrictions/Precautions   Other Precautions Contact/isolation;Cognitive;Chair Alarm;Bed Alarm;Multiple lines;Fall Risk;Visual impairment;Aspiration   Home Living   Type of Home House   Home Layout One level   Bathroom Shower/Tub Tub/shower unit   Bathroom Equipment Grab bars in shower;Tub transfer bench;Commode;Grab bars around toilet   Home Equipment Walker;Cane;Other (Comment)  (lift chair)   Additional Comments per chart review. 1+1 SUSI. 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 pt ambulates w RW. gets assistance from dtr and other family prn, has 24/7 care   General   Additional Pertinent History pt admitted 9/4/24 for sepsis. oob to chair orders. PMhx significant for CA, cognitive impairment, RLS, anxiety, legally blind, osteoporosis, chronic pain syndrome   Cognition   Overall Cognitive Status  Impaired   Arousal/Participation Responsive   Orientation Level Oriented to person;Disoriented to place;Disoriented to time;Disoriented to situation   Memory Decreased recall of precautions;Decreased recall of recent events;Decreased short term memory;Decreased recall of biographical information   Following Commands Follows one step commands inconsistently   Comments hx cognitive impairment   RLE Assessment   RLE Assessment X  (2+ knee ext and hip flexion)   LLE Assessment   LLE Assessment X  (2+ knee ext and hip flexion. noted ankle deformities)   Bed Mobility   Rolling R 1  Dependent   Additional items Assist x 1;Verbal cues   Rolling L 1  Dependent   Additional items Assist x 1;Verbal cues   Supine to Sit 1  Dependent   Additional items Assist x 2;HOB elevated;Verbal cues   Sit to Supine 1  Dependent   Additional items Assist x 2;Verbal cues   Transfers   Sit to Stand Unable to assess   Balance   Static Sitting Poor +   Endurance Deficit   Endurance Deficit No   Activity Tolerance   Activity Tolerance Other (Comment)  (cognition)   Medical Staff Made Aware Talha MELTON; Meme DAILEY   Nurse Made Aware Krissy RN   Assessment   Prognosis Guarded   Problem List Decreased strength;Impaired balance;Decreased mobility;Decreased cognition;Impaired judgement;Decreased safety awareness;Decreased range of motion;Impaired vision   Assessment Latanya Ray is a 89 y.o. female admitted to [facility] on 9/4/2024 for Sepsis (HCC). PT was consulted and pt was seen on 9/8/2024 for mobility assessment and d/c planning. Pt presents w contact isolation, aspiration precautions, high fall risk, multiple lines. Poor historian secondary to cognitive deficits; per chart review pt gets A for ADLs/IADLs and ambulates short distances w RW. Unclear level of assist she normally requires for functional mobility. Pt is currently functioning at a [facility] Ax2 for bed mobility. Limited participation dt cognitive deficits. Upon sitting EOB pt somewhat resistive,  yelling out and pushing away from therapists. In time she was able to maintain her balance w use of bilateral bedrail support however require constant S dt functional inconsistencies and decreased situational/ safety awareness. Attempted standing transfers however pt extremely resistive again, yelling out and grabbing at therapists. Unable to clear >25% during standing transf dt resistance. Bed pads changed secondary to urinary incontinence and pt repositioned in bed for comfort and safety. Will maintain on caseload to try to progress mobility during hospitalization as able. If family unable to continue to care for patient she may benefit from transition to LTC.   Barriers to Discharge Other (Comment)  (unable to identify. unclear level of support at home)   Goals   Patient Goals none stated dt cognition   STG Expiration Date 09/18/24   Short Term Goal #1 1).  Pt will perform bed mobility with max Ax1 demonstrating appropriate technique 100% of the time in order to improve function. 2) Perform all transfers with max Ax1 demonstrating safe and appropriate technique 100% of the time in order to improve ability to negotiate safely in home environment.3) Amb with least restrictive AD > 5-10'x1 with mod Ax1 in order to demonstrate ability to negotiate in home environment.4)  Improve unsupported sitting balance to fair - in order to optimize ability to perform functional tasks and reduce fall risk.5) Increase activity tolerance to 25 minutes in order to improve endurance to functional tasks.6) Improve am-pac by 2.   Plan   Treatment/Interventions Functional transfer training;LE strengthening/ROM;Therapeutic exercise;Cognitive reorientation;Patient/family training;Equipment eval/education;Bed mobility;Gait training;Compensatory technique education;Continued evaluation;Spoke to nursing;OT;ST   PT Frequency 1-2x/wk   Discharge Recommendation   Rehab Resource Intensity Level, PT II (Moderate Resource Intensity)  (STR> ?LTC)    Additional Comments [place]   AM-PAC Basic Mobility Inpatient   Turning in Flat Bed Without Bedrails 1   Lying on Back to Sitting on Edge of Flat Bed Without Bedrails 1   Moving Bed to Chair 1   Standing Up From Chair Using Arms 1   Walk in Room 1   Climb 3-5 Stairs With Railing 1   Basic Mobility Inpatient Raw Score 6   Turning Head Towards Sound 2   Follow Simple Instructions 2   Low Function Basic Mobility Raw Score  10   Low Function Basic Mobility Standardized Score  14.65   [facility] Level Of Mobility   -HL Goal 2: Bed activities/Dependent transfer   -HL Achieved 3: Sit at edge of bed   End of Consult   Patient Position at End of Consult Supine;Bed/Chair alarm activated;All needs within reach   End of Consult Comments OT present   History: co - morbidities including age, social background, behavior pattern, use of assistive device, assist for adl's/iadl's, cognition, current experience including fall risk, multiple lines, contact isolation  Exam: impairments in systems including multiple body structures involved; musculoskeletal (strength), neuromuscular (balance, gait, transfers), cognition; activity limitations (difficulties executing an action); participation restrictions (problems associated w involvement in life situations), AM-PAC  Clinical: unstable/unpredictable  Complexity:high    Any Arzola, PT

## 2024-09-08 NOTE — OCCUPATIONAL THERAPY NOTE
Occupational Therapy Evaluation     Patient Name: Latanya Ray  Today's Date: 9/8/2024  Problem List  Principal Problem:    Sepsis (Formerly [facility])  Active Problems:    History of lobular carcinoma of breast    Cognitive impairment    Restless leg syndrome    Gram-negative bacteremia    Past Medical History  Past Medical History:   Diagnosis Date    Anxiety     Arthritis     Breast carcinoma (Formerly [facility]) 08/13/2012    Description: s/p L mastectomy. Dr. 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    06/06/2018 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    Osteoarthritis 06/05/2012    Osteoporosis 06/05/2012    Description: Dexa 7/13 read as normal, patient declines medication    Pressure injury of coccygeal region, stage 2 (Formerly [facility]) 03/06/2020    Pulmonary embolism (Formerly [facility]) 01/2011    last assessed 9/18/12    Restless legs syndrome 06/05/2012    RLS (restless legs syndrome)     Thoracic back pain 10/19/2017     Past Surgical History  Past Surgical History:   Procedure Laterality Date    BILATERAL OOPHORECTOMY      Laparoscopic    BREAST SURGERY Left     Mastectomy     CHOLECYSTECTOMY      Laparoscopic    HERNIA REPAIR      HYSTERECTOMY      SMALL INTESTINE SURGERY             09/08/24 1100   Note Type   Note type Evaluation   Pain Assessment   Pain Assessment Tool 0-10   Pain Score No Pain   Pain Rating: FLACC (Rest) - Face 0   Pain Rating: FLACC (Rest) - Legs 0   Pain Rating: FLACC (Rest) - Activity 0   Pain Rating: FLACC  "(Rest) - Cry 0   Pain Rating: FLACC (Rest) - Consolability 0   Score: FLACC (Rest) 0   Restrictions/Precautions   Weight Bearing Precautions Per Order No   Other Precautions Cognitive;Chair Alarm;Bed Alarm;Fall Risk;Visual impairment;Contact/isolation;Multiple lines;Aspiration   Home Living   Type of Home House   Home Layout One level   Bathroom Shower/Tub Tub/shower unit   Bathroom Toilet Standard   Bathroom Equipment Grab bars in shower;Tub transfer bench;Commode;Grab bars around toilet   Home Equipment Walker;Cane  (lift chair)   Prior Function   Level of [place] Needs assistance with ADLs;Needs assistance with functional mobility;Needs assistance with IADLS   Lives With Daughter   Falls in the last 6 months   (unable to fully assess 2* pt's cognitive deficits)   Lifestyle   Autonomy PTA pt had assistance with her ADLs, transfers, ambulation--with RW; +falls=3(per last admission's notes), has 24/7 care   Reciprocal Relationships supportive dtr, son   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; [employer] machine   Intrinsic Gratification listening to music   Subjective   Subjective \"I don't know how many kids I have.\"   ADL   Where Assessed Edge of bed   Eating Assistance 5  Supervision/Setup   Grooming Assistance 4  Minimal Assistance   UB Bathing Assistance 3  Moderate Assistance   LB Bathing Assistance 2  Maximal Assistance   UB Dressing Assistance 3  Moderate Assistance   LB Dressing Assistance 2  Maximal Assistance   Toileting Assistance  1  Total Assistance   Bed Mobility   Rolling R 1  Dependent   Additional items Assist x 1   Rolling L 1  Dependent   Additional items Assist x 1;Increased time required;Verbal cues;LE management   Supine to Sit 1  Dependent   Additional items Assist x 2;Increased time required;Verbal cues;LE management   Sit to Supine 1  Dependent   Additional items Assist x 2;Increased time required;Verbal cues;LE management   Transfers   Sit to Stand Unable to assess  (pt declining " standing or OOB mobility)   Functional Mobility   Functional Mobility   (recommend [facility] for OOB mobility)   Balance   Static Sitting Poor +   Dynamic Sitting Poor   Activity Tolerance   Activity Tolerance Patient limited by fatigue;Other (Comment)  (cognition)   RUE Assessment   RUE Assessment WFL   RUE Strength   RUE Overall Strength   (3+/5 throughout)   LUE Assessment   LUE Assessment X  (limited shr AROM(i.e.flex=60-70*), elbow-distal=WFLs)   LUE Strength   LUE Overall Strength   (shr=3-/5, elbow-distal=3+/5)   Hand Function   Gross Motor Coordination Functional   Fine Motor Coordination Functional   Sensation   Light Touch No apparent deficits   Proprioception   Proprioception No apparent deficits   Vision-Basic Assessment   Current Vision   (no glasses noted)   Visual History Macular degeneration   Vision - Complex Assessment   Acuity   (impaired; blind)   Psychosocial   Psychosocial (WDL) X   Patient Behaviors/Mood Fearful   Perception   Inattention/Neglect Appears intact   Cognition   Overall Cognitive Status Impaired   Arousal/Participation Alert   Attention Attends with cues to redirect   Orientation Level Oriented to person;Disoriented to place;Disoriented to time;Disoriented to situation   Memory Decreased short term memory;Decreased recall of recent events;Decreased recall of precautions   Following Commands Follows one step commands inconsistently   Comments hx cognitive decline   Assessment   Limitation Decreased ADL status;Decreased UE strength;Decreased Safe judgement during ADL;Decreased UE ROM;Decreased cognition;Decreased endurance;Decreased high-level ADLs   Prognosis Poor   Assessment Pt is a 89y/o female admitted to the hospital 2* symptoms of fever, congestion, and weakness. Pt noted with sepsis, +MDRO(urine). Pt with PMH anxiety, breast Ca--L mastectomy, chronic pain, blindness, PE, OA, OP, skin Ca--face, pressure wound--coccygeal region, HTN. PTA pt had assistance with her ADLs,  transfers, ambulation--with RW; +falls=3(per last admission's notes), has 24/7 care. During initial eval, pt demonstrated deficits with her functional balance, functional mobility, ADL status, transfer safety, b/l UE strength, activity tolerance(currently fair=15-20mins), and cognition(i.e.orientation, memory, problem-solving). Functional mobility assessment limited 2* pt declining standing or OOB mobility. Pt would benefit from continued OT assessment and tx for the above deficits. 2-5xwk/1-2wks. The patient's raw score on the AM-PAC Daily Activity Inpatient Short Form is 13. A raw score of less than 19 suggests the patient may benefit from discharge to post-acute rehabilitation services. Please refer to the recommendation of the Occupational Therapist for safe discharge planning.   Goals   Patient Goals unable to fully assess 2* impaired cognition   STG Time Frame   (1-7 days)   Short Term Goal #1 Pt will tolerate continued functional mobility/cognitive assessment and appropriate goals/discharge recommendations will be provided.   Short Term Goal #2 Pt will demonstrate modA with their bed mobility to facilitate EOB ADLs.   Short Term Goal  Pt will demonstrate improved activity tolerance to good(20-30mins) and sitting tolerance(on EOB) to 15-20mins to assist with ADLs.   LTG Time Frame   (7-14 days)   Long Term Goal #1 Pt will demonstrate improved functional balance by 1 grade to assist with ADLs/transfers.   Long Term Goal #2 Pt will demonstrate Natali with her UE and mod A with her LE bathing/dressing.   Long Term Goal Pt will demonstrate improved b/l UE strength by 1/2 MM grade to assist with ADLs/transfers.   Plan   Treatment Interventions ADL retraining;Functional transfer training;UE strengthening/ROM;Visual perceptual retraining;Endurance training;Cognitive reorientation;Patient/family training;Equipment evaluation/education;Compensatory technique education;Continued evaluation   Goal Expiration Date 09/22/24    OT Treatment Day 0   OT Frequency   (2-5xwk)   Discharge Recommendation   Rehab Resource Intensity Level, OT II (Moderate Resource Intensity)   AM-PAC Daily Activity Inpatient   Lower Body Dressing 2   Bathing 2   Toileting 1   Upper Body Dressing 2   Grooming 3   Eating 3   Daily Activity Raw Score 13   Daily Activity Standardized Score (Calc for Raw Score >=11) 32.03   AM-PAC Applied Cognition Inpatient   Following a Speech/Presentation 2   Understanding Ordinary Conversation 2   Taking Medications 1   Remembering Where Things Are Placed or Put Away 1   Remembering List of 4-5 Errands 1   Taking Care of Complicated Tasks 1   Applied Cognition Raw Score 8   Applied Cognition Standardized Score 19.32   Talha Angel

## 2024-09-08 NOTE — PLAN OF CARE
Problem: Potential for Falls  Goal: Patient will remain free of falls  Description: INTERVENTIONS:  - Educate patient/family on patient safety including physical limitations  - Instruct patient to call for assistance with activity   - Consult OT/PT to assist with strengthening/mobility   - Keep Call bell within reach  - Keep bed low and locked with side rails adjusted as appropriate  - Keep care items and personal belongings within reach  - Initiate and maintain comfort rounds  - Make Fall Risk Sign visible to staff  - Offer Toileting every 2 Hours, in advance of need  - Initiate/Maintain bed alarm  - Obtain necessary fall risk management equipment: bed alarm  - Apply yellow socks and bracelet for high fall risk patients  - Consider moving patient to room near nurses station  Outcome: Progressing     Problem: Prexisting or High Potential for Compromised Skin Integrity  Goal: Skin integrity is maintained or improved  Description: INTERVENTIONS:  - Identify patients at risk for skin breakdown  - Assess and monitor skin integrity  - Assess and monitor nutrition and hydration status  - Monitor labs   - Assess for incontinence   - Turn and reposition patient  - Assist with mobility/ambulation  - Relieve pressure over bony prominences  - Avoid friction and shearing  - Provide appropriate hygiene as needed including keeping skin clean and dry  - Evaluate need for skin moisturizer/barrier cream  - Collaborate with interdisciplinary team   - Patient/family teaching  - Consider wound care consult   Outcome: Progressing     Problem: INFECTION - ADULT  Goal: Absence or prevention of progression during hospitalization  Description: INTERVENTIONS:  - Assess and monitor for signs and symptoms of infection  - Monitor lab/diagnostic results  - Monitor all insertion sites, i.e. indwelling lines, tubes, and drains  - Monitor endotracheal if appropriate and nasal secretions for changes in amount and color  - [place] appropriate  cooling/warming therapies per order  - Administer medications as ordered  - Instruct and encourage patient and family to use good hand hygiene technique  - Identify and instruct in appropriate isolation precautions for identified infection/condition  Outcome: Progressing     Problem: SAFETY ADULT  Goal: Patient will remain free of falls  Description: INTERVENTIONS:  - Educate patient/family on patient safety including physical limitations  - Instruct patient to call for assistance with activity   - Consult OT/PT to assist with strengthening/mobility   - Keep Call bell within reach  - Keep bed low and locked with side rails adjusted as appropriate  - Keep care items and personal belongings within reach  - Initiate and maintain comfort rounds  - Make Fall Risk Sign visible to staff  - Offer Toileting every 2 Hours, in advance of need  - Initiate/Maintain bed alarm  - Obtain necessary fall risk management equipment: bed alarm  - Apply yellow socks and bracelet for high fall risk patients  - Consider moving patient to room near nurses station  Outcome: Progressing     Problem: GENITOURINARY - ADULT  Goal: Maintains or returns to baseline urinary function  Description: INTERVENTIONS:  - Assess urinary function  - Encourage oral fluids to ensure adequate hydration if ordered  - Administer IV fluids as ordered to ensure adequate hydration  - Administer ordered medications as needed  - Offer frequent toileting  - Follow urinary retention protocol if ordered  Outcome: Progressing

## 2024-09-08 NOTE — PLAN OF CARE
Problem: PHYSICAL THERAPY ADULT  Goal: Performs mobility at highest level of function for planned discharge setting.  See evaluation for individualized goals.  Description: Treatment/Interventions: Functional transfer training, LE strengthening/ROM, Therapeutic exercise, Cognitive reorientation, Patient/family training, Equipment eval/education, Bed mobility, Gait training, Compensatory technique education, Continued evaluation, Spoke to nursing, OT, ST          See flowsheet documentation for full assessment, interventions and recommendations.  Note: Prognosis: Guarded  Problem List: Decreased strength, Impaired balance, Decreased mobility, Decreased cognition, Impaired judgement, Decreased safety awareness, Decreased range of motion, Impaired vision  Assessment: Latanya Ray is a 89 y.o. female admitted to [facility] on 9/4/2024 for Sepsis (HCC). PT was consulted and pt was seen on 9/8/2024 for mobility assessment and d/c planning. Pt presents w contact isolation, aspiration precautions, high fall risk, multiple lines. Poor historian secondary to cognitive deficits; per chart review pt gets A for ADLs/IADLs and ambulates short distances w RW. Unclear level of assist she normally requires for functional mobility. Pt is currently functioning at a dep Ax2 for bed mobility. Limited participation dt cognitive deficits. Upon sitting EOB pt somewhat resistive, yelling out and pushing away from therapists. In time she was able to maintain her balance w use of bilateral bedrail support however require constant S dt functional inconsistencies and decreased situational/ safety awareness. Attempted standing transfers however pt extremely resistive again, yelling out and grabbing at therapists. Unable to clear >25% during standing transf dt resistance. Bed pads changed secondary to urinary incontinence and pt repositioned in bed for comfort and safety. Will maintain on caseload to try to progress mobility during  hospitalization as able. If family unable to continue to care for patient she may benefit from transition to LTC.  Barriers to Discharge: Other (Comment) (unable to identify. unclear level of support at home)     Rehab Resource Intensity Level, PT: II (Moderate Resource Intensity) (STR> ?LTC)    See flowsheet documentation for full assessment.

## 2024-09-08 NOTE — PLAN OF CARE
Problem: OCCUPATIONAL THERAPY ADULT  Goal: Performs self-care activities at highest level of function for planned discharge setting.  See evaluation for individualized goals.  Description: Treatment Interventions: ADL retraining, Functional transfer training, UE strengthening/ROM, Visual perceptual retraining, Endurance training, Cognitive reorientation, Patient/family training, Equipment evaluation/education, Compensatory technique education, Continued evaluation          See flowsheet documentation for full assessment, interventions and recommendations.   Note: Limitation: Decreased ADL status, Decreased UE strength, Decreased Safe judgement during ADL, Decreased UE ROM, Decreased cognition, Decreased endurance, Decreased high-level ADLs  Prognosis: Poor  Assessment: Pt is a 89y/o female admitted to the hospital 2* symptoms of fever, congestion, and weakness. Pt noted with sepsis, +MDRO(urine). Pt with PMH anxiety, breast Ca--L mastectomy, chronic pain, blindness, PE, OA, OP, skin Ca--face, pressure wound--coccygeal region, HTN. PTA pt had assistance with her ADLs, transfers, ambulation--with RW; +falls=3(per last admission's notes), has 24/7 care. During initial eval, pt demonstrated deficits with her functional balance, functional mobility, ADL status, transfer safety, b/l UE strength, activity tolerance(currently fair=15-20mins), and cognition(i.e.orientation, memory, problem-solving). Functional mobility assessment limited 2* pt declining standing or OOB mobility. Pt would benefit from continued OT assessment and tx for the above deficits. 2-5xwk/1-2wks. The patient's raw score on the AM-PAC Daily Activity Inpatient Short Form is 13. A raw score of less than 19 suggests the patient may benefit from discharge to post-acute rehabilitation services. Please refer to the recommendation of the Occupational Therapist for safe discharge planning.     Rehab Resource Intensity Level, OT: II (Moderate Resource  Intensity)

## 2024-09-08 NOTE — PROGRESS NOTES
[facility]  Progress Note  Name: Latanya Ray I  MRN: [id number]  Unit/Bed#: [place] [address] -01 I Date of Admission: 9/4/2024   Date of Service: 9/8/2024 I Hospital Day: 4    Assessment & Plan   * Sepsis (HCC)  Assessment & Plan  Sepsis secondary to UTI and Enterobacter bacteremia  Enterobacter cloacae on blood culture and urine culture    Continue IV cefepime through today; will transition to p.o. Levaquin tomorrow to complete a 7-day course of antibiotic therapy  Monitor WBC count and fever curve  Disposition: Discharge home with home health care tomorrow    Gram-negative bacteremia  Assessment & Plan  1 out of 2 cultures on admission positive for Enterobacter  Secondary to UTI  Will complete 3 days of IV cefepime; transition to p.o. Levaquin on discharge to complete a 7-day course of antibiotic therapy    Restless leg syndrome  Assessment & Plan  Continue pramipexole at bedtime    Cognitive impairment  Assessment & Plan  High risk for delirium.  She also is legally blind so she will need supervision with feeding and activities  fall precautions/ aspiration precautions    History of lobular carcinoma of breast  Assessment & Plan  She has multiple right breast nodules and skin thickening on CT.  This is reflected on physical exam.  No open areas or sign of cellulitis.           VTE Pharmacologic Prophylaxis: VTE Score: 7 High Risk (Score >/= 5) - Pharmacological DVT Prophylaxis Ordered: heparin. Sequential Compression Devices Ordered.    Mobility:   Basic Mobility Inpatient Raw Score: 6  JH-HLM Goal: 2: Bed activities/Dependent transfer  JH-HLM Achieved: 3: Sit at edge of bed  JH-HLM Goal NOT achieved. Continue with multidisciplinary rounding and encourage appropriate mobility to improve upon JH-HLM goals.    Patient Centered Rounds: I performed bedside rounds with nursing staff today.   Discussions with Specialists or Other Care Team Provider: BETITO.    Education and Discussions with  Family / Patient: Updated  (daughter) via phone.    Total Time Spent on Date of Encounter in care of patient: 35 mins. This time was spent on one or more of the following: performing physical exam; counseling and coordination of care; obtaining or reviewing history; documenting in the medical record; reviewing/ordering tests, medications or procedures; communicating with other healthcare professionals and discussing with patient's family/caregivers.    Current Length of Stay: 4 day(s)  Current Patient Status: Inpatient   Certification Statement: The patient will continue to require additional inpatient hospital stay due to the antibiotics, cultures, clinical monitoring, serial lab monitoring  Discharge Plan: Anticipate discharge in 24-48 hrs to home.    Code Status: Level 3 - DNAR and DNI    Subjective:   Patient seen and examined.  No acute events overnight.  Patient remains afebrile.  No new complaints.    Objective:     Vitals:   Temp (24hrs), Av.1 °F (37.3 °C), Min:98.7 °F (37.1 °C), Max:99.9 °F (37.7 °C)    Temp:  [98.7 °F (37.1 °C)-99.9 °F (37.7 °C)] 99 °F (37.2 °C)  HR:  [64-87] 64  Resp:  [18] 18  BP: (109-152)/(70-73) 121/72  SpO2:  [92 %-97 %] 97 %  Body mass index is 24.76 kg/m².     Input and Output Summary (last 24 hours):     Intake/Output Summary (Last 24 hours) at 2024 1331  Last data filed at 2024 0807  Gross per 24 hour   Intake 480 ml   Output 660 ml   Net -180 ml       PHYSICAL EXAM:    Vitals signs reviewed  Constitutional   Awake and cooperative. NAD.   Head/Neck   Normocephalic. Atraumatic.  Right frontal scalp lesion noted.   HEENT   No scleral icterus. EOMI.   Heart   Regular rate and rhythm. No murmers.   Lungs   Clear to auscultation bilaterally. Respirations unlaboured.   Abdomen   Soft. Nontender. Nondistended.    Skin   Skin color normal. No rashes.   Extremities   No deformities. No peripheral edema.   Neuro   Alert and oriented. No new deficits.   Psych    Mood stable. Affect normal.         Additional Data:     Labs:  Results from last 7 days   Lab Units 09/08/24 0431 09/07/24  0515   WBC Thousand/uL 6.13 5.88   HEMOGLOBIN g/dL 12.3 11.7   HEMATOCRIT % 37.0 35.6   PLATELETS Thousands/uL 248 214   SEGS PCT %  --  49   LYMPHO PCT %  --  37   MONO PCT %  --  10   EOS PCT %  --  3     Results from last 7 days   Lab Units 09/08/24 0431 09/05/24 0420 09/04/24  1633   SODIUM mmol/L 138   < > 136   POTASSIUM mmol/L 3.8   < > 3.8   CHLORIDE mmol/L 104   < > 102   CO2 mmol/L 24   < > 24   BUN mg/dL 19   < > 18   CREATININE mg/dL 0.54*   < > 0.53*   ANION GAP mmol/L 10   < > 10   CALCIUM mg/dL 8.9   < > 9.2   ALBUMIN g/dL  --   --  3.7   TOTAL BILIRUBIN mg/dL  --   --  0.52   ALK PHOS U/L  --   --  99   ALT U/L  --   --  9   AST U/L  --   --  14   GLUCOSE RANDOM mg/dL 94   < > 144*    < > = values in this interval not displayed.     Results from last 7 days   Lab Units 09/04/24  1633   INR  0.98             Results from last 7 days   Lab Units 09/05/24 0420 09/04/24 1923 09/04/24  1633   LACTIC ACID mmol/L  --  1.4 2.4*   PROCALCITONIN ng/ml 0.13  --  0.12       Lines/Drains:  Invasive Devices       Peripheral Intravenous Line  Duration             Peripheral IV 09/08/24 Left Forearm <1 day                          Imaging: No pertinent imaging reviewed.    Recent Cultures (last 7 days):   Results from last 7 days   Lab Units 09/04/24 1923 09/04/24  1102   BLOOD CULTURE  No Growth at 72 hrs.  Enterobacter cloacae*  --    GRAM STAIN RESULT  Gram negative rods*  --    URINE CULTURE  >100,000 cfu/ml Enterobacter cloacae complex*  >100,000 cfu/ml Escherichia coli* >100,000 cfu/ml       Last 24 Hours Medication List:   Current Facility-Administered Medications   Medication Dose Route Frequency Provider Last Rate    acetaminophen  650 mg Oral Q6H PRN Allen Burnette DO      aspirin  81 mg Oral HS Joe Brown MD      cefepime  2,000 mg Intravenous Q12H  Allen Burnette DO      heparin (porcine)  5,000 Units Subcutaneous Q8H HERMAN Joe Brown MD      [START ON 9/9/2024] levofloxacin  750 mg Oral Every Other Day Allen Burnette DO      pantoprazole  20 mg Oral Early Morning Joe Brown MD      pramipexole  0.25 mg Oral HS Joe Brown MD          Today, Patient Was Seen By: Allen Burnette DO    **Please Note: This note may have been constructed using a voice recognition system.**  Patient remains afebrile.  Tolerating diet.  No events overnight.

## 2024-09-08 NOTE — SPEECH THERAPY NOTE
Speech Language/Pathology  Speech/Language Pathology  Assessment    Patient Name: Latanya Ray  Today's Date: 9/8/2024     Problem List  Principal Problem:    Sepsis ([facility])  Active Problems:    History of lobular carcinoma of breast    Cognitive impairment    Restless leg syndrome    Gram-negative bacteremia    Past Medical History  Past Medical History:   Diagnosis Date    Anxiety     Arthritis     Breast carcinoma ([facility]) 08/13/2012    Description: s/p L mastectomy. Dr. 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    06/06/2018 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    Osteoarthritis 06/05/2012    Osteoporosis 06/05/2012    Description: Dexa 7/13 read as normal, patient declines medication    Pressure injury of coccygeal region, stage 2 ([facility]) 03/06/2020    Pulmonary embolism ([facility]) 01/2011    last assessed 9/18/12    Restless legs syndrome 06/05/2012    RLS (restless legs syndrome)     Thoracic back pain 10/19/2017     Past Surgical History  Past Surgical History:   Procedure Laterality Date    BILATERAL OOPHORECTOMY      Laparoscopic    BREAST SURGERY Left     Mastectomy     CHOLECYSTECTOMY      Laparoscopic    HERNIA REPAIR      HYSTERECTOMY      SMALL INTESTINE SURGERY        Bedside Swallow Evaluation:    Summary:  Pt presented w/ mild oral and WNL pharyngeal stage of swallow. Positioned upright and alert. Pt accepted trails of soft solids and thin liquids. Full assist for feed as visual impairment.  "Mastication was mildly prolonged as pt edentulous. Bolus control, formation, and transfer were WNL. Swallows appeared prompt. Laryngeal rise appeared adequate. No overt s/s of aspiration. Pt denied difficulties with chewing and swallowing. She denied history of acid reflux. However pt poor historian. Spoke with nursing reported no overt difficulties with medications and breakfast this am.     Recommendations:  Diet: Dysphagia 3 dental soft   Liquid: thin   Meds:As tolerated   Supervision: Full assist   Positioning:Upright  Strategies: slow rate, alternate liquids with solids, swallow prior to additional po   Pt to take PO/Meds only when fully alert and upright.   Oral care:  Aspiration precautions  Reflux precautions  Therapy Prognosis: fair   Prognosis considerations:age, medical status  Frequency:1-3 times weekly as indicated     Consider consult w/:  Rehab  Nutrition    Goal(s):  Dysphagia LTG  -Patient will demonstrate safe and effective oral intake (without overt s/s significant oral/pharyngeal dysphagia including s/s penetration or aspiration) for the highest appropriate diet level.     1.Pt will tolerate least restrictive diet w/out s/s aspiration or oral/pharyngeal difficulties.   2.Pt will will effectively manipulate/masticate and transfer solids w/out s/s dysphagia/aspiration.   3.Pt will tolerate thin liquids w/out s/s aspiration.   -If indicated, patient will comply with a Video/Modified Barium Swallow study for more complete assessment of swallowing anatomy/physiology/aspiration risk and to assess efficacy of treatment techniques so as to best guide treatment plan     H&P/Admit info/ pertinent provider notes: (PMH noted above)  Latanya Ray is a 89 y.o. female with a PMH of cancer, restless leg syndrome, anxiety, cognitive decline and legal blindness who presents with weakness and being \"off.\"  History was obtained from her daughter who is involved in her care.  The patient's current illness " "started late August, August 21, 2024 where he started having urinary frequency.  She had outpatient urine culture and was started on Ceftin for 10 days which she completed.  According to her daughter she did well with antibiotic and had improvement in her wellbeing and resolution of urinary frequency.  However last night she noted that she was not acting herself, weaker than usual and spiked a fever 100.7.  She noticed that the patient appeared congested with  clear nasal discharge.  The patient is usually able to do certain ADLs like walk to the bathroom.  She is incontinent at baseline and uses adult diapers.  Her daughter she had no diarrhea and did not complain of abdominal pain    Special Studies:  CT CHEST WITHOUT IV CONTRAST 09/04/2024  IMPRESSION:  No pneumonia.  Multiple right breast nodules and skin thickening consistent with neoplastic process. Correlate with physical exam and history    Procalcitonin: 0.13                            09/05/2024   WBC: 6.13                             09/08/2024     Code Status Level 3 DNR/DNI    Previous MBS: none    Patient's goal:\" these are good \"    Did the pt report pain? no  If yes, was nursing notified/was it addressed? N/a     Reason for consult:  R/o aspiration  Determine safest and least restrictive diet   h/o dysphagia     Precautions:  Contact    Food Allergies: No known    Current Diet: Dysphagia 2 [facility] soft and thin liquids    Premorbid diet: Regular and thin liquids   O2 requirement: Room air    Social/Prior living Lives with daughter    Voice/Speech: WNL   Follows commands: Basic    Cognitive status: Alert      Oral [facility] exam:  Dentition: Edentulous   Lips (VII):WNL  Tongue (XII):midline   Mandible (V):adequate ROM  Face/oral sensation (V):symmetrical   Velum (X):WNL    Items administered:  Soft solids and thin liquids via straw with single/successive sips     Oral stage:  Lip closure:WNL  Mastication:Alert   Bolus formation:WNL  Bolus " control:WNL  Transfer:WNL    Pharyngeal stage:  Swallow promptness: prompt   Laryngeal rise: Adequate   No overt s/s aspiration    Esophageal stage:  No s/s reported  H/o GERD    Aspiration precautions posted    Results d/w:  Pt, nursing, , physician,

## 2024-09-08 NOTE — PLAN OF CARE
Problem: Potential for Falls  Goal: Patient will remain free of falls  Description: INTERVENTIONS:  - Educate patient/family on patient safety including physical limitations  - Instruct patient to call for assistance with activity   - Consult OT/PT to assist with strengthening/mobility   - Keep Call bell within reach  - Keep bed low and locked with side rails adjusted as appropriate  - Keep care items and personal belongings within reach  - Initiate and maintain comfort rounds  - Make Fall Risk Sign visible to staff  - Offer Toileting every 2 Hours, in advance of need  - Initiate/Maintain bed alarm  - Obtain necessary fall risk management equipment: bed alarm  - Apply yellow socks and bracelet for high fall risk patients  - Consider moving patient to room near nurses station  Outcome: Progressing     Problem: Prexisting or High Potential for Compromised Skin Integrity  Goal: Skin integrity is maintained or improved  Description: INTERVENTIONS:  - Identify patients at risk for skin breakdown  - Assess and monitor skin integrity  - Assess and monitor nutrition and hydration status  - Monitor labs   - Assess for incontinence   - Turn and reposition patient  - Assist with mobility/ambulation  - Relieve pressure over bony prominences  - Avoid friction and shearing  - Provide appropriate hygiene as needed including keeping skin clean and dry  - Evaluate need for skin moisturizer/barrier cream  - Collaborate with interdisciplinary team   - Patient/family teaching  - Consider wound care consult   Outcome: Progressing     Problem: INFECTION - ADULT  Goal: Absence or prevention of progression during hospitalization  Description: INTERVENTIONS:  - Assess and monitor for signs and symptoms of infection  - Monitor lab/diagnostic results  - Monitor all insertion sites, i.e. indwelling lines, tubes, and drains  - Monitor endotracheal if appropriate and nasal secretions for changes in amount and color  - [place] appropriate  cooling/warming therapies per order  - Administer medications as ordered  - Instruct and encourage patient and family to use good hand hygiene technique  - Identify and instruct in appropriate isolation precautions for identified infection/condition  Outcome: Progressing     Problem: SAFETY ADULT  Goal: Patient will remain free of falls  Description: INTERVENTIONS:  - Educate patient/family on patient safety including physical limitations  - Instruct patient to call for assistance with activity   - Consult OT/PT to assist with strengthening/mobility   - Keep Call bell within reach  - Keep bed low and locked with side rails adjusted as appropriate  - Keep care items and personal belongings within reach  - Initiate and maintain comfort rounds  - Make Fall Risk Sign visible to staff  - Offer Toileting every 2 Hours, in advance of need  - Initiate/Maintain bed alarm  - Obtain necessary fall risk management equipment: bed alarm  - Apply yellow socks and bracelet for high fall risk patients  - Consider moving patient to room near nurses station  Outcome: Progressing     Problem: GENITOURINARY - ADULT  Goal: Maintains or returns to baseline urinary function  Description: INTERVENTIONS:  - Assess urinary function  - Encourage oral fluids to ensure adequate hydration if ordered  - Administer IV fluids as ordered to ensure adequate hydration  - Administer ordered medications as needed  - Offer frequent toileting  - Follow urinary retention protocol if ordered  Outcome: Progressing

## 2024-09-09 VITALS
OXYGEN SATURATION: 95 % | SYSTOLIC BLOOD PRESSURE: 111 MMHG | RESPIRATION RATE: 16 BRPM | BODY MASS INDEX: 24.71 KG/M2 | WEIGHT: 122.58 LBS | DIASTOLIC BLOOD PRESSURE: 60 MMHG | HEIGHT: 59 IN | HEART RATE: 59 BPM | TEMPERATURE: 98.3 F

## 2024-09-09 PROCEDURE — 99239 HOSP IP/OBS DSCHRG MGMT >30: CPT | Performed by: INTERNAL MEDICINE

## 2024-09-09 RX ORDER — LEVOFLOXACIN 750 MG/1
750 TABLET, FILM COATED ORAL EVERY OTHER DAY
Qty: 3 TABLET | Refills: 0 | Status: SHIPPED | OUTPATIENT
Start: 2024-09-11 | End: 2024-09-16

## 2024-09-09 RX ADMIN — LEVOFLOXACIN 750 MG: 750 TABLET, FILM COATED ORAL at 08:59

## 2024-09-09 RX ADMIN — PANTOPRAZOLE SODIUM 20 MG: 20 TABLET, DELAYED RELEASE ORAL at 05:24

## 2024-09-09 RX ADMIN — HEPARIN SODIUM 5000 UNITS: 5000 INJECTION INTRAVENOUS; SUBCUTANEOUS at 05:24

## 2024-09-09 NOTE — PLAN OF CARE
Problem: Potential for Falls  Goal: Patient will remain free of falls  Description: INTERVENTIONS:  - Educate patient/family on patient safety including physical limitations  - Instruct patient to call for assistance with activity   - Consult OT/PT to assist with strengthening/mobility   - Keep Call bell within reach  - Keep bed low and locked with side rails adjusted as appropriate  - Keep care items and personal belongings within reach  - Initiate and maintain comfort rounds  - Make Fall Risk Sign visible to staff  - Offer Toileting every 2 Hours, in advance of need  - Initiate/Maintain bed alarm  - Obtain necessary fall risk management equipment: bed alarm  - Apply yellow socks and bracelet for high fall risk patients  - Consider moving patient to room near nurses station  Outcome: Progressing     Problem: Prexisting or High Potential for Compromised Skin Integrity  Goal: Skin integrity is maintained or improved  Description: INTERVENTIONS:  - Identify patients at risk for skin breakdown  - Assess and monitor skin integrity  - Assess and monitor nutrition and hydration status  - Monitor labs   - Assess for incontinence   - Turn and reposition patient  - Assist with mobility/ambulation  - Relieve pressure over bony prominences  - Avoid friction and shearing  - Provide appropriate hygiene as needed including keeping skin clean and dry  - Evaluate need for skin moisturizer/barrier cream  - Collaborate with interdisciplinary team   - Patient/family teaching  - Consider wound care consult   Outcome: Progressing     Problem: INFECTION - ADULT  Goal: Absence or prevention of progression during hospitalization  Description: INTERVENTIONS:  - Assess and monitor for signs and symptoms of infection  - Monitor lab/diagnostic results  - Monitor all insertion sites, i.e. indwelling lines, tubes, and drains  - Monitor endotracheal if appropriate and nasal secretions for changes in amount and color  - [place] appropriate  cooling/warming therapies per order  - Administer medications as ordered  - Instruct and encourage patient and family to use good hand hygiene technique  - Identify and instruct in appropriate isolation precautions for identified infection/condition  Outcome: Progressing     Problem: SAFETY ADULT  Goal: Patient will remain free of falls  Description: INTERVENTIONS:  - Educate patient/family on patient safety including physical limitations  - Instruct patient to call for assistance with activity   - Consult OT/PT to assist with strengthening/mobility   - Keep Call bell within reach  - Keep bed low and locked with side rails adjusted as appropriate  - Keep care items and personal belongings within reach  - Initiate and maintain comfort rounds  - Make Fall Risk Sign visible to staff  - Offer Toileting every 2 Hours, in advance of need  - Initiate/Maintain bed alarm  - Obtain necessary fall risk management equipment: bed alarm  - Apply yellow socks and bracelet for high fall risk patients  - Consider moving patient to room near nurses station  Outcome: Progressing     Problem: GENITOURINARY - ADULT  Goal: Maintains or returns to baseline urinary function  Description: INTERVENTIONS:  - Assess urinary function  - Encourage oral fluids to ensure adequate hydration if ordered  - Administer IV fluids as ordered to ensure adequate hydration  - Administer ordered medications as needed  - Offer frequent toileting  - Follow urinary retention protocol if ordered  Outcome: Progressing

## 2024-09-09 NOTE — RESTORATIVE TECHNICIAN NOTE
Restorative Technician Note      Patient Name: Latanya Ray     Restorative Tech Visit Date: 09/09/24  Note Type: Mobility  Patient Position Upon Consult: Supine  Activity Performed: Range of motion; Repositioned; Dangled  Patient Position at End of Consult: All needs within reach; Supine

## 2024-09-09 NOTE — NURSING NOTE
Patient was d/c'd to home in no distress. No c/o pain, able to make needs known. Instructions were reviewed with the patient. Patient was transferred to the w/c with all safety precautions taken. Instructions were reviewed with the daughter. Patient left in care of transporter with daughter at the bedside. All belongings left with the patient.

## 2024-09-09 NOTE — CASE MANAGEMENT
Case Management Discharge Planning Note    Patient name Latanya Ray  Location [facility] 2 /South 2 M* MRN [id number]  : 1926 Date 2024       Current Admission Date: 2024  Current Admission Diagnosis:Sepsis (HCC)   Patient Active Problem List    Diagnosis Date Noted Date Diagnosed    Gram-negative bacteremia 2024     Restless leg syndrome 2024     Generalized muscle weakness 2024     Sepsis (HCC) 2024     Diaphragmatic hernia without obstruction or gangrene 2024     Dysphagia, oropharyngeal phase 2024     Medicare annual wellness visit, subsequent 2023     Legal blindness 2022     Cognitive impairment 2021     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     Vitamin D deficiency 2012     Cerebral aneurysm 2012     Anxiety 2012     History of lobular carcinoma of breast 2012       LOS (days): 5  Geometric Mean LOS (GMLOS) (days):   Days to GMLOS:     OBJECTIVE:  Risk of Unplanned Readmission Score: 11.42         Current admission status: Inpatient   Preferred Pharmacy:   [id number] - [place] [address]  Phone: [phone] Fax: [phone]    [facility] - [address] [address]  Phone: [phone] Fax: [phone]    Primary Care Provider: Amina Rowe DO    Primary Insurance: MEDICARE  Secondary Insurance: AARP    DISCHARGE DETAILS:    Treatment Team Recommendation: Home with Home Health Care  Discharge Destination Plan:: Home with Home Health Care  Transport at Discharge : Wheelchair van     Number/Name of Dispatcher: Rodoflo [phone]     ETA of Transport (Date): 24  ETA of Transport (Time): 1230     Transfer Mode: Wheelchair        Additional Comments: BETITO spoke with patient's daughterLatanya via phone contact, patient's daughter  requesting CM to set transportation via [facility]. CM requested in Roundtrip. Patient's daughter requested patient to arrive home prior to 1:15P.M. as patient's daughter has medical appointment. CM sent follow up message to inform bedside nurse and [facility] attending of transport. CM to follow for further discharge planning.    Nakita Lebron,

## 2024-09-09 NOTE — PLAN OF CARE
Problem: Potential for Falls  Goal: Patient will remain free of falls  Description: INTERVENTIONS:  - Educate patient/family on patient safety including physical limitations  - Instruct patient to call for assistance with activity   - Consult OT/PT to assist with strengthening/mobility   - Keep Call bell within reach  - Keep bed low and locked with side rails adjusted as appropriate  - Keep care items and personal belongings within reach  - Initiate and maintain comfort rounds  - Make Fall Risk Sign visible to staff  - Offer Toileting every 2 Hours, in advance of need  - Initiate/Maintain bed alarm  - Obtain necessary fall risk management equipment: bed alarm  - Apply yellow socks and bracelet for high fall risk patients  - Consider moving patient to room near nurses station  Outcome: Adequate for Discharge     Problem: Prexisting or High Potential for Compromised Skin Integrity  Goal: Skin integrity is maintained or improved  Description: INTERVENTIONS:  - Identify patients at risk for skin breakdown  - Assess and monitor skin integrity  - Assess and monitor nutrition and hydration status  - Monitor labs   - Assess for incontinence   - Turn and reposition patient  - Assist with mobility/ambulation  - Relieve pressure over bony prominences  - Avoid friction and shearing  - Provide appropriate hygiene as needed including keeping skin clean and dry  - Evaluate need for skin moisturizer/barrier cream  - Collaborate with interdisciplinary team   - Patient/family teaching  - Consider wound care consult   Outcome: Adequate for Discharge     Problem: INFECTION - ADULT  Goal: Absence or prevention of progression during hospitalization  Description: INTERVENTIONS:  - Assess and monitor for signs and symptoms of infection  - Monitor lab/diagnostic results  - Monitor all insertion sites, i.e. indwelling lines, tubes, and drains  - Monitor endotracheal if appropriate and nasal secretions for changes in amount and color  -  [place] appropriate cooling/warming therapies per order  - Administer medications as ordered  - Instruct and encourage patient and family to use good hand hygiene technique  - Identify and instruct in appropriate isolation precautions for identified infection/condition  Outcome: Adequate for Discharge     Problem: SAFETY ADULT  Goal: Patient will remain free of falls  Description: INTERVENTIONS:  - Educate patient/family on patient safety including physical limitations  - Instruct patient to call for assistance with activity   - Consult OT/PT to assist with strengthening/mobility   - Keep Call bell within reach  - Keep bed low and locked with side rails adjusted as appropriate  - Keep care items and personal belongings within reach  - Initiate and maintain comfort rounds  - Make Fall Risk Sign visible to staff  - Offer Toileting every 2 Hours, in advance of need  - Initiate/Maintain bed alarm  - Obtain necessary fall risk management equipment: bed alarm  - Apply yellow socks and bracelet for high fall risk patients  - Consider moving patient to room near nurses station  Outcome: Adequate for Discharge     Problem: GENITOURINARY - ADULT  Goal: Maintains or returns to baseline urinary function  Description: INTERVENTIONS:  - Assess urinary function  - Encourage oral fluids to ensure adequate hydration if ordered  - Administer IV fluids as ordered to ensure adequate hydration  - Administer ordered medications as needed  - Offer frequent toileting  - Follow urinary retention protocol if ordered  Outcome: Adequate for Discharge

## 2024-09-10 ENCOUNTER — TRANSITIONAL CARE MANAGEMENT (OUTPATIENT)
Dept: FAMILY MEDICINE CLINIC | Facility: CLINIC | Age: 89
End: 2024-09-10

## 2024-09-10 LAB — BACTERIA BLD CULT: NORMAL

## 2024-09-12 PROBLEM — Z00.00 MEDICARE ANNUAL WELLNESS VISIT, SUBSEQUENT: Status: RESOLVED | Noted: 2023-03-27 | Resolved: 2024-09-12

## 2024-09-14 NOTE — DISCHARGE SUMMARY
Discharge Summary - Latanya Ray, 9/20/1926, [id number]        Admission Date: 9/4/2024  Discharge Date: 9/9/2024    Discharge Diagnosis:   1.  Urinary tract infection  2.  Enterobacter sepsis secondary to #1  3.  Cognitive impairment  4.  Restless leg syndrome  5.  History of lobular carcinoma of the breast    Consulting Physicians:  None    Procedures Performed:   None    HPI: The patient is a 89-year-old woman who was brought to the emergency room because of generalized weakness.  She was recently treated with cefuroxime for urinary tract infection.  Following this, the patient was improved with resolution of urinary frequency and improvement in her overall wellbeing.  On the evening before admission the patient seemed weaker than usual.  She was not acting herself.  She had a fever of 100.7.  She was brought to the emergency room for further care and was admitted for presumed urinary tract infection.    Hospital Course: The patient was admitted to the hospital and cultures were obtained.  She was started on intravenous antibiotics.  She was hydrated with intravenous fluid.  Ultimately, urine and 1 blood culture grew Enterobacter.  This was sensitive to cefepime.  It was also sensitive to levofloxacin.  When culture results were available, the patient was transitioned to oral levofloxacin to complete a 7-day course of antibiotics.  Clinically, the patient improved with antibiotic treatment.    The patient has a history of restless leg syndrome.  She continued pramipexole during her stay.    The patient has a history of cognitive decline and is legally blind.  Despite this, she did not become delirious during her hospitalization.    On the day of discharge the patient was feeling pretty well.  Vital signs were stable.  Lungs were clear.  Cardiac exam revealed a regular rhythm.  The abdomen was soft and nontender.  There was no edema.    Disposition: The patient was discharged home under the care of her  daughter on September 9.  Diet and activity will be as tolerated.  She was asked to arrange follow-up with her personal physician, Dr. Amina Rowe, within 1 week.    Discharge instructions/Information to patient and family:   See after visit summary for information provided to patient and family.      Provisions for Follow-Up Care:  See after visit summary for information related to follow-up care and any pertinent home health orders.      Planned Readmission: No    Discharge Statement   I spent 35 minutes discharging the patient. This time was spent on the day of discharge. I had direct contact with the patient on the day of discharge.     Discharge Medications:  See after visit summary for reconciled discharge medications provided to patient and family.

## 2024-09-24 DIAGNOSIS — B37.2 CANDIDA INFECTION OF FLEXURAL SKIN: ICD-10-CM

## 2024-09-25 RX ORDER — NYSTATIN TOPICAL POWDER 100000 U/G
1 POWDER TOPICAL 2 TIMES DAILY
Qty: 60 G | Refills: 1 | Status: SHIPPED | OUTPATIENT
Start: 2024-09-25

## 2024-10-05 PROBLEM — A41.9 SEPSIS (HCC): Status: RESOLVED | Noted: 2024-04-29 | Resolved: 2024-10-05

## 2024-10-08 ENCOUNTER — TELEPHONE (OUTPATIENT)
Age: 89
End: 2024-10-08

## 2024-10-08 ENCOUNTER — OFFICE VISIT (OUTPATIENT)
Dept: FAMILY MEDICINE CLINIC | Facility: CLINIC | Age: 89
End: 2024-10-08
Payer: MEDICARE

## 2024-10-08 VITALS
OXYGEN SATURATION: 94 % | BODY MASS INDEX: 24.76 KG/M2 | DIASTOLIC BLOOD PRESSURE: 70 MMHG | HEART RATE: 69 BPM | SYSTOLIC BLOOD PRESSURE: 112 MMHG | HEIGHT: 59 IN | TEMPERATURE: 97.8 F

## 2024-10-08 DIAGNOSIS — E55.9 VITAMIN D DEFICIENCY: ICD-10-CM

## 2024-10-08 DIAGNOSIS — Z87.440 HISTORY OF RECURRENT UTI (URINARY TRACT INFECTION): ICD-10-CM

## 2024-10-08 DIAGNOSIS — G25.81 RESTLESS LEG SYNDROME: ICD-10-CM

## 2024-10-08 DIAGNOSIS — Z23 NEED FOR COVID-19 VACCINE: Primary | ICD-10-CM

## 2024-10-08 DIAGNOSIS — K44.9 DIAPHRAGMATIC HERNIA WITHOUT OBSTRUCTION OR GANGRENE: ICD-10-CM

## 2024-10-08 DIAGNOSIS — R41.89 COGNITIVE IMPAIRMENT: ICD-10-CM

## 2024-10-08 PROBLEM — N39.0 RECURRENT UTI: Status: ACTIVE | Noted: 2024-10-08

## 2024-10-08 PROCEDURE — 90480 ADMN SARSCOV2 VAC 1/ONLY CMP: CPT

## 2024-10-08 PROCEDURE — 91320 SARSCV2 VAC 30MCG TRS-SUC IM: CPT

## 2024-10-08 PROCEDURE — 99214 OFFICE O/P EST MOD 30 MIN: CPT | Performed by: FAMILY MEDICINE

## 2024-10-08 NOTE — ASSESSMENT & PLAN NOTE
Sleep is good on mirapex will continue that and follwoup in 6 months   Orders:    CBC and differential; Future

## 2024-10-08 NOTE — PROGRESS NOTES
Ambulatory Visit  Name: Latanya Ray      : 1926      MRN: [id number]  Encounter Provider: Amina Rowe DO  Encounter Date: 10/8/2024   Encounter department: [facility]    Assessment & Plan  History of recurrent UTI (urinary tract infection)  Currently utilizing timed voiding and also drinking lots of fluids Patient also getting cranberry supplement   Orders:    Basic metabolic panel; Future    Restless leg syndrome  Sleep is good on mirapex will continue that and follwoup in 6 months   Orders:    CBC and differential; Future    Vitamin D deficiency  Continue supplement        Need for COVID-19 vaccine  Covid shot given Patient and family decline the flu and rsv shots   Orders:    COVID-19 Pfizer mRNA vaccine 12 yr and older (Comirnaty pre-filled syringe)    Diaphragmatic hernia without obstruction or gangrene  Continue omeprazole       Cognitive impairment  Stable continue to monitor           Chief Complaint   Patient presents with    Follow-up     F/u hospitals, covid vaccine        History of Present Illness     Patient is here with son and daughter for followup of hospitalization for UTI Patient had weakness and confusion she was had completed an antibiotic at that time Then she had a repeat Urine that was negative for infection however she started with fever and chills and confusion again patient was seen in ER and was admitted She was found to have E coli in urine She was treated from 2024 to 2024 Patient has been doing well at home She is now getting up every 2 hours for timed voiding This is preventing accidents and also this gets the patient up and moving She does have the known ulcerating mass on the right breast However family and patient do not want intervnetion Patient denies any pain She has good appetite and no bowel or bladder issues           Review of Systems   Constitutional:  Negative for activity change, appetite change, fatigue and unexpected  "weight change.   HENT:  Negative for congestion, sinus pressure and sinus pain.    Respiratory:  Negative for cough, shortness of breath and wheezing.    Cardiovascular:  Negative for chest pain, palpitations and leg swelling.   Gastrointestinal:  Negative for abdominal pain, constipation, diarrhea and nausea.   Genitourinary:  Negative for decreased urine volume, dysuria and urgency.   Neurological:  Negative for headaches.   Psychiatric/Behavioral:  Negative for sleep disturbance. The patient is not nervous/anxious.            Objective     /70   Pulse 69   Temp 97.8 °F (36.6 °C) (Temporal)   Ht 4' 11\" (1.499 m)   SpO2 94%   BMI 24.76 kg/m²     Physical Exam  Vitals and nursing note reviewed.   Constitutional:       Appearance: Normal appearance.   HENT:      Head: Normocephalic.   Eyes:      Extraocular Movements: Extraocular movements intact.      Conjunctiva/sclera: Conjunctivae normal.      Pupils: Pupils are equal, round, and reactive to light.   Cardiovascular:      Rate and Rhythm: Normal rate and regular rhythm.      Heart sounds: Normal heart sounds.   Pulmonary:      Effort: Pulmonary effort is normal.      Breath sounds: Normal breath sounds.   Skin:     Findings: No rash.   Neurological:      General: No focal deficit present.      Mental Status: She is alert. Mental status is at baseline.   Psychiatric:         Mood and Affect: Mood normal.         Behavior: Behavior normal.         "

## 2024-10-08 NOTE — ASSESSMENT & PLAN NOTE
Currently utilizing timed voiding and also drinking lots of fluids Patient also getting cranberry supplement   Orders:    Basic metabolic panel; Future

## 2024-11-05 ENCOUNTER — TELEPHONE (OUTPATIENT)
Dept: FAMILY MEDICINE CLINIC | Facility: CLINIC | Age: 89
End: 2024-11-05

## 2024-11-05 NOTE — TELEPHONE ENCOUNTER
[address] [place] [address] health faxed discharge summary to be signed. Forms placed in folder for Dr Rowe to sign

## 2025-01-06 ENCOUNTER — HOSPITAL ENCOUNTER (INPATIENT)
Facility: HOSPITAL | Age: OVER 89
LOS: 4 days | Discharge: HOME HEALTH | DRG: 689 | End: 2025-01-10
Attending: EMERGENCY MEDICINE | Admitting: INTERNAL MEDICINE
Payer: MEDICARE

## 2025-01-06 DIAGNOSIS — H54.8 LEGAL BLINDNESS: ICD-10-CM

## 2025-01-06 DIAGNOSIS — R41.82 ALTERED MENTAL STATUS: ICD-10-CM

## 2025-01-06 DIAGNOSIS — R41.89 COGNITIVE IMPAIRMENT: ICD-10-CM

## 2025-01-06 DIAGNOSIS — N39.0 URINARY TRACT INFECTION: Primary | ICD-10-CM

## 2025-01-06 LAB
ALBUMIN SERPL BCG-MCNC: 4.1 G/DL (ref 3.5–5)
ALP SERPL-CCNC: 81 U/L (ref 34–104)
ALT SERPL W P-5'-P-CCNC: 12 U/L (ref 7–52)
ANION GAP SERPL CALCULATED.3IONS-SCNC: 9 MMOL/L (ref 4–13)
AST SERPL W P-5'-P-CCNC: 20 U/L (ref 13–39)
BACTERIA UR QL AUTO: ABNORMAL /HPF
BASOPHILS # BLD AUTO: 0.05 THOUSANDS/ΜL (ref 0–0.1)
BASOPHILS NFR BLD AUTO: 1 % (ref 0–1)
BILIRUB SERPL-MCNC: 0.44 MG/DL (ref 0.2–1)
BILIRUB UR QL STRIP: NEGATIVE
BUN SERPL-MCNC: 18 MG/DL (ref 5–25)
CALCIUM SERPL-MCNC: 9.2 MG/DL (ref 8.4–10.2)
CHLORIDE SERPL-SCNC: 104 MMOL/L (ref 96–108)
CLARITY UR: ABNORMAL
CO2 SERPL-SCNC: 25 MMOL/L (ref 21–32)
COLOR UR: YELLOW
CREAT SERPL-MCNC: 0.48 MG/DL (ref 0.6–1.3)
EOSINOPHIL # BLD AUTO: 0.09 THOUSAND/ΜL (ref 0–0.61)
EOSINOPHIL NFR BLD AUTO: 1 % (ref 0–6)
ERYTHROCYTE [DISTWIDTH] IN BLOOD BY AUTOMATED COUNT: 14.6 % (ref 11.6–15.1)
GFR SERPL CREATININE-BSD FRML MDRD: 81 ML/MIN/1.73SQ M
GLUCOSE SERPL-MCNC: 78 MG/DL (ref 65–140)
GLUCOSE UR STRIP-MCNC: NEGATIVE MG/DL
HCT VFR BLD AUTO: 42.7 % (ref 34.8–46.1)
HGB BLD-MCNC: 14.1 G/DL (ref 11.5–15.4)
HGB UR QL STRIP.AUTO: ABNORMAL
HYALINE CASTS #/AREA URNS LPF: ABNORMAL /LPF
IMM GRANULOCYTES # BLD AUTO: 0.01 THOUSAND/UL (ref 0–0.2)
IMM GRANULOCYTES NFR BLD AUTO: 0 % (ref 0–2)
KETONES UR STRIP-MCNC: NEGATIVE MG/DL
LACTATE SERPL-SCNC: 1.5 MMOL/L (ref 0.5–2)
LEUKOCYTE ESTERASE UR QL STRIP: ABNORMAL
LYMPHOCYTES # BLD AUTO: 1.52 THOUSANDS/ΜL (ref 0.6–4.47)
LYMPHOCYTES NFR BLD AUTO: 19 % (ref 14–44)
MCH RBC QN AUTO: 29.7 PG (ref 26.8–34.3)
MCHC RBC AUTO-ENTMCNC: 33 G/DL (ref 31.4–37.4)
MCV RBC AUTO: 90 FL (ref 82–98)
MONOCYTES # BLD AUTO: 0.59 THOUSAND/ΜL (ref 0.17–1.22)
MONOCYTES NFR BLD AUTO: 7 % (ref 4–12)
NEUTROPHILS # BLD AUTO: 5.94 THOUSANDS/ΜL (ref 1.85–7.62)
NEUTS SEG NFR BLD AUTO: 72 % (ref 43–75)
NITRITE UR QL STRIP: POSITIVE
NON-SQ EPI CELLS URNS QL MICRO: ABNORMAL /HPF
NRBC BLD AUTO-RTO: 0 /100 WBCS
PH UR STRIP.AUTO: 7 [PH] (ref 4.5–8)
PLATELET # BLD AUTO: 243 THOUSANDS/UL (ref 149–390)
PMV BLD AUTO: 10.3 FL (ref 8.9–12.7)
POTASSIUM SERPL-SCNC: 4 MMOL/L (ref 3.5–5.3)
PROCALCITONIN SERPL-MCNC: <0.05 NG/ML
PROT SERPL-MCNC: 7.3 G/DL (ref 6.4–8.4)
PROT UR STRIP-MCNC: ABNORMAL MG/DL
RBC # BLD AUTO: 4.75 MILLION/UL (ref 3.81–5.12)
RBC #/AREA URNS AUTO: ABNORMAL /HPF
SODIUM SERPL-SCNC: 138 MMOL/L (ref 135–147)
SP GR UR STRIP.AUTO: 1.02 (ref 1–1.03)
UROBILINOGEN UR QL STRIP.AUTO: 0.2 E.U./DL
WBC # BLD AUTO: 8.2 THOUSAND/UL (ref 4.31–10.16)
WBC #/AREA URNS AUTO: ABNORMAL /HPF

## 2025-01-06 PROCEDURE — 99284 EMERGENCY DEPT VISIT MOD MDM: CPT

## 2025-01-06 PROCEDURE — 99223 1ST HOSP IP/OBS HIGH 75: CPT | Performed by: INTERNAL MEDICINE

## 2025-01-06 PROCEDURE — 87186 SC STD MICRODIL/AGAR DIL: CPT

## 2025-01-06 PROCEDURE — 87077 CULTURE AEROBIC IDENTIFY: CPT

## 2025-01-06 PROCEDURE — 87040 BLOOD CULTURE FOR BACTERIA: CPT | Performed by: EMERGENCY MEDICINE

## 2025-01-06 PROCEDURE — 87181 SC STD AGAR DILUTION PER AGT: CPT

## 2025-01-06 PROCEDURE — 81001 URINALYSIS AUTO W/SCOPE: CPT

## 2025-01-06 PROCEDURE — 83605 ASSAY OF LACTIC ACID: CPT | Performed by: EMERGENCY MEDICINE

## 2025-01-06 PROCEDURE — 87086 URINE CULTURE/COLONY COUNT: CPT

## 2025-01-06 PROCEDURE — 84145 PROCALCITONIN (PCT): CPT | Performed by: EMERGENCY MEDICINE

## 2025-01-06 PROCEDURE — 96365 THER/PROPH/DIAG IV INF INIT: CPT

## 2025-01-06 PROCEDURE — 85025 COMPLETE CBC W/AUTO DIFF WBC: CPT | Performed by: EMERGENCY MEDICINE

## 2025-01-06 PROCEDURE — 36415 COLL VENOUS BLD VENIPUNCTURE: CPT | Performed by: EMERGENCY MEDICINE

## 2025-01-06 PROCEDURE — 80053 COMPREHEN METABOLIC PANEL: CPT | Performed by: EMERGENCY MEDICINE

## 2025-01-06 PROCEDURE — 99285 EMERGENCY DEPT VISIT HI MDM: CPT | Performed by: EMERGENCY MEDICINE

## 2025-01-06 RX ORDER — ONDANSETRON 2 MG/ML
4 INJECTION INTRAMUSCULAR; INTRAVENOUS EVERY 6 HOURS PRN
Status: DISCONTINUED | OUTPATIENT
Start: 2025-01-06 | End: 2025-01-10 | Stop reason: HOSPADM

## 2025-01-06 RX ORDER — ASPIRIN 81 MG/1
81 TABLET ORAL
Status: DISCONTINUED | OUTPATIENT
Start: 2025-01-06 | End: 2025-01-10 | Stop reason: HOSPADM

## 2025-01-06 RX ORDER — ENOXAPARIN SODIUM 100 MG/ML
40 INJECTION SUBCUTANEOUS DAILY
Status: DISCONTINUED | OUTPATIENT
Start: 2025-01-07 | End: 2025-01-10 | Stop reason: HOSPADM

## 2025-01-06 RX ORDER — PRAMIPEXOLE DIHYDROCHLORIDE 0.25 MG/1
0.25 TABLET ORAL
Status: DISCONTINUED | OUTPATIENT
Start: 2025-01-06 | End: 2025-01-10 | Stop reason: HOSPADM

## 2025-01-06 RX ORDER — PANTOPRAZOLE SODIUM 20 MG/1
20 TABLET, DELAYED RELEASE ORAL
Status: DISCONTINUED | OUTPATIENT
Start: 2025-01-07 | End: 2025-01-10 | Stop reason: HOSPADM

## 2025-01-06 RX ADMIN — PRAMIPEXOLE DIHYDROCHLORIDE 0.25 MG: 0.25 TABLET ORAL at 22:25

## 2025-01-06 RX ADMIN — CEFEPIME 2000 MG: 2 INJECTION, POWDER, FOR SOLUTION INTRAVENOUS at 16:25

## 2025-01-06 RX ADMIN — ASPIRIN 81 MG: 81 TABLET, COATED ORAL at 22:25

## 2025-01-06 NOTE — ED PROVIDER NOTES
Time reflects when diagnosis was documented in both MDM as applicable and the Disposition within this note       Time User Action Codes Description Comment    1/6/2025  5:53 PM Cassandra Quigley Add [N39.0] Urinary tract infection     1/6/2025  5:53 PM Cassnadra Quigley Add [R41.82] Altered mental status           ED Disposition       ED Disposition   Admit    Condition   Good    Date/Time   Mon Jan 6, 2025  5:53 PM    Comment   Case was discussed with dr sanches and the patient's admission status was agreed to be Admission Status: inpatient status to the service of Dr. sanches .               Assessment & Plan       Medical Decision Making  89-year-old female with history of dementia and UTI presents to the emergency department with altered mental status and foul-smelling urine according to family members.  Currently patient is alone in the room and unable to provide any history awaiting family member arrival to obtain more history.  According to EMS, family called because she seemed less responsive today and also had foul-smelling urine.  Patient was admitted for similar circumstances in September and found to have a UTI with Enterobacter growing in the urine as well as in 1 blood culture.  She was treated and subsequently released.  On exam she is awake and alert in no acute distress and has no complaints.  Vital signs are unremarkable.  Patient is unable to really participate in much of the physical exam but she has no abdominal tenderness on exam and does not seem to have any lateralizing weakness.  Likely her altered mental status is secondary to UTI.  She does have a cloudy specimen which was already provided.  Will check CBC, BMP, lactate and Pro-Jordan as well as urine.  Likely she will require admission    Amount and/or Complexity of Data Reviewed  Labs: ordered. Decision-making details documented in ED Course.    Risk  Decision regarding hospitalization.        ED Course as of 01/06/25 0130   Mon Jan 06,  2025   1519 Blood Pressure: 102/60   1519 Temperature: 97.9 °F (36.6 °C)   1519 Pulse: 77   1519 Respirations: 16   1519 SpO2: 92 %   1533 Leukocytes, UA(!): Moderate   1533 Nitrite, UA(!): Positive   1613 RBC Urine(!): 10-20   1613 WBC, UA(!): Innumerable   1613 Bacteria, UA: Occasional   1622 WBC: 8.20   1622 Hemoglobin: 14.1   1632 LACTIC ACID: 1.5   1632 Sodium: 138   1632 Potassium: 4.0   1633 ANION GAP: 9   1633 Creatinine(!): 0.48   1737 Spoke with patient's son who is now in the room.  He states normally his mother is not alone between him, his sister and visiting nurse.  However, his sister is now been diagnosed with cancer and is currently in [place].  He will not be able to take care of her tomorrow as well.  They tried to call another outside caretaker and there is no one available.  Will admit for altered mental status and UTI   1754 Procalcitonin: <0.05       Medications   cefepime (MAXIPIME) 2 g/50 mL dextrose IVPB (0 mg Intravenous Stopped 1/6/25 1727)       ED Risk Strat Scores                                              History of Present Illness       Chief Complaint   Patient presents with    Possible UTI     Pt came in via EMS from home, per family member pt more lethargic, possible UTI. Foul urine noted. Pt has hx of dementia       Past Medical History:   Diagnosis Date    Anxiety     Arthritis     Breast carcinoma (HCC) 08/13/2012    Description: s/p L mastectomy. Dr. 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    06/06/2018 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    Osteoarthritis 06/05/2012    Osteoporosis 06/05/2012    Description: Dexa 7/13 read as normal, patient declines medication    Pressure injury of coccygeal region, stage 2 (HCC) 03/06/2020    Pulmonary embolism (HCC) 01/2011    last assessed 9/18/12    Restless legs syndrome 06/05/2012    RLS (restless legs syndrome)     Thoracic back pain 10/19/2017      Past Surgical History:   Procedure Laterality Date    BILATERAL OOPHORECTOMY      Laparoscopic    BREAST SURGERY Left     Mastectomy     CHOLECYSTECTOMY      Laparoscopic    HERNIA REPAIR      HYSTERECTOMY      SMALL INTESTINE SURGERY        Family History   Problem Relation Age of Onset    Hypertension Mother     Blindness Mother     Cancer Sister     Cancer Maternal Aunt     Cancer Maternal Uncle       Social History     Tobacco Use    Smoking status: Never    Smokeless tobacco: Never    Tobacco comments:     denies   Vaping Use    Vaping status: Never Used   Substance Use Topics    Alcohol use: No     Comment: denies    Drug use: No      E-Cigarette/Vaping    E-Cigarette Use Never User     Cartridges/Day 0     Comments denies       E-Cigarette/Vaping Substances      I have reviewed and agree with the history as documented.     89-year-old female reportedly from home with history of hypertension, dementia, UTI presents to the emergency department with altered level of consciousness and possible UTI.  This was reported by EMS as the patient is unable to provide any history.  Patient's son told EMS that they noticed today that she seemed not her usual self and had foul-smelling urine.  Patient was admitted in September for UTI and ultimately grew Enterobacter in the urine as well as 1 blood culture.  She was treated and discharged home.  No reported fevers.  Currently the patient is awake and alert but unable to provide any adequate history.  She is in no acute distress.  Vital  signs are unremarkable      History provided by:  EMS personnel and medical records  History limited by:  Dementia   used: No    Altered Mental Status  Presenting symptoms: confusion and partial responsiveness    Most recent episode:  Today  Progression:  Unchanged  Chronicity:  Recurrent  Context: dementia    Context: not head injury, not nursing home resident, not recent change in medication, not recent illness and not recent infection    Context comment:  History of altered mental status with UTI  Associated symptoms: no difficulty breathing, no fever and no vomiting        Review of Systems   Unable to perform ROS: Dementia   Constitutional:  Negative for fever.   Gastrointestinal:  Negative for vomiting.   Psychiatric/Behavioral:  Positive for confusion.            Objective       ED Triage Vitals [01/06/25 1515]   Temperature Pulse Blood Pressure Respirations SpO2 Patient Position - Orthostatic VS   97.9 °F (36.6 °C) 77 102/60 16 92 % Sitting      Temp Source Heart Rate Source BP Location FiO2 (%) Pain Score    Oral Monitor Right arm -- No Pain      Vitals      Date and Time Temp Pulse SpO2 Resp BP Pain Score FACES Pain Rating User   01/06/25 1728 -- 84 95 % 16 131/78 -- -- CO   01/06/25 1515 97.9 °F (36.6 °C) 77 92 % 16 102/60 No Pain -- CO            Physical Exam  Vitals and nursing note reviewed.   Constitutional:       General: She is awake. She is not in acute distress.     Appearance: She is well-developed and normal weight. She is not ill-appearing, toxic-appearing or diaphoretic.   HENT:      Head: Normocephalic and atraumatic.      Right Ear: External ear normal.      Left Ear: External ear normal.      Nose: Nose normal.      Mouth/Throat:      Mouth: Mucous membranes are moist.   Eyes:      General:         Right eye: No discharge.         Left eye: No discharge.      Conjunctiva/sclera: Conjunctivae normal.      Pupils: Pupils are equal, round, and reactive to light.   Neck:       Thyroid: No thyromegaly.      Vascular: No JVD.      Trachea: No tracheal deviation.   Cardiovascular:      Rate and Rhythm: Normal rate and regular rhythm.      Heart sounds: Normal heart sounds. No murmur heard.     No friction rub. No gallop.   Pulmonary:      Effort: Pulmonary effort is normal. No respiratory distress.      Breath sounds: Normal breath sounds. No stridor. No wheezing, rhonchi or rales.   Chest:      Chest wall: No tenderness.   Abdominal:      General: Bowel sounds are normal. There is no distension.      Palpations: Abdomen is soft. There is no mass.      Tenderness: There is no abdominal tenderness.      Hernia: No hernia is present.   Skin:     General: Skin is warm and dry.      Coloration: Skin is not jaundiced or pale.      Findings: No bruising, erythema, lesion or rash.   Neurological:      Mental Status: She is alert. She is disoriented.      Motor: No weakness or abnormal muscle tone.      Deep Tendon Reflexes: Reflexes are normal and symmetric.   Psychiatric:         Behavior: Behavior is cooperative.         Results Reviewed       Procedure Component Value Units Date/Time    Procalcitonin [[id number]]  (Normal) Collected: 01/06/25 1542    Lab Status: Final result Specimen: Blood from Arm, Left Updated: 01/06/25 1753     Procalcitonin <0.05 ng/ml     Comprehensive metabolic panel [[id number]]  (Abnormal) Collected: 01/06/25 1542    Lab Status: Final result Specimen: Blood from Arm, Left Updated: 01/06/25 1629     Sodium 138 mmol/L      Potassium 4.0 mmol/L      Chloride 104 mmol/L      CO2 25 mmol/L      ANION GAP 9 mmol/L      BUN 18 mg/dL      Creatinine 0.48 mg/dL      Glucose 78 mg/dL      Calcium 9.2 mg/dL      AST 20 U/L      ALT 12 U/L      Alkaline Phosphatase 81 U/L      Total Protein 7.3 g/dL      Albumin 4.1 g/dL      Total Bilirubin 0.44 mg/dL      eGFR 81 ml/min/1.73sq m     Narrative:      National Kid[employer] Disease Foundation guidelines for Chronic Kidney Disease (CKD):      Stage 1 with normal or high GFR (GFR > 90 mL/min/1.73 square meters)    Stage 2 Mild CKD (GFR = 60-89 mL/min/1.73 square meters)    Stage 3A Moderate CKD (GFR = 45-59 mL/min/1.73 square meters)    Stage 3B Moderate CKD (GFR = 30-44 mL/min/1.73 square meters)    Stage 4 Severe CKD (GFR = 15-29 mL/min/1.73 square meters)    Stage 5 End Stage CKD (GFR <15 mL/min/1.73 square meters)  Note: GFR calculation is accurate only with a steady state creatinine    Lactic acid, plasma (w/reflex if result > 2.0) [[id number]]  (Normal) Collected: 01/06/25 1542    Lab Status: Final result Specimen: Blood from Arm, Left Updated: 01/06/25 1629     LACTIC ACID 1.5 mmol/L     Narrative:      Result may be elevated if tourniquet was used during collection.    Blood culture #1 [[id number]] Collected: 01/06/25 1550    Lab Status: In process Specimen: Blood from Arm, Left Updated: 01/06/25 1615    Blood culture #2 [[id number]] Collected: 01/06/25 1605    Lab Status: In process Specimen: Blood from Arm, Right Updated: 01/06/25 1614    CBC and differential [[id number]] Collected: 01/06/25 1542    Lab Status: Final result Specimen: Blood from Arm, Left Updated: 01/06/25 1614     WBC 8.20 Thousand/uL      RBC 4.75 Million/uL      Hemoglobin 14.1 g/dL      Hematocrit 42.7 %      MCV 90 fL      MCH 29.7 pg      MCHC 33.0 g/dL      RDW 14.6 %      MPV 10.3 fL      Platelets 243 Thousands/uL      nRBC 0 /100 WBCs      Segmented % 72 %      Immature Grans % 0 %      Lymphocytes % 19 %      Monocytes % 7 %      Eosinophils Relative 1 %      Basophils Relative 1 %      Absolute Neutrophils 5.94 Thousands/µL      Absolute Immature Grans 0.01 Thousand/uL      Absolute Lymphocytes 1.52 Thousands/µL      Absolute Monocytes 0.59 Thousand/µL      Eosinophils Absolute 0.09 Thousand/µL      Basophils Absolute 0.05 Thousands/µL     Urine Microscopic [[id number]]  (Abnormal) Collected: 01/06/25 1529    Lab Status: Final result Specimen: Urine, Other Updated:  01/06/25 1603     RBC, UA 10-20 /hpf      WBC, UA Innumerable /hpf      Epithelial Cells Occasional /hpf      Bacteria, UA Occasional /hpf      Hyaline Casts, UA 0-3 /lpf     Urine culture [[id number]] Collected: 01/06/25 1529    Lab Status: In process Specimen: Urine, Other Updated: 01/06/25 1603    Urine Macroscopic, POC [[id number]]  (Abnormal) Collected: 01/06/25 1529    Lab Status: Final result Specimen: Urine Updated: 01/06/25 1531     Color, UA Yellow     Clarity, UA Cloudy     pH, UA 7.0     Leukocytes, UA Moderate     Nitrite, UA Positive     Protein, UA 30 (1+) mg/dl      Glucose, UA Negative mg/dl      Ketones, UA Negative mg/dl      Urobilinogen, UA 0.2 E.U./dl      Bilirubin, UA Negative     Occult Blood, UA Trace     Specific Gravity, UA 1.020    Narrative:      CLINITEK RESULT            No orders to display       Procedures    ED Medication and Procedure Management   Prior to Admission Medications   Prescriptions Last Dose Informant Patient Reported? Taking?   Calcium Carbonate-Vit D-Min (CALCIUM 1200 PO)   Yes Yes   Sig: Take 750 mg by mouth in the morning At 3pm   Cranberry 250 MG TABS   No Yes   Sig: Take 2 tablets (500 mg total) by mouth 2 (two) times a day   Klayesta powder   No Yes   Sig: APPLY TO AFFECTED AREA TWICE A DAY   aspirin (ECOTRIN LOW STRENGTH) 81 mg EC tablet   Yes Yes   Sig: Take 81 mg by mouth daily at bedtime   cholecalciferol (VITAMIN D3) 1,000 units tablet   Yes Yes   Sig: Take 4,000 Units by mouth daily   omeprazole (PriLOSEC) 20 mg delayed release capsule   No Yes   Sig: Take 1 capsule (20 mg total) by mouth daily   pramipexole (MIRAPEX) 0.25 mg tablet   No Yes   Sig: Take 1 tablet (0.25 mg total) by mouth daily at bedtime   vitamin B-12 (VITAMIN B-12) 1,000 mcg tablet   Yes Yes   Sig: Take 2,500 mcg by mouth every other day      Facility-Administered Medications: None     Patient's Medications   Discharge Prescriptions    No medications on file     No discharge procedures on  file.  ED SEPSIS DOCUMENTATION   Time reflects when diagnosis was documented in both MDM as applicable and the Disposition within this note       Time User Action Codes Description Comment    1/6/2025  5:53 PM Cassandra Quigley Add [N39.0] Urinary tract infection     1/6/2025  5:53 PM Cassandra Quigley Add [R41.82] Altered mental status                  Cassandra Quigley,   01/06/25 1758

## 2025-01-07 LAB
ANION GAP SERPL CALCULATED.3IONS-SCNC: 6 MMOL/L (ref 4–13)
BUN SERPL-MCNC: 21 MG/DL (ref 5–25)
CALCIUM SERPL-MCNC: 9 MG/DL (ref 8.4–10.2)
CHLORIDE SERPL-SCNC: 106 MMOL/L (ref 96–108)
CO2 SERPL-SCNC: 27 MMOL/L (ref 21–32)
CREAT SERPL-MCNC: 0.62 MG/DL (ref 0.6–1.3)
ERYTHROCYTE [DISTWIDTH] IN BLOOD BY AUTOMATED COUNT: 14.9 % (ref 11.6–15.1)
GFR SERPL CREATININE-BSD FRML MDRD: 75 ML/MIN/1.73SQ M
GLUCOSE SERPL-MCNC: 91 MG/DL (ref 65–140)
HCT VFR BLD AUTO: 38 % (ref 34.8–46.1)
HGB BLD-MCNC: 12.7 G/DL (ref 11.5–15.4)
MCH RBC QN AUTO: 30.3 PG (ref 26.8–34.3)
MCHC RBC AUTO-ENTMCNC: 33.4 G/DL (ref 31.4–37.4)
MCV RBC AUTO: 91 FL (ref 82–98)
PLATELET # BLD AUTO: 197 THOUSANDS/UL (ref 149–390)
PMV BLD AUTO: 10 FL (ref 8.9–12.7)
POTASSIUM SERPL-SCNC: 4.1 MMOL/L (ref 3.5–5.3)
RBC # BLD AUTO: 4.19 MILLION/UL (ref 3.81–5.12)
SODIUM SERPL-SCNC: 139 MMOL/L (ref 135–147)
TSH SERPL DL<=0.05 MIU/L-ACNC: 1.6 UIU/ML (ref 0.45–4.5)
VIT B12 SERPL-MCNC: 807 PG/ML (ref 180–914)
WBC # BLD AUTO: 7.44 THOUSAND/UL (ref 4.31–10.16)

## 2025-01-07 PROCEDURE — 85027 COMPLETE CBC AUTOMATED: CPT | Performed by: INTERNAL MEDICINE

## 2025-01-07 PROCEDURE — 99232 SBSQ HOSP IP/OBS MODERATE 35: CPT | Performed by: HOSPITALIST

## 2025-01-07 PROCEDURE — 84443 ASSAY THYROID STIM HORMONE: CPT

## 2025-01-07 PROCEDURE — 80048 BASIC METABOLIC PNL TOTAL CA: CPT | Performed by: INTERNAL MEDICINE

## 2025-01-07 PROCEDURE — 82607 VITAMIN B-12: CPT

## 2025-01-07 RX ADMIN — CEFEPIME 2000 MG: 2 INJECTION, POWDER, FOR SOLUTION INTRAVENOUS at 16:33

## 2025-01-07 RX ADMIN — ASPIRIN 81 MG: 81 TABLET, COATED ORAL at 22:11

## 2025-01-07 RX ADMIN — PRAMIPEXOLE DIHYDROCHLORIDE 0.25 MG: 0.25 TABLET ORAL at 22:11

## 2025-01-07 RX ADMIN — PANTOPRAZOLE SODIUM 20 MG: 20 TABLET, DELAYED RELEASE ORAL at 05:17

## 2025-01-07 RX ADMIN — CEFEPIME 2000 MG: 2 INJECTION, POWDER, FOR SOLUTION INTRAVENOUS at 03:49

## 2025-01-07 RX ADMIN — ENOXAPARIN SODIUM 40 MG: 40 INJECTION SUBCUTANEOUS at 08:51

## 2025-01-07 RX ADMIN — Medication 4000 UNITS: at 08:51

## 2025-01-07 RX ADMIN — Medication 1000 MCG: at 08:51

## 2025-01-07 NOTE — PLAN OF CARE
Problem: Potential for Falls  Goal: Patient will remain free of falls  Description: INTERVENTIONS:  - Educate patient/family on patient safety including physical limitations  - Instruct patient to call for assistance with activity   - Consult OT/PT to assist with strengthening/mobility   - Keep Call bell within reach  - Keep bed low and locked with side rails adjusted as appropriate  - Keep care items and personal belongings within reach  - Initiate and maintain comfort rounds  - Make Fall Risk Sign visible to staff  - Offer Toileting every 2 Hours, in advance of need  - Initiate/Maintain bed alarm  - Obtain necessary fall risk management equipment: bed alarm call bell  - Apply yellow socks and bracelet for high fall risk patients  - Consider moving patient to room near nurses station  Outcome: Progressing     Problem: PAIN - ADULT  Goal: Verbalizes/displays adequate comfort level or baseline comfort level  Description: Interventions:  - Encourage patient to monitor pain and request assistance  - Assess pain using appropriate pain scale  - Administer analgesics based on type and severity of pain and evaluate response  - Implement non-pharmacological measures as appropriate and evaluate response  - Consider cultural and social influences on pain and pain management  - Notify physician/advanced practitioner if interventions unsuccessful or patient reports new pain  Outcome: Progressing     Problem: INFECTION - ADULT  Goal: Absence or prevention of progression during hospitalization  Description: INTERVENTIONS:  - Assess and monitor for signs and symptoms of infection  - Monitor lab/diagnostic results  - Monitor all insertion sites, i.e. indwelling lines, tubes, and drains  - Monitor endotracheal if appropriate and nasal secretions for changes in amount and color  - [place] appropriate cooling/warming therapies per order  - Administer medications as ordered  - Instruct and encourage patient and family to use good  hand hygiene technique  - Identify and instruct in appropriate isolation precautions for identified infection/condition  Outcome: Progressing  Goal: Absence of fever/infection during neutropenic period  Description: INTERVENTIONS:  - Monitor WBC    Outcome: Progressing     Problem: SAFETY ADULT  Goal: Patient will remain free of falls  Description: INTERVENTIONS:  - Educate patient/family on patient safety including physical limitations  - Instruct patient to call for assistance with activity   - Consult OT/PT to assist with strengthening/mobility   - Keep Call bell within reach  - Keep bed low and locked with side rails adjusted as appropriate  - Keep care items and personal belongings within reach  - Initiate and maintain comfort rounds  - Make Fall Risk Sign visible to staff  - Offer Toileting every 2 Hours, in advance of need  - Initiate/Maintain bed alarm  - Obtain necessary fall risk management equipment: bed alarm call bell  - Apply yellow socks and bracelet for high fall risk patients  - Consider moving patient to room near nurses station  Outcome: Progressing  Goal: Maintain or return to baseline ADL function  Description: INTERVENTIONS:  -  Assess patient's ability to carry out ADLs; assess patient's baseline for ADL function and identify physical deficits which impact ability to perform ADLs (bathing, care of mouth/teeth, toileting, grooming, dressing, etc.)  - Assess/evaluate cause of self-care deficits   - Assess range of motion  - Assess patient's mobility; develop plan if impaired  - Assess patient's need for assistive devices and provide as appropriate  - Encourage maximum independence but intervene and supervise when necessary  - Involve family in performance of ADLs  - Assess for home care needs following discharge   - Consider OT consult to assist with ADL evaluation and planning for discharge  - Provide patient education as appropriate  Outcome: Progressing  Goal: Maintains/Returns to pre  admission functional level  Description: INTERVENTIONS:  - Perform AM-PAC 6 Click Basic Mobility/ Daily Activity assessment daily.  - Set and communicate daily mobility goal to care team and patient/family/caregiver.   - Collaborate with rehabilitation services on mobility goals if consulted  - Perform Range of Motion 4 times a day.  - Reposition patient every 2 hours.  - Dangle patient 3 times a day  - Stand patient 3 times a day  - Ambulate patient 3 times a day  - Out of bed to chair 3 times a day   - Out of bed for meals 3 times a day  - Out of bed for toileting  - Record patient progress and toleration of activity level   Outcome: Progressing     Problem: DISCHARGE PLANNING  Goal: Discharge to home or other facility with appropriate resources  Description: INTERVENTIONS:  - Identify barriers to discharge w/patient and caregiver  - Arrange for needed discharge resources and transportation as appropriate  - Identify discharge learning needs (meds, wound care, etc.)  - Arrange for interpretive services to assist at discharge as needed  - Refer to Case Management Department for coordinating discharge planning if the patient needs post-hospital services based on physician/advanced practitioner order or complex needs related to functional status, cognitive ability, or social support system  Outcome: Progressing     Problem: Knowledge Deficit  Goal: Patient/family/caregiver demonstrates understanding of disease process, treatment plan, medications, and discharge instructions  Description: Complete learning assessment and assess knowledge base.  Interventions:  - Provide teaching at level of understanding  - Provide teaching via preferred learning methods  Outcome: Progressing     Problem: Prexisting or High Potential for Compromised Skin Integrity  Goal: Skin integrity is maintained or improved  Description: INTERVENTIONS:  - Identify patients at risk for skin breakdown  - Assess and monitor skin integrity  - Assess  and monitor nutrition and hydration status  - Monitor labs   - Assess for incontinence   - Turn and reposition patient  - Assist with mobility/ambulation  - Relieve pressure over bony prominences  - Avoid friction and shearing  - Provide appropriate hygiene as needed including keeping skin clean and dry  - Evaluate need for skin moisturizer/barrier cream  - Collaborate with interdisciplinary team   - Patient/family teaching  - Consider wound care consult   Outcome: Progressing

## 2025-01-07 NOTE — ASSESSMENT & PLAN NOTE
Admitted for concerns of acute UTI in the setting of recurrent uti with history of enterobacter  Patient is poor historian so not expressing symptoms, abdomen non-tender  Started on cefepime per previous cultures, day 2  Monitor intake and output, monitor for retention  Follow up on blood culture and urine culture   Not meeting SIRS/septic criteria

## 2025-01-07 NOTE — PROGRESS NOTES
Progress Note - Hospitalist   Name: Latanya Ray 89 y.o. female I MRN: [id number]  Unit/Bed#: [place] [address] -01 I Date of Admission: 1/6/2025   Date of Service: 1/7/2025 I Hospital Day: 1    Assessment & Plan  Acute metabolic encephalopathy  In setting of cognitive impairment and urinary tract infection  On admission patient was noted be hallucinating  Currently appears to be around baseline, alert to person.  Appears to be improving  History of recurrent UTI (urinary tract infection)  Admitted for concerns of acute UTI in the setting of recurrent uti with history of enterobacter  Patient is poor historian so not expressing symptoms, abdomen non-tender  Started on cefepime per previous cultures, day 2  Monitor intake and output, monitor for retention  Follow up on blood culture and urine culture   Not meeting SIRS/septic criteria  Restless leg syndrome  On mirapex  History of lobular carcinoma of breast  History of breast cancer but family declined intervention   Cognitive impairment  Noted history, patient's primary caregiver is her daughter who is currently in the hospital  Patient does not have a caregiver at home at this time  Also with legal blindness, follows with the outpatient ophthalmologist  Baseline is alert to person and place, disoriented to time  Will check TSH and B12  Legal blindness  Noted, supportive care and outpatient follow up  Dysphagia, oropharyngeal phase  Continue mechanical soft diet     VTE Pharmacologic Prophylaxis: VTE Score: 5 High Risk (Score >/= 5) - Pharmacological DVT Prophylaxis Ordered: enoxaparin (Lovenox). Sequential Compression Devices Ordered.    Mobility:   Basic Mobility Inpatient Raw Score: 17  JH-HLM Goal: 5: Stand one or more mins  JH-HLM Achieved: 6: Walk 10 steps or more  JH-HLM Goal NOT achieved. Continue with multidisciplinary rounding and encourage appropriate mobility to improve upon JH-HLM goals.    Patient Centered Rounds: I performed bedside rounds with  nursing staff today.   Discussions with Specialists or Other Care Team Provider: CM    Education and Discussions with Family / Patient: Updated  (daughter) via phone.    Current Length of Stay: 1 day(s)  Current Patient Status: Inpatient   Certification Statement: The patient will continue to require additional inpatient hospital stay due to infectious work up  Discharge Plan: Anticipate discharge in 48-72 hrs to discharge location to be determined pending rehab evaluations.    Code Status: Level 3 - DNAR and DNI    Subjective   Patient seen and examined lying in bed.  She notes she is doing well and resting.  She currently denies any discomfort.  She is alert and oriented to herself and her birthday.  She is pleasant on exam and cooperative.  Nursing denies any issues overnight.  Nursing notes she has a great appetite.    Objective :  Temp:  [97.8 °F (36.6 °C)-99.4 °F (37.4 °C)] 99.4 °F (37.4 °C)  HR:  [] 67  BP: (102-141)/(60-81) 104/78  Resp:  [16-22] 22  SpO2:  [92 %-100 %] 96 %  O2 Device: None (Room air)    Body mass index is 23.68 kg/m².     Input and Output Summary (last 24 hours):     Intake/Output Summary (Last 24 hours) at 1/7/2025 1311  Last data filed at 1/7/2025 0554  Gross per 24 hour   Intake 150 ml   Output --   Net 150 ml       Physical Exam  Constitutional:       Appearance: She is not toxic-appearing or diaphoretic.      Comments: Thin, frail appearing   Cardiovascular:      Rate and Rhythm: Normal rate and regular rhythm.   Pulmonary:      Effort: Pulmonary effort is normal. No respiratory distress.      Breath sounds: Normal breath sounds.   Abdominal:      General: Bowel sounds are normal.      Palpations: Abdomen is soft.      Tenderness: There is no abdominal tenderness.   Musculoskeletal:         General: Deformity (arthritic changes) present.      Right lower leg: No edema.      Left lower leg: No edema.   Skin:     General: Skin is warm and dry.      Coloration: Skin  is pale.      Comments: Scabbed lesion right forehead   Neurological:      General: No focal deficit present.      Mental Status: She is alert. Mental status is at baseline.      Comments: Alert to person. Hospital, disoriented to time   Psychiatric:         Behavior: Behavior is cooperative.         Lab Results: I have reviewed the following results:   Results from last 7 days   Lab Units 01/07/25  0610 01/06/25  1542   WBC Thousand/uL 7.44 8.20   HEMOGLOBIN g/dL 12.7 14.1   HEMATOCRIT % 38.0 42.7   PLATELETS Thousands/uL 197 243   SEGS PCT %  --  72   LYMPHO PCT %  --  19   MONO PCT %  --  7   EOS PCT %  --  1     Results from last 7 days   Lab Units 01/07/25  0610 01/06/25  1542   SODIUM mmol/L 139 138   POTASSIUM mmol/L 4.1 4.0   CHLORIDE mmol/L 106 104   CO2 mmol/L 27 25   BUN mg/dL 21 18   CREATININE mg/dL 0.62 0.48*   ANION GAP mmol/L 6 9   CALCIUM mg/dL 9.0 9.2   ALBUMIN g/dL  --  4.1   TOTAL BILIRUBIN mg/dL  --  0.44   ALK PHOS U/L  --  81   ALT U/L  --  12   AST U/L  --  20   GLUCOSE RANDOM mg/dL 91 78     Results from last 7 days   Lab Units 01/06/25  1542   LACTIC ACID mmol/L 1.5   PROCALCITONIN ng/ml <0.05     Recent Cultures (last 7 days):   Results from last 7 days   Lab Units 01/06/25  1605 01/06/25  1550   BLOOD CULTURE  Received in Microbiology Lab. Culture in Progress. Received in Microbiology Lab. Culture in Progress.     Last 24 Hours Medication List:     Current Facility-Administered Medications:     aspirin (ECOTRIN LOW STRENGTH) EC tablet 81 mg, HS    ceFEPime (MAXIPIME) 2,000 mg in dextrose 5 % 50 mL IVPB, Q12H, Last Rate: 2,000 mg (01/07/25 0349)    Cholecalciferol (VITAMIN D3) tablet 4,000 Units, Daily    cyanocobalamin (VITAMIN B-12) tablet 1,000 mcg, Every Other Day    enoxaparin (LOVENOX) subcutaneous injection 40 mg, Daily    ondansetron (ZOFRAN) injection 4 mg, Q6H PRN    pantoprazole (PROTONIX) EC tablet 20 mg, Early Morning    pramipexole (MIRAPEX) tablet 0.25 mg,  HS    Administrative Statements   Today, Patient Was Seen By: Mary Mcknight PA-C    **Please Note: This note may have been constructed using a voice recognition system.**

## 2025-01-07 NOTE — ASSESSMENT & PLAN NOTE
In the setting of dementia and legally blind   As a result of uti  Was hallucinating this am  Will treat uti and continue supportive care

## 2025-01-07 NOTE — ASSESSMENT & PLAN NOTE
In setting of cognitive impairment and urinary tract infection  On admission patient was noted be hallucinating  Currently appears to be around baseline, alert to person.  Appears to be improving

## 2025-01-07 NOTE — ASSESSMENT & PLAN NOTE
History of dementia  Will continue supportive care  Lives with her daughter who is currently in the hospital   Does not have care giver at home at this time

## 2025-01-07 NOTE — H&P
H&P - Hospitalist   Name: Latanya Ray 89 y.o. female I MRN: [id number]  Unit/Bed#: [facility]-04 I Date of Admission: 1/6/2025   Date of Service: 1/6/2025 I Hospital Day: 0     Assessment & Plan  Acute metabolic encephalopathy  In the setting of dementia and legally blind   As a result of uti  Was hallucinating this am  Will treat uti and continue supportive care   History of lobular carcinoma of breast  History of breast cancer but family declined intervention   Cognitive impairment  History of dementia  Will continue supportive care  Lives with her daughter who is currently in the hospital   Does not have care giver at home at this time   Legal blindness  Continue supportive care   Dysphagia, oropharyngeal phase  Continue mechanical soft diet   History of recurrent UTI (urinary tract infection)  Acute uti in the setting of recurrent uti with history of enterobacter  Given cefepime in ed  Will continue cefepime given previous cultures  Follow up on blood culture and urine culture       VTE Pharmacologic Prophylaxis: VTE Score: 5 lovenox   Code Status: dnr/dni  Discussion with family: Updated  (son) at bedside.    Anticipated Length of Stay: Patient will be admitted on an inpatient basis with an anticipated length of stay of greater than 2 midnights secondary to above .    History of Present Illness   Chief Complaint: change in mental status     Latanya Ray is a 89 y.o. female with a PMH of legal blindness, dementia, recurrent uti, and history of bca who presents with change in mental status in which she was hallucinating and foul smelling urine. She was found to have a uti. She was started on cefepime given the sensitives of a previous urine culture. She usually resides with her daughter who is her primary care giver. Unfortunately her daughter is in the hospital and is unable to care for her at this time. She is pleasantly confused. Her baseline mentation is knowing her name and where she is      Review of Systems   Unable to perform ROS: Dementia       Historical Information   Past Medical History:   Diagnosis Date    Anxiety     Arthritis     Breast carcinoma ([facility]) 08/13/2012    Description: s/p L mastectomy. Dr. 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    06/06/2018 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    Osteoarthritis 06/05/2012    Osteoporosis 06/05/2012    Description: Dexa 7/13 read as normal, patient declines medication    Pressure injury of coccygeal region, stage 2 ([facility]) 03/06/2020    Pulmonary embolism ([facility]) 01/2011    last assessed 9/18/12    Restless legs syndrome 06/05/2012    RLS (restless legs syndrome)     Thoracic back pain 10/19/2017     Past Surgical History:   Procedure Laterality Date    BILATERAL OOPHORECTOMY      Laparoscopic    BREAST SURGERY Left     Mastectomy     CHOLECYSTECTOMY      Laparoscopic    HERNIA REPAIR      HYSTERECTOMY      SMALL INTESTINE SURGERY       Social History     Tobacco Use    Smoking status: Never    Smokeless tobacco: Never    Tobacco comments:     denies   Vaping Use    Vaping status: Never Used   Substance and Sexual Activity    Alcohol use: No     Comment: denies    Drug use: No    Sexual activity: Not Currently     E-Cigarette/Vaping    E-Cigarette Use Never User     Cartridges/Day 0     Comments denies      E-Cigarette/Vaping Substances     Family history non-contributory  Social History:  Marital Status:        Meds/Allergies    I have reviewed home medications with patient personally.  Prior to Admission medications    Medication Sig Start Date End Date Taking? Authorizing Provider   aspirin (ECOTRIN LOW STRENGTH) 81 mg EC tablet Take 81 mg by mouth daily at bedtime   Yes Historical Provider, MD   Calcium Carbonate-Vit D-Min (CALCIUM 1200 PO) Take 750 mg by mouth in the morning At 3pm   Yes Historical Provider, MD   cholecalciferol (VITAMIN D3) 1,000 units tablet Take 4,000 Units by mouth daily   Yes Historical Provider, MD   Cranberry 250 MG TABS Take 2 tablets (500 mg total) by mouth 2 (two) times a day 9/9/24  Yes Austin Escobar MD   Klayesta powder APPLY TO AFFECTED AREA TWICE A DAY 9/25/24  Yes Amina Rowe, DO   omeprazole (PriLOSEC) 20 mg delayed release capsule Take 1 capsule (20 mg total) by mouth daily 9/2/24  Yes Amina Rowe, DO   pramipexole (MIRAPEX) 0.25 mg tablet Take 1 tablet (0.25 mg total) by mouth daily at bedtime 9/2/24  Yes Amina Rowe,    vitamin B-12 (VITAMIN B-12) 1,000 mcg tablet Take 2,500 mcg by mouth every other day   Yes Historical Provider, MD     Allergies   Allergen Reactions    Amoxicillin Hives    Celecoxib     Ciprofloxacin      Other reaction(s): diarrhea. Tolerates Levaquin    Codeine Other (See Comments)     Other reaction(s): Other (See Comments)  takes vicodin @ home  takes vicodin @ home    Epinephrine Other (See Comments)     Heart racing    Oxycodone-Acetaminophen Other (See Comments)     Nausea?    Oxycodone-Acetaminophen      Other reaction(s): Unknown Reaction    Propoxyphene Other (See Comments)    Rofecoxib     Sulfa Antibiotics Other (See Comments)     takes at home  takes at home  Other reaction(s): Other (See Comments)  takes at home    Sulfamethoxazole-Trimethoprim Other (See Comments)     Unknown, perhaps hives?    Nitrofurantoin Rash       Objective :  Temp:  [97.9 °F (36.6 °C)] 97.9 °F (36.6 °C)  HR:  [77-84] 80  BP: (102-141)/(60-81) 141/81  Resp:  [16-18]  18  SpO2:  [92 %-95 %] 95 %  O2 Device: None (Room air)    Physical Exam  Constitutional:       Comments: Frail    HENT:      Head: Normocephalic.      Comments: Right frontal lesion on scalp  Eyes:      Extraocular Movements: Extraocular movements intact.      Pupils: Pupils are equal, round, and reactive to light.   Cardiovascular:      Rate and Rhythm: Normal rate and regular rhythm.      Heart sounds: No murmur heard.     No friction rub. No gallop.   Pulmonary:      Effort: Pulmonary effort is normal. No respiratory distress.      Breath sounds: Normal breath sounds. No wheezing, rhonchi or rales.   Abdominal:      General: Bowel sounds are normal. There is no distension.      Palpations: Abdomen is soft.      Tenderness: There is no abdominal tenderness. There is no guarding or rebound.   Neurological:      Mental Status: She is alert.      Comments: Oriented x1 person        Lines/Drains:      Lab Results: I have reviewed the following results:  Results from last 7 days   Lab Units 01/06/25  1542   WBC Thousand/uL 8.20   HEMOGLOBIN g/dL 14.1   HEMATOCRIT % 42.7   PLATELETS Thousands/uL 243   SEGS PCT % 72   LYMPHO PCT % 19   MONO PCT % 7   EOS PCT % 1     Results from last 7 days   Lab Units 01/06/25  1542   SODIUM mmol/L 138   POTASSIUM mmol/L 4.0   CHLORIDE mmol/L 104   CO2 mmol/L 25   BUN mg/dL 18   CREATININE mg/dL 0.48*   ANION GAP mmol/L 9   CALCIUM mg/dL 9.2   ALBUMIN g/dL 4.1   TOTAL BILIRUBIN mg/dL 0.44   ALK PHOS U/L 81   ALT U/L 12   AST U/L 20   GLUCOSE RANDOM mg/dL 78             Lab Results   Component Value Date    HGBA1C 5.3 12/15/2021    HGBA1C 5.4 06/06/2018    HGBA1C 5.5 04/20/2017     Results from last 7 days   Lab Units 01/06/25  1542   LACTIC ACID mmol/L 1.5   PROCALCITONIN ng/ml <0.05       Imaging Results Review: No pertinent imaging studies reviewed.  Other Study Results Review: No additional pertinent studies reviewed.

## 2025-01-07 NOTE — PLAN OF CARE

## 2025-01-07 NOTE — ASSESSMENT & PLAN NOTE
Noted history, patient's primary caregiver is her daughter who is currently in the hospital  Patient does not have a caregiver at home at this time  Also with legal blindness, follows with the outpatient ophthalmologist  Baseline is alert to person and place, disoriented to time  Will check TSH and B12

## 2025-01-07 NOTE — ASSESSMENT & PLAN NOTE
Acute uti in the setting of recurrent uti with history of enterobacter  Given cefepime in ed  Will continue cefepime given previous cultures  Follow up on blood culture and urine culture

## 2025-01-07 NOTE — CASE MANAGEMENT
Case Management Assessment & Discharge Planning Note    Patient name Latanya Ray  Location [facility] 2 M* MRN [id number]  : 1926 Date 2025       Current Admission Date: 2025  Current Admission Diagnosis:Acute metabolic encephalopathy   Patient Active Problem List    Diagnosis Date Noted Date Diagnosed    History of recurrent UTI (urinary tract infection) 10/08/2024     Gram-negative bacteremia 2024     Restless leg syndrome 2024     Generalized muscle weakness 2024     Diaphragmatic hernia without obstruction or gangrene 2024     Dysphagia, oropharyngeal phase 2024     Acute metabolic encephalopathy 2023     Legal blindness 2022     Cognitive impairment 2021     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     Vitamin D deficiency 2012     Cerebral aneurysm 2012     Anxiety 2012     History of lobular carcinoma of breast 2012       LOS (days): 1  Geometric Mean LOS (GMLOS) (days): 3.8  Days to GMLOS:2.9     OBJECTIVE:    Risk of Unplanned Readmission Score: 11.53         Current admission status: Inpatient       Preferred Pharmacy:   [facility]/pharmacy #[id number] - [place], [place] - [address] [place] [address]  Phone: [phone] Fax: [phone]    [facility] - [address] [address]  Phone: [phone] Fax: [phone]    Primary Care Provider: Amina Rowe DO    Primary Insurance: MEDICARE  Secondary Insurance: AARP    ASSESSMENT:  Active Health Care Proxies       Celia Gilbert Health Care Agent - Child   Primary Phone: [phone] (Home)                 Advance Directives  Does patient have a Health Care POA?: Yes  Does patient have Advance Directives?: Yes  Advance Directives: Living will  Primary Contact: Celia Gilbert (Child)  [phone] (Home Phone)    Readmission Root Cause  30 Day  Readmission: No    Patient Information  Admitted from:: Home  Mental Status: Confused  During Assessment patient was accompanied by: Not accompanied during assessment  Assessment information provided by:: Daughter  Primary Caregiver: Child  Caregiver's Name:: Patient's Celia muse  Caregiver's Relationship to Patient:: Family Member  Caregiver's Telephone Number:: [phone]  Support Systems: Daughter, Children, Son  County of Residence: [place]  What city do you live in?: [place]  Home entry access options. Select all that apply.: Stairs  Type of Current Residence: [facility]  Living Arrangements: Lives w/ Daughter  Is patient a ?: No    Activities of Daily Living Prior to Admission  Functional Status: Assistance  Completes ADLs independently?: No  Level of ADL dependence: Assistance  Ambulates independently?: No  Level of ambulatory dependence: Assistance  Does patient use assisted devices?: Yes  Assisted Devices (DME) used: Walker, Wheelchair (Transport WC)  What DME does the patient currently own?: Walker, Wheelchair (Transport WC)  Does patient have a history of Outpatient Therapy (PT/OT)?: No  Does the patient have a history of Short-Term Rehab?: No  Does patient have a history of HHC?: Yes  Does patient currently have HHC?: Yes ([facility])    Current Home Health Care  Type of Current Home Care Services: Other (Comment) ([facility])  Home Health Agency Name:: [facility]  Current Home Health Follow-Up Provider:: PCP    Patient Information Continued  Income Source: SSI/SSD  Does patient have prescription coverage?: Yes  Does patient receive dialysis treatments?: No  Does patient have a history of substance abuse?: No  Does patient have a history of Mental Health Diagnosis?: No    Means of Transportation  Means of Transport to Appts:: Family transport (Patient's texther)    DISCHARGE DETAILS:    Discharge planning discussed with:: Patient's Celia muse  of Choice: Yes  Comments - Freedom of Choice: CM reviewed pending follow up with IP Physical and Occuapational therapies; patient's daughter agreeable to review recommendations.  CM contacted family/caregiver?: Yes (Patient's daughter, Celia)  Were Treatment Team discharge recommendations reviewed with patient/caregiver?: Yes  Did patient/caregiver verbalize understanding of patient care needs?: Yes  Were patient/caregiver advised of the risks associated with not following Treatment Team discharge recommendations?: Yes    Contacts  Patient Contacts: Celia Gilbert, daughter  Relationship to Patient:: Family  Contact Method: Phone  Phone Number: [phone]  Reason/Outcome: Emergency Contact, Discharge Planning, Continuity of Care    Requested Home Health Care         Is the patient interested in HHC at discharge?: Yes  Home Health Discipline requested:: Nursing, Occupational Therapy, Physical Therapy  [facility] External Referral Reason (only applicable if external HHA name selected): Patient has established relationship with provider  Home Health Follow-Up Provider:: PCP  Home Health Services Needed:: Strengthening/Theraputic Exercises to Improve Function, Gait/ADL Training  Homebound Criteria Met:: Uses an Assist Device (i.e. cane, walker, etc), Requires the Assistance of Another Person for Safe Ambulation or to Leave the Home  Supporting Clincal Findings:: Limited Endurance, Fatigues Easliy in Short Distances    DME Referral Provided  Referral made for DME?: No    Other Referral/Resources/Interventions Provided:  Interventions: HHC, Short Term Rehab, Respite Care  Referral Comments: CM pending IP therapy recommendations.    Treatment Team Recommendation: Home with Home Health Care, Short Term Rehab, Other (Patient pending IP therapy recommendations)  Discharge Destination Plan:: Home with Home Health Care, Short Term Rehab, Other        Additional Comments: CM spoke with  patient's daughter, Celia (PH: [phone]). Patient's daughter confimred currently admitted to [facility] for medical procedure (surgery Friday, 01/10/2025). Patient's daughter expressed frustrations with STR in prior experiences. Celia confirmed patient lives at home with her and has DME. Patient reportedly has HHC and [facility]. Patient reported had HHA prior ([facility]), patient's daughter confirmed prior, an not satisfied with follow up. CM reviewed respite services, patient's daughter declined due to dissatisfaction with STR prior. CM reviewed follow up with inpatient therapies. Patient's daughter denied  CM sent mobility follow up for home to inpatient therapies. CM to follow for further discharge planning needs.

## 2025-01-08 LAB
ALBUMIN SERPL BCG-MCNC: 3.6 G/DL (ref 3.5–5)
ALP SERPL-CCNC: 76 U/L (ref 34–104)
ALT SERPL W P-5'-P-CCNC: 11 U/L (ref 7–52)
ANION GAP SERPL CALCULATED.3IONS-SCNC: 6 MMOL/L (ref 4–13)
AST SERPL W P-5'-P-CCNC: 15 U/L (ref 13–39)
BILIRUB SERPL-MCNC: 0.66 MG/DL (ref 0.2–1)
BUN SERPL-MCNC: 20 MG/DL (ref 5–25)
CALCIUM SERPL-MCNC: 8.8 MG/DL (ref 8.4–10.2)
CHLORIDE SERPL-SCNC: 105 MMOL/L (ref 96–108)
CO2 SERPL-SCNC: 27 MMOL/L (ref 21–32)
CREAT SERPL-MCNC: 0.58 MG/DL (ref 0.6–1.3)
ERYTHROCYTE [DISTWIDTH] IN BLOOD BY AUTOMATED COUNT: 14.9 % (ref 11.6–15.1)
GFR SERPL CREATININE-BSD FRML MDRD: 76 ML/MIN/1.73SQ M
GLUCOSE SERPL-MCNC: 89 MG/DL (ref 65–140)
HCT VFR BLD AUTO: 37.8 % (ref 34.8–46.1)
HGB BLD-MCNC: 12.5 G/DL (ref 11.5–15.4)
MCH RBC QN AUTO: 29.4 PG (ref 26.8–34.3)
MCHC RBC AUTO-ENTMCNC: 33.1 G/DL (ref 31.4–37.4)
MCV RBC AUTO: 89 FL (ref 82–98)
PLATELET # BLD AUTO: 194 THOUSANDS/UL (ref 149–390)
PMV BLD AUTO: 10.2 FL (ref 8.9–12.7)
POTASSIUM SERPL-SCNC: 4.2 MMOL/L (ref 3.5–5.3)
PROT SERPL-MCNC: 6.4 G/DL (ref 6.4–8.4)
RBC # BLD AUTO: 4.25 MILLION/UL (ref 3.81–5.12)
SODIUM SERPL-SCNC: 138 MMOL/L (ref 135–147)
WBC # BLD AUTO: 6.89 THOUSAND/UL (ref 4.31–10.16)

## 2025-01-08 PROCEDURE — 97163 PT EVAL HIGH COMPLEX 45 MIN: CPT

## 2025-01-08 PROCEDURE — 85027 COMPLETE CBC AUTOMATED: CPT

## 2025-01-08 PROCEDURE — 97167 OT EVAL HIGH COMPLEX 60 MIN: CPT

## 2025-01-08 PROCEDURE — 99232 SBSQ HOSP IP/OBS MODERATE 35: CPT | Performed by: HOSPITALIST

## 2025-01-08 PROCEDURE — 80053 COMPREHEN METABOLIC PANEL: CPT

## 2025-01-08 RX ADMIN — Medication 4000 UNITS: at 08:48

## 2025-01-08 RX ADMIN — ASPIRIN 81 MG: 81 TABLET, COATED ORAL at 21:04

## 2025-01-08 RX ADMIN — ENOXAPARIN SODIUM 40 MG: 40 INJECTION SUBCUTANEOUS at 08:48

## 2025-01-08 RX ADMIN — PRAMIPEXOLE DIHYDROCHLORIDE 0.25 MG: 0.25 TABLET ORAL at 21:04

## 2025-01-08 RX ADMIN — PANTOPRAZOLE SODIUM 20 MG: 20 TABLET, DELAYED RELEASE ORAL at 05:02

## 2025-01-08 RX ADMIN — CEFEPIME 2000 MG: 2 INJECTION, POWDER, FOR SOLUTION INTRAVENOUS at 05:02

## 2025-01-08 RX ADMIN — CEFTRIAXONE 1000 MG: 10 INJECTION, POWDER, FOR SOLUTION INTRAVENOUS at 16:29

## 2025-01-08 NOTE — ASSESSMENT & PLAN NOTE
In setting of cognitive impairment and questionable urinary tract infection  On admission patient was noted be hallucinating  Per nursing no acute events here. She appears to be around her baseline, alert to person  This has improved

## 2025-01-08 NOTE — PLAN OF CARE
Problem: PHYSICAL THERAPY ADULT  Goal: Performs mobility at highest level of function for planned discharge setting.  See evaluation for individualized goals.  Description: Treatment/Interventions: Functional transfer training, LE strengthening/ROM, Therapeutic exercise, Cognitive reorientation, Patient/family training, Equipment eval/education, Bed mobility, Gait training, Compensatory technique education, Continued evaluation, Spoke to nursing, OT          See flowsheet documentation for full assessment, interventions and recommendations.  Note: Prognosis: Fair  Problem List: Decreased strength, Decreased range of motion, Impaired balance, Decreased mobility, Decreased cognition, Impaired judgement, Decreased safety awareness, Impaired vision, Decreased skin integrity  Assessment: Latanya Ray is a 89 y.o. female admitted to [facility] on 1/6/2025 for Acute metabolic encephalopathy. PT was consulted and pt was seen on 1/8/2025 for mobility assessment and d/c planning. Pt presents w high fall risk, contact isolation. Poor historian secondary to cognitive deficits; per chart review pt gets A for mobility including Ax1 to stand, ?take steps w RW. Has access to a wc/transport chair which is used in the home. Pt is currently functioning at a max-[facility] Ax1-2 for bed mobility and transfers. Pt demonstrated baseline deficits of cognition as well as deficits of strength, balance. Screams out during mobility dt fear of falling, despite step by step instruction and Ax2 hands on guarding. Able to stand twice however standing tolerance limited dt fearfulness and cognition/impulsivity. Unable to take steps dt prev mentioned factors. Will maintain on caseload to progress mobility as able. Defer to family/caregivers for dc determination however would recommend STR given apparent functional decline and acute lack of caregiver support vs home w 24/7 A as pt may function better in her own familiar environment.  Barriers to Discharge:  Decreased caregiver support (per chart review, dtr is in hospital and getting surgery 1/10/25)     Rehab Resource Intensity Level, PT: II (Moderate Resource Intensity)    See flowsheet documentation for full assessment.

## 2025-01-08 NOTE — ASSESSMENT & PLAN NOTE
Admitted for concerns of acute UTI in the setting of recurrent uti with history of enterobacter  Patient is poor historian so not expressing symptoms, abdomen non-tender  Low clinical suspicion for acute cystitis given patient is afebrile without leukocytosis and cell count is low on urine culture however due to change in mentation PTA and recurrent infectious history, reasonable to cover with 3-day IV antibiotic therapy  Started on cefepime and received 2 days per previous cultures, urine cultures growing E. coli here.  Will transition down to IV Rocephin for 1 additional day  Remains without bacteremia at 24 hours  No SIRS/septic criteria

## 2025-01-08 NOTE — OCCUPATIONAL THERAPY NOTE
Occupational Therapy Evaluation     Patient Name: Latanya Ray  Today's Date: 1/8/2025  Problem List  Principal Problem:    Acute metabolic encephalopathy  Active Problems:    History of lobular carcinoma of breast    Cognitive impairment    Legal blindness    Dysphagia, oropharyngeal phase    Restless leg syndrome    History of recurrent UTI (urinary tract infection)    Past Medical History  Past Medical History:   Diagnosis Date    Anxiety     Arthritis     Breast carcinoma (Former[facility]) 08/13/2012    Description: s/p L mastectomy. Dr. 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    06/06/2018 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    Osteoarthritis 06/05/2012    Osteoporosis 06/05/2012    Description: Dexa 7/13 read as normal, patient declines medication    Pressure injury of coccygeal region, stage 2 ([facility]) 03/06/2020    Pulmonary embolism ([facility]) 01/2011    last assessed 9/18/12    Restless legs syndrome 06/05/2012    RLS (restless legs syndrome)     Thoracic back pain 10/19/2017     Past Surgical History  Past Surgical History:   Procedure Laterality Date    BILATERAL OOPHORECTOMY      Laparoscopic    BREAST SURGERY Left     Mastectomy     CHOLECYSTECTOMY      Laparoscopic    HERNIA REPAIR      HYSTERECTOMY      SMALL INTESTINE SURGERY             01/08/25 1011   OT Last Visit   OT Visit Date 01/08/25   Note Type   Note type Evaluation   Pain Assessment   Pain Assessment Tool 0-10   Pain Score No Pain  "  Restrictions/Precautions   Weight Bearing Precautions Per Order No   Other Precautions Cognitive;Bed Alarm;Chair Alarm;Contact/isolation;Multiple lines;Telemetry;Fall Risk;Pain;Visual impairment;Hard of hearing  (legally blind)   Home Living   Type of Home House   Home Layout One level;Performs ADLs on one level;Able to live on main level with bedroom/bathroom   Bathroom Shower/Tub Tub/shower unit   Bathroom Toilet Raised   Bathroom Equipment Grab bars in shower;Grab bars around toilet;Tub transfer bench;Commode   Bathroom Accessibility Accessible   Home Equipment Walker;Wheelchair-manual;Cane   Additional Comments per chart pt w/ RW to stand and family assist x1 to w/c and assist w/c management   Prior Function   Level of [place] Independent with ADLs;Needs assistance with ADLs;Needs assistance with functional mobility;Needs assistance with IADLS   Lives With Family   Receives Help From Personal care attendant;Family;Home health  (has HHA)   IADLs Family/Friend/Other provides medication management;Family/Friend/Other provides meals;Family/Friend/Other provides transportation   Falls in the last 6 months   (unable to reports)   Vocational Retired   Comments pt family provides 24/7care for patient and her daughter is currently in hospital for procedure   Lifestyle   Autonomy per pt setup grooming and self-feeding, assist ADLs, assist x1 functional transfers w/ RW, assist mobility w/ RW vs/ w/c, assist w/ IADLs   Reciprocal Relationships family   Service to Others retired   Intrinsic Gratification tv   Subjective   Subjective \"Don't let me fall\"   ADL   Where Assessed Edge of bed   Eating Assistance 3  Moderate Assistance   Grooming Assistance 3  Moderate Assistance   UB Bathing Assistance 2  Maximal Assistance   LB Bathing Assistance 1  Total Assistance   UB Dressing Assistance 2  Maximal Assistance   LB Dressing Assistance 1  Total Assistance   Toileting Assistance  1  Total Assistance   Functional Assistance " 2  Maximal Assistance   Bed Mobility   Supine to Sit 2  Maximal assistance   Additional items Assist x 2;Increased time required;Verbal cues;LE management;Bedrails;HOB elevated   Sit to Supine 2  Maximal assistance   Additional items Assist x 2;Increased time required;Verbal cues;LE management   Additional Comments initially requiring mod-max assist support to maintain EOB balance, then close supervision w/ bed rail support   Transfers   Sit to Stand 2  Maximal assistance   Additional items Assist x 2;Increased time required;Verbal cues;Bedrails   Stand to Sit 2  Maximal assistance   Additional items Assist x 2;Increased time required;Verbal cues   Additional Comments several trials of standing sit<>stand and standing tolerance 45sec max to wash bottom after incontinence of urine   Functional Mobility   Additional Comments unable to advance LEs   Balance   Static Sitting Fair -   Dynamic Sitting Poor +   Static Standing Poor   Dynamic Standing Poor -   Activity Tolerance   Activity Tolerance Patient limited by fatigue;Patient limited by pain;Treatment limited secondary to medical complications (Comment)   Medical Staff Made Aware PT Any: Pt seen for co-session with skilled Physical therapist 2* clinically unstable presentation, medical complexity, new precautions, performance deficits/functional limitations, impaired cognition/safety awareness, limited activity tolerance and present impairments which are a regression from patient patient's baseline and impacting overall occupational performance   Nurse Made Aware appropriate to see pe RNKarla   RUEDMUNDO Assessment   RUE Assessment WFL  (limited elevation,3+/5)   LUE Assessment   LUE Assessment WFL  (limited elevation, 3+/5)   Hand Function   Gross Motor Coordination Functional   Fine Motor Coordination Impaired   Vision-Basic Assessment   Current Vision Wears glasses all the time  (legally blind)   Cognition   Overall Cognitive Status Impaired    Arousal/Participation Responsive;Cooperative   Attention Attends with cues to redirect   Orientation Level Oriented to person;Disoriented to place;Disoriented to time;Disoriented to situation  (able to state birthday)   Memory Decreased short term memory;Decreased recall of precautions;Decreased recall of recent events;Decreased recall of biographical information   Following Commands Follows one step commands with increased time or repetition   Comments pt w/ impaired insight and safety awareness, fearful of falling, yelling out at times, don't drop me   Assessment   Limitation Decreased ADL status;Decreased cognition;Decreased UE strength;Decreased Safe judgement during ADL;Decreased endurance;Decreased high-level ADLs;Decreased self-care trans;Visual deficit   Prognosis Fair   Assessment Pt is a 89 y.o. female seen for OT evaluation s/p admit to [facility] on 1/6/2025 w/ Acute metabolic encephalopathy and recurrent UTI.  Comorbidities affecting pt's functional performance at time of assessment include: restless leg syndrome, h/o lobular carcinoma of breast, legal blindness, cognitive impairment, macular degeneration, anxiety, dysphagia. Personal factors affecting pt at time of IE include:limited home support, difficulty performing ADLS, difficulty performing IADLS , limited insight into deficits, and decreased initiation and engagement . Prior to admission, pt was living w/ daughter and has caregiver and reports: per pt setup grooming and self-feeding, assist ADLs, assist x1 functional transfers w/ RW, assist mobility w/ RW vs/ w/c, assist w/ IADLs. Upon evaluation: Pt requires MOD assist grooming, MAX assistUB ADLs, total assist LB ADLs, total assist toileting MAX assist x2 supine<>sit bed mobility, MAX assist x2 sit<>stand w/ VCs for hand placement and positioning 2* the following deficits impacting occupational performance: increased pain, impaired balance, impaired activity tolerance, multiple lines, fearful of  falling, impaired insight and safety awareness, impaired cognition, decreased strength and endurance, oriented to self, fall risk, impaired vision. Pt to benefit from continued skilled OT tx while in the hospital to address deficits as defined above and maximize level of functional independence w ADL's and functional mobility. Occupational Performance areas to address include: grooming, bathing/shower, toilet hygiene, dressing, health maintenance, functional mobility, and clothing management. From OT standpoint, recommendation at time of d/c would be level II moderate resources.   The patient's raw score on the AM-PAC Daily Activity Inpatient Short Form is 10. A raw score of less than 19 suggests the patient may benefit from discharge to post-acute rehabilitation services. Please refer to the recommendation of the Occupational Therapist for safe discharge planning.   Goals   Patient Goals none expressed 2* cogntion   LTG Time Frame 10-14   Long Term Goal please see below goals   Plan   Treatment Interventions ADL retraining;Functional transfer training;UE strengthening/ROM;Endurance training;Cognitive reorientation;Patient/family training;Equipment evaluation/education;Compensatory technique education;Activityengagement;Energy conservation   Goal Expiration Date 01/22/25   OT Frequency 2-3x/wk   Discharge Recommendation   Recommendation Geriatric Consult   Rehab Resource Intensity Level, OT II (Moderate Resource Intensity)   AM-PAC Daily Activity Inpatient   Lower Body Dressing 1   Bathing 1   Toileting 2   Upper Body Dressing 2   Grooming 2   Eating 2   Daily Activity Raw Score 10   Turning Head Towards Sound 2   Follow Simple Instructions 2   Low Function Daily Activity Raw Score 14   Low Function Daily Activity Standardized Score  24.79   AM-[facility] Applied Cognition Inpatient   Following a Speech/Presentation 1   Understanding Ordinary Conversation 2   Taking Medications 1   Remembering Where Things Are Placed or  Put Away 1   Remembering List of 4-5 Errands 1   Taking Care of Complicated Tasks 1   Applied Cognition Raw Score 7   Applied Cognition Standardized Score 15.17   Modified [place] Scale   Modified Riaz Scale 5   End of Consult   Education Provided Yes   Patient Position at End of Consult All needs within reach;Bed/Chair alarm activated;Supine   Nurse Communication Nurse aware of consult   End of Consult Comments recommend clark SPENCER     Occupational Therapy Goals to be met in 10-14 days:  1) Pt will improve activity tolerance to F for 20 min txment sessions  2) Pt will complete ADLs/self care w/ min assist  3) Pt will complete toileting w/ mod assist w/ G hygiene/thoroughness using DME PRN  4) Pt will improve functional transfers on/off all surfaces using DME PRN w/ G balance/safety including toileting w/ mod assist  5) Pt will improve fx'l mobility during I/ADl/leisure tasks using DME PRN w/ g balance/safety w/ mod assist   6) Pt will engage in ongoing cognitive assessment w/ G participation to A w/ safe d/c planning/recommendations  7) Pt will demonstrate G carryover of pt/caregiver education and training as appropriate w/ mod I  w/ G tolerance  8) Pt will engage in depression screen/leisure interest checklist w/ G participation to monitor s/s depression and ID 3 positive coping strategies to A w/ emotional regulation and management  9) Pt will demonstrate 100% carryover of E.C. techniques w/ mod I t/o fx'l I/ADL/leisure tasks w/o cues s/p skilled education  10) Pt will tolerate bed mobility and EOB seated tasks w/ min A for 30 mins to engage in fx'l I/ADL/leisure tasks w/ min A w/ min cues  11) Pt will engage in activity configuration activity w/ G participation and mod I to increase time management skills and improve participation in a structured routine to improve overall quality of life  12) Pt will demonstrate improved b/l UE strength by 1 MMT grade to enhance ADLS and functional transfers     Documentation  completed by: Michelle John, MS, OTR/L

## 2025-01-08 NOTE — ASSESSMENT & PLAN NOTE
Noted history, patient's primary caregiver is her daughter who is currently in the hospital  Patient does not have a caregiver at home at this time  Also with legal blindness, follows with the outpatient ophthalmologist  Baseline is alert to person and place, disoriented to time  TSH and B12 normal  CM to follow with discharge planning

## 2025-01-08 NOTE — PHYSICAL THERAPY NOTE
"                      PHYSICAL THERAPY EVALUATION          Patient Name: Latanya Ray  Today's Date: 1/8/2025 01/08/25 1010   PT Last Visit   PT Visit Date 01/08/25   Note Type   Note type Evaluation   Pain Assessment   Pain Assessment Tool 0-10   Pain Score No Pain   Restrictions/Precautions   Other Precautions Contact/isolation;Cognitive;Bed Alarm;Chair Alarm;Fall Risk;Visual impairment;Hard of hearing   Home Living   Type of Home House   Home Layout One level   Home Equipment Walker;Other (Comment)  (transport chair)   Additional Comments per chart review from CM note this admission   Prior Function   Level of [place] Needs assistance with ADLs;Needs assistance with functional mobility;Needs assistance with IADLS   Lives With Family  (dtr)   Receives Help From Family;Other (Comment)  (seniors helping seniors)   Comments per chart review from CM note and discussion between CM and family. pt sleeps in recliner. requires Ax1 to transfer and take steps w RW. has wc/transport chair thats used in the house   General   Additional Pertinent History pt admitted 1/6/25 for acute metabolic encephalopathy. activity as tolerated orders. PMHx significant for cognitive impairment, RLS, anxiety, CA, arthritis, osteoporosis, OA, glaucoma and macular degeneration, chronic pain syndrome   Cognition   Overall Cognitive Status Impaired   Arousal/Participation Responsive   Attention Attends with cues to redirect   Orientation Level Oriented to person;Disoriented to place;Disoriented to time;Disoriented to situation   Memory Decreased recall of recent events;Decreased short term memory;Decreased recall of biographical information   Following Commands Follows one step commands inconsistently   Comments hx of cognitive impairement. anxious behaviors noted during session. 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 LE in ER)   Bed Mobility   Rolling R 1  Dependent   Additional items Assist x 1   Rolling L 1  Dependent   Additional items Assist x 1   Supine to Sit 2  Maximal assistance   Additional items Assist x 2;Bedrails;HOB elevated;Increased time required;Verbal cues;LE management;Other  (trunk support)   Sit to Supine 1  Dependent   Additional items Assist x 1;Increased time required;LE management;Other  (trunk support)   Additional Comments cues for direction   Transfers   Sit to Stand 2  Maximal assistance   Additional items Assist x 2;Increased time required;Verbal cues;Other  (RW)   Stand to Sit 2  Maximal assistance   Additional items Assist x 2;Increased time required;Impulsive;Verbal cues;Other  (RW)   Additional Comments performed x2 from EOB   Balance   Static Sitting Fair -  (w BUE support)   Static Standing Zero  (Ax2)   Endurance Deficit   Endurance Deficit No   Activity Tolerance   Activity Tolerance Other (Comment)  (cognition/ resistive behaviors/ fearfulness)   Medical Staff Made Aware Michelle OT   Nurse Made Aware Karla RN   Assessment   Prognosis Fair   Problem List Decreased strength;Decreased range of motion;Impaired balance;Decreased mobility;Decreased cognition;Impaired judgement;Decreased safety awareness;Impaired vision;Decreased skin integrity   Assessment Latanya Ray is a 89 y.o. female admitted to [facility] on 1/6/2025 for Acute metabolic encephalopathy. PT was consulted and pt was seen on 1/8/2025 for mobility assessment and d/c planning. Pt presents w high fall risk, contact isolation. Poor historian secondary to cognitive deficits; per chart review pt gets A for mobility including Ax1 to stand, ?take steps w RW. Has access to a wc/transport chair which is used in the home. Pt is currently functioning at a max-dep Ax1-2 for bed mobility and transfers. Pt demonstrated baseline deficits of cognition as well as deficits of strength, balance. Screams out during  mobility dt fear of falling, despite step by step instruction and Ax2 hands on guarding. Able to stand twice however standing tolerance limited dt fearfulness and cognition/impulsivity. Unable to take steps dt prev mentioned factors. Will maintain on caseload to progress mobility as able. Defer to family/caregivers for dc determination however would recommend STR given apparent functional decline and acute lack of caregiver support vs home w 24/7 A as pt may function better in her own familiar environment.   Barriers to Discharge Decreased caregiver support  (per chart review, dtr is in hospital and getting surgery 1/10/25)   Goals   Patient Goals none stated dt cognition   STG Expiration Date 01/18/25   Short Term Goal #1 1) Pt will perform bed mobility dep Ax1 demonstrating appropriate technique 100% of the time in order to improve function. 2) Pt will perform all transfers max Ax1 demonstrating safe technique 100% of the time in order to improve ability to negotiate safely in home environment. 3) Amb with least restrictive AD > 5'x1 with max Ax1 in order to demonstrate ability to negotiate in home environment. 4) Improve overall strength and balance 1/2 grade in order to optimize ability to perform functional tasks and reduce fall risk. 5) Improve am-pac by 2 to demonstrate functional progress and return to baseline function/reduced caregiver burden.   Plan   Treatment/Interventions Functional transfer training;LE strengthening/ROM;Therapeutic exercise;Cognitive reorientation;Patient/family training;Equipment eval/education;Bed mobility;Gait training;Compensatory technique education;Continued evaluation;Spoke to nursing;OT   PT Frequency 1-2x/wk   Discharge Recommendation   Rehab Resource Intensity Level, PT II (Moderate Resource Intensity)   AM-PAC Basic Mobility Inpatient   Turning in Flat Bed Without Bedrails 1   Lying on Back to Sitting on Edge of Flat Bed Without Bedrails 1   Moving Bed to Chair 1   Standing  Up From Chair Using Arms 1   Walk in Room 1   Climb 3-5 Stairs With Railing 1   Basic Mobility Inpatient Raw Score 6   Turning Head Towards Sound 2   Follow Simple Instructions 2   Low Function Basic Mobility Raw Score  10   Low Function Basic Mobility Standardized Score  14.65   [facility] Highest Level Of Mobility   -[facility] Goal 2: Bed activities/Dependent transfer   -HL Achieved 3: Sit at edge of bed   End of Consult   Patient Position at End of Consult Supine;Bed/Chair alarm activated;All needs within reach   History: co - morbidities including age, social background,  use of assistive device, assist for adl's, cognition, current experience including fall risk, contact isolation  Exam: impairments in systems including multiple body structures involved; neuromuscular (balance, transfers), cognition; activity limitations  (difficulties executing an action); participation restrictions (problems associated w involvement in life situations), AM-PAC  Clinical: unstable/unpredictable  Complexity:high    Any Arzola, PT

## 2025-01-08 NOTE — PLAN OF CARE
Problem: Potential for Falls  Goal: Patient will remain free of falls  Description: INTERVENTIONS:  - Educate patient/family on patient safety including physical limitations  - Instruct patient to call for assistance with activity   - Consult OT/PT to assist with strengthening/mobility   - Keep Call bell within reach  - Keep bed low and locked with side rails adjusted as appropriate  - Keep care items and personal belongings within reach  - Initiate and maintain comfort rounds  - Make Fall Risk Sign visible to staff  - Offer Toileting every 2 Hours, in advance of need  - Initiate/Maintain bed alarm  - Obtain necessary fall risk management equipment: bed alarm call bell  - Apply yellow socks and bracelet for high fall risk patients  - Consider moving patient to room near nurses station  Outcome: Progressing     Problem: PAIN - ADULT  Goal: Verbalizes/displays adequate comfort level or baseline comfort level  Description: Interventions:  - Encourage patient to monitor pain and request assistance  - Assess pain using appropriate pain scale  - Administer analgesics based on type and severity of pain and evaluate response  - Implement non-pharmacological measures as appropriate and evaluate response  - Consider cultural and social influences on pain and pain management  - Notify physician/advanced practitioner if interventions unsuccessful or patient reports new pain  Outcome: Progressing     Problem: INFECTION - ADULT  Goal: Absence or prevention of progression during hospitalization  Description: INTERVENTIONS:  - Assess and monitor for signs and symptoms of infection  - Monitor lab/diagnostic results  - Monitor all insertion sites, i.e. indwelling lines, tubes, and drains  - Monitor endotracheal if appropriate and nasal secretions for changes in amount and color  - [place] appropriate cooling/warming therapies per order  - Administer medications as ordered  - Instruct and encourage patient and family to use good  hand hygiene technique  - Identify and instruct in appropriate isolation precautions for identified infection/condition  Outcome: Progressing  Goal: Absence of fever/infection during neutropenic period  Description: INTERVENTIONS:  - Monitor WBC    Outcome: Progressing     Problem: SAFETY ADULT  Goal: Patient will remain free of falls  Description: INTERVENTIONS:  - Educate patient/family on patient safety including physical limitations  - Instruct patient to call for assistance with activity   - Consult OT/PT to assist with strengthening/mobility   - Keep Call bell within reach  - Keep bed low and locked with side rails adjusted as appropriate  - Keep care items and personal belongings within reach  - Initiate and maintain comfort rounds  - Make Fall Risk Sign visible to staff  - Offer Toileting every 2 Hours, in advance of need  - Initiate/Maintain bed alarm  - Obtain necessary fall risk management equipment: bed alarm call bell  - Apply yellow socks and bracelet for high fall risk patients  - Consider moving patient to room near nurses station  Outcome: Progressing  Goal: Maintain or return to baseline ADL function  Description: INTERVENTIONS:  -  Assess patient's ability to carry out ADLs; assess patient's baseline for ADL function and identify physical deficits which impact ability to perform ADLs (bathing, care of mouth/teeth, toileting, grooming, dressing, etc.)  - Assess/evaluate cause of self-care deficits   - Assess range of motion  - Assess patient's mobility; develop plan if impaired  - Assess patient's need for assistive devices and provide as appropriate  - Encourage maximum independence but intervene and supervise when necessary  - Involve family in performance of ADLs  - Assess for home care needs following discharge   - Consider OT consult to assist with ADL evaluation and planning for discharge  - Provide patient education as appropriate  Outcome: Progressing  Goal: Maintains/Returns to pre  admission functional level  Description: INTERVENTIONS:  - Perform AM-PAC 6 Click Basic Mobility/ Daily Activity assessment daily.  - Set and communicate daily mobility goal to care team and patient/family/caregiver.   - Collaborate with rehabilitation services on mobility goals if consulted  - Perform Range of Motion 4 times a day.  - Reposition patient every 2 hours.  - Dangle patient 3 times a day  - Stand patient 3 times a day  - Ambulate patient 3 times a day  - Out of bed to chair 3 times a day   - Out of bed for meals 3 times a day  - Out of bed for toileting  - Record patient progress and toleration of activity level   Outcome: Progressing     Problem: DISCHARGE PLANNING  Goal: Discharge to home or other facility with appropriate resources  Description: INTERVENTIONS:  - Identify barriers to discharge w/patient and caregiver  - Arrange for needed discharge resources and transportation as appropriate  - Identify discharge learning needs (meds, wound care, etc.)  - Arrange for interpretive services to assist at discharge as needed  - Refer to Case Management Department for coordinating discharge planning if the patient needs post-hospital services based on physician/advanced practitioner order or complex needs related to functional status, cognitive ability, or social support system  Outcome: Progressing     Problem: Knowledge Deficit  Goal: Patient/family/caregiver demonstrates understanding of disease process, treatment plan, medications, and discharge instructions  Description: Complete learning assessment and assess knowledge base.  Interventions:  - Provide teaching at level of understanding  - Provide teaching via preferred learning methods  Outcome: Progressing     Problem: Prexisting or High Potential for Compromised Skin Integrity  Goal: Skin integrity is maintained or improved  Description: INTERVENTIONS:  - Identify patients at risk for skin breakdown  - Assess and monitor skin integrity  - Assess  and monitor nutrition and hydration status  - Monitor labs   - Assess for incontinence   - Turn and reposition patient  - Assist with mobility/ambulation  - Relieve pressure over bony prominences  - Avoid friction and shearing  - Provide appropriate hygiene as needed including keeping skin clean and dry  - Evaluate need for skin moisturizer/barrier cream  - Collaborate with interdisciplinary team   - Patient/family teaching  - Consider wound care consult   Outcome: Progressing

## 2025-01-08 NOTE — PLAN OF CARE
Problem: Potential for Falls  Goal: Patient will remain free of falls  Description: INTERVENTIONS:  - Educate patient/family on patient safety including physical limitations  - Instruct patient to call for assistance with activity   - Consult OT/PT to assist with strengthening/mobility   - Keep Call bell within reach  - Keep bed low and locked with side rails adjusted as appropriate  - Keep care items and personal belongings within reach  - Initiate and maintain comfort rounds  - Make Fall Risk Sign visible to staff  - Offer Toileting every 2 Hours, in advance of need  - Initiate/Maintain bed alarm  - Obtain necessary fall risk management equipment: bed alarm call bell  - Apply yellow socks and bracelet for high fall risk patients  - Consider moving patient to room near nurses station  Outcome: Progressing     Problem: PAIN - ADULT  Goal: Verbalizes/displays adequate comfort level or baseline comfort level  Description: Interventions:  - Encourage patient to monitor pain and request assistance  - Assess pain using appropriate pain scale  - Administer analgesics based on type and severity of pain and evaluate response  - Implement non-pharmacological measures as appropriate and evaluate response  - Consider cultural and social influences on pain and pain management  - Notify physician/advanced practitioner if interventions unsuccessful or patient reports new pain  Outcome: Progressing     Problem: INFECTION - ADULT  Goal: Absence or prevention of progression during hospitalization  Description: INTERVENTIONS:  - Assess and monitor for signs and symptoms of infection  - Monitor lab/diagnostic results  - Monitor all insertion sites, i.e. indwelling lines, tubes, and drains  - Monitor endotracheal if appropriate and nasal secretions for changes in amount and color  - [place] appropriate cooling/warming therapies per order  - Administer medications as ordered  - Instruct and encourage patient and family to use good  hand hygiene technique  - Identify and instruct in appropriate isolation precautions for identified infection/condition  Outcome: Progressing  Goal: Absence of fever/infection during neutropenic period  Description: INTERVENTIONS:  - Monitor WBC    Outcome: Progressing     Problem: SAFETY ADULT  Goal: Patient will remain free of falls  Description: INTERVENTIONS:  - Educate patient/family on patient safety including physical limitations  - Instruct patient to call for assistance with activity   - Consult OT/PT to assist with strengthening/mobility   - Keep Call bell within reach  - Keep bed low and locked with side rails adjusted as appropriate  - Keep care items and personal belongings within reach  - Initiate and maintain comfort rounds  - Make Fall Risk Sign visible to staff  - Offer Toileting every 2 Hours, in advance of need  - Initiate/Maintain bed alarm  - Obtain necessary fall risk management equipment: bed alarm call bell  - Apply yellow socks and bracelet for high fall risk patients  - Consider moving patient to room near nurses station  Outcome: Progressing  Goal: Maintain or return to baseline ADL function  Description: INTERVENTIONS:  -  Assess patient's ability to carry out ADLs; assess patient's baseline for ADL function and identify physical deficits which impact ability to perform ADLs (bathing, care of mouth/teeth, toileting, grooming, dressing, etc.)  - Assess/evaluate cause of self-care deficits   - Assess range of motion  - Assess patient's mobility; develop plan if impaired  - Assess patient's need for assistive devices and provide as appropriate  - Encourage maximum independence but intervene and supervise when necessary  - Involve family in performance of ADLs  - Assess for home care needs following discharge   - Consider OT consult to assist with ADL evaluation and planning for discharge  - Provide patient education as appropriate  Outcome: Progressing  Goal: Maintains/Returns to pre  admission functional level  Description: INTERVENTIONS:  - Perform AM-PAC 6 Click Basic Mobility/ Daily Activity assessment daily.  - Set and communicate daily mobility goal to care team and patient/family/caregiver.   - Collaborate with rehabilitation services on mobility goals if consulted  - Perform Range of Motion 4 times a day.  - Reposition patient every 2 hours.  - Dangle patient 3 times a day  - Stand patient 3 times a day  - Ambulate patient 3 times a day  - Out of bed to chair 3 times a day   - Out of bed for meals 3 times a day  - Out of bed for toileting  - Record patient progress and toleration of activity level   Outcome: Progressing     Problem: DISCHARGE PLANNING  Goal: Discharge to home or other facility with appropriate resources  Description: INTERVENTIONS:  - Identify barriers to discharge w/patient and caregiver  - Arrange for needed discharge resources and transportation as appropriate  - Identify discharge learning needs (meds, wound care, etc.)  - Arrange for interpretive services to assist at discharge as needed  - Refer to Case Management Department for coordinating discharge planning if the patient needs post-hospital services based on physician/advanced practitioner order or complex needs related to functional status, cognitive ability, or social support system  Outcome: Progressing     Problem: Knowledge Deficit  Goal: Patient/family/caregiver demonstrates understanding of disease process, treatment plan, medications, and discharge instructions  Description: Complete learning assessment and assess knowledge base.  Interventions:  - Provide teaching at level of understanding  - Provide teaching via preferred learning methods  Outcome: Progressing     Problem: Prexisting or High Potential for Compromised Skin Integrity  Goal: Skin integrity is maintained or improved  Description: INTERVENTIONS:  - Identify patients at risk for skin breakdown  - Assess and monitor skin integrity  - Assess  and monitor nutrition and hydration status  - Monitor labs   - Assess for incontinence   - Turn and reposition patient  - Assist with mobility/ambulation  - Relieve pressure over bony prominences  - Avoid friction and shearing  - Provide appropriate hygiene as needed including keeping skin clean and dry  - Evaluate need for skin moisturizer/barrier cream  - Collaborate with interdisciplinary team   - Patient/family teaching  - Consider wound care consult   Outcome: Progressing     Problem: GENITOURINARY - ADULT  Goal: Maintains or returns to baseline urinary function  Description: INTERVENTIONS:  - Assess urinary function  - Encourage oral fluids to ensure adequate hydration if ordered  - Administer IV fluids as ordered to ensure adequate hydration  - Administer ordered medications as needed  - Offer frequent toileting  - Follow urinary retention protocol if ordered  Outcome: Progressing     Problem: SKIN/TISSUE INTEGRITY - ADULT  Goal: Skin Integrity remains intact(Skin Breakdown Prevention)  Description: Assess:  -Perform [place] assessment every shift  -Clean and moisturize skin every 2 hours  -Inspect skin when repositioning, toileting, and assisting with ADLS  -Assess under medical devices such as masimo every shift  -Assess extremities for adequate circulation and sensation     Bed Management:  -Have minimal linens on bed & keep smooth, unwrinkled  -Change linens as needed when moist or perspiring  -Avoid sitting or lying in one position for more than 2 hours while in bed  -Keep HOB at 30degrees     Toileting:  -Offer bedside commode  -Assess for incontinence every 2 hours  -Use incontinent care products after each incontinent episode such as wipes    Activity:  -Mobilize patient 3 times a day  -Encourage activity and walks on unit  -Encourage or provide ROM exercises   -Turn and reposition patient every 2 Hours  -Use appropriate equipment to lift or move patient in bed  -Instruct/ Assist with weight shifting  every 2 hours when out of bed in chair  -Consider limitation of chair time 2 hour intervals    Skin Care:  -Avoid use of baby powder, tape, friction and shearing, hot water or constrictive clothing  -Relieve pressure over bony prominences using pillows  -Do not massage red bony areas    Next Steps:  -Teach patient strategies to minimize risks such as turning   -Consider consults to  interdisciplinary teams such as wound  Outcome: Progressing

## 2025-01-08 NOTE — PLAN OF CARE
Problem: OCCUPATIONAL THERAPY ADULT  Goal: Performs self-care activities at highest level of function for planned discharge setting.  See evaluation for individualized goals.  Description: Treatment Interventions: ADL retraining, Functional transfer training, UE strengthening/ROM, Endurance training, Cognitive reorientation, Patient/family training, Equipment evaluation/education, Compensatory technique education, Activityengagement, Energy conservation          See flowsheet documentation for full assessment, interventions and recommendations.   Note: Limitation: Decreased ADL status, Decreased cognition, Decreased UE strength, Decreased Safe judgement during ADL, Decreased endurance, Decreased high-level ADLs, Decreased self-care trans, Visual deficit  Prognosis: Fair  Assessment: Pt is a 89 y.o. female seen for OT evaluation s/p admit to [facility] on 1/6/2025 w/ Acute metabolic encephalopathy and recurrent UTI.  Comorbidities affecting pt's functional performance at time of assessment include: restless leg syndrome, h/o lobular carcinoma of breast, legal blindness, cognitive impairment, macular degeneration, anxiety, dysphagia. Personal factors affecting pt at time of IE include:limited home support, difficulty performing ADLS, difficulty performing IADLS , limited insight into deficits, and decreased initiation and engagement . Prior to admission, pt was living w/ daughter and has caregiver and reports: per pt setup grooming and self-feeding, assist ADLs, assist x1 functional transfers w/ RW, assist mobility w/ RW vs/ w/c, assist w/ IADLs. Upon evaluation: Pt requires MOD assist grooming, MAX assistUB ADLs, total assist LB ADLs, total assist toileting MAX assist x2 supine<>sit bed mobility, MAX assist x2 sit<>stand w/ VCs for hand placement and positioning 2* the following deficits impacting occupational performance: increased pain, impaired balance, impaired activity tolerance, multiple lines, fearful of falling,  impaired insight and safety awareness, impaired cognition, decreased strength and endurance, oriented to self, fall risk, impaired vision. Pt to benefit from continued skilled OT tx while in the hospital to address deficits as defined above and maximize level of functional independence w ADL's and functional mobility. Occupational Performance areas to address include: grooming, bathing/shower, toilet hygiene, dressing, health maintenance, functional mobility, and clothing management. From OT standpoint, recommendation at time of d/c would be level II moderate resources.   The patient's raw score on the -PAC Daily Activity Inpatient Short Form is 10. A raw score of less than 19 suggests the patient may benefit from discharge to post-acute rehabilitation services. Please refer to the recommendation of the Occupational Therapist for safe discharge planning.  Recommendation: Geriatric Consult  Rehab Resource Intensity Level, OT: II (Moderate Resource Intensity)

## 2025-01-08 NOTE — PROGRESS NOTES
Progress Note - Hospitalist   Name: Latanya Ray 89 y.o. female I MRN: [id number]  Unit/Bed#: [place] [address] -01 I Date of Admission: 1/6/2025   Date of Service: 1/8/2025 I Hospital Day: 2    Assessment & Plan  Acute metabolic encephalopathy  In setting of cognitive impairment and questionable urinary tract infection  On admission patient was noted be hallucinating  Per nursing no acute events here. She appears to be around her baseline, alert to person  This has improved  History of recurrent UTI (urinary tract infection)  Admitted for concerns of acute UTI in the setting of recurrent uti with history of enterobacter  Patient is poor historian so not expressing symptoms, abdomen non-tender  Low clinical suspicion for acute cystitis given patient is afebrile without leukocytosis and cell count is low on urine culture however due to change in mentation PTA and recurrent infectious history, reasonable to cover with 3-day IV antibiotic therapy  Started on cefepime and received 2 days per previous cultures, urine cultures growing E. coli here.  Will transition down to IV Rocephin for 1 additional day  Remains without bacteremia at 24 hours  No SIRS/septic criteria  Cognitive impairment  Noted history, patient's primary caregiver is her daughter who is currently in the hospital  Patient does not have a caregiver at home at this time  Also with legal blindness, follows with the outpatient ophthalmologist  Baseline is alert to person and place, disoriented to time  TSH and B12 normal  CM to follow with discharge planning  Restless leg syndrome  On mirapex  History of lobular carcinoma of breast  Patient has a mass, further intervention declined. Also with lesion on right forehead which was noted in 4/2023 and daughter declined further investigation given advanced age  Legal blindness  Hx of macular degeneration, supportive care and outpatient follow up  Dysphagia, oropharyngeal phase  Continue mechanical soft diet   She  is on PPI    VTE Pharmacologic Prophylaxis: VTE Score: 5 High Risk (Score >/= 5) - Pharmacological DVT Prophylaxis Ordered: enoxaparin (Lovenox). Sequential Compression Devices Ordered.    Mobility:   Basic Mobility Inpatient Raw Score: 8  JH-HLM Goal: 3: Sit at edge of bed  JH-HLM Achieved: 3: Sit at edge of bed  JH-HLM Goal NOT achieved. Continue with multidisciplinary rounding and encourage appropriate mobility to improve upon JH-HLM goals.    Patient Centered Rounds: I performed bedside rounds with nursing staff today.   Discussions with Specialists or Other Care Team Provider: BETITO    Education and Discussions with Family / Patient: Updated  (daughter) via phone.    Current Length of Stay: 2 day(s)  Current Patient Status: Inpatient   Certification Statement: The patient will continue to require additional inpatient hospital stay due to watching cultures and antibiotics, discharge planning  Discharge Plan: Anticipate discharge in 24-48 hrs to discharge location to be determined pending rehab evaluations.    Code Status: Level 3 - DNAR and DNI    Subjective   Patient seen and examined sitting up in bed.  She is pleasant and says hello my arrival.  Per nursing no acute events overnight.  She currently denies any pain.    Objective :  Temp:  [98.5 °F (36.9 °C)-100.3 °F (37.9 °C)] 98.5 °F (36.9 °C)  HR:  [] 93  BP: (108-134)/() 134/114  Resp:  [20] 20  SpO2:  [94 %-97 %] 94 %  O2 Device: None (Room air)    Body mass index is 23.68 kg/m².     Input and Output Summary (last 24 hours):     Intake/Output Summary (Last 24 hours) at 1/8/2025 1230  Last data filed at 1/7/2025 1838  Gross per 24 hour   Intake 720 ml   Output --   Net 720 ml       Physical Exam  Constitutional:       Appearance: She is not diaphoretic.      Comments: Thin frail appearing   Eyes:      Comments: Blind    Cardiovascular:      Rate and Rhythm: Normal rate and regular rhythm.   Pulmonary:      Effort: Pulmonary effort is  normal. No respiratory distress.   Abdominal:      General: Bowel sounds are normal.      Palpations: Abdomen is soft.      Tenderness: There is no abdominal tenderness.   Musculoskeletal:         General: Deformity (arthritic changes) present.   Skin:     General: Skin is warm and dry.      Coloration: Skin is pale.      Findings: Lesion (right forehead and right breast) present.   Neurological:      Mental Status: She is alert. Mental status is at baseline.      Comments: Alert to person. Hospital, disoriented to time          Lab Results: I have reviewed the following results:   Results from last 7 days   Lab Units 01/08/25  0454 01/07/25  0610 01/06/25  1542   WBC Thousand/uL 6.89   < > 8.20   HEMOGLOBIN g/dL 12.5   < > 14.1   HEMATOCRIT % 37.8   < > 42.7   PLATELETS Thousands/uL 194   < > 243   SEGS PCT %  --   --  72   LYMPHO PCT %  --   --  19   MONO PCT %  --   --  7   EOS PCT %  --   --  1    < > = values in this interval not displayed.     Results from last 7 days   Lab Units 01/08/25  0454   SODIUM mmol/L 138   POTASSIUM mmol/L 4.2   CHLORIDE mmol/L 105   CO2 mmol/L 27   BUN mg/dL 20   CREATININE mg/dL 0.58*   ANION GAP mmol/L 6   CALCIUM mg/dL 8.8   ALBUMIN g/dL 3.6   TOTAL BILIRUBIN mg/dL 0.66   ALK PHOS U/L 76   ALT U/L 11   AST U/L 15   GLUCOSE RANDOM mg/dL 89     Results from last 7 days   Lab Units 01/06/25  1542   LACTIC ACID mmol/L 1.5   PROCALCITONIN ng/ml <0.05     Recent Cultures (last 7 days):   Results from last 7 days   Lab Units 01/06/25  1605 01/06/25  1550 01/06/25  1529   BLOOD CULTURE  No Growth at 24 hrs. No Growth at 24 hrs.  --    URINE CULTURE   --   --  50,000-59,000 cfu/ml Escherichia coli*     Last 24 Hours Medication List:     Current Facility-Administered Medications:     aspirin (ECOTRIN LOW STRENGTH) EC tablet 81 mg, HS    cefTRIAXone (ROCEPHIN) 1,000 mg in dextrose 5 % 50 mL IVPB, Q24H    Cholecalciferol (VITAMIN D3) tablet 4,000 Units, Daily    cyanocobalamin (VITAMIN B-12)  tablet 1,000 mcg, Every Other Day    enoxaparin (LOVENOX) subcutaneous injection 40 mg, Daily    ondansetron (ZOFRAN) injection 4 mg, Q6H PRN    pantoprazole (PROTONIX) EC tablet 20 mg, Early Morning    pramipexole (MIRAPEX) tablet 0.25 mg, HS    Administrative Statements   Today, Patient Was Seen By: Mary Mcknight PA-C    **Please Note: This note may have been constructed using a voice recognition system.**

## 2025-01-08 NOTE — CASE MANAGEMENT
Case Management Assessment & Discharge Planning Note    Patient name Latanya Ray  Location South 2 /South 2 M* MRN [id number]  : 1926 Date 2025       Current Admission Date: 2025  Current Admission Diagnosis:Acute metabolic encephalopathy   Patient Active Problem List    Diagnosis Date Noted Date Diagnosed    History of recurrent UTI (urinary tract infection) 10/08/2024     Gram-negative bacteremia 2024     Restless leg syndrome 2024     Generalized muscle weakness 2024     Diaphragmatic hernia without obstruction or gangrene 2024     Dysphagia, oropharyngeal phase 2024     Acute metabolic encephalopathy 2023     Legal blindness 2022     Cognitive impairment 2021     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     Vitamin D deficiency 2012     Cerebral aneurysm 2012     Anxiety 2012     History of lobular carcinoma of breast 2012       LOS (days): 2  Geometric Mean LOS (GMLOS) (days): 3.8  Days to GMLOS:1.9     OBJECTIVE:    Risk of Unplanned Readmission Score: 11.58         Current admission status: Inpatient       Preferred Pharmacy:   [facility]/pharmacy #[id number] - [place] [address]  Phone: [phone] Fax: [phone]    [facility] - [address] [address]  Phone: [phone] Fax: [phone]    Primary Care Provider: Amina Rowe DO    Primary Insurance: MEDICARE  Secondary Insurance: AARP    ASSESSMENT:  Active Health Care Proxies       Vladimir Celia Health Care Agent - Child   Primary Phone: [phone] (Home)                                                                    DISCHARGE DETAILS:       Freedom of Choice: Yes  Comments - Freedom of Choice: CM spoke with patient's daughter, Celia, CM discussed referrals for STR, [facility] and with [facility],  resuming for [facility] with support of (non-medical) Seniors helping Seniors.  CM contacted family/caregiver?: Yes (Patient's daughter Celia)                            Other Referral/Resources/Interventions Provided:  Interventions: HHC, Short Term Rehab  Referral Comments: CM made referrals for HHC with [facility] and [facility]         Treatment Team Recommendation: Home with Home Health Care, Short Term Rehab  Discharge Destination Plan:: Home with Home Health Care, Short Term Rehab                                         Additional Comments: CM spoke with patient's daughterCelia via phone contact; CM confirmed ongoing discussions with discharge planning. Patient's daughter and CM discussed referral for STR with [facility], and due to location patient's daughter declined referrals for [facility]. Patient's daughter and CM discussed referral for HHC ([facility]) in [facility]. CM to follow for further discharge planning.

## 2025-01-08 NOTE — ASSESSMENT & PLAN NOTE
Patient has a mass, further intervention declined. Also with lesion on right forehead which was noted in 4/2023 and daughter declined further investigation given advanced age

## 2025-01-09 PROCEDURE — 97110 THERAPEUTIC EXERCISES: CPT

## 2025-01-09 PROCEDURE — 99232 SBSQ HOSP IP/OBS MODERATE 35: CPT | Performed by: HOSPITALIST

## 2025-01-09 PROCEDURE — 90662 IIV NO PRSV INCREASED AG IM: CPT

## 2025-01-09 PROCEDURE — G0008 ADMIN INFLUENZA VIRUS VAC: HCPCS

## 2025-01-09 PROCEDURE — 97530 THERAPEUTIC ACTIVITIES: CPT

## 2025-01-09 RX ADMIN — PANTOPRAZOLE SODIUM 20 MG: 20 TABLET, DELAYED RELEASE ORAL at 05:16

## 2025-01-09 RX ADMIN — INFLUENZA A VIRUS A/VICTORIA/4897/2022 IVR-238 (H1N1) ANTIGEN (FORMALDEHYDE INACTIVATED), INFLUENZA A VIRUS A/CALIFORNIA/122/2022 SAN-022 (H3N2) ANTIGEN (FORMALDEHYDE INACTIVATED), AND INFLUENZA B VIRUS B/MICHIGAN/01/2021 ANTIGEN (FORMALDEHYDE INACTIVATED) 0.5 ML: 60; 60; 60 INJECTION, SUSPENSION INTRAMUSCULAR at 13:53

## 2025-01-09 RX ADMIN — ENOXAPARIN SODIUM 40 MG: 40 INJECTION SUBCUTANEOUS at 08:56

## 2025-01-09 RX ADMIN — PRAMIPEXOLE DIHYDROCHLORIDE 0.25 MG: 0.25 TABLET ORAL at 21:54

## 2025-01-09 RX ADMIN — ASPIRIN 81 MG: 81 TABLET, COATED ORAL at 21:54

## 2025-01-09 RX ADMIN — Medication 4000 UNITS: at 08:55

## 2025-01-09 RX ADMIN — CEFTRIAXONE 1000 MG: 10 INJECTION, POWDER, FOR SOLUTION INTRAVENOUS at 16:46

## 2025-01-09 RX ADMIN — Medication 1000 MCG: at 08:55

## 2025-01-09 NOTE — ASSESSMENT & PLAN NOTE
Admitted for concerns of acute UTI in the setting of recurrent uti with history of enterobacter  Patient is poor historian so not expressing symptoms, abdomen non-tender  Low clinical suspicion for acute cystitis given patient is afebrile without leukocytosis and cell count is low on urine culture however due to change in mentation PTA and recurrent infectious history, reasonable to cover with 3-day IV antibiotic therapy  Started on cefepime and received 2 days per previous cultures, urine cultures growing E. coli here.  Transitioned to IV Rocephin to complete antibiotic therapy  Remains without bacteremia at 48 hours  No SIRS/septic criteria

## 2025-01-09 NOTE — PHYSICAL THERAPY NOTE
PHYSICAL THERAPY NOTE          Patient Name: Latanya Ray  Today's Date: 1/9/2025 01/09/25 1606   Note Type   Note Type Treatment   Pain Assessment   Pain Assessment Tool 0-10   Pain Score No Pain   Restrictions/Precautions   Other Precautions Contact/isolation;Cognitive;Chair Alarm;Bed Alarm;Fall Risk;Visual impairment;Hard of hearing   General   Chart Reviewed Yes   Family/Caregiver Present No   Cognition   Overall Cognitive Status Impaired   Arousal/Participation Responsive;Cooperative   Attention Attends with cues to redirect   Orientation Level Oriented to person;Disoriented to place;Disoriented to situation;Disoriented to time   Memory Decreased recall of precautions;Decreased recall of recent events;Decreased short term memory;Decreased recall of biographical information;Decreased long term memory   Following Commands Follows one step commands with increased time or repetition   Subjective   Subjective Okay, but don't let me fall.   Bed Mobility   Rolling R 2  Maximal assistance   Additional items Assist x 1;Increased time required;Bedrails;Verbal cues;LE management   Rolling L 2  Maximal assistance   Additional items Assist x 2;Increased time required;Verbal cues;LE management   Supine to Sit 3  Moderate assistance   Additional items Assist x 1;Bedrails;Increased time required;Verbal cues;LE management;HOB elevated   Sit to Supine 2  Maximal assistance   Additional items Assist x 1;Increased time required;Verbal cues;LE management   Additional Comments eob  x 15- 17 minutes minutes with R ue support with bedrail   Transfers   Sit to Stand 3  Moderate assistance   Additional items Assist x 1;Bedrails;Increased time required;Verbal cues  (mod assist x1 to min assist x1 with verbal cues to push through le's and to stand upright with Rw)   Stand to Sit 2  Maximal assistance   Additional items Assist x 1;Increased time  "required;Verbal cues   Additional Comments pt  performed sit to stand trasnfer trials x 3 with use of Rw mod- min assist x1, static standing with support of Rw with mod- assist x1 x 25- 35 seconds cues for upright posture,   pt  fearful of falling, pt hollering out at times stating, \"Don't let me fall!.\"  pt demonstrates forward flexed sptoure with WBOS and toeing outward b/l le's R > L.  unable toprogress to taking steps diue to decreased balance, activity tolerance and fear of falling.   Balance   Static Sitting Fair   Dynamic Sitting Fair -   Static Standing Poor   Dynamic Standing Poor -   Ambulatory Zero   Activity Tolerance   Activity Tolerance Patient limited by fatigue  (fear of falling)   Exercises   Heelslides Supine;5 reps;AROM;Bilateral   Hip Flexion Supine;AROM;Bilateral;5 reps  (slr)   Knee AROM Long Arc Quad Sitting;10 reps;AROM;Bilateral   Marching Sitting;10 reps;AROM;Bilateral   Assessment   Prognosis Fair   Problem List Decreased strength;Decreased endurance;Decreased cognition;Impaired judgement;Decreased safety awareness;Impaired vision;Impaired hearing   Assessment Pt seen for PT treatment session this date with interventions consisting of bed mobility, transfer training, and HEP, sitting and standing balance activities and education provided as needed for safety and direction to improve functional mobility, safety awareness, and activity tolerance. Pt agreeable to PT treatment session upon arrival, pt found supine in bed . At end of session, pt left  supine in bed with all needs in reach. In comparison to previous session, pt with improvement in activity tolerance, endurance, standing balance, b/l le strength, and functional mobility. Pt  is showing improvements as noted.  Pt  is requiring less assistance for supine to sit, static sitting EOB  transfers and static standing with use of rw.  Is limited by decreased activity tolerance, endurance, balance, fear of falling and fatigue. Pt is " functioning at mod assist x1 for supine to sit,  max assist x1 for sit to supine,  max assist x1 for rolling R and L with use of bed rails,  pt performs static standing with support of Rw  x 3 with mod assist x1, sit to stand transfers x3 with  mod assist x1 progressing to min assist x1.  Pt  requiring increased time to perform and complete  all mobility with max verbal cues for techniques, improved posture and constant reassurance and encouragement t/o PT session. Pt  demonstrates improved sitting balance at EOB, pt  maintains static upright seated posture with R ue support  and use of bedrail x 15- 17 minutes minutes.  Standing tolerance 25- 35 seconds. Pt  performs seated and supine b/l le arom exercises  x5- 10 reps to tolerance. Pt  is incontinent of bowel, requires total assist for laura anal hygiene. Pt  remains at increased risk for falls due to impairments as noted.    Continue to recommend  level II moderate rehab resource intensity  at time of d/c in order to maximize pt's functional independence and safety w/ mobility. Pt continues to be functioning below baseline level. PT will continue to see pt while here in order to address the deficits listed above and provide interventions consistent w/ POC in effort to achieve STGs.    The patient's AM-PAC Basic Mobility Inpatient Short Form Raw Score is 7. A raw score less than 16 suggests the patient may benefit from discharge to post-acute rehabilitation services. Please also refer to the recommendation of the Physical Therapist for safe discharge planning.   Goals   Patient Goals none expressed at this time.   STG Expiration Date 01/18/25   PT Treatment Day 1   Plan   Treatment/Interventions Functional transfer training;LE strengthening/ROM;Therapeutic exercise;Endurance training;Patient/family training;Equipment eval/education;Bed mobility;Spoke to nursing   Progress Slow progress, decreased activity tolerance   PT Frequency 1-2x/wk   Discharge Recommendation    Rehab Resource Intensity Level, PT II (Moderate Resource Intensity)   AM-PAC Basic Mobility Inpatient   Turning in Flat Bed Without Bedrails 1   Lying on Back to Sitting on Edge of Flat Bed Without Bedrails 1   Moving Bed to Chair 1   Standing Up From Chair Using Arms 2   Walk in Room 1   Climb 3-5 Stairs With Railing 1   Basic Mobility Inpatient Raw Score 7   Turning Head Towards Sound 4   Follow Simple Instructions 3   Low Function Basic Mobility Raw Score  14   Low Function Basic Mobility Standardized Score  22.01   [facility] Highest Level Of Mobility   -[facility] Goal 2: Bed activities/Dependent transfer   [facility] Achieved 3: Sit at edge of bed   Education   Education Provided Mobility training;Home exercise program;Assistive device   Patient Reinforcement needed   End of Consult   Patient Position at End of Consult Supine;Bed/Chair alarm activated;All needs within reach      01/09/25 1606   Note Type   Note Type Treatment   Pain Assessment   Pain Assessment Tool 0-10   Pain Score No Pain   Restrictions/Precautions   Other Precautions Contact/isolation;Cognitive;Chair Alarm;Bed Alarm;Fall Risk;Visual impairment;Hard of hearing   General   Chart Reviewed Yes   Family/Caregiver Present No   Cognition   Overall Cognitive Status Impaired   Arousal/Participation Responsive;Cooperative   Attention Attends with cues to redirect   Orientation Level Oriented to person;Disoriented to place;Disoriented to situation;Disoriented to time   Memory Decreased recall of precautions;Decreased recall of recent events;Decreased short term memory;Decreased recall of biographical information;Decreased long term memory   Following Commands Follows one step commands with increased time or repetition   Subjective   Subjective Okay, but don't let me fall.   Bed Mobility   Rolling R 2  Maximal assistance   Additional items Assist x 1;Increased time required;Bedrails;Verbal cues;LE management   Rolling L 2  Maximal assistance   Additional  "items Assist x 2;Increased time required;Verbal cues;LE management   Supine to Sit 3  Moderate assistance   Additional items Assist x 1;Bedrails;Increased time required;Verbal cues;LE management;HOB elevated   Sit to Supine 2  Maximal assistance   Additional items Assist x 1;Increased time required;Verbal cues;LE management   Additional Comments eob  x 15- 17 minutes minutes with R ue support with bedrail   Transfers   Sit to Stand 3  Moderate assistance   Additional items Assist x 1;Bedrails;Increased time required;Verbal cues  (mod assist x1 to min assist x1 with verbal cues to push through le's and to stand upright with Rw)   Stand to Sit 2  Maximal assistance   Additional items Assist x 1;Increased time required;Verbal cues   Additional Comments pt  performed sit to stand trasnfer trials x 3 with use of Rw mod- min assist x1, static standing with support of Rw with mod- assist x1 x 25- 35 seconds cues for upright posture,   pt  fearful of falling, pt hollering out at times stating, \"Don't let me fall!.\"  pt demonstrates forward flexed sptoure with WBOS and toeing outward b/l le's R > L.  unable toprogress to taking steps diue to decreased balance, activity tolerance and fear of falling.   Balance   Static Sitting Fair   Dynamic Sitting Fair -   Static Standing Poor   Dynamic Standing Poor -   Ambulatory Zero   Activity Tolerance   Activity Tolerance Patient limited by fatigue  (fear of falling)   Exercises   Heelslides Supine;5 reps;AROM;Bilateral   Hip Flexion Supine;AROM;Bilateral;5 reps  (slr)   Knee AROM Long Arc Quad Sitting;10 reps;AROM;Bilateral   Marching Sitting;10 reps;AROM;Bilateral   Assessment   Prognosis Fair   Problem List Decreased strength;Decreased endurance;Decreased cognition;Impaired judgement;Decreased safety awareness;Impaired vision;Impaired hearing   Assessment Pt seen for PT treatment session this date with interventions consisting of bed mobility, transfer training, and HEP, sitting " and standing balance activities and education provided as needed for safety and direction to improve functional mobility, safety awareness, and activity tolerance. Pt agreeable to PT treatment session upon arrival, pt found supine in bed . At end of session, pt left  supine in bed with all needs in reach. In comparison to previous session, pt with improvement in activity tolerance, endurance, standing balance, b/l le strength, and functional mobility. Pt  is showing improvements as noted.  Pt  is requiring less assistance for supine to sit, static sitting EOB  transfers and static standing with use of rw.  Is limited by decreased activity tolerance, endurance, balance, fear of falling and fatigue. Pt is functioning at mod assist x1 for supine to sit,  max assist x1 for sit to supine,  max assist x1 for rolling R and L with use of bed rails,  pt performs static standing with support of Rw  x 3 with mod assist x1, sit to stand transfers x3 with  mod assist x1 progressing to min assist x1.  Pt  requiring increased time to perform and complete  all mobility with max verbal cues for techniques, improved posture and constant reassurance and encouragement t/o PT session. Pt  demonstrates improved sitting balance at EOB, pt  maintains static upright seated posture with R ue support  and use of bedrail x 15- 17 minutes minutes.  Standing tolerance 25- 35 seconds. Pt  performs seated and supine b/l le arom exercises  x5- 10 reps to tolerance. Pt  is incontinent of bowel, requires total assist for laura anal hygiene. Pt  remains at increased risk for falls due to impairments as noted.    Continue to recommend  level II moderate rehab resource intensity  at time of d/c in order to maximize pt's functional independence and safety w/ mobility. Pt continues to be functioning below baseline level. PT will continue to see pt while here in order to address the deficits listed above and provide interventions consistent w/ POC in effort  to achieve STGs.    The patient's AM-[facility] Basic Mobility Inpatient Short Form Raw Score is 7. A raw score less than 16 suggests the patient may benefit from discharge to post-acute rehabilitation services. Please also refer to the recommendation of the Physical Therapist for safe discharge planning.   Goals   Patient Goals none expressed at this time.   STG Expiration Date 01/18/25   PT Treatment Day 1   Plan   Treatment/Interventions Functional transfer training;LE strengthening/ROM;Therapeutic exercise;Endurance training;Patient/family training;Equipment eval/education;Bed mobility;Spoke to nursing   Progress Slow progress, decreased activity tolerance   PT Frequency 1-2x/wk   Discharge Recommendation   Rehab Resource Intensity Level, PT II (Moderate Resource Intensity)   AM-PAC Basic Mobility Inpatient   Turning in Flat Bed Without Bedrails 1   Lying on Back to Sitting on Edge of Flat Bed Without Bedrails 1   Moving Bed to Chair 1   Standing Up From Chair Using Arms 2   Walk in Room 1   Climb 3-5 Stairs With Railing 1   Basic Mobility Inpatient Raw Score 7   Turning Head Towards Sound 4   Follow Simple Instructions 3   Low Function Basic Mobility Raw Score  14   Low Function Basic Mobility Standardized Score  22.01   [facility] Highest Level Of Mobility   -[facility] Goal 2: Bed activities/Dependent transfer   -[facility] Achieved 3: Sit at edge of bed   Education   Education Provided Mobility training;Home exercise program;Assistive device   Patient Reinforcement needed   End of Consult   Patient Position at End of Consult Supine;Bed/Chair alarm activated;All needs within reach   Chey Anton, PTA

## 2025-01-09 NOTE — PLAN OF CARE
Problem: Potential for Falls  Goal: Patient will remain free of falls  Description: INTERVENTIONS:  - Educate patient/family on patient safety including physical limitations  - Instruct patient to call for assistance with activity   - Consult OT/PT to assist with strengthening/mobility   - Keep Call bell within reach  - Keep bed low and locked with side rails adjusted as appropriate  - Keep care items and personal belongings within reach  - Initiate and maintain comfort rounds  - Make Fall Risk Sign visible to staff  - Offer Toileting every 2 Hours, in advance of need  - Initiate/Maintain bed alarm  - Obtain necessary fall risk management equipment: bed alarm call bell  - Apply yellow socks and bracelet for high fall risk patients  - Consider moving patient to room near nurses station  Outcome: Progressing     Problem: PAIN - ADULT  Goal: Verbalizes/displays adequate comfort level or baseline comfort level  Description: Interventions:  - Encourage patient to monitor pain and request assistance  - Assess pain using appropriate pain scale  - Administer analgesics based on type and severity of pain and evaluate response  - Implement non-pharmacological measures as appropriate and evaluate response  - Consider cultural and social influences on pain and pain management  - Notify physician/advanced practitioner if interventions unsuccessful or patient reports new pain  Outcome: Progressing     Problem: INFECTION - ADULT  Goal: Absence or prevention of progression during hospitalization  Description: INTERVENTIONS:  - Assess and monitor for signs and symptoms of infection  - Monitor lab/diagnostic results  - Monitor all insertion sites, i.e. indwelling lines, tubes, and drains  - Monitor endotracheal if appropriate and nasal secretions for changes in amount and color  - [place] appropriate cooling/warming therapies per order  - Administer medications as ordered  - Instruct and encourage patient and family to use good  hand hygiene technique  - Identify and instruct in appropriate isolation precautions for identified infection/condition  Outcome: Progressing  Goal: Absence of fever/infection during neutropenic period  Description: INTERVENTIONS:  - Monitor WBC    Outcome: Progressing     Problem: SAFETY ADULT  Goal: Patient will remain free of falls  Description: INTERVENTIONS:  - Educate patient/family on patient safety including physical limitations  - Instruct patient to call for assistance with activity   - Consult OT/PT to assist with strengthening/mobility   - Keep Call bell within reach  - Keep bed low and locked with side rails adjusted as appropriate  - Keep care items and personal belongings within reach  - Initiate and maintain comfort rounds  - Make Fall Risk Sign visible to staff  - Offer Toileting every 2 Hours, in advance of need  - Initiate/Maintain bed alarm  - Obtain necessary fall risk management equipment: bed alarm call bell  - Apply yellow socks and bracelet for high fall risk patients  - Consider moving patient to room near nurses station  Outcome: Progressing  Goal: Maintain or return to baseline ADL function  Description: INTERVENTIONS:  -  Assess patient's ability to carry out ADLs; assess patient's baseline for ADL function and identify physical deficits which impact ability to perform ADLs (bathing, care of mouth/teeth, toileting, grooming, dressing, etc.)  - Assess/evaluate cause of self-care deficits   - Assess range of motion  - Assess patient's mobility; develop plan if impaired  - Assess patient's need for assistive devices and provide as appropriate  - Encourage maximum independence but intervene and supervise when necessary  - Involve family in performance of ADLs  - Assess for home care needs following discharge   - Consider OT consult to assist with ADL evaluation and planning for discharge  - Provide patient education as appropriate  Outcome: Progressing  Goal: Maintains/Returns to pre  admission functional level  Description: INTERVENTIONS:  - Perform AM-PAC 6 Click Basic Mobility/ Daily Activity assessment daily.  - Set and communicate daily mobility goal to care team and patient/family/caregiver.   - Collaborate with rehabilitation services on mobility goals if consulted  - Perform Range of Motion 4 times a day.  - Reposition patient every 2 hours.  - Dangle patient 3 times a day  - Stand patient 3 times a day  - Ambulate patient 3 times a day  - Out of bed to chair 3 times a day   - Out of bed for meals 3 times a day  - Out of bed for toileting  - Record patient progress and toleration of activity level   Outcome: Progressing     Problem: DISCHARGE PLANNING  Goal: Discharge to home or other facility with appropriate resources  Description: INTERVENTIONS:  - Identify barriers to discharge w/patient and caregiver  - Arrange for needed discharge resources and transportation as appropriate  - Identify discharge learning needs (meds, wound care, etc.)  - Arrange for interpretive services to assist at discharge as needed  - Refer to Case Management Department for coordinating discharge planning if the patient needs post-hospital services based on physician/advanced practitioner order or complex needs related to functional status, cognitive ability, or social support system  Outcome: Progressing     Problem: Knowledge Deficit  Goal: Patient/family/caregiver demonstrates understanding of disease process, treatment plan, medications, and discharge instructions  Description: Complete learning assessment and assess knowledge base.  Interventions:  - Provide teaching at level of understanding  - Provide teaching via preferred learning methods  Outcome: Progressing     Problem: Prexisting or High Potential for Compromised Skin Integrity  Goal: Skin integrity is maintained or improved  Description: INTERVENTIONS:  - Identify patients at risk for skin breakdown  - Assess and monitor skin integrity  - Assess  and monitor nutrition and hydration status  - Monitor labs   - Assess for incontinence   - Turn and reposition patient  - Assist with mobility/ambulation  - Relieve pressure over bony prominences  - Avoid friction and shearing  - Provide appropriate hygiene as needed including keeping skin clean and dry  - Evaluate need for skin moisturizer/barrier cream  - Collaborate with interdisciplinary team   - Patient/family teaching  - Consider wound care consult   Outcome: Progressing     Problem: GENITOURINARY - ADULT  Goal: Maintains or returns to baseline urinary function  Description: INTERVENTIONS:  - Assess urinary function  - Encourage oral fluids to ensure adequate hydration if ordered  - Administer IV fluids as ordered to ensure adequate hydration  - Administer ordered medications as needed  - Offer frequent toileting  - Follow urinary retention protocol if ordered  Outcome: Progressing     Problem: SKIN/TISSUE INTEGRITY - ADULT  Goal: Skin Integrity remains intact(Skin Breakdown Prevention)  Description: Assess:  -Perform [place] assessment every shift  -Clean and moisturize skin every shift/as needed  -Inspect skin when repositioning, toileting, and assisting with ADLS  -Assess under medical devices such as masimo every shift  -Assess extremities for adequate circulation and sensation     Bed Management:  -Have minimal linens on bed & keep smooth, unwrinkled  -Change linens as needed when moist or perspiring  -Avoid sitting or lying in one position for more than 2 hours while in bed  -Keep HOB at 30 degrees     Toileting:  -Offer bedside commode  -Assess for incontinence every 2 hours  -Use incontinent care products after each incontinent episode such as lotion    Activity:  -Mobilize patient 3 times a day  -Encourage activity and walks on unit  -Encourage or provide ROM exercises   -Turn and reposition patient every 2 Hours  -Use appropriate equipment to lift or move patient in bed  -Instruct/ Assist with weight  shifting every 1 hour when out of bed in chair  -Consider limitation of chair time 2 hour intervals    Skin Care:  -Avoid use of baby powder, tape, friction and shearing, hot water or constrictive clothing  -Relieve pressure over bony prominences using allevyn  -Do not massage red bony areas    Next Steps:  -Teach patient strategies to minimize risks such as repositioning   -Consider consults to  interdisciplinary teams such as PTOT  Outcome: Progressing

## 2025-01-09 NOTE — CASE MANAGEMENT
Case Management Discharge Planning Note    Patient name Latanya Ray  Location [facility] 2 /South 2 M* MRN [id number]  : 1926 Date 2025       Current Admission Date: 2025  Current Admission Diagnosis:Acute metabolic encephalopathy   Patient Active Problem List    Diagnosis Date Noted Date Diagnosed    History of recurrent UTI (urinary tract infection) 10/08/2024     Gram-negative bacteremia 2024     Restless leg syndrome 2024     Generalized muscle weakness 2024     Diaphragmatic hernia without obstruction or gangrene 2024     Dysphagia, oropharyngeal phase 2024     Acute metabolic encephalopathy 2023     Legal blindness 2022     Cognitive impairment 2021     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     Vitamin D deficiency 2012     Cerebral aneurysm 2012     Anxiety 2012     History of lobular carcinoma of breast 2012       LOS (days): 3  Geometric Mean LOS (GMLOS) (days): 3.8  Days to GMLOS:0.9     OBJECTIVE:  Risk of Unplanned Readmission Score: 11.76         Current admission status: Inpatient   Preferred Pharmacy:   [facility]/pharmacy #[id number] - [place], [place] - [facility] [address] [address]  Phone: [phone] Fax: [phone]    [facility] - [address] [address]  Phone: [phone] Fax: [phone]    Primary Care Provider: Amina Rowe DO    Primary Insurance: MEDICARE  Secondary Insurance: [facility]    DISCHARGE DETAILS:    Discharge planning discussed with:: Patient's Celia perdue  Freedom of Choice: Yes  Comments - Freedom of Choice: Patient's Celia perdue declined STR. CM offered accepting [facility] agency choices.  CM contacted family/caregiver?: Yes (Patient's daughterCelia- CM attempted to contact patient's son, Raman.)  Were Treatment Team discharge recommendations reviewed with  patient/caregiver?: Yes  Did patient/caregiver verbalize understanding of patient care needs?: Yes  Were patient/caregiver advised of the risks associated with not following Treatment Team discharge recommendations?: Yes    Contacts  Patient Contacts: Celia Gilbert, daughter  Relationship to Patient:: Family  Contact Method: Phone  Phone Number: [phone]  Reason/Outcome: Emergency Contact, Discharge Planning, Continuity of Care    Requested Home Health Care         Is the patient interested in HHC at discharge?: Yes  Home Health Discipline requested:: Medical Social Work, Nursing, Physical Therapy, Occupational Therapy  Home Health Agency Name:: [facility]  Home Health Follow-Up Provider:: PCP  Home Health Services Needed:: Restore Joint Function Post Joint Replacement, Gait/ADL Training, Strengthening/Theraputic Exercises to Improve Function  Homebound Criteria Met:: Requires the Assistance of Another Person for Safe Ambulation or to Leave the Home, Uses an Assist Device (i.e. cane, walker, etc)  Supporting Clincal Findings:: Limited Endurance, Fatigues Easliy in Short Distances    DME Referral Provided  Referral made for DME?: No    Other Referral/Resources/Interventions Provided:  Interventions: HHC  Referral Comments: Patient's daughter agreeable to HHC, CM offered accepting HHC choices.    Treatment Team Recommendation: Short Term Rehab  Discharge Destination Plan:: Home with Home Health Care        Additional Comments: CM set transportation for patient for the following day at 12:00P.M,  patient's daughter confirmed HHC services with [address] [place] [address] services. CM reserved in [place]. CM attempted to contact patient's son, Raman via phone although unsuccessful. CM to follow.    CM followed up with patient's daughter, patient's daughter agreeable to transport and HHC with [facility]. CM to follow with patient's brother, Raman for discharge follow up.                   2021

## 2025-01-09 NOTE — PLAN OF CARE
Problem: PHYSICAL THERAPY ADULT  Goal: Performs mobility at highest level of function for planned discharge setting.  See evaluation for individualized goals.  Description: Treatment/Interventions: Functional transfer training, LE strengthening/ROM, Therapeutic exercise, Cognitive reorientation, Patient/family training, Equipment eval/education, Bed mobility, Gait training, Compensatory technique education, Continued evaluation, Spoke to nursing, OT          See flowsheet documentation for full assessment, interventions and recommendations.  Outcome: Progressing  Note: Prognosis: Fair  Problem List: Decreased strength, Decreased endurance, Decreased cognition, Impaired judgement, Decreased safety awareness, Impaired vision, Impaired hearing  Assessment: Pt seen for PT treatment session this date with interventions consisting of bed mobility, transfer training, and HEP, sitting and standing balance activities and education provided as needed for safety and direction to improve functional mobility, safety awareness, and activity tolerance. Pt agreeable to PT treatment session upon arrival, pt found supine in bed . At end of session, pt left  supine in bed with all needs in reach. In comparison to previous session, pt with improvement in activity tolerance, endurance, standing balance, b/l le strength, and functional mobility. Pt  is showing improvements as noted.  Pt  is requiring less assistance for supine to sit, static sitting EOB  transfers and static standing with use of rw.  Is limited by decreased activity tolerance, endurance, balance, fear of falling and fatigue. Pt is functioning at mod assist x1 for supine to sit,  max assist x1 for sit to supine,  max assist x1 for rolling R and L with use of bed rails,  pt performs static standing with support of Rw  x 3 with mod assist x1, sit to stand transfers x3 with  mod assist x1 progressing to min assist x1.  Pt  requiring increased time to perform and complete   all mobility with max verbal cues for techniques, improved posture and constant reassurance and encouragement t/o PT session. Pt  demonstrates improved sitting balance at EOB, pt  maintains static upright seated posture with R ue support  and use of bedrail x 15- 17 minutes minutes.  Standing tolerance 25- 35 seconds. Pt  performs seated and supine b/l le arom exercises  x5- 10 reps to tolerance. Pt  is incontinent of bowel, requires total assist for laura anal hygiene. Pt  remains at increased risk for falls due to impairments as noted.    Continue to recommend  level II moderate rehab resource intensity  at time of d/c in order to maximize pt's functional independence and safety w/ mobility. Pt continues to be functioning below baseline level. PT will continue to see pt while here in order to address the deficits listed above and provide interventions consistent w/ POC in effort to achieve STGs.    The patient's AM-PAC Basic Mobility Inpatient Short Form Raw Score is 7. A raw score less than 16 suggests the patient may benefit from discharge to post-acute rehabilitation services. Please also refer to the recommendation of the Physical Therapist for safe discharge planning.  Barriers to Discharge: Decreased caregiver support (per chart review, dtr is in hospital and getting surgery 1/10/25)     Rehab Resource Intensity Level, PT: II (Moderate Resource Intensity)    See flowsheet documentation for full assessment.

## 2025-01-09 NOTE — CASE MANAGEMENT
Case Management Discharge Planning Note    Patient name Latanya Ray  Location 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     Vitamin D deficiency 2012     Cerebral aneurysm 2012     Anxiety 2012     History of lobular carcinoma of breast 2012       LOS (days): 3  Geometric Mean LOS (GMLOS) (days): 3.8  Days to GMLOS:0.9     OBJECTIVE:  Risk of Unplanned Readmission Score: 11.76         Current admission status: Inpatient   Preferred Pharmacy:   [facility]/pharmacy #[id number] - [place], [place] - [facility] [address] [address]  Phone: [phone] Fax: [phone]    [facility] - [address] [address]  Phone: [phone] Fax: [phone]    Primary Care Provider: Amina Rowe DO    Primary Insurance: MEDICARE  Secondary Insurance: [facility]    DISCHARGE DETAILS:    Discharge planning discussed with:: Patient's Celia perdue  Freedom of Choice: Yes  Comments - Freedom of Choice: Patient's Celia perdue declined STR. CM offered accepting [facility] agency choices.  CM contacted family/caregiver?: Yes (Patient's daughterCelia- CM attempted to contact patient's son, Raman.)  Were Treatment Team discharge recommendations reviewed with  patient/caregiver?: Yes  Did patient/caregiver verbalize understanding of patient care needs?: Yes  Were patient/caregiver advised of the risks associated with not following Treatment Team discharge recommendations?: Yes    Contacts  Patient Contacts: Celia Gilbert, daughter  Relationship to Patient:: Family  Contact Method: Phone  Phone Number: [phone]  Reason/Outcome: Emergency Contact, Discharge Planning, Continuity of Care    Requested Home Health Care         Is the patient interested in HHC at discharge?: Yes  Home Health Discipline requested:: Medical Social Work, Nursing, Physical Therapy, Occupational Therapy  Home Health Agency Name:: Other  Home Health Follow-Up Provider:: PCP  Home Health Services Needed:: Restore Joint Function Post Joint Replacement, Gait/ADL Training, Strengthening/Theraputic Exercises to Improve Function  Homebound Criteria Met:: Requires the Assistance of Another Person for Safe Ambulation or to Leave the Home, Uses an Assist Device (i.e. cane, walker, etc)  Supporting Clincal Findings:: Limited Endurance, Fatigues Easliy in Short Distances    DME Referral Provided  Referral made for DME?: No    Other Referral/Resources/Interventions Provided:  Interventions: HHC  Referral Comments: Patient's daughter agreeable to [facility], CM offered accepting [facility] choices.      Treatment Team Recommendation: Short Term Rehab  Discharge Destination Plan:: Home with Home Health Care     Additional Comments: CM spoke with patient's daughterCelia PH: [phone], patient's daughter denied patient to discharge to [facility]. CM reviewed prior C agency, [facility] care did not yet respond for [facility] and CM sent referral for other [facility] agencies. Patient's daughter confirmed she will be unreachable tomorrow and CM to contact patient's son. CM discussed with patient's daughter transport request for 12:00P.M, the following day, patient's daughter, Celia agreeable. CM attempted to conatct patient's son, Raman  via phone contact, although unsuccessful and CM unable to leave voicemail. CM to follow for further discharge planning.

## 2025-01-09 NOTE — CASE MANAGEMENT
Case Management Discharge Planning Note    Patient name Latanya Ray  Location [facility] 2 M* MRN [id number]  : 1926 Date 2025       Current Admission Date: 2025  Current Admission Diagnosis:Acute metabolic encephalopathy   Patient Active Problem List    Diagnosis Date Noted Date Diagnosed    History of recurrent UTI (urinary tract infection) 10/08/2024     Gram-negative bacteremia 2024     Restless leg syndrome 2024     Generalized muscle weakness 2024     Diaphragmatic hernia without obstruction or gangrene 2024     Dysphagia, oropharyngeal phase 2024     Acute metabolic encephalopathy 2023     Legal blindness 2022     Cognitive impairment 2021     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     Vitamin D deficiency 2012     Cerebral aneurysm 2012     Anxiety 2012     History of lobular carcinoma of breast 2012       LOS (days): 3  Geometric Mean LOS (GMLOS) (days): 3.8  Days to GMLOS:1     OBJECTIVE:  Risk of Unplanned Readmission Score: 11.76         Current admission status: Inpatient   Preferred Pharmacy:   [facility]/pharmacy #[id number] - [place] [address]  Phone: [phone] Fax: [phone]    [facility] - [address] [address]  Phone: [phone] Fax: [phone]    Primary Care Provider: Amina Rowe DO    Primary Insurance: MEDICARE  Secondary Insurance: [facility]    DISCHARGE DETAILS:    Other Referral/Resources/Interventions Provided:  Referral Comments: CM made referrals for C with ([facility]) and CM made referral for [facility].        Additional Comments: CM left message for patient's daughter, Celia PH: [phone]. CM to follow for further discharge planning.

## 2025-01-09 NOTE — ASSESSMENT & PLAN NOTE
Noted history, patient's primary caregiver is her daughter who is currently in the hospital  Patient does not have a caregiver at home at this time  Also with legal blindness, follows with the outpatient ophthalmologist  Baseline is alert to person and place, disoriented to time  TSH and B12 normal  CM to follow with discharge planning, anticipate discharge home tomorrow with son and seniors helping seniors as daughter is going for surgery in [place]

## 2025-01-09 NOTE — ASSESSMENT & PLAN NOTE
In setting of cognitive impairment and questionable urinary tract infection  On admission patient was noted be hallucinating  Per nursing no acute events here. She appears to be around her baseline, alert to person  This has improved  Working on safe discharge planning

## 2025-01-09 NOTE — PLAN OF CARE
Problem: Potential for Falls  Goal: Patient will remain free of falls  Description: INTERVENTIONS:  - Educate patient/family on patient safety including physical limitations  - Instruct patient to call for assistance with activity   - Consult OT/PT to assist with strengthening/mobility   - Keep Call bell within reach  - Keep bed low and locked with side rails adjusted as appropriate  - Keep care items and personal belongings within reach  - Initiate and maintain comfort rounds  - Make Fall Risk Sign visible to staff  - Offer Toileting every 2 Hours, in advance of need  - Initiate/Maintain bed alarm  - Obtain necessary fall risk management equipment: bed alarm call bell  - Apply yellow socks and bracelet for high fall risk patients  - Consider moving patient to room near nurses station  Outcome: Progressing     Problem: PAIN - ADULT  Goal: Verbalizes/displays adequate comfort level or baseline comfort level  Description: Interventions:  - Encourage patient to monitor pain and request assistance  - Assess pain using appropriate pain scale  - Administer analgesics based on type and severity of pain and evaluate response  - Implement non-pharmacological measures as appropriate and evaluate response  - Consider cultural and social influences on pain and pain management  - Notify physician/advanced practitioner if interventions unsuccessful or patient reports new pain  Outcome: Progressing     Problem: INFECTION - ADULT  Goal: Absence or prevention of progression during hospitalization  Description: INTERVENTIONS:  - Assess and monitor for signs and symptoms of infection  - Monitor lab/diagnostic results  - Monitor all insertion sites, i.e. indwelling lines, tubes, and drains  - Monitor endotracheal if appropriate and nasal secretions for changes in amount and color  - Wendel appropriate cooling/warming therapies per order  - Administer medications as ordered  - Instruct and encourage patient and family to use good  hand hygiene technique  - Identify and instruct in appropriate isolation precautions for identified infection/condition  Outcome: Progressing  Goal: Absence of fever/infection during neutropenic period  Description: INTERVENTIONS:  - Monitor WBC    Outcome: Progressing     Problem: SAFETY ADULT  Goal: Patient will remain free of falls  Description: INTERVENTIONS:  - Educate patient/family on patient safety including physical limitations  - Instruct patient to call for assistance with activity   - Consult OT/PT to assist with strengthening/mobility   - Keep Call bell within reach  - Keep bed low and locked with side rails adjusted as appropriate  - Keep care items and personal belongings within reach  - Initiate and maintain comfort rounds  - Make Fall Risk Sign visible to staff  - Offer Toileting every 2 Hours, in advance of need  - Initiate/Maintain bed alarm  - Obtain necessary fall risk management equipment: bed alarm call bell  - Apply yellow socks and bracelet for high fall risk patients  - Consider moving patient to room near nurses station  Outcome: Progressing  Goal: Maintain or return to baseline ADL function  Description: INTERVENTIONS:  -  Assess patient's ability to carry out ADLs; assess patient's baseline for ADL function and identify physical deficits which impact ability to perform ADLs (bathing, care of mouth/teeth, toileting, grooming, dressing, etc.)  - Assess/evaluate cause of self-care deficits   - Assess range of motion  - Assess patient's mobility; develop plan if impaired  - Assess patient's need for assistive devices and provide as appropriate  - Encourage maximum independence but intervene and supervise when necessary  - Involve family in performance of ADLs  - Assess for home care needs following discharge   - Consider OT consult to assist with ADL evaluation and planning for discharge  - Provide patient education as appropriate  Outcome: Progressing  Goal: Maintains/Returns to pre  admission functional level  Description: INTERVENTIONS:  - Perform AM-PAC 6 Click Basic Mobility/ Daily Activity assessment daily.  - Set and communicate daily mobility goal to care team and patient/family/caregiver.   - Collaborate with rehabilitation services on mobility goals if consulted  - Perform Range of Motion 4 times a day.  - Reposition patient every 2 hours.  - Dangle patient 3 times a day  - Stand patient 3 times a day  - Ambulate patient 3 times a day  - Out of bed to chair 3 times a day   - Out of bed for meals 3 times a day  - Out of bed for toileting  - Record patient progress and toleration of activity level   Outcome: Progressing     Problem: DISCHARGE PLANNING  Goal: Discharge to home or other facility with appropriate resources  Description: INTERVENTIONS:  - Identify barriers to discharge w/patient and caregiver  - Arrange for needed discharge resources and transportation as appropriate  - Identify discharge learning needs (meds, wound care, etc.)  - Arrange for interpretive services to assist at discharge as needed  - Refer to Case Management Department for coordinating discharge planning if the patient needs post-hospital services based on physician/advanced practitioner order or complex needs related to functional status, cognitive ability, or social support system  Outcome: Progressing     Problem: Knowledge Deficit  Goal: Patient/family/caregiver demonstrates understanding of disease process, treatment plan, medications, and discharge instructions  Description: Complete learning assessment and assess knowledge base.  Interventions:  - Provide teaching at level of understanding  - Provide teaching via preferred learning methods  Outcome: Progressing     Problem: Prexisting or High Potential for Compromised Skin Integrity  Goal: Skin integrity is maintained or improved  Description: INTERVENTIONS:  - Identify patients at risk for skin breakdown  - Assess and monitor skin integrity  - Assess  and monitor nutrition and hydration status  - Monitor labs   - Assess for incontinence   - Turn and reposition patient  - Assist with mobility/ambulation  - Relieve pressure over bony prominences  - Avoid friction and shearing  - Provide appropriate hygiene as needed including keeping skin clean and dry  - Evaluate need for skin moisturizer/barrier cream  - Collaborate with interdisciplinary team   - Patient/family teaching  - Consider wound care consult   Outcome: Progressing     Problem: GENITOURINARY - ADULT  Goal: Maintains or returns to baseline urinary function  Description: INTERVENTIONS:  - Assess urinary function  - Encourage oral fluids to ensure adequate hydration if ordered  - Administer IV fluids as ordered to ensure adequate hydration  - Administer ordered medications as needed  - Offer frequent toileting  - Follow urinary retention protocol if ordered  Outcome: Progressing     Problem: SKIN/TISSUE INTEGRITY - ADULT  Goal: Skin Integrity remains intact(Skin Breakdown Prevention)  Description: Assess:  -Perform Rosales assessment  -Clean and moisturize skin  -Inspect skin when repositioning, toileting, and assisting with ADLS  -Assess under medical devices  -Assess extremities for adequate circulation and sensation     Bed Management:  -Have minimal linens on bed & keep smooth, unwrinkled  -Change linens as needed when moist or perspiring  -Avoid sitting or lying in one position for more than 2 hours while in bed  -Keep HOB at 30 degrees     Toileting:  -Offer bedside commode  -Assess for incontinence  -Use incontinent care products after each incontinent episode    Activity:  -Mobilize patient 3 times a day  -Encourage activity and walks on unit  -Encourage or provide ROM exercises   -Turn and reposition patient every 2 Hours  -Use appropriate equipment to lift or move patient in bed  -Instruct/ Assist with weight shifting when out of bed in chair  -Consider limitation of chair time 2 hour  intervals    Skin Care:  -Avoid use of baby powder, tape, friction and shearing, hot water or constrictive clothing  -Relieve pressure over bony prominences  -Do not massage red bony areas    Next Steps:  -Teach patient strategies to minimize risks   -Consider consults to  interdisciplinary teams  Outcome: Progressing

## 2025-01-09 NOTE — PROGRESS NOTES
Progress Note - Hospitalist   Name: Latanya Ray 89 y.o. female I MRN: [id number]  Unit/Bed#: [facility] [address] -01 I Date of Admission: 1/6/2025   Date of Service: 1/9/2025 I Hospital Day: 3    Assessment & Plan  Acute metabolic encephalopathy  In setting of cognitive impairment and questionable urinary tract infection  On admission patient was noted be hallucinating  Per nursing no acute events here. She appears to be around her baseline, alert to person  This has improved  Working on safe discharge planning  History of recurrent UTI (urinary tract infection)  Admitted for concerns of acute UTI in the setting of recurrent uti with history of enterobacter  Patient is poor historian so not expressing symptoms, abdomen non-tender  Low clinical suspicion for acute cystitis given patient is afebrile without leukocytosis and cell count is low on urine culture however due to change in mentation PTA and recurrent infectious history, reasonable to cover with 3-day IV antibiotic therapy  Started on cefepime and received 2 days per previous cultures, urine cultures growing E. coli here.  Transitioned to IV Rocephin to complete antibiotic therapy  Remains without bacteremia at 48 hours  No SIRS/septic criteria  Cognitive impairment  Noted history, patient's primary caregiver is her daughter who is currently in the hospital  Patient does not have a caregiver at home at this time  Also with legal blindness, follows with the outpatient ophthalmologist  Baseline is alert to person and place, disoriented to time  TSH and B12 normal  CM to follow with discharge planning, anticipate discharge home tomorrow with son and seniors helping seniors as daughter is going for surgery in [place]  Restless leg syndrome  On mirapex  History of lobular carcinoma of breast  Patient has a mass, further intervention declined. Also with lesion on right forehead which was noted in 4/2023 and daughter declined further investigation given advanced  age  Legal blindness  Hx of macular degeneration, supportive care and outpatient follow up  Dysphagia, oropharyngeal phase  Continue mechanical soft diet   She is on PPI    VTE Pharmacologic Prophylaxis: VTE Score: 5 High Risk (Score >/= 5) - Pharmacological DVT Prophylaxis Ordered: enoxaparin (Lovenox). Sequential Compression Devices Ordered.    Mobility:   Basic Mobility Inpatient Raw Score: 6  JH-HLM Goal: 2: Bed activities/Dependent transfer  JH-HLM Achieved: 2: Bed activities/Dependent transfer  JH-HLM Goal NOT achieved. Continue with multidisciplinary rounding and encourage appropriate mobility to improve upon JH-HLM goals.    Patient Centered Rounds: I performed bedside rounds with nursing staff today.   Discussions with Specialists or Other Care Team Provider: case management    Education and Discussions with Family / Patient: Updated  (daughter) via phone.    Current Length of Stay: 3 day(s)  Current Patient Status: Inpatient   Certification Statement: The patient will continue to require additional inpatient hospital stay due to discharge planning, monitoring cultures  Discharge Plan: Anticipate discharge in 24-48 hrs to home with home services.    Code Status: Level 3 - DNAR and DNI    Subjective   Patient seen sitting up in bed. She ate all her breakfast. Bowel movement yesterday.  Discussed over the phone with daughter who notes she does not want her going to anywhere short-term and has been talking to seniors with seniors for help at home while she is going to be in the hospital.    Patient is at her baseline.  She denies any pain at this time.    Objective :  Temp:  [97.4 °F (36.3 °C)-98.1 °F (36.7 °C)] 97.4 °F (36.3 °C)  HR:  [76-82] 82  BP: (129-131)/(75-76) 129/75  Resp:  [18-20] 18  SpO2:  [94 %-96 %] 96 %  O2 Device: None (Room air)    Body mass index is 23.68 kg/m².     Input and Output Summary (last 24 hours):     Intake/Output Summary (Last 24 hours) at 1/9/2025 1236  Last data  filed at 1/9/2025 0841  Gross per 24 hour   Intake 660 ml   Output --   Net 660 ml       Physical Exam  Constitutional:       Appearance: Normal appearance. She is not diaphoretic.      Comments: Frail, thin appearing   Eyes:      Comments: blind   Cardiovascular:      Rate and Rhythm: Normal rate and regular rhythm.   Pulmonary:      Effort: Pulmonary effort is normal. No respiratory distress.   Abdominal:      General: Bowel sounds are normal.      Palpations: Abdomen is soft.      Tenderness: There is no abdominal tenderness. There is no guarding or rebound.   Musculoskeletal:         General: Deformity (arthritic changes) present.      Right lower leg: No edema.      Left lower leg: No edema.   Skin:     General: Skin is warm and dry.      Coloration: Skin is pale.      Findings: Lesion (right forehead) present.   Neurological:      Mental Status: She is alert. Mental status is at baseline.      Comments: Alert to person only, pleasant forgetful       Lines/Drains:      Lab Results: I have reviewed the following results:   Results from last 7 days   Lab Units 01/08/25  0454 01/07/25  0610 01/06/25  1542   WBC Thousand/uL 6.89   < > 8.20   HEMOGLOBIN g/dL 12.5   < > 14.1   HEMATOCRIT % 37.8   < > 42.7   PLATELETS Thousands/uL 194   < > 243   SEGS PCT %  --   --  72   LYMPHO PCT %  --   --  19   MONO PCT %  --   --  7   EOS PCT %  --   --  1    < > = values in this interval not displayed.     Results from last 7 days   Lab Units 01/08/25  0454   SODIUM mmol/L 138   POTASSIUM mmol/L 4.2   CHLORIDE mmol/L 105   CO2 mmol/L 27   BUN mg/dL 20   CREATININE mg/dL 0.58*   ANION GAP mmol/L 6   CALCIUM mg/dL 8.8   ALBUMIN g/dL 3.6   TOTAL BILIRUBIN mg/dL 0.66   ALK PHOS U/L 76   ALT U/L 11   AST U/L 15   GLUCOSE RANDOM mg/dL 89       Results from last 7 days   Lab Units 01/06/25  1542   LACTIC ACID mmol/L 1.5   PROCALCITONIN ng/ml <0.05     Recent Cultures (last 7 days):   Results from last 7 days   Lab Units 01/06/25  7176  01/06/25  1550 01/06/25  1529   BLOOD CULTURE  No Growth at 48 hrs. No Growth at 48 hrs.  --    URINE CULTURE   --   --  50,000-59,000 cfu/ml Escherichia coli*  20,000-29,000 cfu/ml Escherichia coli*  <10,000 cfu/ml Enterococcus faecalis*     Last 24 Hours Medication List:     Current Facility-Administered Medications:     aspirin (ECOTRIN LOW STRENGTH) EC tablet 81 mg, HS    cefTRIAXone (ROCEPHIN) 1,000 mg in dextrose 5 % 50 mL IVPB, Q24H, Last Rate: 1,000 mg (01/08/25 1629)    Cholecalciferol (VITAMIN D3) tablet 4,000 Units, Daily    cyanocobalamin (VITAMIN B-12) tablet 1,000 mcg, Every Other Day    enoxaparin (LOVENOX) subcutaneous injection 40 mg, Daily    ondansetron (ZOFRAN) injection 4 mg, Q6H PRN    pantoprazole (PROTONIX) EC tablet 20 mg, Early Morning    pramipexole (MIRAPEX) tablet 0.25 mg, HS    Administrative Statements   Today, Patient Was Seen By: Mary Mcknight PA-C    **Please Note: This note may have been constructed using a voice recognition system.**

## 2025-01-10 ENCOUNTER — TELEPHONE (OUTPATIENT)
Age: OVER 89
End: 2025-01-10

## 2025-01-10 VITALS
OXYGEN SATURATION: 99 % | SYSTOLIC BLOOD PRESSURE: 177 MMHG | DIASTOLIC BLOOD PRESSURE: 83 MMHG | HEIGHT: 60 IN | BODY MASS INDEX: 23.81 KG/M2 | RESPIRATION RATE: 16 BRPM | HEART RATE: 87 BPM | WEIGHT: 121.25 LBS | TEMPERATURE: 97.9 F

## 2025-01-10 LAB
ANION GAP SERPL CALCULATED.3IONS-SCNC: 9 MMOL/L (ref 4–13)
BACTERIA UR CULT: ABNORMAL
BUN SERPL-MCNC: 16 MG/DL (ref 5–25)
CALCIUM SERPL-MCNC: 8.7 MG/DL (ref 8.4–10.2)
CHLORIDE SERPL-SCNC: 105 MMOL/L (ref 96–108)
CO2 SERPL-SCNC: 24 MMOL/L (ref 21–32)
CREAT SERPL-MCNC: 0.47 MG/DL (ref 0.6–1.3)
ERYTHROCYTE [DISTWIDTH] IN BLOOD BY AUTOMATED COUNT: 14.6 % (ref 11.6–15.1)
GFR SERPL CREATININE-BSD FRML MDRD: 82 ML/MIN/1.73SQ M
GLUCOSE SERPL-MCNC: 100 MG/DL (ref 65–140)
HCT VFR BLD AUTO: 39.5 % (ref 34.8–46.1)
HGB BLD-MCNC: 13.1 G/DL (ref 11.5–15.4)
MCH RBC QN AUTO: 30 PG (ref 26.8–34.3)
MCHC RBC AUTO-ENTMCNC: 33.2 G/DL (ref 31.4–37.4)
MCV RBC AUTO: 90 FL (ref 82–98)
PLATELET # BLD AUTO: 214 THOUSANDS/UL (ref 149–390)
PMV BLD AUTO: 10.2 FL (ref 8.9–12.7)
POTASSIUM SERPL-SCNC: 3.8 MMOL/L (ref 3.5–5.3)
RBC # BLD AUTO: 4.37 MILLION/UL (ref 3.81–5.12)
SODIUM SERPL-SCNC: 138 MMOL/L (ref 135–147)
WBC # BLD AUTO: 6.42 THOUSAND/UL (ref 4.31–10.16)

## 2025-01-10 PROCEDURE — 80048 BASIC METABOLIC PNL TOTAL CA: CPT

## 2025-01-10 PROCEDURE — 97530 THERAPEUTIC ACTIVITIES: CPT

## 2025-01-10 PROCEDURE — 99239 HOSP IP/OBS DSCHRG MGMT >30: CPT | Performed by: HOSPITALIST

## 2025-01-10 PROCEDURE — 97110 THERAPEUTIC EXERCISES: CPT

## 2025-01-10 PROCEDURE — 85027 COMPLETE CBC AUTOMATED: CPT

## 2025-01-10 RX ADMIN — PANTOPRAZOLE SODIUM 20 MG: 20 TABLET, DELAYED RELEASE ORAL at 05:10

## 2025-01-10 RX ADMIN — ENOXAPARIN SODIUM 40 MG: 40 INJECTION SUBCUTANEOUS at 09:41

## 2025-01-10 RX ADMIN — Medication 4000 UNITS: at 09:41

## 2025-01-10 NOTE — OCCUPATIONAL THERAPY NOTE
"  Occupational Therapy Progress Note     Patient Name: Latanya Ray  Today's Date: 1/10/2025  Problem List  Principal Problem:    Acute metabolic encephalopathy  Active Problems:    History of lobular carcinoma of breast    Cognitive impairment    Legal blindness    Dysphagia, oropharyngeal phase    Restless leg syndrome    History of recurrent UTI (urinary tract infection)       01/10/25 1100   OT Last Visit   OT Visit Date 01/10/25   Note Type   Note Type Treatment   Pain Assessment   Pain Assessment Tool 0-10   Pain Score No Pain   Restrictions/Precautions   Weight Bearing Precautions Per Order No   Other Precautions Contact/isolation;Cognitive;Chair Alarm;Bed Alarm;Fall Risk;Visual impairment;Hard of hearing   ADL   Toileting Assistance  1  Total Assistance   Toileting Deficit Perineal hygiene   Bed Mobility   Rolling R 2  Maximal assistance   Additional items Assist x 1;Bedrails;Verbal cues;LE management   Rolling L 2  Maximal assistance   Additional items Assist x 1;Bedrails;Verbal cues;LE management   Transfers   Additional Comments pt declined   Therapeutic Exercise - ROM   UE-ROM Yes   ROM- Right Upper Extremities   R Shoulder AAROM;Flexion;Extension;ABduction;Horizontal ABduction   R Elbow AROM;Elbow flexion;Elbow extension   R Weight/Reps/Sets 1x10   ROM - Left Upper Extremities    L Shoulder AAROM;Flexion;Extension;ABduction;Horizontal ABduction   L Elbow AROM;Elbow flexion;Elbow extension   L Weight/Reps/Sets 1x10   Subjective   Subjective \"Oh no- not today\"   Cognition   Overall Cognitive Status Impaired   Orientation Level Oriented to person   Activity Tolerance   Activity Tolerance Patient limited by fatigue;Other (Comment)  (limited baseline)   Medical Staff Made Aware Yes   Assessment   Assessment Pt seen for OT f/u treatment session focusing on functional activity tolerance, bed mobility, ADLs, and UE ROM/strengthening. Upon OT arrival, pt was found supine in bed, agreeable to OT tx session w " encouragement. W bed mobility, pt incontinent thus required assistance for hygiene. Please refer to flowsheet for functional performance. B/l UE ROM completed to increase ROM, maintain joint integrity. Provided education on HEP, energy conservation techniques, deep breathing techniques, fall prevention strategies, and overall safety awareness.   Patient requiring verbal cues for safety and verbal cues for pacing through activity steps.  Patient continues to be functioning below baseline level, occupational performance remains limited secondary to factors listed above and increased risk for falls and injury. Pt lying supine with bed alarm activated at end of session. Call bell and phone within reach. All needs met and pt reports no further questions for OT at this time. Currently, pt is recommended for level 2 resource intensity at time of d/c. Will continue to see pt per POC to address deficits to facilitate return to PLOF.   Plan   Treatment Interventions ADL retraining;Functional transfer training;UE strengthening/ROM;Endurance training;Cognitive reorientation;Patient/family training;Equipment evaluation/education;Compensatory technique education;Activityengagement;Energy conservation   Goal Expiration Date 01/22/25   OT Treatment Day 1   OT Frequency 2-3x/wk   Discharge Recommendation   Rehab Resource Intensity Level, OT II (Moderate Resource Intensity)   AM-PAC Daily Activity Inpatient   Lower Body Dressing 1   Bathing 1   Toileting 1   Upper Body Dressing 1   Grooming 2   Eating 2   Daily Activity Raw Score 8     Zelda Nicholson

## 2025-01-10 NOTE — PLAN OF CARE
Problem: Potential for Falls  Goal: Patient will remain free of falls  Description: INTERVENTIONS:  - Educate patient/family on patient safety including physical limitations  - Instruct patient to call for assistance with activity   - Consult OT/PT to assist with strengthening/mobility   - Keep Call bell within reach  - Keep bed low and locked with side rails adjusted as appropriate  - Keep care items and personal belongings within reach  - Initiate and maintain comfort rounds  - Make Fall Risk Sign visible to staff  - Offer Toileting every 2 Hours, in advance of need  - Initiate/Maintain bed alarm  - Obtain necessary fall risk management equipment: bed alarm call bell  - Apply yellow socks and bracelet for high fall risk patients  - Consider moving patient to room near nurses station  Outcome: Progressing     Problem: PAIN - ADULT  Goal: Verbalizes/displays adequate comfort level or baseline comfort level  Description: Interventions:  - Encourage patient to monitor pain and request assistance  - Assess pain using appropriate pain scale  - Administer analgesics based on type and severity of pain and evaluate response  - Implement non-pharmacological measures as appropriate and evaluate response  - Consider cultural and social influences on pain and pain management  - Notify physician/advanced practitioner if interventions unsuccessful or patient reports new pain  Outcome: Progressing     Problem: INFECTION - ADULT  Goal: Absence or prevention of progression during hospitalization  Description: INTERVENTIONS:  - Assess and monitor for signs and symptoms of infection  - Monitor lab/diagnostic results  - Monitor all insertion sites, i.e. indwelling lines, tubes, and drains  - Monitor endotracheal if appropriate and nasal secretions for changes in amount and color  - Adah appropriate cooling/warming therapies per order  - Administer medications as ordered  - Instruct and encourage patient and family to use good  hand hygiene technique  - Identify and instruct in appropriate isolation precautions for identified infection/condition  Outcome: Progressing  Goal: Absence of fever/infection during neutropenic period  Description: INTERVENTIONS:  - Monitor WBC    Outcome: Progressing     Problem: SAFETY ADULT  Goal: Patient will remain free of falls  Description: INTERVENTIONS:  - Educate patient/family on patient safety including physical limitations  - Instruct patient to call for assistance with activity   - Consult OT/PT to assist with strengthening/mobility   - Keep Call bell within reach  - Keep bed low and locked with side rails adjusted as appropriate  - Keep care items and personal belongings within reach  - Initiate and maintain comfort rounds  - Make Fall Risk Sign visible to staff  - Offer Toileting every 2 Hours, in advance of need  - Initiate/Maintain bed alarm  - Obtain necessary fall risk management equipment: bed alarm call bell  - Apply yellow socks and bracelet for high fall risk patients  - Consider moving patient to room near nurses station  Outcome: Progressing  Goal: Maintain or return to baseline ADL function  Description: INTERVENTIONS:  -  Assess patient's ability to carry out ADLs; assess patient's baseline for ADL function and identify physical deficits which impact ability to perform ADLs (bathing, care of mouth/teeth, toileting, grooming, dressing, etc.)  - Assess/evaluate cause of self-care deficits   - Assess range of motion  - Assess patient's mobility; develop plan if impaired  - Assess patient's need for assistive devices and provide as appropriate  - Encourage maximum independence but intervene and supervise when necessary  - Involve family in performance of ADLs  - Assess for home care needs following discharge   - Consider OT consult to assist with ADL evaluation and planning for discharge  - Provide patient education as appropriate  Outcome: Progressing  Goal: Maintains/Returns to pre  admission functional level  Description: INTERVENTIONS:  - Perform AM-PAC 6 Click Basic Mobility/ Daily Activity assessment daily.  - Set and communicate daily mobility goal to care team and patient/family/caregiver.   - Collaborate with rehabilitation services on mobility goals if consulted  - Perform Range of Motion 4 times a day.  - Reposition patient every 2 hours.  - Dangle patient 3 times a day  - Stand patient 3 times a day  - Ambulate patient 3 times a day  - Out of bed to chair 3 times a day   - Out of bed for meals 3 times a day  - Out of bed for toileting  - Record patient progress and toleration of activity level   Outcome: Progressing     Problem: DISCHARGE PLANNING  Goal: Discharge to home or other facility with appropriate resources  Description: INTERVENTIONS:  - Identify barriers to discharge w/patient and caregiver  - Arrange for needed discharge resources and transportation as appropriate  - Identify discharge learning needs (meds, wound care, etc.)  - Arrange for interpretive services to assist at discharge as needed  - Refer to Case Management Department for coordinating discharge planning if the patient needs post-hospital services based on physician/advanced practitioner order or complex needs related to functional status, cognitive ability, or social support system  Outcome: Progressing     Problem: Knowledge Deficit  Goal: Patient/family/caregiver demonstrates understanding of disease process, treatment plan, medications, and discharge instructions  Description: Complete learning assessment and assess knowledge base.  Interventions:  - Provide teaching at level of understanding  - Provide teaching via preferred learning methods  Outcome: Progressing     Problem: Prexisting or High Potential for Compromised Skin Integrity  Goal: Skin integrity is maintained or improved  Description: INTERVENTIONS:  - Identify patients at risk for skin breakdown  - Assess and monitor skin integrity  - Assess  and monitor nutrition and hydration status  - Monitor labs   - Assess for incontinence   - Turn and reposition patient  - Assist with mobility/ambulation  - Relieve pressure over bony prominences  - Avoid friction and shearing  - Provide appropriate hygiene as needed including keeping skin clean and dry  - Evaluate need for skin moisturizer/barrier cream  - Collaborate with interdisciplinary team   - Patient/family teaching  - Consider wound care consult   Outcome: Progressing     Problem: GENITOURINARY - ADULT  Goal: Maintains or returns to baseline urinary function  Description: INTERVENTIONS:  - Assess urinary function  - Encourage oral fluids to ensure adequate hydration if ordered  - Administer IV fluids as ordered to ensure adequate hydration  - Administer ordered medications as needed  - Offer frequent toileting  - Follow urinary retention protocol if ordered  Outcome: Progressing     Problem: SKIN/TISSUE INTEGRITY - ADULT  Goal: Skin Integrity remains intact(Skin Breakdown Prevention)  Description: Assess:  -Perform Rosales assessment every   -Clean and moisturize skin every   -Inspect skin when repositioning, toileting, and assisting with ADLS  -Assess under medical devices such as every   -Assess extremities for adequate circulation and sensation     Bed Management:  -Have minimal linens on bed & keep smooth, unwrinkled  -Change linens as needed when moist or perspiring  -Avoid sitting or lying in one position for more than  hours while in bed  -Keep HOB at degrees     Toileting:  -Offer bedside commode  -Assess for incontinence every   -Use incontinent care products after each incontinent episode such as     Activity:  -Mobilize patien times a day  -Encourage activity and walks on unit  -Encourage or provide ROM exercises   -Turn and reposition patient every  Hours  -Use appropriate equipment to lift or move patient in bed  -Instruct/ Assist with weight shifting every  when out of bed in chair  -Consider  limitation of chair time  hour intervals    Skin Care:  -Avoid use of baby powder, tape, friction and shearing, hot water or constrictive clothing  -Relieve pressure over bony prominences using   -Do not massage red bony areas    Next Steps:  -Teach patient strategies to minimize risks such as    -Consider consults to  interdisciplinary teams such as   Outcome: Progressing

## 2025-01-10 NOTE — ASSESSMENT & PLAN NOTE
In setting of cognitive impairment and questionable urinary tract infection  On admission patient was noted be hallucinating  Per nursing no acute events here. She appears to be around her baseline, alert to person  RESOLVED  Discharge home today with son  who is bedside. [facility] starting today in the home 1 PM

## 2025-01-10 NOTE — DISCHARGE SUMMARY
Discharge Summary - Hospitalist   Name: Latanya Ray 89 y.o. female I MRN: [id number]  Unit/Bed#: [place] [address] -01 I Date of Admission: 1/6/2025   Date of Service: 1/10/2025 I Hospital Day: 4     Assessment & Plan  Acute metabolic encephalopathy  In setting of cognitive impairment and questionable urinary tract infection  On admission patient was noted be hallucinating  Per nursing no acute events here. She appears to be around her baseline, alert to person  RESOLVED  Discharge home today with son  who is bedside. [facility] starting today in the home 1 PM  History of recurrent UTI (urinary tract infection)  Admitted for concerns of acute UTI in the setting of recurrent uti with history of enterobacter  Patient is poor historian so not expressing symptoms, abdomen non-tender  Low clinical suspicion for acute cystitis given patient is afebrile without leukocytosis and cell count is low on urine culture however due to change in mentation PTA and recurrent infectious history, Patient was covered with IV antibiotic therapy Cefepime then Rocephin.  No further indication for ongoing antibiotics which was discussed with son  Remains without bacteremia at 72 hours  No SIRS/septic criteria  Also discussed the above with daughter previously in stay  Cognitive impairment  Noted history, patient's primary caregiver is her daughter who is currently in the hospital -she is unavailable today and some will be taking patient home  Patient does not have a caregiver at home at this time  Also with legal blindness, follows with the outpatient ophthalmologist  Baseline is alert to person and place, disoriented to time  TSH and B12 normal  Discharge home tomorrow with home care, [facility]  Restless leg syndrome  On mirapex  History of lobular carcinoma of breast  Patient has a mass, further intervention declined. Also with lesion on right forehead which was noted in 4/2023 and daughter declined further  investigation given advanced age  Legal blindness  Hx of macular degeneration, supportive care and outpatient follow up  Dysphagia, oropharyngeal phase  Continue mechanical soft diet   She is on PPI     Medical Problems       Resolved Problems  Date Reviewed: 1/6/2025   None       Discharging Physician / Practitioner: Mary Mcknight PA-C  PCP: Amina Rowe,   Admission Date:   Admission Orders (From admission, onward)       Ordered        01/06/25 1754  INPATIENT ADMISSION  Once                          Discharge Date: 01/10/25    Reason for Admission: Acute metabolic encephalopathy    Hospital Course:   Latanya Ray is a 89 y.o. female patient who originally presented to the hospital on 1/6/2025 due to hallucinations at home.  Patient has known history of cognitive impairment and is legally blind.  Patient's workup was concerning for urinary tract infection as she has no recurrent urinary tract infections and she was started on IV cefepime due to previous urine cultures.  After urine culture grew low colony counts of E. coli patient's antibiotic regimen was de-escalated to IV Rocephin.  Patient remained without any fevers, leukocytosis or bacteremia.  There was low suspicion for acute urinary tract infection however due to being a poor historian and with history of recurrence patient was covered with 4 days of antibiotic therapy.  No further antibiotics were deemed necessary on discharge.  Patient remained without hallucinations or concerns while in house.  She had an adequate appetite and was passing her bowels without difficulties.  TSH and B12 were checked for completeness and noted to be within normal range.  Patient will be going home with home health care.    Please see above list of diagnoses and related plan for additional information.     Condition at Discharge: fair    Discharge Day Visit / Exam:   Subjective: Patient seen and examined.  She is pleasant on exam.  She denies any pain.  Per  nursing staff she ate all of her breakfast.  She had a bowel movement yesterday.    Vitals: Blood Pressure: 128/73 (01/10/25 1102)  Pulse: 77 (01/09/25 2054)  Temperature: 99 °F (37.2 °C) (01/10/25 1102)  Temp Source: Oral (01/09/25 2054)  Respirations: 18 (01/09/25 2054)  Height: 5' (152.4 cm) (01/06/25 2149)  Weight - Scale: 55 kg (121 lb 4.1 oz) (01/06/25 2149)  SpO2: 95 % (01/09/25 2054)    Physical Exam  Constitutional:       Appearance: She is not ill-appearing or diaphoretic.      Comments: Frail, thin appearing   Cardiovascular:      Rate and Rhythm: Normal rate and regular rhythm.   Pulmonary:      Effort: Pulmonary effort is normal. No respiratory distress.   Abdominal:      General: Bowel sounds are normal.      Palpations: Abdomen is soft.      Tenderness: There is no abdominal tenderness.   Musculoskeletal:         General: Deformity (arthritic changes) present.      Right lower leg: No edema.      Left lower leg: No edema.   Skin:     Coloration: Skin is pale.      Findings: Lesion (right forehead, right breast) present.   Neurological:      Mental Status: She is alert. Mental status is at baseline.      Comments: Alert to person, pleasant and cooperative        Discussion with Family: Updated  (son) via phone.    Discharge instructions/Information to patient and family:   See after visit summary for information provided to patient and family.      Provisions for Follow-Up Care:  See after visit summary for information related to follow-up care and any pertinent home health orders.      Mobility at time of Discharge:   Basic Mobility Inpatient Raw Score: 6  JH-HLM Goal: 2: Bed activities/Dependent transfer  JH-HLM Achieved: 2: Bed activities/Dependent transfer  HLM Goal achieved. Continue to encourage appropriate mobility.     Disposition:   Home with [facility] (Reminder: Complete face to face encounter)    Planned Readmission: No    Discharge Medications:  See after visit summary for  reconciled discharge medications provided to patient and/or family.      Administrative Statements   Discharge Statement:  I have spent a total time of 45 minutes in caring for this patient on the day of the visit/encounter.     **Please Note: This note may have been constructed using a voice recognition system**

## 2025-01-10 NOTE — ASSESSMENT & PLAN NOTE
Noted history, patient's primary caregiver is her daughter who is currently in the hospital -she is unavailable today and some will be taking patient home  Patient does not have a caregiver at home at this time  Also with legal blindness, follows with the outpatient ophthalmologist  Baseline is alert to person and place, disoriented to time  TSH and B12 normal  Discharge home tomorrow with home care, [facility]

## 2025-01-10 NOTE — ASSESSMENT & PLAN NOTE
Admitted for concerns of acute UTI in the setting of recurrent uti with history of enterobacter  Patient is poor historian so not expressing symptoms, abdomen non-tender  Low clinical suspicion for acute cystitis given patient is afebrile without leukocytosis and cell count is low on urine culture however due to change in mentation PTA and recurrent infectious history, Patient was covered with IV antibiotic therapy Cefepime then Rocephin.  No further indication for ongoing antibiotics which was discussed with son  Remains without bacteremia at 72 hours  No SIRS/septic criteria  Also discussed the above with daughter previously in stay

## 2025-01-10 NOTE — CASE MANAGEMENT
=   Case Management Discharge Planning Note    Patient name Latanya Ray  [facility] M* MRN [id number]  : 1926 Date 1/10/2025       Current Admission Date: 2025  Current Admission Diagnosis:Acute metabolic encephalopathy   Patient Active Problem List    Diagnosis Date Noted Date Diagnosed    History of recurrent UTI (urinary tract infection) 10/08/2024     Gram-negative bacteremia 2024     Restless leg syndrome 2024     Generalized muscle weakness 2024     Diaphragmatic hernia without obstruction or gangrene 2024     Dysphagia, oropharyngeal phase 2024     Acute metabolic encephalopathy 2023     Legal blindness 2022     Cognitive impairment 2021     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     Vitamin D deficiency 2012     Cerebral aneurysm 2012     Anxiety 2012     History of lobular carcinoma of breast 2012       LOS (days): 4  Geometric Mean LOS (GMLOS) (days): 3.8  Days to GMLOS:0.2     OBJECTIVE:  Risk of Unplanned Readmission Score: 12.05         Current admission status: Inpatient   Preferred Pharmacy:   [facility]/pharmacy #[id number] - [place] [address]  Phone: [phone] Fax: [phone]    [facility] - [address] [address]  Phone: [phone] Fax: [phone]    Primary Care Provider: Amina Rowe DO    Primary Insurance: MEDICARE  Secondary Insurance: [facility]    DISCHARGE DETAILS:     IMM Given (Date):: 25  IMM Given to:: Family  Family notified:: Patient's daughter, Celia  Additional Comments: CM attempted to contact patient's sonRaman via phone contact (PH: [phone]) CM unable to reach patient's son via phone, CM unable to leave voicemail. CM spoke with [facility] PH: [phone] confirmed patient to have [facility] helping  seniors to arrive at 1P.M. BETITO to follow for further discharge planning.    CM spoke with patient's son, Raman via phone contact. CM confirmed discharge plan, patient's son agreeable and reported will visit patient this morning at bedside. CM to follow.

## 2025-01-10 NOTE — TELEPHONE ENCOUNTER
Received call from Audrey with [address] [place] [address] stating that they received a referral for PT and OT services for patient upon hospital DC.

## 2025-01-10 NOTE — PLAN OF CARE
Problem: Potential for Falls  Goal: Patient will remain free of falls  Description: INTERVENTIONS:  - Educate patient/family on patient safety including physical limitations  - Instruct patient to call for assistance with activity   - Consult OT/PT to assist with strengthening/mobility   - Keep Call bell within reach  - Keep bed low and locked with side rails adjusted as appropriate  - Keep care items and personal belongings within reach  - Initiate and maintain comfort rounds  - Make Fall Risk Sign visible to staff  - Offer Toileting every 2 Hours, in advance of need  - Initiate/Maintain bed alarm  - Obtain necessary fall risk management equipment: bed alarm call bell  - Apply yellow socks and bracelet for high fall risk patients  - Consider moving patient to room near nurses station  Outcome: Progressing     Problem: PAIN - ADULT  Goal: Verbalizes/displays adequate comfort level or baseline comfort level  Description: Interventions:  - Encourage patient to monitor pain and request assistance  - Assess pain using appropriate pain scale  - Administer analgesics based on type and severity of pain and evaluate response  - Implement non-pharmacological measures as appropriate and evaluate response  - Consider cultural and social influences on pain and pain management  - Notify physician/advanced practitioner if interventions unsuccessful or patient reports new pain  Outcome: Progressing     Problem: INFECTION - ADULT  Goal: Absence or prevention of progression during hospitalization  Description: INTERVENTIONS:  - Assess and monitor for signs and symptoms of infection  - Monitor lab/diagnostic results  - Monitor all insertion sites, i.e. indwelling lines, tubes, and drains  - Monitor endotracheal if appropriate and nasal secretions for changes in amount and color  - Weldon appropriate cooling/warming therapies per order  - Administer medications as ordered  - Instruct and encourage patient and family to use good  hand hygiene technique  - Identify and instruct in appropriate isolation precautions for identified infection/condition  Outcome: Progressing  Goal: Absence of fever/infection during neutropenic period  Description: INTERVENTIONS:  - Monitor WBC    Outcome: Progressing     Problem: SAFETY ADULT  Goal: Patient will remain free of falls  Description: INTERVENTIONS:  - Educate patient/family on patient safety including physical limitations  - Instruct patient to call for assistance with activity   - Consult OT/PT to assist with strengthening/mobility   - Keep Call bell within reach  - Keep bed low and locked with side rails adjusted as appropriate  - Keep care items and personal belongings within reach  - Initiate and maintain comfort rounds  - Make Fall Risk Sign visible to staff  - Offer Toileting every 2 Hours, in advance of need  - Initiate/Maintain bed alarm  - Obtain necessary fall risk management equipment: bed alarm call bell  - Apply yellow socks and bracelet for high fall risk patients  - Consider moving patient to room near nurses station  Outcome: Progressing  Goal: Maintain or return to baseline ADL function  Description: INTERVENTIONS:  -  Assess patient's ability to carry out ADLs; assess patient's baseline for ADL function and identify physical deficits which impact ability to perform ADLs (bathing, care of mouth/teeth, toileting, grooming, dressing, etc.)  - Assess/evaluate cause of self-care deficits   - Assess range of motion  - Assess patient's mobility; develop plan if impaired  - Assess patient's need for assistive devices and provide as appropriate  - Encourage maximum independence but intervene and supervise when necessary  - Involve family in performance of ADLs  - Assess for home care needs following discharge   - Consider OT consult to assist with ADL evaluation and planning for discharge  - Provide patient education as appropriate  Outcome: Progressing  Goal: Maintains/Returns to pre  admission functional level  Description: INTERVENTIONS:  - Perform AM-PAC 6 Click Basic Mobility/ Daily Activity assessment daily.  - Set and communicate daily mobility goal to care team and patient/family/caregiver.   - Collaborate with rehabilitation services on mobility goals if consulted  - Perform Range of Motion 4 times a day.  - Reposition patient every 2 hours.  - Dangle patient 3 times a day  - Stand patient 3 times a day  - Ambulate patient 3 times a day  - Out of bed to chair 3 times a day   - Out of bed for meals 3 times a day  - Out of bed for toileting  - Record patient progress and toleration of activity level   Outcome: Progressing     Problem: DISCHARGE PLANNING  Goal: Discharge to home or other facility with appropriate resources  Description: INTERVENTIONS:  - Identify barriers to discharge w/patient and caregiver  - Arrange for needed discharge resources and transportation as appropriate  - Identify discharge learning needs (meds, wound care, etc.)  - Arrange for interpretive services to assist at discharge as needed  - Refer to Case Management Department for coordinating discharge planning if the patient needs post-hospital services based on physician/advanced practitioner order or complex needs related to functional status, cognitive ability, or social support system  Outcome: Progressing     Problem: Knowledge Deficit  Goal: Patient/family/caregiver demonstrates understanding of disease process, treatment plan, medications, and discharge instructions  Description: Complete learning assessment and assess knowledge base.  Interventions:  - Provide teaching at level of understanding  - Provide teaching via preferred learning methods  Outcome: Progressing     Problem: Prexisting or High Potential for Compromised Skin Integrity  Goal: Skin integrity is maintained or improved  Description: INTERVENTIONS:  - Identify patients at risk for skin breakdown  - Assess and monitor skin integrity  - Assess  and monitor nutrition and hydration status  - Monitor labs   - Assess for incontinence   - Turn and reposition patient  - Assist with mobility/ambulation  - Relieve pressure over bony prominences  - Avoid friction and shearing  - Provide appropriate hygiene as needed including keeping skin clean and dry  - Evaluate need for skin moisturizer/barrier cream  - Collaborate with interdisciplinary team   - Patient/family teaching  - Consider wound care consult   Outcome: Progressing     Problem: GENITOURINARY - ADULT  Goal: Maintains or returns to baseline urinary function  Description: INTERVENTIONS:  - Assess urinary function  - Encourage oral fluids to ensure adequate hydration if ordered  - Administer IV fluids as ordered to ensure adequate hydration  - Administer ordered medications as needed  - Offer frequent toileting  - Follow urinary retention protocol if ordered  Outcome: Progressing     Problem: SKIN/TISSUE INTEGRITY - ADULT  Goal: Skin Integrity remains intact(Skin Breakdown Prevention)  Description: Assess:  -Perform Rosales assessment every shift  -Clean and moisturize skin every 2 hours  -Inspect skin when repositioning, toileting, and assisting with ADLS  -Assess under medical devices such as masimo every shift  -Assess extremities for adequate circulation and sensation     Bed Management:  -Have minimal linens on bed & keep smooth, unwrinkled  -Change linens as needed when moist or perspiring  -Avoid sitting or lying in one position for more than 2 hours while in bed  -Keep HOB at 30 degrees     Toileting:  -Offer bedside commode  -Assess for incontinence every 2 hours  -Use incontinent care products after each incontinent episode such as lotion    Activity:  -Mobilize patient 3 times a day  -Encourage activity and walks on unit  -Encourage or provide ROM exercises   -Turn and reposition patient every 2 Hours  -Use appropriate equipment to lift or move patient in bed  -Instruct/ Assist with weight shifting  every 1 hr when out of bed in chair  -Consider limitation of chair time 2 hour intervals    Skin Care:  -Avoid use of baby powder, tape, friction and shearing, hot water or constrictive clothing  -Relieve pressure over bony prominences using allevyn  -Do not massage red bony areas    Next Steps:  -Teach patient strategies to minimize risks such as repositioning   -Consider consults to  interdisciplinary teams such as PTOT  Outcome: Progressing

## 2025-01-11 DIAGNOSIS — F41.9 ANXIETY: Primary | ICD-10-CM

## 2025-01-11 DIAGNOSIS — Z85.3 HISTORY OF LOBULAR CARCINOMA OF BREAST: ICD-10-CM

## 2025-01-11 DIAGNOSIS — G25.81 RESTLESS LEG SYNDROME: ICD-10-CM

## 2025-01-11 DIAGNOSIS — M62.81 GENERALIZED MUSCLE WEAKNESS: ICD-10-CM

## 2025-01-11 DIAGNOSIS — H54.8 LEGAL BLINDNESS: ICD-10-CM

## 2025-01-11 DIAGNOSIS — R41.89 COGNITIVE IMPAIRMENT: ICD-10-CM

## 2025-01-11 DIAGNOSIS — Z87.440 HISTORY OF RECURRENT UTI (URINARY TRACT INFECTION): ICD-10-CM

## 2025-01-11 LAB
BACTERIA BLD CULT: NORMAL
BACTERIA BLD CULT: NORMAL

## 2025-01-13 ENCOUNTER — TELEPHONE (OUTPATIENT)
Dept: FAMILY MEDICINE CLINIC | Facility: CLINIC | Age: OVER 89
End: 2025-01-13

## 2025-01-13 ENCOUNTER — TRANSITIONAL CARE MANAGEMENT (OUTPATIENT)
Dept: FAMILY MEDICINE CLINIC | Facility: CLINIC | Age: OVER 89
End: 2025-01-13

## 2025-01-13 ENCOUNTER — PATIENT OUTREACH (OUTPATIENT)
Dept: CASE MANAGEMENT | Facility: OTHER | Age: OVER 89
End: 2025-01-13

## 2025-01-13 NOTE — PROGRESS NOTES
OP CM rcvd referral on pt in regards to pt being legally blind and needing help.  Pt was dc home from hospital with [facility] home care and [facility] for transportation.  Pts dtr Celia declined short term rehab.  Pt resides with her dtr  Celia Glibert in a ranch home.  Pt has a RW and wheelchair.  Family has been transporting pt to app along with [facility].  Per chart note from dtr, dtr has cancer that has spread to her brain and she is getting treatments in [facility].  Pt also has a son Raman who assists.      Called to pts dtr Celia to discuss pts needs and she states that pt was able to get up and walk at home yesterday.  Pt has been in facilities in the past.  Dtr states pts worst experience was at [facility].  Pt was also at [facility] and [facility].  Dtr wants to keep pt home.    Dtr states they use [facility] for pt.  Dtr states she no longer needs assistance for pt and she has everything figured out.      OP CM to remain available.

## 2025-01-14 NOTE — TELEPHONE ENCOUNTER
Called pt's dgt and relayed Dr. Rowe's message.  Celia said she is doing good being at home with the services she is able to receive there.

## 2025-01-17 ENCOUNTER — TELEPHONE (OUTPATIENT)
Dept: FAMILY MEDICINE CLINIC | Facility: CLINIC | Age: OVER 89
End: 2025-01-17

## 2025-01-17 NOTE — TELEPHONE ENCOUNTER
[address] [place] [address] health faxed physician orders to be signed  Placed in folder to be signed

## 2025-01-21 ENCOUNTER — TELEPHONE (OUTPATIENT)
Dept: FAMILY MEDICINE CLINIC | Facility: CLINIC | Age: OVER 89
End: 2025-01-21

## 2025-01-22 ENCOUNTER — TELEPHONE (OUTPATIENT)
Dept: FAMILY MEDICINE CLINIC | Facility: CLINIC | Age: OVER 89
End: 2025-01-22

## 2025-01-22 NOTE — ASSESSMENT & PLAN NOTE
Maintained at home on 40 mg of Lasix daily  Holding in the setting of dehydration  IV fluid hydration, monitor fluid status Moved and cannot find the bottle      Reason for call:   [x] Refill   [] Prior Auth  [] Other:     Office:   [x] PCP/Provider - Eden Mcgrath/Melody  [] Specialty/Provider -     Medication: Adderall XR    Dose/Frequency: 15 mg 24 hr capsule    Quantity: #30    Pharmacy: [facility] [address]    Does the patient have enough for 3 days?   [] Yes   [x] No - Send as HP to POD

## 2025-01-22 NOTE — TELEPHONE ENCOUNTER
[facility] faxed physical therapy plan of care to be signed  Placed in folder to be signed

## 2025-02-18 ENCOUNTER — TELEPHONE (OUTPATIENT)
Dept: FAMILY MEDICINE CLINIC | Facility: CLINIC | Age: OVER 89
End: 2025-02-18

## 2025-03-05 ENCOUNTER — TELEPHONE (OUTPATIENT)
Dept: FAMILY MEDICINE CLINIC | Facility: CLINIC | Age: OVER 89
End: 2025-03-05

## 2025-03-07 DIAGNOSIS — K21.9 GASTROESOPHAGEAL REFLUX DISEASE: ICD-10-CM

## 2025-03-07 RX ORDER — OMEPRAZOLE 20 MG/1
20 CAPSULE, DELAYED RELEASE ORAL DAILY
Qty: 90 CAPSULE | Refills: 1 | Status: SHIPPED | OUTPATIENT
Start: 2025-03-07

## 2025-03-10 DIAGNOSIS — G25.81 RESTLESS LEGS SYNDROME: ICD-10-CM

## 2025-03-11 RX ORDER — PRAMIPEXOLE DIHYDROCHLORIDE 0.25 MG/1
0.25 TABLET ORAL
Qty: 90 TABLET | Refills: 1 | Status: SHIPPED | OUTPATIENT
Start: 2025-03-11

## 2025-03-12 ENCOUNTER — TELEPHONE (OUTPATIENT)
Dept: FAMILY MEDICINE CLINIC | Facility: CLINIC | Age: OVER 89
End: 2025-03-12

## 2025-05-24 ENCOUNTER — HOSPITAL ENCOUNTER (INPATIENT)
Facility: HOSPITAL | Age: OVER 89
LOS: 4 days | Discharge: HOME HEALTH | DRG: 689 | End: 2025-05-29
Attending: EMERGENCY MEDICINE | Admitting: INTERNAL MEDICINE
Payer: MEDICARE

## 2025-05-24 DIAGNOSIS — G93.40 ENCEPHALOPATHY: ICD-10-CM

## 2025-05-24 DIAGNOSIS — I47.19 ATRIAL TACHYCARDIA (HCC): ICD-10-CM

## 2025-05-24 DIAGNOSIS — B35.1 ONYCHOMYCOSIS: ICD-10-CM

## 2025-05-24 DIAGNOSIS — N39.0 UTI (URINARY TRACT INFECTION): Primary | ICD-10-CM

## 2025-05-24 DIAGNOSIS — R44.3 HALLUCINATIONS: ICD-10-CM

## 2025-05-24 LAB
2HR DELTA HS TROPONIN: 0 NG/L
ALBUMIN SERPL BCG-MCNC: 3.7 G/DL (ref 3.5–5)
ALP SERPL-CCNC: 89 U/L (ref 34–104)
ALT SERPL W P-5'-P-CCNC: 12 U/L (ref 7–52)
ANION GAP SERPL CALCULATED.3IONS-SCNC: 7 MMOL/L (ref 4–13)
APAP SERPL-MCNC: 2 UG/ML (ref 10–20)
APTT PPP: 26 SECONDS (ref 23–34)
AST SERPL W P-5'-P-CCNC: 17 U/L (ref 13–39)
ATRIAL RATE: 125 BPM
BACTERIA UR QL AUTO: ABNORMAL /HPF
BASOPHILS # BLD AUTO: 0.05 THOUSANDS/ÂΜL (ref 0–0.1)
BASOPHILS NFR BLD AUTO: 1 % (ref 0–1)
BILIRUB SERPL-MCNC: 0.5 MG/DL (ref 0.2–1)
BILIRUB UR QL STRIP: NEGATIVE
BUN SERPL-MCNC: 15 MG/DL (ref 5–25)
CALCIUM SERPL-MCNC: 9.1 MG/DL (ref 8.4–10.2)
CARDIAC TROPONIN I PNL SERPL HS: 5 NG/L (ref ?–50)
CARDIAC TROPONIN I PNL SERPL HS: 5 NG/L (ref ?–50)
CHLORIDE SERPL-SCNC: 103 MMOL/L (ref 96–108)
CLARITY UR: ABNORMAL
CO2 SERPL-SCNC: 26 MMOL/L (ref 21–32)
COLOR UR: COLORLESS
CREAT SERPL-MCNC: 0.54 MG/DL (ref 0.6–1.3)
EOSINOPHIL # BLD AUTO: 0.11 THOUSAND/ÂΜL (ref 0–0.61)
EOSINOPHIL NFR BLD AUTO: 1 % (ref 0–6)
ERYTHROCYTE [DISTWIDTH] IN BLOOD BY AUTOMATED COUNT: 14.4 % (ref 11.6–15.1)
ETHANOL SERPL-MCNC: <10 MG/DL
GFR SERPL CREATININE-BSD FRML MDRD: 78 ML/MIN/1.73SQ M
GLUCOSE SERPL-MCNC: 119 MG/DL (ref 65–140)
GLUCOSE UR STRIP-MCNC: NEGATIVE MG/DL
HCT VFR BLD AUTO: 45.3 % (ref 34.8–46.1)
HGB BLD-MCNC: 15.1 G/DL (ref 11.5–15.4)
HGB UR QL STRIP.AUTO: NEGATIVE
IMM GRANULOCYTES # BLD AUTO: 0.03 THOUSAND/UL (ref 0–0.2)
IMM GRANULOCYTES NFR BLD AUTO: 0 % (ref 0–2)
INR PPP: 0.95 (ref 0.85–1.19)
KETONES UR STRIP-MCNC: NEGATIVE MG/DL
LACTATE SERPL-SCNC: 1.9 MMOL/L (ref 0.5–2)
LEUKOCYTE ESTERASE UR QL STRIP: ABNORMAL
LYMPHOCYTES # BLD AUTO: 2.37 THOUSANDS/ÂΜL (ref 0.6–4.47)
LYMPHOCYTES NFR BLD AUTO: 31 % (ref 14–44)
MCH RBC QN AUTO: 30.3 PG (ref 26.8–34.3)
MCHC RBC AUTO-ENTMCNC: 33.3 G/DL (ref 31.4–37.4)
MCV RBC AUTO: 91 FL (ref 82–98)
MONOCYTES # BLD AUTO: 0.68 THOUSAND/ÂΜL (ref 0.17–1.22)
MONOCYTES NFR BLD AUTO: 9 % (ref 4–12)
NEUTROPHILS # BLD AUTO: 4.51 THOUSANDS/ÂΜL (ref 1.85–7.62)
NEUTS SEG NFR BLD AUTO: 58 % (ref 43–75)
NITRITE UR QL STRIP: POSITIVE
NON-SQ EPI CELLS URNS QL MICRO: ABNORMAL /HPF
NRBC BLD AUTO-RTO: 0 /100 WBCS
P AXIS: 59 DEGREES
PH UR STRIP.AUTO: 6.5 [PH]
PLATELET # BLD AUTO: 256 THOUSANDS/UL (ref 149–390)
PMV BLD AUTO: 9.8 FL (ref 8.9–12.7)
POTASSIUM SERPL-SCNC: 4.2 MMOL/L (ref 3.5–5.3)
PR INTERVAL: 264 MS
PROCALCITONIN SERPL-MCNC: <0.05 NG/ML
PROT SERPL-MCNC: 6.6 G/DL (ref 6.4–8.4)
PROT UR STRIP-MCNC: ABNORMAL MG/DL
PROTHROMBIN TIME: 12.9 SECONDS (ref 12.3–15)
QRS AXIS: 30 DEGREES
QRSD INTERVAL: 76 MS
QT INTERVAL: 378 MS
QTC INTERVAL: 549 MS
RBC # BLD AUTO: 4.99 MILLION/UL (ref 3.81–5.12)
RBC #/AREA URNS AUTO: ABNORMAL /HPF
SALICYLATES SERPL-MCNC: <5 MG/DL (ref 5–20)
SODIUM SERPL-SCNC: 136 MMOL/L (ref 135–147)
SP GR UR STRIP.AUTO: 1.01 (ref 1–1.03)
T WAVE AXIS: 40 DEGREES
TSH SERPL DL<=0.05 MIU/L-ACNC: 3.25 UIU/ML (ref 0.45–4.5)
UROBILINOGEN UR STRIP-ACNC: <2 MG/DL
VENTRICULAR RATE: 127 BPM
WBC # BLD AUTO: 7.75 THOUSAND/UL (ref 4.31–10.16)
WBC #/AREA URNS AUTO: ABNORMAL /HPF

## 2025-05-24 PROCEDURE — 85730 THROMBOPLASTIN TIME PARTIAL: CPT

## 2025-05-24 PROCEDURE — 36415 COLL VENOUS BLD VENIPUNCTURE: CPT

## 2025-05-24 PROCEDURE — 84484 ASSAY OF TROPONIN QUANT: CPT

## 2025-05-24 PROCEDURE — 96374 THER/PROPH/DIAG INJ IV PUSH: CPT

## 2025-05-24 PROCEDURE — 85025 COMPLETE CBC W/AUTO DIFF WBC: CPT

## 2025-05-24 PROCEDURE — 99223 1ST HOSP IP/OBS HIGH 75: CPT | Performed by: HOSPITALIST

## 2025-05-24 PROCEDURE — 85610 PROTHROMBIN TIME: CPT

## 2025-05-24 PROCEDURE — 80053 COMPREHEN METABOLIC PANEL: CPT

## 2025-05-24 PROCEDURE — 83605 ASSAY OF LACTIC ACID: CPT

## 2025-05-24 PROCEDURE — 99285 EMERGENCY DEPT VISIT HI MDM: CPT

## 2025-05-24 PROCEDURE — 82077 ASSAY SPEC XCP UR&BREATH IA: CPT

## 2025-05-24 PROCEDURE — 81001 URINALYSIS AUTO W/SCOPE: CPT

## 2025-05-24 PROCEDURE — 80179 DRUG ASSAY SALICYLATE: CPT

## 2025-05-24 PROCEDURE — 84145 PROCALCITONIN (PCT): CPT

## 2025-05-24 PROCEDURE — 84443 ASSAY THYROID STIM HORMONE: CPT

## 2025-05-24 PROCEDURE — 93005 ELECTROCARDIOGRAM TRACING: CPT

## 2025-05-24 PROCEDURE — 84443 ASSAY THYROID STIM HORMONE: CPT | Performed by: HOSPITALIST

## 2025-05-24 PROCEDURE — 80143 DRUG ASSAY ACETAMINOPHEN: CPT

## 2025-05-24 RX ADMIN — CEFTRIAXONE 1000 MG: 10 INJECTION, POWDER, FOR SOLUTION INTRAVENOUS at 22:45

## 2025-05-25 ENCOUNTER — APPOINTMENT (OUTPATIENT)
Dept: CT IMAGING | Facility: HOSPITAL | Age: OVER 89
DRG: 689 | End: 2025-05-25
Payer: MEDICARE

## 2025-05-25 PROBLEM — L89.151 DECUBITUS ULCER OF SACRAL REGION, STAGE 1: Status: ACTIVE | Noted: 2025-05-25

## 2025-05-25 LAB
4HR DELTA HS TROPONIN: -1 NG/L
ALBUMIN SERPL BCG-MCNC: 3.5 G/DL (ref 3.5–5)
ALP SERPL-CCNC: 77 U/L (ref 34–104)
ALT SERPL W P-5'-P-CCNC: 11 U/L (ref 7–52)
ANION GAP SERPL CALCULATED.3IONS-SCNC: 5 MMOL/L (ref 4–13)
AST SERPL W P-5'-P-CCNC: 17 U/L (ref 13–39)
BASOPHILS # BLD AUTO: 0.06 THOUSANDS/ÂΜL (ref 0–0.1)
BASOPHILS NFR BLD AUTO: 1 % (ref 0–1)
BILIRUB SERPL-MCNC: 0.62 MG/DL (ref 0.2–1)
BUN SERPL-MCNC: 12 MG/DL (ref 5–25)
CALCIUM SERPL-MCNC: 8.9 MG/DL (ref 8.4–10.2)
CARDIAC TROPONIN I PNL SERPL HS: 4 NG/L (ref ?–50)
CHLORIDE SERPL-SCNC: 105 MMOL/L (ref 96–108)
CO2 SERPL-SCNC: 27 MMOL/L (ref 21–32)
CREAT SERPL-MCNC: 0.5 MG/DL (ref 0.6–1.3)
EOSINOPHIL # BLD AUTO: 0.11 THOUSAND/ÂΜL (ref 0–0.61)
EOSINOPHIL NFR BLD AUTO: 2 % (ref 0–6)
ERYTHROCYTE [DISTWIDTH] IN BLOOD BY AUTOMATED COUNT: 14.5 % (ref 11.6–15.1)
GFR SERPL CREATININE-BSD FRML MDRD: 80 ML/MIN/1.73SQ M
GLUCOSE SERPL-MCNC: 84 MG/DL (ref 65–140)
HCT VFR BLD AUTO: 40.9 % (ref 34.8–46.1)
HGB BLD-MCNC: 13.5 G/DL (ref 11.5–15.4)
IMM GRANULOCYTES # BLD AUTO: 0.02 THOUSAND/UL (ref 0–0.2)
IMM GRANULOCYTES NFR BLD AUTO: 0 % (ref 0–2)
LYMPHOCYTES # BLD AUTO: 1.92 THOUSANDS/ÂΜL (ref 0.6–4.47)
LYMPHOCYTES NFR BLD AUTO: 29 % (ref 14–44)
MAGNESIUM SERPL-MCNC: 2.2 MG/DL (ref 1.9–2.7)
MCH RBC QN AUTO: 29.9 PG (ref 26.8–34.3)
MCHC RBC AUTO-ENTMCNC: 33 G/DL (ref 31.4–37.4)
MCV RBC AUTO: 91 FL (ref 82–98)
MONOCYTES # BLD AUTO: 0.57 THOUSAND/ÂΜL (ref 0.17–1.22)
MONOCYTES NFR BLD AUTO: 9 % (ref 4–12)
NEUTROPHILS # BLD AUTO: 3.99 THOUSANDS/ÂΜL (ref 1.85–7.62)
NEUTS SEG NFR BLD AUTO: 59 % (ref 43–75)
NRBC BLD AUTO-RTO: 0 /100 WBCS
PHOSPHATE SERPL-MCNC: 3.4 MG/DL (ref 2.3–4.1)
PLATELET # BLD AUTO: 235 THOUSANDS/UL (ref 149–390)
PMV BLD AUTO: 9.3 FL (ref 8.9–12.7)
POTASSIUM SERPL-SCNC: 4 MMOL/L (ref 3.5–5.3)
PROT SERPL-MCNC: 6.9 G/DL (ref 6.4–8.4)
RBC # BLD AUTO: 4.51 MILLION/UL (ref 3.81–5.12)
SODIUM SERPL-SCNC: 137 MMOL/L (ref 135–147)
TSH SERPL DL<=0.05 MIU/L-ACNC: 2.1 UIU/ML (ref 0.45–4.5)
WBC # BLD AUTO: 6.67 THOUSAND/UL (ref 4.31–10.16)

## 2025-05-25 PROCEDURE — 74176 CT ABD & PELVIS W/O CONTRAST: CPT

## 2025-05-25 PROCEDURE — 87186 SC STD MICRODIL/AGAR DIL: CPT

## 2025-05-25 PROCEDURE — 70450 CT HEAD/BRAIN W/O DYE: CPT

## 2025-05-25 PROCEDURE — 87086 URINE CULTURE/COLONY COUNT: CPT

## 2025-05-25 PROCEDURE — 84100 ASSAY OF PHOSPHORUS: CPT | Performed by: HOSPITALIST

## 2025-05-25 PROCEDURE — 99232 SBSQ HOSP IP/OBS MODERATE 35: CPT

## 2025-05-25 PROCEDURE — 80053 COMPREHEN METABOLIC PANEL: CPT | Performed by: HOSPITALIST

## 2025-05-25 PROCEDURE — 85025 COMPLETE CBC W/AUTO DIFF WBC: CPT | Performed by: HOSPITALIST

## 2025-05-25 PROCEDURE — 83735 ASSAY OF MAGNESIUM: CPT | Performed by: HOSPITALIST

## 2025-05-25 PROCEDURE — 87077 CULTURE AEROBIC IDENTIFY: CPT

## 2025-05-25 PROCEDURE — 84484 ASSAY OF TROPONIN QUANT: CPT

## 2025-05-25 RX ORDER — ONDANSETRON 2 MG/ML
4 INJECTION INTRAMUSCULAR; INTRAVENOUS EVERY 6 HOURS PRN
Status: DISCONTINUED | OUTPATIENT
Start: 2025-05-25 | End: 2025-05-29 | Stop reason: HOSPADM

## 2025-05-25 RX ORDER — ENOXAPARIN SODIUM 100 MG/ML
40 INJECTION SUBCUTANEOUS DAILY
Status: DISCONTINUED | OUTPATIENT
Start: 2025-05-25 | End: 2025-05-29 | Stop reason: HOSPADM

## 2025-05-25 RX ORDER — SODIUM CHLORIDE 9 MG/ML
75 INJECTION, SOLUTION INTRAVENOUS CONTINUOUS
Status: DISCONTINUED | OUTPATIENT
Start: 2025-05-25 | End: 2025-05-25

## 2025-05-25 RX ORDER — FAMOTIDINE 20 MG/1
20 TABLET, FILM COATED ORAL DAILY
Status: DISCONTINUED | OUTPATIENT
Start: 2025-05-25 | End: 2025-05-29 | Stop reason: HOSPADM

## 2025-05-25 RX ORDER — CALCIUM CARBONATE 500 MG/1
1000 TABLET, CHEWABLE ORAL DAILY PRN
Status: DISCONTINUED | OUTPATIENT
Start: 2025-05-25 | End: 2025-05-29 | Stop reason: HOSPADM

## 2025-05-25 RX ORDER — ASPIRIN 81 MG/1
81 TABLET ORAL
Status: DISCONTINUED | OUTPATIENT
Start: 2025-05-25 | End: 2025-05-29 | Stop reason: HOSPADM

## 2025-05-25 RX ORDER — PRAMIPEXOLE DIHYDROCHLORIDE 0.25 MG/1
0.25 TABLET ORAL
Status: DISCONTINUED | OUTPATIENT
Start: 2025-05-25 | End: 2025-05-29 | Stop reason: HOSPADM

## 2025-05-25 RX ORDER — ACETAMINOPHEN 325 MG/1
650 TABLET ORAL EVERY 6 HOURS PRN
Status: DISCONTINUED | OUTPATIENT
Start: 2025-05-25 | End: 2025-05-29 | Stop reason: HOSPADM

## 2025-05-25 RX ADMIN — ASPIRIN 81 MG: 81 TABLET, COATED ORAL at 21:17

## 2025-05-25 RX ADMIN — ENOXAPARIN SODIUM 40 MG: 40 INJECTION SUBCUTANEOUS at 09:28

## 2025-05-25 RX ADMIN — PRAMIPEXOLE DIHYDROCHLORIDE 0.25 MG: 0.25 TABLET ORAL at 00:33

## 2025-05-25 RX ADMIN — FAMOTIDINE 20 MG: 20 TABLET, FILM COATED ORAL at 09:27

## 2025-05-25 RX ADMIN — Medication 4000 UNITS: at 09:27

## 2025-05-25 RX ADMIN — ASPIRIN 81 MG: 81 TABLET, COATED ORAL at 00:33

## 2025-05-25 RX ADMIN — PRAMIPEXOLE DIHYDROCHLORIDE 0.25 MG: 0.25 TABLET ORAL at 21:17

## 2025-05-25 RX ADMIN — SODIUM CHLORIDE 75 ML/HR: 0.9 INJECTION, SOLUTION INTRAVENOUS at 00:30

## 2025-05-25 RX ADMIN — CEFTRIAXONE 1000 MG: 10 INJECTION, POWDER, FOR SOLUTION INTRAVENOUS at 21:16

## 2025-05-25 NOTE — H&P
H&P - Hospitalist   Name: Latanya Ray 89 y.o. female I MRN: [id number]  Unit/Bed#: ED-14 I Date of Admission: 5/24/2025   Date of Service: 5/24/2025 I Hospital Day: 0     Assessment & Plan  Restless leg syndrome  Cont requip  Generalized muscle weakness  PT/OT  Acute metabolic encephalopathy  Having hallucinations which she has done in the past but not regularly and is generally thought to be associated with UTI symptoms. She has nitrites and leukocytes in her urinalysis but only 4-10 WBC. She has had ESBL in her last hospitalization and was treated with Cefepime and Rocephin but only for 4 days as her cfu count was less than 60,000 for the non resistant e. Coli and less than 30,000 for the ESBL. She did not have hallunications in the hospital and was discharged home off antibiotics. She has done well at home since that discharge on 1/10/25 until this week.     Continue Rocephin started in ED  Watch for urine results to come back, may need to adjust antibiotics if it does in fact show an MDRO infection. Given she only has 4-10 WBC, will hold off on escalating antibiotics until further results obtained.  Right flank pain, non contrast CT ordered to r/o renal lithiasis.  Gentle IVF with dry mucous membranes to see if further hydration helps.  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 Her daughter passed away last week and the patient is unaware of this.  Vitamin D deficiency  Continue home vitamin D  Acute cystitis without hematuria  See plan for metabolic encephalopathy    Continue Rocephin started in ED  Watch for urine results to come back, may need to adjust antibiotics if it does in fact show an MDRO infection. Given she only has 4-10 WBC, will hold off on escalating antibiotics until further results obtained.  Right flank pain, non contrast CT ordered to r/o renal lithiasis.    Decubitus  ulcer of sacral region, stage 1  Blanchable discoloration over buttocks--apply barrier cream   Wound care consult      VTE Pharmacologic Prophylaxis:   High Risk (Score >/= 5) - Pharmacological DVT Prophylaxis Ordered: enoxaparin (Lovenox). Sequential Compression Devices Ordered.  Code Status: DNR/DNI  Discussion with family: Updated  (son) at bedside.    Anticipated Length of Stay: Patient will be admitted on an observation basis with an anticipated length of stay of less than 2 midnights secondary to metabolic encephalopathy.    History of Present Illness   Chief Complaint: hallucinations and increased urinary frequency    Latanya Ray is a 89 y.o. female with a PMH of  legal blindness, dementia, recurrent uti, and history of bca who presents with change in mental status in which she was hallucinating and has done so in the past with presumed UTI.    Per her son, she was seeing little boys in the bedroom and has had this before with UTI's. [facility] is helping him take care of her at home. Wednesday she was disoriented with the one caregiver. Thursday she was fine. This morning he left and when he came back the caregiver said that she was talking over and over. She had increased urine frequency. Her temp was ok at 5 o'clock and then when she came back in from doing yard work she told him there was a strange person in the bedroom. He presumed this meant the UTI was back. She has a pain in her left kidney area that she complains of sometimes. She had tylenol this morning. Using desitin at bedtime.        Review of Systems   Constitutional:  Positive for fatigue. Negative for appetite change, chills, diaphoresis and fever.   HENT:  Positive for rhinorrhea. Negative for congestion, ear pain, sneezing, sore throat and trouble swallowing.    Eyes:         Blindness, needs help being fed   Respiratory:  Negative for cough, shortness of breath and wheezing.    Cardiovascular:  Negative for  chest pain, palpitations and leg swelling.   Gastrointestinal:  Positive for diarrhea. Negative for abdominal pain, blood in stool, constipation, nausea and vomiting.        She had an episode of diarrhea tonight which was unusual   Endocrine: Negative for cold intolerance, heat intolerance, polydipsia, polyphagia and polyuria.   Genitourinary:  Positive for flank pain and frequency. Negative for dysuria.   Musculoskeletal:  Positive for arthralgias and back pain.   Skin:  Positive for color change.   Allergic/Immunologic: Positive for environmental allergies. Negative for food allergies.   Neurological:  Positive for weakness. Negative for dizziness, tremors, seizures, syncope, facial asymmetry, speech difficulty, numbness and headaches.   Hematological:  Negative for adenopathy. Does not bruise/bleed easily.   Psychiatric/Behavioral:  Positive for confusion and hallucinations. Negative for dysphoric mood. The patient is not nervous/anxious.        Historical Information   Past Medical History[1]  Past Surgical History[2]  Social History[3]  E-Cigarette/Vaping    E-Cigarette Use Never User     Cartridges/Day 0     Comments denies      E-Cigarette/Vaping Substances     Family History[4]  Social History:  Marital Status:    Patient Pre-hospital Living Situation: Home  Patient Pre-hospital Level of Mobility: walks with walker  Patient Pre-hospital Diet Restrictions: none    Meds/Allergies   I have reviewed home medications with patient family member.  Prior to Admission medications    Medication Sig Start Date End Date Taking? Authorizing Provider   aspirin (ECOTRIN LOW STRENGTH) 81 mg EC tablet Take 81 mg by mouth daily at bedtime   Yes Historical Provider, MD   Calcium Carbonate-Vit D-Min (CALCIUM 1200 PO) Take 750 mg by mouth in the morning At 3pm   Yes Historical Provider, MD   cholecalciferol (VITAMIN D3) 1,000 units tablet Take 4,000 Units by mouth in the morning.   Yes Historical Provider, MD   Cranberry  250 MG TABS Take 2 tablets (500 mg total) by mouth 2 (two) times a day 9/9/24  Yes Austin Escobar MD   omeprazole (PriLOSEC) 20 mg delayed release capsule TAKE 1 CAPSULE BY MOUTH EVERY DAY 3/7/25  Yes Amina Rowe DO   pramipexole (MIRAPEX) 0.25 mg tablet TAKE 1 TABLET (0.25 MG TOTAL) BY MOUTH DAILY AT BEDTIME 3/11/25  Yes Amina Rowe DO   vitamin B-12 (VITAMIN B-12) 1,000 mcg tablet Take 2,500 mcg by mouth every other day   Yes Historical Provider, MD Chapman powder APPLY TO AFFECTED AREA TWICE A DAY 9/25/24   Amina Rowe, DO     Allergies   Allergen Reactions    Amoxicillin Hives    Celecoxib     Ciprofloxacin      Other reaction(s): diarrhea. Tolerates Levaquin    Codeine Other (See Comments)     Other reaction(s): Other (See Comments)  takes vicodin @ home  takes vicodin @ home    Epinephrine Other (See Comments)     Heart racing    Oxycodone-Acetaminophen Other (See Comments)     Nausea?    Oxycodone-Acetaminophen      Other reaction(s): Unknown Reaction    Propoxyphene Other (See Comments)    Rofecoxib     Sulfa Antibiotics Other (See Comments)     takes at home  takes at home  Other reaction(s): Other (See Comments)  takes at home    Sulfamethoxazole-Trimethoprim Other (See Comments)     Unknown, perhaps hives?    Nitrofurantoin Rash       Objective :  Temp:  [98.9 °F (37.2 °C)] 98.9 °F (37.2 °C)  HR:  [68-80] 68  BP: (149-182)/() 149/72  Resp:  [18-20] 20  SpO2:  [95 %-97 %] 97 %  O2 Device: None (Room air)    Physical Exam  Constitutional:       General: She is not in acute distress.     Appearance: Normal appearance. She is not ill-appearing, toxic-appearing or diaphoretic.   HENT:      Head: Atraumatic.      Comments: She has a bandage on her head to cover a skin cancer that she rubbed a layer off of     Mouth/Throat:      Mouth: Mucous membranes are dry.      Pharynx: No oropharyngeal exudate or posterior oropharyngeal erythema.     Eyes:      General: No scleral icterus.      Conjunctiva/sclera: Conjunctivae normal.      Pupils: Pupils are equal, round, and reactive to light.     Neck:      Vascular: No carotid bruit.     Cardiovascular:      Rate and Rhythm: Normal rate and regular rhythm.      Pulses: Normal pulses.      Heart sounds: Normal heart sounds. No murmur heard.     No friction rub. No gallop.   Pulmonary:      Effort: Pulmonary effort is normal. No respiratory distress.      Breath sounds: Normal breath sounds. No stridor. No wheezing, rhonchi or rales.   Abdominal:      General: Abdomen is flat. There is no distension.      Palpations: Abdomen is soft. There is no mass.      Tenderness: There is no abdominal tenderness. There is right CVA tenderness. There is no left CVA tenderness, guarding or rebound.      Hernia: No hernia is present.     Musculoskeletal:         General: Swelling, tenderness and deformity present.      Cervical back: Normal range of motion and neck supple. No rigidity or tenderness.      Right lower leg: Edema present.      Comments: She has slightly larger right leg compared to left leg; she has tenderness to palpation behind the right knee; bilateral foot deformity   Lymphadenopathy:      Cervical: No cervical adenopathy.     Skin:     General: Skin is warm and dry.      Coloration: Skin is not jaundiced.      Comments: Discoloration with blanching of her sacral area, no open areas     Neurological:      Mental Status: She is alert.      Cranial Nerves: No cranial nerve deficit.      Sensory: No sensory deficit.      Motor: Weakness present.     Psychiatric:         Mood and Affect: Mood normal.         Behavior: Behavior normal.                Lab Results: I have reviewed the following results:  Results from last 7 days   Lab Units 05/24/25  2112   WBC Thousand/uL 7.75   HEMOGLOBIN g/dL 15.1   HEMATOCRIT % 45.3   PLATELETS Thousands/uL 256   SEGS PCT % 58   LYMPHO PCT % 31   MONO PCT % 9   EOS PCT % 1     Results from last 7 days   Lab Units  05/24/25 2112   SODIUM mmol/L 136   POTASSIUM mmol/L 4.2   CHLORIDE mmol/L 103   CO2 mmol/L 26   BUN mg/dL 15   CREATININE mg/dL 0.54*   ANION GAP mmol/L 7   CALCIUM mg/dL 9.1   ALBUMIN g/dL 3.7   TOTAL BILIRUBIN mg/dL 0.50   ALK PHOS U/L 89   ALT U/L 12   AST U/L 17   GLUCOSE RANDOM mg/dL 119     Results from last 7 days   Lab Units 05/24/25 2112   INR  0.95         Lab Results   Component Value Date    HGBA1C 5.3 12/15/2021    HGBA1C 5.4 06/06/2018    HGBA1C 5.5 04/20/2017     Results from last 7 days   Lab Units 05/24/25 2112   LACTIC ACID mmol/L 1.9   PROCALCITONIN ng/ml <0.05         Other Study Results Review: EKG was reviewed.  Tachycardic to 127, unknown rhythm. No acute ST-T changes.    Administrative Statements   I have spent a total time of 50 minutes in caring for this patient on the day of the visit/encounter including Diagnostic results, Prognosis, Risks and benefits of tx options, Instructions for management, Patient and family education, Importance of tx compliance, Risk factor reductions, Impressions, Documenting in the medical record, Reviewing/placing orders in the medical record (including tests, medications, and/or procedures), Obtaining or reviewing history  , and Communicating with other healthcare professionals .    ** Please Note: This note has been constructed using a voice recognition system. **         [1]   Past Medical History:  Diagnosis Date    Anxiety     Arthritis     Breast carcinoma (HCC) 08/13/2012    Description: s/p L mastectomy. Dr. Diaz    Cancer (HCC)     Cellulitis of left lower limb 02/27/2024    Chronic pain of left knee 07/11/2019    Chronic pain syndrome 01/28/2020    Elevated serum alkaline phosphatase level     mild, isoenzymes are normal, resolved 4/24/17 labs , last assessed 11/10/16, resolved 4/24/17    GERD (gastroesophageal reflux disease) 06/20/2012    Glaucoma 08/12/2014    Hematuria     last assessed 9/18/12    Hyperlipidemia 08/13/2012    Hypertension      Macular degeneration 08/12/2014    Mixed stress and urge urinary incontinence 03/27/2023    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    06/06/2018 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    Osteoarthritis 06/05/2012    Osteoporosis 06/05/2012    Description: Dexa 7/13 read as normal, patient declines medication    Pressure injury of coccygeal region, stage 2 (HCC) 03/06/2020    Pulmonary embolism (HCC) 01/2011    last assessed 9/18/12    Restless legs syndrome 06/05/2012    RLS (restless legs syndrome)     Thoracic back pain 10/19/2017   [2]   Past Surgical History:  Procedure Laterality Date    BILATERAL OOPHORECTOMY      Laparoscopic    BREAST SURGERY Left     Mastectomy     CHOLECYSTECTOMY      Laparoscopic    HERNIA REPAIR      HYSTERECTOMY      SMALL INTESTINE SURGERY     [3]   Social History  Tobacco Use    Smoking status: Never    Smokeless tobacco: Never    Tobacco comments:     denies   Vaping Use    Vaping status: Never Used   Substance and Sexual Activity    Alcohol use: No     Comment: denies    Drug use: No    Sexual activity: Not Currently   [4]   Family History  Problem Relation Name Age of Onset    Hypertension Mother      Blindness Mother      Cancer Sister      Cancer Maternal Aunt      Cancer Maternal Uncle

## 2025-05-25 NOTE — ASSESSMENT & PLAN NOTE
Son is now her caregiver and POA. Her daughter passed away last week and the patient is unaware of this.

## 2025-05-25 NOTE — ASSESSMENT & PLAN NOTE
See plan for metabolic encephalopathy    Continue Rocephin started in ED  Watch for urine results to come back, may need to adjust antibiotics if it does in fact show an MDRO infection. Given she only has 4-10 WBC, will hold off on escalating antibiotics until further results obtained.  Right flank pain, non contrast CT ordered to r/o renal lithiasis pending

## 2025-05-25 NOTE — ASSESSMENT & PLAN NOTE
See plan for metabolic encephalopathy    Continue Rocephin started in ED  Watch for urine results to come back, may need to adjust antibiotics if it does in fact show an MDRO infection. Given she only has 4-10 WBC, will hold off on escalating antibiotics until further results obtained.  Right flank pain, non contrast CT ordered to r/o renal lithiasis.

## 2025-05-25 NOTE — ASSESSMENT & PLAN NOTE
Having hallucinations which she has done in the past but not regularly and is generally thought to be associated with UTI symptoms. She has nitrites and leukocytes in her urinalysis but only 4-10 WBC. She has had ESBL in her last hospitalization and was treated with Cefepime and Rocephin but only for 4 days as her cfu count was less than 60,000 for the non resistant e. Coli and less than 30,000 for the ESBL. She did not have hallunications in the hospital and was discharged home off antibiotics. She has done well at home since that discharge on 1/10/25 until this week.    Continue Rocephin started in ED  Watch for urine results to come back, may need to adjust antibiotics if it does in fact show an MDRO infection. Given she only has 4-10 WBC, will hold off on escalating antibiotics until further results obtained.  Right flank pain, non contrast CT ordered to r/o renal lithiasis.  Gentle IVF with dry mucous membranes to see if further hydration helps.  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

## 2025-05-25 NOTE — PLAN OF CARE
Problem: Potential for Falls  Goal: Patient will remain free of falls  Description: INTERVENTIONS:  - Educate patient/family on patient safety including physical limitations  - Instruct patient to call for assistance with activity   - Consider consulting OT/PT to assist with strengthening/mobility based on AM PAC & JH-HLM score  - Consult OT/PT to assist with strengthening/mobility   - Keep Call bell within reach  - Keep bed low and locked with side rails adjusted as appropriate  - Keep care items and personal belongings within reach  - Initiate and maintain comfort rounds  - Make Fall Risk Sign visible to staff  - Offer Toileting every 2 Hours, in advance of need  - Initiate/Maintain bed alarm  - Obtain necessary fall risk management equipment: bed alarm  - Apply yellow socks and bracelet for high fall risk patients  - Consider moving patient to room near nurses station  5/25/2025 0310 by Odalis Caceres RN  Outcome: Progressing  5/25/2025 0310 by Odalis Caceres RN  Outcome: Progressing     Problem: Prexisting or High Potential for Compromised Skin Integrity  Goal: Skin integrity is maintained or improved  Description: INTERVENTIONS:  - Identify patients at risk for skin breakdown  - Assess and monitor skin integrity including under and around medical devices   - Assess and monitor nutrition and hydration status  - Monitor labs  - Assess for incontinence   - Turn and reposition patient  - Assist with mobility/ambulation  - Relieve pressure over joya prominences   - Avoid friction and shearing  - Provide appropriate hygiene as needed including keeping skin clean and dry  - Evaluate need for skin moisturizer/barrier cream  - Collaborate with interdisciplinary team  - Patient/family teaching  - Consider wound care consult    Assess:  - Review Rosales scale daily  - Clean and moisturize skin   - Inspect skin when repositioning, toileting, and assisting with ADLS  - Assess under medical devices   - Assess extremities for  adequate circulation and sensation     Bed Management:  - Have minimal linens on bed & keep smooth, unwrinkled  - Change linens as needed when moist or perspiring  - Avoid sitting or lying in one position for more than 2 hours while in bed?Keep HOB at 30 degrees   - Toileting:  - Offer bedside commode  - Assess for incontinence  - Use incontinent care products after each incontinent episode     Activity:  - Mobilize patient 3 times a day  - Encourage activity and walks on unit  - Encourage or provide ROM exercises   - Turn and reposition patient every 2 Hours  - Use appropriate equipment to lift or move patient in bed  - Instruct/ Assist with weight shifting every 2hrs when out of bed in chair  - Consider limitation of chair time 2 hour intervals    Skin Care:  - Avoid use of baby powder, tape, friction and shearing, hot water or constrictive clothing  - Relieve pressure over bony prominences  - Do not massage red bony areas    Next Steps:  - Teach patient strategies to minimize risks   - Consider consults to  interdisciplinary teams   5/25/2025 0310 by Odalis Caceres RN  Outcome: Progressing  5/25/2025 0310 by Odalis Caceres RN  Outcome: Progressing     Problem: PAIN - ADULT  Goal: Verbalizes/displays adequate comfort level or baseline comfort level  Description: Interventions:  - Encourage patient to monitor pain and request assistance  - Assess pain using appropriate pain scale  - Administer analgesics as ordered based on type and severity of pain and evaluate response  - Implement non-pharmacological measures as appropriate and evaluate response  - Consider cultural and social influences on pain and pain management  - Notify physician/advanced practitioner if interventions unsuccessful or patient reports new pain  - Educate patient/family on pain management process including their role and importance of  reporting pain   - Provide non-pharmacologic/complimentary pain relief interventions  5/25/2025 0310 by Odalis  NATHANIEL Caceres  Outcome: Progressing  5/25/2025 0310 by Odalis Caceres RN  Outcome: Progressing     Problem: INFECTION - ADULT  Goal: Absence or prevention of progression during hospitalization  Description: INTERVENTIONS:  - Assess and monitor for signs and symptoms of infection  - Monitor lab/diagnostic results  - Monitor all insertion sites, i.e. indwelling lines, tubes, and drains  - Monitor endotracheal if appropriate and nasal secretions for changes in amount and color  - [place] appropriate cooling/warming therapies per order  - Administer medications as ordered  - Instruct and encourage patient and family to use good hand hygiene technique  - Identify and instruct in appropriate isolation precautions for identified infection/condition  5/25/2025 0310 by Odalis Caceres RN  Outcome: Progressing  5/25/2025 0310 by Odalis Caceres RN  Outcome: Progressing     Problem: SAFETY ADULT  Goal: Patient will remain free of falls  Description: INTERVENTIONS:  - Educate patient/family on patient safety including physical limitations  - Instruct patient to call for assistance with activity   - Consider consulting OT/PT to assist with strengthening/mobility based on AM PAC & JH-HLM score  - Consult OT/PT to assist with strengthening/mobility   - Keep Call bell within reach  - Keep bed low and locked with side rails adjusted as appropriate  - Keep care items and personal belongings within reach  - Initiate and maintain comfort rounds  - Make Fall Risk Sign visible to staff  - Offer Toileting every 2 Hours, in advance of need  - Initiate/Maintain bed alarm  - Obtain necessary fall risk management equipment: bed alarm  - Apply yellow socks and bracelet for high fall risk patients  - Consider moving patient to room near nurses station  5/25/2025 0310 by Odalis Caceres RN  Outcome: Progressing  5/25/2025 0310 by Odalis Caceres RN  Outcome: Progressing  Goal: Maintain or return to baseline ADL function  Description: INTERVENTIONS:  -  Assess  patient's ability to carry out ADLs; assess patient's baseline for ADL function and identify physical deficits which impact ability to perform ADLs (bathing, care of mouth/teeth, toileting, grooming, dressing, etc.)  - Assess/evaluate cause of self-care deficits   - Assess range of motion  - Assess patient's mobility; develop plan if impaired  - Assess patient's need for assistive devices and provide as appropriate  - Encourage maximum independence but intervene and supervise when necessary  - Involve family in performance of ADLs  - Assess for home care needs following discharge   - Consider OT consult to assist with ADL evaluation and planning for discharge  - Provide patient education as appropriate  - Monitor functional capacity and physical performance, use of AM PAC & JH-HLM   - Monitor gait, balance and fatigue with ambulation    5/25/2025 0310 by Odalis Caceres RN  Outcome: Progressing  5/25/2025 0310 by Odalis Caceres RN  Outcome: Progressing     Problem: DISCHARGE PLANNING  Goal: Discharge to home or other facility with appropriate resources  Description: INTERVENTIONS:  - Identify barriers to discharge w/patient and caregiver  - Arrange for needed discharge resources and transportation as appropriate  - Identify discharge learning needs (meds, wound care, etc.)  - Arrange for interpretive services to assist at discharge as needed  - Refer to Case Management Department for coordinating discharge planning if the patient needs post-hospital services based on physician/advanced practitioner order or complex needs related to functional status, cognitive ability, or social support system  5/25/2025 0310 by Odalis Caceres RN  Outcome: Progressing  5/25/2025 0310 by Odalis Caceres RN  Outcome: Progressing     Problem: Knowledge Deficit  Goal: Patient/family/caregiver demonstrates understanding of disease process, treatment plan, medications, and discharge instructions  Description: Complete learning assessment and assess  knowledge base.  Interventions:  - Provide teaching at level of understanding  - Provide teaching via preferred learning methods  5/25/2025 0310 by Odalis Caceres RN  Outcome: Progressing  5/25/2025 0310 by Odalis Caceres RN  Outcome: Progressing     Problem: CARDIOVASCULAR - ADULT  Goal: Maintains optimal cardiac output and hemodynamic stability  Description: INTERVENTIONS:  - Monitor I/O, vital signs and rhythm  - Monitor for S/S and trends of decreased cardiac output  - Administer and titrate ordered vasoactive medications to optimize hemodynamic stability  - Assess quality of pulses, skin color and temperature  - Assess for signs of decreased coronary artery perfusion  - Instruct patient to report change in severity of symptoms  5/25/2025 0310 by Odalis Caceres RN  Outcome: Progressing  5/25/2025 0310 by Odalis Caceres RN  Outcome: Progressing     Problem: GASTROINTESTINAL - ADULT  Goal: Minimal or absence of nausea and/or vomiting  Description: INTERVENTIONS:  - Administer IV fluids if ordered to ensure adequate hydration  - Maintain NPO status until nausea and vomiting are resolved  - Nasogastric tube if ordered  - Administer ordered antiemetic medications as needed  - Provide nonpharmacologic comfort measures as appropriate  - Advance diet as tolerated, if ordered  - Consider nutrition services referral to assist patient with adequate nutrition and appropriate food choices  5/25/2025 0310 by Odalis Caceres RN  Outcome: Progressing  5/25/2025 0310 by Odalis Caceres RN  Outcome: Progressing  Goal: Maintains or returns to baseline bowel function  Description: INTERVENTIONS:  - Assess bowel function  - Encourage oral fluids to ensure adequate hydration  - Administer IV fluids if ordered to ensure adequate hydration  - Administer ordered medications as needed  - Encourage mobilization and activity  - Consider nutritional services referral to assist patient with adequate nutrition and appropriate food choices  5/25/2025 0310  by Odalis Nicki, RN  Outcome: Progressing  5/25/2025 0310 by Odalis Caceres RN  Outcome: Progressing  Goal: Maintains adequate nutritional intake  Description: INTERVENTIONS:  - Monitor percentage of each meal consumed  - Identify factors contributing to decreased intake, treat as appropriate  - Assist with meals as needed  - Monitor I&O, weight, and lab values if indicated  - Obtain nutrition services referral as needed  5/25/2025 0310 by Odalis Caceres RN  Outcome: Progressing  5/25/2025 0310 by Odalis Caceres RN  Outcome: Progressing     Problem: METABOLIC, FLUID AND ELECTROLYTES - ADULT  Goal: Electrolytes maintained within normal limits  Description: INTERVENTIONS:  - Monitor labs and assess patient for signs and symptoms of electrolyte imbalances  - Administer electrolyte replacement as ordered  - Monitor response to electrolyte replacements, including repeat lab results as appropriate  - Instruct patient on fluid and nutrition as appropriate  5/25/2025 0310 by Odalis Caceres RN  Outcome: Progressing  5/25/2025 0310 by Odalis Caceres RN  Outcome: Progressing     Problem: SKIN/TISSUE INTEGRITY - ADULT  Goal: Incision(s), wounds(s) or drain site(s) healing without S/S of infection  Description: INTERVENTIONS  - Assess and document dressing, incision, wound bed, drain sites and surrounding tissue  - Provide patient and family education  - Perform skin care/dressing changes   5/25/2025 0310 by Odalis Caceres RN  Outcome: Progressing  5/25/2025 0310 by Odalis Caceres RN  Outcome: Progressing     Problem: HEMATOLOGIC - ADULT  Goal: Maintains hematologic stability  Description: INTERVENTIONS  - Assess for signs and symptoms of bleeding or hemorrhage  - Monitor labs  - Administer supportive blood products/factors as ordered and appropriate  5/25/2025 0310 by Odalis Caceres RN  Outcome: Progressing  5/25/2025 0310 by Odalis Caceres RN  Outcome: Progressing

## 2025-05-25 NOTE — PLAN OF CARE
Problem: Potential for Falls  Goal: Patient will remain free of falls  Description: INTERVENTIONS:  - Educate patient/family on patient safety including physical limitations  - Instruct patient to call for assistance with activity   - Consider consulting OT/PT to assist with strengthening/mobility based on AM PAC & -HLM score  - Consult OT/PT to assist with strengthening/mobility   - Keep Call bell within reach  - Keep bed low and locked with side rails adjusted as appropriate  - Keep care items and personal belongings within reach  - Initiate and maintain comfort rounds  - Make Fall Risk Sign visible to staff  - Offer Toileting every 2 Hours, in advance of need  - Initiate/Maintain bed alarm  - Obtain necessary fall risk management equipment: bed alarm  - Apply yellow socks and bracelet for high fall risk patients  - Consider moving patient to room near nurses station  Outcome: Progressing     Problem: Prexisting or High Potential for Compromised Skin Integrity  Goal: Skin integrity is maintained or improved  Description: INTERVENTIONS:  - Identify patients at risk for skin breakdown  - Assess and monitor skin integrity including under and around medical devices   - Assess and monitor nutrition and hydration status  - Monitor labs  - Assess for incontinence   - Turn and reposition patient  - Assist with mobility/ambulation  - Relieve pressure over joya prominences   - Avoid friction and shearing  - Provide appropriate hygiene as needed including keeping skin clean and dry  - Evaluate need for skin moisturizer/barrier cream  - Collaborate with interdisciplinary team  - Patient/family teaching  - Consider wound care consult    Assess:  - Review Rosales scale daily  - Clean and moisturize skin   - Inspect skin when repositioning, toileting, and assisting with ADLS  - Assess under medical devices   - Assess extremities for adequate circulation and sensation     Bed Management:  - Have minimal linens on bed & keep  smooth, unwrinkled  - Change linens as needed when moist or perspiring  - Avoid sitting or lying in one position for more than 2 hours while in bed?Keep HOB at 30 degrees   - Toileting:  - Offer bedside commode  - Assess for incontinence  - Use incontinent care products after each incontinent episode     Activity:  - Mobilize patient 3 times a day  - Encourage activity and walks on unit  - Encourage or provide ROM exercises   - Turn and reposition patient every 2 Hours  - Use appropriate equipment to lift or move patient in bed  - Instruct/ Assist with weight shifting every 2hrs when out of bed in chair  - Consider limitation of chair time 2 hour intervals    Skin Care:  - Avoid use of baby powder, tape, friction and shearing, hot water or constrictive clothing  - Relieve pressure over bony prominences  - Do not massage red bony areas    Next Steps:  - Teach patient strategies to minimize risks   - Consider consults to  interdisciplinary teams   Outcome: Progressing     Problem: PAIN - ADULT  Goal: Verbalizes/displays adequate comfort level or baseline comfort level  Description: Interventions:  - Encourage patient to monitor pain and request assistance  - Assess pain using appropriate pain scale  - Administer analgesics as ordered based on type and severity of pain and evaluate response  - Implement non-pharmacological measures as appropriate and evaluate response  - Consider cultural and social influences on pain and pain management  - Notify physician/advanced practitioner if interventions unsuccessful or patient reports new pain  - Educate patient/family on pain management process including their role and importance of  reporting pain   - Provide non-pharmacologic/complimentary pain relief interventions  Outcome: Progressing     Problem: INFECTION - ADULT  Goal: Absence or prevention of progression during hospitalization  Description: INTERVENTIONS:  - Assess and monitor for signs and symptoms of infection  -  Monitor lab/diagnostic results  - Monitor all insertion sites, i.e. indwelling lines, tubes, and drains  - Monitor endotracheal if appropriate and nasal secretions for changes in amount and color  - [place] appropriate cooling/warming therapies per order  - Administer medications as ordered  - Instruct and encourage patient and family to use good hand hygiene technique  - Identify and instruct in appropriate isolation precautions for identified infection/condition  Outcome: Progressing     Problem: SAFETY ADULT  Goal: Patient will remain free of falls  Description: INTERVENTIONS:  - Educate patient/family on patient safety including physical limitations  - Instruct patient to call for assistance with activity   - Consider consulting OT/PT to assist with strengthening/mobility based on AM PAC & -HL score  - Consult OT/PT to assist with strengthening/mobility   - Keep Call bell within reach  - Keep bed low and locked with side rails adjusted as appropriate  - Keep care items and personal belongings within reach  - Initiate and maintain comfort rounds  - Make Fall Risk Sign visible to staff  - Offer Toileting every 2 Hours, in advance of need  - Initiate/Maintain bed alarm  - Obtain necessary fall risk management equipment: bed alarm  - Apply yellow socks and bracelet for high fall risk patients  - Consider moving patient to room near nurses station  Outcome: Progressing  Goal: Maintain or return to baseline ADL function  Description: INTERVENTIONS:  -  Assess patient's ability to carry out ADLs; assess patient's baseline for ADL function and identify physical deficits which impact ability to perform ADLs (bathing, care of mouth/teeth, toileting, grooming, dressing, etc.)  - Assess/evaluate cause of self-care deficits   - Assess range of motion  - Assess patient's mobility; develop plan if impaired  - Assess patient's need for assistive devices and provide as appropriate  - Encourage maximum independence but  intervene and supervise when necessary  - Involve family in performance of ADLs  - Assess for home care needs following discharge   - Consider OT consult to assist with ADL evaluation and planning for discharge  - Provide patient education as appropriate  - Monitor functional capacity and physical performance, use of AM PAC & JH-HLM   - Monitor gait, balance and fatigue with ambulation    Outcome: Progressing     Problem: DISCHARGE PLANNING  Goal: Discharge to home or other facility with appropriate resources  Description: INTERVENTIONS:  - Identify barriers to discharge w/patient and caregiver  - Arrange for needed discharge resources and transportation as appropriate  - Identify discharge learning needs (meds, wound care, etc.)  - Arrange for interpretive services to assist at discharge as needed  - Refer to Case Management Department for coordinating discharge planning if the patient needs post-hospital services based on physician/advanced practitioner order or complex needs related to functional status, cognitive ability, or social support system  Outcome: Progressing     Problem: Knowledge Deficit  Goal: Patient/family/caregiver demonstrates understanding of disease process, treatment plan, medications, and discharge instructions  Description: Complete learning assessment and assess knowledge base.  Interventions:  - Provide teaching at level of understanding  - Provide teaching via preferred learning methods  Outcome: Progressing     Problem: CARDIOVASCULAR - ADULT  Goal: Maintains optimal cardiac output and hemodynamic stability  Description: INTERVENTIONS:  - Monitor I/O, vital signs and rhythm  - Monitor for S/S and trends of decreased cardiac output  - Administer and titrate ordered vasoactive medications to optimize hemodynamic stability  - Assess quality of pulses, skin color and temperature  - Assess for signs of decreased coronary artery perfusion  - Instruct patient to report change in severity of  symptoms  Outcome: Progressing     Problem: GASTROINTESTINAL - ADULT  Goal: Minimal or absence of nausea and/or vomiting  Description: INTERVENTIONS:  - Administer IV fluids if ordered to ensure adequate hydration  - Maintain NPO status until nausea and vomiting are resolved  - Nasogastric tube if ordered  - Administer ordered antiemetic medications as needed  - Provide nonpharmacologic comfort measures as appropriate  - Advance diet as tolerated, if ordered  - Consider nutrition services referral to assist patient with adequate nutrition and appropriate food choices  Outcome: Progressing  Goal: Maintains or returns to baseline bowel function  Description: INTERVENTIONS:  - Assess bowel function  - Encourage oral fluids to ensure adequate hydration  - Administer IV fluids if ordered to ensure adequate hydration  - Administer ordered medications as needed  - Encourage mobilization and activity  - Consider nutritional services referral to assist patient with adequate nutrition and appropriate food choices  Outcome: Progressing  Goal: Maintains adequate nutritional intake  Description: INTERVENTIONS:  - Monitor percentage of each meal consumed  - Identify factors contributing to decreased intake, treat as appropriate  - Assist with meals as needed  - Monitor I&O, weight, and lab values if indicated  - Obtain nutrition services referral as needed  Outcome: Progressing     Problem: METABOLIC, FLUID AND ELECTROLYTES - ADULT  Goal: Electrolytes maintained within normal limits  Description: INTERVENTIONS:  - Monitor labs and assess patient for signs and symptoms of electrolyte imbalances  - Administer electrolyte replacement as ordered  - Monitor response to electrolyte replacements, including repeat lab results as appropriate  - Instruct patient on fluid and nutrition as appropriate  Outcome: Progressing     Problem: SKIN/TISSUE INTEGRITY - ADULT  Goal: Incision(s), wounds(s) or drain site(s) healing without S/S of  infection  Description: INTERVENTIONS  - Assess and document dressing, incision, wound bed, drain sites and surrounding tissue  - Provide patient and family education  - Perform skin care/dressing changes   Outcome: Progressing     Problem: HEMATOLOGIC - ADULT  Goal: Maintains hematologic stability  Description: INTERVENTIONS  - Assess for signs and symptoms of bleeding or hemorrhage  - Monitor labs  - Administer supportive blood products/factors as ordered and appropriate  Outcome: Progressing     Problem: Nutrition/Hydration-ADULT  Goal: Nutrient/Hydration intake appropriate for improving, restoring or maintaining nutritional needs  Description: Monitor and assess patient's nutrition/hydration status for malnutrition. Collaborate with interdisciplinary team and initiate plan and interventions as ordered.  Monitor patient's weight and dietary intake as ordered or per policy. Utilize nutrition screening tool and intervene as necessary. Determine patient's food preferences and provide high-protein, high-caloric foods as appropriate.     INTERVENTIONS:  - Monitor oral intake, urinary output, labs, and treatment plans  - Assess nutrition and hydration status and recommend course of action  - Evaluate amount of meals eaten  - Assist patient with eating if necessary   - Allow adequate time for meals  - Recommend/ encourage appropriate diets, oral nutritional supplements, and vitamin/mineral supplements  - Order, calculate, and assess calorie counts as needed  - Recommend, monitor, and adjust tube feedings and TPN/PPN based on assessed needs  - Assess need for intravenous fluids  - Provide specific nutrition/hydration education as appropriate  - Include patient/family/caregiver in decisions related to nutrition  Outcome: Progressing

## 2025-05-25 NOTE — ASSESSMENT & PLAN NOTE
Having hallucinations which she has done in the past but not regularly and is generally thought to be associated with UTI symptoms. She has nitrites and leukocytes in her urinalysis but only 4-10 WBC. She has had ESBL in her last hospitalization and was treated with Cefepime and Rocephin but only for 4 days as her cfu count was less than 60,000 for the non resistant e. Coli and less than 30,000 for the ESBL. She did not have hallunications in the hospital and was discharged home off antibiotics. She has done well at home since that discharge on 1/10/25 until this week.   Baseline mental status per son is oriented x 1.    Continue Rocephin started in ED  Watch for urine results to come back, may need to adjust antibiotics if it does in fact show an MDRO infection. Given she only has 4-10 WBC, will hold off on escalating antibiotics until further results obtained.  Right flank pain, non contrast CT ordered to r/o renal lithiasis. pending  Gentle IVF with dry mucous membranes to see if further hydration helps.  TSH, B12, folate  CT Head: Extensive motion degradation which significantly reduces diagnostic sensitivity, no acute intracranial hemorrhage, significant mass effect or midline shift.  Mild-moderate chronic microangiopathic.  If systems persist, recommend brain MRI.  Will defer at this time, if no improvement with UTI treatment can consider MRI  5/25 Denies hallucinations and oriented x1  Delirium protocol ordered

## 2025-05-25 NOTE — ED PROVIDER NOTES
Time reflects when diagnosis was documented in both MDM as applicable and the Disposition within this note       Time User Action Codes Description Comment    5/24/2025 10:56 PM Gonzalez Peralta [N39.0] UTI (urinary tract infection)     5/24/2025 10:56 PM Gonzalez Peralta [G93.40] Encephalopathy     5/24/2025 10:56 PM Gonzalez Peralta [R44.3] Hallucinations           ED Disposition       ED Disposition   Admit    Condition   Stable    Date/Time   Sat May 24, 2025 10:57 PM    Comment   Case was discussed with Dr. Kaleigh Ledezma and the patient's admission status was agreed to be Admission Status: observation status to the service of Dr. Ledezma .               Assessment & Plan       Medical Decision Making  89-year-old female presenting with new intermittent disorientation and hallucinations.  History of dementia but is altered past her baseline.  Has had similar events before when experiencing UTI.  Exam: Patient able to correctly answer name and birthdate, however she thought that she was in [place] rather than [place].  She is in no apparent distress.  Abdomen is soft and nontender.  No CVA tenderness.  Lungs CTAB.  Heart RRR.  No lower extremity edema.    Workup obtained to evaluate for cause of disorientation and altered mental status.  UA was remarkable for moderate bacteria, 4-10 WBCs, 2-4 RBCs.  Also positive nitrites.  Likely consistent with acute UTI with resulting mild encephalopathy.  Rest of blood work was unremarkable, no other apparent causes of patient's AMS and hallucinations.  Initiated treatment with 1 g Rocephin IV.  Discussed all results with patient and son.  Advised them that I would like to keep patient for observation in the hospital due to UTI with encephalopathy.  They are agreeable.  Patient's son confirms Level 3 DNR.  Discussed with Katie Ledezma from medicine.  Patient care transferred to [facility].    Amount and/or Complexity of Data Reviewed  Labs: ordered. Decision-making details documented  in ED Course.    Risk  Decision regarding hospitalization.        ED Course as of 05/25/25 0637   Sat May 24, 2025   2127 Leukocytes, UA(!): Large   2127 Nitrite, UA(!): Positive   2150 Bacteria, UA(!): Moderate   2150 WBC, UA(!): 4-10   2150 RBC Urine(!): 2-4   2150 Procalcitonin: <0.05   2150 WBC: 7.75   2218 Overall picture looks like UTI without sepsis.  She does present with mild encephalopathy likely secondary to UTI.  Disorientation at times, hallucinations, which are not her baseline.  Will plan to keep in hospital for observation.       Medications   ceftriaxone (ROCEPHIN) 1 g/50 mL in dextrose IVPB (0 mg Intravenous Stopped 5/24/25 2315)       ED Risk Strat Scores                    No data recorded        SBIRT 20yo+      Flowsheet Row Most Recent Value   Initial Alcohol Screen: US AUDIT-C     1. How often do you have a drink containing alcohol? 0 Filed at: 05/24/2025 2258   2. How many drinks containing alcohol do you have on a typical day you are drinking?  0 Filed at: 05/24/2025 2258   3a. Male UNDER 65: How often do you have five or more drinks on one occasion? 0 Filed at: 05/24/2025 2258   3b. FEMALE Any Age, or MALE 65+: How often do you have 4 or more drinks on one occassion? 0 Filed at: 05/24/2025 2258   Audit-C Score 0 Filed at: 05/24/2025 2258   [place]: How many times in the past year have you...    Used an illegal drug or used a prescription medication for non-medical reasons? Never Filed at: 05/24/2025 2258                            History of Present Illness       Chief Complaint   Patient presents with    Altered Mental Status     Per son pt with confusion since yesterday, son believes it it due to UTI.       Past Medical History[1]   Past Surgical History[2]   Family History[3]   Social History[4]   E-Cigarette/Vaping    E-Cigarette Use Never User     Cartridges/Day 0     Comments denies       E-Cigarette/Vaping Substances      I have reviewed and agree with the history as documented.      89-year-old female with PMH of HTN, HLD, macular degeneration, dementia presents for evaluation of AMS.  Son at bedside to give pertinent information, he is also POA.  Explains that patient's home health care aides noticed that she was a bit disoriented this week compared to her baseline, which is generally fairly alert and oriented.  Son noticed on several occasions that patient was hallucinating, noticing things such as a young boy standing in the corner of her room.  She has had similar episodes with hallucinations in the past when she was encephalopathic from UTIs.  No other concerns such as fevers, vomiting, chest pain, shortness of breath, abdominal pain.  He did mention that she had an episode of diarrhea earlier today.        Review of Systems   Unable to perform ROS: Dementia           Objective       ED Triage Vitals [05/24/25 2028]   Temperature Pulse Blood Pressure Respirations SpO2 Patient Position - Orthostatic VS   98.9 °F (37.2 °C) 77 (!) 182/105 18 95 % Sitting      Temp Source Heart Rate Source BP Location FiO2 (%) Pain Score    Oral Monitor Left arm -- No Pain      Vitals      Date and Time Temp Pulse SpO2 Resp BP Pain Score FACES Pain Rating User   05/25/25 0028 97.8 °F (36.6 °C) 71 96 % 18 145/86 -- -- OE   05/25/25 0007 -- -- -- -- -- No Pain -- LN   05/25/25 0004 97.8 °F (36.6 °C) 71 96 % 18 145/86 -- -- OE   05/24/25 2300 -- 60 98 % 18 149/72 -- -- CG   05/24/25 2248 -- 68 97 % 20 149/72 -- -- TMS   05/24/25 2030 -- 80 96 % -- 181/87 -- -- LAP   05/24/25 2028 98.9 °F (37.2 °C) 77 95 % 18 182/105 No Pain -- LAP            Physical Exam  Vitals and nursing note reviewed.   Constitutional:       General: She is not in acute distress.     Appearance: She is well-developed.   HENT:      Head: Normocephalic and atraumatic.     Eyes:      Conjunctiva/sclera: Conjunctivae normal.       Cardiovascular:      Rate and Rhythm: Normal rate and regular rhythm.      Heart sounds: No murmur  heard.  Pulmonary:      Effort: Pulmonary effort is normal. No respiratory distress.      Breath sounds: Normal breath sounds.   Abdominal:      Palpations: Abdomen is soft.      Tenderness: There is no abdominal tenderness.     Musculoskeletal:         General: No swelling.      Cervical back: Neck supple.     Skin:     General: Skin is warm and dry.      Capillary Refill: Capillary refill takes less than 2 seconds.     Neurological:      Mental Status: She is alert. She is disoriented.     Psychiatric:         Mood and Affect: Mood normal.         Results Reviewed       Procedure Component Value Units Date/Time    HS Troponin I 4hr [[id number]]  (Normal) Collected: 05/25/25 0213    Lab Status: Final result Specimen: Blood from Hand, Left Updated: 05/25/25 0245     hs TnI 4hr 4 ng/L      Delta 4hr hsTnI -1 ng/L     TSH, 3rd generation [[id number]]  (Normal) Collected: 05/24/25 2302    Lab Status: Final result Specimen: Blood from Arm, Left Updated: 05/25/25 0124     TSH 3RD GENERATON 2.105 uIU/mL     HS Troponin I 2hr [[id number]]  (Normal) Collected: 05/24/25 2302    Lab Status: Final result Specimen: Blood from Arm, Left Updated: 05/24/25 2333     hs TnI 2hr 5 ng/L      Delta 2hr hsTnI 0 ng/L     TSH, 3rd generation with Free T4 reflex [[id number]]  (Normal) Collected: 05/24/25 2112    Lab Status: Final result Specimen: Blood from Arm, Right Updated: 05/24/25 2150     TSH 3RD GENERATON 3.247 uIU/mL     Urine Microscopic [[id number]]  (Abnormal) Collected: 05/24/25 2113    Lab Status: Final result Specimen: Urine, Clean Catch Updated: 05/24/25 2149     RBC, UA 2-4 /hpf      WBC, UA 4-10 /hpf      Epithelial Cells Occasional /hpf      Bacteria, UA Moderate /hpf     Procalcitonin [[id number]]  (Normal) Collected: 05/24/25 2112    Lab Status: Final result Specimen: Blood from Arm, Right Updated: 05/24/25 2144     Procalcitonin <0.05 ng/ml     Comprehensive metabolic panel [[id number]]  (Abnormal) Collected: 05/24/25 2112     Lab Status: Final result Specimen: Blood from Arm, Right Updated: 05/24/25 2144     Sodium 136 mmol/L      Potassium 4.2 mmol/L      Chloride 103 mmol/L      CO2 26 mmol/L      ANION GAP 7 mmol/L      BUN 15 mg/dL      Creatinine 0.54 mg/dL      Glucose 119 mg/dL      Calcium 9.1 mg/dL      AST 17 U/L      ALT 12 U/L      Alkaline Phosphatase 89 U/L      Total Protein 6.6 g/dL      Albumin 3.7 g/dL      Total Bilirubin 0.50 mg/dL      eGFR 78 ml/min/1.73sq m     Narrative:      National Kid[employer] Disease Foundation guidelines for Chronic Kidney Disease (CKD):     Stage 1 with normal or high GFR (GFR > 90 mL/min/1.73 square meters)    Stage 2 Mild CKD (GFR = 60-89 mL/min/1.73 square meters)    Stage 3A Moderate CKD (GFR = 45-59 mL/min/1.73 square meters)    Stage 3B Moderate CKD (GFR = 30-44 mL/min/1.73 square meters)    Stage 4 Severe CKD (GFR = 15-29 mL/min/1.73 square meters)    Stage 5 End Stage CKD (GFR <15 mL/min/1.73 square meters)  Note: GFR calculation is accurate only with a steady state creatinine    Acetaminophen level-If concentration is detectable, please discuss with medical  on call. [[id number]]  (Abnormal) Collected: 05/24/25 2112    Lab Status: Final result Specimen: Blood from Arm, Right Updated: 05/24/25 2144     Acetaminophen Level 2 ug/mL     Salicylate level [[id number]]  (Abnormal) Collected: 05/24/25 2112    Lab Status: Final result Specimen: Blood from Arm, Right Updated: 05/24/25 2144     Salicylate Lvl <5 mg/dL     HS Troponin 0hr (reflex protocol) [[id number]]  (Normal) Collected: 05/24/25 2112    Lab Status: Final result Specimen: Blood from Arm, Right Updated: 05/24/25 2142     hs TnI 0hr 5 ng/L     Protime-INR [[id number]]  (Normal) Collected: 05/24/25 2112    Lab Status: Final result Specimen: Blood from Arm, Right Updated: 05/24/25 2133     Protime 12.9 seconds      INR 0.95    Narrative:      INR Therapeutic Range    Indication                                             INR  Range      Atrial Fibrillation                                               2.0-3.0  Hypercoagulable State                                    2.0.2.3  Left Ventricular Asist Device                            2.0-3.0  Mechanical Heart Valve                                  -    Aortic(with afib, MI, embolism, HF, LA enlargement,    and/or coagulopathy)                                     2.0-3.0 (2.5-3.5)     Mitral                                                             2.5-3.5  Prosthetic/Bioprosthetic Heart Valve               2.0-3.0  Venous thromboembolism (VTE: VT, PE        2.0-3.0    APTT [[id number]]  (Normal) Collected: 05/24/25 2112    Lab Status: Final result Specimen: Blood from Arm, Right Updated: 05/24/25 2133     PTT 26 seconds     Lactic acid, plasma (w/reflex if result > 2.0) [[id number]]  (Normal) Collected: 05/24/25 2112    Lab Status: Final result Specimen: Blood from Arm, Right Updated: 05/24/25 2132     LACTIC ACID 1.9 mmol/L     Narrative:      Result may be elevated if tourniquet was used during collection.    Ethanol [[id number]]  (Normal) Collected: 05/24/25 2112    Lab Status: Final result Specimen: Blood from Arm, Right Updated: 05/24/25 2132     Ethanol Lvl <10 mg/dL     UA w Reflex to Microscopic w Reflex to Culture [[id number]]  (Abnormal) Collected: 05/24/25 2113    Lab Status: Final result Specimen: Urine, Clean Catch Updated: 05/24/25 2126     Color, UA Colorless     Clarity, UA Turbid     Specific Gravity, UA 1.006     pH, UA 6.5     Leukocytes, UA Large     Nitrite, UA Positive     Protein, UA Trace mg/dl      Glucose, UA Negative mg/dl      Ketones, UA Negative mg/dl      Urobilinogen, UA <2.0 mg/dl      Bilirubin, UA Negative     Occult Blood, UA Negative    CBC and differential [[id number]] Collected: 05/24/25 2112    Lab Status: Final result Specimen: Blood from Arm, Right Updated: 05/24/25 2119     WBC 7.75 Thousand/uL      RBC 4.99 Million/uL      Hemoglobin 15.1 g/dL       Hematocrit 45.3 %      MCV 91 fL      MCH 30.3 pg      MCHC 33.3 g/dL      RDW 14.4 %      MPV 9.8 fL      Platelets 256 Thousands/uL      nRBC 0 /100 WBCs      Segmented % 58 %      Immature Grans % 0 %      Lymphocytes % 31 %      Monocytes % 9 %      Eosinophils Relative 1 %      Basophils Relative 1 %      Absolute Neutrophils 4.51 Thousands/µL      Absolute Immature Grans 0.03 Thousand/uL      Absolute Lymphocytes 2.37 Thousands/µL      Absolute Monocytes 0.68 Thousand/µL      Eosinophils Absolute 0.11 Thousand/µL      Basophils Absolute 0.05 Thousands/µL             CT head wo contrast    (Results Pending)   CT renal stone study abdomen pelvis wo contrast    (Results Pending)   VAS VENOUS DUPLEX -LOWER LIMB UNILATERAL    (Results Pending)       Procedures    ED Medication and Procedure Management   Prior to Admission Medications   Prescriptions Last Dose Informant Patient Reported? Taking?   Calcium Carbonate-Vit D-Min (CALCIUM 1200 PO) 5/24/2025  Yes Yes   Sig: Take 750 mg by mouth in the morning At 3pm   Cranberry 250 MG TABS 5/25/2025 Morning  No Yes   Sig: Take 2 tablets (500 mg total) by mouth 2 (two) times a day   Klayesta powder   No No   Sig: APPLY TO AFFECTED AREA TWICE A DAY   aspirin (ECOTRIN LOW STRENGTH) 81 mg EC tablet 5/23/2025  Yes Yes   Sig: Take 81 mg by mouth daily at bedtime   cholecalciferol (VITAMIN D3) 1,000 units tablet 5/25/2025 Morning  Yes Yes   Sig: Take 4,000 Units by mouth in the morning.   omeprazole (PriLOSEC) 20 mg delayed release capsule 5/24/2025  No Yes   Sig: TAKE 1 CAPSULE BY MOUTH EVERY DAY   pramipexole (MIRAPEX) 0.25 mg tablet 5/24/2025  No Yes   Sig: TAKE 1 TABLET (0.25 MG TOTAL) BY MOUTH DAILY AT BEDTIME   vitamin B-12 (VITAMIN B-12) 1,000 mcg tablet 5/24/2025  Yes Yes   Sig: Take 2,500 mcg by mouth every other day      Facility-Administered Medications: None     Current Discharge Medication List        CONTINUE these medications which have NOT CHANGED    Details    aspirin (ECOTRIN LOW STRENGTH) 81 mg EC tablet Take 81 mg by mouth daily at bedtime      Calcium Carbonate-Vit D-Min (CALCIUM 1200 PO) Take 750 mg by mouth in the morning At 3pm      cholecalciferol (VITAMIN D3) 1,000 units tablet Take 4,000 Units by mouth in the morning.      Cranberry 250 MG TABS Take 2 tablets (500 mg total) by mouth 2 (two) times a day    Associated Diagnoses: Gram-negative bacteremia      omeprazole (PriLOSEC) 20 mg delayed release capsule TAKE 1 CAPSULE BY MOUTH EVERY DAY  Qty: 90 capsule, Refills: 1    Associated Diagnoses: Gastroesophageal reflux disease      pramipexole (MIRAPEX) 0.25 mg tablet TAKE 1 TABLET (0.25 MG TOTAL) BY MOUTH DAILY AT BEDTIME  Qty: 90 tablet, Refills: 1    Associated Diagnoses: Restless legs syndrome      vitamin B-12 (VITAMIN B-12) 1,000 mcg tablet Take 2,500 mcg by mouth every other day      Klayesta powder APPLY TO AFFECTED AREA TWICE A DAY  Qty: 60 g, Refills: 1    Associated Diagnoses: Candida infection of flexural skin           No discharge procedures on file.  ED SEPSIS DOCUMENTATION   Time reflects when diagnosis was documented in both MDM as applicable and the Disposition within this note       Time User Action Codes Description Comment    5/24/2025 10:56 PM Gonzalez Peralta [N39.0] UTI (urinary tract infection)     5/24/2025 10:56 PM Gonzalez Peralta [G93.40] Encephalopathy     5/24/2025 10:56 PM Gonzalez Peralta [R44.3] Hallucinations                      [1]   Past Medical History:  Diagnosis Date    Anxiety     Arthritis     Breast carcinoma (HCC) 08/13/2012    Description: s/p L mastectomy. Dr. 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    06/06/2018 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    Osteoarthritis 06/05/2012    Osteoporosis 06/05/2012    Description: Dexa 7/13 read as normal, patient declines medication    Pressure injury of coccygeal region, stage 2 (HCC) 03/06/2020    Pulmonary embolism (HCC) 01/2011    last assessed 9/18/12    Restless legs syndrome 06/05/2012    RLS (restless legs syndrome)     Thoracic back pain 10/19/2017   [2]   Past Surgical History:  Procedure Laterality Date    BILATERAL OOPHORECTOMY      Laparoscopic    BREAST SURGERY Left     Mastectomy     CHOLECYSTECTOMY      Laparoscopic    HERNIA REPAIR      HYSTERECTOMY      SMALL INTESTINE SURGERY     [3]   Family History  Problem Relation Name Age of Onset    Hypertension Mother      Blindness Mother      Cancer Sister      Cancer Maternal Aunt      Cancer Maternal Uncle     [4]   Social History  Tobacco Use    Smoking status: Never    Smokeless tobacco: Never    Tobacco comments:     denies   Vaping Use    Vaping status: Never Used   Substance Use Topics    Alcohol use: No     Comment: denies    Drug use: No        Gonzalez Peralta PA-C  05/25/25 0637

## 2025-05-25 NOTE — PROGRESS NOTES
Progress Note - Hospitalist   Name: Latanya Ray 89 y.o. female I MRN: [id number]  Unit/Bed#: E5 -01 I Date of Admission: 5/24/2025   Date of Service: 5/25/2025 I Hospital Day: 0    Assessment & Plan  Acute metabolic encephalopathy  Having hallucinations which she has done in the past but not regularly and is generally thought to be associated with UTI symptoms. She has nitrites and leukocytes in her urinalysis but only 4-10 WBC. She has had ESBL in her last hospitalization and was treated with Cefepime and Rocephin but only for 4 days as her cfu count was less than 60,000 for the non resistant e. Coli and less than 30,000 for the ESBL. She did not have hallunications in the hospital and was discharged home off antibiotics. She has done well at home since that discharge on 1/10/25 until this week.   Baseline mental status per son is oriented x 1.    Continue Rocephin started in ED  Watch for urine results to come back, may need to adjust antibiotics if it does in fact show an MDRO infection. Given she only has 4-10 WBC, will hold off on escalating antibiotics until further results obtained.  Right flank pain, non contrast CT ordered to r/o renal lithiasis. pending  Gentle IVF with dry mucous membranes to see if further hydration helps.  TSH, B12, folate  CT Head: Extensive motion degradation which significantly reduces diagnostic sensitivity, no acute intracranial hemorrhage, significant mass effect or midline shift.  Mild-moderate chronic microangiopathic.  If systems persist, recommend brain MRI.  Will defer at this time, if no improvement with UTI treatment can consider MRI  5/25 Denies hallucinations and oriented x1  Delirium protocol ordered  Restless leg syndrome  Cont requip  Generalized muscle weakness  PT/OT evaluations ordered  Legal blindness  Noted, patient requires assistance with ADLs  Cognitive impairment  Son is now her caregiver and POA. Her daughter passed away last week and the patient  is unaware of this.  Vitamin D deficiency  Continue home vitamin D  Acute cystitis without hematuria  See plan for metabolic encephalopathy    Continue Rocephin started in ED  Watch for urine results to come back, may need to adjust antibiotics if it does in fact show an MDRO infection. Given she only has 4-10 WBC, will hold off on escalating antibiotics until further results obtained.  Right flank pain, non contrast CT ordered to r/o renal lithiasis pending  Decubitus ulcer of sacral region, stage 1  Blanchable discoloration over buttocks--apply barrier cream   Wound care consult    VTE Pharmacologic Prophylaxis: VTE Score: 5 High Risk (Score >/= 5) - Pharmacological DVT Prophylaxis Ordered: enoxaparin (Lovenox). Sequential Compression Devices Ordered.    Mobility:   Basic Mobility Inpatient Raw Score: 12  JH-HLM Goal: 4: Move to chair/commode  JH-HLM Achieved: 2: Bed activities/Dependent transfer  JH-HLM Goal NOT achieved. Continue with multidisciplinary rounding and encourage appropriate mobility to improve upon JH-HLM goals.    Patient Centered Rounds: I performed bedside rounds with nursing staff today.   Discussions with Specialists or Other Care Team Provider: CELIO    Education and Discussions with Family / Patient: Updated  (son) via phone.    Current Length of Stay: 0 day(s)  Current Patient Status: Inpatient   Certification Statement: The patient, admitted on an observation basis, will now require > 2 midnight hospital stay due to IV antibiotics, pending urine culture, vascular ultrasound and CT abd/pelvis  Discharge Plan: Anticipate discharge in 24-48 hrs to discharge location to be determined pending rehab evaluations.    Code Status: Level 3 - DNAR and DNI    Subjective   Patient is pleasantly confused.  Oriented x 1.  She denies hallucinations.  She denies pain.  She has no questions or concerns at this time.    Objective :  Temp:  [97.8 °F (36.6 °C)-98.9 °F (37.2 °C)] 97.9 °F (36.6  °C)  HR:  [60-80] 72  BP: (132-182)/() 132/87  Resp:  [16-20] 16  SpO2:  [95 %-98 %] 96 %  O2 Device: None (Room air)    Body mass index is 27.56 kg/m².     Input and Output Summary (last 24 hours):     Intake/Output Summary (Last 24 hours) at 5/25/2025 1254  Last data filed at 5/25/2025 1231  Gross per 24 hour   Intake 710 ml   Output --   Net 710 ml       Physical Exam  Vitals and nursing note reviewed.   Constitutional:       General: She is not in acute distress.     Appearance: Normal appearance. She is not ill-appearing.   HENT:      Head: Normocephalic.      Mouth/Throat:      Mouth: Mucous membranes are moist.      Pharynx: Oropharynx is clear.     Eyes:      Conjunctiva/sclera: Conjunctivae normal.       Cardiovascular:      Rate and Rhythm: Normal rate and regular rhythm.      Pulses: Normal pulses.      Heart sounds: Normal heart sounds. No murmur heard.  Pulmonary:      Effort: Pulmonary effort is normal. No respiratory distress.      Breath sounds: Normal breath sounds. No wheezing or rhonchi.   Abdominal:      General: Bowel sounds are normal. There is no distension.      Palpations: Abdomen is soft.      Tenderness: There is abdominal tenderness in the left upper quadrant. There is no guarding.     Musculoskeletal:      Right lower leg: No edema.      Left lower leg: No edema.     Skin:     General: Skin is warm and dry.     Neurological:      Mental Status: She is alert. She is disoriented.     Psychiatric:         Mood and Affect: Mood normal.           Lines/Drains:        Telemetry:  Telemetry Orders (From admission, onward)               24 Hour Telemetry Monitoring  Continuous x 24 Hours (Telem)        Question:  Reason for 24 Hour Telemetry  Answer:  Metabolic/electrolyte disturbance with high probability of dysrhythmia. K level <3 or >6 OR KCL infusion >10mEq/hr  Comment:  Encephalopathy 2/2 UTI                     Telemetry Reviewed: Normal Sinus Rhythm  Indication for Continued  Telemetry Use: Arrthymias requiring medical therapy               Lab Results: I have reviewed the following results:   Results from last 7 days   Lab Units 05/25/25  0624   WBC Thousand/uL 6.67   HEMOGLOBIN g/dL 13.5   HEMATOCRIT % 40.9   PLATELETS Thousands/uL 235   SEGS PCT % 59   LYMPHO PCT % 29   MONO PCT % 9   EOS PCT % 2     Results from last 7 days   Lab Units 05/25/25  0624   SODIUM mmol/L 137   POTASSIUM mmol/L 4.0   CHLORIDE mmol/L 105   CO2 mmol/L 27   BUN mg/dL 12   CREATININE mg/dL 0.50*   ANION GAP mmol/L 5   CALCIUM mg/dL 8.9   ALBUMIN g/dL 3.5   TOTAL BILIRUBIN mg/dL 0.62   ALK PHOS U/L 77   ALT U/L 11   AST U/L 17   GLUCOSE RANDOM mg/dL 84     Results from last 7 days   Lab Units 05/24/25  2112   INR  0.95             Results from last 7 days   Lab Units 05/24/25  2112   LACTIC ACID mmol/L 1.9   PROCALCITONIN ng/ml <0.05       Recent Cultures (last 7 days):         Imaging Results Review: I reviewed radiology reports from this admission including: CT head.  Other Study Results Review: EKG was reviewed.     Last 24 Hours Medication List:     Current Facility-Administered Medications:     acetaminophen (TYLENOL) tablet 650 mg, Q6H PRN    aspirin (ECOTRIN LOW STRENGTH) EC tablet 81 mg, HS    calcium carbonate (TUMS) chewable tablet 1,000 mg, Daily PRN    cefTRIAXone (ROCEPHIN) 1,000 mg in dextrose 5 % 50 mL IVPB, Q24H    Cholecalciferol (VITAMIN D3) tablet 4,000 Units, Daily    [START ON 5/26/2025] cyanocobalamin (VITAMIN B-12) tablet 1,000 mcg, Every Other Day    enoxaparin (LOVENOX) subcutaneous injection 40 mg, Daily    famotidine (PEPCID) tablet 20 mg, Daily    melatonin tablet 3 mg, HS PRN    ondansetron (ZOFRAN) injection 4 mg, Q6H PRN    pramipexole (MIRAPEX) tablet 0.25 mg, HS    Administrative Statements   Today, Patient Was Seen By: MARINA Chan      **Please Note: This note may have been constructed using a voice recognition system.**

## 2025-05-26 PROBLEM — I47.19 ATRIAL TACHYCARDIA (HCC): Status: ACTIVE | Noted: 2025-05-26

## 2025-05-26 LAB
ATRIAL RATE: 125 BPM
P AXIS: 59 DEGREES
PR INTERVAL: 264 MS
QRS AXIS: 30 DEGREES
QRSD INTERVAL: 76 MS
QT INTERVAL: 378 MS
QTC INTERVAL: 549 MS
T WAVE AXIS: 40 DEGREES
VENTRICULAR RATE: 127 BPM

## 2025-05-26 PROCEDURE — 97166 OT EVAL MOD COMPLEX 45 MIN: CPT | Performed by: OCCUPATIONAL THERAPIST

## 2025-05-26 PROCEDURE — 99232 SBSQ HOSP IP/OBS MODERATE 35: CPT | Performed by: PHYSICIAN ASSISTANT

## 2025-05-26 PROCEDURE — 93010 ELECTROCARDIOGRAM REPORT: CPT | Performed by: INTERNAL MEDICINE

## 2025-05-26 RX ADMIN — Medication 1000 MCG: at 08:42

## 2025-05-26 RX ADMIN — Medication 3 MG: at 21:56

## 2025-05-26 RX ADMIN — Medication 12.5 MG: at 10:24

## 2025-05-26 RX ADMIN — ASPIRIN 81 MG: 81 TABLET, COATED ORAL at 21:54

## 2025-05-26 RX ADMIN — Medication 12.5 MG: at 21:54

## 2025-05-26 RX ADMIN — ENOXAPARIN SODIUM 40 MG: 40 INJECTION SUBCUTANEOUS at 08:44

## 2025-05-26 RX ADMIN — FAMOTIDINE 20 MG: 20 TABLET, FILM COATED ORAL at 08:42

## 2025-05-26 RX ADMIN — Medication 4000 UNITS: at 08:42

## 2025-05-26 RX ADMIN — PRAMIPEXOLE DIHYDROCHLORIDE 0.25 MG: 0.25 TABLET ORAL at 21:52

## 2025-05-26 RX ADMIN — CEFTRIAXONE 1000 MG: 10 INJECTION, POWDER, FOR SOLUTION INTRAVENOUS at 21:52

## 2025-05-26 NOTE — OCCUPATIONAL THERAPY NOTE
Occupational Therapy Evaluation     Patient Name: Latanya Ray  Today's Date: 5/26/2025  Problem List  Principal Problem:    Acute metabolic encephalopathy  Active Problems:    Vitamin D deficiency    Cognitive impairment    Legal blindness    Acute cystitis without hematuria    Generalized muscle weakness    Restless leg syndrome    Decubitus ulcer of sacral region, stage 1    Atrial tachycardia (HCC)    Past Medical History  Past Medical History[1]  Past Surgical History  Past Surgical History[2]        05/26/25 0910   OT Last Visit   OT Visit Date 05/26/25   Note Type   Note type Evaluation   Pain Assessment   Pain Assessment Tool FLACC   Pain Rating: FLACC (Rest) - Face 0   Pain Rating: FLACC (Rest) - Legs 0   Pain Rating: FLACC (Rest) - Activity 0   Pain Rating: FLACC (Rest) - Cry 0   Pain Rating: FLACC (Rest) - Consolability 0   Score: FLACC (Rest) 0   Pain Rating: FLACC (Activity) - Face 1   Pain Rating: FLACC (Activity) - Legs 2   Pain Rating: FLACC (Activity) - Activity 1   Pain Rating: FLACC (Activity) - Cry 2   Pain Rating: FLACC (Activity) - Consolability 2   Score: FLACC (Activity) 8   Restrictions/Precautions   Weight Bearing Precautions Per Order No   Other Precautions Contact/isolation;Agitated;Combative;Cognitive;Bed Alarm;Fall Risk;Pain;Visual impairment;Hard of hearing  (legally blind)   Home Living   Type of Home House   Home Layout Two level;Performs ADLs on one level;Able to live on main level with bedroom/bathroom   Bathroom Shower/Tub Tub/shower unit   Bathroom Toilet Raised   Bathroom Equipment Grab bars in shower;Tub transfer bench   Bathroom Accessibility Accessible   Home Equipment Walker;Wheelchair-manual   Additional Comments pt poor historian and unable to answer questions about home; information from previous hospital stay Jan 2025   Prior Function   Level of [place] Needs assistance with ADLs;Needs assistance with functional mobility;Needs assistance with IADLS  (As per  "IE Jan 2025; pt required assistance with BADLs, but pt d/c requiring Max to total A of ADLs and functional mobility.)   Lives With Son   Receives Help From Family;Home health  (family and HHA, 24/7 care- daughter recently passed away who was main caregiver)   IADLs Family/Friend/Other provides transportation;Family/Friend/Other provides meals;Family/Friend/Other provides medication management   Falls in the last 6 months   (unknown)   Comments Prior chart review, pt requires A x1 for bed to wheelchair t/f with RW; but pt d/c level at clark lift, refusal to get OOB and attempt t/f/standing.   General   Additional Pertinent History Latanya Ray is a 89 y.o. female with a PMH of  legal blindness, dementia, recurrent uti, and history of bca who presents with change in mental status in which she was hallucinating and has done so in the past with presumed UTI.   Family/Caregiver Present No   Subjective   Subjective \"Don't let me fall.\"   ADL   Eating Assistance 2  Maximal Assistance   Grooming Assistance 1  Total Assistance   UB Bathing Assistance 1  Total Assistance   LB Bathing Assistance 1  Total Assistance   UB Dressing Assistance 2  Maximal Assistance   LB Dressing Assistance 1  Total Assistance   Toileting Assistance  1  Total Assistance   Bed Mobility   Rolling R 2  Maximal assistance   Additional items Assist x 2   Rolling L 2  Maximal assistance   Additional items Assist x 2   Additional Comments Pt with significant fear of falling, pt also refusal to sit at EOB 2* fear; pt notes with agitation, and physical pushing if therapist attempting with movement.   Transfers   Additional Comments unable to try at this time 2* pt agitated and combative state if attempted; clark lift for safety   Activity Tolerance   Activity Tolerance Treatment limited secondary to agitation   Nurse Made Aware yes   RUE Assessment   RUE Assessment   (Impaired; shoulder 0-80 degrees, dec wrist/hand ROM arthritic changes)   LUE " Assessment   LUE Assessment   (Impaired; shoulder 0-80 degrees, dec wrist/hand ROM arthritic changes)   Hand Function   Gross Motor Coordination Impaired   Fine Motor Coordination Impaired   Vision-Basic Assessment   Visual History   (legally blind)   Psychosocial   Psychosocial (WDL) X   Patient Behaviors/Mood Anxious;Fearful   Cognition   Overall Cognitive Status Impaired   Arousal/Participation Lethargic;Persistent stimuli required   Attention Difficulty attending to directions   Orientation Level Oriented to person   Memory Decreased short term memory;Decreased recall of recent events;Decreased recall of precautions   Following Commands Follows one step commands inconsistently   Comments confused, poor historian, becomes fearful and agitated if attempting to move or touch pt.   Assessment   Limitation Decreased ADL status;Decreased UE ROM;Decreased UE strength;Decreased Safe judgement during ADL;Decreased cognition;Decreased endurance   Assessment Patient is a 89 y.o. year old female seen for OT eval s/p admit to [facility] on [facility]/24[facility]25 with  PMH of legal blindness, dementia, recurrent uti, and history of bca who presents with change in mental status in which she was hallucinating and has done so in the past with presumed UTI. Patient with active OT orders and activity orders for Activity as tolerated, Up as tolerated. OT consulted to assess ADLs/IADLs/functional mobility and assist w/ D/C planning. PTA, required (A) with ADLs, required (A) with IADLs, and required (A) for functional mobility/transfers- mostly bed or w/c bound, as per previous hospital stay Jan 2025 pt t/f into wheelchair A x 1. Patient lives in a home but has 1st floor living access; 24/7 care between son/family and home health/Seniors caring for seniors (previous hospital record). Patient demonstrates the following deficits impacting occupational performance: decreased UE ROM, decreased strength , decreased balance, decreased activity tolerance,  impaired GMC, impaired FMC, impaired memory, decreased safety awareness, mood/behavioral limitations , lethargy , and visual deficits. These impairments, as well at pt’s SUSI home environment, difficulty performing ADLs, difficulty performing IADLs, difficulty performing transfers/mobility, limited insight into deficits, compliance, decreased initiation and engagement, fall risk , functional decline , advanced age, and multiple admissions , limit pt’s ability to safely engage in all baseline areas of occupation. Pt's CLOF as follows: eating/grooming: maxA, UB ADLs: dependent, LB ADLs: dependent, toileting: dependent, bed mobility: dependent, functional transfers: dependent and DNT safe for use of clark lift, functional mobility: dependent and DNT, sitting/standing tolerance: unable. Due to patient worsening dementia, agitation and combative behavior, previous discharge from hospital as Ax2/dependent for all ADLs and difficulty following directions/participating in skilled OT tx, pt is not a candidate for skilled rehab services at this time. If pt current state changes, and is more agreeable to participate in skilled OT services, please contact OT department. From OT standpoint, recommend D/C to family support when medically cleared. D/C pt from OT caseload at this time.   Plan   OT Frequency Eval only   Discharge Recommendation   Rehab Resource Intensity Level, OT III (Minimum Resource Intensity)   AM-PAC Daily Activity Inpatient   Lower Body Dressing 1   Bathing 1   Toileting 1   Upper Body Dressing 1   Grooming 1   Eating 1   Daily Activity Raw Score 6   AM-PAC Applied Cognition Inpatient   Following a Speech/Presentation 3   Understanding Ordinary Conversation 3   Taking Medications 1   Remembering Where Things Are Placed or Put Away 2   Remembering List of 4-5 Errands 2   Taking Care of Complicated Tasks 2   Applied Cognition Raw Score 13   Applied Cognition Standardized Score 30.46     Malathi Gold MS OTR/L  CLT          [1]   Past Medical History:  Diagnosis Date    Anxiety     Arthritis     Breast carcinoma ([facility]) 08/13/2012    Description: s/p L mastectomy. Dr. 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    06/06/2018 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    Osteoarthritis 06/05/2012    Osteoporosis 06/05/2012    Description: Dexa 7/13 read as normal, patient declines medication    Pressure injury of coccygeal region, stage 2 ([facility]) 03/06/2020    Pulmonary embolism ([facility]) 01/2011    last assessed 9/18/12    Restless legs syndrome 06/05/2012    RLS (restless legs syndrome)     Thoracic back pain 10/19/2017   [2]   Past Surgical History:  Procedure Laterality Date    BILATERAL OOPHORECTOMY      Laparoscopic    BREAST SURGERY Left     Mastectomy     CHOLECYSTECTOMY      Laparoscopic    HERNIA REPAIR      HYSTERECTOMY      SMALL INTESTINE SURGERY

## 2025-05-26 NOTE — ASSESSMENT & PLAN NOTE
Hx of Afib  Tele looks like episodes of SVT  Start metoprolol 12.5mg BID for now  Will confer with cardiology

## 2025-05-26 NOTE — ASSESSMENT & PLAN NOTE
Baseline mental status - AAOx1 to self only  In the setting of UTI. Previous episodes in the past with hallucinations in setting of UTIs  1/10/25 - Admitted for UTI, Cx grew ESBL ( <60k CFU), received 4 days of IV cefepime and discharged home  UA w/ positive nitrites, large leukocytes, 4-10 WBC, and moderate bacteria  Urine Cx: >100k CFU Gram negative rods  CT A/P:  nonobstructing left renal calculi, no hydro  CT Head: Extensive motion degradation. No acute intracranial abnormalities noted. Mild/mod chronic microangiopathy.  Continue Rocephin D2  Follow up Urine culture, adjust abx as needed  Follow up B12/Folate - replete if needed

## 2025-05-26 NOTE — PROGRESS NOTES
Progress Note - Hospitalist   Name: Latanya Ray 89 y.o. female I MRN: [id number]  Unit/Bed#: E5 -01 I Date of Admission: 5/24/2025   Date of Service: 5/26/2025 I Hospital Day: 1    Assessment & Plan  Acute metabolic encephalopathy  Acute cystitis without hematuria  Baseline mental status - AAOx1 to self only  In the setting of UTI. Previous episodes in the past with hallucinations in setting of UTIs  1/10/25 - Admitted for UTI, Cx grew ESBL ( <60k CFU), received 4 days of IV cefepime and discharged home  UA w/ positive nitrites, large leukocytes, 4-10 WBC, and moderate bacteria  Urine Cx: >100k CFU Gram negative rods  CT A/P:  nonobstructing left renal calculi, no hydro  CT Head: Extensive motion degradation. No acute intracranial abnormalities noted. Mild/mod chronic microangiopathy.  Continue Rocephin D2  Follow up Urine culture, adjust abx as needed  Follow up B12/Folate - replete if needed  Atrial tachycardia (HCC)  Hx of Afib  Tele looks like episodes of SVT  Start metoprolol 12.5mg BID for now  Will confer with cardiology  Restless leg syndrome  Cont requip  Generalized muscle weakness  PT/OT evaluations ordered  Legal blindness  Noted, patient requires assistance with ADLs  Cognitive impairment  Son is now her caregiver and POA. Her daughter passed away last week and the patient is unaware of this.  Vitamin D deficiency  Continue home vitamin D  Decubitus ulcer of sacral region, stage 1  Blanchable discoloration over buttocks--apply barrier cream   Wound care consult    VTE Pharmacologic Prophylaxis: VTE Score: 5 Moderate Risk (Score 3-4) - Pharmacological DVT Prophylaxis Ordered: enoxaparin (Lovenox).    Mobility:   Basic Mobility Inpatient Raw Score: 12  JH-HLM Goal: 4: Move to chair/commode  JH-HLM Achieved: 4: Move to chair/commode  JH-HLM Goal achieved. Continue to encourage appropriate mobility.    Patient Centered Rounds: I performed bedside rounds with nursing staff today.   Discussions  with Specialists or Other Care Team Provider: None    Education and Discussions with Family / Patient: Updated  (son) via phone.    Current Length of Stay: 1 day(s)  Current Patient Status: Inpatient   Certification Statement: The patient will continue to require additional inpatient hospital stay due to UTI/AMS  Discharge Plan: Anticipate discharge in 48-72 hrs to discharge location to be determined pending rehab evaluations.    Code Status: Level 3 - DNAR and DNI    Subjective   No acute events. AAOx1 to self only. Denies flank pain. Denies any complaints.    Objective :  Temp:  [96.6 °F (35.9 °C)-98 °F (36.7 °C)] 96.6 °F (35.9 °C)  HR:  [70-81] 81  BP: (101-161)/() 137/104  Resp:  [16] 16  SpO2:  [93 %-97 %] 93 %  O2 Device: None (Room air)    Body mass index is 27.56 kg/m².     Input and Output Summary (last 24 hours):     Intake/Output Summary (Last 24 hours) at 5/26/2025 1017  Last data filed at 5/26/2025 0809  Gross per 24 hour   Intake 840 ml   Output --   Net 840 ml       Physical Exam  Vitals and nursing note reviewed.   Constitutional:       Appearance: Normal appearance.   HENT:      Head: Normocephalic and atraumatic.      Mouth/Throat:      Mouth: Mucous membranes are moist.      Pharynx: Oropharynx is clear. No oropharyngeal exudate.     Eyes:      Extraocular Movements: Extraocular movements intact.       Cardiovascular:      Rate and Rhythm: Normal rate and regular rhythm.      Pulses: Normal pulses.      Heart sounds: Normal heart sounds. No murmur heard.     No friction rub. No gallop.   Pulmonary:      Effort: Pulmonary effort is normal. No respiratory distress.      Breath sounds: Normal breath sounds. No stridor. No wheezing or rales.   Abdominal:      General: Abdomen is flat. Bowel sounds are normal. There is no distension.      Palpations: Abdomen is soft.      Tenderness: There is no abdominal tenderness.     Musculoskeletal:      Right lower leg: No edema.      Left  lower leg: No edema.     Skin:     General: Skin is warm and dry.      Findings: Lesion (Over R breast) present.     Neurological:      General: No focal deficit present.      Mental Status: She is alert. Mental status is at baseline.           Lines/Drains:        Telemetry:  Telemetry Orders (From admission, onward)               24 Hour Telemetry Monitoring  Continuous x 24 Hours (Telem)        Expiring   Question:  Reason for 24 Hour Telemetry  Answer:  Metabolic/electrolyte disturbance with high probability of dysrhythmia. K level <3 or >6 OR KCL infusion >10mEq/hr  Comment:  Encephalopathy 2/2 UTI                     Telemetry Reviewed: Normal Sinus Rhythm w/ possible episodes of SVT  Indication for Continued Telemetry Use: Arrthymias requiring medical therapy               Lab Results: I have reviewed the following results:   Results from last 7 days   Lab Units 05/25/25  0624   WBC Thousand/uL 6.67   HEMOGLOBIN g/dL 13.5   HEMATOCRIT % 40.9   PLATELETS Thousands/uL 235   SEGS PCT % 59   LYMPHO PCT % 29   MONO PCT % 9   EOS PCT % 2     Results from last 7 days   Lab Units 05/25/25  0624   SODIUM mmol/L 137   POTASSIUM mmol/L 4.0   CHLORIDE mmol/L 105   CO2 mmol/L 27   BUN mg/dL 12   CREATININE mg/dL 0.50*   ANION GAP mmol/L 5   CALCIUM mg/dL 8.9   ALBUMIN g/dL 3.5   TOTAL BILIRUBIN mg/dL 0.62   ALK PHOS U/L 77   ALT U/L 11   AST U/L 17   GLUCOSE RANDOM mg/dL 84     Results from last 7 days   Lab Units 05/24/25  2112   INR  0.95             Results from last 7 days   Lab Units 05/24/25  2112   LACTIC ACID mmol/L 1.9   PROCALCITONIN ng/ml <0.05       Recent Cultures (last 7 days):   Results from last 7 days   Lab Units 05/25/25  0749   URINE CULTURE  >100,000 cfu/ml Gram Negative Yang Enteric Like*       Imaging Results Review: I reviewed radiology reports from this admission including: CT abdomen/pelvis.  Other Study Results Review: No additional pertinent studies reviewed.    Last 24 Hours Medication List:      Current Facility-Administered Medications:     acetaminophen (TYLENOL) tablet 650 mg, Q6H PRN    aspirin (ECOTRIN LOW STRENGTH) EC tablet 81 mg, HS    calcium carbonate (TUMS) chewable tablet 1,000 mg, Daily PRN    cefTRIAXone (ROCEPHIN) 1,000 mg in dextrose 5 % 50 mL IVPB, Q24H, Last Rate: 1,000 mg (05/25/25 2116)    Cholecalciferol (VITAMIN D3) tablet 4,000 Units, Daily    cyanocobalamin (VITAMIN B-12) tablet 1,000 mcg, Every Other Day    enoxaparin (LOVENOX) subcutaneous injection 40 mg, Daily    famotidine (PEPCID) tablet 20 mg, Daily    melatonin tablet 3 mg, HS PRN    metoprolol tartrate (LOPRESSOR) partial tablet 12.5 mg, Q12H HERMAN    ondansetron (ZOFRAN) injection 4 mg, Q6H PRN    pramipexole (MIRAPEX) tablet 0.25 mg, HS    Administrative Statements   Today, Patient Was Seen By: Alex Frank PA-C      **Please Note: This note may have been constructed using a voice recognition system.**

## 2025-05-26 NOTE — CONSULTS
Podiatry - Consultation    Patient Information:   Latanya Ray 89 y.o. female MRN: [id number]  Unit/Bed#: E5 -01 Encounter: [id number]  PCP: Amina Rowe DO  Date of Admission:  5/24/2025  Date of Consultation: 05/26/25  Requesting Physician: Norberto Ruiz DO      ASSESSMENT:    Latanya Ray is a 89 y.o. female with:    Bilateral foot pain   Onychomycosis     PLAN:    Patient seen at bedside. Pain to bilateral feet due to elongated nails.    Elongated toenails x10 were excisionally debrided. See procedure note below.   Rest of Medical care per primary team.  Podiatry signing off at this time, please re-consult with any questions or concerns as needed.    Procedure Note: Nail Debridement   After verbal consent was obtained, patient's elongated nails x10 were sharply debrided to near normal length and thickness using a large nail nipper. The patient tolerated this well and without incident.     Weight bearing Status: WBAT     SUBJECTIVE:    History of Present Illness:    Latanya Ray is a 89 y.o. female who is originally admitted 5/24/2025 due to change in mental status with a past medical history of legal blindness, dementia, recurrent UTI.     We are consulted for painful, elongated toenails x 10. Patient states that they have difficulty applying their socks and shoes due to the elongation of the nails. They report pain with palpation and pressure within their shoe gear. They have been unable to cut their nails adequately. Patient has no further pedal complaints at this time.     Patient denies nausea, vomiting, fever, chills, shortness of breath, chest pain.     Review of Systems:    Constitutional: Negative.    HENT: Negative.    Eyes: Negative.    Respiratory: Negative.    Cardiovascular: Negative.    Gastrointestinal: Negative.    Musculoskeletal: Pain due to elongated nails and pressure   Skin: Elongated nails    Neurological: Negative    Psych: Negative.     Past Medical and Surgical  History:     Past Medical History[1]    Past Surgical History[2]    Meds/Allergies:      Medications Prior to Admission:     aspirin (ECOTRIN LOW STRENGTH) 81 mg EC tablet    Calcium Carbonate-Vit D-Min (CALCIUM 1200 PO)    cholecalciferol (VITAMIN D3) 1,000 units tablet    Cranberry 250 MG TABS    omeprazole (PriLOSEC) 20 mg delayed release capsule    pramipexole (MIRAPEX) 0.25 mg tablet    vitamin B-12 (VITAMIN B-12) 1,000 mcg tablet    Klayesta powder    Allergies[3]    Social History:     Marital Status: Single    Substance Use History:   Social History     Substance and Sexual Activity   Alcohol Use No    Comment: denies     Tobacco Use History[4]  Social History     Substance and Sexual Activity   Drug Use No       Family History:    Family History[5]      OBJECTIVE:    Vitals:   Blood Pressure: (!) 137/104 (05/26/25 1006)  Pulse: 81 (05/26/25 1006)  Temperature: (!) 96.6 °F (35.9 °C) (05/26/25 0809)  Temp Source: Oral (05/25/25 1457)  Respirations: 16 (05/25/25 1457)  Weight - Scale: 64 kg (141 lb 1.5 oz) (05/24/25 2028)  SpO2: 93 % (05/26/25 1006)    Physical Exam:    General Appearance: Alert, cooperative, no distress.  HEENT: Head normocephalic, atraumatic, without obvious abnormality.  Heart: Normal rate and rhythm.  Lungs: Non-labored breathing. No respiratory distress.  Abdomen: Without distension.  Psychiatric: AAOx3  Lower Extremity:    Vascular:   DP: Right: 2+   Left: 2+  PT: Right: 2+   Left: 2+  CRT < 3 seconds at the digits.   +0/4 edema noted at bilateral lower extremities.   Pedal hair is absent.   Skin temperature is WNL bilaterally.    Musculoskeletal:  MMT is 4/5 in all muscle compartments bilaterally.   ROM at the 1st MPJ and ankle joint are reduced bilaterally with the leg extended.   Pain on palpation of bilateral feet due to nail pressure   B/L pes planus     Dermatological:  Elongated, dystrophic, thickened toenails x10 that are tender to palpation with notable subungual  "debris.    Neurological:  Gross sensation is intact.   Light touch is intact.   Protective sensation is intact.    Additional data:     Lab Results: I have personally reviewed pertinent labs including:    Results from last 7 days   Lab Units 05/25/25  0624   WBC Thousand/uL 6.67   HEMOGLOBIN g/dL 13.5   HEMATOCRIT % 40.9   PLATELETS Thousands/uL 235   SEGS PCT % 59   LYMPHO PCT % 29   MONO PCT % 9   EOS PCT % 2     Results from last 7 days   Lab Units 05/25/25  0624   POTASSIUM mmol/L 4.0   CHLORIDE mmol/L 105   CO2 mmol/L 27   BUN mg/dL 12   CREATININE mg/dL 0.50*   CALCIUM mg/dL 8.9   ALK PHOS U/L 77   ALT U/L 11   AST U/L 17     Results from last 7 days   Lab Units 05/24/25  2112   INR  0.95       Cultures: I have personally reviewed pertinent cultures including:    Results from last 7 days   Lab Units 05/25/25  0749   URINE CULTURE  >100,000 cfu/ml Gram Negative Yang Enteric Like*           Imaging: I have personally reviewed pertinent reports in PACS.  EKG, Pathology, and Other Studies: I have personally reviewed pertinent reports.    Time Spent for Care: 30 minutes.  More than 50% of total time spent on counseling and coordination of care as described above.      ** Please Note: Portions of the record may have been created with voice recognition software. Occasional wrong word or \"sound a like\" substitutions may have occurred due to the inherent limitations of voice recognition software. Read the chart carefully and recognize, using context, where substitutions have occurred. **         [1]   Past Medical History:  Diagnosis Date    Anxiety     Arthritis     Breast carcinoma (HCC) 08/13/2012    Description: s/p L mastectomy. Dr. 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    06/06/2018 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    Osteoarthritis 06/05/2012    Osteoporosis 06/05/2012    Description: Dexa 7/13 read as normal, patient declines medication    Pressure injury of coccygeal region, stage 2 (HCC) 03/06/2020    Pulmonary embolism (HCC) 01/2011    last assessed 9/18/12    Restless legs syndrome 06/05/2012    RLS (restless legs syndrome)     Thoracic back pain 10/19/2017   [2]   Past Surgical History:  Procedure Laterality Date    BILATERAL OOPHORECTOMY      Laparoscopic    BREAST SURGERY Left     Mastectomy     CHOLECYSTECTOMY      Laparoscopic    HERNIA REPAIR      HYSTERECTOMY      SMALL INTESTINE SURGERY     [3]   Allergies  Allergen Reactions    Amoxicillin Hives    Celecoxib     Ciprofloxacin      Other reaction(s): diarrhea. Tolerates Levaquin    Codeine Other (See Comments)     Other reaction(s): Other (See Comments)  takes vicodin @ home  takes vicodin @ home    Epinephrine Other (See Comments)     Heart racing    Oxycodone-Acetaminophen Other (See Comments)     Nausea?    Oxycodone-Acetaminophen      Other reaction(s): Unknown Reaction    Propoxyphene Other (See Comments)    Rofecoxib     Sulfa Antibiotics Other (See Comments)     takes at home  takes at home  Other reaction(s): Other (See Comments)  takes at home    Sulfamethoxazole-Trimethoprim Other (See Comments)     Unknown, perhaps hives?    Nitrofurantoin Rash   [4]   Social History  Tobacco Use   Smoking Status Never   Smokeless Tobacco Never   Tobacco Comments    denies   [5]   Family History  Problem Relation Name Age of Onset    Hypertension Mother      Blindness Mother      Cancer Sister      Cancer Maternal Aunt      Cancer Maternal Uncle

## 2025-05-27 ENCOUNTER — APPOINTMENT (INPATIENT)
Dept: NON INVASIVE DIAGNOSTICS | Facility: HOSPITAL | Age: OVER 89
DRG: 689 | End: 2025-05-27
Payer: MEDICARE

## 2025-05-27 LAB
ANION GAP SERPL CALCULATED.3IONS-SCNC: 6 MMOL/L (ref 4–13)
BACTERIA UR CULT: ABNORMAL
BUN SERPL-MCNC: 15 MG/DL (ref 5–25)
CALCIUM SERPL-MCNC: 8.9 MG/DL (ref 8.4–10.2)
CHLORIDE SERPL-SCNC: 105 MMOL/L (ref 96–108)
CO2 SERPL-SCNC: 26 MMOL/L (ref 21–32)
CREAT SERPL-MCNC: 0.49 MG/DL (ref 0.6–1.3)
ERYTHROCYTE [DISTWIDTH] IN BLOOD BY AUTOMATED COUNT: 14.6 % (ref 11.6–15.1)
GFR SERPL CREATININE-BSD FRML MDRD: 81 ML/MIN/1.73SQ M
GLUCOSE SERPL-MCNC: 96 MG/DL (ref 65–140)
HCT VFR BLD AUTO: 39.1 % (ref 34.8–46.1)
HGB BLD-MCNC: 12.8 G/DL (ref 11.5–15.4)
MCH RBC QN AUTO: 29.9 PG (ref 26.8–34.3)
MCHC RBC AUTO-ENTMCNC: 32.7 G/DL (ref 31.4–37.4)
MCV RBC AUTO: 91 FL (ref 82–98)
PLATELET # BLD AUTO: 225 THOUSANDS/UL (ref 149–390)
PMV BLD AUTO: 9.8 FL (ref 8.9–12.7)
POTASSIUM SERPL-SCNC: 4.1 MMOL/L (ref 3.5–5.3)
RBC # BLD AUTO: 4.28 MILLION/UL (ref 3.81–5.12)
SODIUM SERPL-SCNC: 137 MMOL/L (ref 135–147)
WBC # BLD AUTO: 7.03 THOUSAND/UL (ref 4.31–10.16)

## 2025-05-27 PROCEDURE — 93971 EXTREMITY STUDY: CPT

## 2025-05-27 PROCEDURE — 93971 EXTREMITY STUDY: CPT | Performed by: SURGERY

## 2025-05-27 PROCEDURE — 99222 1ST HOSP IP/OBS MODERATE 55: CPT | Performed by: INTERNAL MEDICINE

## 2025-05-27 PROCEDURE — 99232 SBSQ HOSP IP/OBS MODERATE 35: CPT | Performed by: PHYSICIAN ASSISTANT

## 2025-05-27 PROCEDURE — 80048 BASIC METABOLIC PNL TOTAL CA: CPT | Performed by: PHYSICIAN ASSISTANT

## 2025-05-27 PROCEDURE — 85027 COMPLETE CBC AUTOMATED: CPT | Performed by: PHYSICIAN ASSISTANT

## 2025-05-27 RX ORDER — METOPROLOL TARTRATE 25 MG/1
25 TABLET, FILM COATED ORAL EVERY 12 HOURS SCHEDULED
Status: DISCONTINUED | OUTPATIENT
Start: 2025-05-27 | End: 2025-05-27

## 2025-05-27 RX ORDER — METOPROLOL TARTRATE 25 MG/1
25 TABLET, FILM COATED ORAL EVERY 12 HOURS SCHEDULED
Status: DISCONTINUED | OUTPATIENT
Start: 2025-05-27 | End: 2025-05-28

## 2025-05-27 RX ADMIN — PRAMIPEXOLE DIHYDROCHLORIDE 0.25 MG: 0.25 TABLET ORAL at 21:45

## 2025-05-27 RX ADMIN — ASPIRIN 81 MG: 81 TABLET, COATED ORAL at 21:45

## 2025-05-27 RX ADMIN — METOPROLOL TARTRATE 25 MG: 25 TABLET, FILM COATED ORAL at 09:26

## 2025-05-27 RX ADMIN — FAMOTIDINE 20 MG: 20 TABLET, FILM COATED ORAL at 09:26

## 2025-05-27 RX ADMIN — Medication 4000 UNITS: at 09:26

## 2025-05-27 RX ADMIN — ENOXAPARIN SODIUM 40 MG: 40 INJECTION SUBCUTANEOUS at 09:26

## 2025-05-27 RX ADMIN — METOPROLOL TARTRATE 25 MG: 25 TABLET, FILM COATED ORAL at 21:45

## 2025-05-27 RX ADMIN — CEFTRIAXONE 1000 MG: 10 INJECTION, POWDER, FOR SOLUTION INTRAVENOUS at 22:05

## 2025-05-27 NOTE — PROGRESS NOTES
Progress Note - Hospitalist   Name: Latanya Ray 89 y.o. female I MRN: [id number]  Unit/Bed#: E5 -01 I Date of Admission: 5/24/2025   Date of Service: 5/27/2025 I Hospital Day: 2    Assessment & Plan  Acute metabolic encephalopathy  Acute cystitis without hematuria  Baseline mental status - AAOx1 to self only  In the setting of UTI. Previous episodes in the past with hallucinations in setting of UTIs  1/10/25 - Admitted for UTI, Cx grew ESBL ( <60k CFU), received 4 days of IV cefepime and discharged home  UA w/ positive nitrites, large leukocytes, 4-10 WBC, and moderate bacteria  Urine Cx: >100k CFU Klebsiella pneumoniae  CT A/P:  nonobstructing left renal calculi, no hydro  CT Head: Extensive motion degradation. No acute intracranial abnormalities noted. Mild/mod chronic microangiopathy.  Continue Rocephin D3  Follow up B12/Folate - replete if needed  Atrial tachycardia (HCC)  Hx of Afib  SVT on telemetry  Started on metoprolol 12.5 mg twice daily, uptitrate to 25 mg twice daily  Cardiology consult  Restless leg syndrome  Cont requip  Generalized muscle weakness  PT/OT evaluations ordered  Legal blindness  Noted, patient requires assistance with ADLs  Cognitive impairment  Son is now her caregiver and POA. Her daughter passed away last week and the patient is unaware of this.  Vitamin D deficiency  Continue home vitamin D  Decubitus ulcer of sacral region, stage 1  Blanchable discoloration over buttocks--apply barrier cream   Wound care consult    VTE Pharmacologic Prophylaxis: VTE Score: 5 High Risk (Score >/= 5) - Pharmacological DVT Prophylaxis Ordered: enoxaparin (Lovenox). Sequential Compression Devices Ordered.    Mobility:   Basic Mobility Inpatient Raw Score: 12  JH-HLM Goal: 4: Move to chair/commode  JH-HLM Achieved: 1: Laying in bed  JH-HLM Goal NOT achieved. Continue with multidisciplinary rounding and encourage appropriate mobility to improve upon JH-HLM goals.    Patient Centered Rounds: I  performed bedside rounds with nursing staff today.   Discussions with Specialists or Other Care Team Provider: Case management    Education and Discussions with Family / Patient: Updated  (son) via phone.    Current Length of Stay: 2 day(s)  Current Patient Status: Inpatient   Certification Statement: The patient will continue to require additional inpatient hospital stay due to telemetry monitoring  Discharge Plan: Anticipate discharge tomorrow to home with home services.    Code Status: Level 3 - DNAR and DNI    Subjective   Patient examined at bedside.  No acute events overnight.  She denies any chest pain, shortness of breath, lightheadedness, nausea, vomiting.  She also denies any palpitations.    Objective :  Temp:  [97.7 °F (36.5 °C)-98.5 °F (36.9 °C)] 97.7 °F (36.5 °C)  HR:  [64-68] 64  BP: (109-154)/() 130/80  Resp:  [16-18] 16  SpO2:  [94 %-96 %] 96 %  O2 Device: None (Room air)    Body mass index is 27.56 kg/m².     Input and Output Summary (last 24 hours):     Intake/Output Summary (Last 24 hours) at 5/27/2025 1158  Last data filed at 5/27/2025 0815  Gross per 24 hour   Intake 240 ml   Output --   Net 240 ml       Physical Exam  Vitals and nursing note reviewed.   Constitutional:       Appearance: Normal appearance.   HENT:      Head: Normocephalic and atraumatic.      Mouth/Throat:      Mouth: Mucous membranes are moist.      Pharynx: Oropharynx is clear. No oropharyngeal exudate.     Eyes:      Extraocular Movements: Extraocular movements intact.       Cardiovascular:      Rate and Rhythm: Normal rate and regular rhythm.      Pulses: Normal pulses.      Heart sounds: Normal heart sounds. No murmur heard.     No friction rub. No gallop.   Pulmonary:      Effort: Pulmonary effort is normal. No respiratory distress.      Breath sounds: Normal breath sounds. No stridor. No wheezing or rales.   Abdominal:      General: Abdomen is flat. Bowel sounds are normal. There is no distension.       Palpations: Abdomen is soft.      Tenderness: There is no abdominal tenderness.     Musculoskeletal:      Right lower leg: No edema.      Left lower leg: No edema.     Skin:     General: Skin is warm and dry.     Neurological:      General: No focal deficit present.      Mental Status: She is alert. Mental status is at baseline.           Lines/Drains:        Telemetry:  Telemetry Orders (From admission, onward)               24 Hour Telemetry Monitoring  Continuous x 24 Hours (Telem)        Expiring   Question:  Reason for 24 Hour Telemetry  Answer:  Metabolic/electrolyte disturbance with high probability of dysrhythmia. K level <3 or >6 OR KCL infusion >10mEq/hr  Comment:  Encephalopathy 2/2 UTI                     Telemetry Reviewed: Normal Sinus Rhythm with episodes of SVT  Indication for Continued Telemetry Use: Arrthymias requiring medical therapy               Lab Results: I have reviewed the following results:   Results from last 7 days   Lab Units 05/27/25  0507 05/25/25  0624   WBC Thousand/uL 7.03 6.67   HEMOGLOBIN g/dL 12.8 13.5   HEMATOCRIT % 39.1 40.9   PLATELETS Thousands/uL 225 235   SEGS PCT %  --  59   LYMPHO PCT %  --  29   MONO PCT %  --  9   EOS PCT %  --  2     Results from last 7 days   Lab Units 05/27/25  0507 05/25/25  0624   SODIUM mmol/L 137 137   POTASSIUM mmol/L 4.1 4.0   CHLORIDE mmol/L 105 105   CO2 mmol/L 26 27   BUN mg/dL 15 12   CREATININE mg/dL 0.49* 0.50*   ANION GAP mmol/L 6 5   CALCIUM mg/dL 8.9 8.9   ALBUMIN g/dL  --  3.5   TOTAL BILIRUBIN mg/dL  --  0.62   ALK PHOS U/L  --  77   ALT U/L  --  11   AST U/L  --  17   GLUCOSE RANDOM mg/dL 96 84     Results from last 7 days   Lab Units 05/24/25  2112   INR  0.95             Results from last 7 days   Lab Units 05/24/25  2112   LACTIC ACID mmol/L 1.9   PROCALCITONIN ng/ml <0.05       Recent Cultures (last 7 days):   Results from last 7 days   Lab Units 05/25/25  0749   URINE CULTURE  >100,000 cfu/ml Klebsiella pneumoniae*        Imaging Results Review: No pertinent imaging studies reviewed.  Other Study Results Review: No additional pertinent studies reviewed.    Last 24 Hours Medication List:     Current Facility-Administered Medications:     acetaminophen (TYLENOL) tablet 650 mg, Q6H PRN    aspirin (ECOTRIN LOW STRENGTH) EC tablet 81 mg, HS    calcium carbonate (TUMS) chewable tablet 1,000 mg, Daily PRN    cefTRIAXone (ROCEPHIN) 1,000 mg in dextrose 5 % 50 mL IVPB, Q24H, Last Rate: 1,000 mg (05/26/25 2152)    Cholecalciferol (VITAMIN D3) tablet 4,000 Units, Daily    cyanocobalamin (VITAMIN B-12) tablet 1,000 mcg, Every Other Day    enoxaparin (LOVENOX) subcutaneous injection 40 mg, Daily    famotidine (PEPCID) tablet 20 mg, Daily    melatonin tablet 3 mg, HS PRN    metoprolol tartrate (LOPRESSOR) tablet 25 mg, Q12H HERMAN    ondansetron (ZOFRAN) injection 4 mg, Q6H PRN    pramipexole (MIRAPEX) tablet 0.25 mg, HS    Administrative Statements   Today, Patient Was Seen By: Alex Frank PA-C      **Please Note: This note may have been constructed using a voice recognition system.**

## 2025-05-27 NOTE — PLAN OF CARE
Problem: Potential for Falls  Goal: Patient will remain free of falls  Description: INTERVENTIONS:  - Educate patient/family on patient safety including physical limitations  - Instruct patient to call for assistance with activity   - Consider consulting OT/PT to assist with strengthening/mobility based on AM PAC & -HLM score  - Consult OT/PT to assist with strengthening/mobility   - Keep Call bell within reach  - Keep bed low and locked with side rails adjusted as appropriate  - Keep care items and personal belongings within reach  - Initiate and maintain comfort rounds  - Make Fall Risk Sign visible to staff  - Offer Toileting every 2 Hours, in advance of need  - Initiate/Maintain bed alarm  - Obtain necessary fall risk management equipment: bed alarm  - Apply yellow socks and bracelet for high fall risk patients  - Consider moving patient to room near nurses station  Outcome: Progressing     Problem: Prexisting or High Potential for Compromised Skin Integrity  Goal: Skin integrity is maintained or improved  Description: INTERVENTIONS:  - Identify patients at risk for skin breakdown  - Assess and monitor skin integrity including under and around medical devices   - Assess and monitor nutrition and hydration status  - Monitor labs  - Assess for incontinence   - Turn and reposition patient  - Assist with mobility/ambulation  - Relieve pressure over joya prominences   - Avoid friction and shearing  - Provide appropriate hygiene as needed including keeping skin clean and dry  - Evaluate need for skin moisturizer/barrier cream  - Collaborate with interdisciplinary team  - Patient/family teaching  - Consider wound care consult    Assess:  - Review Rosales scale daily  - Clean and moisturize skin   - Inspect skin when repositioning, toileting, and assisting with ADLS  - Assess under medical devices   - Assess extremities for adequate circulation and sensation     Bed Management:  - Have minimal linens on bed & keep  smooth, unwrinkled  - Change linens as needed when moist or perspiring  - Avoid sitting or lying in one position for more than 2 hours while in bed?Keep HOB at 30 degrees   - Toileting:  - Offer bedside commode  - Assess for incontinence  - Use incontinent care products after each incontinent episode     Activity:  - Mobilize patient 3 times a day  - Encourage activity and walks on unit  - Encourage or provide ROM exercises   - Turn and reposition patient every 2 Hours  - Use appropriate equipment to lift or move patient in bed  - Instruct/ Assist with weight shifting every 2hrs when out of bed in chair  - Consider limitation of chair time 2 hour intervals    Skin Care:  - Avoid use of baby powder, tape, friction and shearing, hot water or constrictive clothing  - Relieve pressure over bony prominences  - Do not massage red bony areas    Next Steps:  - Teach patient strategies to minimize risks   - Consider consults to  interdisciplinary teams   Outcome: Progressing     Problem: PAIN - ADULT  Goal: Verbalizes/displays adequate comfort level or baseline comfort level  Description: Interventions:  - Encourage patient to monitor pain and request assistance  - Assess pain using appropriate pain scale  - Administer analgesics as ordered based on type and severity of pain and evaluate response  - Implement non-pharmacological measures as appropriate and evaluate response  - Consider cultural and social influences on pain and pain management  - Notify physician/advanced practitioner if interventions unsuccessful or patient reports new pain  - Educate patient/family on pain management process including their role and importance of  reporting pain   - Provide non-pharmacologic/complimentary pain relief interventions  Outcome: Progressing     Problem: INFECTION - ADULT  Goal: Absence or prevention of progression during hospitalization  Description: INTERVENTIONS:  - Assess and monitor for signs and symptoms of infection  -  Monitor lab/diagnostic results  - Monitor all insertion sites, i.e. indwelling lines, tubes, and drains  - Monitor endotracheal if appropriate and nasal secretions for changes in amount and color  - [place] appropriate cooling/warming therapies per order  - Administer medications as ordered  - Instruct and encourage patient and family to use good hand hygiene technique  - Identify and instruct in appropriate isolation precautions for identified infection/condition  Outcome: Progressing     Problem: SAFETY ADULT  Goal: Patient will remain free of falls  Description: INTERVENTIONS:  - Educate patient/family on patient safety including physical limitations  - Instruct patient to call for assistance with activity   - Consider consulting OT/PT to assist with strengthening/mobility based on AM PAC & -HL score  - Consult OT/PT to assist with strengthening/mobility   - Keep Call bell within reach  - Keep bed low and locked with side rails adjusted as appropriate  - Keep care items and personal belongings within reach  - Initiate and maintain comfort rounds  - Make Fall Risk Sign visible to staff  - Offer Toileting every 2 Hours, in advance of need  - Initiate/Maintain bed alarm  - Obtain necessary fall risk management equipment: bed alarm  - Apply yellow socks and bracelet for high fall risk patients  - Consider moving patient to room near nurses station  Outcome: Progressing  Goal: Maintain or return to baseline ADL function  Description: INTERVENTIONS:  -  Assess patient's ability to carry out ADLs; assess patient's baseline for ADL function and identify physical deficits which impact ability to perform ADLs (bathing, care of mouth/teeth, toileting, grooming, dressing, etc.)  - Assess/evaluate cause of self-care deficits   - Assess range of motion  - Assess patient's mobility; develop plan if impaired  - Assess patient's need for assistive devices and provide as appropriate  - Encourage maximum independence but  intervene and supervise when necessary  - Involve family in performance of ADLs  - Assess for home care needs following discharge   - Consider OT consult to assist with ADL evaluation and planning for discharge  - Provide patient education as appropriate  - Monitor functional capacity and physical performance, use of AM PAC & JH-HLM   - Monitor gait, balance and fatigue with ambulation    Outcome: Progressing     Problem: DISCHARGE PLANNING  Goal: Discharge to home or other facility with appropriate resources  Description: INTERVENTIONS:  - Identify barriers to discharge w/patient and caregiver  - Arrange for needed discharge resources and transportation as appropriate  - Identify discharge learning needs (meds, wound care, etc.)  - Arrange for interpretive services to assist at discharge as needed  - Refer to Case Management Department for coordinating discharge planning if the patient needs post-hospital services based on physician/advanced practitioner order or complex needs related to functional status, cognitive ability, or social support system  Outcome: Progressing     Problem: Knowledge Deficit  Goal: Patient/family/caregiver demonstrates understanding of disease process, treatment plan, medications, and discharge instructions  Description: Complete learning assessment and assess knowledge base.  Interventions:  - Provide teaching at level of understanding  - Provide teaching via preferred learning methods  Outcome: Progressing     Problem: CARDIOVASCULAR - ADULT  Goal: Maintains optimal cardiac output and hemodynamic stability  Description: INTERVENTIONS:  - Monitor I/O, vital signs and rhythm  - Monitor for S/S and trends of decreased cardiac output  - Administer and titrate ordered vasoactive medications to optimize hemodynamic stability  - Assess quality of pulses, skin color and temperature  - Assess for signs of decreased coronary artery perfusion  - Instruct patient to report change in severity of  symptoms  Outcome: Progressing     Problem: GASTROINTESTINAL - ADULT  Goal: Minimal or absence of nausea and/or vomiting  Description: INTERVENTIONS:  - Administer IV fluids if ordered to ensure adequate hydration  - Maintain NPO status until nausea and vomiting are resolved  - Nasogastric tube if ordered  - Administer ordered antiemetic medications as needed  - Provide nonpharmacologic comfort measures as appropriate  - Advance diet as tolerated, if ordered  - Consider nutrition services referral to assist patient with adequate nutrition and appropriate food choices  Outcome: Progressing  Goal: Maintains or returns to baseline bowel function  Description: INTERVENTIONS:  - Assess bowel function  - Encourage oral fluids to ensure adequate hydration  - Administer IV fluids if ordered to ensure adequate hydration  - Administer ordered medications as needed  - Encourage mobilization and activity  - Consider nutritional services referral to assist patient with adequate nutrition and appropriate food choices  Outcome: Progressing  Goal: Maintains adequate nutritional intake  Description: INTERVENTIONS:  - Monitor percentage of each meal consumed  - Identify factors contributing to decreased intake, treat as appropriate  - Assist with meals as needed  - Monitor I&O, weight, and lab values if indicated  - Obtain nutrition services referral as needed  Outcome: Progressing     Problem: METABOLIC, FLUID AND ELECTROLYTES - ADULT  Goal: Electrolytes maintained within normal limits  Description: INTERVENTIONS:  - Monitor labs and assess patient for signs and symptoms of electrolyte imbalances  - Administer electrolyte replacement as ordered  - Monitor response to electrolyte replacements, including repeat lab results as appropriate  - Instruct patient on fluid and nutrition as appropriate  Outcome: Progressing     Problem: SKIN/TISSUE INTEGRITY - ADULT  Goal: Incision(s), wounds(s) or drain site(s) healing without S/S of  infection  Description: INTERVENTIONS  - Assess and document dressing, incision, wound bed, drain sites and surrounding tissue  - Provide patient and family education  - Perform skin care/dressing changes   Outcome: Progressing     Problem: HEMATOLOGIC - ADULT  Goal: Maintains hematologic stability  Description: INTERVENTIONS  - Assess for signs and symptoms of bleeding or hemorrhage  - Monitor labs  - Administer supportive blood products/factors as ordered and appropriate  Outcome: Progressing     Problem: Nutrition/Hydration-ADULT  Goal: Nutrient/Hydration intake appropriate for improving, restoring or maintaining nutritional needs  Description: Monitor and assess patient's nutrition/hydration status for malnutrition. Collaborate with interdisciplinary team and initiate plan and interventions as ordered.  Monitor patient's weight and dietary intake as ordered or per policy. Utilize nutrition screening tool and intervene as necessary. Determine patient's food preferences and provide high-protein, high-caloric foods as appropriate.     INTERVENTIONS:  - Monitor oral intake, urinary output, labs, and treatment plans  - Assess nutrition and hydration status and recommend course of action  - Evaluate amount of meals eaten  - Assist patient with eating if necessary   - Allow adequate time for meals  - Recommend/ encourage appropriate diets, oral nutritional supplements, and vitamin/mineral supplements  - Order, calculate, and assess calorie counts as needed  - Recommend, monitor, and adjust tube feedings and TPN/PPN based on assessed needs  - Assess need for intravenous fluids  - Provide specific nutrition/hydration education as appropriate  - Include patient/family/caregiver in decisions related to nutrition  Outcome: Progressing

## 2025-05-27 NOTE — CONSULTS
Consult - Cardiology   Latanya Ray 89 y.o. female MRN: [id number]  Unit/Bed#: E5 -01 Encounter: [id number]        Reason For Consult: Tachyarrhythmia                 Assessment:  Metabolic encephalopathy, acute cystitis   Reportedly at baseline only oriented to self   History of encephalopathy and hallucination in the setting of UTI   Current UA nitrite positive with large leukocytes and moderate bacteria.  Culture >100K GN rods  Dementia  At baseline reportedly oriented only to self  Intermittent narrow complex tachycardia (reason for consultation): Probable atrial tachycardia-see discussion below      Stage I sacral decubitus ulceration POA   legally blind  Restless leg syndrome    Discussion / Plan:  # Patient admitted for signs of encephalopathy in the setting of urinary tract infection and on a background of known dementia    # Narrow complex long RP tachycardia -probable paroxysmal atrial tachycardia  Patient unaware of rate and rhythm and seemingly asymptomatic  On no AV blocking Rx or other antihypertensive prior to admission       Sinus rate in the 60s  Initiated on low-dose of beta-blocker  -Lopressor 25 mg twice daily ~~> continue to follow telemetry for effect on heart rate range and burden of dysrhythmia with additional titration and consideration to change to succinate formulation guided by response      History Of Present Illness:  Latanya Ray is a 89-year-old with dementia but  lives in the community supported by in-home caregivers and family.    On 5/24/2025 she was brought to the hospital with reports of increased urinary frequency and confusion with hallucination and some repetitive speech.  Her family had reported similar symptoms in the past in the setting of urinary tract infection.  She was admitted and found to have UTI/cystitis and a metabolic encephalopathy.    Since admission telemetry is noted to show intermittent bouts of tachycardia for which we are consulted.  When  seen the patient is quite pleasant but is a poor historian offering very little..  She indicates she is overall comfortable and denies awareness of her heart rate and rhythm.      Past Medical History:        Past Medical History[1] Past Surgical History[2]     Allergy:        Allergies[3]    Medications:       Prior to Admission medications    Medication Sig Start Date End Date Taking? Authorizing Provider   aspirin (ECOTRIN LOW STRENGTH) 81 mg EC tablet Take 81 mg by mouth daily at bedtime   Yes Historical Provider, MD   Calcium Carbonate-Vit D-Min (CALCIUM 1200 PO) Take 750 mg by mouth in the morning At 3pm   Yes Historical Provider, MD   cholecalciferol (VITAMIN D3) 1,000 units tablet Take 4,000 Units by mouth in the morning.   Yes Historical Provider, MD   Cranberry 250 MG TABS Take 2 tablets (500 mg total) by mouth 2 (two) times a day 9/9/24  Yes Austin Escobar MD   omeprazole (PriLOSEC) 20 mg delayed release capsule TAKE 1 CAPSULE BY MOUTH EVERY DAY 3/7/25  Yes Amina Rowe DO   pramipexole (MIRAPEX) 0.25 mg tablet TAKE 1 TABLET (0.25 MG TOTAL) BY MOUTH DAILY AT BEDTIME 3/11/25  Yes Amina Rowe DO   vitamin B-12 (VITAMIN B-12) 1,000 mcg tablet Take 2,500 mcg by mouth every other day   Yes Historical Provider, MD Kebedeesta powder APPLY TO AFFECTED AREA TWICE A DAY 9/25/24   Amina Rowe DO       Family History:     Family History[4]     Social History:       Social History[5]    ROS:  Poorly obtainable due to dementia  Surprised to learn she is 89 years old  Denies any pain  Unaware of her heart rate and rhythm  Indicates she intermittently uses a cane or walker  Unaware if she experiences any dysuria      Exam:  General:  Alert, pleasant, comfortable appearing, able to follow commands, but oriented only to self.  Head: Normocephalic, atraumatic.  Eyes:  EOMI. Pupils - equal, round, reactive to accomodation.  No icterus.  Normal Conjunctiva.   Oropharynx: Moist without lesion  Neck:  No  gross bruit, JVD, thyromegaly, or lymphadenopathy  Heart:  Regular with controlled rate.  No significant pathologic murmur appreciated   lungs:  Clear without rales/rhonchi/wheeze  Abdomen:  Soft and nontender with normal bowel sounds. No organomegaly or mass  Lower Limbs:  No pitting edema  Pulses:  RLE - DP:  1+                LLE - DP:  1+  Musculoskeletal: Independent movement of limbs observed, Formal ROM and strength eval not performed  Neurologic:    Oriented to: person only.   Cranial Nerves: grossly intact - vision, smell, taste, and hearing were not tested.     Motor function: grossly normal, symmetric   Sensation: Was not tested      Vitals:    05/26/25 2153 05/27/25 0012 05/27/25 0100 05/27/25 0815   BP: 144/57 (!) 153/113 109/59 130/80   BP Location:  Left arm Right arm    Pulse: 65 66  64   Resp:  18 18 16   Temp: 98.5 °F (36.9 °C) 98 °F (36.7 °C) 98.5 °F (36.9 °C) 97.7 °F (36.5 °C)   TempSrc:  Oral Oral    SpO2: 96% 95%  96%   Weight:               DATA:        Telemetry:   Sinus rate trend in the 60s    Intermittent recurrent episodes of narrow complex tachycardia consistent with atrial tachycardia              -----------------------------------------------------------------------------------------------------------------------------------------------  Weights:    Wt Readings from Last 20 Encounters:   05/24/25 64 kg (141 lb 1.5 oz)   01/06/25 55 kg (121 lb 4.1 oz)   09/04/24 55.6 kg (122 lb 9.2 oz)   08/13/24 58.1 kg (128 lb)   04/29/24 58.5 kg (128 lb 15.5 oz)   05/20/20 56.2 kg (124 lb)   03/01/20 62.8 kg (138 lb 7.2 oz)   02/21/20 61.1 kg (134 lb 11.2 oz)   10/29/19 72.6 kg (160 lb)   07/29/19 72.6 kg (160 lb)   03/26/19 69.7 kg (153 lb 9.6 oz)   06/06/18 74.6 kg (164 lb 8 oz)   12/06/17 75.3 kg (166 lb)   10/19/17 77.6 kg (171 lb)   06/06/17 76.7 kg (169 lb)   01/16/17 74.4 kg (164 lb)   11/20/16 78.5 kg (173 lb)   11/10/16 77.6 kg (171 lb)   07/30/15 81.8 kg (180 lb 4 oz)   08/12/14 79.8 kg  (176 lb)   , Body mass index is 27.56 kg/m².         Lab Studies:               Results from last 7 days   Lab Units 05/27/25  0507 05/25/25  0624 05/24/25  2112   WBC Thousand/uL 7.03 6.67 7.75   HEMOGLOBIN g/dL 12.8 13.5 15.1   HEMATOCRIT % 39.1 40.9 45.3   PLATELETS Thousands/uL 225 235 256   ,   Results from last 7 days   Lab Units 05/27/25  0507 05/25/25  0624 05/24/25  2112   POTASSIUM mmol/L 4.1 4.0 4.2   CHLORIDE mmol/L 105 105 103   CO2 mmol/L 26 27 26   BUN mg/dL 15 12 15   CREATININE mg/dL 0.49* 0.50* 0.54*   CALCIUM mg/dL 8.9 8.9 9.1   ALK PHOS U/L  --  77 89   ALT U/L  --  11 12   AST U/L  --  17 17          [1]   Past Medical History:  Diagnosis Date    Anxiety     Arthritis     Breast carcinoma ([facility]) 08/13/2012    Description: s/p L mastectomy. Dr. Diaz    Cancer (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) 03/06/2020    Pulmonary embolism ([facility]) 01/2011    last assessed 9/18/12    Restless legs syndrome 06/05/2012    RLS (restless legs syndrome)     Thoracic back pain 10/19/2017   [2]   Past Surgical History:  Procedure Laterality Date    BILATERAL OOPHORECTOMY      Laparoscopic    BREAST SURGERY Left     Mastectomy     CHOLECYSTECTOMY      Laparoscopic    HERNIA REPAIR       HYSTERECTOMY      SMALL INTESTINE SURGERY     [3]   Allergies  Allergen Reactions    Amoxicillin Hives    Celecoxib     Ciprofloxacin      Other reaction(s): diarrhea. Tolerates Levaquin    Codeine Other (See Comments)     Other reaction(s): Other (See Comments)  takes vicodin @ home  takes vicodin @ home    Epinephrine Other (See Comments)     Heart racing    Oxycodone-Acetaminophen Other (See Comments)     Nausea?    Oxycodone-Acetaminophen      Other reaction(s): Unknown Reaction    Propoxyphene Other (See Comments)    Rofecoxib     Sulfa Antibiotics Other (See Comments)     takes at home  takes at home  Other reaction(s): Other (See Comments)  takes at home    Sulfamethoxazole-Trimethoprim Other (See Comments)     Unknown, perhaps hives?    Nitrofurantoin Rash   [4]   Family History  Problem Relation Name Age of Onset    Hypertension Mother      Blindness Mother      Cancer Sister      Cancer Maternal Aunt      Cancer Maternal Uncle     [5]   Social History  Socioeconomic History    Marital status:    Tobacco Use    Smoking status: Never    Smokeless tobacco: Never    Tobacco comments:     denies   Vaping Use    Vaping status: Never Used   Substance and Sexual Activity    Alcohol use: No     Comment: denies    Drug use: No    Sexual activity: Not Currently   Social History Narrative    Lives with adult children      Social Drivers of Health     Financial Resource Strain: Low Risk  (2/23/2024)    Received from [facility]    Overall Financial Resource Strain (CARDIA)     Difficulty of Paying Living Expenses: Not hard at all   Food Insecurity: Patient Unable To Answer (5/25/2025)    Nursing - Inadequate Food Risk Classification     Worried About Running Out of Food in the Last Year: Never true     Ran Out of Food in the Last Year: Never true     Ran Out of Food in the Last Year: Patient unable to answer   Transportation Needs: Patient Unable To Answer (5/25/2025)    Nursing - Transportation  Risk Classification     Lack of Transportation: Patient unable to answer   Intimate Partner Violence: Patient Unable To Answer (2025)    Nursing IPS     Physically Hurt by Someone: Patient unable to answer     Hurt or Threatened by Someone: Patient unable to answer   Housing Stability: Patient Unable To Answer (2025)    Nursing: Inadequate Housing Risk Classification     Unable to Pay for Housing in the Last Year: Patient unable to answer     Has Housin

## 2025-05-27 NOTE — PLAN OF CARE
Problem: Potential for Falls  Goal: Patient will remain free of falls  Description: INTERVENTIONS:  - Educate patient/family on patient safety including physical limitations  - Instruct patient to call for assistance with activity   - Consider consulting OT/PT to assist with strengthening/mobility based on AM PAC & -HLM score  - Consult OT/PT to assist with strengthening/mobility   - Keep Call bell within reach  - Keep bed low and locked with side rails adjusted as appropriate  - Keep care items and personal belongings within reach  - Initiate and maintain comfort rounds  - Make Fall Risk Sign visible to staff  - Offer Toileting every 2 Hours, in advance of need  - Initiate/Maintain bed alarm  - Obtain necessary fall risk management equipment: bed alarm  - Apply yellow socks and bracelet for high fall risk patients  - Consider moving patient to room near nurses station  Outcome: Progressing     Problem: Prexisting or High Potential for Compromised Skin Integrity  Goal: Skin integrity is maintained or improved  Description: INTERVENTIONS:  - Identify patients at risk for skin breakdown  - Assess and monitor skin integrity including under and around medical devices   - Assess and monitor nutrition and hydration status  - Monitor labs  - Assess for incontinence   - Turn and reposition patient  - Assist with mobility/ambulation  - Relieve pressure over joya prominences   - Avoid friction and shearing  - Provide appropriate hygiene as needed including keeping skin clean and dry  - Evaluate need for skin moisturizer/barrier cream  - Collaborate with interdisciplinary team  - Patient/family teaching  - Consider wound care consult    Assess:  - Review Rosales scale daily  - Clean and moisturize skin   - Inspect skin when repositioning, toileting, and assisting with ADLS  - Assess under medical devices   - Assess extremities for adequate circulation and sensation     Bed Management:  - Have minimal linens on bed & keep  smooth, unwrinkled  - Change linens as needed when moist or perspiring  - Avoid sitting or lying in one position for more than 2 hours while in bed?Keep HOB at 30 degrees   - Toileting:  - Offer bedside commode  - Assess for incontinence  - Use incontinent care products after each incontinent episode     Activity:  - Mobilize patient 3 times a day  - Encourage activity and walks on unit  - Encourage or provide ROM exercises   - Turn and reposition patient every 2 Hours  - Use appropriate equipment to lift or move patient in bed  - Instruct/ Assist with weight shifting every 2hrs when out of bed in chair  - Consider limitation of chair time 2 hour intervals    Skin Care:  - Avoid use of baby powder, tape, friction and shearing, hot water or constrictive clothing  - Relieve pressure over bony prominences  - Do not massage red bony areas    Next Steps:  - Teach patient strategies to minimize risks   - Consider consults to  interdisciplinary teams   Outcome: Progressing     Problem: PAIN - ADULT  Goal: Verbalizes/displays adequate comfort level or baseline comfort level  Description: Interventions:  - Encourage patient to monitor pain and request assistance  - Assess pain using appropriate pain scale  - Administer analgesics as ordered based on type and severity of pain and evaluate response  - Implement non-pharmacological measures as appropriate and evaluate response  - Consider cultural and social influences on pain and pain management  - Notify physician/advanced practitioner if interventions unsuccessful or patient reports new pain  - Educate patient/family on pain management process including their role and importance of  reporting pain   - Provide non-pharmacologic/complimentary pain relief interventions  Outcome: Progressing     Problem: INFECTION - ADULT  Goal: Absence or prevention of progression during hospitalization  Description: INTERVENTIONS:  - Assess and monitor for signs and symptoms of infection  -  Monitor lab/diagnostic results  - Monitor all insertion sites, i.e. indwelling lines, tubes, and drains  - Monitor endotracheal if appropriate and nasal secretions for changes in amount and color  - [place] appropriate cooling/warming therapies per order  - Administer medications as ordered  - Instruct and encourage patient and family to use good hand hygiene technique  - Identify and instruct in appropriate isolation precautions for identified infection/condition  Outcome: Progressing     Problem: SAFETY ADULT  Goal: Patient will remain free of falls  Description: INTERVENTIONS:  - Educate patient/family on patient safety including physical limitations  - Instruct patient to call for assistance with activity   - Consider consulting OT/PT to assist with strengthening/mobility based on AM PAC & -HL score  - Consult OT/PT to assist with strengthening/mobility   - Keep Call bell within reach  - Keep bed low and locked with side rails adjusted as appropriate  - Keep care items and personal belongings within reach  - Initiate and maintain comfort rounds  - Make Fall Risk Sign visible to staff  - Offer Toileting every 2 Hours, in advance of need  - Initiate/Maintain bed alarm  - Obtain necessary fall risk management equipment: bed alarm  - Apply yellow socks and bracelet for high fall risk patients  - Consider moving patient to room near nurses station  Outcome: Progressing  Goal: Maintain or return to baseline ADL function  Description: INTERVENTIONS:  -  Assess patient's ability to carry out ADLs; assess patient's baseline for ADL function and identify physical deficits which impact ability to perform ADLs (bathing, care of mouth/teeth, toileting, grooming, dressing, etc.)  - Assess/evaluate cause of self-care deficits   - Assess range of motion  - Assess patient's mobility; develop plan if impaired  - Assess patient's need for assistive devices and provide as appropriate  - Encourage maximum independence but  intervene and supervise when necessary  - Involve family in performance of ADLs  - Assess for home care needs following discharge   - Consider OT consult to assist with ADL evaluation and planning for discharge  - Provide patient education as appropriate  - Monitor functional capacity and physical performance, use of AM PAC & JH-HLM   - Monitor gait, balance and fatigue with ambulation    Outcome: Progressing     Problem: DISCHARGE PLANNING  Goal: Discharge to home or other facility with appropriate resources  Description: INTERVENTIONS:  - Identify barriers to discharge w/patient and caregiver  - Arrange for needed discharge resources and transportation as appropriate  - Identify discharge learning needs (meds, wound care, etc.)  - Arrange for interpretive services to assist at discharge as needed  - Refer to Case Management Department for coordinating discharge planning if the patient needs post-hospital services based on physician/advanced practitioner order or complex needs related to functional status, cognitive ability, or social support system  Outcome: Progressing     Problem: Knowledge Deficit  Goal: Patient/family/caregiver demonstrates understanding of disease process, treatment plan, medications, and discharge instructions  Description: Complete learning assessment and assess knowledge base.  Interventions:  - Provide teaching at level of understanding  - Provide teaching via preferred learning methods  Outcome: Progressing     Problem: CARDIOVASCULAR - ADULT  Goal: Maintains optimal cardiac output and hemodynamic stability  Description: INTERVENTIONS:  - Monitor I/O, vital signs and rhythm  - Monitor for S/S and trends of decreased cardiac output  - Administer and titrate ordered vasoactive medications to optimize hemodynamic stability  - Assess quality of pulses, skin color and temperature  - Assess for signs of decreased coronary artery perfusion  - Instruct patient to report change in severity of  symptoms  Outcome: Progressing     Problem: GASTROINTESTINAL - ADULT  Goal: Minimal or absence of nausea and/or vomiting  Description: INTERVENTIONS:  - Administer IV fluids if ordered to ensure adequate hydration  - Maintain NPO status until nausea and vomiting are resolved  - Nasogastric tube if ordered  - Administer ordered antiemetic medications as needed  - Provide nonpharmacologic comfort measures as appropriate  - Advance diet as tolerated, if ordered  - Consider nutrition services referral to assist patient with adequate nutrition and appropriate food choices  Outcome: Progressing  Goal: Maintains or returns to baseline bowel function  Description: INTERVENTIONS:  - Assess bowel function  - Encourage oral fluids to ensure adequate hydration  - Administer IV fluids if ordered to ensure adequate hydration  - Administer ordered medications as needed  - Encourage mobilization and activity  - Consider nutritional services referral to assist patient with adequate nutrition and appropriate food choices  Outcome: Progressing  Goal: Maintains adequate nutritional intake  Description: INTERVENTIONS:  - Monitor percentage of each meal consumed  - Identify factors contributing to decreased intake, treat as appropriate  - Assist with meals as needed  - Monitor I&O, weight, and lab values if indicated  - Obtain nutrition services referral as needed  Outcome: Progressing     Problem: METABOLIC, FLUID AND ELECTROLYTES - ADULT  Goal: Electrolytes maintained within normal limits  Description: INTERVENTIONS:  - Monitor labs and assess patient for signs and symptoms of electrolyte imbalances  - Administer electrolyte replacement as ordered  - Monitor response to electrolyte replacements, including repeat lab results as appropriate  - Instruct patient on fluid and nutrition as appropriate  Outcome: Progressing     Problem: SKIN/TISSUE INTEGRITY - ADULT  Goal: Incision(s), wounds(s) or drain site(s) healing without S/S of  infection  Description: INTERVENTIONS  - Assess and document dressing, incision, wound bed, drain sites and surrounding tissue  - Provide patient and family education  - Perform skin care/dressing changes   Outcome: Progressing     Problem: HEMATOLOGIC - ADULT  Goal: Maintains hematologic stability  Description: INTERVENTIONS  - Assess for signs and symptoms of bleeding or hemorrhage  - Monitor labs  - Administer supportive blood products/factors as ordered and appropriate  Outcome: Progressing     Problem: Nutrition/Hydration-ADULT  Goal: Nutrient/Hydration intake appropriate for improving, restoring or maintaining nutritional needs  Description: Monitor and assess patient's nutrition/hydration status for malnutrition. Collaborate with interdisciplinary team and initiate plan and interventions as ordered.  Monitor patient's weight and dietary intake as ordered or per policy. Utilize nutrition screening tool and intervene as necessary. Determine patient's food preferences and provide high-protein, high-caloric foods as appropriate.     INTERVENTIONS:  - Monitor oral intake, urinary output, labs, and treatment plans  - Assess nutrition and hydration status and recommend course of action  - Evaluate amount of meals eaten  - Assist patient with eating if necessary   - Allow adequate time for meals  - Recommend/ encourage appropriate diets, oral nutritional supplements, and vitamin/mineral supplements  - Order, calculate, and assess calorie counts as needed  - Recommend, monitor, and adjust tube feedings and TPN/PPN based on assessed needs  - Assess need for intravenous fluids  - Provide specific nutrition/hydration education as appropriate  - Include patient/family/caregiver in decisions related to nutrition  Outcome: Progressing

## 2025-05-27 NOTE — WOUND OSTOMY CARE
Consult Note - Wound   Latanya Ray 89 y.o. female MRN: [id number]  Unit/Bed#: E5 -01 Encounter: [id number]        History and Present Illness:  Patient is a 89yo female that was admitted to [facility] or treatment of acute metabolic encephalopathy. Patient has a PMH of lobular carcinoma of breast 20921. Neoplasm of skin of face 2017   Assessment Findings:   B/L heels are dry intact and shobha with no skin loss or wounds present. Recommend preventative Hydraguard Cream and proper offloading/ repositioning.      B/L sacro-buttocks is dry, intact chronic light purple hyperpigmentation.. No skin loss or wounds present. Recommend preventative hydragaurd to area.     1.Chronic wound to forehead. Open to air no drainage or open areas  2.Chronic right sided chest wound open to air no lifting,drainage or odor.    No induration, fluctuance, odor, warmth/temperature differences, redness, or purulence noted to the above noted wounds and skin areas assessed. New dressings applied per orders listed below. Patient tolerated well- no s/s of non-verbal pain or discomfort observed during the encounter. Bedside nurse aware of plan of care. See flow sheets for more detailed assessment findings.      Orders listed below and wound care will continue to follow, call or Secure Chat with questions.     Skin care plans:  1-Hydraguard to bilateral sacrum, buttock and heels BID and PRN  2-Elevate heels to offload pressure.  3-Ehob cushion in chair when out of bed.  4-Moisturize skin daily with skin nourishing cream.  5-Turn/reposition q2h for pressure re-distribution on skin.  6-May keep chronic wounds to forehead and right chest open to air, monitor for open areas or drainage   Wounds:  Wound 05/25/25 Buttocks (Active)   Wound Image   05/27/25 1348   Wound Description Dry;Intact;Light purple 05/27/25 1348   Drainage Amount None 05/27/25 1348   Treatments Cleansed;Site care 05/27/25 1348   Dressing Protective barrier 05/27/25 1348    Patient Tolerance Tolerated well 05/27/25 1348   Dressing Status Remoistened 05/27/25 1348       Wound 05/25/25 Sternum Right (Active)   Wound Image   05/27/25 1347   Wound Description Dry;Intact;Fragile 05/27/25 1347   Drainage Amount None 05/27/25 1347   Dressing Open to air 05/27/25 1347   Patient Tolerance Tolerated well 05/27/25 1347   Dressing Status Removed 05/27/25 1347       Wound 05/25/25 Face Right;Upper (Active)   Wound Image   05/27/25 1346   Wound Description Dry;Intact;Beefy red;Brown 05/27/25 1346   Non-staged Wound Description Partial thickness 05/27/25 1346   Drainage Amount None 05/27/25 1346   Ksenia-wound Assessment Scaly 05/27/25 1346   Treatments Site care 05/27/25 1346   Dressing Open to air 05/27/25 1346   Patient Tolerance Tolerated well 05/27/25 1346     Call or Secure Chat with any questions  Wound Care will continue to follow weekly    Chey PAREDESN RN CWON

## 2025-05-27 NOTE — DISCHARGE INSTR - OTHER ORDERS
Skin care plans:  1-Hydraguard to bilateral sacrum, buttock and heels BID and PRN  2-Elevate heels to offload pressure.  3-Ehob cushion in chair when out of bed.  4-Moisturize skin daily with skin nourishing cream.  5-Turn/reposition q2h for pressure re-distribution on skin.  6-May keep chronic wounds to forehead and right chest open to air, monitor for open areas or drainage

## 2025-05-27 NOTE — ASSESSMENT & PLAN NOTE
Baseline mental status - AAOx1 to self only  In the setting of UTI. Previous episodes in the past with hallucinations in setting of UTIs  1/10/25 - Admitted for UTI, Cx grew ESBL ( <60k CFU), received 4 days of IV cefepime and discharged home  UA w/ positive nitrites, large leukocytes, 4-10 WBC, and moderate bacteria  Urine Cx: >100k CFU Klebsiella pneumoniae  CT A/P:  nonobstructing left renal calculi, no hydro  CT Head: Extensive motion degradation. No acute intracranial abnormalities noted. Mild/mod chronic microangiopathy.  Continue Rocephin D3  Follow up B12/Folate - replete if needed

## 2025-05-27 NOTE — ASSESSMENT & PLAN NOTE
Hx of Afib  SVT on telemetry  Started on metoprolol 12.5 mg twice daily, uptitrate to 25 mg twice daily  Cardiology consult

## 2025-05-27 NOTE — CASE MANAGEMENT
Case Management Discharge Planning Note    Patient name Latanya Ray  Location [facility] /E5 -* MRN [id number]  : 1926 Date 2025       Current Admission Date: 2025  Current Admission Diagnosis:Acute metabolic encephalopathy   Patient Active Problem List    Diagnosis Date Noted    Atrial tachycardia (HCC) 2025    Decubitus ulcer of sacral region, stage 1 2025    History of recurrent UTI (urinary tract infection) 10/08/2024    Gram-negative bacteremia 2024    Restless leg syndrome 2024    Generalized muscle weakness 2024    Diaphragmatic hernia without obstruction or gangrene 2024    Dysphagia, oropharyngeal phase 2024    Acute cystitis without hematuria 2023    Acute metabolic encephalopathy 2023    Legal blindness 2022    Cognitive impairment 2021    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    Vitamin D deficiency 2012    Cerebral aneurysm 2012    Anxiety 2012    History of lobular carcinoma of breast 2012      LOS (days): 2  Geometric Mean LOS (GMLOS) (days):   Days to GMLOS:     OBJECTIVE:  Risk of Unplanned Readmission Score: 13.33         Current admission status: Inpatient   Preferred Pharmacy:   [facility]/pharmacy #[id number] - [place], [place] - [address] [place] [address]  Phone: [phone] Fax: [phone]    [facility] - [address] [address]  Phone: [phone] Fax: [phone]    Primary Care Provider: Amina Rowe DO    Primary Insurance: MEDICARE  Secondary Insurance: AARP    DISCHARGE DETAILS:    Other Referral/Resources/Interventions Provided:  Interventions: C  Referral Comments: C referrals sent via [facility] to assess availability.    Additional Comments: CM called pt's daughter to complete initial assessment however she did not answer, CM left voicemail  requesting a return call. Per OT note, pt is recommended for level 3 care. CM sent prelimiary [facility] referrals, referrals can be canceled if pt's daughter is not in agreement with recommendation. CM department to follow.

## 2025-05-28 LAB
ANION GAP SERPL CALCULATED.3IONS-SCNC: 6 MMOL/L (ref 4–13)
BUN SERPL-MCNC: 18 MG/DL (ref 5–25)
CALCIUM SERPL-MCNC: 9 MG/DL (ref 8.4–10.2)
CHLORIDE SERPL-SCNC: 104 MMOL/L (ref 96–108)
CO2 SERPL-SCNC: 28 MMOL/L (ref 21–32)
CREAT SERPL-MCNC: 0.53 MG/DL (ref 0.6–1.3)
ERYTHROCYTE [DISTWIDTH] IN BLOOD BY AUTOMATED COUNT: 14.4 % (ref 11.6–15.1)
GFR SERPL CREATININE-BSD FRML MDRD: 79 ML/MIN/1.73SQ M
GLUCOSE SERPL-MCNC: 84 MG/DL (ref 65–140)
HCT VFR BLD AUTO: 40.4 % (ref 34.8–46.1)
HGB BLD-MCNC: 13.1 G/DL (ref 11.5–15.4)
MCH RBC QN AUTO: 30.2 PG (ref 26.8–34.3)
MCHC RBC AUTO-ENTMCNC: 32.4 G/DL (ref 31.4–37.4)
MCV RBC AUTO: 93 FL (ref 82–98)
PLATELET # BLD AUTO: 242 THOUSANDS/UL (ref 149–390)
PMV BLD AUTO: 9.6 FL (ref 8.9–12.7)
POTASSIUM SERPL-SCNC: 4.2 MMOL/L (ref 3.5–5.3)
RBC # BLD AUTO: 4.34 MILLION/UL (ref 3.81–5.12)
SODIUM SERPL-SCNC: 138 MMOL/L (ref 135–147)
WBC # BLD AUTO: 7.41 THOUSAND/UL (ref 4.31–10.16)

## 2025-05-28 PROCEDURE — 85027 COMPLETE CBC AUTOMATED: CPT | Performed by: PHYSICIAN ASSISTANT

## 2025-05-28 PROCEDURE — 80048 BASIC METABOLIC PNL TOTAL CA: CPT | Performed by: PHYSICIAN ASSISTANT

## 2025-05-28 PROCEDURE — 97163 PT EVAL HIGH COMPLEX 45 MIN: CPT

## 2025-05-28 PROCEDURE — 99232 SBSQ HOSP IP/OBS MODERATE 35: CPT | Performed by: INTERNAL MEDICINE

## 2025-05-28 PROCEDURE — 99232 SBSQ HOSP IP/OBS MODERATE 35: CPT

## 2025-05-28 RX ORDER — METOPROLOL TARTRATE 50 MG
50 TABLET ORAL EVERY 12 HOURS SCHEDULED
Status: DISCONTINUED | OUTPATIENT
Start: 2025-05-28 | End: 2025-05-28

## 2025-05-28 RX ORDER — AMIODARONE HYDROCHLORIDE 200 MG/1
200 TABLET ORAL
Status: DISCONTINUED | OUTPATIENT
Start: 2025-05-28 | End: 2025-05-29 | Stop reason: HOSPADM

## 2025-05-28 RX ADMIN — ENOXAPARIN SODIUM 40 MG: 40 INJECTION SUBCUTANEOUS at 08:37

## 2025-05-28 RX ADMIN — PRAMIPEXOLE DIHYDROCHLORIDE 0.25 MG: 0.25 TABLET ORAL at 22:29

## 2025-05-28 RX ADMIN — Medication 4000 UNITS: at 08:36

## 2025-05-28 RX ADMIN — METOPROLOL TARTRATE 25 MG: 25 TABLET, FILM COATED ORAL at 08:36

## 2025-05-28 RX ADMIN — Medication 1000 MCG: at 08:36

## 2025-05-28 RX ADMIN — ASPIRIN 81 MG: 81 TABLET, COATED ORAL at 22:29

## 2025-05-28 RX ADMIN — AMIODARONE HYDROCHLORIDE 200 MG: 200 TABLET ORAL at 17:26

## 2025-05-28 RX ADMIN — FAMOTIDINE 20 MG: 20 TABLET, FILM COATED ORAL at 08:36

## 2025-05-28 RX ADMIN — CEFTRIAXONE 1000 MG: 10 INJECTION, POWDER, FOR SOLUTION INTRAVENOUS at 22:27

## 2025-05-28 NOTE — CASE MANAGEMENT
Case Management Assessment & Discharge Planning Note    Patient name Latanya Ray  Location East 5 /E5 -* MRN [id number]  : 1926 Date 2025       Current Admission Date: 2025  Current Admission Diagnosis:Acute metabolic encephalopathy   Patient Active Problem List    Diagnosis Date Noted    Atrial tachycardia (HCC) 2025    Decubitus ulcer of sacral region, stage 1 2025    History of recurrent UTI (urinary tract infection) 10/08/2024    Gram-negative bacteremia 2024    Restless leg syndrome 2024    Generalized muscle weakness 2024    Diaphragmatic hernia without obstruction or gangrene 2024    Dysphagia, oropharyngeal phase 2024    Acute cystitis without hematuria 2023    Acute metabolic encephalopathy 2023    Legal blindness 2022    Cognitive impairment 2021    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    Vitamin D deficiency 2012    Cerebral aneurysm 2012    Anxiety 2012    History of lobular carcinoma of breast 2012      LOS (days): 3  Geometric Mean LOS (GMLOS) (days): 3.8  Days to GMLOS:0.7     OBJECTIVE:    Risk of Unplanned Readmission Score: 12.35      Current admission status: Inpatient    Preferred Pharmacy:   [id number] [address] [place] [address]  Phone: [phone] Fax: [phone]    [facility] - [address] [address]  Phone: [phone] Fax: [phone]    Primary Care Provider: Amina Rowe DO    Primary Insurance: MEDICARE  Secondary Insurance: AARP    ASSESSMENT:  Active Health Care Proxies       Raman Ray Alternate Health Care Agent - Son   Primary Phone: [phone] (Mobile)            Advance Directives  Does patient have a Health Care POA?: Yes  Does patient have Advance Directives?: Yes  Advance  Directives: Living will, Power of  for health care  Primary Contact: Alexa Ray    Readmission Root Cause  30 Day Readmission: No    Patient Information  Admitted from:: Home  Mental Status: Confused  During Assessment patient was accompanied by: Not accompanied during assessment  Assessment information provided by:: Son  Primary Caregiver: Child  Caregiver's Name:: pt's son Alexa  Caregiver's Relationship to Patient:: Family Member  Caregiver's Telephone Number:: [phone]  Support Systems: Son, Home care staff  County of Residence: [place]  What city do you live in?: [place]  Home entry access options. Select all that apply.: Stairs  Number of steps to enter home.: 4  Type of Current Residence: [facility]  Living Arrangements: Lives w/ Children    Activities of Daily Living Prior to Admission  Functional Status: Assistance  Completes ADLs independently?: No  Level of ADL dependence: Assistance  Ambulates independently?: No  Level of ambulatory dependence: Assistance  Does patient use assisted devices?: Yes  Assisted Devices (DME) used: Walker, Wheelchair, Bedside Commode  Does patient currently own DME?: Yes  What DME does the patient currently own?: Bedside Commode, Wheelchair, Walker  Does the patient have a history of Short-Term Rehab?: Yes  Does patient have a history of HHC?: Yes  Does patient currently have HHC?: No    Current Home Health Care  [facility] Name[facility]    Patient Information Continued  Does patient have prescription coverage?: Yes  Can the patient afford their medications and any related supplies (such as glucometers or test strips)?: Yes  Does patient receive dialysis treatments?: No  Does patient have a history of substance abuse?: No  Does patient have a history of Mental Health Diagnosis?: No     Means of Transportation  [facility]    DISCHARGE DETAILS:    Discharge planning discussed with:: pt's son  Freedom of Choice: Yes     CM contacted  family/caregiver?: Yes  Were Treatment Team discharge recommendations reviewed with patient/caregiver?: Yes  Did patient/caregiver verbalize understanding of patient care needs?: Yes  Were patient/caregiver advised of the risks associated with not following Treatment Team discharge recommendations?: Yes    Contacts  Patient Contacts: Raman Cory  Relationship to Patient:: Family  Contact Method: Phone  Phone Number: [phone]  Reason/Outcome: Emergency Contact, Discharge Planning, Continuity of Care    Requested Home Health Care         Is the patient interested in HHC at discharge?: Yes  Home Health Discipline requested:: Nursing, Occupational Therapy, Physical Therapy, Medical Social Work  [facility] Health Agency Name:: [facility]  HHA External Referral Reason (only applicable if external HHA name selected): Patient has established relationship with provider  Home Health Follow-Up Provider:: PCP  Home Health Services Needed:: Wound/Ostomy Care, Evaluate Functional Status and Safety, Strengthening/Theraputic Exercises to Improve Function, Gait/ADL Training  Homebound Criteria Met:: Requires the Assistance of Another Person for Safe Ambulation or to Leave the Home, Uses an Assist Device (i.e. cane, walker, etc)  Supporting Clincal Findings:: Limited Endurance, Fatigues Easliy in Short Distances    DME Referral Provided  Referral made for DME?: Yes  DME referral completed for the following items:: Hospital Bed  DME Supplier Name:: [employer]    Other Referral/Resources/Interventions Provided:  Interventions: HHC, DME  Referral Comments: [address] [place] [address] Home Care reserved. CM also requested hospital bed through [facility] from [employer] [facility].    Treatment Team Recommendation: Home with Home Health Care  Discharge Destination Plan:: Home with Home Health Care  Transport at Discharge : BLS Ambulance     IMM Given (Date):: 05/28/25  IMM Given to:: Family  Family notified:: Reviewed with pt's son.  Additional Comments: Cm spoke  with pt's son Alexa Ray at [phone]. He is now the primary caregiver for this pt as his sister recently passed away (pt is not aware). Pt lives in a ranch home with 3-4 SUSI. She requires assist x1 at baseline and uses walker, wc (transport) and BSC at baseline. SHe sleeps in a recliner and son asked cm to order a hospital bed. Cm placed request with [facility] [address] and will notify son if it is covered by her insurance. Pt has had STR in the past and family does not wish for her to return.  also reserved [facility] for home PT, OT, RN, and MSW. Pt's son will arrange [employer] to resume day time coverage at the home. Pt will need transport home.

## 2025-05-28 NOTE — ASSESSMENT & PLAN NOTE
Hx of Afib  Intermittent SVT on telemetry  Started on metoprolol 12.5 mg twice daily, uptitrate to 25 mg twice daily  Cardiology consult, input appreciated

## 2025-05-28 NOTE — PROGRESS NOTES
Patient:  DORIE CARSON    MRN:  [id number]    [place]ki[place] Request ID:  [id number]    Level of care reserved:  Home Health Agency    Partner Reserved:  [address] [place] [address] [facility], [place] [id number] [phone]    Clinical needs requested:    Geography searched:  [id number]    Start of Service:    Request sent:  11:36am EDT on 5/27/2025 by Taisha Vega    Partner reserved:  1:23pm EDT on 5/28/2025 by Melissa Barrientos    Choice list shared:  1:22pm EDT on 5/28/2025 by Melissa Barrientos

## 2025-05-28 NOTE — ASSESSMENT & PLAN NOTE
Blanchable discoloration over buttocks--apply barrier cream   Wound care consult, input appreciated

## 2025-05-28 NOTE — PHYSICAL THERAPY NOTE
PHYSICAL THERAPY EVALUATION          Patient Name: Latanya Ray  Today's Date: 5/28/2025 05/28/25 1417   PT Last Visit   PT Visit Date 05/28/25   Note Type   Note type Evaluation   Pain Assessment   Pain Assessment Tool 0-10   Pain Score No Pain   Restrictions/Precautions   Other Precautions Contact/isolation;Cognitive;Bed Alarm;Chair Alarm;Fall Risk;Telemetry   Home Living   Type of Home House   Home Layout One level   Bathroom Equipment Commode   Home Equipment Walker;Wheelchair-manual   Additional Comments 4 SUSI. per chart review   Prior Function   Level of [place] Needs assistance with ADLs;Needs assistance with functional mobility;Needs assistance with IADLS   Receives Help From Family;Home health  (per chart review has HH aide. son is caregiver)   Comments per chart review. pt sleeps in recliner. Ax1 w prev notes indicating pt primarily non amb, uses wc   General   Additional Pertinent History pt admitted 5/24/25 for acute metabolic encephalopathy. PMHx significant for cognitive impairment, RLS, anxiety, CA, arthritis, osteoporosis, chronic pain syndrome   Cognition   Overall Cognitive Status Impaired   Arousal/Participation Responsive   Attention Difficulty attending to directions   Orientation Level Disoriented X4  (responds to first name, doesnt know last name)   Following Commands Follows one step commands inconsistently   Comments hx cognitive impairment   Bed Mobility   Rolling R 1  Dependent   Additional items Assist x 1   Rolling L 1  Dependent   Additional items Assist x 1   Supine to Sit 2  Maximal assistance   Additional items Assist x 2   Sit to Supine 1  Dependent   Additional items Assist x 2   Transfers   Sit to Stand 2  Maximal assistance   Additional items Assist x 2   Stand to Sit 2  Maximal assistance   Additional items Assist x 2   Balance   Static Sitting Fair -  (retropulsive)   Static  Standing Zero  (Ax2)   Activity Tolerance   Activity Tolerance Other (Comment)  (cognition, fearfulness)   Medical Staff Made Aware Li PCA; CM, DO   Assessment   Prognosis Guarded   Problem List Decreased strength;Decreased cognition;Decreased safety awareness;Impaired judgement;Decreased mobility   Assessment Latanya Ray is a 89 y.o. female admitted to [facility] on 5/24/2025 for Acute metabolic encephalopathy. PT was consulted and pt was seen on 5/28/2025 for mobility assessment and d/c planning. Pt presents w contact isolation, fall risk. Per chart review pt gets A for ADLs, IADLs and mobility. Ax1; prev notes indicate primarily non amb. Pt is currently functioning at a dep Ax2 for bed mobility, max Ax2 for transfers (dep Ax2 to pivot). Pt demonstrated resistive behaviors impacting participation. Was able to hold herself unsupported at EOB for brief periods of time. Given her fearfulness to fall she had limited standing tolerance. Pt will benefit from continued skilled IP PT to address the above mentioned impairments  in order to maximize recovery and increase functional independence when completing mobility and ADLs.   Barriers to Discharge None   Goals   Patient Goals not fall, get back in bed   RUST Expiration Date 06/07/25   Short Term Goal #1 1) Pt will perform bed mobility max Ax1 demonstrating appropriate technique 100% of the time in order to improve function. 2) Pt will perform all transfers max Ax1 demonstrating safe technique 100% of the time in order to improve ability to negotiate safely in home environment. 3) Improve overall strength and balance 1/2 grade in order to optimize ability to perform functional tasks and reduce fall risk. 5) Improve am-pac by 2 to demonstrate functional progress and return to baseline function/reduced caregiver burden.   Plan   Treatment/Interventions Functional transfer training;LE strengthening/ROM;Therapeutic exercise;Patient/family training;Cognitive  reorientation;Equipment eval/education;Bed mobility;Compensatory technique education;Continued evaluation;Gait training;OT;Spoke to case management;Spoke to MD   PT Frequency Other (Comment)  (x1/wk)   Discharge Recommendation   Rehab Resource Intensity Level, PT No post-acute rehabilitation needs  (difficulty participating in therapy dt cognitive deficits. If family unable to cont to care for patient may need to consider transition to LTC)   AM-PAC Basic Mobility Inpatient   Turning in Flat Bed Without Bedrails 1   Lying on Back to Sitting on Edge of Flat Bed Without Bedrails 1   Moving Bed to Chair 1   Standing Up From Chair Using Arms 1   Walk in Room 1   Climb 3-5 Stairs With Railing 1   Basic Mobility Inpatient Raw Score 6   Turning Head Towards Sound 2   Follow Simple Instructions 2   Low Function Basic Mobility Raw Score  10   Low Function Basic Mobility Standardized Score  14.65   [facility] Highest Level Of Mobility   -HL Goal 2: Bed activities/Dependent transfer   -HL Achieved 3: Sit at edge of bed   End of Consult   Patient Position at End of Consult Supine;Bed/Chair alarm activated;All needs within reach     History: co - morbidities including age, social background, assist mobility, assist for adl's, cognition, current experience including fall risk, multiple lines, contact isolation  Exam: impairments in systems including multiple body structures involved; neuromuscular (balance,transfers), cognition; activity limitations  (difficulties executing an action); participation restrictions (problems associated w involvement in life situations), AM-PAC  Clinical: unstable/unpredictable  Complexity:high    Any Arzola, PT

## 2025-05-28 NOTE — PROGRESS NOTES
Progress Note - Hospitalist   Name: Latanya Ray 89 y.o. female I MRN: [id number]  Unit/Bed#: E5 -01 I Date of Admission: 5/24/2025   Date of Service: 5/28/2025 I Hospital Day: 3    Assessment & Plan  Acute metabolic encephalopathy  Acute cystitis without hematuria  Baseline mental status - AAOx1 to self only  In the setting of UTI. Previous episodes in the past with hallucinations in setting of UTIs  1/10/25 - Admitted for UTI, Cx grew ESBL ( <60k CFU), received 4 days of IV cefepime and discharged home  UA w/ positive nitrites, large leukocytes, 4-10 WBC, and moderate bacteria  Urine Cx: >100k CFU Klebsiella pneumoniae  CT A/P:  nonobstructing left renal calculi, no hydro  CT Head: Extensive motion degradation. No acute intracranial abnormalities noted. Mild/mod chronic microangiopathy.  Continue Rocephin, day 5  Baseline mental status - AAOx1 to self only  In the setting of UTI. Previous episodes in the past with hallucinations in setting of UTIs  1/10/25 - Admitted for UTI, Cx grew ESBL ( <60k CFU), received 4 days of IV cefepime and discharged home  UA w/ positive nitrites, large leukocytes, 4-10 WBC, and moderate bacteria  Urine Cx: >100k CFU Klebsiella pneumoniae  CT A/P:  nonobstructing left renal calculi, no hydro  CT Head: Extensive motion degradation. No acute intracranial abnormalities noted. Mild/mod chronic microangiopathy.  Continue Rocephin D5  Atrial tachycardia (HCC)  Hx of Afib  Intermittent SVT on telemetry  Started on metoprolol 12.5 mg twice daily, uptitrate to 25 mg twice daily  Cardiology consult, input appreciated  Restless leg syndrome  Cont requip  Generalized muscle weakness  PT/OT evaluations rec level 3 rehab intensity  Case management referrals for [facility] placed  Right lower extremity duplex ordered for pain and negative for acute or chronic  Legal blindness  Noted, patient requires assistance with ADLs  Cognitive impairment  Son is now her caregiver and POA. Her daughter passed  away last week and the patient is unaware of this.  Vitamin D deficiency  Continue home vitamin D  Decubitus ulcer of sacral region, stage 1  Blanchable discoloration over buttocks--apply barrier cream   Wound care consult, input appreciated    VTE Pharmacologic Prophylaxis: VTE Score: 5 High Risk (Score >/= 5) - Pharmacological DVT Prophylaxis Ordered: enoxaparin (Lovenox). Sequential Compression Devices Ordered.    Mobility:   Basic Mobility Inpatient Raw Score: 6  JH-HLM Goal: 2: Bed activities/Dependent transfer  JH-HLM Achieved: 2: Bed activities/Dependent transfer  JH-HLM Goal achieved. Continue to encourage appropriate mobility.    Patient Centered Rounds: I performed bedside rounds with nursing staff today.   Discussions with Specialists or Other Care Team Provider: Cardiology, case management    Education and Discussions with Family / Patient: Updated  (son) via phone.    Current Length of Stay: 3 day(s)  Current Patient Status: Inpatient   Certification Statement: The patient will continue to require additional inpatient hospital stay due to telemetry monitoring and IV antibiotic  Discharge Plan: Anticipate discharge in 24-48 hrs to home with home services.    Code Status: Level 3 - DNAR and DNI    Subjective   Patient denies pain.  She is oriented x 1.  She has no questions or concerns at this time and would like to rest.    Objective :  Temp:  [98.2 °F (36.8 °C)] 98.2 °F (36.8 °C)  HR:  [] 57  BP: (135-137)/(65-78) 135/65  SpO2:  [94 %-95 %] 95 %    Body mass index is 27.56 kg/m².     Input and Output Summary (last 24 hours):     Intake/Output Summary (Last 24 hours) at 5/28/2025 1517  Last data filed at 5/28/2025 1332  Gross per 24 hour   Intake 472 ml   Output --   Net 472 ml       Physical Exam  Vitals and nursing note reviewed.   Constitutional:       General: She is not in acute distress.     Appearance: Normal appearance. She is not ill-appearing.   HENT:      Head:  Normocephalic.      Mouth/Throat:      Mouth: Mucous membranes are moist.      Pharynx: Oropharynx is clear.     Eyes:      Conjunctiva/sclera: Conjunctivae normal.       Cardiovascular:      Rate and Rhythm: Normal rate and regular rhythm.      Pulses: Normal pulses.      Heart sounds: Normal heart sounds. No murmur heard.  Pulmonary:      Effort: Pulmonary effort is normal. No respiratory distress.      Breath sounds: Normal breath sounds. No wheezing or rhonchi.   Abdominal:      General: Bowel sounds are normal. There is no distension.      Palpations: Abdomen is soft.      Tenderness: There is abdominal tenderness in the right upper quadrant. There is no guarding.     Musculoskeletal:      Right lower leg: No edema.      Left lower leg: No edema.     Skin:     General: Skin is warm and dry.     Neurological:      Mental Status: She is alert. Mental status is at baseline. She is disoriented.     Psychiatric:         Mood and Affect: Mood normal.           Lines/Drains:        Telemetry:  Telemetry Orders (From admission, onward)               24 Hour Telemetry Monitoring  Continuous x 24 Hours (Telem)        Expiring   Question:  Reason for 24 Hour Telemetry  Answer:  Metabolic/electrolyte disturbance with high probability of dysrhythmia. K level <3 or >6 OR KCL infusion >10mEq/hr  Comment:  Encephalopathy 2/2 UTI                     Telemetry Reviewed: Normal Sinus Rhythm and Sinus Tachycardia  Indication for Continued Telemetry Use: Arrthymias requiring medical therapy               Lab Results: I have reviewed the following results:   Results from last 7 days   Lab Units 05/28/25  0603 05/27/25  0507 05/25/25  0624   WBC Thousand/uL 7.41   < > 6.67   HEMOGLOBIN g/dL 13.1   < > 13.5   HEMATOCRIT % 40.4   < > 40.9   PLATELETS Thousands/uL 242   < > 235   SEGS PCT %  --   --  59   LYMPHO PCT %  --   --  29   MONO PCT %  --   --  9   EOS PCT %  --   --  2    < > = values in this interval not displayed.      Results from last 7 days   Lab Units 05/28/25  0603 05/27/25  0507 05/25/25  0624   SODIUM mmol/L 138   < > 137   POTASSIUM mmol/L 4.2   < > 4.0   CHLORIDE mmol/L 104   < > 105   CO2 mmol/L 28   < > 27   BUN mg/dL 18   < > 12   CREATININE mg/dL 0.53*   < > 0.50*   ANION GAP mmol/L 6   < > 5   CALCIUM mg/dL 9.0   < > 8.9   ALBUMIN g/dL  --   --  3.5   TOTAL BILIRUBIN mg/dL  --   --  0.62   ALK PHOS U/L  --   --  77   ALT U/L  --   --  11   AST U/L  --   --  17   GLUCOSE RANDOM mg/dL 84   < > 84    < > = values in this interval not displayed.     Results from last 7 days   Lab Units 05/24/25  2112   INR  0.95             Results from last 7 days   Lab Units 05/24/25  2112   LACTIC ACID mmol/L 1.9   PROCALCITONIN ng/ml <0.05       Recent Cultures (last 7 days):   Results from last 7 days   Lab Units 05/25/25  0749   URINE CULTURE  >100,000 cfu/ml Klebsiella pneumoniae*       Imaging Results Review: I reviewed radiology reports from this admission including: Ultrasound(s).  Other Study Results Review: No additional pertinent studies reviewed.    Last 24 Hours Medication List:     Current Facility-Administered Medications:     acetaminophen (TYLENOL) tablet 650 mg, Q6H PRN    aspirin (ECOTRIN LOW STRENGTH) EC tablet 81 mg, HS    calcium carbonate (TUMS) chewable tablet 1,000 mg, Daily PRN    cefTRIAXone (ROCEPHIN) 1,000 mg in dextrose 5 % 50 mL IVPB, Q24H, Last Rate: 1,000 mg (05/27/25 2205)    Cholecalciferol (VITAMIN D3) tablet 4,000 Units, Daily    cyanocobalamin (VITAMIN B-12) tablet 1,000 mcg, Every Other Day    enoxaparin (LOVENOX) subcutaneous injection 40 mg, Daily    famotidine (PEPCID) tablet 20 mg, Daily    melatonin tablet 3 mg, HS PRN    metoprolol tartrate (LOPRESSOR) tablet 25 mg, Q12H HERMAN    ondansetron (ZOFRAN) injection 4 mg, Q6H PRN    pramipexole (MIRAPEX) tablet 0.25 mg, HS    Administrative Statements   Today, Patient Was Seen By: MARINA Chan      **Please Note: This note may have  been constructed using a voice recognition system.**

## 2025-05-28 NOTE — PLAN OF CARE
Problem: Potential for Falls  Goal: Patient will remain free of falls  Description: INTERVENTIONS:  - Educate patient/family on patient safety including physical limitations  - Instruct patient to call for assistance with activity   - Consider consulting OT/PT to assist with strengthening/mobility based on AM PAC & -HLM score  - Consult OT/PT to assist with strengthening/mobility   - Keep Call bell within reach  - Keep bed low and locked with side rails adjusted as appropriate  - Keep care items and personal belongings within reach  - Initiate and maintain comfort rounds  - Make Fall Risk Sign visible to staff  - Offer Toileting every 2 Hours, in advance of need  - Initiate/Maintain bed alarm  - Obtain necessary fall risk management equipment: bed alarm  - Apply yellow socks and bracelet for high fall risk patients  - Consider moving patient to room near nurses station  Outcome: Progressing     Problem: Prexisting or High Potential for Compromised Skin Integrity  Goal: Skin integrity is maintained or improved  Description: INTERVENTIONS:  - Identify patients at risk for skin breakdown  - Assess and monitor skin integrity including under and around medical devices   - Assess and monitor nutrition and hydration status  - Monitor labs  - Assess for incontinence   - Turn and reposition patient  - Assist with mobility/ambulation  - Relieve pressure over joya prominences   - Avoid friction and shearing  - Provide appropriate hygiene as needed including keeping skin clean and dry  - Evaluate need for skin moisturizer/barrier cream  - Collaborate with interdisciplinary team  - Patient/family teaching  - Consider wound care consult    Assess:  - Review Rosales scale daily  - Clean and moisturize skin   - Inspect skin when repositioning, toileting, and assisting with ADLS  - Assess under medical devices   - Assess extremities for adequate circulation and sensation     Bed Management:  - Have minimal linens on bed & keep  smooth, unwrinkled  - Change linens as needed when moist or perspiring  - Avoid sitting or lying in one position for more than 2 hours while in bed?Keep HOB at 30 degrees   - Toileting:  - Offer bedside commode  - Assess for incontinence  - Use incontinent care products after each incontinent episode     Activity:  - Mobilize patient 3 times a day  - Encourage activity and walks on unit  - Encourage or provide ROM exercises   - Turn and reposition patient every 2 Hours  - Use appropriate equipment to lift or move patient in bed  - Instruct/ Assist with weight shifting every 2hrs when out of bed in chair  - Consider limitation of chair time 2 hour intervals    Skin Care:  - Avoid use of baby powder, tape, friction and shearing, hot water or constrictive clothing  - Relieve pressure over bony prominences  - Do not massage red bony areas    Next Steps:  - Teach patient strategies to minimize risks   - Consider consults to  interdisciplinary teams   Outcome: Progressing     Problem: PAIN - ADULT  Goal: Verbalizes/displays adequate comfort level or baseline comfort level  Description: Interventions:  - Encourage patient to monitor pain and request assistance  - Assess pain using appropriate pain scale  - Administer analgesics as ordered based on type and severity of pain and evaluate response  - Implement non-pharmacological measures as appropriate and evaluate response  - Consider cultural and social influences on pain and pain management  - Notify physician/advanced practitioner if interventions unsuccessful or patient reports new pain  - Educate patient/family on pain management process including their role and importance of  reporting pain   - Provide non-pharmacologic/complimentary pain relief interventions  Outcome: Progressing     Problem: INFECTION - ADULT  Goal: Absence or prevention of progression during hospitalization  Description: INTERVENTIONS:  - Assess and monitor for signs and symptoms of infection  -  Monitor lab/diagnostic results  - Monitor all insertion sites, i.e. indwelling lines, tubes, and drains  - Monitor endotracheal if appropriate and nasal secretions for changes in amount and color  - [place] appropriate cooling/warming therapies per order  - Administer medications as ordered  - Instruct and encourage patient and family to use good hand hygiene technique  - Identify and instruct in appropriate isolation precautions for identified infection/condition  Outcome: Progressing     Problem: SAFETY ADULT  Goal: Patient will remain free of falls  Description: INTERVENTIONS:  - Educate patient/family on patient safety including physical limitations  - Instruct patient to call for assistance with activity   - Consider consulting OT/PT to assist with strengthening/mobility based on AM PAC & -HL score  - Consult OT/PT to assist with strengthening/mobility   - Keep Call bell within reach  - Keep bed low and locked with side rails adjusted as appropriate  - Keep care items and personal belongings within reach  - Initiate and maintain comfort rounds  - Make Fall Risk Sign visible to staff  - Offer Toileting every 2 Hours, in advance of need  - Initiate/Maintain bed alarm  - Obtain necessary fall risk management equipment: bed alarm  - Apply yellow socks and bracelet for high fall risk patients  - Consider moving patient to room near nurses station  Outcome: Progressing  Goal: Maintain or return to baseline ADL function  Description: INTERVENTIONS:  -  Assess patient's ability to carry out ADLs; assess patient's baseline for ADL function and identify physical deficits which impact ability to perform ADLs (bathing, care of mouth/teeth, toileting, grooming, dressing, etc.)  - Assess/evaluate cause of self-care deficits   - Assess range of motion  - Assess patient's mobility; develop plan if impaired  - Assess patient's need for assistive devices and provide as appropriate  - Encourage maximum independence but  intervene and supervise when necessary  - Involve family in performance of ADLs  - Assess for home care needs following discharge   - Consider OT consult to assist with ADL evaluation and planning for discharge  - Provide patient education as appropriate  - Monitor functional capacity and physical performance, use of AM PAC & JH-HLM   - Monitor gait, balance and fatigue with ambulation    Outcome: Progressing     Problem: DISCHARGE PLANNING  Goal: Discharge to home or other facility with appropriate resources  Description: INTERVENTIONS:  - Identify barriers to discharge w/patient and caregiver  - Arrange for needed discharge resources and transportation as appropriate  - Identify discharge learning needs (meds, wound care, etc.)  - Arrange for interpretive services to assist at discharge as needed  - Refer to Case Management Department for coordinating discharge planning if the patient needs post-hospital services based on physician/advanced practitioner order or complex needs related to functional status, cognitive ability, or social support system  Outcome: Progressing     Problem: Knowledge Deficit  Goal: Patient/family/caregiver demonstrates understanding of disease process, treatment plan, medications, and discharge instructions  Description: Complete learning assessment and assess knowledge base.  Interventions:  - Provide teaching at level of understanding  - Provide teaching via preferred learning methods  Outcome: Progressing     Problem: CARDIOVASCULAR - ADULT  Goal: Maintains optimal cardiac output and hemodynamic stability  Description: INTERVENTIONS:  - Monitor I/O, vital signs and rhythm  - Monitor for S/S and trends of decreased cardiac output  - Administer and titrate ordered vasoactive medications to optimize hemodynamic stability  - Assess quality of pulses, skin color and temperature  - Assess for signs of decreased coronary artery perfusion  - Instruct patient to report change in severity of  symptoms  Outcome: Progressing     Problem: GASTROINTESTINAL - ADULT  Goal: Minimal or absence of nausea and/or vomiting  Description: INTERVENTIONS:  - Administer IV fluids if ordered to ensure adequate hydration  - Maintain NPO status until nausea and vomiting are resolved  - Nasogastric tube if ordered  - Administer ordered antiemetic medications as needed  - Provide nonpharmacologic comfort measures as appropriate  - Advance diet as tolerated, if ordered  - Consider nutrition services referral to assist patient with adequate nutrition and appropriate food choices  Outcome: Progressing  Goal: Maintains or returns to baseline bowel function  Description: INTERVENTIONS:  - Assess bowel function  - Encourage oral fluids to ensure adequate hydration  - Administer IV fluids if ordered to ensure adequate hydration  - Administer ordered medications as needed  - Encourage mobilization and activity  - Consider nutritional services referral to assist patient with adequate nutrition and appropriate food choices  Outcome: Progressing  Goal: Maintains adequate nutritional intake  Description: INTERVENTIONS:  - Monitor percentage of each meal consumed  - Identify factors contributing to decreased intake, treat as appropriate  - Assist with meals as needed  - Monitor I&O, weight, and lab values if indicated  - Obtain nutrition services referral as needed  Outcome: Progressing     Problem: METABOLIC, FLUID AND ELECTROLYTES - ADULT  Goal: Electrolytes maintained within normal limits  Description: INTERVENTIONS:  - Monitor labs and assess patient for signs and symptoms of electrolyte imbalances  - Administer electrolyte replacement as ordered  - Monitor response to electrolyte replacements, including repeat lab results as appropriate  - Instruct patient on fluid and nutrition as appropriate  Outcome: Progressing     Problem: SKIN/TISSUE INTEGRITY - ADULT  Goal: Incision(s), wounds(s) or drain site(s) healing without S/S of  infection  Description: INTERVENTIONS  - Assess and document dressing, incision, wound bed, drain sites and surrounding tissue  - Provide patient and family education  - Perform skin care/dressing changes   Outcome: Progressing     Problem: HEMATOLOGIC - ADULT  Goal: Maintains hematologic stability  Description: INTERVENTIONS  - Assess for signs and symptoms of bleeding or hemorrhage  - Monitor labs  - Administer supportive blood products/factors as ordered and appropriate  Outcome: Progressing     Problem: Nutrition/Hydration-ADULT  Goal: Nutrient/Hydration intake appropriate for improving, restoring or maintaining nutritional needs  Description: Monitor and assess patient's nutrition/hydration status for malnutrition. Collaborate with interdisciplinary team and initiate plan and interventions as ordered.  Monitor patient's weight and dietary intake as ordered or per policy. Utilize nutrition screening tool and intervene as necessary. Determine patient's food preferences and provide high-protein, high-caloric foods as appropriate.     INTERVENTIONS:  - Monitor oral intake, urinary output, labs, and treatment plans  - Assess nutrition and hydration status and recommend course of action  - Evaluate amount of meals eaten  - Assist patient with eating if necessary   - Allow adequate time for meals  - Recommend/ encourage appropriate diets, oral nutritional supplements, and vitamin/mineral supplements  - Order, calculate, and assess calorie counts as needed  - Recommend, monitor, and adjust tube feedings and TPN/PPN based on assessed needs  - Assess need for intravenous fluids  - Provide specific nutrition/hydration education as appropriate  - Include patient/family/caregiver in decisions related to nutrition  Outcome: Progressing

## 2025-05-28 NOTE — PROGRESS NOTES
Cardiology Progress Note   MD Angus Styles MD, FACC  Gonzalez Sun DO, [facility]  MD Maia Gipson DO, [facility] Landers [facility], [facility]  ----------------------------------------------------------------  [address] [address]    Latanya Ray 89 y.o. female MRN: [id number]  Unit/Bed#: E5 -01 Encounter: [id number]      ASSESSMENT:   Paroxysmal atrial tachycardia  Acute metabolic encephalopathy  Acute cystitis  Cognitive impairment/probable dementia  Restless leg syndrome    PLAN:  Patient had evidence of atrial tachycardia in the midst of acute cystitis  She had burst of atrial tachycardia overnight  Patient continues to have significant runs of PAT  Risks, benefits and alternatives to continued amiodarone use have been discussed with the patient including the risk of pulmonary abnormalities including fibrosis, thyroid abnormalities and liver abnormalities.  Patient is aware of the need for annual eye exams.   Start amiodarone 200 mg 3 times daily for 7 days and then 200 mg daily thereafter  Hold metoprolol for now with plan to reinstitute at 25 mg dosing in the next 24 hours if heart rates stable  Management of cystitis as per medicine team  May require further volume expansion, but given age if she is not taking oral fluids sufficiently, may require very slow IV fluids  After 24 hours with no further runs of PAT, reasonable for discharge from CV perspective  Continue aspirin  Outpatient follow-up for additional CV testing as clinically indicated    Signed: Gonzalez Sun DO, [facility], CHRISTIANO PA, FACP      History of Present Illness:  Patient seen and examined.  Denies chest pain, pressure, tightness or squeezing.  Denies lightheadedness, dizziness or palpitations.  Denies lower extremity swelling, orthopnea or paroxysmal nocturnal dyspnea.      Review of Systems:  Review of Systems   Constitutional: Negative for decreased appetite,  fever, weight gain and weight loss.   HENT:  Negative for congestion and sore throat.    Eyes:  Negative for visual disturbance.   Cardiovascular:  Negative for chest pain, dyspnea on exertion, leg swelling, near-syncope and palpitations.   Respiratory:  Negative for cough and shortness of breath.    Hematologic/Lymphatic: Negative for bleeding problem.   Skin:  Negative for rash.   Musculoskeletal:  Negative for myalgias and neck pain.   Gastrointestinal:  Negative for abdominal pain and nausea.   Neurological:  Negative for light-headedness and weakness.   Psychiatric/Behavioral:  Negative for depression.        Allergies[1]    No current facility-administered medications on file prior to encounter.     Current Outpatient Medications on File Prior to Encounter   Medication Sig    aspirin (ECOTRIN LOW STRENGTH) 81 mg EC tablet Take 81 mg by mouth daily at bedtime    Calcium Carbonate-Vit D-Min (CALCIUM 1200 PO) Take 750 mg by mouth in the morning At 3pm    cholecalciferol (VITAMIN D3) 1,000 units tablet Take 4,000 Units by mouth in the morning.    Cranberry 250 MG TABS Take 2 tablets (500 mg total) by mouth 2 (two) times a day    omeprazole (PriLOSEC) 20 mg delayed release capsule TAKE 1 CAPSULE BY MOUTH EVERY DAY    pramipexole (MIRAPEX) 0.25 mg tablet TAKE 1 TABLET (0.25 MG TOTAL) BY MOUTH DAILY AT BEDTIME    vitamin B-12 (VITAMIN B-12) 1,000 mcg tablet Take 2,500 mcg by mouth every other day    Klayesta powder APPLY TO AFFECTED AREA TWICE A DAY        Current Facility-Administered Medications   Medication Dose Route Frequency Provider Last Rate    acetaminophen  650 mg Oral Q6H PRN Katie Astudilloks, DO      aspirin  81 mg Oral HS Katie Astudilloks, DO      calcium carbonate  1,000 mg Oral Daily PRN Katie Wuerstle Ledezma, DO      cefTRIAXone  1,000 mg Intravenous Q24H Katie Astudilloks, DO 1,000 mg (05/27/25 2205)    cholecalciferol  4,000 Units Oral Daily Katie Jensentle Brisa, DO      vitamin B-12   "1,000 mcg Oral Every Other Day Katie Ledezma, DO      enoxaparin  40 mg Subcutaneous Daily Katie Lauren Ledezma, DO      famotidine  20 mg Oral Daily Katie Lauren Ledezma, DO      melatonin  3 mg Oral HS PRN Willa Lantigua, CRRAE      metoprolol tartrate  25 mg Oral Q12H [facility] Johann Felix PA-C      ondansetron  4 mg Intravenous Q6H PRN Katie Ledezma, DO      pramipexole  0.25 mg Oral HS Katie Lauren Astudilloks, DO              Vitals:    05/27/25 0100 05/27/25 0815 05/27/25 1457 05/27/25 2140   BP: 109/59 130/80 107/53 137/78   BP Location: Right arm      Pulse:  64 69 (!) 120   Resp: 18 16     Temp: 98.5 °F (36.9 °C) 97.7 °F (36.5 °C) 97.5 °F (36.4 °C)    TempSrc: Oral      SpO2:  96% 94% 94%   Weight:         Body mass index is 27.56 kg/m².      Intake/Output Summary (Last 24 hours) at 5/28/2025 0535  Last data filed at 5/27/2025 0815  Gross per 24 hour   Intake 240 ml   Output --   Net 240 ml       Weight change:     PHYSICAL EXAMINATION:  Gen: Awake, Alert, NAD  Head/eyes: AT/NC, pupils equal and round, Anicteric  ENT: mmm  Neck: Supple, No elevated JVP, trachea midline  Resp: CTA bilaterally no w/r/r  CV: RRR +S1, S2, No m/r/g  Abd: Soft, NT/ND + BS  Ext: no LE edema bilaterally  Neuro: Follows commands, moves all extermities  Psych: Appropriate affect, normal mood, pleasant attitude, non-combative  Skin: warm; no rash, erythema or venous stasis changes on exposed skin    Lab Results:  Results from last 7 days   Lab Units 05/27/25  0507   WBC Thousand/uL 7.03   HEMOGLOBIN g/dL 12.8   HEMATOCRIT % 39.1   PLATELETS Thousands/uL 225     Results from last 7 days   Lab Units 05/27/25  0507 05/25/25  0624   POTASSIUM mmol/L 4.1 4.0   CHLORIDE mmol/L 105 105   CO2 mmol/L 26 27   BUN mg/dL 15 12   CREATININE mg/dL 0.49* 0.50*   CALCIUM mg/dL 8.9 8.9   ALK PHOS U/L  --  77   ALT U/L  --  11   AST U/L  --  17     No results found for: \"TROPONINT\"      Results from last 7 days   Lab Units 05/25/25  0210 " 05/24/25 2302 05/24/25 2112   HS TNI 0HR ng/L  --   --  5   HS TNI 2HR ng/L  --  5  --    HS TNI 4HR ng/L 4  --   --      Results from last 7 days   Lab Units 05/24/25 2112   INR  0.95       Tele:     This note was completed in part utilizing M-Modal Fluency Direct Software.  Grammatical errors, random word insertions, spelling mistakes, and incomplete sentences may be an occasional consequence of this system secondary to software limitations, ambient noise, and hardware issues.  If you have any questions or concerns about the content, text, or information contained within the body of this dictation, please contact the provider for clarification.           [1]   Allergies  Allergen Reactions    Amoxicillin Hives    Celecoxib     Ciprofloxacin      Other reaction(s): diarrhea. Tolerates Levaquin    Codeine Other (See Comments)     Other reaction(s): Other (See Comments)  takes vicodin @ home  takes vicodin @ home    Epinephrine Other (See Comments)     Heart racing    Oxycodone-Acetaminophen Other (See Comments)     Nausea?    Oxycodone-Acetaminophen      Other reaction(s): Unknown Reaction    Propoxyphene Other (See Comments)    Rofecoxib     Sulfa Antibiotics Other (See Comments)     takes at home  takes at home  Other reaction(s): Other (See Comments)  takes at home    Sulfamethoxazole-Trimethoprim Other (See Comments)     Unknown, perhaps hives?    Nitrofurantoin Rash

## 2025-05-28 NOTE — ASSESSMENT & PLAN NOTE
Baseline mental status - AAOx1 to self only  In the setting of UTI. Previous episodes in the past with hallucinations in setting of UTIs  1/10/25 - Admitted for UTI, Cx grew ESBL ( <60k CFU), received 4 days of IV cefepime and discharged home  UA w/ positive nitrites, large leukocytes, 4-10 WBC, and moderate bacteria  Urine Cx: >100k CFU Klebsiella pneumoniae  CT A/P:  nonobstructing left renal calculi, no hydro  CT Head: Extensive motion degradation. No acute intracranial abnormalities noted. Mild/mod chronic microangiopathy.  Continue Rocephin, day 5  Baseline mental status - AAOx1 to self only  In the setting of UTI. Previous episodes in the past with hallucinations in setting of UTIs  1/10/25 - Admitted for UTI, Cx grew ESBL ( <60k CFU), received 4 days of IV cefepime and discharged home  UA w/ positive nitrites, large leukocytes, 4-10 WBC, and moderate bacteria  Urine Cx: >100k CFU Klebsiella pneumoniae  CT A/P:  nonobstructing left renal calculi, no hydro  CT Head: Extensive motion degradation. No acute intracranial abnormalities noted. Mild/mod chronic microangiopathy.  Continue Rocephin D5

## 2025-05-28 NOTE — RESTORATIVE TECHNICIAN NOTE
Restorative Technician Note      Patient Name: Latanya Ray     Restorative Tech Visit Date: 05/28/25  Note Type: Mobility  Patient Position Upon Consult: Supine  Activity Performed: Stood (patient stood with arms hold)  Assistive Device: Other (Comment) (patient sit on side the bad)  Patient Position at End of Consult: All needs within reach; Supine; Bed/Chair alarm activated            ns

## 2025-05-28 NOTE — PLAN OF CARE
Problem: Potential for Falls  Goal: Patient will remain free of falls  Description: INTERVENTIONS:  - Educate patient/family on patient safety including physical limitations  - Instruct patient to call for assistance with activity   - Consider consulting OT/PT to assist with strengthening/mobility based on AM PAC & -HLM score  - Consult OT/PT to assist with strengthening/mobility   - Keep Call bell within reach  - Keep bed low and locked with side rails adjusted as appropriate  - Keep care items and personal belongings within reach  - Initiate and maintain comfort rounds  - Make Fall Risk Sign visible to staff  - Offer Toileting every 2 Hours, in advance of need  - Initiate/Maintain bed alarm  - Obtain necessary fall risk management equipment: bed alarm  - Apply yellow socks and bracelet for high fall risk patients  - Consider moving patient to room near nurses station  Outcome: Progressing     Problem: Prexisting or High Potential for Compromised Skin Integrity  Goal: Skin integrity is maintained or improved  Description: INTERVENTIONS:  - Identify patients at risk for skin breakdown  - Assess and monitor skin integrity including under and around medical devices   - Assess and monitor nutrition and hydration status  - Monitor labs  - Assess for incontinence   - Turn and reposition patient  - Assist with mobility/ambulation  - Relieve pressure over joya prominences   - Avoid friction and shearing  - Provide appropriate hygiene as needed including keeping skin clean and dry  - Evaluate need for skin moisturizer/barrier cream  - Collaborate with interdisciplinary team  - Patient/family teaching  - Consider wound care consult    Assess:  - Review Rosales scale daily  - Clean and moisturize skin   - Inspect skin when repositioning, toileting, and assisting with ADLS  - Assess under medical devices   - Assess extremities for adequate circulation and sensation     Bed Management:  - Have minimal linens on bed & keep  smooth, unwrinkled  - Change linens as needed when moist or perspiring  - Avoid sitting or lying in one position for more than 2 hours while in bed?Keep HOB at 30 degrees   - Toileting:  - Offer bedside commode  - Assess for incontinence  - Use incontinent care products after each incontinent episode     Activity:  - Mobilize patient 3 times a day  - Encourage activity and walks on unit  - Encourage or provide ROM exercises   - Turn and reposition patient every 2 Hours  - Use appropriate equipment to lift or move patient in bed  - Instruct/ Assist with weight shifting every 2hrs when out of bed in chair  - Consider limitation of chair time 2 hour intervals    Skin Care:  - Avoid use of baby powder, tape, friction and shearing, hot water or constrictive clothing  - Relieve pressure over bony prominences  - Do not massage red bony areas    Next Steps:  - Teach patient strategies to minimize risks   - Consider consults to  interdisciplinary teams   Outcome: Progressing     Problem: PAIN - ADULT  Goal: Verbalizes/displays adequate comfort level or baseline comfort level  Description: Interventions:  - Encourage patient to monitor pain and request assistance  - Assess pain using appropriate pain scale  - Administer analgesics as ordered based on type and severity of pain and evaluate response  - Implement non-pharmacological measures as appropriate and evaluate response  - Consider cultural and social influences on pain and pain management  - Notify physician/advanced practitioner if interventions unsuccessful or patient reports new pain  - Educate patient/family on pain management process including their role and importance of  reporting pain   - Provide non-pharmacologic/complimentary pain relief interventions  Outcome: Progressing     Problem: INFECTION - ADULT  Goal: Absence or prevention of progression during hospitalization  Description: INTERVENTIONS:  - Assess and monitor for signs and symptoms of infection  -  Monitor lab/diagnostic results  - Monitor all insertion sites, i.e. indwelling lines, tubes, and drains  - Monitor endotracheal if appropriate and nasal secretions for changes in amount and color  - [place] appropriate cooling/warming therapies per order  - Administer medications as ordered  - Instruct and encourage patient and family to use good hand hygiene technique  - Identify and instruct in appropriate isolation precautions for identified infection/condition  Outcome: Progressing     Problem: SAFETY ADULT  Goal: Patient will remain free of falls  Description: INTERVENTIONS:  - Educate patient/family on patient safety including physical limitations  - Instruct patient to call for assistance with activity   - Consider consulting OT/PT to assist with strengthening/mobility based on AM PAC & -HL score  - Consult OT/PT to assist with strengthening/mobility   - Keep Call bell within reach  - Keep bed low and locked with side rails adjusted as appropriate  - Keep care items and personal belongings within reach  - Initiate and maintain comfort rounds  - Make Fall Risk Sign visible to staff  - Offer Toileting every 2 Hours, in advance of need  - Initiate/Maintain bed alarm  - Obtain necessary fall risk management equipment: bed alarm  - Apply yellow socks and bracelet for high fall risk patients  - Consider moving patient to room near nurses station  Outcome: Progressing  Goal: Maintain or return to baseline ADL function  Description: INTERVENTIONS:  -  Assess patient's ability to carry out ADLs; assess patient's baseline for ADL function and identify physical deficits which impact ability to perform ADLs (bathing, care of mouth/teeth, toileting, grooming, dressing, etc.)  - Assess/evaluate cause of self-care deficits   - Assess range of motion  - Assess patient's mobility; develop plan if impaired  - Assess patient's need for assistive devices and provide as appropriate  - Encourage maximum independence but  intervene and supervise when necessary  - Involve family in performance of ADLs  - Assess for home care needs following discharge   - Consider OT consult to assist with ADL evaluation and planning for discharge  - Provide patient education as appropriate  - Monitor functional capacity and physical performance, use of AM PAC & JH-HLM   - Monitor gait, balance and fatigue with ambulation    Outcome: Progressing     Problem: DISCHARGE PLANNING  Goal: Discharge to home or other facility with appropriate resources  Description: INTERVENTIONS:  - Identify barriers to discharge w/patient and caregiver  - Arrange for needed discharge resources and transportation as appropriate  - Identify discharge learning needs (meds, wound care, etc.)  - Arrange for interpretive services to assist at discharge as needed  - Refer to Case Management Department for coordinating discharge planning if the patient needs post-hospital services based on physician/advanced practitioner order or complex needs related to functional status, cognitive ability, or social support system  Outcome: Progressing     Problem: Knowledge Deficit  Goal: Patient/family/caregiver demonstrates understanding of disease process, treatment plan, medications, and discharge instructions  Description: Complete learning assessment and assess knowledge base.  Interventions:  - Provide teaching at level of understanding  - Provide teaching via preferred learning methods  Outcome: Progressing     Problem: CARDIOVASCULAR - ADULT  Goal: Maintains optimal cardiac output and hemodynamic stability  Description: INTERVENTIONS:  - Monitor I/O, vital signs and rhythm  - Monitor for S/S and trends of decreased cardiac output  - Administer and titrate ordered vasoactive medications to optimize hemodynamic stability  - Assess quality of pulses, skin color and temperature  - Assess for signs of decreased coronary artery perfusion  - Instruct patient to report change in severity of  symptoms  Outcome: Progressing     Problem: SKIN/TISSUE INTEGRITY - ADULT  Goal: Incision(s), wounds(s) or drain site(s) healing without S/S of infection  Description: INTERVENTIONS  - Assess and document dressing, incision, wound bed, drain sites and surrounding tissue  - Provide patient and family education  - Perform skin care/dressing changes   Outcome: Progressing     Problem: Nutrition/Hydration-ADULT  Goal: Nutrient/Hydration intake appropriate for improving, restoring or maintaining nutritional needs  Description: Monitor and assess patient's nutrition/hydration status for malnutrition. Collaborate with interdisciplinary team and initiate plan and interventions as ordered.  Monitor patient's weight and dietary intake as ordered or per policy. Utilize nutrition screening tool and intervene as necessary. Determine patient's food preferences and provide high-protein, high-caloric foods as appropriate.     INTERVENTIONS:  - Monitor oral intake, urinary output, labs, and treatment plans  - Assess nutrition and hydration status and recommend course of action  - Evaluate amount of meals eaten  - Assist patient with eating if necessary   - Allow adequate time for meals  - Recommend/ encourage appropriate diets, oral nutritional supplements, and vitamin/mineral supplements  - Order, calculate, and assess calorie counts as needed  - Recommend, monitor, and adjust tube feedings and TPN/PPN based on assessed needs  - Assess need for intravenous fluids  - Provide specific nutrition/hydration education as appropriate  - Include patient/family/caregiver in decisions related to nutrition  Outcome: Progressing

## 2025-05-28 NOTE — ASSESSMENT & PLAN NOTE
PT/OT evaluations rec level 3 rehab intensity  Case management referrals for [facility] placed  Right lower extremity duplex ordered for pain and negative for acute or chronic

## 2025-05-28 NOTE — PLAN OF CARE
Problem: PHYSICAL THERAPY ADULT  Goal: Performs mobility at highest level of function for planned discharge setting.  See evaluation for individualized goals.  Description: Treatment/Interventions: Functional transfer training, LE strengthening/ROM, Therapeutic exercise, Patient/family training, Cognitive reorientation, Equipment eval/education, Bed mobility, Compensatory technique education, Continued evaluation, Gait training, OT, Spoke to case management, Spoke to MD          See flowsheet documentation for full assessment, interventions and recommendations.  Note: Prognosis: Guarded  Problem List: Decreased strength, Decreased cognition, Decreased safety awareness, Impaired judgement, Decreased mobility  Assessment: Latanya Ray is a 89 y.o. female admitted to [facility] on 5/24/2025 for Acute metabolic encephalopathy. PT was consulted and pt was seen on 5/28/2025 for mobility assessment and d/c planning. Pt presents w contact isolation, fall risk. Per chart review pt gets A for ADLs, IADLs and mobility. Ax1; prev notes indicate primarily non amb. Pt is currently functioning at a dep Ax2 for bed mobility, max Ax2 for transfers (dep Ax2 to pivot). Pt demonstrated resistive behaviors impacting participation. Was able to hold herself unsupported at EOB for brief periods of time. Given her fearfulness to fall she had limited standing tolerance. Pt will benefit from continued skilled IP PT to address the above mentioned impairments  in order to maximize recovery and increase functional independence when completing mobility and ADLs.  Barriers to Discharge: None     Rehab Resource Intensity Level, PT: No post-acute rehabilitation needs (difficulty participating in therapy dt cognitive deficits)    See flowsheet documentation for full assessment.

## 2025-05-29 VITALS
HEART RATE: 69 BPM | RESPIRATION RATE: 18 BRPM | SYSTOLIC BLOOD PRESSURE: 135 MMHG | DIASTOLIC BLOOD PRESSURE: 74 MMHG | WEIGHT: 141.09 LBS | TEMPERATURE: 98.3 F | OXYGEN SATURATION: 95 % | BODY MASS INDEX: 27.56 KG/M2

## 2025-05-29 LAB
ANION GAP SERPL CALCULATED.3IONS-SCNC: 7 MMOL/L (ref 4–13)
BUN SERPL-MCNC: 14 MG/DL (ref 5–25)
CALCIUM SERPL-MCNC: 8.8 MG/DL (ref 8.4–10.2)
CHLORIDE SERPL-SCNC: 106 MMOL/L (ref 96–108)
CO2 SERPL-SCNC: 25 MMOL/L (ref 21–32)
CREAT SERPL-MCNC: 0.45 MG/DL (ref 0.6–1.3)
ERYTHROCYTE [DISTWIDTH] IN BLOOD BY AUTOMATED COUNT: 14.5 % (ref 11.6–15.1)
GFR SERPL CREATININE-BSD FRML MDRD: 83 ML/MIN/1.73SQ M
GLUCOSE SERPL-MCNC: 78 MG/DL (ref 65–140)
HCT VFR BLD AUTO: 40.3 % (ref 34.8–46.1)
HGB BLD-MCNC: 13.2 G/DL (ref 11.5–15.4)
MCH RBC QN AUTO: 30.1 PG (ref 26.8–34.3)
MCHC RBC AUTO-ENTMCNC: 32.8 G/DL (ref 31.4–37.4)
MCV RBC AUTO: 92 FL (ref 82–98)
PLATELET # BLD AUTO: 229 THOUSANDS/UL (ref 149–390)
PMV BLD AUTO: 9.6 FL (ref 8.9–12.7)
POTASSIUM SERPL-SCNC: 4.3 MMOL/L (ref 3.5–5.3)
RBC # BLD AUTO: 4.38 MILLION/UL (ref 3.81–5.12)
SODIUM SERPL-SCNC: 138 MMOL/L (ref 135–147)
WBC # BLD AUTO: 6.83 THOUSAND/UL (ref 4.31–10.16)

## 2025-05-29 PROCEDURE — 99232 SBSQ HOSP IP/OBS MODERATE 35: CPT | Performed by: INTERNAL MEDICINE

## 2025-05-29 PROCEDURE — 85027 COMPLETE CBC AUTOMATED: CPT | Performed by: PHYSICIAN ASSISTANT

## 2025-05-29 PROCEDURE — 80048 BASIC METABOLIC PNL TOTAL CA: CPT | Performed by: PHYSICIAN ASSISTANT

## 2025-05-29 PROCEDURE — 99239 HOSP IP/OBS DSCHRG MGMT >30: CPT | Performed by: INTERNAL MEDICINE

## 2025-05-29 RX ORDER — METOPROLOL SUCCINATE 25 MG/1
25 TABLET, EXTENDED RELEASE ORAL DAILY
Status: DISCONTINUED | OUTPATIENT
Start: 2025-05-29 | End: 2025-05-29 | Stop reason: HOSPADM

## 2025-05-29 RX ORDER — AMIODARONE HYDROCHLORIDE 200 MG/1
TABLET ORAL
Qty: 50 TABLET | Refills: 0 | Status: SHIPPED | OUTPATIENT
Start: 2025-05-30

## 2025-05-29 RX ORDER — METOPROLOL SUCCINATE 25 MG/1
25 TABLET, EXTENDED RELEASE ORAL DAILY
Qty: 30 TABLET | Refills: 0 | Status: SHIPPED | OUTPATIENT
Start: 2025-05-30 | End: 2025-06-04 | Stop reason: SDUPTHER

## 2025-05-29 RX ADMIN — Medication 4000 UNITS: at 09:17

## 2025-05-29 RX ADMIN — METOPROLOL SUCCINATE 25 MG: 25 TABLET, EXTENDED RELEASE ORAL at 15:28

## 2025-05-29 RX ADMIN — ENOXAPARIN SODIUM 40 MG: 40 INJECTION SUBCUTANEOUS at 09:17

## 2025-05-29 RX ADMIN — FAMOTIDINE 20 MG: 20 TABLET, FILM COATED ORAL at 09:17

## 2025-05-29 RX ADMIN — AMIODARONE HYDROCHLORIDE 200 MG: 200 TABLET ORAL at 15:31

## 2025-05-29 RX ADMIN — AMIODARONE HYDROCHLORIDE 200 MG: 200 TABLET ORAL at 09:17

## 2025-05-29 RX ADMIN — AMIODARONE HYDROCHLORIDE 200 MG: 200 TABLET ORAL at 13:34

## 2025-05-29 NOTE — PROGRESS NOTES
Cardiology Progress Note   MD Angus Styles MD, FACC  Gonzalez Sun DO, [facility]  MD Maia Gipson DO, [facility]  Ld Landers DO, [facility]  ----------------------------------------------------------------  [address] [address]    Latanya Ray 89 y.o. female MRN: [id number]  Unit/Bed#: E5 -01 Encounter: [id number]      ASSESSMENT:   Paroxysmal atrial tachycardia  Acute metabolic encephalopathy  Acute cystitis  Cognitive impairment/probable dementia  Restless leg syndrome    PLAN:  Patient's blood pressure is mildly elevated and heart rates remain stable  Will give metoprolol 25 mg of the extended release formulation daily  Complete amiodarone load 200 mg 3 times daily for 7 days and then 200 mg daily thereafter  Volume expansion as per medicine  Continue aspirin  Antibiotics as per primary team  If telemetry without further sustained/prolonged episodes of PAT into the afternoon, reasonable for discharge from CV perspective  Will see further inpatient as needed; please reconsult with questions    Signed: Gonzalez Sun DO, [facility], FAC      History of Present Illness:  Patient seen and examined.  Denies chest pain, pressure, tightness or squeezing.  Denies lightheadedness, dizziness or palpitations.  Denies lower extremity swelling, orthopnea or paroxysmal nocturnal dyspnea.  Resting comfortably in bed.  No acute events.      Review of Systems:  Review of Systems   Constitutional: Negative for decreased appetite, fever, weight gain and weight loss.   HENT:  Negative for congestion and sore throat.    Eyes:  Negative for visual disturbance.   Cardiovascular:  Negative for chest pain, dyspnea on exertion, leg swelling, near-syncope and palpitations.   Respiratory:  Negative for cough and shortness of breath.    Hematologic/Lymphatic: Negative for bleeding problem.   Skin:  Negative for rash.   Musculoskeletal:  Negative for  myalgias and neck pain.   Gastrointestinal:  Negative for abdominal pain and nausea.   Neurological:  Negative for light-headedness and weakness.   Psychiatric/Behavioral:  Negative for depression.        Allergies[1]    No current facility-administered medications on file prior to encounter.     Current Outpatient Medications on File Prior to Encounter   Medication Sig    aspirin (ECOTRIN LOW STRENGTH) 81 mg EC tablet Take 81 mg by mouth daily at bedtime    Calcium Carbonate-Vit D-Min (CALCIUM 1200 PO) Take 750 mg by mouth in the morning At 3pm    cholecalciferol (VITAMIN D3) 1,000 units tablet Take 4,000 Units by mouth in the morning.    Cranberry 250 MG TABS Take 2 tablets (500 mg total) by mouth 2 (two) times a day    omeprazole (PriLOSEC) 20 mg delayed release capsule TAKE 1 CAPSULE BY MOUTH EVERY DAY    pramipexole (MIRAPEX) 0.25 mg tablet TAKE 1 TABLET (0.25 MG TOTAL) BY MOUTH DAILY AT BEDTIME    vitamin B-12 (VITAMIN B-12) 1,000 mcg tablet Take 2,500 mcg by mouth every other day    Klayesta powder APPLY TO AFFECTED AREA TWICE A DAY        Current Facility-Administered Medications   Medication Dose Route Frequency Provider Last Rate    acetaminophen  650 mg Oral Q6H PRN Katie Ledezma, DO      amiodarone  200 mg Oral TID With Meals Gonzalez Sun, DO      aspirin  81 mg Oral HS Katie Ledezma, DO      calcium carbonate  1,000 mg Oral Daily PRN Katie Ledezma, DO      cefTRIAXone  1,000 mg Intravenous Q24H Katie Ledezma, DO Stopped (05/28/25 7311)    cholecalciferol  4,000 Units Oral Daily Katie Ledezma, DO      vitamin B-12  1,000 mcg Oral Every Other Day Katie Ledezma, DO      enoxaparin  40 mg Subcutaneous Daily Katie Ledezma, DO      famotidine  20 mg Oral Daily Katie Ledezma, DO      melatonin  3 mg Oral HS PRN MARINA Chan      ondansetron  4 mg Intravenous Q6H PRN Katie Ledezma, DO      pramipexole  0.25 mg Oral HS Katie  "Lauren Ledezma, DO              Vitals:    05/27/25 2140 05/28/25 0824 05/28/25 2336 05/29/25 0815   BP: 137/78 135/65 146/93 (!) 153/104   BP Location:   Left arm Left arm   Pulse: (!) 120 57 69 69   Resp:   18 19   Temp:  98.2 °F (36.8 °C) 98.6 °F (37 °C) 97.5 °F (36.4 °C)   TempSrc:   Oral Oral   SpO2: 94% 95% 95% 95%   Weight:         Body mass index is 27.56 kg/m².      Intake/Output Summary (Last 24 hours) at 5/29/2025 0942  Last data filed at 5/28/2025 1728  Gross per 24 hour   Intake 716 ml   Output --   Net 716 ml       Weight change:     PHYSICAL EXAMINATION:  Gen: Awake, Alert, NAD  Head/eyes: AT/NC, pupils equal and round, Anicteric  ENT: mmm  Neck: Supple, No elevated JVP, trachea midline  Resp: CTA bilaterally no w/r/r  CV: RRR +S1, S2, No m/r/g  Abd: Soft, NT/ND + BS  Ext: no LE edema bilaterally  Neuro: Follows commands, moves all extermities  Psych: Appropriate affect, normal mood, cooperative attitude, non-combative  Skin: warm; no rash, erythema or venous stasis changes on exposed skin    Lab Results:  Results from last 7 days   Lab Units 05/29/25  0614   WBC Thousand/uL 6.83   HEMOGLOBIN g/dL 13.2   HEMATOCRIT % 40.3   PLATELETS Thousands/uL 229     Results from last 7 days   Lab Units 05/29/25  0614 05/27/25  0507 05/25/25  0624   POTASSIUM mmol/L 4.3   < > 4.0   CHLORIDE mmol/L 106   < > 105   CO2 mmol/L 25   < > 27   BUN mg/dL 14   < > 12   CREATININE mg/dL 0.45*   < > 0.50*   CALCIUM mg/dL 8.8   < > 8.9   ALK PHOS U/L  --   --  77   ALT U/L  --   --  11   AST U/L  --   --  17    < > = values in this interval not displayed.     No results found for: \"TROPONINT\"      Results from last 7 days   Lab Units 05/25/25  0213 05/24/25 2302 05/24/25 2112   HS TNI 0HR ng/L  --   --  5   HS TNI 2HR ng/L  --  5  --    HS TNI 4HR ng/L 4  --   --      Results from last 7 days   Lab Units 05/24/25 2112   INR  0.95       Tele:  w/ rare non-sust PAT    This note was completed in part utilizing M-Modal " Fluency Direct Software.  Grammatical errors, random word insertions, spelling mistakes, and incomplete sentences may be an occasional consequence of this system secondary to software limitations, ambient noise, and hardware issues.  If you have any questions or concerns about the content, text, or information contained within the body of this dictation, please contact the provider for clarification.           [1]   Allergies  Allergen Reactions    Amoxicillin Hives    Celecoxib     Ciprofloxacin      Other reaction(s): diarrhea. Tolerates Levaquin    Codeine Other (See Comments)     Other reaction(s): Other (See Comments)  takes vicodin @ home  takes vicodin @ home    Epinephrine Other (See Comments)     Heart racing    Oxycodone-Acetaminophen Other (See Comments)     Nausea?    Oxycodone-Acetaminophen      Other reaction(s): Unknown Reaction    Propoxyphene Other (See Comments)    Rofecoxib     Sulfa Antibiotics Other (See Comments)     takes at home  takes at home  Other reaction(s): Other (See Comments)  takes at home    Sulfamethoxazole-Trimethoprim Other (See Comments)     Unknown, perhaps hives?    Nitrofurantoin Rash

## 2025-05-29 NOTE — PLAN OF CARE
Problem: Potential for Falls  Goal: Patient will remain free of falls  Description: INTERVENTIONS:  - Educate patient/family on patient safety including physical limitations  - Instruct patient to call for assistance with activity   - Consider consulting OT/PT to assist with strengthening/mobility based on AM PAC & -HLM score  - Consult OT/PT to assist with strengthening/mobility   - Keep Call bell within reach  - Keep bed low and locked with side rails adjusted as appropriate  - Keep care items and personal belongings within reach  - Initiate and maintain comfort rounds  - Make Fall Risk Sign visible to staff  - Offer Toileting every 2 Hours, in advance of need  - Initiate/Maintain bed alarm  - Obtain necessary fall risk management equipment: bed alarm  - Apply yellow socks and bracelet for high fall risk patients  - Consider moving patient to room near nurses station  Outcome: Progressing     Problem: Prexisting or High Potential for Compromised Skin Integrity  Goal: Skin integrity is maintained or improved  Description: INTERVENTIONS:  - Identify patients at risk for skin breakdown  - Assess and monitor skin integrity including under and around medical devices   - Assess and monitor nutrition and hydration status  - Monitor labs  - Assess for incontinence   - Turn and reposition patient  - Assist with mobility/ambulation  - Relieve pressure over joya prominences   - Avoid friction and shearing  - Provide appropriate hygiene as needed including keeping skin clean and dry  - Evaluate need for skin moisturizer/barrier cream  - Collaborate with interdisciplinary team  - Patient/family teaching  - Consider wound care consult    Assess:  - Review Rosales scale daily  - Clean and moisturize skin   - Inspect skin when repositioning, toileting, and assisting with ADLS  - Assess under medical devices   - Assess extremities for adequate circulation and sensation     Bed Management:  - Have minimal linens on bed & keep  smooth, unwrinkled  - Change linens as needed when moist or perspiring  - Avoid sitting or lying in one position for more than 2 hours while in bed?Keep HOB at 30 degrees   - Toileting:  - Offer bedside commode  - Assess for incontinence  - Use incontinent care products after each incontinent episode     Activity:  - Mobilize patient 3 times a day  - Encourage activity and walks on unit  - Encourage or provide ROM exercises   - Turn and reposition patient every 2 Hours  - Use appropriate equipment to lift or move patient in bed  - Instruct/ Assist with weight shifting every 2hrs when out of bed in chair  - Consider limitation of chair time 2 hour intervals    Skin Care:  - Avoid use of baby powder, tape, friction and shearing, hot water or constrictive clothing  - Relieve pressure over bony prominences  - Do not massage red bony areas    Next Steps:  - Teach patient strategies to minimize risks   - Consider consults to  interdisciplinary teams   Outcome: Progressing     Problem: PAIN - ADULT  Goal: Verbalizes/displays adequate comfort level or baseline comfort level  Description: Interventions:  - Encourage patient to monitor pain and request assistance  - Assess pain using appropriate pain scale  - Administer analgesics as ordered based on type and severity of pain and evaluate response  - Implement non-pharmacological measures as appropriate and evaluate response  - Consider cultural and social influences on pain and pain management  - Notify physician/advanced practitioner if interventions unsuccessful or patient reports new pain  - Educate patient/family on pain management process including their role and importance of  reporting pain   - Provide non-pharmacologic/complimentary pain relief interventions  Outcome: Progressing     Problem: INFECTION - ADULT  Goal: Absence or prevention of progression during hospitalization  Description: INTERVENTIONS:  - Assess and monitor for signs and symptoms of infection  -  Monitor lab/diagnostic results  - Monitor all insertion sites, i.e. indwelling lines, tubes, and drains  - Monitor endotracheal if appropriate and nasal secretions for changes in amount and color  - [place] appropriate cooling/warming therapies per order  - Administer medications as ordered  - Instruct and encourage patient and family to use good hand hygiene technique  - Identify and instruct in appropriate isolation precautions for identified infection/condition  Outcome: Progressing     Problem: SAFETY ADULT  Goal: Patient will remain free of falls  Description: INTERVENTIONS:  - Educate patient/family on patient safety including physical limitations  - Instruct patient to call for assistance with activity   - Consider consulting OT/PT to assist with strengthening/mobility based on AM PAC & -HL score  - Consult OT/PT to assist with strengthening/mobility   - Keep Call bell within reach  - Keep bed low and locked with side rails adjusted as appropriate  - Keep care items and personal belongings within reach  - Initiate and maintain comfort rounds  - Make Fall Risk Sign visible to staff  - Offer Toileting every 2 Hours, in advance of need  - Initiate/Maintain bed alarm  - Obtain necessary fall risk management equipment: bed alarm  - Apply yellow socks and bracelet for high fall risk patients  - Consider moving patient to room near nurses station  Outcome: Progressing  Goal: Maintain or return to baseline ADL function  Description: INTERVENTIONS:  -  Assess patient's ability to carry out ADLs; assess patient's baseline for ADL function and identify physical deficits which impact ability to perform ADLs (bathing, care of mouth/teeth, toileting, grooming, dressing, etc.)  - Assess/evaluate cause of self-care deficits   - Assess range of motion  - Assess patient's mobility; develop plan if impaired  - Assess patient's need for assistive devices and provide as appropriate  - Encourage maximum independence but  intervene and supervise when necessary  - Involve family in performance of ADLs  - Assess for home care needs following discharge   - Consider OT consult to assist with ADL evaluation and planning for discharge  - Provide patient education as appropriate  - Monitor functional capacity and physical performance, use of AM PAC & JH-HLM   - Monitor gait, balance and fatigue with ambulation    Outcome: Progressing     Problem: DISCHARGE PLANNING  Goal: Discharge to home or other facility with appropriate resources  Description: INTERVENTIONS:  - Identify barriers to discharge w/patient and caregiver  - Arrange for needed discharge resources and transportation as appropriate  - Identify discharge learning needs (meds, wound care, etc.)  - Arrange for interpretive services to assist at discharge as needed  - Refer to Case Management Department for coordinating discharge planning if the patient needs post-hospital services based on physician/advanced practitioner order or complex needs related to functional status, cognitive ability, or social support system  Outcome: Progressing     Problem: Knowledge Deficit  Goal: Patient/family/caregiver demonstrates understanding of disease process, treatment plan, medications, and discharge instructions  Description: Complete learning assessment and assess knowledge base.  Interventions:  - Provide teaching at level of understanding  - Provide teaching via preferred learning methods  Outcome: Progressing     Problem: CARDIOVASCULAR - ADULT  Goal: Maintains optimal cardiac output and hemodynamic stability  Description: INTERVENTIONS:  - Monitor I/O, vital signs and rhythm  - Monitor for S/S and trends of decreased cardiac output  - Administer and titrate ordered vasoactive medications to optimize hemodynamic stability  - Assess quality of pulses, skin color and temperature  - Assess for signs of decreased coronary artery perfusion  - Instruct patient to report change in severity of  symptoms  Outcome: Progressing     Problem: SKIN/TISSUE INTEGRITY - ADULT  Goal: Incision(s), wounds(s) or drain site(s) healing without S/S of infection  Description: INTERVENTIONS  - Assess and document dressing, incision, wound bed, drain sites and surrounding tissue  - Provide patient and family education  - Perform skin care/dressing changes   Outcome: Progressing     Problem: Nutrition/Hydration-ADULT  Goal: Nutrient/Hydration intake appropriate for improving, restoring or maintaining nutritional needs  Description: Monitor and assess patient's nutrition/hydration status for malnutrition. Collaborate with interdisciplinary team and initiate plan and interventions as ordered.  Monitor patient's weight and dietary intake as ordered or per policy. Utilize nutrition screening tool and intervene as necessary. Determine patient's food preferences and provide high-protein, high-caloric foods as appropriate.     INTERVENTIONS:  - Monitor oral intake, urinary output, labs, and treatment plans  - Assess nutrition and hydration status and recommend course of action  - Evaluate amount of meals eaten  - Assist patient with eating if necessary   - Allow adequate time for meals  - Recommend/ encourage appropriate diets, oral nutritional supplements, and vitamin/mineral supplements  - Order, calculate, and assess calorie counts as needed  - Recommend, monitor, and adjust tube feedings and TPN/PPN based on assessed needs  - Assess need for intravenous fluids  - Provide specific nutrition/hydration education as appropriate  - Include patient/family/caregiver in decisions related to nutrition  Outcome: Progressing

## 2025-05-29 NOTE — INCIDENTAL FINDINGS
The following findings require follow up:  Radiographic finding   Finding: Questionable 2.4 cm right breast mass     Incidental finding results were discussed with the son Raman by Joe Brown MD on 05/29/25.   Given her  advanced age, he does not want any further workup or treatment of this possible right breast mass.

## 2025-05-29 NOTE — CASE MANAGEMENT
Case Management Discharge Planning Note    Patient name Latanya Ray  [facility] /[facility] -* MRN [id number]  : 1926 Date 2025       Current Admission Date: 2025  Current Admission Diagnosis:Acute metabolic encephalopathy   Patient Active Problem List    Diagnosis Date Noted    Atrial tachycardia (HCC) 2025    Decubitus ulcer of sacral region, stage 1 2025    History of recurrent UTI (urinary tract infection) 10/08/2024    Gram-negative bacteremia 2024    Restless leg syndrome 2024    Generalized muscle weakness 2024    Diaphragmatic hernia without obstruction or gangrene 2024    Dysphagia, oropharyngeal phase 2024    Acute cystitis without hematuria 2023    Acute metabolic encephalopathy 2023    Legal blindness 2022    Cognitive impairment 2021    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    Vitamin D deficiency 2012    Cerebral aneurysm 2012    Anxiety 2012    History of lobular carcinoma of breast 2012      LOS (days): 4  Geometric Mean LOS (GMLOS) (days): 3.8  Days to GMLOS:-0.3     OBJECTIVE:  Risk of Unplanned Readmission Score: 12.54      Current admission status: Inpatient   Preferred Pharmacy:   [id number] - [place] - [address] [place] [address]  Phone: [phone] Fax: [phone]    [facility] - [address] [address]  Phone: [phone] Fax: [phone]    Primary Care Provider: Amina Rowe DO    Primary Insurance: MEDICARE  Secondary Insurance: AARP    DISCHARGE DETAILS:    Discharge planning discussed with:: pt's son Raman Ray  Freedom of Choice: Yes     CM contacted family/caregiver?: Yes  Were Treatment Team discharge recommendations reviewed with patient/caregiver?: Yes  Did patient/caregiver verbalize understanding of  patient care needs?: Yes  Were patient/caregiver advised of the risks associated with not following Treatment Team discharge recommendations?: Yes    Contacts  Patient Contacts: Raman Ray  Relationship to Patient:: Family  Contact Method: Phone  Phone Number: [phone]  Reason/Outcome: Emergency Contact, Discharge Planning, Continuity of Care    Requested Home Health Care         Is the patient interested in HHC at discharge?: Yes  Home Health Discipline requested:: Nursing, Occupational Therapy, Physical Therapy, Medical Social Work  Home Health Agency Name:: [facility]  HHA External Referral Reason (only applicable if external HHA name selected): Patient has established relationship with provider  Home Health Follow-Up Provider:: PCP  Home Health Services Needed:: Wound/Ostomy Care, Evaluate Functional Status and Safety, Strengthening/Theraputic Exercises to Improve Function, Gait/ADL Training  Homebound Criteria Met:: Requires the Assistance of Another Person for Safe Ambulation or to Leave the Home, Uses an Assist Device (i.e. cane, walker, etc)  Supporting Clincal Findings:: Limited Endurance, Fatigues Easliy in Short Distances    DME Referral Provided  Referral made for DME?: Yes  DME referral completed for the following items:: Hospital Bed  DME Supplier Name:: [email or web address]    Other Referral/Resources/Interventions Provided:  Interventions: HHC, DME    Treatment Team Recommendation: Home with Home Health Care  Discharge Destination Plan:: Home with Home Health Care  Transport at Discharge : [facility]     Number/Name of Dispatcher: [facility]  Transported by (Company and Unit #): [facility]  ETA of Transport (Date): 05/29/25  ETA of Transport (Time): 5168    Additional Comments: [facility] will tranport pt via BLS at 5:45 pm set up on [employer]. Medical Necessity provided to bedside RN. Details of entrance of house provided to [facility]. Cm notified pt's son, [facility] of dc today. Son will be at the house. He  will arrange [facility] to resume. He will call [facility] when he moves the non hospital bed out of room for them to deliver the hospital bed. Bedside RN and SLIM aware of transport time.

## 2025-05-29 NOTE — ASSESSMENT & PLAN NOTE
Continue amiodarone 200 mg 3 times daily for 6 days and then 200 mg daily after  Continue Toprol XL 25 mg daily

## 2025-05-29 NOTE — ASSESSMENT & PLAN NOTE
Improved.  Mental status at baseline  She has had 5 days of IV ceftriaxone which is sufficient for acute cystitis

## 2025-05-29 NOTE — DISCHARGE SUMMARY
Discharge Summary - Hospitalist   Name: Latanya Ray 89 y.o. female I MRN: [id number]  Unit/Bed#: E5 -01 I Date of Admission: 5/24/2025   Date of Service: 5/29/2025 I Hospital Day: 4     Assessment & Plan  Acute metabolic encephalopathy  Acute cystitis without hematuria  Improved.  Mental status at baseline  She has had 5 days of IV ceftriaxone which is sufficient for acute cystitis  Atrial tachycardia (HCC)  Continue amiodarone 200 mg 3 times daily for 6 days and then 200 mg daily after  Continue Toprol XL 25 mg daily  Restless leg syndrome  Cont requip  Generalized muscle weakness  Multifactorial due to advanced age and comorbidities  DC home with VNA and family support  Vitamin D deficiency  Continue home vitamin D  Decubitus ulcer of sacral region, stage 1  Blanchable discoloration over buttocks--apply barrier cream   DC home with VNA     Medical Problems       Resolved Problems  Date Reviewed: 5/24/2025   None       Discharging Physician / Practitioner: Joe Brown MD  PCP: Amina Rowe DO  Admission Date:   Admission Orders (From admission, onward)       Ordered        05/25/25 1110  INPATIENT ADMISSION  Once            05/24/25 2258  Place in Observation  Once                          Discharge Date: 05/29/25    Consultations During Hospital Stay:  none    Procedures Performed:   none    Significant Findings / Test Results:     CT renal stone study abdomen pelvis wo contrast  Result Date: 5/26/2025  Impression: 1.  Nonobstructing left renal calculi. No hydronephrosis. 2.  Incidental questionable right breast mass. Clinical correlation recommended. Diagnostic mammogram and ultrasound recommended if clinically indicated    CT head wo contrast  Result Date: 5/25/2025  Impression: Within the confines of extensive motion degradation, which significantly reduces diagnostic sensitivity, no acute intracranial hemorrhage, significant mass effect or midline shift. Mild-moderate chronic  microangiopathy.      Incidental Findings:    2.4 cm right breast mass discussed with son Raman.  He does not want any further workup or treatment of this given her advanced age and comorbidities     Hospital Course:   Latayna Ray is a 89 y.o. female patient who originally presented to the hospital on 5/24/2025 due to hallucinations and urinary frequency.  She was admitted for change in mental status due to UTI.  She completed 5 days of ceftriaxone.  She was seen by cardiology for paroxysmal atrial tachycardia which is a new diagnosis for her.  She was started on amiodarone and metoprolol.  She will continue amiodarone 200 mg 3 times daily for 7 days total and then once a day thereafter.    Please see above list of diagnoses and related plan for additional information.     Discharge Day Visit / Exam:   Subjective:  Denies any pain or discomfort  Vitals: Blood Pressure: (!) 153/104 (05/29/25 0815)  Pulse: 69 (05/29/25 0815)  Temperature: 97.5 °F (36.4 °C) (05/29/25 0815)  Temp Source: Oral (05/29/25 0815)  Respirations: 19 (05/29/25 0815)  Weight - Scale: 64 kg (141 lb 1.5 oz) (05/24/25 2028)  SpO2: 95 % (05/29/25 0815)  Physical Exam  Vitals reviewed.   HENT:      Head: Normocephalic and atraumatic.      Nose: No congestion or rhinorrhea.     Eyes:      General: No scleral icterus.     Comments: Legally blind     Cardiovascular:      Rate and Rhythm: Normal rate and regular rhythm.   Pulmonary:      Breath sounds: No wheezing or rhonchi.   Abdominal:      Tenderness: There is no abdominal tenderness. There is no guarding.     Musculoskeletal:      Right lower leg: No edema.      Left lower leg: No edema.     Skin:     General: Skin is warm and dry.     Psychiatric:         Behavior: Behavior normal.        Discussion with Family: Updated  (son) via phone.    Discharge instructions/Information to patient and family:   See after visit summary for information provided to patient and family.       Provisions for Follow-Up Care:  See after visit summary for information related to follow-up care and any pertinent home health orders.       Disposition:   Home with VNA Services (Reminder: Complete face to face encounter)    Planned Readmission: no    Administrative Statements   Discharge Statement:  I have spent a total time of >30 minutes in caring for this patient on the day of the visit/encounter. .    **Please Note: This note may have been constructed using a voice recognition system**

## 2025-05-29 NOTE — PLAN OF CARE
Problem: Potential for Falls  Goal: Patient will remain free of falls  Description: INTERVENTIONS:  - Educate patient/family on patient safety including physical limitations  - Instruct patient to call for assistance with activity   - Consider consulting OT/PT to assist with strengthening/mobility based on AM PAC & -HLM score  - Consult OT/PT to assist with strengthening/mobility   - Keep Call bell within reach  - Keep bed low and locked with side rails adjusted as appropriate  - Keep care items and personal belongings within reach  - Initiate and maintain comfort rounds  - Make Fall Risk Sign visible to staff  - Offer Toileting every 2 Hours, in advance of need  - Initiate/Maintain bed alarm  - Obtain necessary fall risk management equipment: bed alarm  - Apply yellow socks and bracelet for high fall risk patients  - Consider moving patient to room near nurses station  Outcome: Progressing     Problem: Prexisting or High Potential for Compromised Skin Integrity  Goal: Skin integrity is maintained or improved  Description: INTERVENTIONS:  - Identify patients at risk for skin breakdown  - Assess and monitor skin integrity including under and around medical devices   - Assess and monitor nutrition and hydration status  - Monitor labs  - Assess for incontinence   - Turn and reposition patient  - Assist with mobility/ambulation  - Relieve pressure over joya prominences   - Avoid friction and shearing  - Provide appropriate hygiene as needed including keeping skin clean and dry  - Evaluate need for skin moisturizer/barrier cream  - Collaborate with interdisciplinary team  - Patient/family teaching  - Consider wound care consult    Assess:  - Review Rosales scale daily  - Clean and moisturize skin   - Inspect skin when repositioning, toileting, and assisting with ADLS  - Assess under medical devices   - Assess extremities for adequate circulation and sensation     Bed Management:  - Have minimal linens on bed & keep  smooth, unwrinkled  - Change linens as needed when moist or perspiring  - Avoid sitting or lying in one position for more than 2 hours while in bed?Keep HOB at 30 degrees   - Toileting:  - Offer bedside commode  - Assess for incontinence  - Use incontinent care products after each incontinent episode     Activity:  - Mobilize patient 3 times a day  - Encourage activity and walks on unit  - Encourage or provide ROM exercises   - Turn and reposition patient every 2 Hours  - Use appropriate equipment to lift or move patient in bed  - Instruct/ Assist with weight shifting every 2hrs when out of bed in chair  - Consider limitation of chair time 2 hour intervals    Skin Care:  - Avoid use of baby powder, tape, friction and shearing, hot water or constrictive clothing  - Relieve pressure over bony prominences  - Do not massage red bony areas    Next Steps:  - Teach patient strategies to minimize risks   - Consider consults to  interdisciplinary teams   Outcome: Progressing     Problem: PAIN - ADULT  Goal: Verbalizes/displays adequate comfort level or baseline comfort level  Description: Interventions:  - Encourage patient to monitor pain and request assistance  - Assess pain using appropriate pain scale  - Administer analgesics as ordered based on type and severity of pain and evaluate response  - Implement non-pharmacological measures as appropriate and evaluate response  - Consider cultural and social influences on pain and pain management  - Notify physician/advanced practitioner if interventions unsuccessful or patient reports new pain  - Educate patient/family on pain management process including their role and importance of  reporting pain   - Provide non-pharmacologic/complimentary pain relief interventions  Outcome: Progressing     Problem: INFECTION - ADULT  Goal: Absence or prevention of progression during hospitalization  Description: INTERVENTIONS:  - Assess and monitor for signs and symptoms of infection  -  Monitor lab/diagnostic results  - Monitor all insertion sites, i.e. indwelling lines, tubes, and drains  - Monitor endotracheal if appropriate and nasal secretions for changes in amount and color  - [place] appropriate cooling/warming therapies per order  - Administer medications as ordered  - Instruct and encourage patient and family to use good hand hygiene technique  - Identify and instruct in appropriate isolation precautions for identified infection/condition  Outcome: Progressing     Problem: SAFETY ADULT  Goal: Patient will remain free of falls  Description: INTERVENTIONS:  - Educate patient/family on patient safety including physical limitations  - Instruct patient to call for assistance with activity   - Consider consulting OT/PT to assist with strengthening/mobility based on AM PAC & -HL score  - Consult OT/PT to assist with strengthening/mobility   - Keep Call bell within reach  - Keep bed low and locked with side rails adjusted as appropriate  - Keep care items and personal belongings within reach  - Initiate and maintain comfort rounds  - Make Fall Risk Sign visible to staff  - Offer Toileting every 2 Hours, in advance of need  - Initiate/Maintain bed alarm  - Obtain necessary fall risk management equipment: bed alarm  - Apply yellow socks and bracelet for high fall risk patients  - Consider moving patient to room near nurses station  Outcome: Progressing  Goal: Maintain or return to baseline ADL function  Description: INTERVENTIONS:  -  Assess patient's ability to carry out ADLs; assess patient's baseline for ADL function and identify physical deficits which impact ability to perform ADLs (bathing, care of mouth/teeth, toileting, grooming, dressing, etc.)  - Assess/evaluate cause of self-care deficits   - Assess range of motion  - Assess patient's mobility; develop plan if impaired  - Assess patient's need for assistive devices and provide as appropriate  - Encourage maximum independence but  intervene and supervise when necessary  - Involve family in performance of ADLs  - Assess for home care needs following discharge   - Consider OT consult to assist with ADL evaluation and planning for discharge  - Provide patient education as appropriate  - Monitor functional capacity and physical performance, use of AM PAC & JH-HLM   - Monitor gait, balance and fatigue with ambulation    Outcome: Progressing     Problem: DISCHARGE PLANNING  Goal: Discharge to home or other facility with appropriate resources  Description: INTERVENTIONS:  - Identify barriers to discharge w/patient and caregiver  - Arrange for needed discharge resources and transportation as appropriate  - Identify discharge learning needs (meds, wound care, etc.)  - Arrange for interpretive services to assist at discharge as needed  - Refer to Case Management Department for coordinating discharge planning if the patient needs post-hospital services based on physician/advanced practitioner order or complex needs related to functional status, cognitive ability, or social support system  Outcome: Progressing     Problem: Knowledge Deficit  Goal: Patient/family/caregiver demonstrates understanding of disease process, treatment plan, medications, and discharge instructions  Description: Complete learning assessment and assess knowledge base.  Interventions:  - Provide teaching at level of understanding  - Provide teaching via preferred learning methods  Outcome: Progressing     Problem: CARDIOVASCULAR - ADULT  Goal: Maintains optimal cardiac output and hemodynamic stability  Description: INTERVENTIONS:  - Monitor I/O, vital signs and rhythm  - Monitor for S/S and trends of decreased cardiac output  - Administer and titrate ordered vasoactive medications to optimize hemodynamic stability  - Assess quality of pulses, skin color and temperature  - Assess for signs of decreased coronary artery perfusion  - Instruct patient to report change in severity of  symptoms  Outcome: Progressing     Problem: SKIN/TISSUE INTEGRITY - ADULT  Goal: Incision(s), wounds(s) or drain site(s) healing without S/S of infection  Description: INTERVENTIONS  - Assess and document dressing, incision, wound bed, drain sites and surrounding tissue  - Provide patient and family education  - Perform skin care/dressing changes   Outcome: Progressing     Problem: Nutrition/Hydration-ADULT  Goal: Nutrient/Hydration intake appropriate for improving, restoring or maintaining nutritional needs  Description: Monitor and assess patient's nutrition/hydration status for malnutrition. Collaborate with interdisciplinary team and initiate plan and interventions as ordered.  Monitor patient's weight and dietary intake as ordered or per policy. Utilize nutrition screening tool and intervene as necessary. Determine patient's food preferences and provide high-protein, high-caloric foods as appropriate.     INTERVENTIONS:  - Monitor oral intake, urinary output, labs, and treatment plans  - Assess nutrition and hydration status and recommend course of action  - Evaluate amount of meals eaten  - Assist patient with eating if necessary   - Allow adequate time for meals  - Recommend/ encourage appropriate diets, oral nutritional supplements, and vitamin/mineral supplements  - Order, calculate, and assess calorie counts as needed  - Recommend, monitor, and adjust tube feedings and TPN/PPN based on assessed needs  - Assess need for intravenous fluids  - Provide specific nutrition/hydration education as appropriate  - Include patient/family/caregiver in decisions related to nutrition  Outcome: Progressing

## 2025-05-29 NOTE — PLAN OF CARE
Problem: Potential for Falls  Goal: Patient will remain free of falls  Description: INTERVENTIONS:  - Educate patient/family on patient safety including physical limitations  - Instruct patient to call for assistance with activity   - Consider consulting OT/PT to assist with strengthening/mobility based on AM PAC & JH-HLM score  - Consult OT/PT to assist with strengthening/mobility   - Keep Call bell within reach  - Keep bed low and locked with side rails adjusted as appropriate  - Keep care items and personal belongings within reach  - Initiate and maintain comfort rounds  - Make Fall Risk Sign visible to staff  - Offer Toileting every 2 Hours, in advance of need  - Initiate/Maintain bed alarm  - Obtain necessary fall risk management equipment: bed alarm  - Apply yellow socks and bracelet for high fall risk patients  - Consider moving patient to room near nurses station  5/29/2025 1430 by Gema Wadsworth RN  Outcome: Adequate for Discharge  5/29/2025 1014 by Gema Wadsworth RN  Outcome: Progressing     Problem: Prexisting or High Potential for Compromised Skin Integrity  Goal: Skin integrity is maintained or improved  Description: INTERVENTIONS:  - Identify patients at risk for skin breakdown  - Assess and monitor skin integrity including under and around medical devices   - Assess and monitor nutrition and hydration status  - Monitor labs  - Assess for incontinence   - Turn and reposition patient  - Assist with mobility/ambulation  - Relieve pressure over joya prominences   - Avoid friction and shearing  - Provide appropriate hygiene as needed including keeping skin clean and dry  - Evaluate need for skin moisturizer/barrier cream  - Collaborate with interdisciplinary team  - Patient/family teaching  - Consider wound care consult    Assess:  - Review Rosales scale daily  - Clean and moisturize skin   - Inspect skin when repositioning, toileting, and assisting with ADLS  - Assess under medical devices   -  Assess extremities for adequate circulation and sensation     Bed Management:  - Have minimal linens on bed & keep smooth, unwrinkled  - Change linens as needed when moist or perspiring  - Avoid sitting or lying in one position for more than 2 hours while in bed?Keep HOB at 30 degrees   - Toileting:  - Offer bedside commode  - Assess for incontinence  - Use incontinent care products after each incontinent episode     Activity:  - Mobilize patient 3 times a day  - Encourage activity and walks on unit  - Encourage or provide ROM exercises   - Turn and reposition patient every 2 Hours  - Use appropriate equipment to lift or move patient in bed  - Instruct/ Assist with weight shifting every 2hrs when out of bed in chair  - Consider limitation of chair time 2 hour intervals    Skin Care:  - Avoid use of baby powder, tape, friction and shearing, hot water or constrictive clothing  - Relieve pressure over bony prominences  - Do not massage red bony areas    Next Steps:  - Teach patient strategies to minimize risks   - Consider consults to  interdisciplinary teams   5/29/2025 1430 by Gema Wadsworth RN  Outcome: Adequate for Discharge  5/29/2025 1014 by Gema Wadsworth RN  Outcome: Progressing     Problem: PAIN - ADULT  Goal: Verbalizes/displays adequate comfort level or baseline comfort level  Description: Interventions:  - Encourage patient to monitor pain and request assistance  - Assess pain using appropriate pain scale  - Administer analgesics as ordered based on type and severity of pain and evaluate response  - Implement non-pharmacological measures as appropriate and evaluate response  - Consider cultural and social influences on pain and pain management  - Notify physician/advanced practitioner if interventions unsuccessful or patient reports new pain  - Educate patient/family on pain management process including their role and importance of  reporting pain   - Provide non-pharmacologic/complimentary pain  relief interventions  5/29/2025 1430 by Gema Wadsworth RN  Outcome: Adequate for Discharge  5/29/2025 1014 by Gema Wadsworth RN  Outcome: Progressing     Problem: INFECTION - ADULT  Goal: Absence or prevention of progression during hospitalization  Description: INTERVENTIONS:  - Assess and monitor for signs and symptoms of infection  - Monitor lab/diagnostic results  - Monitor all insertion sites, i.e. indwelling lines, tubes, and drains  - Monitor endotracheal if appropriate and nasal secretions for changes in amount and color  - [place] appropriate cooling/warming therapies per order  - Administer medications as ordered  - Instruct and encourage patient and family to use good hand hygiene technique  - Identify and instruct in appropriate isolation precautions for identified infection/condition  5/29/2025 1430 by Gema Wadsworth RN  Outcome: Adequate for Discharge  5/29/2025 1014 by Gema Wadsworth RN  Outcome: Progressing     Problem: SAFETY ADULT  Goal: Patient will remain free of falls  Description: INTERVENTIONS:  - Educate patient/family on patient safety including physical limitations  - Instruct patient to call for assistance with activity   - Consider consulting OT/PT to assist with strengthening/mobility based on AM PAC & JH-HLM score  - Consult OT/PT to assist with strengthening/mobility   - Keep Call bell within reach  - Keep bed low and locked with side rails adjusted as appropriate  - Keep care items and personal belongings within reach  - Initiate and maintain comfort rounds  - Make Fall Risk Sign visible to staff  - Offer Toileting every 2 Hours, in advance of need  - Initiate/Maintain bed alarm  - Obtain necessary fall risk management equipment: bed alarm  - Apply yellow socks and bracelet for high fall risk patients  - Consider moving patient to room near nurses station  5/29/2025 1430 by Gema Wadsworth RN  Outcome: Adequate for Discharge  5/29/2025 1014 by Gema Wadsworth  RN  Outcome: Progressing  Goal: Maintain or return to baseline ADL function  Description: INTERVENTIONS:  -  Assess patient's ability to carry out ADLs; assess patient's baseline for ADL function and identify physical deficits which impact ability to perform ADLs (bathing, care of mouth/teeth, toileting, grooming, dressing, etc.)  - Assess/evaluate cause of self-care deficits   - Assess range of motion  - Assess patient's mobility; develop plan if impaired  - Assess patient's need for assistive devices and provide as appropriate  - Encourage maximum independence but intervene and supervise when necessary  - Involve family in performance of ADLs  - Assess for home care needs following discharge   - Consider OT consult to assist with ADL evaluation and planning for discharge  - Provide patient education as appropriate  - Monitor functional capacity and physical performance, use of AM PAC & JH-HLM   - Monitor gait, balance and fatigue with ambulation    5/29/2025 1430 by Gema Wadsworth RN  Outcome: Adequate for Discharge  5/29/2025 1014 by Gema Wadsworth RN  Outcome: Progressing     Problem: DISCHARGE PLANNING  Goal: Discharge to home or other facility with appropriate resources  Description: INTERVENTIONS:  - Identify barriers to discharge w/patient and caregiver  - Arrange for needed discharge resources and transportation as appropriate  - Identify discharge learning needs (meds, wound care, etc.)  - Arrange for interpretive services to assist at discharge as needed  - Refer to Case Management Department for coordinating discharge planning if the patient needs post-hospital services based on physician/advanced practitioner order or complex needs related to functional status, cognitive ability, or social support system  5/29/2025 1430 by Gema Wadsworth RN  Outcome: Adequate for Discharge  5/29/2025 1014 by Gema Wadsworth RN  Outcome: Progressing     Problem: Knowledge Deficit  Goal:  Patient/family/caregiver demonstrates understanding of disease process, treatment plan, medications, and discharge instructions  Description: Complete learning assessment and assess knowledge base.  Interventions:  - Provide teaching at level of understanding  - Provide teaching via preferred learning methods  5/29/2025 1430 by Gema Wadsworth RN  Outcome: Adequate for Discharge  5/29/2025 1014 by Gema Wadsworth RN  Outcome: Progressing     Problem: CARDIOVASCULAR - ADULT  Goal: Maintains optimal cardiac output and hemodynamic stability  Description: INTERVENTIONS:  - Monitor I/O, vital signs and rhythm  - Monitor for S/S and trends of decreased cardiac output  - Administer and titrate ordered vasoactive medications to optimize hemodynamic stability  - Assess quality of pulses, skin color and temperature  - Assess for signs of decreased coronary artery perfusion  - Instruct patient to report change in severity of symptoms  5/29/2025 1430 by Gema Wadsworth RN  Outcome: Adequate for Discharge  5/29/2025 1014 by Gema Wadsworth RN  Outcome: Progressing     Problem: SKIN/TISSUE INTEGRITY - ADULT  Goal: Incision(s), wounds(s) or drain site(s) healing without S/S of infection  Description: INTERVENTIONS  - Assess and document dressing, incision, wound bed, drain sites and surrounding tissue  - Provide patient and family education  - Perform skin care/dressing changes   5/29/2025 1430 by Gema Wadsworth RN  Outcome: Adequate for Discharge  5/29/2025 1014 by Gema Wadsworth RN  Outcome: Progressing     Problem: Nutrition/Hydration-ADULT  Goal: Nutrient/Hydration intake appropriate for improving, restoring or maintaining nutritional needs  Description: Monitor and assess patient's nutrition/hydration status for malnutrition. Collaborate with interdisciplinary team and initiate plan and interventions as ordered.  Monitor patient's weight and dietary intake as ordered or per policy. Utilize nutrition screening  tool and intervene as necessary. Determine patient's food preferences and provide high-protein, high-caloric foods as appropriate.     INTERVENTIONS:  - Monitor oral intake, urinary output, labs, and treatment plans  - Assess nutrition and hydration status and recommend course of action  - Evaluate amount of meals eaten  - Assist patient with eating if necessary   - Allow adequate time for meals  - Recommend/ encourage appropriate diets, oral nutritional supplements, and vitamin/mineral supplements  - Order, calculate, and assess calorie counts as needed  - Recommend, monitor, and adjust tube feedings and TPN/PPN based on assessed needs  - Assess need for intravenous fluids  - Provide specific nutrition/hydration education as appropriate  - Include patient/family/caregiver in decisions related to nutrition  5/29/2025 1430 by Gema Wadsworth RN  Outcome: Adequate for Discharge  5/29/2025 1014 by Gema Wadsworth RN  Outcome: Progressing     Problem: OCCUPATIONAL THERAPY ADULT  Goal: Performs self-care activities at highest level of function for planned discharge setting.  See evaluation for individualized goals.  Description:   Outcome: Adequate for Discharge     Problem: PHYSICAL THERAPY ADULT  Goal: Performs mobility at highest level of function for planned discharge setting.  See evaluation for individualized goals.  Description: Treatment/Interventions: Functional transfer training, LE strengthening/ROM, Therapeutic exercise, Patient/family training, Cognitive reorientation, Equipment eval/education, Bed mobility, Compensatory technique education, Continued evaluation, Gait training, OT, Spoke to case management, Spoke to MD          See flowsheet documentation for full assessment, interventions and recommendations.  Outcome: Adequate for Discharge

## 2025-05-30 ENCOUNTER — TRANSITIONAL CARE MANAGEMENT (OUTPATIENT)
Dept: FAMILY MEDICINE CLINIC | Facility: CLINIC | Age: OVER 89
End: 2025-05-30

## 2025-05-30 LAB
DME PARACHUTE DELIVERY DATE ACTUAL: NORMAL
DME PARACHUTE DELIVERY DATE EXPECTED: NORMAL
DME PARACHUTE DELIVERY DATE REQUESTED: NORMAL
DME PARACHUTE ITEM DESCRIPTION: NORMAL
DME PARACHUTE ORDER STATUS: NORMAL
DME PARACHUTE SUPPLIER NAME: NORMAL
DME PARACHUTE SUPPLIER PHONE: NORMAL

## 2025-06-04 ENCOUNTER — TELEMEDICINE (OUTPATIENT)
Dept: FAMILY MEDICINE CLINIC | Facility: CLINIC | Age: OVER 89
End: 2025-06-04
Payer: MEDICARE

## 2025-06-04 DIAGNOSIS — L89.151 DECUBITUS ULCER OF SACRAL REGION, STAGE 1: ICD-10-CM

## 2025-06-04 DIAGNOSIS — G93.41 ACUTE METABOLIC ENCEPHALOPATHY: Primary | ICD-10-CM

## 2025-06-04 DIAGNOSIS — N30.00 ACUTE CYSTITIS WITHOUT HEMATURIA: ICD-10-CM

## 2025-06-04 DIAGNOSIS — G25.81 RESTLESS LEGS SYNDROME: ICD-10-CM

## 2025-06-04 DIAGNOSIS — K21.9 GASTROESOPHAGEAL REFLUX DISEASE: ICD-10-CM

## 2025-06-04 DIAGNOSIS — R41.89 COGNITIVE IMPAIRMENT: ICD-10-CM

## 2025-06-04 DIAGNOSIS — I47.19 ATRIAL TACHYCARDIA (HCC): ICD-10-CM

## 2025-06-04 DIAGNOSIS — R13.12 DYSPHAGIA, OROPHARYNGEAL PHASE: ICD-10-CM

## 2025-06-04 DIAGNOSIS — H54.8 LEGAL BLINDNESS: ICD-10-CM

## 2025-06-04 PROBLEM — R78.81 GRAM-NEGATIVE BACTEREMIA: Status: RESOLVED | Noted: 2024-09-05 | Resolved: 2025-06-04

## 2025-06-04 PROCEDURE — 99213 OFFICE O/P EST LOW 20 MIN: CPT | Performed by: FAMILY MEDICINE

## 2025-06-04 PROCEDURE — G2211 COMPLEX E/M VISIT ADD ON: HCPCS | Performed by: FAMILY MEDICINE

## 2025-06-04 RX ORDER — PRAMIPEXOLE DIHYDROCHLORIDE 0.25 MG/1
0.25 TABLET ORAL
Qty: 90 TABLET | Refills: 1 | Status: SHIPPED | OUTPATIENT
Start: 2025-06-04

## 2025-06-04 RX ORDER — METOPROLOL SUCCINATE 25 MG/1
25 TABLET, EXTENDED RELEASE ORAL DAILY
Qty: 90 TABLET | Refills: 1 | Status: SHIPPED | OUTPATIENT
Start: 2025-06-04

## 2025-06-04 RX ORDER — OMEPRAZOLE 20 MG/1
20 CAPSULE, DELAYED RELEASE ORAL DAILY
Qty: 90 CAPSULE | Refills: 1 | Status: SHIPPED | OUTPATIENT
Start: 2025-06-04

## 2025-06-04 RX ORDER — LEVOFLOXACIN 500 MG/1
500 TABLET, FILM COATED ORAL EVERY 24 HOURS
Qty: 5 TABLET | Refills: 0 | Status: SHIPPED | OUTPATIENT
Start: 2025-06-04 | End: 2025-06-09

## 2025-06-04 NOTE — PROGRESS NOTES
Virtual TCM Visit:Name: Latanya Ray      : 1926      MRN: [id number]  Encounter Provider: Amina Rowe DO  Encounter Date: 2025   Encounter department: [facility] PRIMARY CARE  :  Assessment & Plan  Acute metabolic encephalopathy  Patient appears to be having increase in confuision again patient family wants to keep her at home Consult  regarding help in home vs palliative care check Urine culture will treat as presumed UTI with levaquin  will see what culture shows also  Orders:    Ambulatory Referral to Social Work Care Management Program; Future    Urine culture; Future    Acute cystitis without hematuria  Check culture again treat with PO levaquin  Orders:    Ambulatory Referral to Social Work Care Management Program; Future    levofloxacin (LEVAQUIN) 500 mg tablet; Take 1 tablet (500 mg total) by mouth every 24 hours for 5 days    Urine culture; Future    Atrial tachycardia (HCC)  Continue toprol and amiodarone follouwp cardiology  Orders:    Ambulatory Referral to Social Work Care Management Program; Future    metoprolol succinate (TOPROL-XL) 25 mg 24 hr tablet; Take 1 tablet (25 mg total) by mouth daily    Decubitus ulcer of sacral region, stage 1  Continue with home care   Orders:    Ambulatory Referral to Social Work Care Management Program; Future    Dysphagia, oropharyngeal phase  Continue mechanical soft diet   Orders:    Ambulatory Referral to Social Work Care Management Program; Future    Legal blindness    Orders:    Ambulatory Referral to Social Work Care Management Program; Future    Cognitive impairment  Appears to be worsening  check urine  Orders:    Ambulatory Referral to Social Work Care Management Program; Future    Urine culture; Future    Restless legs syndrome  Continue the mirapex  Orders:    pramipexole (MIRAPEX) 0.25 mg tablet; Take 1 tablet (0.25 mg total) by mouth daily at bedtime    Gastroesophageal reflux  disease  Stable swallowing Patient eating Ok for family continue prilosec  Orders:    omeprazole (PriLOSEC) 20 mg delayed release capsule; Take 1 capsule (20 mg total) by mouth daily           History of Present Illness     Transitional Care Management Review:   Latanya Ray is a 89 y.o. female here for TCM follow up.    During the TCM phone call patient stated:  TCM Call (since 2025)       Date and time call was made  2025  8:50 AM    Hospital care reviewed  Records reviewed    Patient was hospitialized at  [facility]    Date of Admission  25    Date of discharge  25    Diagnosis  UTI    Disposition  Home    Were the patients medications reviewed and updated  Yes    Current Symptoms  --  Confused          TCM Call (since 2025)       Post hospital issues  Reduced activity    Scheduled for follow up?  Yes    Did you obtain your prescribed medications  Yes    Do you need help managing your prescriptions or medications  Yes    Why type of assitance do you need  Son manages    Is transportation to your appointment needed  Yes    Specify why  Pt does not drive    I have advised the patient to call PCP with any new or worsening symptoms  Shea Luo MA    Living Arrangements  Children          There were multiple attempts to connect to patient and family via video and were unable Patient has dementia and cannot give history Patient daughter who was primary caretaker has  patient is not under care of her son Raman Patient is per Raman not doing well Patient has not been out of bed since her discharge Patient has been this way honestly for about one month He relates he is afraid of UTI or that Uti did not resolved despite the 5 days of IV antibiotics as patient has some hallucinations again Patient memory is terrible and has been worsening They had aides coming into the home Patient is taking meds as directed They do not feel they can get her out of the house She is taking the  new medications per cardiology       Review of Systems   Constitutional:  Positive for activity change. Negative for appetite change, fatigue and fever.   Psychiatric/Behavioral:  Positive for confusion and hallucinations.      Objective   There were no vitals taken for this visit.    Physical Exam  Medications have been reviewed by provider in current encounter    Administrative Statements   Encounter provider Amina Rowe, DO    The Patient is located at Home and in the following state in which I hold an active license [place].    The patient was identified by name and date of birth. Latanya SHARONDA Ray was informed that this is a telemedicine visit and that the visit is being conducted through Telephone.  My office door was closed. No one else was in the room.  She acknowledged consent and understanding of privacy and security of the video platform. The patient has agreed to participate and understands they can discontinue the visit at any time.    I have spent a total time of 18 minutes in caring for this patient on the day of the visit/encounter including Diagnostic results, Prognosis, Risks and benefits of tx options, Patient and family education, Impressions, Counseling / Coordination of care, and Documenting in the medical record, not including the time spent for establishing the audio/video connection.

## 2025-06-04 NOTE — ASSESSMENT & PLAN NOTE
Continue with home care   Orders:    Ambulatory Referral to Social Work Care Management Program; Future

## 2025-06-04 NOTE — ASSESSMENT & PLAN NOTE
Continue toprol and amiodarone follouwp cardiology  Orders:    Ambulatory Referral to Social Work Care Management Program; Future    metoprolol succinate (TOPROL-XL) 25 mg 24 hr tablet; Take 1 tablet (25 mg total) by mouth daily

## 2025-06-04 NOTE — ASSESSMENT & PLAN NOTE
Check culture again treat with PO levaquin  Orders:    Ambulatory Referral to Social Work Care Management Program; Future    levofloxacin (LEVAQUIN) 500 mg tablet; Take 1 tablet (500 mg total) by mouth every 24 hours for 5 days    Urine culture; Future

## 2025-06-04 NOTE — ASSESSMENT & PLAN NOTE
Continue mechanical soft diet   Orders:    Ambulatory Referral to Social Work Care Management Program; Future

## 2025-06-04 NOTE — ASSESSMENT & PLAN NOTE
Patient appears to be having increase in confuision again patient family wants to keep her at home Consult  regarding help in home vs palliative care check Urine culture will treat as presumed UTI with levaquin  will see what culture shows also  Orders:    Ambulatory Referral to Social Work Care Management Program; Future    Urine culture; Future

## 2025-06-05 ENCOUNTER — TELEPHONE (OUTPATIENT)
Dept: FAMILY MEDICINE CLINIC | Facility: CLINIC | Age: OVER 89
End: 2025-06-05

## 2025-06-05 ENCOUNTER — PATIENT OUTREACH (OUTPATIENT)
Dept: CASE MANAGEMENT | Facility: OTHER | Age: OVER 89
End: 2025-06-05

## 2025-06-05 PROCEDURE — 87086 URINE CULTURE/COLONY COUNT: CPT | Performed by: FAMILY MEDICINE

## 2025-06-05 PROCEDURE — 87077 CULTURE AEROBIC IDENTIFY: CPT | Performed by: FAMILY MEDICINE

## 2025-06-05 PROCEDURE — 87186 SC STD MICRODIL/AGAR DIL: CPT | Performed by: FAMILY MEDICINE

## 2025-06-05 NOTE — PROGRESS NOTES
CHRIS JASSO received referral for patient for resources.  CHRIS JASSO reviewed patient's chart and reached out to her son.  Son reports patient has HHA through [facility] and home PT with [address] [place] [address].  Family's concern was due to hallucinations which he believes are likely due to a UTI as this has been the case for his mother in the past.  He did obtain an antibiotic for her and reports being on day 2.  CHRIS JASSO also encouraged patient to discuss with her Senior [facility] doctor if hallucinations do not subside, as patient does have Dementia as well.  Family declines SW assistance needed at this time.  Referral closed.

## 2025-06-05 NOTE — ASSESSMENT & PLAN NOTE
Appears to be worsening  check urine  Orders:    Ambulatory Referral to Social Work Care Management Program; Future    Urine culture; Future

## 2025-06-05 NOTE — TELEPHONE ENCOUNTER
Type of form: physicians order via fax.   Order # [id number]    Forms have been placed in Amina Rowe DO folder to be completed for clinical staff.     Routing to clinical pool.

## 2025-06-08 LAB
BACTERIA UR CULT: ABNORMAL
BACTERIA UR CULT: ABNORMAL

## 2025-06-09 ENCOUNTER — RESULTS FOLLOW-UP (OUTPATIENT)
Dept: FAMILY MEDICINE CLINIC | Facility: CLINIC | Age: OVER 89
End: 2025-06-09

## 2025-06-09 DIAGNOSIS — N30.00 ACUTE CYSTITIS WITHOUT HEMATURIA: Primary | ICD-10-CM

## 2025-06-09 RX ORDER — MINOCYCLINE HYDROCHLORIDE 100 MG/1
100 TABLET ORAL 2 TIMES DAILY
Qty: 14 TABLET | Refills: 0 | Status: SHIPPED | OUTPATIENT
Start: 2025-06-09 | End: 2025-06-16

## 2025-06-10 ENCOUNTER — TELEPHONE (OUTPATIENT)
Age: OVER 89
End: 2025-06-10

## 2025-06-10 DIAGNOSIS — R11.0 NAUSEA: Primary | ICD-10-CM

## 2025-06-10 RX ORDER — ONDANSETRON 4 MG/1
4 TABLET, FILM COATED ORAL EVERY 8 HOURS PRN
Qty: 20 TABLET | Refills: 0 | Status: SHIPPED | OUTPATIENT
Start: 2025-06-10

## 2025-06-10 NOTE — TELEPHONE ENCOUNTER
Raman, the patient's son, said he missed a call from Dr Rowe. I informed him that the doctor stated that she would call later.

## 2025-06-10 NOTE — TELEPHONE ENCOUNTER
I called and spoke with the patient's son and made him aware he verbalized understanding and agreement.

## 2025-06-10 NOTE — TELEPHONE ENCOUNTER
Son called and stated that the home health nurse stated that he should call the Primary Care Provider for something for nausea.  He stated that she is allergic to certain meds and the son would like the Primary Care Provider to send it in to [facility].   The patient is getting nauseous and dizzy when they lay her flat to change her.  The son stated that did change the blood pressure meds.  They have check the blood pressure and its stable.   He is looking for the pcp to call him back at

## 2025-06-11 ENCOUNTER — TELEPHONE (OUTPATIENT)
Dept: FAMILY MEDICINE CLINIC | Facility: CLINIC | Age: OVER 89
End: 2025-06-11

## 2025-06-11 NOTE — TELEPHONE ENCOUNTER
Type of form: home care orders via fax from [address] [place] [address].    Order # [id number]      Routing to clinical pool.

## 2025-06-13 NOTE — TELEPHONE ENCOUNTER
Type of form: plan of care via fax.  Order # [id number] and # [id number]      Forms have been placed in Amina Rowe DO folder to be completed for clinical staff.     Routing to clinical pool.

## 2025-06-18 ENCOUNTER — TELEPHONE (OUTPATIENT)
Dept: FAMILY MEDICINE CLINIC | Facility: CLINIC | Age: OVER 89
End: 2025-06-18

## 2025-06-18 NOTE — TELEPHONE ENCOUNTER
Type of form: home health plan of care via fax.    Order # [id number]    Forms have been placed in Amina Rowe DO folder to be completed for clinical staff.     Routing to clinical pool.

## 2025-06-24 PROBLEM — N30.00 ACUTE CYSTITIS WITHOUT HEMATURIA: Status: RESOLVED | Noted: 2023-04-25 | Resolved: 2025-06-24

## 2025-06-25 DIAGNOSIS — I47.19 ATRIAL TACHYCARDIA (HCC): ICD-10-CM

## 2025-06-25 DIAGNOSIS — R11.0 NAUSEA: ICD-10-CM

## 2025-06-25 RX ORDER — AMIODARONE HYDROCHLORIDE 200 MG/1
TABLET ORAL
Qty: 50 TABLET | Refills: 0 | Status: SHIPPED | OUTPATIENT
Start: 2025-06-25

## 2025-06-25 RX ORDER — ONDANSETRON 4 MG/1
4 TABLET, FILM COATED ORAL EVERY 8 HOURS PRN
Qty: 20 TABLET | Refills: 0 | Status: SHIPPED | OUTPATIENT
Start: 2025-06-25

## 2025-06-25 NOTE — TELEPHONE ENCOUNTER
Reason for call:   [x] Refill   [] Prior Auth  [] Other:     Office:   [x] PCP/Provider -  Amina Rowe, DO   [] Specialty/Provider -     Medication:     ondansetron (ZOFRAN) 4 mg tablet Take 1 tablet (4 mg total) by mouth every 8 (eight) hours as needed for nausea or vomiting   20      amiodarone 200 mg tablet Take 1 tablet 3 times a day until 6/4/25. Take 1 tablet daily starting 6/5/25 Do not start before May 30, 2025.   30    Pharmacy: [facility]/pharmacy #[id number] - [place] - [address] [place] [phone]     Local Pharmacy   Does the patient have enough for 3 days?   [] Yes   [x] No - Send as HP to POD    Mail Away Pharmacy   Does the patient have enough for 10 days?   [] Yes   [] No - Send as HP to POD

## 2025-06-25 NOTE — TELEPHONE ENCOUNTER
Requested medication(s) are due for refill today: If no, explain: amiodarone prescribed inpatient at hospital  **If antibiotic or given during sick visit, contact patient to discuss current symptoms.   **Confirm prescribing provider    LOV:  6/4/25 - telehealth  **If longer then 1 year, contact patient to schedule annual PRIOR to refilling. Once scheduled, adjust refill for 30 days, no refills.  **Update CareEverywhere to confirm not being seen elsewhere    NOV:  unknown date    Is patient due for annual visit: No  **If future appointment, adjust to annual/follow up.  ** No appointment call to schedule annual/follow up.    Route to PCP, unless PCP no longer here, then physician they are seeing next.

## 2025-06-26 ENCOUNTER — TELEPHONE (OUTPATIENT)
Age: OVER 89
End: 2025-06-26

## 2025-06-26 NOTE — TELEPHONE ENCOUNTER
Called and left message for caregiver to return call to the office to advise that patient would need to be seen to discuss issue. Patient can schedule appointment or go to urgent care  
Home health aware. She is going to call the patients son to see if he can take her .  
Keyla from [address] [place] [address] called to report to patients pcp that she had seen patient for a visit today and she had heard an audible wheeze come from the patient. Keyla stated she had listened to her lungs and they had been clear. Keyla stated all of her vitals were stable. Keyla is asking for pcp to review and if there is any recommendations or advise further please return Yumiko call at [phone].  
Pt admitted to occasional alcohol use and claimed not to have any problem with his alcohol intake. Emotional support and psychoeducation re alcohol provided. Pt verbalized understanding.

## 2025-06-27 ENCOUNTER — TELEPHONE (OUTPATIENT)
Dept: FAMILY MEDICINE CLINIC | Facility: CLINIC | Age: OVER 89
End: 2025-06-27

## 2025-06-27 NOTE — TELEPHONE ENCOUNTER
Type of form: Physician Orders  via fax from [address] [place] [address]  Order # [id number], [id number].  .     Forms have been placed in Amina Rowe DO folder to be signed        [employer]

## 2025-07-02 NOTE — TELEPHONE ENCOUNTER
Type of form: PT plan of care via fax.    Order # [id number]    Forms have been placed in Dr. Olivo's  folder to be completed for clinical staff.     Routing to clinical pool.

## 2025-07-21 NOTE — NURSING NOTE
Discharge instructions reviewed with daughter and questions answered  Scripts sent to [facility] per MD as requested by daughter  Discharge to home with home health  Left arm; Negative

## 2025-07-22 ENCOUNTER — TELEPHONE (OUTPATIENT)
Dept: FAMILY MEDICINE CLINIC | Facility: CLINIC | Age: OVER 89
End: 2025-07-22

## 2025-07-22 NOTE — TELEPHONE ENCOUNTER
Type of form: Physician orders via fax [address] [place] [address].        Forms have been placed in Amina Rowe DO folder to be signed    Routing to clinical pool.

## 2025-07-28 ENCOUNTER — TELEPHONE (OUTPATIENT)
Dept: FAMILY MEDICINE CLINIC | Facility: CLINIC | Age: OVER 89
End: 2025-07-28

## 2025-07-29 ENCOUNTER — TELEPHONE (OUTPATIENT)
Dept: FAMILY MEDICINE CLINIC | Facility: CLINIC | Age: OVER 89
End: 2025-07-29

## 2025-07-31 ENCOUNTER — TELEPHONE (OUTPATIENT)
Dept: FAMILY MEDICINE CLINIC | Facility: CLINIC | Age: OVER 89
End: 2025-07-31

## 2025-08-04 ENCOUNTER — TELEPHONE (OUTPATIENT)
Dept: FAMILY MEDICINE CLINIC | Facility: CLINIC | Age: OVER 89
End: 2025-08-04